# Patient Record
Sex: FEMALE | Race: WHITE | Employment: OTHER | URBAN - METROPOLITAN AREA
[De-identification: names, ages, dates, MRNs, and addresses within clinical notes are randomized per-mention and may not be internally consistent; named-entity substitution may affect disease eponyms.]

---

## 2017-04-03 PROBLEM — M84.48XA: Status: ACTIVE | Noted: 2017-04-03

## 2017-04-05 PROBLEM — M80.08XA PATHOLOGICAL FRACTURE OF VERTEBRA DUE TO OSTEOPOROSIS (HCC): Status: ACTIVE | Noted: 2017-04-03

## 2017-04-05 PROBLEM — E07.89 THYROID MASS OF UNCLEAR ETIOLOGY: Status: ACTIVE | Noted: 2017-04-05

## 2017-04-05 PROBLEM — R91.1 INCIDENTAL PULMONARY NODULE, GREATER THAN OR EQUAL TO 8MM: Status: ACTIVE | Noted: 2017-04-05

## 2017-04-11 ENCOUNTER — TELEPHONE (OUTPATIENT)
Dept: PULMONOLOGY | Age: 67
End: 2017-04-11

## 2017-04-11 ENCOUNTER — TELEPHONE (OUTPATIENT)
Dept: CASE MANAGEMENT | Age: 67
End: 2017-04-11

## 2017-04-11 DIAGNOSIS — R91.1 PULMONARY NODULE, LEFT: Primary | ICD-10-CM

## 2017-04-14 PROBLEM — J96.00 ACUTE RESPIRATORY FAILURE (HCC): Status: ACTIVE | Noted: 2017-04-14

## 2017-04-14 PROBLEM — J44.1 COPD EXACERBATION (HCC): Status: ACTIVE | Noted: 2017-04-14

## 2017-04-14 PROBLEM — T17.500A MUCUS PLUGGING OF BRONCHI: Status: ACTIVE | Noted: 2017-04-14

## 2017-04-14 PROBLEM — J20.9 ACUTE BRONCHITIS: Status: ACTIVE | Noted: 2017-04-14

## 2017-04-14 PROBLEM — J96.01 ACUTE RESPIRATORY FAILURE WITH HYPOXIA (HCC): Status: ACTIVE | Noted: 2017-04-14

## 2017-04-15 PROBLEM — R09.02 HYPOXEMIA: Status: ACTIVE | Noted: 2017-04-15

## 2017-04-15 PROBLEM — J96.01 ACUTE RESPIRATORY FAILURE WITH HYPOXIA (HCC): Status: RESOLVED | Noted: 2017-04-14 | Resolved: 2017-04-15

## 2017-04-25 ENCOUNTER — TELEPHONE (OUTPATIENT)
Dept: PULMONOLOGY | Age: 67
End: 2017-04-25

## 2017-04-27 ENCOUNTER — OFFICE VISIT (OUTPATIENT)
Dept: ENDOCRINOLOGY | Age: 67
End: 2017-04-27

## 2017-04-27 VITALS
RESPIRATION RATE: 16 BRPM | SYSTOLIC BLOOD PRESSURE: 125 MMHG | OXYGEN SATURATION: 98 % | HEIGHT: 62 IN | WEIGHT: 158 LBS | DIASTOLIC BLOOD PRESSURE: 79 MMHG | BODY MASS INDEX: 29.08 KG/M2 | HEART RATE: 83 BPM

## 2017-04-27 DIAGNOSIS — E07.89 THYROID MASS OF UNCLEAR ETIOLOGY: ICD-10-CM

## 2017-04-27 DIAGNOSIS — M80.08XS PATHOLOGICAL FRACTURE OF VERTEBRA DUE TO OSTEOPOROSIS, UNSPECIFIED OSTEOPOROSIS TYPE, SEQUELA: ICD-10-CM

## 2017-04-27 DIAGNOSIS — S22.000A COMPRESSION FRACTURE OF BODY OF THORACIC VERTEBRA (HCC): ICD-10-CM

## 2017-04-27 DIAGNOSIS — N95.1 POSTMENOPAUSAL DISORDER: Primary | ICD-10-CM

## 2017-04-27 PROCEDURE — 1123F ACP DISCUSS/DSCN MKR DOCD: CPT | Performed by: INTERNAL MEDICINE

## 2017-04-27 PROCEDURE — G8420 CALC BMI NORM PARAMETERS: HCPCS | Performed by: INTERNAL MEDICINE

## 2017-04-27 PROCEDURE — 1111F DSCHRG MED/CURRENT MED MERGE: CPT | Performed by: INTERNAL MEDICINE

## 2017-04-27 PROCEDURE — 4040F PNEUMOC VAC/ADMIN/RCVD: CPT | Performed by: INTERNAL MEDICINE

## 2017-04-27 PROCEDURE — 1090F PRES/ABSN URINE INCON ASSESS: CPT | Performed by: INTERNAL MEDICINE

## 2017-04-27 PROCEDURE — G8427 DOCREV CUR MEDS BY ELIG CLIN: HCPCS | Performed by: INTERNAL MEDICINE

## 2017-04-27 PROCEDURE — 99205 OFFICE O/P NEW HI 60 MIN: CPT | Performed by: INTERNAL MEDICINE

## 2017-04-27 PROCEDURE — 4005F PHARM THX FOR OP RXD: CPT | Performed by: INTERNAL MEDICINE

## 2017-04-27 PROCEDURE — 3014F SCREEN MAMMO DOC REV: CPT | Performed by: INTERNAL MEDICINE

## 2017-04-27 PROCEDURE — G8400 PT W/DXA NO RESULTS DOC: HCPCS | Performed by: INTERNAL MEDICINE

## 2017-04-27 PROCEDURE — 3017F COLORECTAL CA SCREEN DOC REV: CPT | Performed by: INTERNAL MEDICINE

## 2017-04-27 PROCEDURE — 1036F TOBACCO NON-USER: CPT | Performed by: INTERNAL MEDICINE

## 2017-04-28 ENCOUNTER — TELEPHONE (OUTPATIENT)
Dept: CT IMAGING | Age: 67
End: 2017-04-28

## 2017-04-28 ENCOUNTER — HOSPITAL ENCOUNTER (OUTPATIENT)
Dept: OTHER | Age: 67
Discharge: HOME | End: 2017-04-28
Attending: INTERNAL MEDICINE | Admitting: INTERNAL MEDICINE

## 2017-04-28 DIAGNOSIS — M80.08XS PATHOLOGICAL FRACTURE OF VERTEBRA DUE TO OSTEOPOROSIS, UNSPECIFIED OSTEOPOROSIS TYPE, SEQUELA: ICD-10-CM

## 2017-04-28 DIAGNOSIS — N95.1 POSTMENOPAUSAL DISORDER: ICD-10-CM

## 2017-04-28 DIAGNOSIS — S22.000A COMPRESSION FRACTURE OF BODY OF THORACIC VERTEBRA (HCC): ICD-10-CM

## 2017-05-02 ENCOUNTER — TELEPHONE (OUTPATIENT)
Dept: FAMILY MEDICINE CLINIC | Age: 67
End: 2017-05-02

## 2017-05-04 ENCOUNTER — OFFICE VISIT (OUTPATIENT)
Dept: FAMILY MEDICINE CLINIC | Age: 67
End: 2017-05-04

## 2017-05-04 VITALS
HEIGHT: 63 IN | DIASTOLIC BLOOD PRESSURE: 68 MMHG | RESPIRATION RATE: 16 BRPM | TEMPERATURE: 97.9 F | OXYGEN SATURATION: 96 % | HEART RATE: 78 BPM | WEIGHT: 155.4 LBS | SYSTOLIC BLOOD PRESSURE: 118 MMHG | BODY MASS INDEX: 27.54 KG/M2

## 2017-05-04 DIAGNOSIS — Z13.220 LIPID SCREENING: ICD-10-CM

## 2017-05-04 DIAGNOSIS — Z23 NEED FOR PNEUMOCOCCAL VACCINATION: ICD-10-CM

## 2017-05-04 DIAGNOSIS — M81.0 OSTEOPOROSIS: ICD-10-CM

## 2017-05-04 DIAGNOSIS — Z92.89 HX OF SCREENING MAMMOGRAPHY: ICD-10-CM

## 2017-05-04 DIAGNOSIS — K59.09 OTHER CONSTIPATION: ICD-10-CM

## 2017-05-04 DIAGNOSIS — J43.8 OTHER EMPHYSEMA (HCC): ICD-10-CM

## 2017-05-04 DIAGNOSIS — Z12.11 COLON CANCER SCREENING: ICD-10-CM

## 2017-05-04 DIAGNOSIS — S22.000A COMPRESSION FX, THORACIC SPINE, CLOSED, INITIAL ENCOUNTER (HCC): ICD-10-CM

## 2017-05-04 DIAGNOSIS — N95.1 POSTMENOPAUSAL DISORDER: ICD-10-CM

## 2017-05-04 DIAGNOSIS — Z87.891 FORMER SMOKER: ICD-10-CM

## 2017-05-04 DIAGNOSIS — K63.5 POLYP OF COLON: ICD-10-CM

## 2017-05-04 DIAGNOSIS — R91.1 LUNG NODULE: Primary | ICD-10-CM

## 2017-05-04 DIAGNOSIS — Z12.4 CERVICAL CANCER SCREENING: ICD-10-CM

## 2017-05-04 DIAGNOSIS — E07.89 THYROID MASS OF UNCLEAR ETIOLOGY: ICD-10-CM

## 2017-05-04 PROBLEM — J44.9 CHRONIC OBSTRUCTIVE PULMONARY DISEASE (HCC): Status: ACTIVE | Noted: 2017-05-04

## 2017-05-04 PROCEDURE — 1090F PRES/ABSN URINE INCON ASSESS: CPT | Performed by: FAMILY MEDICINE

## 2017-05-04 PROCEDURE — 4040F PNEUMOC VAC/ADMIN/RCVD: CPT | Performed by: FAMILY MEDICINE

## 2017-05-04 PROCEDURE — 3023F SPIROM DOC REV: CPT | Performed by: FAMILY MEDICINE

## 2017-05-04 PROCEDURE — 3017F COLORECTAL CA SCREEN DOC REV: CPT | Performed by: FAMILY MEDICINE

## 2017-05-04 PROCEDURE — 90732 PPSV23 VACC 2 YRS+ SUBQ/IM: CPT | Performed by: FAMILY MEDICINE

## 2017-05-04 PROCEDURE — 1036F TOBACCO NON-USER: CPT | Performed by: FAMILY MEDICINE

## 2017-05-04 PROCEDURE — 4005F PHARM THX FOR OP RXD: CPT | Performed by: FAMILY MEDICINE

## 2017-05-04 PROCEDURE — G8420 CALC BMI NORM PARAMETERS: HCPCS | Performed by: FAMILY MEDICINE

## 2017-05-04 PROCEDURE — G8926 SPIRO NO PERF OR DOC: HCPCS | Performed by: FAMILY MEDICINE

## 2017-05-04 PROCEDURE — G8399 PT W/DXA RESULTS DOCUMENT: HCPCS | Performed by: FAMILY MEDICINE

## 2017-05-04 PROCEDURE — G8427 DOCREV CUR MEDS BY ELIG CLIN: HCPCS | Performed by: FAMILY MEDICINE

## 2017-05-04 PROCEDURE — 99204 OFFICE O/P NEW MOD 45 MIN: CPT | Performed by: FAMILY MEDICINE

## 2017-05-04 PROCEDURE — 1123F ACP DISCUSS/DSCN MKR DOCD: CPT | Performed by: FAMILY MEDICINE

## 2017-05-04 PROCEDURE — G0009 ADMIN PNEUMOCOCCAL VACCINE: HCPCS | Performed by: FAMILY MEDICINE

## 2017-05-04 PROCEDURE — 1111F DSCHRG MED/CURRENT MED MERGE: CPT | Performed by: FAMILY MEDICINE

## 2017-05-04 PROCEDURE — 3014F SCREEN MAMMO DOC REV: CPT | Performed by: FAMILY MEDICINE

## 2017-05-04 RX ORDER — TRAMADOL HYDROCHLORIDE 50 MG/1
50 TABLET ORAL 2 TIMES DAILY
Status: ON HOLD | COMMUNITY
End: 2017-05-19

## 2017-05-04 RX ORDER — ALBUTEROL SULFATE 90 UG/1
2 AEROSOL, METERED RESPIRATORY (INHALATION) EVERY 6 HOURS PRN
Qty: 1 INHALER | Refills: 3 | Status: SHIPPED | OUTPATIENT
Start: 2017-05-04 | End: 2017-07-11 | Stop reason: SDUPTHER

## 2017-05-04 RX ORDER — SENNA PLUS 8.6 MG/1
1 TABLET ORAL NIGHTLY
Qty: 30 TABLET | Refills: 0 | Status: SHIPPED | OUTPATIENT
Start: 2017-05-04 | End: 2017-06-03

## 2017-05-04 RX ORDER — CALCIUM CARBONATE-CHOLECALCIFEROL TAB 250 MG-125 UNIT 250-125 MG-UNIT
1 TAB ORAL 2 TIMES DAILY WITH MEALS
Qty: 60 TABLET | Refills: 1 | Status: SHIPPED | OUTPATIENT
Start: 2017-05-04 | End: 2017-08-29

## 2017-05-04 ASSESSMENT — ENCOUNTER SYMPTOMS
COUGH: 0
COLOR CHANGE: 0
SORE THROAT: 0
SINUS PRESSURE: 0
EYE ITCHING: 0
STRIDOR: 0
EYE REDNESS: 0
VOMITING: 0
APNEA: 0
NAUSEA: 0
WHEEZING: 0
ABDOMINAL PAIN: 0
BLOOD IN STOOL: 0
TROUBLE SWALLOWING: 0
ANAL BLEEDING: 0
SHORTNESS OF BREATH: 0
EYE PAIN: 0
RECTAL PAIN: 0
BACK PAIN: 1
PHOTOPHOBIA: 0
EYE DISCHARGE: 0
VOICE CHANGE: 0
DIARRHEA: 0
FACIAL SWELLING: 0
CHEST TIGHTNESS: 0
RHINORRHEA: 0
CHOKING: 0
CONSTIPATION: 0
ABDOMINAL DISTENTION: 0

## 2017-05-04 ASSESSMENT — PATIENT HEALTH QUESTIONNAIRE - PHQ9
1. LITTLE INTEREST OR PLEASURE IN DOING THINGS: 0
SUM OF ALL RESPONSES TO PHQ QUESTIONS 1-9: 0
2. FEELING DOWN, DEPRESSED OR HOPELESS: 0
SUM OF ALL RESPONSES TO PHQ9 QUESTIONS 1 & 2: 0

## 2017-05-05 ENCOUNTER — OFFICE VISIT (OUTPATIENT)
Dept: ENDOCRINOLOGY | Age: 67
End: 2017-05-05

## 2017-05-05 VITALS
RESPIRATION RATE: 18 BRPM | WEIGHT: 158.2 LBS | DIASTOLIC BLOOD PRESSURE: 87 MMHG | HEIGHT: 62 IN | SYSTOLIC BLOOD PRESSURE: 113 MMHG | OXYGEN SATURATION: 93 % | BODY MASS INDEX: 29.11 KG/M2 | HEART RATE: 99 BPM

## 2017-05-05 DIAGNOSIS — E04.1 RIGHT THYROID NODULE: Primary | ICD-10-CM

## 2017-05-05 DIAGNOSIS — M81.0 OSTEOPOROSIS: ICD-10-CM

## 2017-05-05 PROCEDURE — 1036F TOBACCO NON-USER: CPT | Performed by: INTERNAL MEDICINE

## 2017-05-05 PROCEDURE — 4040F PNEUMOC VAC/ADMIN/RCVD: CPT | Performed by: INTERNAL MEDICINE

## 2017-05-05 PROCEDURE — G8420 CALC BMI NORM PARAMETERS: HCPCS | Performed by: INTERNAL MEDICINE

## 2017-05-05 PROCEDURE — 10022 PR FINE NEEDLE ASP;W/IMAGING GUIDANCE: CPT | Performed by: INTERNAL MEDICINE

## 2017-05-05 PROCEDURE — G8399 PT W/DXA RESULTS DOCUMENT: HCPCS | Performed by: INTERNAL MEDICINE

## 2017-05-05 PROCEDURE — 4005F PHARM THX FOR OP RXD: CPT | Performed by: INTERNAL MEDICINE

## 2017-05-05 PROCEDURE — 1111F DSCHRG MED/CURRENT MED MERGE: CPT | Performed by: INTERNAL MEDICINE

## 2017-05-05 PROCEDURE — 1123F ACP DISCUSS/DSCN MKR DOCD: CPT | Performed by: INTERNAL MEDICINE

## 2017-05-05 PROCEDURE — 3017F COLORECTAL CA SCREEN DOC REV: CPT | Performed by: INTERNAL MEDICINE

## 2017-05-05 PROCEDURE — 1090F PRES/ABSN URINE INCON ASSESS: CPT | Performed by: INTERNAL MEDICINE

## 2017-05-05 PROCEDURE — 3014F SCREEN MAMMO DOC REV: CPT | Performed by: INTERNAL MEDICINE

## 2017-05-05 PROCEDURE — 99214 OFFICE O/P EST MOD 30 MIN: CPT | Performed by: INTERNAL MEDICINE

## 2017-05-05 PROCEDURE — G8427 DOCREV CUR MEDS BY ELIG CLIN: HCPCS | Performed by: INTERNAL MEDICINE

## 2017-05-05 RX ORDER — ACETAMINOPHEN 325 MG/1
650 TABLET ORAL EVERY 6 HOURS PRN
Status: ON HOLD | COMMUNITY
End: 2017-05-19 | Stop reason: HOSPADM

## 2017-05-05 RX ORDER — FLUTICASONE FUROATE AND VILANTEROL 100; 25 UG/1; UG/1
POWDER RESPIRATORY (INHALATION) DAILY
COMMUNITY
End: 2017-08-29

## 2017-05-05 ASSESSMENT — ENCOUNTER SYMPTOMS
PHOTOPHOBIA: 0
ORTHOPNEA: 0
DOUBLE VISION: 0
BLURRED VISION: 0
HEMOPTYSIS: 0
COUGH: 0
BACK PAIN: 0

## 2017-05-08 ENCOUNTER — TELEPHONE (OUTPATIENT)
Dept: FAMILY MEDICINE CLINIC | Age: 67
End: 2017-05-08

## 2017-05-08 DIAGNOSIS — R53.81 PHYSICAL DECONDITIONING: Primary | ICD-10-CM

## 2017-05-08 DIAGNOSIS — M80.08XS PATHOLOGICAL FRACTURE OF VERTEBRA DUE TO OSTEOPOROSIS, UNSPECIFIED OSTEOPOROSIS TYPE, SEQUELA: ICD-10-CM

## 2017-05-08 DIAGNOSIS — S22.000A COMPRESSION FRACTURE OF BODY OF THORACIC VERTEBRA (HCC): ICD-10-CM

## 2017-05-08 DIAGNOSIS — N95.1 POSTMENOPAUSAL DISORDER: ICD-10-CM

## 2017-05-08 LAB
24HR URINE VOLUME (ML): 2750 ML
CALCIUM 24 HOUR URINE: 204 MG/24 HR (ref 42–353)
CREATININE 24 HOUR URINE: 1.2 G/24HR (ref 0.6–1.5)

## 2017-05-12 ENCOUNTER — TELEPHONE (OUTPATIENT)
Dept: ENDOCRINOLOGY | Age: 67
End: 2017-05-12

## 2017-05-12 DIAGNOSIS — Z13.220 LIPID SCREENING: ICD-10-CM

## 2017-05-12 DIAGNOSIS — E07.9 THYROID MASS: Primary | ICD-10-CM

## 2017-05-12 LAB
A/G RATIO: 1.9 (ref 1.1–2.2)
ALBUMIN SERPL-MCNC: 4.6 G/DL (ref 3.4–5)
ALP BLD-CCNC: 78 U/L (ref 40–129)
ALT SERPL-CCNC: 13 U/L (ref 10–40)
ANION GAP SERPL CALCULATED.3IONS-SCNC: 15 MMOL/L (ref 3–16)
AST SERPL-CCNC: 14 U/L (ref 15–37)
BILIRUB SERPL-MCNC: 0.4 MG/DL (ref 0–1)
BUN BLDV-MCNC: 17 MG/DL (ref 7–20)
CALCIUM SERPL-MCNC: 9.9 MG/DL (ref 8.3–10.6)
CHLORIDE BLD-SCNC: 100 MMOL/L (ref 99–110)
CHOLESTEROL, TOTAL: 180 MG/DL (ref 0–199)
CO2: 25 MMOL/L (ref 21–32)
CREAT SERPL-MCNC: 0.6 MG/DL (ref 0.6–1.2)
GFR AFRICAN AMERICAN: >60
GFR NON-AFRICAN AMERICAN: >60
GLOBULIN: 2.4 G/DL
GLUCOSE BLD-MCNC: 82 MG/DL (ref 70–99)
HDLC SERPL-MCNC: 57 MG/DL (ref 40–60)
LDL CHOLESTEROL CALCULATED: 107 MG/DL
POTASSIUM SERPL-SCNC: 4.3 MMOL/L (ref 3.5–5.1)
SODIUM BLD-SCNC: 140 MMOL/L (ref 136–145)
TOTAL PROTEIN: 7 G/DL (ref 6.4–8.2)
TRIGL SERPL-MCNC: 81 MG/DL (ref 0–150)
VLDLC SERPL CALC-MCNC: 16 MG/DL

## 2017-05-17 ENCOUNTER — HOSPITAL ENCOUNTER (OUTPATIENT)
Dept: MAMMOGRAPHY | Age: 67
Discharge: HOME | End: 2017-05-17
Attending: FAMILY MEDICINE | Admitting: FAMILY MEDICINE

## 2017-05-17 ENCOUNTER — HOSPITAL ENCOUNTER (OUTPATIENT)
Dept: CT IMAGING | Age: 67
Discharge: HOME | End: 2017-05-17
Attending: FAMILY MEDICINE | Admitting: FAMILY MEDICINE

## 2017-05-17 DIAGNOSIS — Z12.31 VISIT FOR SCREENING MAMMOGRAM: ICD-10-CM

## 2017-05-17 DIAGNOSIS — R91.1 SOLITARY PULMONARY NODULE: ICD-10-CM

## 2017-05-17 DIAGNOSIS — R91.1 LUNG NODULE: ICD-10-CM

## 2017-05-17 DIAGNOSIS — Z87.891 FORMER SMOKER: ICD-10-CM

## 2017-05-17 PROBLEM — M80.08XA VERTEBRAL FRACTURE, OSTEOPOROTIC (HCC): Status: ACTIVE | Noted: 2017-05-17

## 2017-05-22 ENCOUNTER — CARE COORDINATION (OUTPATIENT)
Dept: CARE COORDINATION | Age: 67
End: 2017-05-22

## 2017-05-22 ENCOUNTER — TELEPHONE (OUTPATIENT)
Dept: ENDOCRINOLOGY | Age: 67
End: 2017-05-22

## 2017-05-26 ENCOUNTER — OFFICE VISIT (OUTPATIENT)
Dept: SURGERY | Age: 67
End: 2017-05-26

## 2017-05-26 VITALS
TEMPERATURE: 98.1 F | BODY MASS INDEX: 28.52 KG/M2 | WEIGHT: 155 LBS | SYSTOLIC BLOOD PRESSURE: 125 MMHG | OXYGEN SATURATION: 95 % | HEIGHT: 62 IN | DIASTOLIC BLOOD PRESSURE: 92 MMHG | HEART RATE: 90 BPM

## 2017-05-26 DIAGNOSIS — E07.9 THYROID MASS: Primary | ICD-10-CM

## 2017-05-26 PROCEDURE — 4040F PNEUMOC VAC/ADMIN/RCVD: CPT | Performed by: SURGERY

## 2017-05-26 PROCEDURE — G8399 PT W/DXA RESULTS DOCUMENT: HCPCS | Performed by: SURGERY

## 2017-05-26 PROCEDURE — 99205 OFFICE O/P NEW HI 60 MIN: CPT | Performed by: SURGERY

## 2017-05-26 PROCEDURE — 1123F ACP DISCUSS/DSCN MKR DOCD: CPT | Performed by: SURGERY

## 2017-05-26 PROCEDURE — 3014F SCREEN MAMMO DOC REV: CPT | Performed by: SURGERY

## 2017-05-26 PROCEDURE — G8420 CALC BMI NORM PARAMETERS: HCPCS | Performed by: SURGERY

## 2017-05-26 PROCEDURE — 3017F COLORECTAL CA SCREEN DOC REV: CPT | Performed by: SURGERY

## 2017-05-26 PROCEDURE — G8427 DOCREV CUR MEDS BY ELIG CLIN: HCPCS | Performed by: SURGERY

## 2017-05-26 PROCEDURE — 1111F DSCHRG MED/CURRENT MED MERGE: CPT | Performed by: SURGERY

## 2017-05-26 PROCEDURE — 1036F TOBACCO NON-USER: CPT | Performed by: SURGERY

## 2017-05-26 PROCEDURE — 1090F PRES/ABSN URINE INCON ASSESS: CPT | Performed by: SURGERY

## 2017-05-30 ENCOUNTER — TELEPHONE (OUTPATIENT)
Dept: PULMONOLOGY | Age: 67
End: 2017-05-30

## 2017-05-30 ENCOUNTER — OFFICE VISIT (OUTPATIENT)
Dept: PULMONOLOGY | Age: 67
End: 2017-05-30

## 2017-05-30 VITALS
SYSTOLIC BLOOD PRESSURE: 122 MMHG | WEIGHT: 152 LBS | OXYGEN SATURATION: 95 % | RESPIRATION RATE: 16 BRPM | HEIGHT: 63 IN | DIASTOLIC BLOOD PRESSURE: 80 MMHG | BODY MASS INDEX: 26.93 KG/M2 | HEART RATE: 81 BPM

## 2017-05-30 DIAGNOSIS — J44.9 COPD, SEVERITY TO BE DETERMINED (HCC): Primary | ICD-10-CM

## 2017-05-30 DIAGNOSIS — R91.1 PULMONARY NODULE: ICD-10-CM

## 2017-05-30 PROCEDURE — G8420 CALC BMI NORM PARAMETERS: HCPCS | Performed by: INTERNAL MEDICINE

## 2017-05-30 PROCEDURE — 3023F SPIROM DOC REV: CPT | Performed by: INTERNAL MEDICINE

## 2017-05-30 PROCEDURE — G8926 SPIRO NO PERF OR DOC: HCPCS | Performed by: INTERNAL MEDICINE

## 2017-05-30 PROCEDURE — 3017F COLORECTAL CA SCREEN DOC REV: CPT | Performed by: INTERNAL MEDICINE

## 2017-05-30 PROCEDURE — 4040F PNEUMOC VAC/ADMIN/RCVD: CPT | Performed by: INTERNAL MEDICINE

## 2017-05-30 PROCEDURE — 1111F DSCHRG MED/CURRENT MED MERGE: CPT | Performed by: INTERNAL MEDICINE

## 2017-05-30 PROCEDURE — G8399 PT W/DXA RESULTS DOCUMENT: HCPCS | Performed by: INTERNAL MEDICINE

## 2017-05-30 PROCEDURE — G8427 DOCREV CUR MEDS BY ELIG CLIN: HCPCS | Performed by: INTERNAL MEDICINE

## 2017-05-30 PROCEDURE — 99214 OFFICE O/P EST MOD 30 MIN: CPT | Performed by: INTERNAL MEDICINE

## 2017-05-30 PROCEDURE — 1036F TOBACCO NON-USER: CPT | Performed by: INTERNAL MEDICINE

## 2017-05-30 PROCEDURE — 1090F PRES/ABSN URINE INCON ASSESS: CPT | Performed by: INTERNAL MEDICINE

## 2017-05-30 PROCEDURE — 3014F SCREEN MAMMO DOC REV: CPT | Performed by: INTERNAL MEDICINE

## 2017-05-30 PROCEDURE — 1123F ACP DISCUSS/DSCN MKR DOCD: CPT | Performed by: INTERNAL MEDICINE

## 2017-06-05 ENCOUNTER — HOSPITAL ENCOUNTER (OUTPATIENT)
Dept: PULMONOLOGY | Age: 67
Discharge: HOME | End: 2017-06-05
Attending: INTERNAL MEDICINE | Admitting: INTERNAL MEDICINE

## 2017-06-05 DIAGNOSIS — J44.9 CHRONIC OBSTRUCTIVE PULMONARY DISEASE (HCC): ICD-10-CM

## 2017-06-05 RX ORDER — ALBUTEROL SULFATE 2.5 MG/3ML
2.5 SOLUTION RESPIRATORY (INHALATION) ONCE
Status: COMPLETED | OUTPATIENT
Start: 2017-06-05 | End: 2017-06-05

## 2017-06-05 RX ADMIN — ALBUTEROL SULFATE 2.5 MG: 2.5 SOLUTION RESPIRATORY (INHALATION) at 09:05

## 2017-06-06 ENCOUNTER — TELEPHONE (OUTPATIENT)
Dept: FAMILY MEDICINE CLINIC | Age: 67
End: 2017-06-06

## 2017-06-08 ENCOUNTER — OFFICE VISIT (OUTPATIENT)
Dept: FAMILY MEDICINE CLINIC | Age: 67
End: 2017-06-08

## 2017-06-08 VITALS
TEMPERATURE: 98.5 F | RESPIRATION RATE: 16 BRPM | BODY MASS INDEX: 27.55 KG/M2 | SYSTOLIC BLOOD PRESSURE: 128 MMHG | OXYGEN SATURATION: 96 % | WEIGHT: 149.7 LBS | HEART RATE: 82 BPM | DIASTOLIC BLOOD PRESSURE: 80 MMHG | HEIGHT: 62 IN

## 2017-06-08 DIAGNOSIS — R91.1 LUNG NODULE: ICD-10-CM

## 2017-06-08 DIAGNOSIS — Z98.890 S/P KYPHOPLASTY: ICD-10-CM

## 2017-06-08 DIAGNOSIS — K21.9 GASTROESOPHAGEAL REFLUX DISEASE WITHOUT ESOPHAGITIS: ICD-10-CM

## 2017-06-08 DIAGNOSIS — M54.50 CHRONIC BILATERAL LOW BACK PAIN WITHOUT SCIATICA: ICD-10-CM

## 2017-06-08 DIAGNOSIS — M80.08XD VERTEBRAL FRACTURE, OSTEOPOROTIC, WITH ROUTINE HEALING, SUBSEQUENT ENCOUNTER: ICD-10-CM

## 2017-06-08 DIAGNOSIS — G89.29 CHRONIC BILATERAL LOW BACK PAIN WITHOUT SCIATICA: ICD-10-CM

## 2017-06-08 DIAGNOSIS — J43.8 OTHER EMPHYSEMA (HCC): Primary | ICD-10-CM

## 2017-06-08 DIAGNOSIS — E07.9 THYROID MASS: ICD-10-CM

## 2017-06-08 DIAGNOSIS — Z12.11 COLON CANCER SCREENING: ICD-10-CM

## 2017-06-08 DIAGNOSIS — E04.1 THYROID NODULE: ICD-10-CM

## 2017-06-08 PROCEDURE — G8420 CALC BMI NORM PARAMETERS: HCPCS | Performed by: FAMILY MEDICINE

## 2017-06-08 PROCEDURE — 99214 OFFICE O/P EST MOD 30 MIN: CPT | Performed by: FAMILY MEDICINE

## 2017-06-08 PROCEDURE — G8427 DOCREV CUR MEDS BY ELIG CLIN: HCPCS | Performed by: FAMILY MEDICINE

## 2017-06-08 PROCEDURE — 3023F SPIROM DOC REV: CPT | Performed by: FAMILY MEDICINE

## 2017-06-08 PROCEDURE — G8399 PT W/DXA RESULTS DOCUMENT: HCPCS | Performed by: FAMILY MEDICINE

## 2017-06-08 PROCEDURE — 1111F DSCHRG MED/CURRENT MED MERGE: CPT | Performed by: FAMILY MEDICINE

## 2017-06-08 PROCEDURE — 4005F PHARM THX FOR OP RXD: CPT | Performed by: FAMILY MEDICINE

## 2017-06-08 PROCEDURE — 3017F COLORECTAL CA SCREEN DOC REV: CPT | Performed by: FAMILY MEDICINE

## 2017-06-08 PROCEDURE — 3014F SCREEN MAMMO DOC REV: CPT | Performed by: FAMILY MEDICINE

## 2017-06-08 PROCEDURE — G8926 SPIRO NO PERF OR DOC: HCPCS | Performed by: FAMILY MEDICINE

## 2017-06-08 PROCEDURE — 1036F TOBACCO NON-USER: CPT | Performed by: FAMILY MEDICINE

## 2017-06-08 PROCEDURE — 4040F PNEUMOC VAC/ADMIN/RCVD: CPT | Performed by: FAMILY MEDICINE

## 2017-06-08 PROCEDURE — 1123F ACP DISCUSS/DSCN MKR DOCD: CPT | Performed by: FAMILY MEDICINE

## 2017-06-08 PROCEDURE — 1090F PRES/ABSN URINE INCON ASSESS: CPT | Performed by: FAMILY MEDICINE

## 2017-06-08 RX ORDER — RANITIDINE 150 MG/1
150 TABLET ORAL 2 TIMES DAILY
Qty: 60 TABLET | Refills: 1 | Status: SHIPPED | OUTPATIENT
Start: 2017-06-08 | End: 2017-08-29

## 2017-06-08 RX ORDER — TRAMADOL HYDROCHLORIDE 50 MG/1
50 TABLET ORAL EVERY 6 HOURS PRN
Qty: 60 TABLET | Refills: 0 | Status: CANCELLED | OUTPATIENT
Start: 2017-06-08

## 2017-06-08 ASSESSMENT — ENCOUNTER SYMPTOMS
CONSTIPATION: 0
RECTAL PAIN: 0
COUGH: 0
NAUSEA: 0
SHORTNESS OF BREATH: 0
VOMITING: 0
APNEA: 0
STRIDOR: 0
ANAL BLEEDING: 0
CHEST TIGHTNESS: 0
DIARRHEA: 0
ABDOMINAL PAIN: 0
CHOKING: 0
BLOOD IN STOOL: 0
WHEEZING: 0
ABDOMINAL DISTENTION: 0

## 2017-07-11 ENCOUNTER — TELEPHONE (OUTPATIENT)
Dept: FAMILY MEDICINE CLINIC | Age: 67
End: 2017-07-11

## 2017-07-11 DIAGNOSIS — J43.8 OTHER EMPHYSEMA (HCC): ICD-10-CM

## 2017-07-11 RX ORDER — ALBUTEROL SULFATE 90 UG/1
2 AEROSOL, METERED RESPIRATORY (INHALATION) EVERY 6 HOURS PRN
Qty: 1 INHALER | Refills: 3 | Status: SHIPPED | OUTPATIENT
Start: 2017-07-11 | End: 2017-07-11 | Stop reason: SDUPTHER

## 2017-07-11 RX ORDER — ALBUTEROL SULFATE 90 UG/1
2 AEROSOL, METERED RESPIRATORY (INHALATION) EVERY 6 HOURS PRN
Qty: 1 INHALER | Refills: 3 | Status: SHIPPED | OUTPATIENT
Start: 2017-07-11 | End: 2017-07-11

## 2017-07-11 RX ORDER — ALBUTEROL SULFATE 90 UG/1
2 AEROSOL, METERED RESPIRATORY (INHALATION) EVERY 6 HOURS PRN
Qty: 1 INHALER | Refills: 3 | Status: SHIPPED | OUTPATIENT
Start: 2017-07-11 | End: 2017-08-29

## 2017-08-03 ENCOUNTER — TELEPHONE (OUTPATIENT)
Dept: FAMILY MEDICINE CLINIC | Age: 67
End: 2017-08-03

## 2017-08-03 RX ORDER — ALBUTEROL SULFATE 90 UG/1
2 AEROSOL, METERED RESPIRATORY (INHALATION) EVERY 6 HOURS PRN
Qty: 1 INHALER | Refills: 3 | Status: SHIPPED | OUTPATIENT
Start: 2017-08-03 | End: 2018-06-01 | Stop reason: SDUPTHER

## 2017-08-29 ENCOUNTER — HOSPITAL ENCOUNTER (OUTPATIENT)
Dept: CT IMAGING | Age: 67
Discharge: HOME | End: 2017-08-29
Attending: INTERNAL MEDICINE | Admitting: INTERNAL MEDICINE

## 2017-08-29 ENCOUNTER — OFFICE VISIT (OUTPATIENT)
Dept: PULMONOLOGY | Age: 67
End: 2017-08-29

## 2017-08-29 VITALS
BODY MASS INDEX: 26.4 KG/M2 | RESPIRATION RATE: 18 BRPM | DIASTOLIC BLOOD PRESSURE: 80 MMHG | SYSTOLIC BLOOD PRESSURE: 128 MMHG | HEIGHT: 63 IN | HEART RATE: 87 BPM | WEIGHT: 149 LBS | OXYGEN SATURATION: 95 %

## 2017-08-29 DIAGNOSIS — R91.1 PULMONARY NODULE: ICD-10-CM

## 2017-08-29 DIAGNOSIS — R91.1 SOLITARY PULMONARY NODULE: ICD-10-CM

## 2017-08-29 DIAGNOSIS — J43.2 CENTRILOBULAR EMPHYSEMA (HCC): Primary | ICD-10-CM

## 2017-08-29 DIAGNOSIS — J44.9 COPD, MODERATE (HCC): ICD-10-CM

## 2017-08-29 DIAGNOSIS — R91.1 INCIDENTAL PULMONARY NODULE, GREATER THAN OR EQUAL TO 8MM: ICD-10-CM

## 2017-08-29 PROCEDURE — 99214 OFFICE O/P EST MOD 30 MIN: CPT | Performed by: INTERNAL MEDICINE

## 2017-08-29 PROCEDURE — 1036F TOBACCO NON-USER: CPT | Performed by: INTERNAL MEDICINE

## 2017-08-29 PROCEDURE — 3023F SPIROM DOC REV: CPT | Performed by: INTERNAL MEDICINE

## 2017-08-29 PROCEDURE — G8427 DOCREV CUR MEDS BY ELIG CLIN: HCPCS | Performed by: INTERNAL MEDICINE

## 2017-08-29 PROCEDURE — 1123F ACP DISCUSS/DSCN MKR DOCD: CPT | Performed by: INTERNAL MEDICINE

## 2017-08-29 PROCEDURE — 3014F SCREEN MAMMO DOC REV: CPT | Performed by: INTERNAL MEDICINE

## 2017-08-29 PROCEDURE — 4040F PNEUMOC VAC/ADMIN/RCVD: CPT | Performed by: INTERNAL MEDICINE

## 2017-08-29 PROCEDURE — G8399 PT W/DXA RESULTS DOCUMENT: HCPCS | Performed by: INTERNAL MEDICINE

## 2017-08-29 PROCEDURE — G8926 SPIRO NO PERF OR DOC: HCPCS | Performed by: INTERNAL MEDICINE

## 2017-08-29 PROCEDURE — 1090F PRES/ABSN URINE INCON ASSESS: CPT | Performed by: INTERNAL MEDICINE

## 2017-08-29 PROCEDURE — 3017F COLORECTAL CA SCREEN DOC REV: CPT | Performed by: INTERNAL MEDICINE

## 2017-08-29 PROCEDURE — G8419 CALC BMI OUT NRM PARAM NOF/U: HCPCS | Performed by: INTERNAL MEDICINE

## 2017-08-29 RX ORDER — ACETAMINOPHEN 500 MG
500 TABLET ORAL EVERY 6 HOURS PRN
COMMUNITY

## 2017-08-30 ENCOUNTER — TELEPHONE (OUTPATIENT)
Dept: PULMONOLOGY | Age: 67
End: 2017-08-30

## 2017-08-30 DIAGNOSIS — J43.2 CENTRILOBULAR EMPHYSEMA (HCC): Primary | ICD-10-CM

## 2017-09-14 ENCOUNTER — OFFICE VISIT (OUTPATIENT)
Dept: FAMILY MEDICINE CLINIC | Age: 67
End: 2017-09-14

## 2017-09-14 VITALS
BODY MASS INDEX: 28.06 KG/M2 | HEIGHT: 62 IN | OXYGEN SATURATION: 93 % | HEART RATE: 82 BPM | DIASTOLIC BLOOD PRESSURE: 78 MMHG | RESPIRATION RATE: 16 BRPM | SYSTOLIC BLOOD PRESSURE: 126 MMHG | WEIGHT: 152.5 LBS | TEMPERATURE: 97.9 F

## 2017-09-14 DIAGNOSIS — Z98.890 STATUS POST KYPHOPLASTY: ICD-10-CM

## 2017-09-14 DIAGNOSIS — R91.1 LUNG NODULE: ICD-10-CM

## 2017-09-14 DIAGNOSIS — E66.3 OVERWEIGHT: ICD-10-CM

## 2017-09-14 DIAGNOSIS — M81.0 AGE RELATED OSTEOPOROSIS, UNSPECIFIED PATHOLOGICAL FRACTURE PRESENCE: ICD-10-CM

## 2017-09-14 DIAGNOSIS — K21.9 GASTROESOPHAGEAL REFLUX DISEASE WITHOUT ESOPHAGITIS: ICD-10-CM

## 2017-09-14 DIAGNOSIS — Z01.818 PREOP EXAMINATION: Primary | ICD-10-CM

## 2017-09-14 DIAGNOSIS — E07.9 THYROID MASS: ICD-10-CM

## 2017-09-14 DIAGNOSIS — J43.8 OTHER EMPHYSEMA (HCC): ICD-10-CM

## 2017-09-14 PROCEDURE — G8427 DOCREV CUR MEDS BY ELIG CLIN: HCPCS | Performed by: FAMILY MEDICINE

## 2017-09-14 PROCEDURE — 1123F ACP DISCUSS/DSCN MKR DOCD: CPT | Performed by: FAMILY MEDICINE

## 2017-09-14 PROCEDURE — G8399 PT W/DXA RESULTS DOCUMENT: HCPCS | Performed by: FAMILY MEDICINE

## 2017-09-14 PROCEDURE — G8926 SPIRO NO PERF OR DOC: HCPCS | Performed by: FAMILY MEDICINE

## 2017-09-14 PROCEDURE — 1090F PRES/ABSN URINE INCON ASSESS: CPT | Performed by: FAMILY MEDICINE

## 2017-09-14 PROCEDURE — 4040F PNEUMOC VAC/ADMIN/RCVD: CPT | Performed by: FAMILY MEDICINE

## 2017-09-14 PROCEDURE — 3017F COLORECTAL CA SCREEN DOC REV: CPT | Performed by: FAMILY MEDICINE

## 2017-09-14 PROCEDURE — 3014F SCREEN MAMMO DOC REV: CPT | Performed by: FAMILY MEDICINE

## 2017-09-14 PROCEDURE — 99214 OFFICE O/P EST MOD 30 MIN: CPT | Performed by: FAMILY MEDICINE

## 2017-09-14 PROCEDURE — 1036F TOBACCO NON-USER: CPT | Performed by: FAMILY MEDICINE

## 2017-09-14 PROCEDURE — 4005F PHARM THX FOR OP RXD: CPT | Performed by: FAMILY MEDICINE

## 2017-09-14 PROCEDURE — 3023F SPIROM DOC REV: CPT | Performed by: FAMILY MEDICINE

## 2017-09-14 PROCEDURE — G8417 CALC BMI ABV UP PARAM F/U: HCPCS | Performed by: FAMILY MEDICINE

## 2017-09-14 ASSESSMENT — ENCOUNTER SYMPTOMS
EYE REDNESS: 0
WHEEZING: 0
EYE ITCHING: 0
EYE DISCHARGE: 0
DIARRHEA: 0
RECTAL PAIN: 0
BLOOD IN STOOL: 0
VOICE CHANGE: 0
ABDOMINAL PAIN: 0
ANAL BLEEDING: 0
PHOTOPHOBIA: 0
EYE PAIN: 0
TROUBLE SWALLOWING: 0
COUGH: 0
FACIAL SWELLING: 0
CONSTIPATION: 0
COLOR CHANGE: 0
APNEA: 0
NAUSEA: 0
CHOKING: 0
VOMITING: 0
SHORTNESS OF BREATH: 0
STRIDOR: 0
ABDOMINAL DISTENTION: 0
RHINORRHEA: 0
SORE THROAT: 0
SINUS PRESSURE: 0
CHEST TIGHTNESS: 0

## 2017-09-18 ENCOUNTER — HOSPITAL ENCOUNTER (OUTPATIENT)
Dept: OTHER | Age: 67
Discharge: HOME | End: 2017-09-18
Attending: FAMILY MEDICINE | Admitting: FAMILY MEDICINE

## 2017-09-18 LAB
EKG ATRIAL RATE: 88 BPM
EKG DIAGNOSIS: NORMAL
EKG P AXIS: -8 DEGREES
EKG P-R INTERVAL: 124 MS
EKG Q-T INTERVAL: 384 MS
EKG QRS DURATION: 76 MS
EKG QTC CALCULATION (BAZETT): 464 MS
EKG R AXIS: -4 DEGREES
EKG T AXIS: 36 DEGREES
EKG VENTRICULAR RATE: 88 BPM

## 2017-09-18 PROCEDURE — 93010 ELECTROCARDIOGRAM REPORT: CPT | Performed by: INTERNAL MEDICINE

## 2017-09-20 ENCOUNTER — TELEPHONE (OUTPATIENT)
Dept: PULMONOLOGY | Age: 67
End: 2017-09-20

## 2017-11-09 ENCOUNTER — OFFICE VISIT (OUTPATIENT)
Dept: PULMONOLOGY | Age: 67
End: 2017-11-09

## 2017-11-09 VITALS
OXYGEN SATURATION: 91 % | HEIGHT: 62 IN | SYSTOLIC BLOOD PRESSURE: 128 MMHG | BODY MASS INDEX: 27.42 KG/M2 | WEIGHT: 149 LBS | HEART RATE: 88 BPM | RESPIRATION RATE: 16 BRPM | DIASTOLIC BLOOD PRESSURE: 78 MMHG

## 2017-11-09 DIAGNOSIS — J43.2 CENTRILOBULAR EMPHYSEMA (HCC): Primary | ICD-10-CM

## 2017-11-09 DIAGNOSIS — R91.1 INCIDENTAL PULMONARY NODULE, GREATER THAN OR EQUAL TO 8MM: ICD-10-CM

## 2017-11-09 DIAGNOSIS — J38.00 PARALYZED VOCAL CORDS: ICD-10-CM

## 2017-11-09 PROCEDURE — 4040F PNEUMOC VAC/ADMIN/RCVD: CPT | Performed by: INTERNAL MEDICINE

## 2017-11-09 PROCEDURE — 1036F TOBACCO NON-USER: CPT | Performed by: INTERNAL MEDICINE

## 2017-11-09 PROCEDURE — G8399 PT W/DXA RESULTS DOCUMENT: HCPCS | Performed by: INTERNAL MEDICINE

## 2017-11-09 PROCEDURE — G8417 CALC BMI ABV UP PARAM F/U: HCPCS | Performed by: INTERNAL MEDICINE

## 2017-11-09 PROCEDURE — G8484 FLU IMMUNIZE NO ADMIN: HCPCS | Performed by: INTERNAL MEDICINE

## 2017-11-09 PROCEDURE — G8926 SPIRO NO PERF OR DOC: HCPCS | Performed by: INTERNAL MEDICINE

## 2017-11-09 PROCEDURE — 1123F ACP DISCUSS/DSCN MKR DOCD: CPT | Performed by: INTERNAL MEDICINE

## 2017-11-09 PROCEDURE — 3014F SCREEN MAMMO DOC REV: CPT | Performed by: INTERNAL MEDICINE

## 2017-11-09 PROCEDURE — G8428 CUR MEDS NOT DOCUMENT: HCPCS | Performed by: INTERNAL MEDICINE

## 2017-11-09 PROCEDURE — 3023F SPIROM DOC REV: CPT | Performed by: INTERNAL MEDICINE

## 2017-11-09 PROCEDURE — 99214 OFFICE O/P EST MOD 30 MIN: CPT | Performed by: INTERNAL MEDICINE

## 2017-11-09 PROCEDURE — 1090F PRES/ABSN URINE INCON ASSESS: CPT | Performed by: INTERNAL MEDICINE

## 2017-11-09 PROCEDURE — 3017F COLORECTAL CA SCREEN DOC REV: CPT | Performed by: INTERNAL MEDICINE

## 2017-11-09 RX ORDER — IPRATROPIUM BROMIDE AND ALBUTEROL SULFATE 2.5; .5 MG/3ML; MG/3ML
1 SOLUTION RESPIRATORY (INHALATION) EVERY 4 HOURS PRN
Qty: 360 ML | Refills: 2 | Status: ON HOLD | OUTPATIENT
Start: 2017-11-09 | End: 2018-05-26 | Stop reason: HOSPADM

## 2017-11-09 NOTE — PROGRESS NOTES
depression    Objective:   PHYSICAL EXAM:        VITALS:  /78 (Site: Left Arm, Position: Sitting, Cuff Size: Medium Adult)   Pulse 88   Resp 16   Ht 5' 2\" (1.575 m)   Wt 149 lb (67.6 kg)   SpO2 91%   BMI 27.25 kg/m²     CONSTITUTIONAL:  Awake, alert, cooperative, no apparent distress, and appears stated age  HEENT: No oropharyngeal exudate, PERRL, no cervical adenopathy, no tracheal deviation, thyroid size normal  LUNGS:  Speaking in short sentences, inspiratory and expiratory noise with diffuse expiratory wheezing. CARDIOVASCULAR:  normal S1 and S2 and no JVD  ABDOMEN:  Normal bowel sounds, non-distended and non-tender to palpation  EXT: No edema, no calf tenderness. Pulses are present bilaterally. NEUROLOGIC:  Mental Status Exam:  Level of Alertness:   awake  Orientation:   person, place, time. SKIN:  normal skin color, texture, turgor, no redness, warmth, or swelling     DATA:      Radiology Review:  Pertinent images / reports were reviewed as a part of this visit. CT chest reveals the following:  Impression:        1. Acute on chronic compression fracture at T11 as described. 2. Stable compression fractures and vertebroplasty elsewhere in   the thoracic spine as described. 3. Patchy airspace disease with linear opacities and nodular   opacities in the lingula and left lower lobe. These findings could   relate to atelectasis however early pneumonia is not excluded. 4. Stable indeterminate left upper lobe nodule with central   calcification. Followup chest CT in 3 months recommended. 5. Centrilobular emphysema in the upper lobes. 6. Large substernal right thyroid nodule unchanged in comparison   to previous exam.         August 2017:  Impression:        1. Stable indeterminate nodule left upper lobe measuring up to 12   mm with small central calcification. The nodule remains   indeterminate. At least 2 years of surveillance are recommended   within next CT in 6 months.  The nodule would be

## 2017-12-06 DIAGNOSIS — J43.8 OTHER EMPHYSEMA (HCC): ICD-10-CM

## 2017-12-21 ENCOUNTER — OFFICE VISIT (OUTPATIENT)
Dept: PULMONOLOGY | Age: 67
End: 2017-12-21

## 2017-12-21 VITALS
OXYGEN SATURATION: 91 % | HEIGHT: 62 IN | RESPIRATION RATE: 16 BRPM | DIASTOLIC BLOOD PRESSURE: 78 MMHG | HEART RATE: 94 BPM | WEIGHT: 145 LBS | SYSTOLIC BLOOD PRESSURE: 120 MMHG | BODY MASS INDEX: 26.68 KG/M2

## 2017-12-21 DIAGNOSIS — J43.2 CENTRILOBULAR EMPHYSEMA (HCC): ICD-10-CM

## 2017-12-21 DIAGNOSIS — J39.8 TRACHEOBRONCHOMALACIA: Primary | ICD-10-CM

## 2017-12-21 PROCEDURE — 1123F ACP DISCUSS/DSCN MKR DOCD: CPT | Performed by: INTERNAL MEDICINE

## 2017-12-21 PROCEDURE — G8417 CALC BMI ABV UP PARAM F/U: HCPCS | Performed by: INTERNAL MEDICINE

## 2017-12-21 PROCEDURE — G8428 CUR MEDS NOT DOCUMENT: HCPCS | Performed by: INTERNAL MEDICINE

## 2017-12-21 PROCEDURE — 1090F PRES/ABSN URINE INCON ASSESS: CPT | Performed by: INTERNAL MEDICINE

## 2017-12-21 PROCEDURE — G8926 SPIRO NO PERF OR DOC: HCPCS | Performed by: INTERNAL MEDICINE

## 2017-12-21 PROCEDURE — 3017F COLORECTAL CA SCREEN DOC REV: CPT | Performed by: INTERNAL MEDICINE

## 2017-12-21 PROCEDURE — G8484 FLU IMMUNIZE NO ADMIN: HCPCS | Performed by: INTERNAL MEDICINE

## 2017-12-21 PROCEDURE — 99214 OFFICE O/P EST MOD 30 MIN: CPT | Performed by: INTERNAL MEDICINE

## 2017-12-21 PROCEDURE — 3014F SCREEN MAMMO DOC REV: CPT | Performed by: INTERNAL MEDICINE

## 2017-12-21 PROCEDURE — 1036F TOBACCO NON-USER: CPT | Performed by: INTERNAL MEDICINE

## 2017-12-21 PROCEDURE — G8399 PT W/DXA RESULTS DOCUMENT: HCPCS | Performed by: INTERNAL MEDICINE

## 2017-12-21 PROCEDURE — 4040F PNEUMOC VAC/ADMIN/RCVD: CPT | Performed by: INTERNAL MEDICINE

## 2017-12-21 PROCEDURE — 3023F SPIROM DOC REV: CPT | Performed by: INTERNAL MEDICINE

## 2017-12-21 NOTE — PROGRESS NOTES
[facility] Pulmonary and Critical Care    Outpatient Follow Up Note    Subjective:   CHIEF COMPLAINT / HPI:     The patient is 74 y.o. female who presents today for COPD and pulmonary nodules. Still gets dyspneic with walking and any exertion. Using her albuterol inhaler more frequently than prescribed so she's running out early. She's backed off of late because she's going to run out and pharmacy won't refill it. She's not gotten her nebulizer because of the cost, but is willing to pay out of pocket for another albuterol inhaler. She had her vocal cord botox injection, but the ENT says it will take 6-9 months for full effect. She also said the ENT doesn't think her cords are why she's short of breath and that it may be in her lungs. Clinically her dyspnea was well managed prior to her thyroidectomy. Also, she was unimpressed by stiolto and went back to anoro. Previous history: patient had a follow-up CT scan done May 17 and was essentially found to have more vertebral fractures she was admitted for repair. She's run out of her Spiriva and her Breo several weeks ago and has not needed her rescue inhaler. She has also weaned off of supplemental oxygen. She is scheduled for surgery to remove her thyroid mass in a few weeks. Patient said she felt great on the Anoro and rarely needed her rescue inhaler, and even would forget the anoro from time to time and feel ok. Until September 29th when she had her thyroid removed. 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 Since then she's extremely dyspneic trying to walk short distances and she has to be careful how she swallows or she aspirates. She's having botox injected on the 17th to try to alleviate some of the issues and may need reconstruction. Since the surgery she doesn't feel much benefit from the anoro and has a nonproductive cough and increased dyspnea with exertion, talking and laying down.   She's started umeclidinium-vilanterol (ANORO ELLIPTA) 62.5-25 MCG/INH AEPB inhaler Inhale 1 puff into the lungs daily 1 puff 5    acetaminophen (TYLENOL) 500 MG tablet Take 500 mg by mouth every 6 hours as needed for Pain      Tiotropium Bromide-Olodaterol (STIOLTO RESPIMAT) 2.5-2.5 MCG/ACT AERS Inhale 1 Inhaler into the lungs 2 times daily 1 Inhaler 5    albuterol sulfate HFA (VENTOLIN HFA) 108 (90 Base) MCG/ACT inhaler Inhale 2 puffs into the lungs every 6 hours as needed for Wheezing 1 Inhaler 3     No current facility-administered medications on file prior to visit. REVIEW OF SYSTEMS:    CONSTITUTIONAL: Negative for fevers and chills  HEENT: Negative for oropharyngeal exudate, post nasal drip, sinus pain / pressure, nasal congestion, ear pain  RESPIRATORY:  See HPI  CARDIOVASCULAR: Negative for chest pain, palpitations, edema  GASTROINTESTINAL: Negative for nausea, vomiting, diarrhea, constipation and abdominal pain  HEMATOLOGICAL: Negative for adenopathy  SKIN: Negative for clubbing, cyanosis, skin lesions  EXTREMITIES: Negative for weakness, decreased ROM  NEUROLOGICAL: Negative for unilateral weakness, speech or gait abnormalities  PSYCH: Negative for anxiety, depression    Objective:   PHYSICAL EXAM:        VITALS:  There were no vitals taken for this visit. CONSTITUTIONAL:  Awake, alert, cooperative, no apparent distress, and appears stated age  [de-identified]: No oropharyngeal exudate, PERRL, no cervical adenopathy, no tracheal deviation, thyroid size normal  LUNGS:  Speaking in short sentences, inspiratory and expiratory noise with diffuse expiratory wheezing, best heard at the neck. CARDIOVASCULAR:  normal S1 and S2 and no JVD  ABDOMEN:  Normal bowel sounds, non-distended and non-tender to palpation  EXT: No edema, no calf tenderness. Pulses are present bilaterally. NEUROLOGIC:  Mental Status Exam:  Level of Alertness:   awake  Orientation:   person, place, time.   SKIN:  normal skin color, texture, turgor, no seems to help. I encouraged her to get the nebulizer and use that which will help spare her albuterol. ENT's concerns about her having below the cord issues is interesting since she was well managed prior to the thyroidectomy and cord paralysis. Also her wheezing seems to be coming from the upper airway. Giving that theory the benefit of the doubt I think it's time to repeat spirometry to see how it looks post thyroidectomy and with the paralyzed cord. If she has blunted inspiratory and expiratory limbs then we're dealing with a cord issue. In addition, I was planning to order a Ct anyway to follow her nodule, but now I'm going to get an inspiratory and expiratory film to see if she has significant tracheobronchomalacia as a result of her thyroid surgery. 1. Tracheobronchomalacia  FULL PFT STUDY WITH PRE AND POST    CT Chest WO Contrast        The patient is not currently smoking. More than half the time of this 25 minute visit was spent counseling the patient. RTC 6 weeks w/ MD. Call or RTC sooner if symptoms persist or worsen acutely.       Glenda France

## 2018-01-02 ENCOUNTER — TELEPHONE (OUTPATIENT)
Dept: PULMONOLOGY | Age: 68
End: 2018-01-02

## 2018-01-02 DIAGNOSIS — J18.9 PNEUMONIA DUE TO INFECTIOUS ORGANISM, UNSPECIFIED LATERALITY, UNSPECIFIED PART OF LUNG: Primary | ICD-10-CM

## 2018-01-02 RX ORDER — AZITHROMYCIN 250 MG/1
TABLET, FILM COATED ORAL
Qty: 6 TABLET | Refills: 1 | Status: SHIPPED | OUTPATIENT
Start: 2018-01-02 | End: 2018-01-12

## 2018-01-02 NOTE — TELEPHONE ENCOUNTER
Patient c/o cough--bright yellow phlegm. No fever. No other symptoms. Onset about 2 days ago. She did not know if she should be concerned and or if you wanted to send something to pharmacy, [facility].   Please advise  Thank you

## 2018-01-03 NOTE — TELEPHONE ENCOUNTER
Spoke to pt and she will go to lab after work today (3:30) to do sputum culture before starting Z-pack.

## 2018-01-05 ENCOUNTER — HOSPITAL ENCOUNTER (OUTPATIENT)
Dept: PULMONOLOGY | Age: 68
Discharge: HOME | End: 2018-01-05
Attending: INTERNAL MEDICINE | Admitting: INTERNAL MEDICINE

## 2018-01-05 DIAGNOSIS — J39.8 OTHER SPECIFIED DISEASES OF UPPER RESPIRATORY TRACT (CODE): ICD-10-CM

## 2018-01-05 DIAGNOSIS — J39.8 TRACHEOBRONCHOMALACIA: ICD-10-CM

## 2018-01-05 LAB
CULTURE, RESPIRATORY: ABNORMAL
CULTURE, RESPIRATORY: ABNORMAL
GRAM STAIN RESULT: ABNORMAL
ORGANISM: ABNORMAL

## 2018-01-05 RX ORDER — ALBUTEROL SULFATE 2.5 MG/3ML
2.5 SOLUTION RESPIRATORY (INHALATION) ONCE
Status: COMPLETED | OUTPATIENT
Start: 2018-01-05 | End: 2018-01-05

## 2018-01-05 RX ADMIN — ALBUTEROL SULFATE 2.5 MG: 2.5 SOLUTION RESPIRATORY (INHALATION) at 13:02

## 2018-01-05 NOTE — TELEPHONE ENCOUNTER
Patient called to see if she had appt with Dr Gertrude Bush after CT today. Advised no and gave her message from Dr Gertrude Bush regarding sputum results. She stated she was feeling a little better and see how she does over the weekend. Per Dr Gertrude Bush advised pt to call us or go to ED if she starts feeling worse over the weekend.   She verbalized understanding

## 2018-01-10 NOTE — PROCEDURES
[facility]              [address]                              PULMONARY FUNCTION    PATIENT NAME: Izzy Stallings                   :         1950  MED REC NO:   [id number]                          ROOM:  ACCOUNT NO:   [de-identified]                          ADMIT DATE:  2018  PROVIDER:     Samy Argueta MD    DATE OF PROCEDURE:  2018    INDICATIONS:  Tracheobronchial malacia with dyspnea on hills and  stairs. BMI is 25.6. Room air oxygen saturation 96%. Spirometry data is acceptable and reproducible. SPIROMETRY:  FEV1 to FVC ratio is 60%. Pre-bronchodilator FEV1 is  1.12, which is 50% of predicted. Pre-bronchodilator FVC is 2.38,  which is 82% of predicted. Lung volumes show total lung capacity of 5.72, which is 120% of  predicted. Residual volume is 3.31, which is 165% of predicted. Diffusion capacity is 12.73, which is 56% of predicted. Flow volume loops are consistent with an obstructive defect. IMPRESSION:  1. Moderate obstructive defect. 2.  There is no significant response to bronchodilators. 3.  Air trapping and hyperinflation is present. 4.  Moderate decrease in diffusion capacity. PFTs are consistent with COPD.         Dharmesh George MD    D: 01/10/2018 8:29:33       T: 01/10/2018 9:41:51     [email or web address]  Job#: [id number]     Doc#: [id number]    CC:

## 2018-01-20 DIAGNOSIS — J43.8 OTHER EMPHYSEMA (HCC): ICD-10-CM

## 2018-02-01 ENCOUNTER — OFFICE VISIT (OUTPATIENT)
Dept: PULMONOLOGY | Age: 68
End: 2018-02-01

## 2018-02-01 VITALS
BODY MASS INDEX: 26.68 KG/M2 | DIASTOLIC BLOOD PRESSURE: 80 MMHG | RESPIRATION RATE: 16 BRPM | HEART RATE: 96 BPM | HEIGHT: 62 IN | WEIGHT: 145 LBS | SYSTOLIC BLOOD PRESSURE: 120 MMHG | OXYGEN SATURATION: 93 %

## 2018-02-01 DIAGNOSIS — J38.01 VOCAL CORD PARALYSIS, UNILATERAL PARTIAL: ICD-10-CM

## 2018-02-01 DIAGNOSIS — J43.2 CENTRILOBULAR EMPHYSEMA (HCC): Primary | ICD-10-CM

## 2018-02-01 DIAGNOSIS — R91.1 INCIDENTAL PULMONARY NODULE, GREATER THAN OR EQUAL TO 8MM: ICD-10-CM

## 2018-02-01 PROCEDURE — G8399 PT W/DXA RESULTS DOCUMENT: HCPCS | Performed by: INTERNAL MEDICINE

## 2018-02-01 PROCEDURE — 1036F TOBACCO NON-USER: CPT | Performed by: INTERNAL MEDICINE

## 2018-02-01 PROCEDURE — 99214 OFFICE O/P EST MOD 30 MIN: CPT | Performed by: INTERNAL MEDICINE

## 2018-02-01 PROCEDURE — 90662 IIV NO PRSV INCREASED AG IM: CPT | Performed by: INTERNAL MEDICINE

## 2018-02-01 PROCEDURE — 1090F PRES/ABSN URINE INCON ASSESS: CPT | Performed by: INTERNAL MEDICINE

## 2018-02-01 PROCEDURE — G8417 CALC BMI ABV UP PARAM F/U: HCPCS | Performed by: INTERNAL MEDICINE

## 2018-02-01 PROCEDURE — 3023F SPIROM DOC REV: CPT | Performed by: INTERNAL MEDICINE

## 2018-02-01 PROCEDURE — G8926 SPIRO NO PERF OR DOC: HCPCS | Performed by: INTERNAL MEDICINE

## 2018-02-01 PROCEDURE — 3014F SCREEN MAMMO DOC REV: CPT | Performed by: INTERNAL MEDICINE

## 2018-02-01 PROCEDURE — 3017F COLORECTAL CA SCREEN DOC REV: CPT | Performed by: INTERNAL MEDICINE

## 2018-02-01 PROCEDURE — G8428 CUR MEDS NOT DOCUMENT: HCPCS | Performed by: INTERNAL MEDICINE

## 2018-02-01 PROCEDURE — 4040F PNEUMOC VAC/ADMIN/RCVD: CPT | Performed by: INTERNAL MEDICINE

## 2018-02-01 PROCEDURE — 1123F ACP DISCUSS/DSCN MKR DOCD: CPT | Performed by: INTERNAL MEDICINE

## 2018-02-01 PROCEDURE — G8484 FLU IMMUNIZE NO ADMIN: HCPCS | Performed by: INTERNAL MEDICINE

## 2018-02-01 PROCEDURE — G0008 ADMIN INFLUENZA VIRUS VAC: HCPCS | Performed by: INTERNAL MEDICINE

## 2018-02-01 NOTE — PROGRESS NOTES
chills  HEENT: Negative for oropharyngeal exudate, post nasal drip, sinus pain / pressure, nasal congestion, ear pain  RESPIRATORY:  See HPI  CARDIOVASCULAR: Negative for chest pain, palpitations, edema  GASTROINTESTINAL: Negative for nausea, vomiting, diarrhea, constipation and abdominal pain  HEMATOLOGICAL: Negative for adenopathy  SKIN: Negative for clubbing, cyanosis, skin lesions  EXTREMITIES: Negative for weakness, decreased ROM  NEUROLOGICAL: Negative for unilateral weakness, speech or gait abnormalities  PSYCH: Negative for anxiety, depression    Objective:   PHYSICAL EXAM:        VITALS:  /80 (Site: Right Arm, Position: Sitting, Cuff Size: Large Adult)   Pulse 96   Resp 16   Ht 5' 2\" (1.575 m)   Wt 145 lb (65.8 kg)   SpO2 93%   BMI 26.52 kg/m²     CONSTITUTIONAL:  Awake, alert, cooperative, no apparent distress, and appears stated age  HEENT: No oropharyngeal exudate, PERRL, no cervical adenopathy, no tracheal deviation, thyroid size normal  LUNGS:  Speaking in full sentences, no wheezes rales or ronchi. CARDIOVASCULAR:  normal S1 and S2 and no JVD  ABDOMEN:  Normal bowel sounds, non-distended and non-tender to palpation  EXT: No edema, no calf tenderness. Pulses are present bilaterally. NEUROLOGIC:  Mental Status Exam:  Level of Alertness:   awake  Orientation:   person, place, time. SKIN:  normal skin color, texture, turgor, no redness, warmth, or swelling     DATA:      Radiology Review:  Pertinent images / reports were reviewed as a part of this visit. CT chest reveals the following:  Impression:        1. Acute on chronic compression fracture at T11 as described. 2. Stable compression fractures and vertebroplasty elsewhere in   the thoracic spine as described. 3. Patchy airspace disease with linear opacities and nodular   opacities in the lingula and left lower lobe. These findings could   relate to atelectasis however early pneumonia is not excluded.    4. Stable indeterminate left

## 2018-02-12 DIAGNOSIS — J43.8 OTHER EMPHYSEMA (HCC): ICD-10-CM

## 2018-02-23 ENCOUNTER — TELEPHONE (OUTPATIENT)
Dept: PULMONOLOGY | Age: 68
End: 2018-02-23

## 2018-02-23 RX ORDER — PREDNISONE 10 MG/1
40 TABLET ORAL DAILY
Qty: 20 TABLET | Refills: 0 | Status: SHIPPED | OUTPATIENT
Start: 2018-02-23 | End: 2018-02-28

## 2018-02-23 RX ORDER — LEVOFLOXACIN 750 MG/1
750 TABLET ORAL DAILY
Qty: 7 TABLET | Refills: 0 | Status: SHIPPED | OUTPATIENT
Start: 2018-02-23 | End: 2018-03-02

## 2018-02-23 NOTE — TELEPHONE ENCOUNTER
Patient c/o sinus symptoms, cough with thickening phlegm some discoloration. She said she had this before and let it go and she became worse then was started on antibiotic. The only different symptom is her SOB now with short distant walks. She would like to see if you would send something to [address] [place] [address],[address] so she does not become worse over the weekend.   Thank you

## 2018-02-25 DIAGNOSIS — J43.8 OTHER EMPHYSEMA (HCC): ICD-10-CM

## 2018-03-26 DIAGNOSIS — J43.8 OTHER EMPHYSEMA (HCC): ICD-10-CM

## 2018-04-06 ENCOUNTER — TELEPHONE (OUTPATIENT)
Dept: PULMONOLOGY | Age: 68
End: 2018-04-06

## 2018-04-06 RX ORDER — AZITHROMYCIN 250 MG/1
TABLET, FILM COATED ORAL
Qty: 6 TABLET | Refills: 0 | Status: SHIPPED | OUTPATIENT
Start: 2018-04-06 | End: 2018-04-16

## 2018-04-06 RX ORDER — PREDNISONE 20 MG/1
40 TABLET ORAL DAILY
Qty: 14 TABLET | Refills: 0 | Status: ON HOLD | OUTPATIENT
Start: 2018-04-06 | End: 2018-05-26 | Stop reason: HOSPADM

## 2018-05-02 ENCOUNTER — TELEPHONE (OUTPATIENT)
Dept: FAMILY MEDICINE CLINIC | Age: 68
End: 2018-05-02

## 2018-05-22 PROBLEM — K21.9 GERD (GASTROESOPHAGEAL REFLUX DISEASE): Status: ACTIVE | Noted: 2018-05-22

## 2018-05-22 PROBLEM — R09.02 HYPOXIA: Status: ACTIVE | Noted: 2018-05-22

## 2018-05-23 PROBLEM — R79.89 ELEVATED TROPONIN: Status: ACTIVE | Noted: 2018-05-23

## 2018-05-23 PROBLEM — R77.8 ELEVATED TROPONIN: Status: ACTIVE | Noted: 2018-05-23

## 2018-05-25 PROBLEM — I50.22 SYSTOLIC CHF, CHRONIC (HCC): Status: ACTIVE | Noted: 2018-05-25

## 2018-05-26 PROBLEM — Z98.890 S/P CORONARY ANGIOGRAM: Status: ACTIVE | Noted: 2018-05-26

## 2018-05-27 ENCOUNTER — CARE COORDINATION (OUTPATIENT)
Dept: CASE MANAGEMENT | Age: 68
End: 2018-05-27

## 2018-05-27 DIAGNOSIS — Z98.890 S/P CORONARY ANGIOGRAM: Primary | ICD-10-CM

## 2018-05-27 PROCEDURE — 1111F DSCHRG MED/CURRENT MED MERGE: CPT | Performed by: FAMILY MEDICINE

## 2018-05-30 ENCOUNTER — TELEPHONE (OUTPATIENT)
Dept: PHARMACY | Facility: CLINIC | Age: 68
End: 2018-05-30

## 2018-05-30 ENCOUNTER — OFFICE VISIT (OUTPATIENT)
Dept: PULMONOLOGY | Age: 68
End: 2018-05-30

## 2018-05-30 VITALS
SYSTOLIC BLOOD PRESSURE: 100 MMHG | WEIGHT: 145 LBS | BODY MASS INDEX: 25.69 KG/M2 | OXYGEN SATURATION: 96 % | HEIGHT: 63 IN | DIASTOLIC BLOOD PRESSURE: 70 MMHG | HEART RATE: 75 BPM

## 2018-05-30 DIAGNOSIS — R91.1 INCIDENTAL PULMONARY NODULE, GREATER THAN OR EQUAL TO 8MM: ICD-10-CM

## 2018-05-30 DIAGNOSIS — J38.01 VOCAL CORD PARALYSIS, UNILATERAL PARTIAL: ICD-10-CM

## 2018-05-30 DIAGNOSIS — J43.2 CENTRILOBULAR EMPHYSEMA (HCC): Primary | ICD-10-CM

## 2018-05-30 PROCEDURE — G8427 DOCREV CUR MEDS BY ELIG CLIN: HCPCS | Performed by: INTERNAL MEDICINE

## 2018-05-30 PROCEDURE — 99214 OFFICE O/P EST MOD 30 MIN: CPT | Performed by: INTERNAL MEDICINE

## 2018-05-30 PROCEDURE — 1036F TOBACCO NON-USER: CPT | Performed by: INTERNAL MEDICINE

## 2018-05-30 PROCEDURE — 3023F SPIROM DOC REV: CPT | Performed by: INTERNAL MEDICINE

## 2018-05-30 PROCEDURE — G8926 SPIRO NO PERF OR DOC: HCPCS | Performed by: INTERNAL MEDICINE

## 2018-05-30 PROCEDURE — 1123F ACP DISCUSS/DSCN MKR DOCD: CPT | Performed by: INTERNAL MEDICINE

## 2018-05-30 PROCEDURE — G8417 CALC BMI ABV UP PARAM F/U: HCPCS | Performed by: INTERNAL MEDICINE

## 2018-05-30 PROCEDURE — 1111F DSCHRG MED/CURRENT MED MERGE: CPT | Performed by: INTERNAL MEDICINE

## 2018-05-30 PROCEDURE — G8399 PT W/DXA RESULTS DOCUMENT: HCPCS | Performed by: INTERNAL MEDICINE

## 2018-05-30 PROCEDURE — 3017F COLORECTAL CA SCREEN DOC REV: CPT | Performed by: INTERNAL MEDICINE

## 2018-05-30 PROCEDURE — 1090F PRES/ABSN URINE INCON ASSESS: CPT | Performed by: INTERNAL MEDICINE

## 2018-05-30 PROCEDURE — 4040F PNEUMOC VAC/ADMIN/RCVD: CPT | Performed by: INTERNAL MEDICINE

## 2018-05-30 RX ORDER — PREDNISONE 10 MG/1
TABLET ORAL
Qty: 20 TABLET | Refills: 0 | Status: SHIPPED | OUTPATIENT
Start: 2018-05-30 | End: 2018-06-08 | Stop reason: SDUPTHER

## 2018-06-01 ENCOUNTER — CARE COORDINATION (OUTPATIENT)
Dept: CASE MANAGEMENT | Age: 68
End: 2018-06-01

## 2018-06-01 RX ORDER — ALBUTEROL SULFATE 90 UG/1
2 AEROSOL, METERED RESPIRATORY (INHALATION) EVERY 6 HOURS PRN
Qty: 1 INHALER | Refills: 3 | Status: SHIPPED | OUTPATIENT
Start: 2018-06-01 | End: 2018-09-13 | Stop reason: SDUPTHER

## 2018-06-08 ENCOUNTER — OFFICE VISIT (OUTPATIENT)
Dept: CARDIOLOGY CLINIC | Age: 68
End: 2018-06-08

## 2018-06-08 VITALS
OXYGEN SATURATION: 93 % | DIASTOLIC BLOOD PRESSURE: 84 MMHG | WEIGHT: 150 LBS | SYSTOLIC BLOOD PRESSURE: 112 MMHG | HEART RATE: 70 BPM | BODY MASS INDEX: 26.57 KG/M2

## 2018-06-08 DIAGNOSIS — I50.22 SYSTOLIC CHF, CHRONIC (HCC): Primary | ICD-10-CM

## 2018-06-08 DIAGNOSIS — R06.02 SHORTNESS OF BREATH: ICD-10-CM

## 2018-06-08 PROCEDURE — G8417 CALC BMI ABV UP PARAM F/U: HCPCS | Performed by: NURSE PRACTITIONER

## 2018-06-08 PROCEDURE — 99214 OFFICE O/P EST MOD 30 MIN: CPT | Performed by: NURSE PRACTITIONER

## 2018-06-08 PROCEDURE — 3017F COLORECTAL CA SCREEN DOC REV: CPT | Performed by: NURSE PRACTITIONER

## 2018-06-08 PROCEDURE — G8399 PT W/DXA RESULTS DOCUMENT: HCPCS | Performed by: NURSE PRACTITIONER

## 2018-06-08 PROCEDURE — 1123F ACP DISCUSS/DSCN MKR DOCD: CPT | Performed by: NURSE PRACTITIONER

## 2018-06-08 PROCEDURE — 1036F TOBACCO NON-USER: CPT | Performed by: NURSE PRACTITIONER

## 2018-06-08 PROCEDURE — 1111F DSCHRG MED/CURRENT MED MERGE: CPT | Performed by: NURSE PRACTITIONER

## 2018-06-08 PROCEDURE — 4040F PNEUMOC VAC/ADMIN/RCVD: CPT | Performed by: NURSE PRACTITIONER

## 2018-06-08 PROCEDURE — G8427 DOCREV CUR MEDS BY ELIG CLIN: HCPCS | Performed by: NURSE PRACTITIONER

## 2018-06-08 PROCEDURE — 1090F PRES/ABSN URINE INCON ASSESS: CPT | Performed by: NURSE PRACTITIONER

## 2018-06-08 RX ORDER — LANOLIN ALCOHOL/MO/W.PET/CERES
3 CREAM (GRAM) TOPICAL NIGHTLY PRN
COMMUNITY

## 2018-06-08 RX ORDER — METOPROLOL SUCCINATE 25 MG/1
25 TABLET, EXTENDED RELEASE ORAL DAILY
Qty: 90 TABLET | Refills: 3 | Status: SHIPPED | OUTPATIENT
Start: 2018-06-08 | End: 2018-08-17 | Stop reason: SDUPTHER

## 2018-06-08 ASSESSMENT — ENCOUNTER SYMPTOMS
COUGH: 0
SHORTNESS OF BREATH: 1
GASTROINTESTINAL NEGATIVE: 1
WHEEZING: 0

## 2018-06-12 ENCOUNTER — CARE COORDINATION (OUTPATIENT)
Dept: CASE MANAGEMENT | Age: 68
End: 2018-06-12

## 2018-06-19 ENCOUNTER — TELEPHONE (OUTPATIENT)
Dept: PULMONOLOGY | Age: 68
End: 2018-06-19

## 2018-06-19 RX ORDER — PREDNISONE 10 MG/1
TABLET ORAL
Qty: 20 TABLET | Refills: 0 | Status: SHIPPED | OUTPATIENT
Start: 2018-06-19 | End: 2018-07-20 | Stop reason: ALTCHOICE

## 2018-06-20 RX ORDER — AZITHROMYCIN 250 MG/1
TABLET, FILM COATED ORAL
Qty: 6 TABLET | Refills: 0 | Status: SHIPPED | OUTPATIENT
Start: 2018-06-20 | End: 2018-07-20 | Stop reason: ALTCHOICE

## 2018-06-21 ENCOUNTER — HOSPITAL ENCOUNTER (OUTPATIENT)
Dept: MAMMOGRAPHY | Age: 68
Discharge: HOME | End: 2018-06-21
Attending: FAMILY MEDICINE | Admitting: FAMILY MEDICINE

## 2018-06-21 DIAGNOSIS — Z12.31 VISIT FOR SCREENING MAMMOGRAM: ICD-10-CM

## 2018-06-22 PROBLEM — R77.8 ELEVATED TROPONIN: Status: RESOLVED | Noted: 2018-05-23 | Resolved: 2018-06-22

## 2018-06-22 PROBLEM — R79.89 ELEVATED TROPONIN: Status: RESOLVED | Noted: 2018-05-23 | Resolved: 2018-06-22

## 2018-07-03 ENCOUNTER — OFFICE VISIT (OUTPATIENT)
Dept: DERMATOLOGY | Age: 68
End: 2018-07-03

## 2018-07-03 DIAGNOSIS — L81.4 SOLAR LENTIGO: ICD-10-CM

## 2018-07-03 DIAGNOSIS — D22.9 MULTIPLE NEVI: Primary | ICD-10-CM

## 2018-07-03 DIAGNOSIS — L82.1 SK (SEBORRHEIC KERATOSIS): ICD-10-CM

## 2018-07-03 DIAGNOSIS — D23.71 DERMATOFIBROMA OF RIGHT LOWER EXTREMITY: ICD-10-CM

## 2018-07-03 PROCEDURE — G8427 DOCREV CUR MEDS BY ELIG CLIN: HCPCS | Performed by: DERMATOLOGY

## 2018-07-03 PROCEDURE — 3017F COLORECTAL CA SCREEN DOC REV: CPT | Performed by: DERMATOLOGY

## 2018-07-03 PROCEDURE — 1036F TOBACCO NON-USER: CPT | Performed by: DERMATOLOGY

## 2018-07-03 PROCEDURE — 99213 OFFICE O/P EST LOW 20 MIN: CPT | Performed by: DERMATOLOGY

## 2018-07-03 PROCEDURE — 1123F ACP DISCUSS/DSCN MKR DOCD: CPT | Performed by: DERMATOLOGY

## 2018-07-03 PROCEDURE — G8417 CALC BMI ABV UP PARAM F/U: HCPCS | Performed by: DERMATOLOGY

## 2018-07-03 PROCEDURE — G8399 PT W/DXA RESULTS DOCUMENT: HCPCS | Performed by: DERMATOLOGY

## 2018-07-03 PROCEDURE — 4040F PNEUMOC VAC/ADMIN/RCVD: CPT | Performed by: DERMATOLOGY

## 2018-07-03 PROCEDURE — 1090F PRES/ABSN URINE INCON ASSESS: CPT | Performed by: DERMATOLOGY

## 2018-07-19 ASSESSMENT — ENCOUNTER SYMPTOMS
SHORTNESS OF BREATH: 1
GASTROINTESTINAL NEGATIVE: 1
COUGH: 0
WHEEZING: 0

## 2018-07-19 NOTE — PROGRESS NOTES
Appendectomy; Fixation Kyphoplasty (04/04/2017); and other surgical history (05/18/2017). Social History:   reports that she quit smoking about 7 years ago. Her smoking use included Cigarettes. She has a 22.00 pack-year smoking history. She has never used smokeless tobacco. She reports that she drinks about 2.4 oz of alcohol per week . She reports that she does not use drugs. Family History:   Family History   Problem Relation Age of Onset    Diabetes Mother     No Known Problems Father     No Known Problems Sister     No Known Problems Brother     No Known Problems Maternal Grandmother     No Known Problems Maternal Grandfather     No Known Problems Paternal Grandmother     No Known Problems Paternal Grandfather        Home Medications:  Prior to Admission medications    Medication Sig Start Date End Date Taking? Authorizing Provider   azithromycin (ZITHROMAX) 250 MG tablet Take 500 mg by mouth the first day then 250 mg for the remaining four days 6/20/18   Gordo Nazario MD   predniSONE (DELTASONE) 10 MG tablet Take 4 tablets by mouth for 5 days.  6/19/18   Gordo Nazario MD   melatonin 3 MG TABS tablet Take 3 mg by mouth nightly as needed    Historical Provider, MD   metoprolol succinate (TOPROL XL) 25 MG extended release tablet Take 1 tablet by mouth daily 6/8/18   FAUSTINA Akhtar - CNP   albuterol sulfate HFA (VENTOLIN HFA) 108 (90 Base) MCG/ACT inhaler Inhale 2 puffs into the lungs every 6 hours as needed for Wheezing 6/1/18   Soila Olmstead MD   aspirin 81 MG chewable tablet Take 1 tablet by mouth daily 5/27/18   Claire Nguyen MD   levalbuterol [facility]) 0.63 MG/3ML nebulization Take 3 mLs by nebulization every 6 hours as needed for Wheezing or Shortness of Breath 5/26/18   Claire Nguyen MD   lisinopril (PRINIVIL;ZESTRIL) 2.5 MG tablet Take 1 tablet by mouth daily 5/27/18   Claire Nguyen MD   magnesium hydroxide (MILK OF MAGNESIA) 400 MG/5ML suspension Take 30 mLs by mouth daily as needed for Constipation 5/26/18   Arnaldo Segal MD   Nebulizers [place] [facility] COMPACT MINI NEBULIZER) MISC 1 Device by Does not apply route daily 5/26/18   Arnaldo Segal MD   pantoprazole (PROTONIX) 40 MG tablet Take 1 tablet by mouth every morning (before breakfast) 5/27/18   Arnaldo Segal MD   umeclidinium-vilanterol [facility] ELLIPTA) 62.5-25 MCG/INH AEPB inhaler Inhale 1 puff into the lungs daily 3/26/18   Lobito Campbell MD   acetaminophen (TYLENOL) 500 MG tablet Take 500 mg by mouth every 6 hours as needed for Pain    Historical Provider, MD        Allergies:  Bee venom; Percocet [oxycodone-acetaminophen]; Vicodin [hydrocodone-acetaminophen]; and Adhesive tape     ROS:   Review of Systems   Constitutional: Negative. HENT: Negative. Respiratory: Positive for shortness of breath. Negative for cough and wheezing. Cardiovascular: Negative. Gastrointestinal: Negative. Genitourinary: Negative. Musculoskeletal: Negative. Skin: Negative. Neurological: Negative. Hematological: Negative. Psychiatric/Behavioral: Negative. Physical Examination:    Vitals:    07/20/18 0840   BP: 124/86   Pulse: 73   SpO2: (!) 89%   Weight: 151 lb 9.6 oz (68.8 kg)           Physical Exam   Constitutional: She is oriented to person, place, and time. She appears well-developed and well-nourished. HENT:   Head: Normocephalic and atraumatic. Eyes: Conjunctivae are normal. Pupils are equal, round, and reactive to light. Neck: Normal range of motion. Neck supple. Cardiovascular: Normal rate, regular rhythm, normal heart sounds and intact distal pulses. Pulmonary/Chest: Effort normal and breath sounds normal.   Abdominal: Soft. Musculoskeletal: Normal range of motion. Neurological: She is alert and oriented to person, place, and time. Skin: Skin is warm and dry. Psychiatric: She has a normal mood and affect.  Her behavior is normal. Judgment and thought content normal.       Lab Data:    CBC: coronary artery is normal.     Echo 5/23/18   Left ventricular cavity size is normal. There is mildly increased left   ventricular wall thickness.   Overall left ventricular systolic function appears normal.   Ejection fraction is visually estimated to be 55%.   Anterior wall may be mildly hypokinetic   Normal diastolic function.   No significant valvular disease. Pt Education: The patient has received education on the following topics: dietary sodium restriction, heart failure medications, the importance of physical activity, symptom management and weight monitoring      Assessment/Plan:    Encounter Diagnoses        Systolic CHF, chronic (HCC) EF 45% Fluid balance positive, add HCTZ, on GDT    Shortness of breath diurese           Instructions:   1. Medications: stop lisinopril and start 1/2 lisinopril/HCTZ once a day in the morning, weigh yourself every day in the morning and if no wt loss in one week, call Macey  2. Labs: one week  3. Referrals: Echo in August  4. Limit sodium to 2000mg/day and fluids to 2L or 64oz/day. 5. Follow up: 4 weeks with Blanca Singh    CHF Resource Line: [phone] - Blanca Singh    Heart Failure Websites:   www.heart. org  www.cardiosmart. org    Smartphone meseret's that can help you keep track of symptoms, weights, and medications:  Heart[employer]ner  MyHeartApp  HeartScribe  [address] to track calories, carbohydrates and sodium content of food:   CalorieKing      I appreciate the opportunity of cooperating in the care of this individual.    Babs Tineo CNP, 7/19/2018, 1:29 PM    QUALITY MEASURES  1. Tobacco Cessation Counseling: NA  2. Retake of BP if >140/90:   NA  3. Documentation to PCP/referring for new patient:  Sent to PCP at close of office visit  4. CAD patient on anti-platelet: NA  5. CAD patient on STATIN therapy:  NA  6. Patient with CHF and aFib on anticoagulation:  NA   7. Patient Education:  Yes   8. BB for LVSD :  Yes   9. ACE/ARB for LVSD:  Yes   10.  Left Ventricular Ejection Fraction (LVEF) Assessment:  Yes

## 2018-07-20 ENCOUNTER — OFFICE VISIT (OUTPATIENT)
Dept: CARDIOLOGY CLINIC | Age: 68
End: 2018-07-20

## 2018-07-20 VITALS
WEIGHT: 151.6 LBS | BODY MASS INDEX: 26.85 KG/M2 | HEART RATE: 73 BPM | SYSTOLIC BLOOD PRESSURE: 124 MMHG | OXYGEN SATURATION: 89 % | DIASTOLIC BLOOD PRESSURE: 86 MMHG

## 2018-07-20 DIAGNOSIS — R06.02 SHORTNESS OF BREATH: ICD-10-CM

## 2018-07-20 DIAGNOSIS — I50.22 SYSTOLIC CHF, CHRONIC (HCC): Primary | ICD-10-CM

## 2018-07-20 PROCEDURE — G8417 CALC BMI ABV UP PARAM F/U: HCPCS | Performed by: NURSE PRACTITIONER

## 2018-07-20 PROCEDURE — 1123F ACP DISCUSS/DSCN MKR DOCD: CPT | Performed by: NURSE PRACTITIONER

## 2018-07-20 PROCEDURE — 4040F PNEUMOC VAC/ADMIN/RCVD: CPT | Performed by: NURSE PRACTITIONER

## 2018-07-20 PROCEDURE — 99213 OFFICE O/P EST LOW 20 MIN: CPT | Performed by: NURSE PRACTITIONER

## 2018-07-20 PROCEDURE — G8427 DOCREV CUR MEDS BY ELIG CLIN: HCPCS | Performed by: NURSE PRACTITIONER

## 2018-07-20 PROCEDURE — 1090F PRES/ABSN URINE INCON ASSESS: CPT | Performed by: NURSE PRACTITIONER

## 2018-07-20 PROCEDURE — 1101F PT FALLS ASSESS-DOCD LE1/YR: CPT | Performed by: NURSE PRACTITIONER

## 2018-07-20 PROCEDURE — 1036F TOBACCO NON-USER: CPT | Performed by: NURSE PRACTITIONER

## 2018-07-20 PROCEDURE — G8399 PT W/DXA RESULTS DOCUMENT: HCPCS | Performed by: NURSE PRACTITIONER

## 2018-07-20 PROCEDURE — 3017F COLORECTAL CA SCREEN DOC REV: CPT | Performed by: NURSE PRACTITIONER

## 2018-07-20 RX ORDER — LISINOPRIL AND HYDROCHLOROTHIAZIDE 12.5; 1 MG/1; MG/1
1 TABLET ORAL DAILY
Qty: 30 TABLET | Refills: 3 | Status: SHIPPED | OUTPATIENT
Start: 2018-07-20 | End: 2018-09-06 | Stop reason: ALTCHOICE

## 2018-07-20 NOTE — PATIENT INSTRUCTIONS
younger than 25years old. How should I take hydrochlorothiazide? Follow all directions on your prescription label. Your doctor may occasionally change your dose to make sure you get the best results. Do not use this medicine in larger or smaller amounts or for longer than recommended. Hydrochlorothiazide is usually taken once per day. Follow your doctor's dosing instructions very carefully. Call your doctor if you have ongoing vomiting or diarrhea, or if you are sweating more than usual. You can easily become dehydrated while taking this medicine, which can lead to severely low blood pressure or a serious electrolyte imbalance. While using hydrochlorothiazide, you may need frequent medical tests and blood pressure be checks. Your blood and urine may both be tested if you have been vomiting or are dehydrated. If you need surgery, tell the surgeon ahead of time that you are using hydrochlorothiazide. You may need to stop using the medicine for a short time. Keep using this medicine as directed, even if you feel well. High blood pressure often has no symptoms. You may need to use blood pressure medicine for the rest of your life. Store at room temperature away from moisture, heat, and freezing. Keep the bottle tightly closed when not in use. What happens if I miss a dose? Take the missed dose as soon as you remember. Skip the missed dose if it is almost time for your next scheduled dose. Do not take extra medicine to make up the missed dose. What happens if I overdose? Seek emergency medical attention or call the Poison Help line at [phone]. Overdose symptoms may include nausea, weakness, dizziness, dry mouth, thirst, and muscle pain or weakness. What should I avoid while taking hydrochlorothiazide? Drinking alcohol with this medicine can cause side effects. Avoid becoming overheated or dehydrated during exercise, in hot weather, or by not drinking enough fluids.  Follow your doctor's instructions about the type and amount of liquids you should drink. In some cases, drinking too much liquid can be as unsafe as not drinking enough. What are the possible side effects of hydrochlorothiazide? Get emergency medical help if you have signs of an allergic reaction: hives; difficulty breathing; swelling of your face, lips, tongue, or throat. Call your doctor at once if you have:  · a light-headed feeling, like you might pass out;  · eye pain, vision problems;  · jaundice (yellowing of the skin or eyes);  · pale skin, easy bruising, unusual bleeding (nose, mouth, vagina, or rectum);  · shortness of breath, wheezing, cough with foamy mucus, chest pain;  · signs of electrolyte imbalance --dry mouth, thirst, drowsiness, lack of energy, restlessness, muscle pain or weakness, fast heart rate, nausea and vomiting, little or no urine; or  · severe skin reaction --fever, sore throat, swelling in your face or tongue, burning in your eyes, skin pain followed by a red or purple skin rash that spreads (especially in the face or upper body) and causes blistering and peeling. Common side effects may include:  · nausea, vomiting, loss of appetite;  · diarrhea, constipation;  · muscle spasm; or  · dizziness, headache. This is not a complete list of side effects and others may occur. Call your doctor for medical advice about side effects. You may report side effects to FDA at 5-253-FDA-2609. What other drugs will affect hydrochlorothiazide? Taking this medicine with other drugs that make you light-headed can worsen this effect. Ask your doctor before taking a sleeping pill, narcotic pain medicine, muscle relaxer, or medicine for anxiety, depression, or seizures.   Tell your doctor about all your current medicines and any you start or stop using, especially:  · cholestyramine, colestipol;  · insulin or oral diabetes medicine;  · lithium;  · other blood pressure medications;  · steroid medicine; or  · NSAIDs (nonsteroidal anti-inflammatory drugs) --aspirin, ibuprofen (Advil, Motrin), naproxen (Aleve), celecoxib, diclofenac, indomethacin, meloxicam, and others. This list is not complete. Other drugs may interact with hydrochlorothiazide, including prescription and over-the-counter medicines, vitamins, and herbal products. Not all possible interactions are listed in this medication guide. Where can I get more information? Your pharmacist can provide more information about hydrochlorothiazide. Remember, keep this and all other medicines out of the reach of children, never share your medicines with others, and use this medication only for the indication prescribed. Every effort has been made to ensure that the information provided by [address] [place], [place] [address]can Dr is accurate, up-to-date, and complete, but no guarantee is made to that effect. Drug information contained herein may be time sensitive. [facility] information has been compiled for use by healthcare practitioners and consumers in the [place] and therefore [facility] does not warrant that uses outside of the [place] are appropriate, unless specifically indicated otherwise. [facility] drug information does not endorse drugs, diagnose patients or recommend therapy. [facility] drug information is an informational resource designed to assist licensed healthcare practitioners in caring for their patients and/or to serve consumers viewing this service as a supplement to, and not a substitute for, the expertise, skill, knowledge and judgment of healthcare practitioners. The absence of a warning for a given drug or drug combination in no way should be construed to indicate that the drug or drug combination is safe, effective or appropriate for any given patient. [facility] does not assume any responsibility for any aspect of healthcare administered with the aid of information [facility] provides.  The information contained herein is not intended to cover all possible uses, directions, precautions, warnings, drug interactions, allergic reactions, or adverse effects. If you have questions about the drugs you are taking, check with your doctor, nurse or pharmacist.  Copyright 8442-4290 [address]: 12.01. Revision date: 8/9/2016. Care instructions adapted under license by [facility] ([facility]). If you have questions about a medical condition or this instruction, always ask your healthcare professional. [place] [address] any warranty or liability for your use of this information.

## 2018-07-26 ENCOUNTER — TELEPHONE (OUTPATIENT)
Dept: PULMONOLOGY | Age: 68
End: 2018-07-26

## 2018-08-03 ENCOUNTER — TELEPHONE (OUTPATIENT)
Dept: PULMONOLOGY | Age: 68
End: 2018-08-03

## 2018-08-03 RX ORDER — PREDNISONE 10 MG/1
TABLET ORAL
Qty: 20 TABLET | Refills: 0 | Status: SHIPPED | OUTPATIENT
Start: 2018-08-03 | End: 2018-11-01 | Stop reason: CLARIF

## 2018-08-03 RX ORDER — AZITHROMYCIN 250 MG/1
TABLET, FILM COATED ORAL
Qty: 6 TABLET | Refills: 0 | Status: SHIPPED | OUTPATIENT
Start: 2018-08-03 | End: 2018-11-01 | Stop reason: CLARIF

## 2018-08-16 DIAGNOSIS — I50.22 SYSTOLIC CHF, CHRONIC (HCC): ICD-10-CM

## 2018-08-16 LAB
ANION GAP SERPL CALCULATED.3IONS-SCNC: 13 MMOL/L (ref 3–16)
BUN BLDV-MCNC: 12 MG/DL (ref 7–20)
CALCIUM SERPL-MCNC: 9.8 MG/DL (ref 8.3–10.6)
CHLORIDE BLD-SCNC: 96 MMOL/L (ref 99–110)
CO2: 28 MMOL/L (ref 21–32)
CREAT SERPL-MCNC: 0.7 MG/DL (ref 0.6–1.2)
GFR AFRICAN AMERICAN: >60
GFR NON-AFRICAN AMERICAN: >60
GLUCOSE BLD-MCNC: 92 MG/DL (ref 70–99)
MAGNESIUM: 1.9 MG/DL (ref 1.8–2.4)
POTASSIUM SERPL-SCNC: 4.7 MMOL/L (ref 3.5–5.1)
PRO-BNP: 120 PG/ML (ref 0–124)
SODIUM BLD-SCNC: 137 MMOL/L (ref 136–145)

## 2018-08-16 ASSESSMENT — ENCOUNTER SYMPTOMS
GASTROINTESTINAL NEGATIVE: 1
COUGH: 0
WHEEZING: 0
SHORTNESS OF BREATH: 1

## 2018-08-16 NOTE — PROGRESS NOTES
[address]   Congestive Heart Failure    Primary Care Doctor:  Jerica Coburn MD    Chief Complaint:  CHF    History of Present Illness:  Sancho Piña is a 79 y.o. female with COPD, GERD who presents today for hospital f/u. Sancho Piña was admitted 5/21/18 with hypoxia, dyspnea, and elevated troponin. LHC showed nl CA and EF 45% - started on ACE, BB and discharged 5/26/18. HCTZ started at her last OV and today she has lost 4lb, edema resolved and dyspnea stable. She has been sleeping with home O2 only, none during the day now. She denies chest pain, palpitations, orthopnea, PND, exertional chest pressure/discomfort, fatigue, early saiety, syncope. Sodium Restrictions: 2g  Fluid Restrictions: 48-64 oz/day  Sodium and fluid restriction compliance: fair, just started reading labels    Activity: at baseline, working 3 days a week  Can you walk 1-2 blocks or do a moderate amount of house/yard work? Yes    EF: 45%     NYHA Class: I   Class I   Patients with no limitation of activities; they suffer no symptoms from ordinary activities. Class II   Patients with slight, mild limitation of activity; they are comfortable with rest or with mild exertion. Class III   Patients with marked limitation of activity; they are comfortable only at rest.   Class IV   Patients who should be at complete rest, confined to bed or chair; any physical activity brings on discomfort and symptoms occur at rest.       Past Medical History:   has a past medical history of Back pain; Cervical cancer screening; Compression fracture of thoracic vertebrae, non-traumatic (HCC); COPD (chronic obstructive pulmonary disease) ([address] COPD exacerbation ([address] GERD (gastroesophageal reflux disease); History of mammogram; Lung nodule:left; Osteomyelitis of left leg ([address] Osteoporosis; S/P colonoscopy; Systolic CHF, chronic (HCC) EF 45%; Thyroid mass; and Thyroid nodule.   Surgical History:   has a past surgical history that It  gives off _____ single PDA. The right coronary artery is normal.     Echo 5/23/18   Left ventricular cavity size is normal. There is mildly increased left   ventricular wall thickness.   Overall left ventricular systolic function appears normal.   Ejection fraction is visually estimated to be 55%.   Anterior wall may be mildly hypokinetic   Normal diastolic function.   No significant valvular disease. Pt Education: The patient has received education on the following topics: dietary sodium restriction, heart failure medications, the importance of physical activity, symptom management and weight monitoring      Assessment/Plan:    Encounter Diagnoses        Systolic CHF, chronic (HCC) EF 45% compensated    Shortness of breath stable    Dilated cardiomyopathy ([address] On GDT, uptitrate BB cautiously with COPD, f/u with Dr. Mahesh Valente for guidance on HF med management and echo repeat       Instructions:   1. Medications: Increase metoprolol to 37.5mg (1 and 1/2 tabs) once a day   2. Labs: once a month  3. Referrals: Dr. Mahesh Valente   4. Limit sodium to 2000mg/day and fluids to 2L or 64oz/day. 5. Follow up: 2-3 weeks with Dr. Mahesh Valente before echo ordered    CHF Resource Line: [phone] - [place]        Heart Failure Websites:   www.heart. org  www.cardiosmart. org    Smartphone meseret's that can help you keep track of symptoms, weights, and medications:  HeartPartner  MyHeartApp  HeartScribe  [address] to track calories, carbohydrates and sodium content of food:   CalorieKing      I appreciate the opportunity of cooperating in the care of this individual.    Barak Louis CNP, 8/16/2018, 8:55 AM    QUALITY MEASURES  1. Tobacco Cessation Counseling: NA  2. Retake of BP if >140/90:   NA  3. Documentation to PCP/referring for new patient:  Sent to PCP at close of office visit  4. CAD patient on anti-platelet: NA  5. CAD patient on STATIN therapy:  NA  6. Patient with CHF and aFib on anticoagulation:  NA   7.

## 2018-08-17 ENCOUNTER — OFFICE VISIT (OUTPATIENT)
Dept: CARDIOLOGY CLINIC | Age: 68
End: 2018-08-17

## 2018-08-17 VITALS
BODY MASS INDEX: 26.18 KG/M2 | WEIGHT: 147.8 LBS | DIASTOLIC BLOOD PRESSURE: 66 MMHG | HEART RATE: 69 BPM | SYSTOLIC BLOOD PRESSURE: 124 MMHG

## 2018-08-17 DIAGNOSIS — I50.22 SYSTOLIC CHF, CHRONIC (HCC): Primary | ICD-10-CM

## 2018-08-17 DIAGNOSIS — I42.0 DILATED CARDIOMYOPATHY (HCC): ICD-10-CM

## 2018-08-17 DIAGNOSIS — R06.02 SHORTNESS OF BREATH: ICD-10-CM

## 2018-08-17 PROBLEM — J44.1 COPD EXACERBATION (HCC): Status: RESOLVED | Noted: 2017-04-14 | Resolved: 2018-08-17

## 2018-08-17 PROBLEM — K21.9 GERD (GASTROESOPHAGEAL REFLUX DISEASE): Status: RESOLVED | Noted: 2018-05-22 | Resolved: 2018-08-17

## 2018-08-17 PROCEDURE — 1101F PT FALLS ASSESS-DOCD LE1/YR: CPT | Performed by: NURSE PRACTITIONER

## 2018-08-17 PROCEDURE — G8427 DOCREV CUR MEDS BY ELIG CLIN: HCPCS | Performed by: NURSE PRACTITIONER

## 2018-08-17 PROCEDURE — G8399 PT W/DXA RESULTS DOCUMENT: HCPCS | Performed by: NURSE PRACTITIONER

## 2018-08-17 PROCEDURE — 1036F TOBACCO NON-USER: CPT | Performed by: NURSE PRACTITIONER

## 2018-08-17 PROCEDURE — 99213 OFFICE O/P EST LOW 20 MIN: CPT | Performed by: NURSE PRACTITIONER

## 2018-08-17 PROCEDURE — 4040F PNEUMOC VAC/ADMIN/RCVD: CPT | Performed by: NURSE PRACTITIONER

## 2018-08-17 PROCEDURE — 3017F COLORECTAL CA SCREEN DOC REV: CPT | Performed by: NURSE PRACTITIONER

## 2018-08-17 PROCEDURE — G8417 CALC BMI ABV UP PARAM F/U: HCPCS | Performed by: NURSE PRACTITIONER

## 2018-08-17 PROCEDURE — 1123F ACP DISCUSS/DSCN MKR DOCD: CPT | Performed by: NURSE PRACTITIONER

## 2018-08-17 PROCEDURE — 1090F PRES/ABSN URINE INCON ASSESS: CPT | Performed by: NURSE PRACTITIONER

## 2018-08-17 RX ORDER — METOPROLOL SUCCINATE 25 MG/1
37.5 TABLET, EXTENDED RELEASE ORAL DAILY
Qty: 90 TABLET | Refills: 3 | Status: SHIPPED | OUTPATIENT
Start: 2018-08-17 | End: 2018-09-06 | Stop reason: SDUPTHER

## 2018-08-17 NOTE — PATIENT INSTRUCTIONS
Instructions:   1. Medications: Increase metoprolol to 37.5mg (1 and 1/2 tabs) once a day   2. Labs: once month  3. Referrals: Echo in August 4. Limit sodium to 2000mg/day and fluids to 2L or 64oz/day.    5. Follow up: 2-3 weeks with Dr. Rehan Zarco before echo ordered    CHF Resource Line: [phone] - Carol Armstrong

## 2018-08-28 ENCOUNTER — TELEPHONE (OUTPATIENT)
Dept: PULMONOLOGY | Age: 68
End: 2018-08-28

## 2018-08-28 DIAGNOSIS — J44.1 COPD WITH ACUTE EXACERBATION (HCC): Primary | ICD-10-CM

## 2018-08-28 RX ORDER — PREDNISONE 10 MG/1
TABLET ORAL
Qty: 20 TABLET | Refills: 0 | Status: SHIPPED | OUTPATIENT
Start: 2018-08-28 | End: 2018-11-01 | Stop reason: CLARIF

## 2018-08-28 NOTE — TELEPHONE ENCOUNTER
Patient states for the last 5 days she has had increased sob, coughing up yellow mucus and very congested. Patient would like something called into her pharmacy.

## 2018-09-05 DIAGNOSIS — J44.1 COPD WITH ACUTE EXACERBATION (HCC): ICD-10-CM

## 2018-09-06 ENCOUNTER — OFFICE VISIT (OUTPATIENT)
Dept: CARDIOLOGY CLINIC | Age: 68
End: 2018-09-06

## 2018-09-06 VITALS
WEIGHT: 152.8 LBS | SYSTOLIC BLOOD PRESSURE: 82 MMHG | BODY MASS INDEX: 27.07 KG/M2 | HEART RATE: 80 BPM | DIASTOLIC BLOOD PRESSURE: 58 MMHG

## 2018-09-06 DIAGNOSIS — I42.8 NONISCHEMIC CARDIOMYOPATHY (HCC): ICD-10-CM

## 2018-09-06 DIAGNOSIS — I50.22 SYSTOLIC CHF, CHRONIC (HCC): Primary | ICD-10-CM

## 2018-09-06 DIAGNOSIS — J43.8 OTHER EMPHYSEMA (HCC): ICD-10-CM

## 2018-09-06 DIAGNOSIS — I95.2 HYPOTENSION DUE TO DRUGS: ICD-10-CM

## 2018-09-06 PROCEDURE — 1123F ACP DISCUSS/DSCN MKR DOCD: CPT | Performed by: INTERNAL MEDICINE

## 2018-09-06 PROCEDURE — G8926 SPIRO NO PERF OR DOC: HCPCS | Performed by: INTERNAL MEDICINE

## 2018-09-06 PROCEDURE — 3023F SPIROM DOC REV: CPT | Performed by: INTERNAL MEDICINE

## 2018-09-06 PROCEDURE — G8399 PT W/DXA RESULTS DOCUMENT: HCPCS | Performed by: INTERNAL MEDICINE

## 2018-09-06 PROCEDURE — 99214 OFFICE O/P EST MOD 30 MIN: CPT | Performed by: INTERNAL MEDICINE

## 2018-09-06 PROCEDURE — G8417 CALC BMI ABV UP PARAM F/U: HCPCS | Performed by: INTERNAL MEDICINE

## 2018-09-06 PROCEDURE — 1090F PRES/ABSN URINE INCON ASSESS: CPT | Performed by: INTERNAL MEDICINE

## 2018-09-06 PROCEDURE — 1101F PT FALLS ASSESS-DOCD LE1/YR: CPT | Performed by: INTERNAL MEDICINE

## 2018-09-06 PROCEDURE — 3017F COLORECTAL CA SCREEN DOC REV: CPT | Performed by: INTERNAL MEDICINE

## 2018-09-06 PROCEDURE — 4040F PNEUMOC VAC/ADMIN/RCVD: CPT | Performed by: INTERNAL MEDICINE

## 2018-09-06 PROCEDURE — G8427 DOCREV CUR MEDS BY ELIG CLIN: HCPCS | Performed by: INTERNAL MEDICINE

## 2018-09-06 PROCEDURE — 1036F TOBACCO NON-USER: CPT | Performed by: INTERNAL MEDICINE

## 2018-09-06 RX ORDER — METOPROLOL SUCCINATE 25 MG/1
25 TABLET, EXTENDED RELEASE ORAL DAILY
Qty: 90 TABLET | Refills: 3 | Status: SHIPPED | OUTPATIENT
Start: 2018-09-06 | End: 2018-12-10 | Stop reason: CLARIF

## 2018-09-06 NOTE — PROGRESS NOTES
Wall:  No tenderness or deformity   Heart:  Regular rate and rhythm, S1, S2 normal, no murmur, rub or gallop PMI intact, no jvd, no edema   Abdomen:   Soft, non-tender, bowel sounds active all four quadrants,  no masses, no organomegaly       Extremities: Extremities normal, atraumatic, no cyanosis   Pulses: 2+ and symmetric   Skin: Skin color, texture, turgor normal, no rashes or lesions   Pysch: Normal mood and affect   Neurologic: Normal gross motor and sensory exam.  Cranial nerves intact        Labs:     Lab Results   Component Value Date    WBC 10.0 05/26/2018    HGB 13.4 05/26/2018    HCT 40.6 05/26/2018    MCV 92.2 05/26/2018     05/26/2018     Lab Results   Component Value Date     08/16/2018    K 4.7 08/16/2018    CL 96 (L) 08/16/2018    CO2 28 08/16/2018    BUN 12 08/16/2018    CREATININE 0.7 08/16/2018    GLUCOSE 92 08/16/2018    CALCIUM 9.8 08/16/2018    PROT 6.2 (L) 05/24/2018    LABALBU 3.8 05/24/2018    BILITOT 0.4 05/24/2018    ALKPHOS 54 05/24/2018    AST 10 (L) 05/24/2018    ALT 8 (L) 05/24/2018    LABGLOM >60 08/16/2018    GFRAA >60 08/16/2018    AGRATIO 1.6 05/24/2018    GLOB 2.4 05/24/2018         Lab Results   Component Value Date    CHOL 180 05/12/2017     Lab Results   Component Value Date    TRIG 81 05/12/2017    TRIG 115 04/04/2017     Lab Results   Component Value Date    HDL 57 05/12/2017     Lab Results   Component Value Date    LDLCALC 107 (H) 05/12/2017     Lab Results   Component Value Date    LABVLDL 16 05/12/2017     No results found for: [facility]    Lab Results   Component Value Date    INR 1.02 05/18/2017    INR 0.89 04/04/2017    PROTIME 11.5 05/18/2017    PROTIME 10.1 04/04/2017       The ASCVD Risk score (Jayesh Anderson., et al., 2013) failed to calculate for the following reasons: The valid systolic blood pressure range is 90 to 200 mmHg    Imaging:       Assessment / Plan:      Diagnosis Orders   1. Systolic CHF, chronic (HCC) EF 45%     2.  Nonischemic

## 2018-09-08 LAB
CULTURE, RESPIRATORY: NORMAL
GRAM STAIN RESULT: NORMAL

## 2018-09-12 ASSESSMENT — ENCOUNTER SYMPTOMS: GASTROINTESTINAL NEGATIVE: 1

## 2018-09-12 NOTE — PROGRESS NOTES
and Thyroid nodule. Surgical History:   has a past surgical history that includes  section; Appendectomy; Fixation Kyphoplasty (2017); and other surgical history (2017). Social History:   reports that she quit smoking about 7 years ago. Her smoking use included Cigarettes. She has a 22.00 pack-year smoking history. She has never used smokeless tobacco. She reports that she drinks about 2.4 oz of alcohol per week . She reports that she does not use drugs. Family History:   Family History   Problem Relation Age of Onset    Diabetes Mother     No Known Problems Father     No Known Problems Sister     No Known Problems Brother     No Known Problems Maternal Grandmother     No Known Problems Maternal Grandfather     No Known Problems Paternal Grandmother     No Known Problems Paternal Grandfather        Home Medications:  Prior to Admission medications    Medication Sig Start Date End Date Taking? Authorizing Provider   metoprolol succinate (TOPROL XL) 25 MG extended release tablet Take 1 tablet by mouth daily 18   Genoveva Valentine MD   predniSONE (DELTASONE) 10 MG tablet Take 4 tablets by mouth for 5 days. 18   Ronnie Perez MD   predniSONE (DELTASONE) 10 MG tablet Take 4 tablets by mouth for 5 days.  8/3/18   Ronnie Perez MD   azithromycin (ZITHROMAX) 250 MG tablet Take 500 mg by mouth the first day then 250 mg for the remaining four days 8/3/18   Ronnie Perez MD   melatonin 3 MG TABS tablet Take 3 mg by mouth nightly as needed    Historical Provider, MD   albuterol sulfate HFA (VENTOLIN HFA) 108 (90 Base) MCG/ACT inhaler Inhale 2 puffs into the lungs every 6 hours as needed for Wheezing 18   Gabino Bar MD   aspirin 81 MG chewable tablet Take 1 tablet by mouth daily 18   Trice Serrato MD   levalbuterol Kevyn Katz) 0.63 MG/3ML nebulization Take 3 mLs by nebulization every 6 hours as needed for Wheezing or Shortness of Breath 18   Trice Serrato MD magnesium hydroxide (MILK OF MAGNESIA) 400 MG/5ML suspension Take 30 mLs by mouth daily as needed for Constipation 5/26/18   Heather Moore MD   Nebulizers Marlon Washington COMPACT MINI NEBULIZER) MISC 1 Device by Does not apply route daily 5/26/18   Heather Moore MD   pantoprazole (PROTONIX) 40 MG tablet Take 1 tablet by mouth every morning (before breakfast) 5/27/18   Heather Moore MD   umeclidinium-vilanterol [facility] ELLIPTA) 62.5-25 MCG/INH AEPB inhaler Inhale 1 puff into the lungs daily 3/26/18   Kalee Baldwin MD   acetaminophen (TYLENOL) 500 MG tablet Take 500 mg by mouth every 6 hours as needed for Pain    Historical Provider, MD        Allergies:  Bee venom; Percocet [oxycodone-acetaminophen]; Vicodin [hydrocodone-acetaminophen]; and Adhesive tape     ROS:   Review of Systems   Constitutional: Negative. HENT: Negative. Respiratory: Negative. Cardiovascular: Negative. Gastrointestinal: Negative. Genitourinary: Negative. Musculoskeletal: Negative. Skin: Negative. Neurological: Negative. Hematological: Negative. Psychiatric/Behavioral: Negative. Physical Examination:    Vitals:    09/14/18 1003   BP: 110/80   Pulse: 64   Weight: 154 lb 9.6 oz (70.1 kg)           Physical Exam   Constitutional: She is oriented to person, place, and time. She appears well-developed and well-nourished. HENT:   Head: Normocephalic and atraumatic. Eyes: Pupils are equal, round, and reactive to light. Conjunctivae are normal.   Neck: Normal range of motion. Neck supple. Cardiovascular: Normal rate, regular rhythm, normal heart sounds and intact distal pulses. Pulmonary/Chest: Effort normal and breath sounds normal.   Abdominal: Soft. Musculoskeletal: Normal range of motion. Neurological: She is alert and oriented to person, place, and time. Skin: Skin is warm and dry. Psychiatric: She has a normal mood and affect.  Her behavior is normal. Judgment and thought content normal.       Lab Data:    CBC:   Lab Results   Component Value Date    WBC 10.0 05/26/2018    WBC 12.4 05/24/2018    WBC 5.7 05/22/2018    RBC 4.40 05/26/2018    RBC 4.45 05/24/2018    RBC 4.79 05/22/2018    HGB 13.4 05/26/2018    HGB 13.5 05/24/2018    HGB 14.7 05/22/2018    HCT 40.6 05/26/2018    HCT 41.0 05/24/2018    HCT 44.5 05/22/2018    MCV 92.2 05/26/2018    MCV 92.1 05/24/2018    MCV 93.0 05/22/2018    RDW 14.2 05/26/2018    RDW 14.4 05/24/2018    RDW 14.2 05/22/2018     05/26/2018     05/24/2018     05/22/2018     BMP:  Lab Results   Component Value Date     08/16/2018     05/26/2018     05/24/2018    K 4.7 08/16/2018    K 4.2 05/26/2018    K 4.3 05/24/2018    K 4.2 05/22/2018    K 4.3 05/21/2018    CL 96 08/16/2018     05/26/2018     05/24/2018    CO2 28 08/16/2018    CO2 29 05/26/2018    CO2 28 05/24/2018    PHOS 3.7 04/04/2017    BUN 12 08/16/2018    BUN 26 05/26/2018    BUN 20 05/24/2018    CREATININE 0.7 08/16/2018    CREATININE 0.6 05/26/2018    CREATININE 0.6 05/24/2018     BNP:   Lab Results   Component Value Date    PROBNP 120 08/16/2018    PROBNP 92 04/13/2017       Cardiac [facility]: [facility] 5/25/18     RESULTS:  HEMODYNAMICS:  Left ventricular end-diastolic pressure equals 4.     There was no significant gradient across the aortic valve by pullback post  cineangiography.     LEFT VENTRICULOGRAM:  Left ventriculogram demonstrates an area of lateral  hypokinesis. The ejection fraction is estimated at 45%.     LEFT MAIN:  Left main is very short vessel that gives almost immediate rise  to the LAD and circumflex. This is normal.     LEFT ANTERIOR DESCENDING:  The LAD courses and wraps around the apex. It  gives off two diagonal branches. The LAD is normal.     LEFT CIRCUMFLEX:  Circumflex gives off a large bifurcating marginal branch  and terminates to posterolateral branches.   The circumflex is normal.     RIGHT CORONARY ARTERY:  The right coronary artery is a dominant vessel. It  gives off _____ single PDA. The right coronary artery is normal.     Echo 5/23/18   Left ventricular cavity size is normal. There is mildly increased left   ventricular wall thickness.   Overall left ventricular systolic function appears normal.   Ejection fraction is visually estimated to be 55%.   Anterior wall may be mildly hypokinetic   Normal diastolic function.   No significant valvular disease. Pt Education: The patient has received education on the following topics: dietary sodium restriction, heart failure medications, the importance of physical activity, symptom management and weight monitoring      Assessment/Plan:    Encounter Diagnoses        Hypotension due to drugs Resolved, restart low dose ACE, labs in 2 weeks    Nonischemic cardiomyopathy ([address] On GDT, recheck echo    Systolic CHF, chronic ([address] EF 45% compensated       Instructions:   1. Medications: Restart Lisinopril 2.5mg once a day, take in the morning, continue metoprolol at night  2. Labs: 2 weeks  3. Referrals: repeat echo   4. Limit sodium to 2000mg/day and fluids to 2L or 64oz/day. 5. Follow up: one month with     CHF Resource Line: [phone] - [place]        Heart Failure Websites:   www.heart. org  www.cardiosmart. org    Smartphone meseret's that can help you keep track of symptoms, weights, and medications:  HeartPartner  MyHDolores  HeartScribe  [address] to track calories, carbohydrates and sodium content of food:   CalorieKing      I appreciate the opportunity of cooperating in the care of this individual.    Clarissa Díaz, CNP, 9/12/2018, 4:42 PM    QUALITY MEASURES  1. Tobacco Cessation Counseling: NA  2. Retake of BP if >140/90:   NA  3. Documentation to PCP/referring for new patient:  Sent to PCP at close of office visit  4. CAD patient on anti-platelet: NA  5. CAD patient on STATIN therapy:  NA  6. Patient with CHF and aFib on anticoagulation:  NA   7.  Patient Education:

## 2018-09-14 ENCOUNTER — OFFICE VISIT (OUTPATIENT)
Dept: CARDIOLOGY CLINIC | Age: 68
End: 2018-09-14

## 2018-09-14 VITALS
WEIGHT: 154.6 LBS | HEART RATE: 64 BPM | DIASTOLIC BLOOD PRESSURE: 80 MMHG | SYSTOLIC BLOOD PRESSURE: 110 MMHG | BODY MASS INDEX: 27.39 KG/M2

## 2018-09-14 DIAGNOSIS — I50.22 SYSTOLIC CHF, CHRONIC (HCC): ICD-10-CM

## 2018-09-14 DIAGNOSIS — I42.8 NONISCHEMIC CARDIOMYOPATHY (HCC): Primary | ICD-10-CM

## 2018-09-14 DIAGNOSIS — I95.2 HYPOTENSION DUE TO DRUGS: ICD-10-CM

## 2018-09-14 PROCEDURE — 4040F PNEUMOC VAC/ADMIN/RCVD: CPT | Performed by: NURSE PRACTITIONER

## 2018-09-14 PROCEDURE — G8427 DOCREV CUR MEDS BY ELIG CLIN: HCPCS | Performed by: NURSE PRACTITIONER

## 2018-09-14 PROCEDURE — 3017F COLORECTAL CA SCREEN DOC REV: CPT | Performed by: NURSE PRACTITIONER

## 2018-09-14 PROCEDURE — G8417 CALC BMI ABV UP PARAM F/U: HCPCS | Performed by: NURSE PRACTITIONER

## 2018-09-14 PROCEDURE — 99213 OFFICE O/P EST LOW 20 MIN: CPT | Performed by: NURSE PRACTITIONER

## 2018-09-14 PROCEDURE — 1101F PT FALLS ASSESS-DOCD LE1/YR: CPT | Performed by: NURSE PRACTITIONER

## 2018-09-14 PROCEDURE — 1036F TOBACCO NON-USER: CPT | Performed by: NURSE PRACTITIONER

## 2018-09-14 PROCEDURE — 1090F PRES/ABSN URINE INCON ASSESS: CPT | Performed by: NURSE PRACTITIONER

## 2018-09-14 PROCEDURE — G8399 PT W/DXA RESULTS DOCUMENT: HCPCS | Performed by: NURSE PRACTITIONER

## 2018-09-14 PROCEDURE — 1123F ACP DISCUSS/DSCN MKR DOCD: CPT | Performed by: NURSE PRACTITIONER

## 2018-09-14 RX ORDER — LISINOPRIL 2.5 MG/1
2.5 TABLET ORAL EVERY EVENING
Qty: 90 TABLET | Refills: 3 | Status: SHIPPED | OUTPATIENT
Start: 2018-09-14 | End: 2018-12-10 | Stop reason: CLARIF

## 2018-09-14 ASSESSMENT — ENCOUNTER SYMPTOMS: RESPIRATORY NEGATIVE: 1

## 2018-09-14 NOTE — PATIENT INSTRUCTIONS
Instructions:   1. Medications: Restart Lisinopril 2.5mg once a day, take in the morning, continue metoprolol at night  2. Labs: 2 weeks  3. Referrals: repeat echo   4. Limit sodium to 2000mg/day and fluids to 2L or 64oz/day. 5. Follow up: one month with     CHF Resource Line: [phone] Shahana Ruiz    Patient Education        Limiting Sodium and Fluids With Heart Failure: Care Instructions  Your Care Instructions    Sodium causes your body to hold on to extra water. This may cause your heart failure symptoms to get worse. Limiting sodium may help you feel better and lower your risk of having to go to the hospital.  People get most of their sodium from processed foods. Fast food and restaurant meals also tend to be very high in sodium. Your doctor may suggest that you limit sodium to 2,000 milligrams (mg) a day or less. That is less than 1 teaspoon of salt a day, including all the salt you eat in cooked or packaged foods. Usually, you have to limit the amount of liquids you drink only if your heart failure is severe. Limiting sodium alone often is enough to help your body get rid of extra fluids. However, your doctor may tell you to limit your fluid intake to a set amount each day. Follow-up care is a key part of your treatment and safety. Be sure to make and go to all appointments, and call your doctor if you are having problems. It's also a good idea to know your test results and keep a list of the medicines you take. How can you care for yourself at home? Read food labels  · Read food labels on cans and food packages. The labels tell you how much sodium is in each serving. Make sure that you look at the serving size. If you eat more than the serving size, you have eaten more sodium than is listed for one serving. · Food labels also tell you the Percent Daily Value.  If the Percent Daily Value says 50%, it means that you will get at least 50% of all the sodium you need for the entire day in one processed cheese and regular peanut butter. · Crackers with salted tops. · Frozen prepared meals. · Canned and dried soups, broths, and bouillon, unless labeled sodium-free or low-sodium. · Canned vegetables, unless labeled sodium-free or low-sodium. · Salted snack foods such as chips and pretzels. · [place] fries, pizza, tacos, and other fast foods. · Pickles, olives, ketchup, and other condiments, especially soy sauce, unless labeled sodium-free or low-sodium. If you cannot cook for yourself  · Have family members or friends help you, or have someone cook low-sodium meals. · Check with your local senior nutrition program to find out where meals are served and whether they offer a low-sodium option. You can often find these programs through your local health department or hospital.  · Have meals delivered to your home. Most [facility] have a Meals on [employer]. These programs provide one hot meal a day for older adults, delivered to their homes. Ask whether these meals are low-sodium. Let them know that you are on a low-sodium diet. Limiting fluid intake  · Find a method that works for you. You might simply write down how much you drink every time you do. Some people keep a container filled with the amount of fluid allowed for that day. If they drink from a source other than the container, then they pour out that amount. · Measure your regular drinking glasses to find out how much fluid each one holds. Once you know this, you will not have to measure every time. · Besides water, milk, juices, and other drinks, some foods have a lot of fluid. Count any foods that will melt (such as ice cream or gelatin dessert) or liquid foods (such as soup) as part of your fluid intake for the day. Where can you learn more? Go to https://chrosieb.[employer]. org and sign in to your [employer] account.  Enter A166 in the [employer] box to learn more about \"Limiting Sodium and Fluids With Heart poor balance

## 2018-09-20 ENCOUNTER — OFFICE VISIT (OUTPATIENT)
Dept: PULMONOLOGY | Age: 68
End: 2018-09-20

## 2018-09-20 VITALS
BODY MASS INDEX: 27.79 KG/M2 | SYSTOLIC BLOOD PRESSURE: 100 MMHG | RESPIRATION RATE: 16 BRPM | HEART RATE: 92 BPM | DIASTOLIC BLOOD PRESSURE: 60 MMHG | OXYGEN SATURATION: 93 % | WEIGHT: 151 LBS | HEIGHT: 62 IN

## 2018-09-20 DIAGNOSIS — J43.2 CENTRILOBULAR EMPHYSEMA (HCC): Primary | ICD-10-CM

## 2018-09-20 DIAGNOSIS — J96.11 CHRONIC RESPIRATORY FAILURE WITH HYPOXIA (HCC): ICD-10-CM

## 2018-09-20 PROCEDURE — 1090F PRES/ABSN URINE INCON ASSESS: CPT | Performed by: INTERNAL MEDICINE

## 2018-09-20 PROCEDURE — G8926 SPIRO NO PERF OR DOC: HCPCS | Performed by: INTERNAL MEDICINE

## 2018-09-20 PROCEDURE — 3017F COLORECTAL CA SCREEN DOC REV: CPT | Performed by: INTERNAL MEDICINE

## 2018-09-20 PROCEDURE — G8399 PT W/DXA RESULTS DOCUMENT: HCPCS | Performed by: INTERNAL MEDICINE

## 2018-09-20 PROCEDURE — 3023F SPIROM DOC REV: CPT | Performed by: INTERNAL MEDICINE

## 2018-09-20 PROCEDURE — G8427 DOCREV CUR MEDS BY ELIG CLIN: HCPCS | Performed by: INTERNAL MEDICINE

## 2018-09-20 PROCEDURE — 1101F PT FALLS ASSESS-DOCD LE1/YR: CPT | Performed by: INTERNAL MEDICINE

## 2018-09-20 PROCEDURE — 1036F TOBACCO NON-USER: CPT | Performed by: INTERNAL MEDICINE

## 2018-09-20 PROCEDURE — 1123F ACP DISCUSS/DSCN MKR DOCD: CPT | Performed by: INTERNAL MEDICINE

## 2018-09-20 PROCEDURE — 99214 OFFICE O/P EST MOD 30 MIN: CPT | Performed by: INTERNAL MEDICINE

## 2018-09-20 PROCEDURE — G8417 CALC BMI ABV UP PARAM F/U: HCPCS | Performed by: INTERNAL MEDICINE

## 2018-09-20 PROCEDURE — 4040F PNEUMOC VAC/ADMIN/RCVD: CPT | Performed by: INTERNAL MEDICINE

## 2018-09-20 NOTE — PROGRESS NOTES
[facility] Pulmonary and Critical Care    Outpatient Follow Up Note    Subjective:   CHIEF COMPLAINT / HPI:     The patient is 79 y.o. female who presents today for hospital discharge follow up for exacerbation of COPD and pulmonary nodules. Gait comes in today saying that she is doing pretty well overall. She continues to work and be active with her family. She does not garden as much as she used to because it does make her short of breath. She had a bit of a flare at the end of August and was given a burst of prednisone and had a sputum culture that grew normal respiratory jose manuel so she did not get antibiotics. She only took the prednisone for a couple of days and said she felt very much better within a day or 2. She is hung onto the prednisone and says she felt short of breath, and like there was a tickle in her throat that made breathing more difficult so she took a dose of steroids on Monday and she thinks again on Tuesday, but only 10 mg. She said it made a big difference. She is aware of the potential side effects of using steroids long-term and wanted to discuss it. She is currently on anoro and albuterol. She has a nebulizer that she uses infrequently. She remains on oxygen at night but very seldomly uses it during the day, she has a note from [place] [id number] that says she needs to be re-qualify for oxygen oral cost her $2900. When asked to compare how her breathing is now compared to before she had her thyroid removed, she says it feels a little bit worse. Previous history: patient had a follow-up CT scan done May 17 and was essentially found to have more vertebral fractures she was admitted for repair. She's run out of her Spiriva and her Breo several weeks ago and has not needed her rescue inhaler. She has also weaned off of supplemental oxygen. She is scheduled for surgery to remove her thyroid mass in a few weeks.  Patient said she felt great on the Anoro and rarely needed her rescue 4/4/2011   Smokeless Tobacco    Never Used       Current Medications:  Current Outpatient Prescriptions on File Prior to Visit   Medication Sig Dispense Refill    VENTOLIN  (90 Base) MCG/ACT inhaler INHALE 2 PUFFS INTO THE LUNGS EVERY 6 HOURS AS NEEDED FOR WHEEZING 18 g 0    lisinopril (PRINIVIL;ZESTRIL) 2.5 MG tablet Take 1 tablet by mouth every evening 90 tablet 3    metoprolol succinate (TOPROL XL) 25 MG extended release tablet Take 1 tablet by mouth daily 90 tablet 3    predniSONE (DELTASONE) 10 MG tablet Take 4 tablets by mouth for 5 days. 20 tablet 0    predniSONE (DELTASONE) 10 MG tablet Take 4 tablets by mouth for 5 days. 20 tablet 0    melatonin 3 MG TABS tablet Take 3 mg by mouth nightly as needed      aspirin 81 MG chewable tablet Take 1 tablet by mouth daily 30 tablet 3    levalbuterol (XOPENEX) 0.63 MG/3ML nebulization Take 3 mLs by nebulization every 6 hours as needed for Wheezing or Shortness of Breath 120 vial 3    magnesium hydroxide (MILK OF MAGNESIA) 400 MG/5ML suspension Take 30 mLs by mouth daily as needed for Constipation      Nebulizers (AIRIAL COMPACT MINI NEBULIZER) MISC 1 Device by Does not apply route daily 1 each 0    umeclidinium-vilanterol (ANORO ELLIPTA) 62.5-25 MCG/INH AEPB inhaler Inhale 1 puff into the lungs daily 1 each 11    acetaminophen (TYLENOL) 500 MG tablet Take 500 mg by mouth every 6 hours as needed for Pain      azithromycin (ZITHROMAX) 250 MG tablet Take 500 mg by mouth the first day then 250 mg for the remaining four days 6 tablet 0    pantoprazole (PROTONIX) 40 MG tablet Take 1 tablet by mouth every morning (before breakfast) 30 tablet 3     No current facility-administered medications on file prior to visit.         REVIEW OF SYSTEMS:    CONSTITUTIONAL: Negative for fevers and chills  HEENT: Negative for oropharyngeal exudate, post nasal drip, sinus pain / pressure, nasal congestion, ear pain  RESPIRATORY:  See HPI  CARDIOVASCULAR: Negative for this this scenario. 2018: IMPRESSION:  1. Moderate obstructive defect. 2.  There is no significant response to bronchodilators. 3.  Air trapping and hyperinflation is present. 4.  Moderate decrease in diffusion capacity. Assessment: This is a 79 y.o. female with COPD and pulmonary nodules    Plan:     Continue anoro and albuterol for control. I'm a little perplexed by her feeling that her breathing is worse even after having her thyroid removed. Remains unclear to me if it is partly vocal cord issue or COPD issue. Since she improves with neb treatments and steroids I'm inclined to think it is COPD related. I don't want her using prednisone as a crutch when there are other treatment options available. I wrote for an inhaled steroid and instructed her to rinse her mouth out afterwards. Pulmicort appears to be warts on her formulary. Patient needs to be requalified for oxygen or have it discontinued. We had her perform a 6 minute walk in the office and she was able to complete 4 minutes before the pulse oximeter . She did not desaturate. Will check an overnight pulse ox study and if negative will discontinue oxygen. I would like her to pulmonary rehab but she is still working full-time. Needs a repeat chest CT in May given her risk factors. This will be a lung cancer screening CT    The patient is not currently smoking. More than half the time of this 30 minute visit was spent counseling the patient. RTC 5 months w/ MD. Call or RTC sooner if symptoms persist or worsen acutely.       Marylee Buster

## 2018-09-24 ENCOUNTER — TELEPHONE (OUTPATIENT)
Dept: PULMONOLOGY | Age: 68
End: 2018-09-24

## 2018-09-25 RX ORDER — AZITHROMYCIN 250 MG/1
TABLET, FILM COATED ORAL
Qty: 6 TABLET | Refills: 0 | Status: SHIPPED | OUTPATIENT
Start: 2018-09-25 | End: 2018-11-01 | Stop reason: CLARIF

## 2018-09-25 RX ORDER — PREDNISONE 10 MG/1
TABLET ORAL
Qty: 20 TABLET | Refills: 0 | Status: SHIPPED | OUTPATIENT
Start: 2018-09-25 | End: 2018-11-01 | Stop reason: CLARIF

## 2018-09-25 NOTE — TELEPHONE ENCOUNTER
Wrote for a prednisone burst and z-pack. Hopefully the confusion over the pulmicort has been cleared up. Can you please clarify that Swati Lux knows I want her to add the pulmicort to her anoro and not substitute. The previous note said \"change inhalers\". I wasn't changing anything, I was adding an inhaler.

## 2018-10-29 ENCOUNTER — TELEPHONE (OUTPATIENT)
Dept: PULMONOLOGY | Age: 68
End: 2018-10-29

## 2018-10-29 ENCOUNTER — HOSPITAL ENCOUNTER (OUTPATIENT)
Dept: NON INVASIVE DIAGNOSTICS | Age: 68
Discharge: HOME | End: 2018-10-29
Payer: MEDICARE

## 2018-10-29 DIAGNOSIS — I50.22 SYSTOLIC CHF, CHRONIC (HCC): ICD-10-CM

## 2018-10-29 DIAGNOSIS — I95.2 HYPOTENSION DUE TO DRUGS: ICD-10-CM

## 2018-10-29 DIAGNOSIS — I42.8 NONISCHEMIC CARDIOMYOPATHY (HCC): ICD-10-CM

## 2018-10-29 LAB
LV EF: 53 %
LVEF MODALITY: NORMAL

## 2018-10-29 PROCEDURE — 93306 TTE W/DOPPLER COMPLETE: CPT

## 2018-10-29 RX ORDER — AZITHROMYCIN 250 MG/1
TABLET, FILM COATED ORAL
Qty: 6 TABLET | Refills: 0 | Status: SHIPPED | OUTPATIENT
Start: 2018-10-29 | End: 2018-12-10 | Stop reason: CLARIF

## 2018-10-29 RX ORDER — PREDNISONE 10 MG/1
TABLET ORAL
Qty: 20 TABLET | Refills: 0 | Status: SHIPPED | OUTPATIENT
Start: 2018-10-29 | End: 2018-12-10 | Stop reason: CLARIF

## 2018-10-30 ENCOUNTER — OFFICE VISIT (OUTPATIENT)
Dept: CARDIOLOGY CLINIC | Age: 68
End: 2018-10-30
Payer: MEDICARE

## 2018-10-30 VITALS
WEIGHT: 149 LBS | BODY MASS INDEX: 27.25 KG/M2 | DIASTOLIC BLOOD PRESSURE: 80 MMHG | HEART RATE: 84 BPM | SYSTOLIC BLOOD PRESSURE: 132 MMHG

## 2018-10-30 DIAGNOSIS — I42.0 DILATED CARDIOMYOPATHY (HCC): ICD-10-CM

## 2018-10-30 DIAGNOSIS — I50.22 CHRONIC SYSTOLIC CONGESTIVE HEART FAILURE (HCC): Primary | ICD-10-CM

## 2018-10-30 PROCEDURE — G8427 DOCREV CUR MEDS BY ELIG CLIN: HCPCS | Performed by: INTERNAL MEDICINE

## 2018-10-30 PROCEDURE — 3017F COLORECTAL CA SCREEN DOC REV: CPT | Performed by: INTERNAL MEDICINE

## 2018-10-30 PROCEDURE — 1036F TOBACCO NON-USER: CPT | Performed by: INTERNAL MEDICINE

## 2018-10-30 PROCEDURE — 1123F ACP DISCUSS/DSCN MKR DOCD: CPT | Performed by: INTERNAL MEDICINE

## 2018-10-30 PROCEDURE — G8417 CALC BMI ABV UP PARAM F/U: HCPCS | Performed by: INTERNAL MEDICINE

## 2018-10-30 PROCEDURE — 4040F PNEUMOC VAC/ADMIN/RCVD: CPT | Performed by: INTERNAL MEDICINE

## 2018-10-30 PROCEDURE — G8484 FLU IMMUNIZE NO ADMIN: HCPCS | Performed by: INTERNAL MEDICINE

## 2018-10-30 PROCEDURE — 99214 OFFICE O/P EST MOD 30 MIN: CPT | Performed by: INTERNAL MEDICINE

## 2018-10-30 PROCEDURE — G8399 PT W/DXA RESULTS DOCUMENT: HCPCS | Performed by: INTERNAL MEDICINE

## 2018-10-30 PROCEDURE — 1090F PRES/ABSN URINE INCON ASSESS: CPT | Performed by: INTERNAL MEDICINE

## 2018-10-30 PROCEDURE — 1101F PT FALLS ASSESS-DOCD LE1/YR: CPT | Performed by: INTERNAL MEDICINE

## 2018-10-30 ASSESSMENT — ENCOUNTER SYMPTOMS
CHOKING: 0
COUGH: 0
SHORTNESS OF BREATH: 0
CHEST TIGHTNESS: 0

## 2018-11-01 ENCOUNTER — OFFICE VISIT (OUTPATIENT)
Dept: FAMILY MEDICINE CLINIC | Age: 68
End: 2018-11-01
Payer: MEDICARE

## 2018-11-01 VITALS
DIASTOLIC BLOOD PRESSURE: 86 MMHG | WEIGHT: 150.4 LBS | OXYGEN SATURATION: 95 % | RESPIRATION RATE: 16 BRPM | HEIGHT: 62 IN | HEART RATE: 89 BPM | BODY MASS INDEX: 27.68 KG/M2 | TEMPERATURE: 98.3 F | SYSTOLIC BLOOD PRESSURE: 122 MMHG

## 2018-11-01 DIAGNOSIS — Z12.11 COLON CANCER SCREENING: ICD-10-CM

## 2018-11-01 DIAGNOSIS — R19.7 DIARRHEA OF PRESUMED INFECTIOUS ORIGIN: ICD-10-CM

## 2018-11-01 DIAGNOSIS — E78.00 ELEVATED LDL CHOLESTEROL LEVEL: ICD-10-CM

## 2018-11-01 DIAGNOSIS — K52.9 ACUTE GASTROENTERITIS: Primary | ICD-10-CM

## 2018-11-01 PROCEDURE — 4040F PNEUMOC VAC/ADMIN/RCVD: CPT | Performed by: FAMILY MEDICINE

## 2018-11-01 PROCEDURE — G8427 DOCREV CUR MEDS BY ELIG CLIN: HCPCS | Performed by: FAMILY MEDICINE

## 2018-11-01 PROCEDURE — 1090F PRES/ABSN URINE INCON ASSESS: CPT | Performed by: FAMILY MEDICINE

## 2018-11-01 PROCEDURE — G8510 SCR DEP NEG, NO PLAN REQD: HCPCS | Performed by: FAMILY MEDICINE

## 2018-11-01 PROCEDURE — 1036F TOBACCO NON-USER: CPT | Performed by: FAMILY MEDICINE

## 2018-11-01 PROCEDURE — 3288F FALL RISK ASSESSMENT DOCD: CPT | Performed by: FAMILY MEDICINE

## 2018-11-01 PROCEDURE — G8484 FLU IMMUNIZE NO ADMIN: HCPCS | Performed by: FAMILY MEDICINE

## 2018-11-01 PROCEDURE — 1123F ACP DISCUSS/DSCN MKR DOCD: CPT | Performed by: FAMILY MEDICINE

## 2018-11-01 PROCEDURE — 1101F PT FALLS ASSESS-DOCD LE1/YR: CPT | Performed by: FAMILY MEDICINE

## 2018-11-01 PROCEDURE — 3017F COLORECTAL CA SCREEN DOC REV: CPT | Performed by: FAMILY MEDICINE

## 2018-11-01 PROCEDURE — G8399 PT W/DXA RESULTS DOCUMENT: HCPCS | Performed by: FAMILY MEDICINE

## 2018-11-01 PROCEDURE — 99214 OFFICE O/P EST MOD 30 MIN: CPT | Performed by: FAMILY MEDICINE

## 2018-11-01 PROCEDURE — G8417 CALC BMI ABV UP PARAM F/U: HCPCS | Performed by: FAMILY MEDICINE

## 2018-11-01 ASSESSMENT — ENCOUNTER SYMPTOMS
RECTAL PAIN: 0
APNEA: 0
SHORTNESS OF BREATH: 0
ANAL BLEEDING: 0
STRIDOR: 0
NAUSEA: 0
DIARRHEA: 1
COUGH: 0
CHEST TIGHTNESS: 0
VOMITING: 0
CONSTIPATION: 0
WHEEZING: 0
CHOKING: 0
BLOOD IN STOOL: 0
ABDOMINAL DISTENTION: 0
ABDOMINAL PAIN: 0

## 2018-11-01 ASSESSMENT — PATIENT HEALTH QUESTIONNAIRE - PHQ9
SUM OF ALL RESPONSES TO PHQ QUESTIONS 1-9: 0
2. FEELING DOWN, DEPRESSED OR HOPELESS: 0
1. LITTLE INTEREST OR PLEASURE IN DOING THINGS: 0
SUM OF ALL RESPONSES TO PHQ QUESTIONS 1-9: 0
SUM OF ALL RESPONSES TO PHQ9 QUESTIONS 1 & 2: 0

## 2018-11-01 NOTE — PATIENT INSTRUCTIONS
Gastroenteritis: Care Instructions  Your Care Instructions    Gastroenteritis is an illness that may cause nausea, vomiting, and diarrhea. It is sometimes called \"stomach flu. \" It can be caused by bacteria or a virus. You will probably begin to feel better in 1 to 2 days. In the meantime, get plenty of rest and make sure you do not become dehydrated. Dehydration occurs when your body loses too much fluid. Follow-up care is a key part of your treatment and safety. Be sure to make and go to all appointments, and call your doctor if you are having problems. It's also a good idea to know your test results and keep a list of the medicines you take. How can you care for yourself at home? · If your doctor prescribed antibiotics, take them as directed. Do not stop taking them just because you feel better. You need to take the full course of antibiotics. · Drink plenty of fluids to prevent dehydration, enough so that your urine is light yellow or clear like water. Choose water and other caffeine-free clear liquids until you feel better. If you have kidney, heart, or liver disease and have to limit fluids, talk with your doctor before you increase your fluid intake. · Drink fluids slowly, in frequent, small amounts, because drinking too much too fast can cause vomiting. · Begin eating mild foods, such as dry toast, yogurt, applesauce, bananas, and rice. Avoid spicy, hot, or high-fat foods, and do not drink alcohol or caffeine for a day or two. Do not drink milk or eat ice cream until you are feeling better. How to prevent gastroenteritis  · Keep hot foods hot and cold foods cold. · Do not eat meats, dressings, salads, or other foods that have been kept at room temperature for more than 2 hours. · Use a thermometer to check your refrigerator. It should be between 34°F and 40°F.  · Defrost meats in the refrigerator or microwave, not on the kitchen counter. · Keep your hands and your kitchen clean.  Wash your hands, cutting boards, and countertops with hot soapy water frequently. · Cook meat until it is well done. · Do not eat raw eggs or uncooked sauces made with raw eggs. · Do not take chances. If food looks or tastes spoiled, throw it out. When should you call for help? Call 911 anytime you think you may need emergency care. For example, call if:    · You vomit blood or what looks like coffee grounds.     · You passed out (lost consciousness).     · You pass maroon or very bloody stools.    Call your doctor now or seek immediate medical care if:    · You have severe belly pain.     · You have signs of needing more fluids. You have sunken eyes, a dry mouth, and pass only a little dark urine.     · You feel like you are going to faint.     · You have increased belly pain that does not go away in 1 to 2 days.     · You have new or increased nausea, or you are vomiting.     · You have a new or higher fever.     · Your stools are black and tarlike or have streaks of blood.    Watch closely for changes in your health, and be sure to contact your doctor if:    · You are dizzy or lightheaded.     · You urinate less than usual, or your urine is dark yellow or brown.     · You do not feel better with each day that goes by. Where can you learn more? Go to [email or web address] Computer [email or web address] org and sign in to your [employer] account. Enter N142 in the [facility] box to learn more about \"Gastroenteritis: Care Instructions. \"     If you do not have an account, please click on the \"Sign Up Now\" link. Current as of: November 18, 2017  Content Version: 11.7  © 5300-0695 [employer]. Care instructions adapted under license by [facility] ([facility]). If you have questions about a medical condition or this instruction, always ask your healthcare professional. [address] any warranty or liability for your use of this information.

## 2018-12-03 ENCOUNTER — TELEPHONE (OUTPATIENT)
Dept: PULMONOLOGY | Age: 68
End: 2018-12-03

## 2018-12-04 NOTE — TELEPHONE ENCOUNTER
Called and LVM about appt for tomorrow at 3:00, Asked her to call and let us know if she can make it

## 2018-12-07 DIAGNOSIS — E78.00 ELEVATED LDL CHOLESTEROL LEVEL: ICD-10-CM

## 2018-12-07 DIAGNOSIS — R19.7 DIARRHEA OF PRESUMED INFECTIOUS ORIGIN: ICD-10-CM

## 2018-12-07 DIAGNOSIS — K52.9 ACUTE GASTROENTERITIS: ICD-10-CM

## 2018-12-07 LAB
A/G RATIO: 2.2 (ref 1.1–2.2)
ALBUMIN SERPL-MCNC: 4.9 G/DL (ref 3.4–5)
ALP BLD-CCNC: 59 U/L (ref 40–129)
ALT SERPL-CCNC: 8 U/L (ref 10–40)
ANION GAP SERPL CALCULATED.3IONS-SCNC: 15 MMOL/L (ref 3–16)
AST SERPL-CCNC: 11 U/L (ref 15–37)
BILIRUB SERPL-MCNC: 0.8 MG/DL (ref 0–1)
BUN BLDV-MCNC: 8 MG/DL (ref 7–20)
CALCIUM SERPL-MCNC: 9.7 MG/DL (ref 8.3–10.6)
CHLORIDE BLD-SCNC: 98 MMOL/L (ref 99–110)
CHOLESTEROL, TOTAL: 184 MG/DL (ref 0–199)
CO2: 27 MMOL/L (ref 21–32)
CREAT SERPL-MCNC: 0.6 MG/DL (ref 0.6–1.2)
GFR AFRICAN AMERICAN: >60
GFR NON-AFRICAN AMERICAN: >60
GLOBULIN: 2.2 G/DL
GLUCOSE BLD-MCNC: 95 MG/DL (ref 70–99)
HCT VFR BLD CALC: 45.5 % (ref 36–48)
HDLC SERPL-MCNC: 61 MG/DL (ref 40–60)
HEMOGLOBIN: 14.8 G/DL (ref 12–16)
LDL CHOLESTEROL CALCULATED: 106 MG/DL
MCH RBC QN AUTO: 31.1 PG (ref 26–34)
MCHC RBC AUTO-ENTMCNC: 32.5 G/DL (ref 31–36)
MCV RBC AUTO: 95.6 FL (ref 80–100)
PDW BLD-RTO: 14 % (ref 12.4–15.4)
PLATELET # BLD: 359 K/UL (ref 135–450)
PMV BLD AUTO: 8.5 FL (ref 5–10.5)
POTASSIUM SERPL-SCNC: 4.7 MMOL/L (ref 3.5–5.1)
RBC # BLD: 4.76 M/UL (ref 4–5.2)
SODIUM BLD-SCNC: 140 MMOL/L (ref 136–145)
TOTAL PROTEIN: 7.1 G/DL (ref 6.4–8.2)
TRIGL SERPL-MCNC: 84 MG/DL (ref 0–150)
VLDLC SERPL CALC-MCNC: 17 MG/DL
WBC # BLD: 6.5 K/UL (ref 4–11)

## 2018-12-10 ENCOUNTER — TELEPHONE (OUTPATIENT)
Dept: PULMONOLOGY | Age: 68
End: 2018-12-10

## 2018-12-10 ENCOUNTER — HOSPITAL ENCOUNTER (INPATIENT)
Age: 68
LOS: 4 days | Discharge: HOME | DRG: 189 | End: 2018-12-14
Attending: EMERGENCY MEDICINE | Admitting: INTERNAL MEDICINE
Payer: MEDICARE

## 2018-12-10 ENCOUNTER — APPOINTMENT (OUTPATIENT)
Dept: CT IMAGING | Age: 68
DRG: 189 | End: 2018-12-10
Payer: MEDICARE

## 2018-12-10 ENCOUNTER — APPOINTMENT (OUTPATIENT)
Dept: GENERAL RADIOLOGY | Age: 68
DRG: 189 | End: 2018-12-10
Payer: MEDICARE

## 2018-12-10 DIAGNOSIS — J44.1 COPD EXACERBATION (HCC): Primary | ICD-10-CM

## 2018-12-10 LAB
B-TYPE NATRIURETIC PEPTIDE: 41 PG/ML (ref 0–99.9)
BASE EXCESS VENOUS: 5 (ref -3–3)
BASOPHILS ABSOLUTE: 0.1 K/UL (ref 0–0.2)
BASOPHILS RELATIVE PERCENT: 1.4 %
CALCIUM IONIZED: 1.06 MMOL/L (ref 1.12–1.32)
CO2: 29 MMOL/L (ref 21–32)
EKG ATRIAL RATE: 107 BPM
EKG DIAGNOSIS: NORMAL
EKG P AXIS: 71 DEGREES
EKG P-R INTERVAL: 136 MS
EKG Q-T INTERVAL: 344 MS
EKG QRS DURATION: 76 MS
EKG QTC CALCULATION (BAZETT): 459 MS
EKG R AXIS: 50 DEGREES
EKG T AXIS: 69 DEGREES
EKG VENTRICULAR RATE: 107 BPM
EOSINOPHILS ABSOLUTE: 0.7 K/UL (ref 0–0.6)
EOSINOPHILS RELATIVE PERCENT: 10 %
GFR AFRICAN AMERICAN: >60
GFR NON-AFRICAN AMERICAN: >60
GLUCOSE BLD-MCNC: 118 MG/DL (ref 70–99)
HCO3 VENOUS: 30.7 MMOL/L (ref 23–29)
HCT VFR BLD CALC: 44.3 % (ref 36–48)
HEMOGLOBIN: 14.6 G/DL (ref 12–16)
LACTATE: 1.55 MMOL/L (ref 0.4–2)
LYMPHOCYTES ABSOLUTE: 2.1 K/UL (ref 1–5.1)
LYMPHOCYTES RELATIVE PERCENT: 27.9 %
MCH RBC QN AUTO: 31.8 PG (ref 26–34)
MCHC RBC AUTO-ENTMCNC: 32.9 G/DL (ref 31–36)
MCV RBC AUTO: 96.5 FL (ref 80–100)
MONOCYTES ABSOLUTE: 0.5 K/UL (ref 0–1.3)
MONOCYTES RELATIVE PERCENT: 6.8 %
NEUTROPHILS ABSOLUTE: 4 K/UL (ref 1.7–7.7)
NEUTROPHILS RELATIVE PERCENT: 53.9 %
O2 SAT, VEN: 33 %
PCO2, VEN: 52.4 MM HG (ref 40–50)
PDW BLD-RTO: 14.2 % (ref 12.4–15.4)
PERFORMED ON: ABNORMAL
PERFORMED ON: ABNORMAL
PERFORMED ON: NORMAL
PERFORMED ON: NORMAL
PH VENOUS: 7.38 (ref 7.35–7.45)
PLATELET # BLD: 343 K/UL (ref 135–450)
PMV BLD AUTO: 8.3 FL (ref 5–10.5)
PO2, VEN: 21 MM HG
POC ANION GAP: 10 (ref 10–20)
POC BUN: 10 MG/DL (ref 7–18)
POC CHLORIDE: 99 MMOL/L (ref 99–110)
POC CREATININE: 0.6 MG/DL (ref 0.6–1.2)
POC POTASSIUM: 4.3 MMOL/L (ref 3.5–5.1)
POC SAMPLE TYPE: ABNORMAL
POC SAMPLE TYPE: ABNORMAL
POC SAMPLE TYPE: NORMAL
POC SAMPLE TYPE: NORMAL
POC SODIUM: 138 MMOL/L (ref 136–145)
POC TROPONIN I: 0 NG/ML (ref 0–0.1)
RBC # BLD: 4.59 M/UL (ref 4–5.2)
TCO2 CALC VENOUS: 32 MMOL/L
WBC # BLD: 7.4 K/UL (ref 4–11)

## 2018-12-10 PROCEDURE — 94640 AIRWAY INHALATION TREATMENT: CPT

## 2018-12-10 PROCEDURE — 83605 ASSAY OF LACTIC ACID: CPT

## 2018-12-10 PROCEDURE — 96365 THER/PROPH/DIAG IV INF INIT: CPT

## 2018-12-10 PROCEDURE — 2580000003 HC RX 258: Performed by: EMERGENCY MEDICINE

## 2018-12-10 PROCEDURE — 2700000000 HC OXYGEN THERAPY PER DAY

## 2018-12-10 PROCEDURE — 6360000002 HC RX W HCPCS: Performed by: EMERGENCY MEDICINE

## 2018-12-10 PROCEDURE — 94664 DEMO&/EVAL PT USE INHALER: CPT

## 2018-12-10 PROCEDURE — 6360000002 HC RX W HCPCS: Performed by: INTERNAL MEDICINE

## 2018-12-10 PROCEDURE — 94760 N-INVAS EAR/PLS OXIMETRY 1: CPT

## 2018-12-10 PROCEDURE — 2580000003 HC RX 258: Performed by: INTERNAL MEDICINE

## 2018-12-10 PROCEDURE — 71046 X-RAY EXAM CHEST 2 VIEWS: CPT

## 2018-12-10 PROCEDURE — 6370000000 HC RX 637 (ALT 250 FOR IP): Performed by: INTERNAL MEDICINE

## 2018-12-10 PROCEDURE — 6360000004 HC RX CONTRAST MEDICATION: Performed by: EMERGENCY MEDICINE

## 2018-12-10 PROCEDURE — 82803 BLOOD GASES ANY COMBINATION: CPT

## 2018-12-10 PROCEDURE — 83880 ASSAY OF NATRIURETIC PEPTIDE: CPT

## 2018-12-10 PROCEDURE — 93005 ELECTROCARDIOGRAM TRACING: CPT | Performed by: EMERGENCY MEDICINE

## 2018-12-10 PROCEDURE — 6370000000 HC RX 637 (ALT 250 FOR IP): Performed by: EMERGENCY MEDICINE

## 2018-12-10 PROCEDURE — 1200000000 HC SEMI PRIVATE

## 2018-12-10 PROCEDURE — 71275 CT ANGIOGRAPHY CHEST: CPT

## 2018-12-10 PROCEDURE — 96375 TX/PRO/DX INJ NEW DRUG ADDON: CPT

## 2018-12-10 PROCEDURE — 80047 BASIC METABLC PNL IONIZED CA: CPT

## 2018-12-10 PROCEDURE — 85025 COMPLETE CBC W/AUTO DIFF WBC: CPT

## 2018-12-10 PROCEDURE — 99285 EMERGENCY DEPT VISIT HI MDM: CPT

## 2018-12-10 PROCEDURE — 84484 ASSAY OF TROPONIN QUANT: CPT

## 2018-12-10 RX ORDER — LOPERAMIDE HYDROCHLORIDE 2 MG/1
2 CAPSULE ORAL 4 TIMES DAILY PRN
COMMUNITY
End: 2019-04-26 | Stop reason: CLARIF

## 2018-12-10 RX ORDER — METOPROLOL SUCCINATE 25 MG/1
25 TABLET, EXTENDED RELEASE ORAL NIGHTLY
Status: DISCONTINUED | OUTPATIENT
Start: 2018-12-10 | End: 2018-12-14 | Stop reason: HOSPADM

## 2018-12-10 RX ORDER — UREA 10 %
6 LOTION (ML) TOPICAL NIGHTLY PRN
Status: DISCONTINUED | OUTPATIENT
Start: 2018-12-10 | End: 2018-12-14 | Stop reason: HOSPADM

## 2018-12-10 RX ORDER — MORPHINE SULFATE 4 MG/ML
4 INJECTION, SOLUTION INTRAMUSCULAR; INTRAVENOUS ONCE
Status: COMPLETED | OUTPATIENT
Start: 2018-12-10 | End: 2018-12-10

## 2018-12-10 RX ORDER — PREDNISONE 10 MG/1
TABLET ORAL
Qty: 20 TABLET | Refills: 0 | Status: SHIPPED | OUTPATIENT
Start: 2018-12-10 | End: 2018-12-10 | Stop reason: CLARIF

## 2018-12-10 RX ORDER — METHYLPREDNISOLONE SODIUM SUCCINATE 125 MG/2ML
125 INJECTION, POWDER, LYOPHILIZED, FOR SOLUTION INTRAMUSCULAR; INTRAVENOUS ONCE
Status: COMPLETED | OUTPATIENT
Start: 2018-12-10 | End: 2018-12-10

## 2018-12-10 RX ORDER — SODIUM CHLORIDE 0.9 % (FLUSH) 0.9 %
10 SYRINGE (ML) INJECTION PRN
Status: DISCONTINUED | OUTPATIENT
Start: 2018-12-10 | End: 2018-12-14 | Stop reason: HOSPADM

## 2018-12-10 RX ORDER — LISINOPRIL 2.5 MG/1
2.5 TABLET ORAL DAILY
Status: DISCONTINUED | OUTPATIENT
Start: 2018-12-11 | End: 2018-12-14 | Stop reason: HOSPADM

## 2018-12-10 RX ORDER — IPRATROPIUM BROMIDE AND ALBUTEROL SULFATE 2.5; .5 MG/3ML; MG/3ML
1 SOLUTION RESPIRATORY (INHALATION) ONCE
Status: COMPLETED | OUTPATIENT
Start: 2018-12-10 | End: 2018-12-10

## 2018-12-10 RX ORDER — ALBUTEROL SULFATE 2.5 MG/3ML
2.5 SOLUTION RESPIRATORY (INHALATION) EVERY 6 HOURS PRN
Status: DISCONTINUED | OUTPATIENT
Start: 2018-12-10 | End: 2018-12-14 | Stop reason: HOSPADM

## 2018-12-10 RX ORDER — AZITHROMYCIN 250 MG/1
250 TABLET, FILM COATED ORAL DAILY
Status: DISCONTINUED | OUTPATIENT
Start: 2018-12-12 | End: 2018-12-14 | Stop reason: HOSPADM

## 2018-12-10 RX ORDER — METHYLPREDNISOLONE SODIUM SUCCINATE 40 MG/ML
40 INJECTION, POWDER, LYOPHILIZED, FOR SOLUTION INTRAMUSCULAR; INTRAVENOUS DAILY
Status: DISCONTINUED | OUTPATIENT
Start: 2018-12-11 | End: 2018-12-12

## 2018-12-10 RX ORDER — UREA 10 %
3 LOTION (ML) TOPICAL NIGHTLY PRN
Status: DISCONTINUED | OUTPATIENT
Start: 2018-12-10 | End: 2018-12-10

## 2018-12-10 RX ORDER — ONDANSETRON 2 MG/ML
4 INJECTION INTRAMUSCULAR; INTRAVENOUS EVERY 6 HOURS PRN
Status: DISCONTINUED | OUTPATIENT
Start: 2018-12-10 | End: 2018-12-14 | Stop reason: HOSPADM

## 2018-12-10 RX ORDER — BUDESONIDE 0.5 MG/2ML
0.5 INHALANT ORAL 2 TIMES DAILY
Status: DISCONTINUED | OUTPATIENT
Start: 2018-12-10 | End: 2018-12-14 | Stop reason: HOSPADM

## 2018-12-10 RX ORDER — SODIUM CHLORIDE 0.9 % (FLUSH) 0.9 %
10 SYRINGE (ML) INJECTION EVERY 12 HOURS SCHEDULED
Status: DISCONTINUED | OUTPATIENT
Start: 2018-12-10 | End: 2018-12-14 | Stop reason: HOSPADM

## 2018-12-10 RX ORDER — IPRATROPIUM BROMIDE AND ALBUTEROL SULFATE 2.5; .5 MG/3ML; MG/3ML
1 SOLUTION RESPIRATORY (INHALATION) EVERY 4 HOURS
Status: DISCONTINUED | OUTPATIENT
Start: 2018-12-11 | End: 2018-12-11

## 2018-12-10 RX ORDER — LISINOPRIL 2.5 MG/1
2.5 TABLET ORAL DAILY
COMMUNITY
End: 2019-11-13

## 2018-12-10 RX ORDER — ASPIRIN 81 MG/1
81 TABLET, CHEWABLE ORAL DAILY
Status: DISCONTINUED | OUTPATIENT
Start: 2018-12-11 | End: 2018-12-14 | Stop reason: HOSPADM

## 2018-12-10 RX ORDER — ALBUTEROL SULFATE 2.5 MG/3ML
2.5 SOLUTION RESPIRATORY (INHALATION) ONCE
Status: COMPLETED | OUTPATIENT
Start: 2018-12-10 | End: 2018-12-10

## 2018-12-10 RX ORDER — AZITHROMYCIN 250 MG/1
500 TABLET, FILM COATED ORAL DAILY
Status: COMPLETED | OUTPATIENT
Start: 2018-12-11 | End: 2018-12-11

## 2018-12-10 RX ORDER — METOPROLOL SUCCINATE 25 MG/1
12.5 TABLET, EXTENDED RELEASE ORAL 2 TIMES DAILY
COMMUNITY
End: 2019-06-07 | Stop reason: SDUPTHER

## 2018-12-10 RX ADMIN — IPRATROPIUM BROMIDE AND ALBUTEROL SULFATE 1 AMPULE: .5; 3 SOLUTION RESPIRATORY (INHALATION) at 16:39

## 2018-12-10 RX ADMIN — ALBUTEROL SULFATE 2.5 MG: 2.5 SOLUTION RESPIRATORY (INHALATION) at 16:38

## 2018-12-10 RX ADMIN — BUDESONIDE 500 MCG: 0.5 INHALANT RESPIRATORY (INHALATION) at 21:45

## 2018-12-10 RX ADMIN — Medication 10 ML: at 23:05

## 2018-12-10 RX ADMIN — METOPROLOL SUCCINATE 25 MG: 25 TABLET, EXTENDED RELEASE ORAL at 23:05

## 2018-12-10 RX ADMIN — MORPHINE SULFATE 4 MG: 4 INJECTION INTRAVENOUS at 19:22

## 2018-12-10 RX ADMIN — ALBUTEROL SULFATE 2.5 MG: 2.5 SOLUTION RESPIRATORY (INHALATION) at 23:30

## 2018-12-10 RX ADMIN — METHYLPREDNISOLONE SODIUM SUCCINATE 125 MG: 125 INJECTION, POWDER, FOR SOLUTION INTRAMUSCULAR; INTRAVENOUS at 16:52

## 2018-12-10 RX ADMIN — IOPAMIDOL 80 ML: 755 INJECTION, SOLUTION INTRAVENOUS at 17:56

## 2018-12-10 RX ADMIN — AZITHROMYCIN MONOHYDRATE 500 MG: 500 INJECTION, POWDER, LYOPHILIZED, FOR SOLUTION INTRAVENOUS at 18:45

## 2018-12-10 RX ADMIN — ALBUTEROL SULFATE 2.5 MG: 2.5 SOLUTION RESPIRATORY (INHALATION) at 17:40

## 2018-12-10 RX ADMIN — ALBUTEROL SULFATE 2.5 MG: 2.5 SOLUTION RESPIRATORY (INHALATION) at 16:32

## 2018-12-10 RX ADMIN — Medication 3 MG: at 23:04

## 2018-12-10 ASSESSMENT — PAIN SCALES - GENERAL: PAINLEVEL_OUTOF10: 7

## 2018-12-10 NOTE — ED PROVIDER NOTES
[facility]          ATTENDING PHYSICIAN NOTE       Date of evaluation: 12/10/2018    Chief Complaint     Shortness of Breath (hx of copd ); Cough; and Chest Pain (sharp pain right side )      History of Present Illness     Natalee Kinney is a 79 y.o. female who presents Today with shortness of breath and right-sided chest pain. No sick she had intermittent right-sided pleuritic chest pain for the past few days and then became short of breath this morning. It has been slowly progressive. She has a history of COPD as well as CHF with an EF of 45%. She is managed by Dr. Delmy Patton of pulmonology for COPD. She is not on oxygen at home but comes in satting 84% on room air requiring 2 L to bring saturations above 90%. She is speaking in 6-7 word sentences on arrival but noticeably dyspneic and endorses intermittent right-sided chest pain with wheezing and difficulty breathing. She denies any weight gain denies any lower extremity swelling denies any calf pain, long trips. She did try her Ventolin inhaler prior to arrival here with some mild improvement but not complete. Review of Systems     Review of Systems  A full 10 point review of systems was obtained. Pertinent positives and negatives as documented in the HPI, otherwise all other systems were reviewed and were negative. Past Medical, Surgical, Family, and Social History     She has a past medical history of Back pain; Cervical cancer screening; Compression fracture of thoracic vertebrae, non-traumatic (HCC); COPD (chronic obstructive pulmonary disease) ([address] COPD exacerbation ([address] Elevated LDL cholesterol level; GERD (gastroesophageal reflux disease); History of mammogram; Lung nodule:left; Osteomyelitis of left leg ([address] Osteoporosis; S/P colonoscopy; Systolic CHF, chronic (HCC) EF 45%; Thyroid mass; and Thyroid nodule. She has a past surgical history that includes  section;  Appendectomy; appearing, but dyspneic on examination speaking in 6-7 word sentences  HEENT:  head is atraumatic, sclera are clear, oropharynx is nonerythematous, mucus membranes are moist  Neck: Trachea midline  Chest: Labored respirations with to subcu was inspiratory and expiratory wheezing throughout all lung fields, clear to auscultation bilaterally  Cardiovascular: Regular rate  and rhythm, 2+ radial pulses bilaterally  Abdominal: Nondistended abdomen, soft, nontender without rebound or gaurding  Skin: Warm, dry well perfused, no rashes  Extremities: no obvious deformities, no tenderness to palpation diffusely  Neurologic:  Alert and oriented, speech is clear and intact without dysarthria, gait is intact  Psychologic: appropriate mood and affect  Diagnostic Results     EKG   Sinus tachycardia with a ventricular rate of 107, intervals within normal limits, normal axis, no ST or T-wave changes no signs of ischemia. No changes from EKG in May 2018. RADIOLOGY:  CTA CHEST W CONTRAST   Final Result      1. There is suboptimal timing of contrast bolus. There is no evidence of any pulmonary embolism within the main, lobar segmental pulmonary arteries. Evaluation of the subsegmental pulmonary arteries is markedly limited. 2. No evidence of any acute pulmonary disease. 3. Stable left lower lobe pulmonary nodules dating back to a chest CT dated 4/3/2017. Given stability, these favor a benign etiology. Recommend CT follow-up in one year to confirm stability. XR CHEST STANDARD (2 VW)   Final Result   1. No evidence of acute cardiopulmonary disease.            LABS:   Results for orders placed or performed during the hospital encounter of 12/10/18   CBC auto differential   Result Value Ref Range    WBC 7.4 4.0 - 11.0 K/uL    RBC 4.59 4.00 - 5.20 M/uL    Hemoglobin 14.6 12.0 - 16.0 g/dL    Hematocrit 44.3 36.0 - 48.0 %    MCV 96.5 80.0 - 100.0 fL    MCH 31.8 26.0 - 34.0 pg    MCHC 32.9 31.0 - 36.0 g/dL    RDW 14.2 12.4 - 15.4 MEDICATIONS:  New Prescriptions    No medications on file       DISPOSITION Admitted 12/10/2018 08:13:52 PM       Margarito Johnson MD  12/10/18 [id number]

## 2018-12-10 NOTE — TELEPHONE ENCOUNTER
Patient called back at approx. 3:45 PM.  She will be going to [facility] ED, as her chest pain is becoming more piercing and frequent with exertion. She is driving herself there.

## 2018-12-10 NOTE — ED TRIAGE NOTES
Pt to ED with c/o SOB. Hx of COPD. Unable to talk in complete sentences. 84% on RA. Only needs O2 at night usually. Labored breathing noted. Audible wheezing noted.

## 2018-12-11 LAB
REPORT: NORMAL
RESPIRATORY PANEL PCR: NORMAL

## 2018-12-11 PROCEDURE — 2580000003 HC RX 258: Performed by: INTERNAL MEDICINE

## 2018-12-11 PROCEDURE — 87798 DETECT AGENT NOS DNA AMP: CPT

## 2018-12-11 PROCEDURE — G0008 ADMIN INFLUENZA VIRUS VAC: HCPCS | Performed by: INTERNAL MEDICINE

## 2018-12-11 PROCEDURE — 1200000000 HC SEMI PRIVATE

## 2018-12-11 PROCEDURE — 90686 IIV4 VACC NO PRSV 0.5 ML IM: CPT | Performed by: INTERNAL MEDICINE

## 2018-12-11 PROCEDURE — 87486 CHLMYD PNEUM DNA AMP PROBE: CPT

## 2018-12-11 PROCEDURE — 94761 N-INVAS EAR/PLS OXIMETRY MLT: CPT

## 2018-12-11 PROCEDURE — 89220 SPUTUM SPECIMEN COLLECTION: CPT

## 2018-12-11 PROCEDURE — 2700000000 HC OXYGEN THERAPY PER DAY

## 2018-12-11 PROCEDURE — 6370000000 HC RX 637 (ALT 250 FOR IP): Performed by: INTERNAL MEDICINE

## 2018-12-11 PROCEDURE — 94640 AIRWAY INHALATION TREATMENT: CPT

## 2018-12-11 PROCEDURE — 87581 M.PNEUMON DNA AMP PROBE: CPT

## 2018-12-11 PROCEDURE — 99223 1ST HOSP IP/OBS HIGH 75: CPT | Performed by: INTERNAL MEDICINE

## 2018-12-11 PROCEDURE — 94664 DEMO&/EVAL PT USE INHALER: CPT

## 2018-12-11 PROCEDURE — 94667 MNPJ CHEST WALL 1ST: CPT

## 2018-12-11 PROCEDURE — 6360000002 HC RX W HCPCS: Performed by: INTERNAL MEDICINE

## 2018-12-11 PROCEDURE — 87633 RESP VIRUS 12-25 TARGETS: CPT

## 2018-12-11 RX ORDER — ACETAMINOPHEN 325 MG/1
650 TABLET ORAL EVERY 4 HOURS PRN
Status: DISCONTINUED | OUTPATIENT
Start: 2018-12-11 | End: 2018-12-14 | Stop reason: HOSPADM

## 2018-12-11 RX ORDER — GUAIFENESIN/DEXTROMETHORPHAN 100-10MG/5
5 SYRUP ORAL EVERY 6 HOURS PRN
Status: DISCONTINUED | OUTPATIENT
Start: 2018-12-11 | End: 2018-12-14 | Stop reason: HOSPADM

## 2018-12-11 RX ORDER — TRAMADOL HYDROCHLORIDE 50 MG/1
50 TABLET ORAL ONCE
Status: COMPLETED | OUTPATIENT
Start: 2018-12-11 | End: 2018-12-11

## 2018-12-11 RX ORDER — IPRATROPIUM BROMIDE AND ALBUTEROL SULFATE 2.5; .5 MG/3ML; MG/3ML
1 SOLUTION RESPIRATORY (INHALATION) 3 TIMES DAILY
Status: DISCONTINUED | OUTPATIENT
Start: 2018-12-12 | End: 2018-12-14 | Stop reason: HOSPADM

## 2018-12-11 RX ADMIN — INFLUENZA A VIRUS A/MICHIGAN/45/2015 X-275 (H1N1) ANTIGEN (FORMALDEHYDE INACTIVATED), INFLUENZA A VIRUS A/SINGAPORE/INFIMH-16-0019/2016 IVR-186 (H3N2) ANTIGEN (FORMALDEHYDE INACTIVATED), INFLUENZA B VIRUS B/PHUKET/3073/2013 ANTIGEN (FORMALDEHYDE INACTIVATED), AND INFLUENZA B VIRUS B/MARYLAND/15/2016 BX-69A ANTIGEN (FORMALDEHYDE INACTIVATED) 0.5 ML: 15; 15; 15; 15 INJECTION, SUSPENSION INTRAMUSCULAR at 10:41

## 2018-12-11 RX ADMIN — IPRATROPIUM BROMIDE AND ALBUTEROL SULFATE 1 AMPULE: .5; 3 SOLUTION RESPIRATORY (INHALATION) at 20:35

## 2018-12-11 RX ADMIN — AZITHROMYCIN 500 MG: 250 TABLET, FILM COATED ORAL at 10:39

## 2018-12-11 RX ADMIN — BUDESONIDE 500 MCG: 0.5 INHALANT RESPIRATORY (INHALATION) at 20:35

## 2018-12-11 RX ADMIN — TRAMADOL HYDROCHLORIDE 50 MG: 50 TABLET, FILM COATED ORAL at 02:26

## 2018-12-11 RX ADMIN — IPRATROPIUM BROMIDE AND ALBUTEROL SULFATE 1 AMPULE: .5; 3 SOLUTION RESPIRATORY (INHALATION) at 11:37

## 2018-12-11 RX ADMIN — ASPIRIN 81 MG 81 MG: 81 TABLET ORAL at 10:40

## 2018-12-11 RX ADMIN — Medication 10 ML: at 21:24

## 2018-12-11 RX ADMIN — IPRATROPIUM BROMIDE AND ALBUTEROL SULFATE 1 AMPULE: .5; 3 SOLUTION RESPIRATORY (INHALATION) at 04:26

## 2018-12-11 RX ADMIN — IPRATROPIUM BROMIDE AND ALBUTEROL SULFATE 1 AMPULE: .5; 3 SOLUTION RESPIRATORY (INHALATION) at 08:01

## 2018-12-11 RX ADMIN — IPRATROPIUM BROMIDE AND ALBUTEROL SULFATE 1 AMPULE: .5; 3 SOLUTION RESPIRATORY (INHALATION) at 01:22

## 2018-12-11 RX ADMIN — ACETAMINOPHEN 650 MG: 325 TABLET ORAL at 11:39

## 2018-12-11 RX ADMIN — METOPROLOL SUCCINATE 25 MG: 25 TABLET, EXTENDED RELEASE ORAL at 21:24

## 2018-12-11 RX ADMIN — IPRATROPIUM BROMIDE AND ALBUTEROL SULFATE 1 AMPULE: .5; 3 SOLUTION RESPIRATORY (INHALATION) at 16:22

## 2018-12-11 RX ADMIN — ACETAMINOPHEN 650 MG: 325 TABLET ORAL at 19:51

## 2018-12-11 RX ADMIN — Medication 10 ML: at 10:41

## 2018-12-11 RX ADMIN — BUDESONIDE 500 MCG: 0.5 INHALANT RESPIRATORY (INHALATION) at 08:01

## 2018-12-11 RX ADMIN — METHYLPREDNISOLONE SODIUM SUCCINATE 40 MG: 40 INJECTION, POWDER, FOR SOLUTION INTRAMUSCULAR; INTRAVENOUS at 10:38

## 2018-12-11 RX ADMIN — ENOXAPARIN SODIUM 40 MG: 40 INJECTION SUBCUTANEOUS at 10:38

## 2018-12-11 RX ADMIN — Medication 6 MG: at 21:24

## 2018-12-11 ASSESSMENT — PAIN DESCRIPTION - PROGRESSION
CLINICAL_PROGRESSION: GRADUALLY WORSENING
CLINICAL_PROGRESSION: NOT CHANGED
CLINICAL_PROGRESSION: NOT CHANGED

## 2018-12-11 ASSESSMENT — PAIN SCALES - GENERAL
PAINLEVEL_OUTOF10: 0
PAINLEVEL_OUTOF10: 7
PAINLEVEL_OUTOF10: 3
PAINLEVEL_OUTOF10: 6
PAINLEVEL_OUTOF10: 3
PAINLEVEL_OUTOF10: 7

## 2018-12-11 ASSESSMENT — PAIN DESCRIPTION - ORIENTATION
ORIENTATION: RIGHT

## 2018-12-11 ASSESSMENT — PAIN DESCRIPTION - ONSET
ONSET: ON-GOING

## 2018-12-11 ASSESSMENT — PAIN DESCRIPTION - DESCRIPTORS
DESCRIPTORS: SHARP
DESCRIPTORS: DISCOMFORT

## 2018-12-11 ASSESSMENT — PAIN DESCRIPTION - PAIN TYPE
TYPE: ACUTE PAIN

## 2018-12-11 ASSESSMENT — PAIN DESCRIPTION - FREQUENCY
FREQUENCY: INTERMITTENT

## 2018-12-11 ASSESSMENT — PAIN DESCRIPTION - LOCATION
LOCATION: CHEST

## 2018-12-11 NOTE — PROGRESS NOTES
bpm                d. Can demonstrate a 2-3 second inspiratory hold  4. Bronchodilators will be administered via Hand Held Nebulizer [facility]) to patients when ANY of the following criteria are met  a. Incognizant or uncooperative          b. Patients treated with HHN at Home        c. Unable to demonstrate proper use of MDI with spacer     d. RR > 30 bpm   5. Bronchodilators will be delivered via Metered Dose Inhaler (MDI), HHN, Aerogen to intubated patients on mechanical ventilation. 6. Inhalation medication orders will be delivered and/or substituted as outlined below. Aerosolized Medications Ordering and Administration Guidelines:    1. All Medications will be ordered by a physician, and their frequency and/or modality will be adjusted as defined by the patients Respiratory Severity Index (RSI) score. 2. If the patient does not have documented COPD, consider discontinuing anticholinergics when RSI is less than 9.  3. If the bronchospasm worsens (increased RSI), then the bronchodilator frequency can be increased to a maximum of every 4 hours. If greater than every 4 hours is required, the physician will be contacted. 4. If the bronchospasm improves, the frequency of the bronchodilator can be decreased, based on the patient's RSI, but not less than home treatment regimen frequency. 5. Bronchodilator(s) will be discontinued if patient has a RSI less than 9 and has received no scheduled or as needed treatment for 72  Hrs. Patients Ordered on a Mucolytic Agent:    1. Must always be administered with a bronchodilator. 2. Discontinue if patient experiences worsened bronchospasm, or secretions have lessened to the point that the patient is able to clear them with a cough. Anti-inflammatory and Combination Medications:    1.  If the patient lacks prior history of lung disease, is not using inhaled anti-inflammatory medication at home, and lacks wheezing by examination or by history for at least 24 hours, contact physician for possible discontinuation.

## 2018-12-11 NOTE — CARE COORDINATION
CTC attempted to meet with the Pt to complete the Inpatient Interview. Pt is receiving a breathing treatment and CTC will attempt to meet with the Pt at a later time. JEANCARLOS Phan, RN  Care Transition Coordinator  Contact Number:  [phone]

## 2018-12-11 NOTE — PROGRESS NOTES
Musculoskeletal: Normal range of motion. She exhibits no edema or deformity. Neurological: She is alert and oriented to person, place, and time. Skin: Skin is warm and dry. Capillary refill takes less than 2 seconds. Psychiatric: She has a normal mood and affect. Her behavior is normal.             Labs      Recent Labs      12/10/18   1645   WBC  7.4   HGB  14.6   HCT  44.3   PLT  343   MCV  96.5        Recent Labs      12/10/18   1636   CO2  29   CREATININE  0.6       No results for input(s): AST, ALT, ALB, BILIDIR, BILITOT, ALKPHOS in the last 72 hours. No results for input(s): MG in the last 72 hours. Radiology  CTA CHEST W CONTRAST   Final Result      1. There is suboptimal timing of contrast bolus. There is no evidence of any pulmonary embolism within the main, lobar segmental pulmonary arteries. Evaluation of the subsegmental pulmonary arteries is markedly limited. 2. No evidence of any acute pulmonary disease. 3. Stable left lower lobe pulmonary nodules dating back to a chest CT dated 4/3/2017. Given stability, these favor a benign etiology. Recommend CT follow-up in one year to confirm stability. XR CHEST STANDARD (2 VW)   Final Result   1. No evidence of acute cardiopulmonary disease. Assessment :   1. Acute hypercapnic  failure   2. COPD exacerbation   3. Chronic diastolic HF        Plan  We'll continue with the oxygenation, nebulizations, inhalational corticosteroids plus longer acting beta [email or web address] continue with IV steroids. Completed the Z-Gregg. Chronic systolic heart failure appears to be compensated. Discussed with the pulmonology.   Anticipate discharge in next 24 hours      Axel Barlow MD  12/11/2018    ,

## 2018-12-11 NOTE — H&P
puff into the lungs daily 3/26/18  Yes Kamala Null MD   acetaminophen (TYLENOL) 500 MG tablet Take 500 mg by mouth every 6 hours as needed for Pain   Yes Historical Provider, MD   budesonide (PULMICORT FLEXHALER) 180 MCG/ACT AEPB inhaler Inhale 2 puffs into the lungs 2 times daily 9/20/18   Cassidy Dee MD   Nebulizers Charis Craft COMPACT MINI NEBULIZER) MISC 1 Device by Does not apply route daily 5/26/18   Verner Porto, MD       Allergies:  Bee venom; Percocet [oxycodone-acetaminophen]; Vicodin [hydrocodone-acetaminophen]; and Adhesive tape    Social History:      The patient currently lives At home with  Rola Erwin:   reports that she quit smoking about 7 years ago. Her smoking use included Cigarettes. She has a 22.00 pack-year smoking history. She has never used smokeless tobacco.  ETOH:   reports that she drinks about 2.4 oz of alcohol per week . Family History:      Reviewed. Positive as follows:    Family History   Problem Relation Age of Onset    Diabetes Mother     No Known Problems Father     No Known Problems Sister     No Known Problems Brother     No Known Problems Maternal Grandmother     No Known Problems Maternal Grandfather     No Known Problems Paternal Grandmother     No Known Problems Paternal Grandfather        REVIEW OF SYSTEMS:   Pertinent positives as noted in the HPI. All other systems reviewed and negative. PHYSICAL EXAM PERFORMED:    /64   Pulse 95   Temp 98.7 °F (37.1 °C) (Oral)   Resp 19   Ht 5' 3\" (1.6 m)   Wt 140 lb (63.5 kg)   SpO2 94%   BMI 24.80 kg/m²     General appearance: Moderate distress, appears stated age and cooperative. HEENT:  Normal cephalic, atraumatic without obvious deformity. Pupils equal, round, and reactive to light. Extra ocular muscles intact. Conjunctivae/corneas clear. Neck: Supple, with full range of motion. No jugular venous distention. Trachea midline.   Respiratory:  Increased respiratory effort,

## 2018-12-12 ENCOUNTER — TELEPHONE (OUTPATIENT)
Dept: PULMONOLOGY | Age: 68
End: 2018-12-12

## 2018-12-12 ENCOUNTER — APPOINTMENT (OUTPATIENT)
Dept: GENERAL RADIOLOGY | Age: 68
DRG: 189 | End: 2018-12-12
Payer: MEDICARE

## 2018-12-12 LAB
BASE EXCESS ARTERIAL: 3.3 MMOL/L (ref -3–3)
CARBOXYHEMOGLOBIN ARTERIAL: 1.1 % (ref 0–1.5)
EKG ATRIAL RATE: 88 BPM
EKG DIAGNOSIS: NORMAL
EKG P AXIS: 45 DEGREES
EKG P-R INTERVAL: 122 MS
EKG Q-T INTERVAL: 372 MS
EKG QRS DURATION: 72 MS
EKG QTC CALCULATION (BAZETT): 450 MS
EKG R AXIS: 26 DEGREES
EKG T AXIS: 59 DEGREES
EKG VENTRICULAR RATE: 88 BPM
HCO3 ARTERIAL: 28 MMOL/L (ref 21–29)
HEMOGLOBIN, ART, EXTENDED: 14.3 G/DL
METHEMOGLOBIN ARTERIAL: 0.7 % (ref 0–1.4)
O2 SAT, ARTERIAL: 96 % (ref 93–100)
PCO2 ARTERIAL: 45.5 MMHG (ref 35–45)
PH ARTERIAL: 7.41 (ref 7.35–7.45)
PO2 ARTERIAL: 81.9 MMHG (ref 75–108)
TCO2 ARTERIAL: 30 MMOL/L

## 2018-12-12 PROCEDURE — 6360000002 HC RX W HCPCS: Performed by: INTERNAL MEDICINE

## 2018-12-12 PROCEDURE — 6370000000 HC RX 637 (ALT 250 FOR IP): Performed by: INTERNAL MEDICINE

## 2018-12-12 PROCEDURE — 93005 ELECTROCARDIOGRAM TRACING: CPT | Performed by: INTERNAL MEDICINE

## 2018-12-12 PROCEDURE — 6360000002 HC RX W HCPCS: Performed by: EMERGENCY MEDICINE

## 2018-12-12 PROCEDURE — 94761 N-INVAS EAR/PLS OXIMETRY MLT: CPT

## 2018-12-12 PROCEDURE — 82803 BLOOD GASES ANY COMBINATION: CPT

## 2018-12-12 PROCEDURE — 94640 AIRWAY INHALATION TREATMENT: CPT

## 2018-12-12 PROCEDURE — 2580000003 HC RX 258: Performed by: INTERNAL MEDICINE

## 2018-12-12 PROCEDURE — 93010 ELECTROCARDIOGRAM REPORT: CPT | Performed by: INTERNAL MEDICINE

## 2018-12-12 PROCEDURE — 71045 X-RAY EXAM CHEST 1 VIEW: CPT

## 2018-12-12 PROCEDURE — 1200000000 HC SEMI PRIVATE

## 2018-12-12 PROCEDURE — 99233 SBSQ HOSP IP/OBS HIGH 50: CPT | Performed by: INTERNAL MEDICINE

## 2018-12-12 PROCEDURE — 94664 DEMO&/EVAL PT USE INHALER: CPT

## 2018-12-12 PROCEDURE — 2700000000 HC OXYGEN THERAPY PER DAY

## 2018-12-12 RX ORDER — PREDNISONE 20 MG/1
40 TABLET ORAL DAILY
Status: DISCONTINUED | OUTPATIENT
Start: 2018-12-13 | End: 2018-12-14 | Stop reason: HOSPADM

## 2018-12-12 RX ADMIN — ACETAMINOPHEN 650 MG: 325 TABLET ORAL at 23:06

## 2018-12-12 RX ADMIN — BUDESONIDE 500 MCG: 0.5 INHALANT RESPIRATORY (INHALATION) at 07:17

## 2018-12-12 RX ADMIN — BUDESONIDE 500 MCG: 0.5 INHALANT RESPIRATORY (INHALATION) at 21:20

## 2018-12-12 RX ADMIN — IPRATROPIUM BROMIDE AND ALBUTEROL SULFATE 1 AMPULE: .5; 3 SOLUTION RESPIRATORY (INHALATION) at 13:13

## 2018-12-12 RX ADMIN — Medication 6 MG: at 23:06

## 2018-12-12 RX ADMIN — LISINOPRIL 2.5 MG: 2.5 TABLET ORAL at 09:24

## 2018-12-12 RX ADMIN — METHYLPREDNISOLONE SODIUM SUCCINATE 40 MG: 40 INJECTION, POWDER, FOR SOLUTION INTRAMUSCULAR; INTRAVENOUS at 09:24

## 2018-12-12 RX ADMIN — IPRATROPIUM BROMIDE AND ALBUTEROL SULFATE 1 AMPULE: .5; 3 SOLUTION RESPIRATORY (INHALATION) at 07:17

## 2018-12-12 RX ADMIN — GUAIFENESIN AND DEXTROMETHORPHAN 5 ML: 100; 10 SYRUP ORAL at 18:28

## 2018-12-12 RX ADMIN — IPRATROPIUM BROMIDE AND ALBUTEROL SULFATE 1 AMPULE: .5; 3 SOLUTION RESPIRATORY (INHALATION) at 21:20

## 2018-12-12 RX ADMIN — ACETAMINOPHEN 650 MG: 325 TABLET ORAL at 18:28

## 2018-12-12 RX ADMIN — ACETAMINOPHEN 650 MG: 325 TABLET ORAL at 09:24

## 2018-12-12 RX ADMIN — ALBUTEROL SULFATE 2.5 MG: 2.5 SOLUTION RESPIRATORY (INHALATION) at 00:45

## 2018-12-12 RX ADMIN — AZITHROMYCIN 250 MG: 250 TABLET, FILM COATED ORAL at 09:24

## 2018-12-12 RX ADMIN — METOPROLOL SUCCINATE 25 MG: 25 TABLET, EXTENDED RELEASE ORAL at 20:19

## 2018-12-12 RX ADMIN — Medication 10 ML: at 20:18

## 2018-12-12 RX ADMIN — ACETAMINOPHEN 650 MG: 325 TABLET ORAL at 01:18

## 2018-12-12 RX ADMIN — ENOXAPARIN SODIUM 40 MG: 40 INJECTION SUBCUTANEOUS at 09:24

## 2018-12-12 RX ADMIN — Medication 10 ML: at 09:25

## 2018-12-12 RX ADMIN — GUAIFENESIN AND DEXTROMETHORPHAN 5 ML: 100; 10 SYRUP ORAL at 01:18

## 2018-12-12 RX ADMIN — ASPIRIN 81 MG 81 MG: 81 TABLET ORAL at 09:24

## 2018-12-12 ASSESSMENT — PAIN DESCRIPTION - PAIN TYPE
TYPE: ACUTE PAIN

## 2018-12-12 ASSESSMENT — PAIN SCALES - GENERAL
PAINLEVEL_OUTOF10: 3
PAINLEVEL_OUTOF10: 3
PAINLEVEL_OUTOF10: 1
PAINLEVEL_OUTOF10: 6
PAINLEVEL_OUTOF10: 0
PAINLEVEL_OUTOF10: 1
PAINLEVEL_OUTOF10: 0

## 2018-12-12 ASSESSMENT — PAIN DESCRIPTION - FREQUENCY
FREQUENCY: INTERMITTENT

## 2018-12-12 ASSESSMENT — PAIN DESCRIPTION - LOCATION
LOCATION: CHEST

## 2018-12-12 ASSESSMENT — PAIN DESCRIPTION - ORIENTATION
ORIENTATION: RIGHT;LEFT
ORIENTATION: RIGHT
ORIENTATION: RIGHT;LEFT

## 2018-12-12 ASSESSMENT — PAIN DESCRIPTION - DESCRIPTORS
DESCRIPTORS: ACHING
DESCRIPTORS: SHARP
DESCRIPTORS: ACHING

## 2018-12-12 ASSESSMENT — PAIN DESCRIPTION - PROGRESSION: CLINICAL_PROGRESSION: NOT CHANGED

## 2018-12-12 ASSESSMENT — PAIN DESCRIPTION - ONSET
ONSET: ON-GOING
ONSET: GRADUAL
ONSET: ON-GOING

## 2018-12-12 NOTE — PROGRESS NOTES
Hospital Medicine Care  Progress Note      Chief complain; Shortness of Breath (hx of copd ); Cough; and Chest Pain (sharp pain right side )           Subjective: This am patient became sob with exertion, rapid response was called. Per resident note diffuse wheezing noted on exam.  Patient has chest tightness which hasnt changed. Feels cough loosening up. No nausea, emesis, diarrhea. Afebrile     Review of Systems:     Review of Systems as mentioned above, other ros is negative. Objective:       Medications        Scheduled Meds:   ipratropium-albuterol  1 ampule Inhalation TID    aspirin  81 mg Oral Daily    budesonide  0.5 mg Nebulization BID    lisinopril  2.5 mg Oral Daily    metoprolol succinate  25 mg Oral Nightly    sodium chloride flush  10 mL Intravenous 2 times per day    enoxaparin  40 mg Subcutaneous Daily    methylPREDNISolone  40 mg Intravenous Daily    azithromycin  250 mg Oral Daily     Continuous Infusions:  PRN Meds:.acetaminophen, sodium chloride, guaiFENesin-dextromethorphan, albuterol, sodium chloride flush, magnesium hydroxide, ondansetron, melatonin      Allergies  Allergies   Allergen Reactions    Bee Venom Swelling    Percocet [Oxycodone-Acetaminophen]     Vicodin [Hydrocodone-Acetaminophen]     Adhesive Tape Rash         Vitals:    12/12/18 0354 12/12/18 0717 12/12/18 0736 12/12/18 0808   BP: 128/82  (!) 138/93    Pulse: 87  90    Resp: 18 18 24    Temp: 97.7 °F (36.5 °C)  98.1 °F (36.7 °C)    TempSrc: Oral  Oral    SpO2: 95% 99% 97% 90%   Weight:       Height:             Physical exam:         Physical Exam   Constitutional: She is oriented to person, place, and time. She appears well-developed and well-nourished. HENT:   Head: Normocephalic and atraumatic. Eyes: Pupils are equal, round, and reactive to light. EOM are normal.   Cardiovascular: Normal rate and regular rhythm. Exam reveals no friction rub. No murmur heard.   Pulmonary/Chest: Effort

## 2018-12-12 NOTE — PROGRESS NOTES
RESPIRATORY THERAPY ASSESSMENT    Name:  Ulises Record Number:  [id number]  Age: 79 y.o. Gender: female  : 1950  Today's Date:  2018  Room:  [facility]322-    Assessment     Is the patient being admitted for a COPD or Asthma exacerbation? No   (If yes the patient will be seen every 4 hours for the first 24 hours and then reassessed)    Patient Admission Diagnosis      Allergies  Allergies   Allergen Reactions    Bee Venom Swelling    Percocet [Oxycodone-Acetaminophen]     Vicodin [Hydrocodone-Acetaminophen]     Adhesive Tape Rash       Minimum Predicted Vital Capacity:     782          Actual Vital Capacity:      1000          Pulmonary History:COPD  Home Oxygen Therapy:  2 liters/min via nasal [email or web address][email or web address]  Home Respiratory Therapy:Albuterol and Umeclidinium Bromide/Vilanterol   Current Respiratory Therapy:  duoneb Q4, pulmicort BID, albuterol Q4prn  Treatment Type: N  Medications: Albuterol/Ipratropium    Respiratory Severity Index(RSI)   Patients with orders for inhalation medications, oxygen, or any therapeutic treatment modality will be placed on Respiratory Protocol. They will be assessed with the first treatment and at least every 72 hours thereafter. The following severity scale will be used to determine frequency of treatment intervention.     Smoking History: Mild Exacerbation = 3 (quit 10 years)  Social History  Social History   Substance Use Topics    Smoking status: Former Smoker     Packs/day: 1.00     Years: 22.00     Types: Cigarettes     Quit date: 2011    Smokeless tobacco: Never Used    Alcohol use 2.4 oz/week     4 Cans of beer per week       Recent Surgical History: None = 0  Past Surgical History  Past Surgical History:   Procedure Laterality Date    APPENDECTOMY       SECTION      FIXATION KYPHOPLASTY  2017    Dr. Nam Stanton  2017    thoracic kyphoplasty       Level of Consciousness: Alert, Oriented, administered via Metered Dose Inhaler (MDI) with spacer when the following criteria are met:  a. Alert and cooperative     b. HR < 140 bpm  c. RR < 30 bpm                d. Can demonstrate a 2-3 second inspiratory hold  4. Bronchodilators will be administered via Hand Held Nebulizer [facility]) to patients when ANY of the following criteria are met  a. Incognizant or uncooperative          b. Patients treated with HHN at Home        c. Unable to demonstrate proper use of MDI with spacer     d. RR > 30 bpm   5. Bronchodilators will be delivered via Metered Dose Inhaler (MDI), HHN, Aerogen to intubated patients on mechanical ventilation. 6. Inhalation medication orders will be delivered and/or substituted as outlined below. Aerosolized Medications Ordering and Administration Guidelines:    1. All Medications will be ordered by a physician, and their frequency and/or modality will be adjusted as defined by the patients Respiratory Severity Index (RSI) score. 2. If the patient does not have documented COPD, consider discontinuing anticholinergics when RSI is less than 9.  3. If the bronchospasm worsens (increased RSI), then the bronchodilator frequency can be increased to a maximum of every 4 hours. If greater than every 4 hours is required, the physician will be contacted. 4. If the bronchospasm improves, the frequency of the bronchodilator can be decreased, based on the patient's RSI, but not less than home treatment regimen frequency. 5. Bronchodilator(s) will be discontinued if patient has a RSI less than 9 and has received no scheduled or as needed treatment for 72  Hrs. Patients Ordered on a Mucolytic Agent:    1. Must always be administered with a bronchodilator. 2. Discontinue if patient experiences worsened bronchospasm, or secretions have lessened to the point that the patient is able to clear them with a cough. Anti-inflammatory and Combination Medications:    1.  If the patient lacks prior history

## 2018-12-12 NOTE — TELEPHONE ENCOUNTER
[facility] COPD care coord called to schedule hospital f/u appt for pt. Right now I have pt scheduled 1/2/19 @ noon. Is this going to be ok?

## 2018-12-12 NOTE — CODE DOCUMENTATION
Rapid response has ended. Patient oxygen saturation increased to 97% with the non re-breather. Patient continued to be monitored by Dr. Leo Curran and another resident(name unknown) for fifteen minutes after rapid ended.

## 2018-12-13 ENCOUNTER — TELEPHONE (OUTPATIENT)
Dept: FAMILY MEDICINE CLINIC | Age: 68
End: 2018-12-13

## 2018-12-13 PROCEDURE — 6370000000 HC RX 637 (ALT 250 FOR IP): Performed by: INTERNAL MEDICINE

## 2018-12-13 PROCEDURE — 94664 DEMO&/EVAL PT USE INHALER: CPT

## 2018-12-13 PROCEDURE — 94640 AIRWAY INHALATION TREATMENT: CPT

## 2018-12-13 PROCEDURE — 1200000000 HC SEMI PRIVATE

## 2018-12-13 PROCEDURE — 6360000002 HC RX W HCPCS: Performed by: INTERNAL MEDICINE

## 2018-12-13 PROCEDURE — 99232 SBSQ HOSP IP/OBS MODERATE 35: CPT | Performed by: INTERNAL MEDICINE

## 2018-12-13 PROCEDURE — 2700000000 HC OXYGEN THERAPY PER DAY

## 2018-12-13 PROCEDURE — 94761 N-INVAS EAR/PLS OXIMETRY MLT: CPT

## 2018-12-13 PROCEDURE — 94680 O2 UPTK RST&XERS DIR SIMPLE: CPT

## 2018-12-13 PROCEDURE — 2580000003 HC RX 258: Performed by: INTERNAL MEDICINE

## 2018-12-13 PROCEDURE — 6360000002 HC RX W HCPCS: Performed by: EMERGENCY MEDICINE

## 2018-12-13 RX ORDER — AZITHROMYCIN 250 MG/1
250 TABLET, FILM COATED ORAL DAILY
Qty: 3 TABLET | Refills: 0 | Status: SHIPPED | OUTPATIENT
Start: 2018-12-13 | End: 2018-12-16

## 2018-12-13 RX ORDER — PREDNISONE 10 MG/1
TABLET ORAL
Qty: 30 TABLET | Refills: 0 | Status: SHIPPED | OUTPATIENT
Start: 2018-12-13 | End: 2019-04-26 | Stop reason: CLARIF

## 2018-12-13 RX ORDER — IPRATROPIUM BROMIDE AND ALBUTEROL SULFATE 2.5; .5 MG/3ML; MG/3ML
3 SOLUTION RESPIRATORY (INHALATION) EVERY 6 HOURS PRN
Qty: 360 ML | Refills: 1 | Status: SHIPPED | OUTPATIENT
Start: 2018-12-13 | End: 2019-04-26 | Stop reason: CLARIF

## 2018-12-13 RX ADMIN — IPRATROPIUM BROMIDE AND ALBUTEROL SULFATE 1 AMPULE: .5; 3 SOLUTION RESPIRATORY (INHALATION) at 09:00

## 2018-12-13 RX ADMIN — BUDESONIDE 500 MCG: 0.5 INHALANT RESPIRATORY (INHALATION) at 20:35

## 2018-12-13 RX ADMIN — GUAIFENESIN AND DEXTROMETHORPHAN 5 ML: 100; 10 SYRUP ORAL at 20:07

## 2018-12-13 RX ADMIN — Medication 6 MG: at 23:21

## 2018-12-13 RX ADMIN — BUDESONIDE 500 MCG: 0.5 INHALANT RESPIRATORY (INHALATION) at 09:00

## 2018-12-13 RX ADMIN — ASPIRIN 81 MG 81 MG: 81 TABLET ORAL at 08:54

## 2018-12-13 RX ADMIN — ACETAMINOPHEN 650 MG: 325 TABLET ORAL at 03:36

## 2018-12-13 RX ADMIN — ACETAMINOPHEN 650 MG: 325 TABLET ORAL at 08:57

## 2018-12-13 RX ADMIN — LISINOPRIL 2.5 MG: 2.5 TABLET ORAL at 08:54

## 2018-12-13 RX ADMIN — Medication 10 ML: at 20:08

## 2018-12-13 RX ADMIN — ENOXAPARIN SODIUM 40 MG: 40 INJECTION SUBCUTANEOUS at 08:54

## 2018-12-13 RX ADMIN — METOPROLOL SUCCINATE 25 MG: 25 TABLET, EXTENDED RELEASE ORAL at 20:06

## 2018-12-13 RX ADMIN — Medication 10 ML: at 11:59

## 2018-12-13 RX ADMIN — ALBUTEROL SULFATE 2.5 MG: 2.5 SOLUTION RESPIRATORY (INHALATION) at 03:44

## 2018-12-13 RX ADMIN — IPRATROPIUM BROMIDE AND ALBUTEROL SULFATE 1 AMPULE: .5; 3 SOLUTION RESPIRATORY (INHALATION) at 15:13

## 2018-12-13 RX ADMIN — ACETAMINOPHEN 650 MG: 325 TABLET ORAL at 20:06

## 2018-12-13 RX ADMIN — GUAIFENESIN AND DEXTROMETHORPHAN 5 ML: 100; 10 SYRUP ORAL at 03:36

## 2018-12-13 RX ADMIN — PREDNISONE 40 MG: 20 TABLET ORAL at 08:54

## 2018-12-13 RX ADMIN — AZITHROMYCIN 250 MG: 250 TABLET, FILM COATED ORAL at 08:54

## 2018-12-13 RX ADMIN — IPRATROPIUM BROMIDE AND ALBUTEROL SULFATE 1 AMPULE: .5; 3 SOLUTION RESPIRATORY (INHALATION) at 20:35

## 2018-12-13 ASSESSMENT — PAIN SCALES - GENERAL
PAINLEVEL_OUTOF10: 3
PAINLEVEL_OUTOF10: 0
PAINLEVEL_OUTOF10: 0
PAINLEVEL_OUTOF10: 5
PAINLEVEL_OUTOF10: 4
PAINLEVEL_OUTOF10: 0

## 2018-12-13 ASSESSMENT — PAIN DESCRIPTION - ONSET: ONSET: GRADUAL

## 2018-12-13 ASSESSMENT — PAIN DESCRIPTION - PROGRESSION: CLINICAL_PROGRESSION: NOT CHANGED

## 2018-12-13 ASSESSMENT — PAIN DESCRIPTION - FREQUENCY: FREQUENCY: INTERMITTENT

## 2018-12-13 ASSESSMENT — PAIN DESCRIPTION - DESCRIPTORS: DESCRIPTORS: ACHING

## 2018-12-13 ASSESSMENT — PAIN DESCRIPTION - PAIN TYPE: TYPE: ACUTE PAIN

## 2018-12-13 ASSESSMENT — PAIN DESCRIPTION - ORIENTATION: ORIENTATION: RIGHT;LEFT

## 2018-12-13 ASSESSMENT — PAIN DESCRIPTION - LOCATION: LOCATION: CHEST

## 2018-12-13 NOTE — PROGRESS NOTES
[facility]    Respiratory Therapy     Home Oxygen Evaluation        Name: Ulises Record Number: [id number]  Age: 79 y.o.   Gender:  female   : 1950  Today's date: 2018  Room: 6322/6322-01      Assessment        /88   Pulse 85   Temp 98 °F (36.7 °C) (Oral)   Resp 18   Ht 5' 3\" (1.6 m)   Wt 146 lb 9.6 oz (66.5 kg)   SpO2 95%   BMI 25.97 kg/m²     Patient Active Problem List   Diagnosis    Pathological fracture of vertebra due to osteoporosis (HCC)    Incidental pulmonary nodule, greater than or equal to 8mm    Thyroid mass of unclear etiology    Acute bronchitis    Mucus plugging of bronchi    Shortness of breath    Hypoxemia    Pulmonary emphysema (HCC)    Chronic obstructive pulmonary disease (HCC)    Lung nodule:left    Vertebral fracture, osteoporotic (HCC)    Gastroesophageal reflux disease without esophagitis    Age related osteoporosis    Vocal cord paralysis, unilateral partial    Hypoxia    Chronic respiratory failure with hypoxia (HCC)    Systolic CHF, chronic (HCC) EF 45%    S/P coronary angiogram    Nonischemic cardiomyopathy (HCC)    Hypotension due to drugs    COPD exacerbation (HCC)       Social History:  Social History   Substance Use Topics    Smoking status: Former Smoker     Packs/day: 1.00     Years: 22.00     Types: Cigarettes     Quit date: 2011    Smokeless tobacco: Never Used    Alcohol use 2.4 oz/week     4 Cans of beer per week       Patient Room Air saturation at rest 91  %  Patient Room Air saturation upon ambulation 84 %    Oxygen saturations of 88% or less on RA qualifies patient for Home Oxygen    Patient resting on 0  lmp  with an oxygen saturation of  90 %     Patient ambulated on 1 lpm with an oxygen saturation of 84%    Patient ambulated on 2 lpm with an oxygen saturation of 84%    Patient ambulated on 3 lpm with an oxygen saturation of 85%     Patient ambulated on 4 lpm with an oxygen saturation of 86%    Patient ambulated on 6 liters with an oxygen saturation of 89%    Qualifying patient for home oxygen with ambulation and continuous flow  @ 6 lpm.      In your clinical assessment does the Patient Require Portable Oxygen Tanks?     Yes               Patient/caregiver was educated on Home Oxygen process:  Yes      Level of patient/caregiver understanding able to:   [x] Verbalize understanding   [] Demonstrate understanding       [] Teach back        [] Needs reinforcement        []  No available caregiver               []  Other:     Response to education:  Excellent     Time Spent with Home O2 Set Up:  5  minutes     [facility] VALDEZ on 12/13/2018 at 10:15 AM                 .

## 2018-12-13 NOTE — PROGRESS NOTES
Hospital Medicine Care  Progress Note      Chief complain; Shortness of Breath (hx of copd ); Cough; and Chest Pain (sharp pain right side )           Subjective:     Sob improved  Cough with phlegm production  Afebrile  No nausea, vomiting. No cp, sob     Review of Systems:     Review of Systems as mentioned above, other ros is negative. Objective:       Medications        Scheduled Meds:   predniSONE  40 mg Oral Daily    ipratropium-albuterol  1 ampule Inhalation TID    aspirin  81 mg Oral Daily    budesonide  0.5 mg Nebulization BID    lisinopril  2.5 mg Oral Daily    metoprolol succinate  25 mg Oral Nightly    sodium chloride flush  10 mL Intravenous 2 times per day    enoxaparin  40 mg Subcutaneous Daily    azithromycin  250 mg Oral Daily     Continuous Infusions:  PRN Meds:.acetaminophen, sodium chloride, guaiFENesin-dextromethorphan, albuterol, sodium chloride flush, magnesium hydroxide, ondansetron, melatonin      Allergies  Allergies   Allergen Reactions    Bee Venom Swelling    Percocet [Oxycodone-Acetaminophen]     Vicodin [Hydrocodone-Acetaminophen]     Adhesive Tape Rash         Vitals:    12/13/18 0343 12/13/18 0344 12/13/18 0820 12/13/18 0900   BP: (!) 147/94  134/88    Pulse: 78  85    Resp: 20 16 18    Temp: 97.8 °F (36.6 °C)  98 °F (36.7 °C)    TempSrc: Oral  Oral    SpO2: 95% 94% 93% 95%   Weight:       Height:             Physical exam:         Physical Exam   Constitutional: She is oriented to person, place, and time. She appears well-developed and well-nourished. HENT:   Head: Normocephalic and atraumatic. Eyes: Pupils are equal, round, and reactive to light. EOM are normal.   Cardiovascular: Normal rate and regular rhythm. Exam reveals no friction rub. No murmur heard. Pulmonary/Chest: Effort normal. She has decreased breath sounds. She has wheezes. Abdominal: Soft. Bowel sounds are normal.   Musculoskeletal: Normal range of motion.  She exhibits no edema or

## 2018-12-14 VITALS
WEIGHT: 146.6 LBS | TEMPERATURE: 98.2 F | HEART RATE: 84 BPM | OXYGEN SATURATION: 91 % | DIASTOLIC BLOOD PRESSURE: 77 MMHG | RESPIRATION RATE: 18 BRPM | BODY MASS INDEX: 25.98 KG/M2 | HEIGHT: 63 IN | SYSTOLIC BLOOD PRESSURE: 157 MMHG

## 2018-12-14 PROCEDURE — 6360000002 HC RX W HCPCS: Performed by: INTERNAL MEDICINE

## 2018-12-14 PROCEDURE — 6370000000 HC RX 637 (ALT 250 FOR IP): Performed by: INTERNAL MEDICINE

## 2018-12-14 PROCEDURE — 94640 AIRWAY INHALATION TREATMENT: CPT

## 2018-12-14 PROCEDURE — 2700000000 HC OXYGEN THERAPY PER DAY

## 2018-12-14 PROCEDURE — 99232 SBSQ HOSP IP/OBS MODERATE 35: CPT | Performed by: INTERNAL MEDICINE

## 2018-12-14 PROCEDURE — 94680 O2 UPTK RST&XERS DIR SIMPLE: CPT

## 2018-12-14 PROCEDURE — 94761 N-INVAS EAR/PLS OXIMETRY MLT: CPT

## 2018-12-14 PROCEDURE — 2580000003 HC RX 258: Performed by: INTERNAL MEDICINE

## 2018-12-14 RX ADMIN — ACETAMINOPHEN 650 MG: 325 TABLET ORAL at 09:21

## 2018-12-14 RX ADMIN — GUAIFENESIN AND DEXTROMETHORPHAN 5 ML: 100; 10 SYRUP ORAL at 02:17

## 2018-12-14 RX ADMIN — ASPIRIN 81 MG 81 MG: 81 TABLET ORAL at 09:18

## 2018-12-14 RX ADMIN — Medication 10 ML: at 10:37

## 2018-12-14 RX ADMIN — GUAIFENESIN AND DEXTROMETHORPHAN 5 ML: 100; 10 SYRUP ORAL at 09:23

## 2018-12-14 RX ADMIN — IPRATROPIUM BROMIDE AND ALBUTEROL SULFATE 1 AMPULE: .5; 3 SOLUTION RESPIRATORY (INHALATION) at 08:48

## 2018-12-14 RX ADMIN — PREDNISONE 40 MG: 20 TABLET ORAL at 09:18

## 2018-12-14 RX ADMIN — AZITHROMYCIN 250 MG: 250 TABLET, FILM COATED ORAL at 09:18

## 2018-12-14 RX ADMIN — BUDESONIDE 500 MCG: 0.5 INHALANT RESPIRATORY (INHALATION) at 08:48

## 2018-12-14 RX ADMIN — IPRATROPIUM BROMIDE AND ALBUTEROL SULFATE 1 AMPULE: .5; 3 SOLUTION RESPIRATORY (INHALATION) at 14:20

## 2018-12-14 RX ADMIN — LISINOPRIL 2.5 MG: 2.5 TABLET ORAL at 09:18

## 2018-12-14 RX ADMIN — ENOXAPARIN SODIUM 40 MG: 40 INJECTION SUBCUTANEOUS at 09:18

## 2018-12-14 ASSESSMENT — PAIN SCALES - GENERAL
PAINLEVEL_OUTOF10: 2
PAINLEVEL_OUTOF10: 0

## 2018-12-14 NOTE — DISCHARGE INSTR - COC
800 ml   Output             1250 ml   Net             -450 ml     I/O last 3 completed shifts: In: 1280 [P.O.:1280]  Out: 1250 [Urine:1250]    Safety Concerns:     [address] Safety Concerns:389706334}    Impairments/Disabilities:      [address] Impairments/Disabilities:985022902}    Nutrition Therapy:  Current Nutrition Therapy:   [address] Diet List:118103674}    Routes of Feeding: {CHP DME Other Feedings:712245559}  Liquids: {Slp liquid thickness:83719}  Daily Fluid Restriction: {CHP DME Yes amt example:989441097}  Last Modified Barium Swallow with Video (Video Swallowing Test): {Done Not Done PYAR:768460254}    Treatments at the Time of Hospital Discharge:   Respiratory Treatments: ***  Oxygen Therapy:  {Therapy; copd oxygen:34714}  Ventilator:    { CC Vent IDGB:283330261}    Rehab Therapies: {THERAPEUTIC INTERVENTION:5677119505}  Weight Bearing Status/Restrictions: [address]  Weight Bearin}  Other Medical Equipment (for information only, NOT a DME order):  {EQUIPMENT:498073992}  Other Treatments: ***    Patient's personal belongings (please select all that are sent with patient):  {P DME Belongings:230829999}    RN SIGNATURE:  {Esignature:246845934}    CASE MANAGEMENT/SOCIAL WORK SECTION    Inpatient Status Date: 12/10/2018    Readmission Risk Assessment Score:  Readmission Risk              Risk of Unplanned Readmission:        12           Discharging to [address]  P:  [phone] F:  [address], [address] [facility]   Of any changes .      Dialysis Facility (if applicable) NA   · Name:  · Address:  · Dialysis Schedule:  · Phone:  · Fax:    / signature: Electronically signed by Fercho Shah RN on 18 at 12:15 PM    PHYSICIAN SECTION    Prognosis: {Prognosis:1135797923}    Condition at Discharge: [address] Patient Condition:939699724}    Rehab Potential (if transferring to Rehab):

## 2018-12-14 NOTE — PROGRESS NOTES
°F (36.7 °C) (Oral)   Resp 19   Ht 5' 3\" (1.6 m)   Wt 146 lb 9.6 oz (66.5 kg)   SpO2 92%   BMI 25.97 kg/m²     I/O:      Intake/Output Summary (Last 24 hours) at 12/14/18 0854  Last data filed at 12/14/18 0558   Gross per 24 hour   Intake             1280 ml   Output             1250 ml   Net               30 ml     No intake/output data recorded. I/O last 3 completed shifts: In: 1280 [P.O.:1280]  Out: 1250 [Urine:1250]    Physical Exam   Constitutional: She is oriented to person, place, and time. She appears well-developed and well-nourished. HENT:   Head: Normocephalic and atraumatic. Eyes: No scleral icterus. Neck: Neck supple. No tracheal deviation present. Cardiovascular: Normal rate and regular rhythm. Exam reveals distant heart sounds. Pulmonary/Chest:  She has decreased air movement. No wheezes heard today. Abdominal: Soft. Musculoskeletal: She exhibits no edema. Neurological: She is alert and oriented to person, place, and time. Skin: Skin is warm and dry. Psychiatric: She has a normal mood and affect. Her behavior is normal. Thought content normal.     Recent Labs      12/12/18   0750   PHART  7.408   AUG0STW  45.5*   PO2ART  81.9       DATA:       Labs  CBC:   No results for input(s): WBC, HGB, HCT, PLT in the last 72 hours. BMP:   No results for input(s): NA, K, CL, CO2, BUN, CREATININE, GLUCOSE in the last 72 hours. Invalid input(s):  CA,  PHOS  LFT's: No results for input(s): AST, ALT, ALB, BILITOT, ALKPHOS in the last 72 hours. Troponin:   No results for input(s): TROPONINI in the last 72 hours. BNP:   No results for input(s): BNP in the last 72 hours. ABGs:   Recent Labs      12/12/18   0750   PHART  7.408   OHS0PKF  45.5*   PO2ART  81.9     INR: No results for input(s): INR in the last 72 hours. Lipids: No results for input(s): CHOL, HDL in the last 72 hours.     Invalid input(s): LDLCALCU    U/A:No results for input(s): NITRITE, [address], [address] [facility], LABCAST, [address], RBCUA, MUCUS, TRICHOMONAS, YEAST, BACTERIA, CLARITYU, SPECGRAV, LEUKOCYTESUR, UROBILINOGEN, BILIRUBINUR, BLOODU, GLUCOSEU, AMORPHOUS in the last 72 hours. Invalid input(s): KETONESU     XR CHEST PORTABLE   Final Result      Bibasilar airspace opacities may represent atelectasis/infiltrate. CTA CHEST W CONTRAST   Final Result      1. There is suboptimal timing of contrast bolus. There is no evidence of any pulmonary embolism within the main, lobar segmental pulmonary arteries. Evaluation of the subsegmental pulmonary arteries is markedly limited. 2. No evidence of any acute pulmonary disease. 3. Stable left lower lobe pulmonary nodules dating back to a chest CT dated 4/3/2017. Given stability, these favor a benign etiology. Recommend CT follow-up in one year to confirm stability. XR CHEST STANDARD (2 VW)   Final Result   1. No evidence of acute cardiopulmonary disease. PFTs:  SPIROMETRY:  FEV1 to FVC ratio is 60%.  Pre-bronchodilator FEV1 is  1.12, which is 50% of predicted.  Pre-bronchodilator FVC is 2.38,  which is 82% of predicted.     Lung volumes show total lung capacity of 5.72, which is 120% of  predicted.  Residual volume is 3.31, which is 165% of predicted.     Diffusion capacity is 12.73, which is 56% of predicted.     Flow volume loops are consistent with an obstructive defect.     IMPRESSION:  1.  Moderate obstructive defect. 2. Hiram Bullocks is no significant response to bronchodilators. 3.  Air trapping and hyperinflation is present. 4.  Moderate decrease in diffusion capacity.     PFTs are consistent with COPD.        Echo 10/2018  Normal left ventricle size.  There is mild concentric left ventricular   hypertrophy.   Overall left ventricular systolic function appears normal with an ejection   fraction visually estimated at 50-55%.   No regional wall motion abnormalities are noted.   Indeterminate diastolic function.   Mild mitral regurgitation is present.   Mild tricuspid regurgitation.   Estimated pulmonary artery systolic pressure is elevated at 38mmHg assuming   a right atrial pressure of 3mmHg.       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 Most likely COPD exacerbation. Active Hospital Problems    Diagnosis Date Noted    [facility] [J44.1] 12/10/2018    Hypoxemia [R09.02] 04/15/2017       1. COPD exacerbation   presenting with SOB, increasing cough productive of clear sputum, some fevers, chills. Wheezing on exam. CXR benign for disease. VBG showed pCO2 in ~50's. Requiring 4L O2. On O2 at home, normally around 2L at home nocturnally.   -duonebs q4h  -proventil q6h PRN  -zithromax 250  -flu vaccine X1  -pulmicort BID  -solumedrol 40 IV daily  -encourage activity as pt tolerates  -wean NC as tolerated  -dispo per primary team     Michael Louis MD    I will discuss the plan with Dr. Bernadette Renae      -----------------------------  Chandan Singleton, PGY-1  [facility]  12/14/2018  8:54 AM

## 2018-12-14 NOTE — PLAN OF CARE
Problem: Discharge Planning:  Goal: Discharged to appropriate level of care  Discharged to appropriate level of care   Outcome: Ongoing  Discharge order received. Patient informed of discharge order. Discharge instructions reviewed with patient and . Copy of discharge instructions given to patient. Prescriptions filled via meds to beds program. Patient and  verbalized understanding, denies needs or questions at this time. IV and telemetry removed. All patient belongings packed and sent with patient upon discharge. Patient left in wheelchair via nursing staff to private vehicle for transportation to private residence.

## 2018-12-14 NOTE — PROGRESS NOTES
CLINICAL PHARMACY NOTE: MEDS TO [address] Select Patient?: Yes  Total # of Prescriptions Filled: 2   The following medications were delivered to the patient:  · Prednisone  · Azithromycin  Total # of Interventions Completed: 2  Time Spent (min): 45    Additional Documentation:    Duo-Neb to be run under part B, we cannot run

## 2018-12-15 ENCOUNTER — CARE COORDINATION (OUTPATIENT)
Dept: CASE MANAGEMENT | Age: 68
End: 2018-12-15

## 2018-12-16 NOTE — CARE COORDINATION
[facility] Transitions Initial Follow Up Call    Call within 2 business days of discharge: Yes    Patient: Davide Ruiz Patient : 1950   MRN: [id number]  Reason for Admission: There are no discharge diagnoses documented for the most recent discharge. Discharge Date: 18 RARS: Readmission Risk Score: 12     Spoke with: Marietta Davalos \"Carol\" Sonal Jiang (patient)    Facility: [facility]    Non-face-to-face services provided:  Obtained and reviewed discharge summary and/or continuity of care documents    Care Transitions 24 Hour Call    Do you have all of your prescriptions and are they filled?:  Yes  Patient DME:  Shower chair, Walker, Straight cane, Other  Other Patient DME:  walk-in shower, handheld shower head, high boy toilet, 4 wheeled walker & cane (states she does not use DME equipment)  Do you have support at home?:  Partner/Spouse/SO  Are you an active caregiver in your home?:  No  Care Transitions Interventions         Follow Up: Spoke with Brian Payton. She states she is doing very good. Brian Payton states she received a copy of her d/c instructions and they were reviewed with her prior to her d/c. Brian Payton states she uses home oxygen therapy at night routinely and prn during the day. She states she has  A pulse ox and monitors her O2 sats. Today they have been 88-90% and she has been using her oxygen. Brian Payton states she has also done 1 HHN treatment today. She states she is on the antibiotic and prednisone. Brian Payton denies feeling SOB. She states she does have a cough and some chest discomfort. Her weight today = 148lbs. Discussed the importance of daily weights - how to do daily weights - parameters and documentation. Brian Payton denies any edema. She states she does follow a low sodium/no added salt diet. Brianmindy Cancinos denies any needs or concerns today. Discussed role of CTC. She is agreeable to follow up from CTC.   Future Appointments  Date Time Provider Pablo Guevara   2019 12:00 PM Bev Orosco MD [facility] P/CC [facility]

## 2018-12-18 ENCOUNTER — TELEPHONE (OUTPATIENT)
Dept: PHARMACY | Facility: CLINIC | Age: 68
End: 2018-12-18

## 2018-12-18 NOTE — TELEPHONE ENCOUNTER
CLINICAL PHARMACY NOTE  Post-Discharge Transitions of Care (E[facility]A)    Patient was discharged from [address] [place] [address] on 12/14/18. Attempted to reach patient to review medications; left message on home/mobile # asking for return call. Will continue to attempt to contact as appropriate.       Cheli Guo, PharmD, [address] [place] [address]  Direct: [phone]  Department, toll free: [phone], option 7

## 2018-12-21 ENCOUNTER — TELEPHONE (OUTPATIENT)
Dept: PHARMACY | Facility: CLINIC | Age: 68
End: 2018-12-21

## 2018-12-21 DIAGNOSIS — J44.1 COPD EXACERBATION (HCC): Primary | ICD-10-CM

## 2018-12-21 PROCEDURE — 1111F DSCHRG MED/CURRENT MED MERGE: CPT | Performed by: PHARMACIST

## 2018-12-21 RX ORDER — ALBUTEROL SULFATE 2.5 MG/3ML
2.5 SOLUTION RESPIRATORY (INHALATION) EVERY 6 HOURS PRN
COMMUNITY

## 2018-12-21 NOTE — TELEPHONE ENCOUNTER
CLINICAL PHARMACY NOTE  Post-Discharge Transitions of Care (ELLA)    Subjective/Objective:  Yaya Mcgregor is a 79 y.o. female. Patient was discharged from [facility] on 12/14/18 with a diagnosis of COPD exacerbation. Patient outreach to review discharge medications and provide medication review and management. Spoke with patient, returning my call. Allergies   Allergen Reactions    Bee Venom Swelling    Percocet [Oxycodone-Acetaminophen]     Vicodin [Hydrocodone-Acetaminophen]     Adhesive Tape Rash     Discharge Medications (as per current medication list/AVS):  There are NEW medications for you.  START taking them after you leave the hospital:  Current Outpatient Prescriptions   Medication Sig Dispense Refill    ipratropium-albuterol (DUONEB) 0.5-2.5 (3) MG/3ML SOLN nebulizer solution    *does NOT have - sts her pharmacy told her they needed to check with provider and she hasn't heard back Inhale 3 mLs into the lungs every 6 hours as needed for Shortness of Breath 360 mL 1    predniSONE (DELTASONE) 10 MG tablet 4 tabs daily x 3 days, then  3 tabs daily x 3 days, then  2 tabs daily x 3 days, then  1 tab daily x 3 days 30 tablet 0   *confirms has completed azithromycin    You told us you were taking these medications at home, but the amount or how often you take this medication has CHANGED:  N/A    These are medications you told us you were taking at home, CONTINUE taking them after you leave the hospital:  Current Outpatient Prescriptions   Medication Sig Dispense Refill    VENTOLIN  (90 Base) MCG/ACT inhaler INHALE 2 PUFFS INTO THE LUNGS EVERY 6 HOURS AS NEEDED FOR WHEEZING 18 g 0    lisinopril (PRINIVIL;ZESTRIL) 2.5 MG tablet Take 2.5 mg by mouth daily      metoprolol succinate (TOPROL XL) 25 MG extended release tablet Take 25 mg by mouth nightly      loperamide (IMODIUM) 2 MG capsule Take 2 mg by mouth 4 times daily as needed for Diarrhea      budesonide (PULMICORT FLEXHALER) 180 MCG/ACT AEPB inhaler    *reports not regularly using because she's not sure she's using it correctly Inhale 2 puffs into the lungs 2 times daily 1 each 3    melatonin 3 MG TABS tablet Take 3 mg by mouth nightly as needed      aspirin 81 MG chewable tablet Take 1 tablet by mouth daily 30 tablet 3    levalbuterol (XOPENEX) 0.63 MG/3ML nebulization    *does NOT have - unclear, but reports similar as above with new Duoneb rx - sts her pharmacy told her they needed to check with provider and she hasn't heard back Take 3 mLs by nebulization every 6 hours as needed for Wheezing or Shortness of Breath 120 vial 3    Nebulizers (AIRIAL COMPACT MINI NEBULIZER) MISC 1 Device by Does not apply route daily 1 each 0    umeclidinium-vilanterol (ANORO ELLIPTA) 62.5-25 MCG/INH AEPB inhaler Inhale 1 puff into the lungs daily 1 each 11    acetaminophen (TYLENOL) 500 MG tablet Take 500 mg by mouth every 6 hours as needed for Pain       These are the medications you have told us you were taking at home, STOP taking them after you leave the hospital:  N/A     Additional Medications:  Denies    Estimated Creatinine Clearance: 84 mL/min (based on SCr of 0.6 mg/dL). Assessment/Plan:  - Medication reconciliation completed. - Pt is not taking medications as directed by discharging physician. Number of discrepancies: 2. Instructions per discharge list provided except per below documentation. Identified medication discrepancies/issues:   · Category 3 (2):  1.  Patient does NOT have ipratropium-albuterol or levalbuterol nebulizers - sts the pharmacy was supposed to be doing something and she's never heard back  · Called and spoke to Valeriano Arteaga at [facility] they have the Duoneb rx, but diagnosis code and date patient received nebulizer is needed to process the rx  · Advised Valeriano Arteaga that diagnosis is COPD (ICD J44.9)  · Advised Valeriano Arteaga that patient did confirm with me while I was speaking to the patient that she does have nebulizer;

## 2018-12-28 ENCOUNTER — CARE COORDINATION (OUTPATIENT)
Dept: CASE MANAGEMENT | Age: 68
End: 2018-12-28

## 2018-12-28 NOTE — CARE COORDINATION
[facility] Transitions Initial Follow Up Call    18    Patient:  Juvenal MARTIN  Patient :  1950  MRN:  [id number]    Reason for Admission:  COPD exac. Discharge Date:  18   RARS:  Radhika      Final CTC attempt to reach Pt regarding recent hospital discharge. CTC left voice recording with call back number requesting a call back if further assistance is needed     Follow up appointments:    Future Appointments  Date Time Provider Pablo Guevara   2019 12:00 PM Bhupendra Quinteros MD [facility]       JEANCARLOS Moy, RN  Care Transition Coordinator  Contact Number:  [phone]

## 2019-01-02 ENCOUNTER — OFFICE VISIT (OUTPATIENT)
Dept: PULMONOLOGY | Age: 69
End: 2019-01-02
Payer: MEDICARE

## 2019-01-02 VITALS
WEIGHT: 155 LBS | OXYGEN SATURATION: 94 % | HEART RATE: 84 BPM | DIASTOLIC BLOOD PRESSURE: 80 MMHG | SYSTOLIC BLOOD PRESSURE: 100 MMHG | HEIGHT: 63 IN | BODY MASS INDEX: 27.46 KG/M2

## 2019-01-02 DIAGNOSIS — R91.1 INCIDENTAL PULMONARY NODULE, GREATER THAN OR EQUAL TO 8MM: ICD-10-CM

## 2019-01-02 DIAGNOSIS — J96.11 CHRONIC RESPIRATORY FAILURE WITH HYPOXIA (HCC): ICD-10-CM

## 2019-01-02 DIAGNOSIS — J38.01 VOCAL CORD PARALYSIS, UNILATERAL PARTIAL: ICD-10-CM

## 2019-01-02 DIAGNOSIS — J43.2 CENTRILOBULAR EMPHYSEMA (HCC): Primary | ICD-10-CM

## 2019-01-02 PROCEDURE — 1101F PT FALLS ASSESS-DOCD LE1/YR: CPT | Performed by: INTERNAL MEDICINE

## 2019-01-02 PROCEDURE — 99214 OFFICE O/P EST MOD 30 MIN: CPT | Performed by: INTERNAL MEDICINE

## 2019-01-02 PROCEDURE — G8399 PT W/DXA RESULTS DOCUMENT: HCPCS | Performed by: INTERNAL MEDICINE

## 2019-01-02 PROCEDURE — 1090F PRES/ABSN URINE INCON ASSESS: CPT | Performed by: INTERNAL MEDICINE

## 2019-01-02 PROCEDURE — G8417 CALC BMI ABV UP PARAM F/U: HCPCS | Performed by: INTERNAL MEDICINE

## 2019-01-02 PROCEDURE — 3023F SPIROM DOC REV: CPT | Performed by: INTERNAL MEDICINE

## 2019-01-02 PROCEDURE — 1123F ACP DISCUSS/DSCN MKR DOCD: CPT | Performed by: INTERNAL MEDICINE

## 2019-01-02 PROCEDURE — G8482 FLU IMMUNIZE ORDER/ADMIN: HCPCS | Performed by: INTERNAL MEDICINE

## 2019-01-02 PROCEDURE — G8926 SPIRO NO PERF OR DOC: HCPCS | Performed by: INTERNAL MEDICINE

## 2019-01-02 PROCEDURE — 1111F DSCHRG MED/CURRENT MED MERGE: CPT | Performed by: INTERNAL MEDICINE

## 2019-01-02 PROCEDURE — 4040F PNEUMOC VAC/ADMIN/RCVD: CPT | Performed by: INTERNAL MEDICINE

## 2019-01-02 PROCEDURE — 3017F COLORECTAL CA SCREEN DOC REV: CPT | Performed by: INTERNAL MEDICINE

## 2019-01-02 PROCEDURE — 1036F TOBACCO NON-USER: CPT | Performed by: INTERNAL MEDICINE

## 2019-01-02 PROCEDURE — G8427 DOCREV CUR MEDS BY ELIG CLIN: HCPCS | Performed by: INTERNAL MEDICINE

## 2019-01-02 RX ORDER — PREDNISONE 10 MG/1
TABLET ORAL
Qty: 20 TABLET | Refills: 0 | Status: SHIPPED | OUTPATIENT
Start: 2019-01-02 | End: 2019-04-26 | Stop reason: CLARIF

## 2019-03-29 RX ORDER — ALBUTEROL SULFATE 90 UG/1
AEROSOL, METERED RESPIRATORY (INHALATION)
Qty: 18 G | Refills: 0 | Status: SHIPPED | OUTPATIENT
Start: 2019-03-29 | End: 2019-04-26 | Stop reason: CLARIF

## 2019-04-10 ENCOUNTER — CARE COORDINATION (OUTPATIENT)
Dept: CARE COORDINATION | Age: 69
End: 2019-04-10

## 2019-04-10 NOTE — CARE COORDINATION
afford your medications?:  With Assistance  Medication Assistance Program:  Other  Other Assistance Program:  Has trouble affording inhaler Trelegy. will see if she qualifies for pt assistance  How often do you have trouble taking your medications the way you have been told to take them?:  I always take them as prescribed. Do you have Home O2 Therapy?:  Yes   Oxygen Regimen: At night/Sleep Flow - Enter rate/FIO2:  2   Method of Delivery:  Nasal Cannula      Ability to seek help/take action for Emergent Urgent situations i.e. fire, crime, inclement weather or health crisis. :  Independent  Ability to ambulate to restroom:  Independent  Ability handle personal hygeine needs (bathing/dressing/grooming): Independent  Ability to manage Medications: Independent  Ability to prepare Food Preparation:  Independent  Ability to maintain home (clean home, laundry): Independent  Ability to drive and/or has transportation:  Independent  Ability to do shopping:  Independent  Ability to manage finances: Independent  Is patient able to live independently?:  Yes     Current Housing:  Private Residence                 Thinking about your patient's physical health needs, are there any symptoms or problems (risk indicators) you are unsure about that require further investigation?:  No identified areas of uncertainly or problems already being investigated   Are the patients physical health problems impacting on their mental well-being?:  No identified areas of concern   Are there any problems with your patients lifestyle behaviors (alcohol, drugs, diet, exercise) that are impacting on physical or mental well-being?:  No identified areas of concern   Do you have any other concerns about your patients mental well-being?  How would you rate their severity and impact on the patient?:  No identified areas of concern   How would you rate their home environment in terms of safety and stability (including domestic violence, insecure housing, neighbor harassment)?:  Consistently safe, supportive, stable, no identified problems   How do daily activities impact on the patient's well-being? (include current or anticipated unemployment, work, caregiving, access to transportation or other):  No identified problems or perceived positive benefits   How would you rate their financial resources (including ability to afford all required medical care)?:  Financially secure, some resource challenges   How wells does the patient now understand their health and well-being (symptoms, signs or risk factors) and what they need to do to manage their health?:  Reasonable to good understanding and already engages in managing health or is willing to undertake better management   How well do you think your patient can engage in healthcare discussions? (Barriers include language, deafness, aphasia, alcohol or drug problems, learning difficulties, concentration):  Clear and open communication, no identified barriers   Do other services need to be involved to help this patient?:  Other care/services not required at this time   Suggested Interventions and Community Resources   Disease Assocation: In Process   Zone Management Tools: In Process                  Prior to Admission medications    Medication Sig Start Date End Date Taking?  Authorizing Provider   albuterol sulfate  (90 Base) MCG/ACT inhaler INHALE 2 PUFFS INTO THE LUNGS EVERY 6 HOURS AS NEEDED FOR WHEEZING 3/29/19  Yes Aftab Tijerina MD   VENTOLIN  (90 Base) MCG/ACT inhaler INHALE 2 PUFFS INTO THE LUNGS EVERY 6 HOURS AS NEEDED FOR WHEEZING 1/31/19  Yes John Vick MD   Fluticasone-Umeclidin-Vilant 108-15.2-44 MCG/INH AEPB Inhale 1 puff into the lungs daily 1/2/19  Yes Ese Grajeda MD   albuterol (PROVENTIL) (2.5 MG/3ML) 0.083% nebulizer solution Take 2.5 mg by nebulization every 6 hours as needed for Wheezing   Yes Historical Provider, MD   ipratropium-albuterol (DUONEB) 0.5-2.5 (3) MG/3ML SOLN nebulizer solution Inhale 3 mLs into the lungs every 6 hours as needed for Shortness of Breath 12/13/18  Yes Brandi Reyes MD   metoprolol succinate (TOPROL XL) 25 MG extended release tablet Take 25 mg by mouth nightly   Yes Historical Provider, MD   loperamide (IMODIUM) 2 MG capsule Take 2 mg by mouth 4 times daily as needed for Diarrhea   Yes Historical Provider, MD   melatonin 3 MG TABS tablet Take 3 mg by mouth nightly as needed   Yes Historical Provider, MD   aspirin 81 MG chewable tablet Take 1 tablet by mouth daily 5/27/18  Yes Rocío Harrison MD   Nebulizers Donivan Helling COMPACT MINI NEBULIZER) MISC 1 Device by Does not apply route daily 5/26/18  Yes Rocío Harrison MD   acetaminophen (TYLENOL) 500 MG tablet Take 500 mg by mouth every 6 hours as needed for Pain   Yes Historical Provider, MD   predniSONE (DELTASONE) 10 MG tablet Take 4 tablets by mouth for 5 days.  1/2/19   Gilford Paschal, MD   predniSONE (DELTASONE) 10 MG tablet 4 tabs daily x 3 days, then  3 tabs daily x 3 days, then  2 tabs daily x 3 days, then  1 tab daily x 3 days 12/13/18   Brandi Reyes MD   lisinopril (PRINIVIL;ZESTRIL) 2.5 MG tablet Take 2.5 mg by mouth daily    Historical Provider, MD   umeclidinium-vilanterol Jelorin Beatty ELLIPTA) 62.5-25 MCG/INH AEPB inhaler Inhale 1 puff into the lungs daily 3/26/18   Jose Sosa MD       Future Appointments   Date Time Provider Pablo Guevara   4/25/2019  9:00 AM Gilford Paschal, MD [place] [facility]   4/26/2019  1:30 PM MD [facility]

## 2019-04-15 ENCOUNTER — CARE COORDINATION (OUTPATIENT)
Dept: CARE COORDINATION | Age: 69
End: 2019-04-15

## 2019-04-15 NOTE — CARE COORDINATION
daily as needed for Diarrhea    Historical Provider, MD   melatonin 3 MG TABS tablet Take 3 mg by mouth nightly as needed    Historical Provider, MD   aspirin 81 MG chewable tablet Take 1 tablet by mouth daily 5/27/18   David Alonso MD   Nebulizers Myna Callas COMPACT MINI NEBULIZER) MISC 1 Device by Does not apply route daily 5/26/18   David Alonso MD   umeclidinium-vilanterol [facility] ELLIPTA) 62.5-25 MCG/INH AEPB inhaler Inhale 1 puff into the lungs daily 3/26/18   Maurizio Villalobos MD   acetaminophen (TYLENOL) 500 MG tablet Take 500 mg by mouth every 6 hours as needed for Pain    Historical Provider, MD       Future Appointments   Date Time Provider Pablo Guevara   4/25/2019  9:00 AM MD Rodolfo Hernandez/CC [facility]   4/26/2019  1:30 PM MD BENSON [facility]

## 2019-04-25 ENCOUNTER — OFFICE VISIT (OUTPATIENT)
Dept: PULMONOLOGY | Age: 69
End: 2019-04-25
Payer: MEDICARE

## 2019-04-25 VITALS
RESPIRATION RATE: 16 BRPM | BODY MASS INDEX: 27.29 KG/M2 | WEIGHT: 154 LBS | SYSTOLIC BLOOD PRESSURE: 120 MMHG | HEIGHT: 63 IN | HEART RATE: 88 BPM | DIASTOLIC BLOOD PRESSURE: 80 MMHG | OXYGEN SATURATION: 90 %

## 2019-04-25 DIAGNOSIS — J38.01 VOCAL CORD PARALYSIS, UNILATERAL PARTIAL: ICD-10-CM

## 2019-04-25 DIAGNOSIS — J43.2 CENTRILOBULAR EMPHYSEMA (HCC): Primary | ICD-10-CM

## 2019-04-25 PROCEDURE — G8427 DOCREV CUR MEDS BY ELIG CLIN: HCPCS | Performed by: INTERNAL MEDICINE

## 2019-04-25 PROCEDURE — G8417 CALC BMI ABV UP PARAM F/U: HCPCS | Performed by: INTERNAL MEDICINE

## 2019-04-25 PROCEDURE — G8926 SPIRO NO PERF OR DOC: HCPCS | Performed by: INTERNAL MEDICINE

## 2019-04-25 PROCEDURE — 99214 OFFICE O/P EST MOD 30 MIN: CPT | Performed by: INTERNAL MEDICINE

## 2019-04-25 PROCEDURE — 1036F TOBACCO NON-USER: CPT | Performed by: INTERNAL MEDICINE

## 2019-04-25 PROCEDURE — 4040F PNEUMOC VAC/ADMIN/RCVD: CPT | Performed by: INTERNAL MEDICINE

## 2019-04-25 PROCEDURE — G8399 PT W/DXA RESULTS DOCUMENT: HCPCS | Performed by: INTERNAL MEDICINE

## 2019-04-25 PROCEDURE — 1090F PRES/ABSN URINE INCON ASSESS: CPT | Performed by: INTERNAL MEDICINE

## 2019-04-25 PROCEDURE — 1123F ACP DISCUSS/DSCN MKR DOCD: CPT | Performed by: INTERNAL MEDICINE

## 2019-04-25 PROCEDURE — 3023F SPIROM DOC REV: CPT | Performed by: INTERNAL MEDICINE

## 2019-04-25 PROCEDURE — 3017F COLORECTAL CA SCREEN DOC REV: CPT | Performed by: INTERNAL MEDICINE

## 2019-04-25 RX ORDER — ALBUTEROL SULFATE 90 UG/1
2 AEROSOL, METERED RESPIRATORY (INHALATION) EVERY 6 HOURS PRN
Qty: 1 INHALER | Refills: 11 | Status: SHIPPED | OUTPATIENT
Start: 2019-04-25 | End: 2019-04-26 | Stop reason: CLARIF

## 2019-04-25 RX ORDER — PREDNISONE 10 MG/1
TABLET ORAL
Qty: 20 TABLET | Refills: 0 | Status: SHIPPED | OUTPATIENT
Start: 2019-04-25 | End: 2019-04-26 | Stop reason: CLARIF

## 2019-04-25 NOTE — PROGRESS NOTES
[facility]    Outpatient Follow Up Note    Subjective:   CHIEF COMPLAINT / HPI:     The patient is 81 y.o. female who presents today for hospital discharge follow up for exacerbation of COPD and pulmonary nodules. Saida Amaya has been able to stay out of the hospital the last 3 months after having a flare or being hospitalized each of the previous months. The only differences in management is that we discontinued the pulmicort and put her on trelegy. She still has mini-flares on the current regimen and used the prednisone burst I gave her last visit by taking 10mg of prednisone when she would get on the \"struggle bus\". She did it for 3 day increments each of the last 3 months. Previous history: patient had a follow-up CT scan done May 17 and was essentially found to have more vertebral fractures she was admitted for repair. She's run out of her Spiriva and her Breo several weeks ago and has not needed her rescue inhaler. She has also weaned off of supplemental oxygen. She is scheduled for surgery to remove her thyroid mass in a few weeks. Patient said she felt great on the Anoro and rarely needed her rescue inhaler, and even would forget the anoro from time to time and feel ok. Until September 29th when she had her thyroid removed. 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 Since then she's extremely dyspneic trying to walk short distances and she has to be careful how she swallows or she aspirates. She's having botox injected on the 17th to try to alleviate some of the issues and may need reconstruction. Since the surgery she doesn't feel much benefit from the anoro and has a nonproductive cough and increased dyspnea with exertion, talking and laying down. She's started using her albuterol HFA up to 8 times per day. She says she feels her cough improve and is able to breath better 5-10 minutes after use.   She was all set to do pulmonary ipratropium-albuterol (DUONEB) 0.5-2.5 (3) MG/3ML SOLN nebulizer solution Inhale 3 mLs into the lungs every 6 hours as needed for Shortness of Breath 360 mL 1    lisinopril (PRINIVIL;ZESTRIL) 2.5 MG tablet Take 2.5 mg by mouth daily      metoprolol succinate (TOPROL XL) 25 MG extended release tablet Take 25 mg by mouth nightly      loperamide (IMODIUM) 2 MG capsule Take 2 mg by mouth 4 times daily as needed for Diarrhea      melatonin 3 MG TABS tablet Take 3 mg by mouth nightly as needed      aspirin 81 MG chewable tablet Take 1 tablet by mouth daily 30 tablet 3    Nebulizers ([place] COMPACT MINI NEBULIZER) MISC 1 Device by Does not apply route daily 1 each 0    acetaminophen (TYLENOL) 500 MG tablet Take 500 mg by mouth every 6 hours as needed for Pain      predniSONE (DELTASONE) 10 MG tablet Take 4 tablets by mouth for 5 days. 20 tablet 0    predniSONE (DELTASONE) 10 MG tablet 4 tabs daily x 3 days, then  3 tabs daily x 3 days, then  2 tabs daily x 3 days, then  1 tab daily x 3 days 30 tablet 0    umeclidinium-vilanterol (ANORO ELLIPTA) 62.5-25 MCG/INH AEPB inhaler Inhale 1 puff into the lungs daily 1 each 11     No current facility-administered medications on file prior to visit.         REVIEW OF SYSTEMS:    CONSTITUTIONAL: Negative for fevers and chills  HEENT: Negative for oropharyngeal exudate, post nasal drip, sinus pain / pressure, nasal congestion, ear pain  RESPIRATORY:  See HPI  CARDIOVASCULAR: Negative for chest pain, palpitations, edema  GASTROINTESTINAL: Negative for nausea, vomiting, diarrhea, constipation and abdominal pain  HEMATOLOGICAL: Negative for adenopathy  SKIN: Negative for clubbing, cyanosis, skin lesions  EXTREMITIES: Negative for weakness, decreased ROM  NEUROLOGICAL: Negative for unilateral weakness, speech or gait abnormalities  PSYCH: Negative for anxiety, depression    Objective:   PHYSICAL EXAM:        VITALS:  /80 (Site: Left Upper Arm, Position: Sitting, Cuff Size: Improved lower lung patchy airspace disease with minimal   residual scarring or subsegmental atelectasis. 4. Stable compression fractures with no new compression fracture   identified. January 2018:  No evidence of tracheobronchomalacia.       Centrilobular nodular branching opacities in the lower lobes and   right middle lobe consistent with bronchiolitis. Bronchial wall   thickening is also noted as well as secretions within subsegmental   bronchi. This is all likely representative of an infectious   process.       Multiple stable nodules since 4/3/2017. Findings are most   consistent with a benign process. Following the Fleischner   criteria the patient should BE examined with chest CT at 24 months   from the original examination and April 2019. Following the   Fleischner criteria for pulmonary nodule greater than 8 mm: If   patient is at low risk for lung cancer, recommend follow up CT at   around 3, 9, and 24 mo, dynamic contrast enhanced CT, PET, and/or   biopsy.  If patient is at high risk for lung cancer, same as for   low-risk patient. Last PFTs:   CONCLUSION:  This is a patient who has a baseline severe obstructive lung  defect but with positive bronchodilator response to the moderate range,  evidence of hyperinflation and air trapping and moderately decreased diffusing  capacity. These findings are most consistent with a diagnosis of COPD,  longterm asthma with fixed obstructive defect could also fit this this scenario. 1/2018: IMPRESSION:  1. Moderate obstructive defect. 2.  There is no significant response to bronchodilators. 3.  Air trapping and hyperinflation is present. 4.  Moderate decrease in diffusion capacity. Assessment: This is a 81 y.o. female with COPD and pulmonary nodules    Plan:   Patient has had a flare every month for the past 6 months despite anoro and Pulmicort. She has some difficulty taking the Pulmicort but seems to respond well to prednisone.     Trelegy has helped but she's still needed steroids in between. Will stop trelegy both for cost and efficacy and put her on high dose flovent (she failed pulmicort many times over) and anoro again. Given anoro samples. I also re-wrote for an as needed prednisone burst.    If patient continues to have exacerbations will need to consider a maintenance macrolide or daliresp    I would like her to pulmonary rehab but she is still working full-time. Due for a screening CT in May 2019 given her risk factors. This will be a lung cancer screening CT and will order next visit    The patient is not currently smoking. More than half the time of this 30 minute visit was spent counseling the patient. RTC 4 months w/ MD. Call or RTC sooner if symptoms persist or worsen acutely.       Sandre Fluke

## 2019-04-26 ENCOUNTER — OFFICE VISIT (OUTPATIENT)
Dept: FAMILY MEDICINE CLINIC | Age: 69
End: 2019-04-26
Payer: MEDICARE

## 2019-04-26 VITALS
DIASTOLIC BLOOD PRESSURE: 74 MMHG | WEIGHT: 156.4 LBS | HEIGHT: 63 IN | RESPIRATION RATE: 16 BRPM | HEART RATE: 83 BPM | TEMPERATURE: 97.8 F | SYSTOLIC BLOOD PRESSURE: 112 MMHG | OXYGEN SATURATION: 93 % | BODY MASS INDEX: 27.71 KG/M2

## 2019-04-26 DIAGNOSIS — K21.9 GASTROESOPHAGEAL REFLUX DISEASE WITHOUT ESOPHAGITIS: ICD-10-CM

## 2019-04-26 DIAGNOSIS — E07.9 THYROID MASS: ICD-10-CM

## 2019-04-26 DIAGNOSIS — J43.8 OTHER EMPHYSEMA (HCC): ICD-10-CM

## 2019-04-26 DIAGNOSIS — J44.1 COPD EXACERBATION (HCC): Primary | ICD-10-CM

## 2019-04-26 DIAGNOSIS — E78.00 ELEVATED LDL CHOLESTEROL LEVEL: ICD-10-CM

## 2019-04-26 DIAGNOSIS — R91.1 LUNG NODULE: ICD-10-CM

## 2019-04-26 PROBLEM — R49.0 HOARSENESS OF VOICE: Status: ACTIVE | Noted: 2017-10-04

## 2019-04-26 PROCEDURE — G8399 PT W/DXA RESULTS DOCUMENT: HCPCS | Performed by: FAMILY MEDICINE

## 2019-04-26 PROCEDURE — 1123F ACP DISCUSS/DSCN MKR DOCD: CPT | Performed by: FAMILY MEDICINE

## 2019-04-26 PROCEDURE — G8417 CALC BMI ABV UP PARAM F/U: HCPCS | Performed by: FAMILY MEDICINE

## 2019-04-26 PROCEDURE — 1090F PRES/ABSN URINE INCON ASSESS: CPT | Performed by: FAMILY MEDICINE

## 2019-04-26 PROCEDURE — G8427 DOCREV CUR MEDS BY ELIG CLIN: HCPCS | Performed by: FAMILY MEDICINE

## 2019-04-26 PROCEDURE — 3023F SPIROM DOC REV: CPT | Performed by: FAMILY MEDICINE

## 2019-04-26 PROCEDURE — 99214 OFFICE O/P EST MOD 30 MIN: CPT | Performed by: FAMILY MEDICINE

## 2019-04-26 PROCEDURE — 4040F PNEUMOC VAC/ADMIN/RCVD: CPT | Performed by: FAMILY MEDICINE

## 2019-04-26 PROCEDURE — 1036F TOBACCO NON-USER: CPT | Performed by: FAMILY MEDICINE

## 2019-04-26 PROCEDURE — G8926 SPIRO NO PERF OR DOC: HCPCS | Performed by: FAMILY MEDICINE

## 2019-04-26 PROCEDURE — 3017F COLORECTAL CA SCREEN DOC REV: CPT | Performed by: FAMILY MEDICINE

## 2019-04-26 ASSESSMENT — PATIENT HEALTH QUESTIONNAIRE - PHQ9
SUM OF ALL RESPONSES TO PHQ9 QUESTIONS 1 & 2: 0
1. LITTLE INTEREST OR PLEASURE IN DOING THINGS: 0
SUM OF ALL RESPONSES TO PHQ QUESTIONS 1-9: 0
2. FEELING DOWN, DEPRESSED OR HOPELESS: 0
SUM OF ALL RESPONSES TO PHQ QUESTIONS 1-9: 0

## 2019-04-26 ASSESSMENT — ENCOUNTER SYMPTOMS
RECTAL PAIN: 0
CHOKING: 0
ABDOMINAL PAIN: 0
APNEA: 0
CHEST TIGHTNESS: 0
SHORTNESS OF BREATH: 0
WHEEZING: 0
ANAL BLEEDING: 0
STRIDOR: 0
DIARRHEA: 0
BLOOD IN STOOL: 0
CONSTIPATION: 0
NAUSEA: 0
ABDOMINAL DISTENTION: 0
COUGH: 0
VOMITING: 0

## 2019-04-26 NOTE — PROGRESS NOTES
Subjective:      Patient ID: Emmanuel Irizarry is a 81 y.o. female. HPI  Results for Paul Poole" (MRN [id number]) as of 4/26/2019 13:36   Ref. Range 12/7/2018 08:23   Sodium Latest Ref Range: 136 - 145 mmol/L 140   Potassium Latest Ref Range: 3.5 - 5.1 mmol/L 4.7   Chloride Latest Ref Range: 99 - 110 mmol/L 98 (L)   CO2 Latest Ref Range: 21 - 32 mmol/L 27   BUN Latest Ref Range: 7 - 20 mg/dL 8   Creatinine Latest Ref Range: 0.6 - 1.2 mg/dL 0.6   Anion Gap Latest Ref Range: 3 - 16  15   GFR Non- Latest Ref Range: >60  >60   GFR  Latest Ref Range: >60  >60   Glucose Latest Ref Range: 70 - 99 mg/dL 95   Calcium Latest Ref Range: 8.3 - 10.6 mg/dL 9.7   Total Protein Latest Ref Range: 6.4 - 8.2 g/dL 7.1   Cholesterol, Total Latest Ref Range: 0 - 199 mg/dL 184   HDL Cholesterol Latest Ref Range: 40 - 60 mg/dL 61 (H)   LDL Calculated Latest Ref Range: <100 mg/dL 106 (H)   Triglycerides Latest Ref Range: 0 - 150 mg/dL 84   VLDL Cholesterol Calculated Latest Ref Range: Not Established mg/dL 17   Albumin Latest Ref Range: 3.4 - 5.0 g/dL 4.9   Globulin Latest Units: g/dL 2.2   Albumin/Globulin Ratio Latest Ref Range: 1.1 - 2.2  2.2   Alk Phos Latest Ref Range: 40 - 129 U/L 59   ALT Latest Ref Range: 10 - 40 U/L 8 (L)   AST Latest Ref Range: 15 - 37 U/L 11 (L)   Bilirubin Latest Ref Range: 0.0 - 1.0 mg/dL 0.8   WBC Latest Ref Range: 4.0 - 11.0 K/uL 6.5   RBC Latest Ref Range: 4.00 - 5.20 M/uL 4.76   Hemoglobin Quant Latest Ref Range: 12.0 - 16.0 g/dL 14.8   Hematocrit Latest Ref Range: 36.0 - 48.0 % 45.5   MCV Latest Ref Range: 80.0 - 100.0 fL 95.6   MCH Latest Ref Range: 26.0 - 34.0 pg 31.1   MCHC Latest Ref Range: 31.0 - 36.0 g/dL 32.5   MPV Latest Ref Range: 5.0 - 10.5 fL 8.5   RDW Latest Ref Range: 12.4 - 15.4 % 14.0   Platelet Count Latest Ref Range: 135 - 450 K/uL 359     New problem:  Presenting for ER/Hosp f/u:[facility] records via [facility] reviewed.   ER visit date:12/10/18  Signs/symptoms resulting in visit:sob  Treatment/tests done during visit/hospital stay:cxr;labsabx/steriods/nebs/pulmo consult  Dx:copd exacerbation. Discharge date:12/14/18. Discharge dx/follow up:pcp/pulmo. Seen by pulmo on 4/25/19:office note via EPIC reviewed today. Now:feels better. No recurrence. COPD:per pt' doing well under the care of pulmo. Using inhalers w/o side effects. Doing well. Associated with nothing new. Albuterol inhaler use:1xdaily. Denies wheezing/cough/sob. Elevated LDL:see labs above. No new associated or worsening factors. Improving factors:better diet regime. Denies adbo pain/myalgias. Thyroid nodule-mass/comp fx(clinical osteoporosis):under care of endo:Dr. Rayray Richardson. Denies heat-cold intolerance/fatigue/tremors/palpitations/edema/skin changes. GERD f/u:doing well w/o medication. No new associated/worsening or other improving factors. Denies abdo pain/n-v/diarrhea/melena-blood in stool. Allergies   Allergen Reactions    Bee Venom Swelling    Percocet [Oxycodone-Acetaminophen]     Vicodin [Hydrocodone-Acetaminophen]     Adhesive Tape Rash       Current Outpatient Medications on File Prior to Visit   Medication Sig Dispense Refill    predniSONE (DELTASONE) 10 MG tablet Take 4 tablets by mouth for 5 days. 20 tablet 0    fluticasone propionate (FLOVENT) 250 MCG/BLIST AEPB inhaler Inhale 1 puff into the lungs daily 60 each 3    umeclidinium-vilanterol (ANORO ELLIPTA) 62.5-25 MCG/INH AEPB inhaler Inhale 1 puff into the lungs daily 2 each 0    albuterol sulfate  (90 Base) MCG/ACT inhaler INHALE 2 PUFFS INTO THE LUNGS EVERY 6 HOURS AS NEEDED FOR WHEEZING 18 g 0    VENTOLIN  (90 Base) MCG/ACT inhaler INHALE 2 PUFFS INTO THE LUNGS EVERY 6 HOURS AS NEEDED FOR WHEEZING 18 g 0    predniSONE (DELTASONE) 10 MG tablet Take 4 tablets by mouth for 5 days.  20 tablet 0    albuterol (PROVENTIL) (2.5 MG/3ML) 0.083% nebulizer solution Take 2.5 S/P colonoscopy     Polyps:next in 0MKM=7303    Systolic CHF, chronic (HCC) EF 45% 2018    Thyroid mass     under care of endo & surgeon(Dr. Ewing)    Thyroid nodule     under care of endo:Dr. Ke Romano           Social History     Tobacco Use    Smoking status: Former Smoker     Packs/day: 1.00     Years: 22.00     Pack years: 22.00     Types: Cigarettes     Last attempt to quit: 2011     Years since quittin.0    Smokeless tobacco: Never Used   Substance Use Topics    Alcohol use: Yes     Alcohol/week: 2.4 oz     Types: 4 Cans of beer per week    Drug use: No     Social History     Substance and Sexual Activity   Drug Use No           Review of Systems   Constitutional: Negative for activity change, appetite change, chills, diaphoresis, fatigue, fever and unexpected weight change. Respiratory: Negative for apnea, cough, choking, chest tightness, shortness of breath, wheezing and stridor. Cardiovascular: Negative for chest pain, palpitations and leg swelling. Gastrointestinal: Negative for abdominal distention, abdominal pain, anal bleeding, blood in stool, constipation, diarrhea, nausea, rectal pain and vomiting. Endocrine: Negative. Skin: Negative for rash and wound. Neurological: Negative for dizziness and light-headedness. Hematological: Negative for adenopathy. Psychiatric/Behavioral: Negative for sleep disturbance. Objective:   Physical Exam   Constitutional: She is oriented to person, place, and time. Vital signs are normal. She appears well-developed and well-nourished. She is cooperative. She does not have a sickly appearance. No distress. HENT:   Nose: Nose normal.   Mouth/Throat: Uvula is midline, oropharynx is clear and moist and mucous membranes are normal.   Hearing intact to nml conversation   Eyes: Pupils are equal, round, and reactive to light. Conjunctivae, EOM and lids are normal.   Neck: Trachea normal and normal range of motion. Neck supple. Carotid bruit is not present. Cardiovascular: Normal rate, regular rhythm, normal heart sounds, intact distal pulses and normal pulses. No ankle edema. Pulmonary/Chest: Effort normal and breath sounds normal.   CTAB,good AE bilaterally   Abdominal: Soft. Normal appearance and bowel sounds are normal. She exhibits no distension and no mass. There is no hepatomegaly. There is no tenderness. Lymphadenopathy:     She has no cervical adenopathy. Neurological: She is alert and oriented to person, place, and time. Skin: Skin is warm, dry and intact. Capillary refill takes less than 2 seconds. No rash noted. No cyanosis. Good skin turgor. Psychiatric: She has a normal mood and affect. Good eye contact. Assessment:       Diagnosis Orders   1. COPD exacerbation ([address]  VSS/well appearing. Resolved. COPD:stable. Continue care plan per pulmo. 2. Elevated LDL cholesterol level  LDL close to goal.  The patient is asked to make an attempt to improve diet and exercise patterns to aid in medical management of this problem. Labs in 2-3mos. Comprehensive Metabolic Panel    Lipid Panel   3. Gastroesophageal reflux disease without esophagitis  Stable. 4. COPD  Stable. 5. Lung nodule:left  Per pulmo. 6. Thyroid mass  Per endo. Plan:      Pt' left office in good condition. Obtain labs/diagnostic tests as discussed today & call back for results within 2-7days. Advised to go to local ER or call 911 for any worrisome signs/sx.           Naresh Jenkins MD

## 2019-04-26 NOTE — PATIENT INSTRUCTIONS
Patient Education        Learning About High Cholesterol  What is high cholesterol? Cholesterol is a type of fat in your blood. It is needed for many body functions, such as making new cells. Cholesterol is made by your body. It also comes from food you eat. If you have too much cholesterol, it starts to build up in your arteries. This is called hardening of the arteries, or atherosclerosis. High cholesterol raises your risk of a heart attack and stroke. There are different types of cholesterol. LDL is the \"bad\" cholesterol. High LDL can raise your risk for heart disease, heart attack, and stroke. HDL is the \"good\" cholesterol. High HDL is linked with a lower risk for heart disease, heart attack, and stroke. Your cholesterol levels help your doctor find out your risk for having a heart attack or stroke. How can you prevent high cholesterol? A heart-healthy lifestyle can help you prevent high cholesterol. This lifestyle helps lower your risk for a heart attack and stroke. · Eat heart-healthy foods. ? Eat fruits, vegetables, whole grains (like oatmeal), dried beans and peas, nuts and seeds, soy products (like tofu), and fat-free or low-fat dairy products. ? Replace butter, margarine, and hydrogenated or partially hydrogenated oils with olive and canola oils. (Canola oil margarine without trans fat is fine.)  ? Replace red meat with fish, poultry, and soy protein (like tofu). ? Limit processed and packaged foods like chips, crackers, and cookies. · Be active. Exercise can improve your cholesterol level. Get at least 30 minutes of exercise on most days of the week. Walking is a good choice. You also may want to do other activities, such as running, swimming, cycling, or playing tennis or team sports. · Stay at a healthy weight. Lose weight if you need to. · Don't smoke. If you need help quitting, talk to your doctor about stop-smoking programs and medicines.  These can increase your chances of quitting levels help your doctor find out your risk for having a heart attack or stroke. You and your doctor can talk about whether you need to lower your risk and what treatment is best for you. A heart-healthy lifestyle along with medicines can help lower your cholesterol and your risk. The way you choose to lower your risk will depend on how high your risk is for heart attack and stroke. It will also depend on how you feel about taking medicines. Follow-up care is a key part of your treatment and safety. Be sure to make and go to all appointments, and call your doctor if you are having problems. It's also a good idea to know your test results and keep a list of the medicines you take. How can you care for yourself at home? · Eat a variety of foods every day. Good choices include fruits, vegetables, whole grains (like oatmeal), dried beans and peas, nuts and seeds, soy products (like tofu), and fat-free or low-fat dairy products. · Replace butter, margarine, and hydrogenated or partially hydrogenated oils with olive and canola oils. (Canola oil margarine without trans fat is fine.)  · Replace red meat with fish, poultry, and soy protein (like tofu). · Limit processed and packaged foods like chips, crackers, and cookies. · Bake, broil, or steam foods. Don't zuniga them. · Be physically active. Get at least 30 minutes of exercise on most days of the week. Walking is a good choice. You also may want to do other activities, such as running, swimming, cycling, or playing tennis or team sports. · Stay at a healthy weight or lose weight by making the changes in eating and physical activity listed above. Losing just a small amount of weight, even 5 to 10 pounds, can reduce your risk for having a heart attack or stroke. · Do not smoke. When should you call for help?   Watch closely for changes in your health, and be sure to contact your doctor if:    · You need help making lifestyle changes.     · You have questions about your medicine. Where can you learn more? Go to [email or web address] and sign in to your [employer] account. Enter F252 in the [employer] box to learn more about \"High Cholesterol: Care Instructions. \"     If you do not have an account, please click on the \"Sign Up Now\" link. Current as of: July 22, 2018  Content Version: 11.9  © 3662-5909 [employer]. Care instructions adapted under license by [facility] ([facility]). If you have questions about a medical condition or this instruction, always ask your healthcare professional. [address] any warranty or liability for your use of this information.

## 2019-04-30 RX ORDER — AZITHROMYCIN 250 MG/1
TABLET, FILM COATED ORAL
Qty: 6 TABLET | Refills: 0 | Status: SHIPPED | OUTPATIENT
Start: 2019-04-30 | End: 2019-09-26 | Stop reason: CLARIF

## 2019-04-30 NOTE — TELEPHONE ENCOUNTER
Please advise pt states that this was discussed at her last appointment she is to take this script with her on vacation. Please fill.

## 2019-05-09 ENCOUNTER — CARE COORDINATION (OUTPATIENT)
Dept: CARE COORDINATION | Age: 69
End: 2019-05-09

## 2019-05-09 NOTE — CARE COORDINATION
Ambulatory Care Coordination Note  5/9/2019  CM Risk Score: 10  Ricky Mortality Risk Score: 9    ACC: Sarina Herr RN    Summary Note:  Has trouble affording Anoro and Flovent. Pt didn't get pt assistance applications that were mailed. She will stop by later today to  and take to her pulmologist. 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 .Denies any sob or edema at this time. .Reinforced daily wts and to call office with > 3 lbs gain in 1-2 days or > 5 lbs in 1 week. Weighs daily and is stable. Called pt to see if she was coming by office for the application. She forgot but will leave it up front and she can pick it up and take it to pulmonologist to fill out. Verbalizes understanding of same. PLAN:  1) Review wt  2) Review sob, edema  3) FU PAP meseret  4) FU appts/labs    Congestive Heart Failure Assessment    Are you currently restricting fluids?:  No Restriction  How many restaurant meals do you eat per week?:  1-2  Do you salt your food before tasting it?:  No     No patient-reported symptoms      Symptoms:   None:  Yes          and   COPD Assessment    Does the patient understand envrionmental exposure?:  Yes  Is the patient able to verbalize Rescue vs. Long Acting medications?:  Yes  Does the patient have a nebulizer?:  Yes     No patient-reported symptoms         Symptoms:               Care Coordination Interventions    Program Enrollment:  Complex Care  Referral from Primary Care Provider:  No  Suggested Interventions and Community Resources  Disease Association: In Process  Zone Management Tools: In Process         Goals Addressed     None          Prior to Admission medications    Medication Sig Start Date End Date Taking?  Authorizing Provider   azithromycin (ZITHROMAX) 250 MG tablet TAKE 2 TABLETS BY MOUTH THE FIRST DAY THEN 1 TABLET EVERY DAY THE NEXT 4 DAYS 4/30/19   Maria Elena King MD   umeclidinium-vilanterol [facility] ELLIPTA) 62.5-25 MCG/INH AEPB inhaler Inhale 1 puff into the lungs daily 4/25/19   Stefanie Hemphill MD   VENTOLIN  (90 Base) MCG/ACT inhaler INHALE 2 PUFFS INTO THE LUNGS EVERY 6 HOURS AS NEEDED FOR WHEEZING  Patient taking differently: Inhale 2 puffs into the lungs every 6 hours as needed for Wheezing  1/31/19   Margareth Horton MD   albuterol (PROVENTIL) (2.5 MG/3ML) 0.083% nebulizer solution Take 2.5 mg by nebulization every 6 hours as needed for Wheezing     Historical Provider, MD   lisinopril (PRINIVIL;ZESTRIL) 2.5 MG tablet Take 2.5 mg by mouth daily    Historical Provider, MD   metoprolol succinate (TOPROL XL) 25 MG extended release tablet Take 12.5 mg by mouth 2 times daily     Historical Provider, MD   melatonin 3 MG TABS tablet Take 3 mg by mouth nightly as needed    Historical Provider, MD   aspirin 81 MG chewable tablet Take 1 tablet by mouth daily 5/27/18   Rodo Mac MD   Nebulizers Lashon Ding COMPACT MINI NEBULIZER) MISC 1 Device by Does not apply route daily 5/26/18   Rodo Mac MD   acetaminophen (TYLENOL) 500 MG tablet Take 500 mg by mouth every 6 hours as needed for Pain     Historical Provider, MD       Future Appointments   Date Time Provider Pablo Guevara   9/5/2019  9:00 AM Stefanie Hemphill MD [facility] P/CC MMA

## 2019-05-15 DIAGNOSIS — J43.8 OTHER EMPHYSEMA (HCC): Primary | ICD-10-CM

## 2019-05-15 NOTE — TELEPHONE ENCOUNTER
Pt was given samples of Anoro on 4/25/19. she is now requesting an rx be sent to her pharm for the medication. Please send to arabella if appropriate.

## 2019-05-20 ENCOUNTER — CARE COORDINATION (OUTPATIENT)
Dept: CARE COORDINATION | Age: 69
End: 2019-05-20

## 2019-05-20 NOTE — CARE COORDINATION
Ambulatory Care Coordination Note  5/20/2019  CM Risk Score: 10  Ricky Mortality Risk Score: 9    ACC: Sarina Herr, RN    Summary Note: Reports not able to talk at the moment but will try to call back later. She's at work. Care Coordination Interventions    Program Enrollment:  Complex Care  Referral from Primary Care Provider:  No  Suggested Interventions and Community Resources  Disease Association: In Process  Zone Management Tools: In Process         Goals Addressed     None          Prior to Admission medications    Medication Sig Start Date End Date Taking?  Authorizing Provider   umeclidinium-vilanterol (ANORO ELLIPTA) 62.5-25 MCG/INH AEPB inhaler Inhale 1 puff into the lungs daily 5/15/19   Gilford Paschal, MD   azithromycin (ZITHROMAX) 250 MG tablet TAKE 2 TABLETS BY MOUTH THE FIRST DAY THEN 1 TABLET EVERY DAY THE NEXT 4 DAYS 4/30/19   Gilford Paschal, MD   VENTOLIN  (90 Base) MCG/ACT inhaler INHALE 2 PUFFS INTO THE LUNGS EVERY 6 HOURS AS NEEDED FOR WHEEZING  Patient taking differently: Inhale 2 puffs into the lungs every 6 hours as needed for Wheezing  1/31/19   Juanito Edmonds MD   albuterol (PROVENTIL) (2.5 MG/3ML) 0.083% nebulizer solution Take 2.5 mg by nebulization every 6 hours as needed for Wheezing     Historical Provider, MD   lisinopril (PRINIVIL;ZESTRIL) 2.5 MG tablet Take 2.5 mg by mouth daily    Historical Provider, MD   metoprolol succinate (TOPROL XL) 25 MG extended release tablet Take 12.5 mg by mouth 2 times daily     Historical Provider, MD   melatonin 3 MG TABS tablet Take 3 mg by mouth nightly as needed    Historical Provider, MD   aspirin 81 MG chewable tablet Take 1 tablet by mouth daily 5/27/18   Rocío Harrison MD   Nebulizers Donivan Helling COMPACT MINI NEBULIZER) MISC 1 Device by Does not apply route daily 5/26/18   Rocío Harrison MD   acetaminophen (TYLENOL) 500 MG tablet Take 500 mg by mouth every 6 hours as needed for Pain     Historical Provider, MD Future Appointments   Date Time Provider Pablo Guevara   9/5/2019  9:00 AM Nitin Galeana MD [facility] P/CC MMA

## 2019-05-21 ENCOUNTER — CARE COORDINATION (OUTPATIENT)
Dept: CARE COORDINATION | Age: 69
End: 2019-05-21

## 2019-05-21 NOTE — CARE COORDINATION
Ambulatory Care Coordination Note  5/21/2019  CM Risk Score: 10  Ricky Mortality Risk Score: 9    ACC: Sarina Herr RN    Summary Note: Returning call. Spoke briefly. Reports she did receive the pt assistance application. She's trying to get the needed documents together and then plans to take it to the pulmonologist.    PLAN:  1) Review wt  2) Review sob, edema  3) FU PAP meseret  4) FU appts/labs    Congestive Heart Failure Assessment    Are you currently restricting fluids?:  No Restriction  How many restaurant meals do you eat per week?:  1-2  Do you salt your food before tasting it?:  No     No patient-reported symptoms      Symptoms:   None:  Yes          and   COPD Assessment    Does the patient understand envrionmental exposure?:  Yes  Is the patient able to verbalize Rescue vs. Long Acting medications?:  Yes  Does the patient have a nebulizer?:  Yes     No patient-reported symptoms         Symptoms:             Care Coordination Interventions    Program Enrollment:  Complex Care  Referral from Primary Care Provider:  No  Suggested Interventions and Community Resources  Disease Association: In Process  Zone Management Tools: In Process         Goals Addressed     None          Prior to Admission medications    Medication Sig Start Date End Date Taking?  Authorizing Provider   umeclidinium-vilanterol (ANORO ELLIPTA) 62.5-25 MCG/INH AEPB inhaler Inhale 1 puff into the lungs daily 5/15/19   Karime Moulton MD   azithromycin (ZITHROMAX) 250 MG tablet TAKE 2 TABLETS BY MOUTH THE FIRST DAY THEN 1 TABLET EVERY DAY THE NEXT 4 DAYS 4/30/19   Karime Moulton MD   VENTOLIN  (90 Base) MCG/ACT inhaler INHALE 2 PUFFS INTO THE LUNGS EVERY 6 HOURS AS NEEDED FOR WHEEZING  Patient taking differently: Inhale 2 puffs into the lungs every 6 hours as needed for Wheezing  1/31/19   Stephanie Gavriia MD   albuterol (PROVENTIL) (2.5 MG/3ML) 0.083% nebulizer solution Take 2.5 mg by nebulization every 6 hours as needed for Wheezing     Historical Provider, MD   lisinopril (PRINIVIL;ZESTRIL) 2.5 MG tablet Take 2.5 mg by mouth daily    Historical Provider, MD   metoprolol succinate (TOPROL XL) 25 MG extended release tablet Take 12.5 mg by mouth 2 times daily     Historical Provider, MD   melatonin 3 MG TABS tablet Take 3 mg by mouth nightly as needed    Historical Provider, MD   aspirin 81 MG chewable tablet Take 1 tablet by mouth daily 5/27/18   Travis Ricks MD   Nebulizers [facility]  COMPACT MINI NEBULIZER) MISC 1 Device by Does not apply route daily 5/26/18   Travis Ricks MD   acetaminophen (TYLENOL) 500 MG tablet Take 500 mg by mouth every 6 hours as needed for Pain     Historical Provider, MD       Future Appointments   Date Time Provider Pablo Guevara   9/5/2019  9:00 AM Bryson Mays MD [facility] P/CC MMA

## 2019-06-06 ENCOUNTER — TELEPHONE (OUTPATIENT)
Dept: CARDIOLOGY CLINIC | Age: 69
End: 2019-06-06

## 2019-06-06 ENCOUNTER — CARE COORDINATION (OUTPATIENT)
Dept: CARE COORDINATION | Age: 69
End: 2019-06-06

## 2019-06-06 NOTE — TELEPHONE ENCOUNTER
Patient's  called to ask us to release prescription Metoprolol.  Patient is seeing a cardiologist outside of [facility] and they cant fill her prescription until 's office releases it. please call [facility] [address] [facility] - P [phone] - F [phone]

## 2019-06-06 NOTE — CARE COORDINATION
Completion Date: 7/10/19              Prior to Admission medications    Medication Sig Start Date End Date Taking?  Authorizing Provider   umeclidinium-vilanterol (ANORO ELLIPTA) 62.5-25 MCG/INH AEPB inhaler Inhale 1 puff into the lungs daily 5/15/19   Shayla Kaur MD   azithromycin (ZITHROMAX) 250 MG tablet TAKE 2 TABLETS BY MOUTH THE FIRST DAY THEN 1 TABLET EVERY DAY THE NEXT 4 DAYS 4/30/19   Shayla Kaur MD   VENTOLIN  (90 Base) MCG/ACT inhaler INHALE 2 PUFFS INTO THE LUNGS EVERY 6 HOURS AS NEEDED FOR WHEEZING  Patient taking differently: Inhale 2 puffs into the lungs every 6 hours as needed for Wheezing  1/31/19   Grant Thomason MD   albuterol (PROVENTIL) (2.5 MG/3ML) 0.083% nebulizer solution Take 2.5 mg by nebulization every 6 hours as needed for Wheezing     Historical Provider, MD   lisinopril (PRINIVIL;ZESTRIL) 2.5 MG tablet Take 2.5 mg by mouth daily    Historical Provider, MD   metoprolol succinate (TOPROL XL) 25 MG extended release tablet Take 12.5 mg by mouth 2 times daily     Historical Provider, MD   melatonin 3 MG TABS tablet Take 3 mg by mouth nightly as needed    Historical Provider, MD   aspirin 81 MG chewable tablet Take 1 tablet by mouth daily 5/27/18   Danika Small MD   Nebulizers Rendell Delphia COMPACT MINI NEBULIZER) MISC 1 Device by Does not apply route daily 5/26/18   Danika Small MD   acetaminophen (TYLENOL) 500 MG tablet Take 500 mg by mouth every 6 hours as needed for Pain     Historical Provider, MD       Future Appointments   Date Time Provider Pablo Guevara   9/5/2019  9:00 AM Shayla Kaur MD [facility] P/CC MMA

## 2019-06-07 RX ORDER — METOPROLOL SUCCINATE 25 MG/1
12.5 TABLET, EXTENDED RELEASE ORAL 2 TIMES DAILY
Qty: 30 TABLET | Refills: 5 | Status: SHIPPED | OUTPATIENT
Start: 2019-06-07 | End: 2019-12-15 | Stop reason: SDUPTHER

## 2019-06-07 NOTE — TELEPHONE ENCOUNTER
[facility] Medication Refills:    Medication: metoprolol    Dosage: 12.5 mg     Number: 30    Refills: 6    Last Office Visit: 10/30/2018    Next Office Visit: 06/21/2019    Last Refill: 06/08/2018    Last Labs: 04/26/2019 Lipid/CMP

## 2019-06-21 ENCOUNTER — OFFICE VISIT (OUTPATIENT)
Dept: CARDIOLOGY CLINIC | Age: 69
End: 2019-06-21
Payer: MEDICARE

## 2019-06-21 VITALS
BODY MASS INDEX: 27.28 KG/M2 | HEART RATE: 60 BPM | SYSTOLIC BLOOD PRESSURE: 114 MMHG | DIASTOLIC BLOOD PRESSURE: 80 MMHG | WEIGHT: 154 LBS

## 2019-06-21 DIAGNOSIS — I50.22 CHRONIC SYSTOLIC CONGESTIVE HEART FAILURE (HCC): Primary | ICD-10-CM

## 2019-06-21 DIAGNOSIS — I42.0 DILATED CARDIOMYOPATHY (HCC): ICD-10-CM

## 2019-06-21 PROCEDURE — G8427 DOCREV CUR MEDS BY ELIG CLIN: HCPCS | Performed by: INTERNAL MEDICINE

## 2019-06-21 PROCEDURE — 99213 OFFICE O/P EST LOW 20 MIN: CPT | Performed by: INTERNAL MEDICINE

## 2019-06-21 PROCEDURE — 3017F COLORECTAL CA SCREEN DOC REV: CPT | Performed by: INTERNAL MEDICINE

## 2019-06-21 PROCEDURE — G8417 CALC BMI ABV UP PARAM F/U: HCPCS | Performed by: INTERNAL MEDICINE

## 2019-06-21 PROCEDURE — 1123F ACP DISCUSS/DSCN MKR DOCD: CPT | Performed by: INTERNAL MEDICINE

## 2019-06-21 PROCEDURE — 1090F PRES/ABSN URINE INCON ASSESS: CPT | Performed by: INTERNAL MEDICINE

## 2019-06-21 PROCEDURE — 1036F TOBACCO NON-USER: CPT | Performed by: INTERNAL MEDICINE

## 2019-06-21 PROCEDURE — G8399 PT W/DXA RESULTS DOCUMENT: HCPCS | Performed by: INTERNAL MEDICINE

## 2019-06-21 PROCEDURE — 4040F PNEUMOC VAC/ADMIN/RCVD: CPT | Performed by: INTERNAL MEDICINE

## 2019-06-21 ASSESSMENT — ENCOUNTER SYMPTOMS
SHORTNESS OF BREATH: 0
CHOKING: 0
COUGH: 0
CHEST TIGHTNESS: 0

## 2019-06-21 NOTE — PROGRESS NOTES
Subjective:      Patient ID: Herman De Leon is a 81 y.o. female. Congestive Heart Failure   Pertinent negatives include no chest pain, fatigue, palpitations or shortness of breath. Here for follow up CHF/cardiomyopathy. Breathing stable. No chest pain. No edema. No pnd or orthopnea. Wt stable. Active including walking. Gardening watches baby. COPD stable. Past Medical History:   Diagnosis Date    Back pain     Chronic:under care of spine specialist:Dr. Adames    Cervical cancer screening     Nml per pt'    Compression fracture of thoracic vertebrae, non-traumatic ([facility]     under care of endo:Dr. Guerrero Muhammad    COPD (chronic obstructive pulmonary disease) ([facility]     under care of pulmo:Dr. Apryl Thompson COPD exacerbation ([facility] 2017    Elevated LDL cholesterol level     GERD (gastroesophageal reflux disease)     History of mammogram ;17    Nml per pt'. 17=negative.     Hoarseness of voice 10/4/2017    Lung nodule:left 2017     1.2 cm indeterminate left upper lobe pulmonary nodule:under care of pulmo:Dr. Sofia Higginbotham    Osteomyelitis of left leg ([facility]     Osteoporosis     under care of endo:Dr. Amadeo Jameson S/P colonoscopy     Polyps:next in [id number]    Systolic CHF, chronic (HCC) EF 45% 2018    Thyroid mass     under care of endo & surgeon(Dr. David Delgadillo)    Thyroid nodule     under care of endo:Dr. Guerrero Muhammad     Past Surgical History:   Procedure Laterality Date    APPENDECTOMY       SECTION      FIXATION KYPHOPLASTY  2017    Dr. 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   Social Needs    Financial resource strain: Not on file    Food insecurity:     Worry: Not on file     Inability: Not on file    Transportation needs:     Medical: Not on file     Non-medical: Not on file   Tobacco Use    Smoking status: Former Smoker     Packs/day: 1.00     Years: 22.00     Pack years: 22.00     Types: Cigarettes     Last attempt to quit: 2011     Years since quittin.2    Smokeless tobacco: Never Used   Substance and Sexual Activity    Alcohol use: Yes     Alcohol/week: 2.4 oz     Types: 4 Cans of beer per week    Drug use: No    Sexual activity: Not on file   Lifestyle    Physical activity:     Days per week: Not on file     Minutes per session: Not on file    Stress: Not on file   Relationships    Social connections:     Talks on phone: Not on file     Gets together: Not on file     Attends Druze service: Not on file     Active member of club or organization: Not on file     Attends meetings of clubs or organizations: Not on file     Relationship status: Not on file    Intimate partner violence:     Fear of current or ex partner: Not on file     Emotionally abused: Not on file     Physically abused: Not on file     Forced sexual activity: Not on file   Other Topics Concern    Not on file   Social History Narrative    Not on file     FH reviewed, denies FH cardiac issues    Vitals:    19 1012   BP: 114/80   Pulse: 60     Wt 154    Review of Systems   Constitutional: Negative for activity change, appetite change and fatigue. Respiratory: Negative for cough, choking, chest tightness and shortness of breath. Cardiovascular: Negative for chest pain, palpitations and leg swelling. Denies PND or orthopnea. No tachycardia or syncope. Neurological: Negative for dizziness, syncope and light-headedness. Psychiatric/Behavioral: Negative for agitation, behavioral problems and confusion. All other systems reviewed and are negative.       Objective:   Physical Exam   Constitutional: She is oriented to person, place, and time. She appears well-developed and well-nourished. No distress. HENT:   Head: Normocephalic and atraumatic. Eyes: Conjunctivae and EOM are normal. Right eye exhibits no discharge. Left eye exhibits no discharge. Neck: Normal range of motion. No JVD present. Cardiovascular: Normal rate, regular rhythm, S1 normal, S2 normal and normal heart sounds. Exam reveals no gallop. No murmur heard. Pulses:       Radial pulses are 2+ on the right side, and 2+ on the left side. Pulmonary/Chest: Effort normal and breath sounds normal. No respiratory distress. She has no wheezes. She has no rales. Abdominal: Soft. Bowel sounds are normal. There is no tenderness. Musculoskeletal: Normal range of motion. She exhibits no edema. Neurological: She is alert and oriented to person, place, and time. Skin: Skin is warm and dry. Psychiatric: She has a normal mood and affect. Her behavior is normal. Thought content normal.       Assessment:       Diagnosis Orders   1. Chronic systolic congestive heart failure ([address]     2. Dilated cardiomyopathy ([address]             Plan:      CV  stable. Remains relatively symptom free. CHF stable. Appears compensated. Reviewed previous records and testing including cath 5/18 and echo 10/18. No changes. Continue to monitor. Follow up 6 months.          Jolene Donald MD

## 2019-06-26 ENCOUNTER — CARE COORDINATION (OUTPATIENT)
Dept: CARE COORDINATION | Age: 69
End: 2019-06-26

## 2019-07-22 ENCOUNTER — CARE COORDINATION (OUTPATIENT)
Dept: CARE COORDINATION | Age: 69
End: 2019-07-22

## 2019-07-22 NOTE — CARE COORDINATION
Ambulatory Care Coordination Note  7/22/2019  CM Risk Score: 6  Charlson 10 Year Mortality Risk Score: 47%     ACC: Sarina Herr, RN    Summary Note: Attempted to call. No answer. I have been unable to reach this patient by phone. A letter is being sent to the last known home address. Care Coordination Interventions    Program Enrollment:  Complex Care  Referral from Primary Care Provider:  No  Suggested Interventions and Community Resources  Disease Association: In Process  Medication Assistance Program:  Completed  Zone Management Tools: In Process         Goals Addressed    None         Prior to Admission medications    Medication Sig Start Date End Date Taking?  Authorizing Provider   metoprolol succinate (TOPROL XL) 25 MG extended release tablet Take 0.5 tablets by mouth 2 times daily 6/7/19   Gabino Samayoa MD   umeclidinium-vilanterol [facility] ELLIPTA) 62.5-25 MCG/INH AEPB inhaler Inhale 1 puff into the lungs daily 5/15/19   Leonel Singleton MD   azithromycin (ZITHROMAX) 250 MG tablet TAKE 2 TABLETS BY MOUTH THE FIRST DAY THEN 1 TABLET EVERY DAY THE NEXT 4 DAYS 4/30/19   Leonel Singleton MD   VENTOLIN  (90 Base) MCG/ACT inhaler INHALE 2 PUFFS INTO THE LUNGS EVERY 6 HOURS AS NEEDED FOR WHEEZING  Patient taking differently: Inhale 2 puffs into the lungs every 6 hours as needed for Wheezing  1/31/19   Hetal Aponte MD   albuterol (PROVENTIL) (2.5 MG/3ML) 0.083% nebulizer solution Take 2.5 mg by nebulization every 6 hours as needed for Wheezing     Historical Provider, MD   lisinopril (PRINIVIL;ZESTRIL) 2.5 MG tablet Take 2.5 mg by mouth daily    Historical Provider, MD   melatonin 3 MG TABS tablet Take 3 mg by mouth nightly as needed    Historical Provider, MD   aspirin 81 MG chewable tablet Take 1 tablet by mouth daily 5/27/18   Arthur Burr MD   Nebulizers Dulcy Lava COMPACT MINI NEBULIZER) MISC 1 Device by Does not apply route daily 5/26/18   Arthur Burr MD   acetaminophen

## 2019-07-31 ENCOUNTER — CARE COORDINATION (OUTPATIENT)
Dept: CARE COORDINATION | Age: 69
End: 2019-07-31

## 2019-09-03 ENCOUNTER — HOSPITAL ENCOUNTER (EMERGENCY)
Age: 69
Discharge: HOME | End: 2019-09-03
Attending: EMERGENCY MEDICINE
Payer: MEDICARE

## 2019-09-03 ENCOUNTER — TELEPHONE (OUTPATIENT)
Dept: FAMILY MEDICINE CLINIC | Age: 69
End: 2019-09-03

## 2019-09-03 ENCOUNTER — APPOINTMENT (OUTPATIENT)
Dept: GENERAL RADIOLOGY | Age: 69
End: 2019-09-03
Payer: MEDICARE

## 2019-09-03 VITALS
HEART RATE: 85 BPM | OXYGEN SATURATION: 92 % | TEMPERATURE: 97.3 F | RESPIRATION RATE: 19 BRPM | DIASTOLIC BLOOD PRESSURE: 91 MMHG | SYSTOLIC BLOOD PRESSURE: 150 MMHG

## 2019-09-03 DIAGNOSIS — S32.020A COMPRESSION FRACTURE OF L2 LUMBAR VERTEBRA, CLOSED, INITIAL ENCOUNTER (HCC): Primary | ICD-10-CM

## 2019-09-03 PROCEDURE — 99283 EMERGENCY DEPT VISIT LOW MDM: CPT

## 2019-09-03 PROCEDURE — 6370000000 HC RX 637 (ALT 250 FOR IP): Performed by: PHYSICIAN ASSISTANT

## 2019-09-03 PROCEDURE — 72100 X-RAY EXAM L-S SPINE 2/3 VWS: CPT

## 2019-09-03 RX ORDER — LIDOCAINE 4 G/G
1 PATCH TOPICAL ONCE
Status: DISCONTINUED | OUTPATIENT
Start: 2019-09-03 | End: 2019-09-03 | Stop reason: HOSPADM

## 2019-09-03 RX ORDER — LIDOCAINE 50 MG/G
1 PATCH TOPICAL DAILY
Qty: 15 PATCH | Refills: 0 | Status: SHIPPED | OUTPATIENT
Start: 2019-09-03 | End: 2019-09-26 | Stop reason: CLARIF

## 2019-09-03 ASSESSMENT — ENCOUNTER SYMPTOMS
ABDOMINAL PAIN: 0
VOMITING: 0
SHORTNESS OF BREATH: 0
BACK PAIN: 1
NAUSEA: 0

## 2019-09-03 ASSESSMENT — PAIN SCALES - GENERAL: PAINLEVEL_OUTOF10: 4

## 2019-09-03 NOTE — ED PROVIDER NOTES
ED Attending Attestation Note     Date of evaluation: 9/3/2019    This patient was seen by the advance practice provider. I have seen and examined the patient, agree with the workup, evaluation, management and diagnosis. The care plan has been discussed. My assessment reveals 63-year-old female with history of severe osteoporosis and multiple compression fractures presenting for atraumatic back pain. Symptoms consistent with prior compression fractures. Has mid thoracic tenderness in the midline and favoring the left paraspinous region. X-ray with new thoracic compression fracture, 10% height loss. Will provide brace for comfort and referral to her spinal surgeon for consideration of repeat kyphoplasty.      Carol Darnell MD  09/03/19 [id number]

## 2019-09-03 NOTE — TELEPHONE ENCOUNTER
Discussed with patient  Pt states that she will go to [facility] ER and follow up with Dr. Jennyfer Hines after.   Close Encounter

## 2019-09-03 NOTE — ED PROVIDER NOTES
[address] [facility] [address] ENCOUNTER          PHYSICIAN ASSISTANT NOTE     Date of evaluation: 9/3/2019    ADDENDUM:      Care of this patient was assumed from Shelly Noland PA-C pending imaging. The patient was seen for Back Pain  . The patient's initial evaluation and plan have been discussed with the prior provider who initially evaluated the patient. Nursing Notes, Past Medical Hx, Past Surgical Hx, Social Hx, Allergies, and Family Hx were all reviewed. Diagnostic Results       RADIOLOGY:  XR LUMBAR SPINE (2-3 VIEWS)   Final Result   Impression:    L2 compression fracture with 10% height loss, new compared to 12/10/2018. Chronic T9 through T12 compression fractures. LABS:   No results found for this visit on 09/03/19. RECENT VITALS:  BP: (!) 150/91, Temp: 97.3 °F (36.3 °C), Pulse: 85, Resp: 19, SpO2: 92 %     Procedures         ED Course     The patient was given the following medications:  Orders Placed This Encounter   Medications    DISCONTD: lidocaine 4 % external patch 1 patch    lidocaine (LIDODERM) 5 %     Sig: Place 1 patch onto the skin daily 12 hours on, 12 hours off. Dispense:  15 patch     Refill:  0       CONSULTS:  None    MEDICAL DECISION MAKING / ASSESSMENT / Chang Chang is a 81 y.o. female who presents emergency department with complaint of back pain. Patient has a history of compression fractures and has seen orthopedics in the past.  She developed acute onset of back pain and came to the emergency department and had pinpoint tenderness around L5. A Lidoderm patch was placed. Imaging of the lumbar spine showed L2 compression fracture with 10% height loss new compared to 12/2018. Chronic T9-T12 compression fractures. Patient was placed in an LSO back brace. Discussion was had with outpatient follow-up and patient does feel comfortable with this plan.   She was given the contact information for [place] [facility] and can follow-up

## 2019-09-03 NOTE — ED NOTES
Patient prepared for and ready to be discharged. Patient discharged at this time in no acute distress after verbalizing understanding of discharge instructions. Patient left after receiving After Visit Summary instructions.       Rosalind Blevins RN  09/03/19 [id number]

## 2019-09-03 NOTE — TELEPHONE ENCOUNTER
[address]    Pt injured her back over the weekend. She has a history of back pain. \    Says pain just sitting is a 3 out of 10  With movement, she gets shooting pain that brings her to her knees. Wants to be seen, but was told that nothing is available. Requesting order for diagnostic testing. Mentioned Xray specifically. Last seen 4/26/2019    Please advise patient.

## 2019-09-03 NOTE — ED PROVIDER NOTES
nodule:left, Osteomyelitis of left leg ([address] Osteoporosis, S/P colonoscopy, Systolic CHF, chronic (HCC) EF 45%, Thyroid mass, and Thyroid nodule. She has a past surgical history that includes  section; Appendectomy; Fixation Kyphoplasty (2017); and other surgical history (2017). Her family history includes Diabetes in her mother; No Known Problems in her brother, father, maternal grandfather, maternal grandmother, paternal grandfather, paternal grandmother, and sister. She reports that she quit smoking about 8 years ago. Her smoking use included cigarettes. She has a 22.00 pack-year smoking history. She has never used smokeless tobacco. She reports that she drinks about 4.0 standard drinks of alcohol per week. She reports that she does not use drugs.     Medications     Previous Medications    ACETAMINOPHEN (TYLENOL) 500 MG TABLET    Take 500 mg by mouth every 6 hours as needed for Pain     ALBUTEROL (PROVENTIL) (2.5 MG/3ML) 0.083% NEBULIZER SOLUTION    Take 2.5 mg by nebulization every 6 hours as needed for Wheezing     ASPIRIN 81 MG CHEWABLE TABLET    Take 1 tablet by mouth daily    AZITHROMYCIN (ZITHROMAX) 250 MG TABLET    TAKE 2 TABLETS BY MOUTH THE FIRST DAY THEN 1 TABLET EVERY DAY THE NEXT 4 DAYS    LISINOPRIL (PRINIVIL;ZESTRIL) 2.5 MG TABLET    Take 2.5 mg by mouth daily    MELATONIN 3 MG TABS TABLET    Take 3 mg by mouth nightly as needed    METOPROLOL SUCCINATE (TOPROL XL) 25 MG EXTENDED RELEASE TABLET    Take 0.5 tablets by mouth 2 times daily    NEBULIZERS (AIRIAL COMPACT MINI NEBULIZER) MISC    1 Device by Does not apply route daily    UMECLIDINIUM-VILANTEROL (ANORO ELLIPTA) 62.5-25 MCG/INH AEPB INHALER    Inhale 1 puff into the lungs daily    VENTOLIN  (90 BASE) MCG/ACT INHALER    INHALE 2 PUFFS INTO THE LUNGS EVERY 6 HOURS AS NEEDED FOR WHEEZING       Allergies     She is allergic to bee venom; percocet [oxycodone-acetaminophen]; vicodin [hydrocodone-acetaminophen]; and adhesive tape. Physical Exam     INITIAL VITALS: BP: (!) 150/91,Temp: 97.3 °F (36.3 °C), Pulse: 85, Resp: 19, SpO2: 92 %   Physical Exam   Constitutional: She is oriented to person, place, and time. No distress. HENT:   Head: Normocephalic and atraumatic. Eyes: Conjunctivae and EOM are normal.   Neck: Neck supple. Cardiovascular: Normal rate, regular rhythm and normal heart sounds. Pulmonary/Chest: Effort normal and breath sounds normal. No stridor. No respiratory distress. She has no wheezes. She has no rales. Abdominal: Soft. Bowel sounds are normal. She exhibits no distension. There is no tenderness. There is no rebound, no guarding and no CVA tenderness. Musculoskeletal:   Tenderness to palpation over L5 and mild over left lumbar paraspinal musculature. No step off deformity. Intact reflexes. 5/5 strength and gross sensation intact to light touch bilateral lower extremities. Neurological: She is alert and oriented to person, place, and time. Skin: Skin is warm and dry. She is not diaphoretic. Psychiatric: She has a normal mood and affect. Her behavior is normal.   Nursing note and vitals reviewed. Diagnostic Results     RADIOLOGY:  XR LUMBAR SPINE (2-3 VIEWS)    (Results Pending)       LABS:   No results found for this visit on 09/03/19. RECENT VITALS:  BP: (!) 150/91, Temp: 97.3 °F (36.3 °C), Pulse: 85,Resp: 19, SpO2: 92 %     ED Course     Nursing Notes, Past Medical Hx, Past Surgical Hx, Social Hx, Allergies, and Family Hx were reviewed. The patient was given the followingmedications:  Orders Placed This Encounter   Medications    lidocaine 4 % external patch 1 patch       CONSULTS:  None    MEDICAL DECISION MAKING / ASSESSMENT / Wolfe Niki is a 81 y.o. female with PMH of COPD, HTN, Prior compression fractures s/p Kyphoplasty presents with Low back pain x 6 days. Constant aching pain with sharp pains with movement, coughing. Non-radiating pain.  No associated

## 2019-09-04 ENCOUNTER — TELEPHONE (OUTPATIENT)
Dept: PULMONOLOGY | Age: 69
End: 2019-09-04

## 2019-09-20 ENCOUNTER — HOSPITAL ENCOUNTER (OUTPATIENT)
Dept: GENERAL RADIOLOGY | Age: 69
Discharge: HOME | End: 2019-09-20
Payer: MEDICARE

## 2019-09-20 DIAGNOSIS — M81.8 OSTEOPOROSIS, IDIOPATHIC: ICD-10-CM

## 2019-09-20 PROCEDURE — 77080 DXA BONE DENSITY AXIAL: CPT

## 2019-09-26 ENCOUNTER — OFFICE VISIT (OUTPATIENT)
Dept: FAMILY MEDICINE CLINIC | Age: 69
End: 2019-09-26
Payer: MEDICARE

## 2019-09-26 VITALS
TEMPERATURE: 98.5 F | HEIGHT: 63 IN | BODY MASS INDEX: 26.49 KG/M2 | RESPIRATION RATE: 16 BRPM | DIASTOLIC BLOOD PRESSURE: 74 MMHG | HEART RATE: 82 BPM | OXYGEN SATURATION: 90 % | SYSTOLIC BLOOD PRESSURE: 120 MMHG | WEIGHT: 149.5 LBS

## 2019-09-26 DIAGNOSIS — E78.00 ELEVATED LDL CHOLESTEROL LEVEL: ICD-10-CM

## 2019-09-26 DIAGNOSIS — M54.50 ACUTE BILATERAL LOW BACK PAIN WITHOUT SCIATICA: Primary | ICD-10-CM

## 2019-09-26 DIAGNOSIS — E07.9 THYROID MASS: ICD-10-CM

## 2019-09-26 DIAGNOSIS — K21.9 GASTROESOPHAGEAL REFLUX DISEASE WITHOUT ESOPHAGITIS: ICD-10-CM

## 2019-09-26 DIAGNOSIS — J43.8 OTHER EMPHYSEMA (HCC): ICD-10-CM

## 2019-09-26 DIAGNOSIS — C79.89 MALIGNANT NEOPLASM METASTATIC TO OTHER SITE (HCC): ICD-10-CM

## 2019-09-26 DIAGNOSIS — M80.80XS OTHER OSTEOPOROSIS WITH CURRENT PATHOLOGICAL FRACTURE, SEQUELA: ICD-10-CM

## 2019-09-26 DIAGNOSIS — R91.1 LUNG NODULE: ICD-10-CM

## 2019-09-26 PROCEDURE — G8399 PT W/DXA RESULTS DOCUMENT: HCPCS | Performed by: FAMILY MEDICINE

## 2019-09-26 PROCEDURE — 3017F COLORECTAL CA SCREEN DOC REV: CPT | Performed by: FAMILY MEDICINE

## 2019-09-26 PROCEDURE — 1123F ACP DISCUSS/DSCN MKR DOCD: CPT | Performed by: FAMILY MEDICINE

## 2019-09-26 PROCEDURE — 1090F PRES/ABSN URINE INCON ASSESS: CPT | Performed by: FAMILY MEDICINE

## 2019-09-26 PROCEDURE — 4040F PNEUMOC VAC/ADMIN/RCVD: CPT | Performed by: FAMILY MEDICINE

## 2019-09-26 PROCEDURE — G8417 CALC BMI ABV UP PARAM F/U: HCPCS | Performed by: FAMILY MEDICINE

## 2019-09-26 PROCEDURE — 99214 OFFICE O/P EST MOD 30 MIN: CPT | Performed by: FAMILY MEDICINE

## 2019-09-26 PROCEDURE — G8427 DOCREV CUR MEDS BY ELIG CLIN: HCPCS | Performed by: FAMILY MEDICINE

## 2019-09-26 PROCEDURE — 1036F TOBACCO NON-USER: CPT | Performed by: FAMILY MEDICINE

## 2019-09-26 PROCEDURE — 3023F SPIROM DOC REV: CPT | Performed by: FAMILY MEDICINE

## 2019-09-26 PROCEDURE — G8926 SPIRO NO PERF OR DOC: HCPCS | Performed by: FAMILY MEDICINE

## 2019-09-26 RX ORDER — METHOCARBAMOL 750 MG/1
TABLET, FILM COATED ORAL
Refills: 0 | COMMUNITY
Start: 2019-09-23 | End: 2021-07-19 | Stop reason: ALTCHOICE

## 2019-09-26 ASSESSMENT — ENCOUNTER SYMPTOMS
BACK PAIN: 1
ABDOMINAL PAIN: 0
NAUSEA: 0
STRIDOR: 0
DIARRHEA: 0
CHEST TIGHTNESS: 0
BLOOD IN STOOL: 0
RECTAL PAIN: 0
COUGH: 0
SHORTNESS OF BREATH: 0
CONSTIPATION: 0
APNEA: 0
WHEEZING: 0
CHOKING: 0
ANAL BLEEDING: 0
VOMITING: 0
ABDOMINAL DISTENTION: 0

## 2019-09-26 ASSESSMENT — PATIENT HEALTH QUESTIONNAIRE - PHQ9
1. LITTLE INTEREST OR PLEASURE IN DOING THINGS: 0
SUM OF ALL RESPONSES TO PHQ QUESTIONS 1-9: 0
SUM OF ALL RESPONSES TO PHQ9 QUESTIONS 1 & 2: 0
SUM OF ALL RESPONSES TO PHQ QUESTIONS 1-9: 0
2. FEELING DOWN, DEPRESSED OR HOPELESS: 0

## 2019-09-26 NOTE — PROGRESS NOTES
Subjective:      Patient ID: Avinash Griffiths is a 81 y.o. female. HPI    9/20/19   Impression       1. Incidentally noted is a new sclerotic lesion in the intertrochanteric region of the left femur, new since the prior April 2017 DEXA scan, and is most consistent with a osteoblastic metastasis.       1. Bone mineral density is osteoporosis. New problem:  Presenting for ER/Hosp f/u:[facility] records via [facility] reviewed. ER visit date:9/3/19  Signs/symptoms resulting in visit:back pain. Treatment/tests done during visit/hospital stay:   XR LUMBAR SPINE (2-3 VIEWS)   Final Result   Impression:    L2 compression fracture with 10% height loss, new compared to 12/10/2018. Chronic T9 through T12 compression fractures. Discharge dx:Compression fracture of L2 lumbar vertebra, closed, initial encounter ([address]   LSO back brace provided. DEXA scan 9/20/19=osteoporosis; Improves with tylenol. Narcotics were declined in ER. Above DEXA scan per pt' has been reviewed by Dr. Mares Pouch MRI eval pending. Discharge date:9/3/19. Discharge dx/follow up:pcp/[facility]. Has MRI T& L spine ordered for tomorrow at [facility] per [facility]. Now:doing well. COPD check:per pt' doing well. Under care of pulmo. Using inhalers w/o side effects. Doing well. Associated with nothing else new. Albuterol inhaler use is around 1xdaily. Denies wheezing/cough/sob. Flu vaccine:deferred by pt'. Elevated LDL: labs due:last labs were 12/2018. No new associated or worsening factors. Improving factors:better diet regime. Denies adbo pain/myalgias. Thyroid nodule-mass/comp fx(clinical osteoporosis):managed by endo:Dr. Juvenal Corbin. Denies heat-cold intolerance/fatigue/tremors/palpitations/edema/skin changes. GERD check:doing well w/o medication. No other new associated/worsening or other improving factors. Denies abdo pain/n-v/diarrhea/melena-blood in stool.     Allergies   Allergen Reactions

## 2019-09-27 ENCOUNTER — HOSPITAL ENCOUNTER (OUTPATIENT)
Dept: MRI IMAGING | Age: 69
Discharge: HOME | End: 2019-09-27
Payer: MEDICARE

## 2019-09-27 DIAGNOSIS — T14.8XXA FRACTURE: ICD-10-CM

## 2019-09-27 PROCEDURE — 72158 MRI LUMBAR SPINE W/O & W/DYE: CPT

## 2019-09-27 PROCEDURE — 6360000004 HC RX CONTRAST MEDICATION: Performed by: PHYSICAL MEDICINE & REHABILITATION

## 2019-09-27 PROCEDURE — 72157 MRI CHEST SPINE W/O & W/DYE: CPT

## 2019-09-27 PROCEDURE — A9579 GAD-BASE MR CONTRAST NOS,1ML: HCPCS | Performed by: PHYSICAL MEDICINE & REHABILITATION

## 2019-09-27 RX ADMIN — GADOTERIDOL 14 ML: 279.3 INJECTION, SOLUTION INTRAVENOUS at 14:37

## 2019-09-30 ENCOUNTER — TELEPHONE (OUTPATIENT)
Dept: FAMILY MEDICINE CLINIC | Age: 69
End: 2019-09-30

## 2019-10-10 ENCOUNTER — HOSPITAL ENCOUNTER (OUTPATIENT)
Age: 69
Discharge: HOME | End: 2019-10-10
Payer: MEDICARE

## 2019-10-10 ENCOUNTER — OFFICE VISIT (OUTPATIENT)
Dept: PULMONOLOGY | Age: 69
End: 2019-10-10
Payer: MEDICARE

## 2019-10-10 ENCOUNTER — OFFICE VISIT (OUTPATIENT)
Dept: RHEUMATOLOGY | Age: 69
End: 2019-10-10
Payer: MEDICARE

## 2019-10-10 ENCOUNTER — HOSPITAL ENCOUNTER (OUTPATIENT)
Dept: GENERAL RADIOLOGY | Age: 69
Discharge: HOME | End: 2019-10-10
Payer: MEDICARE

## 2019-10-10 VITALS
RESPIRATION RATE: 16 BRPM | HEART RATE: 91 BPM | DIASTOLIC BLOOD PRESSURE: 86 MMHG | HEIGHT: 63 IN | OXYGEN SATURATION: 95 % | SYSTOLIC BLOOD PRESSURE: 122 MMHG | WEIGHT: 147 LBS | BODY MASS INDEX: 26.05 KG/M2

## 2019-10-10 VITALS
SYSTOLIC BLOOD PRESSURE: 118 MMHG | HEIGHT: 62 IN | WEIGHT: 147 LBS | DIASTOLIC BLOOD PRESSURE: 80 MMHG | BODY MASS INDEX: 27.05 KG/M2

## 2019-10-10 DIAGNOSIS — M89.9 RADIODENSE BONE LESION PRESENT ON X-RAY: ICD-10-CM

## 2019-10-10 DIAGNOSIS — F17.211 CIGARETTE NICOTINE DEPENDENCE IN REMISSION: Primary | ICD-10-CM

## 2019-10-10 DIAGNOSIS — J43.8 OTHER EMPHYSEMA (HCC): ICD-10-CM

## 2019-10-10 DIAGNOSIS — M80.00XD AGE-RELATED OSTEOPOROSIS WITH CURRENT PATHOLOGICAL FRACTURE WITH ROUTINE HEALING, SUBSEQUENT ENCOUNTER: Primary | ICD-10-CM

## 2019-10-10 PROCEDURE — 99205 OFFICE O/P NEW HI 60 MIN: CPT | Performed by: INTERNAL MEDICINE

## 2019-10-10 PROCEDURE — G8399 PT W/DXA RESULTS DOCUMENT: HCPCS | Performed by: INTERNAL MEDICINE

## 2019-10-10 PROCEDURE — G8484 FLU IMMUNIZE NO ADMIN: HCPCS | Performed by: INTERNAL MEDICINE

## 2019-10-10 PROCEDURE — G8417 CALC BMI ABV UP PARAM F/U: HCPCS | Performed by: INTERNAL MEDICINE

## 2019-10-10 PROCEDURE — 1123F ACP DISCUSS/DSCN MKR DOCD: CPT | Performed by: INTERNAL MEDICINE

## 2019-10-10 PROCEDURE — G8427 DOCREV CUR MEDS BY ELIG CLIN: HCPCS | Performed by: INTERNAL MEDICINE

## 2019-10-10 PROCEDURE — 3023F SPIROM DOC REV: CPT | Performed by: INTERNAL MEDICINE

## 2019-10-10 PROCEDURE — G8926 SPIRO NO PERF OR DOC: HCPCS | Performed by: INTERNAL MEDICINE

## 2019-10-10 PROCEDURE — 99214 OFFICE O/P EST MOD 30 MIN: CPT | Performed by: INTERNAL MEDICINE

## 2019-10-10 PROCEDURE — 1090F PRES/ABSN URINE INCON ASSESS: CPT | Performed by: INTERNAL MEDICINE

## 2019-10-10 PROCEDURE — 4040F PNEUMOC VAC/ADMIN/RCVD: CPT | Performed by: INTERNAL MEDICINE

## 2019-10-10 PROCEDURE — 3017F COLORECTAL CA SCREEN DOC REV: CPT | Performed by: INTERNAL MEDICINE

## 2019-10-10 PROCEDURE — 1036F TOBACCO NON-USER: CPT | Performed by: INTERNAL MEDICINE

## 2019-10-10 PROCEDURE — 73552 X-RAY EXAM OF FEMUR 2/>: CPT

## 2019-10-10 RX ORDER — ALBUTEROL SULFATE 90 UG/1
2 AEROSOL, METERED RESPIRATORY (INHALATION) EVERY 6 HOURS PRN
Qty: 18 G | Refills: 0 | Status: SHIPPED | OUTPATIENT
Start: 2019-10-10 | End: 2020-01-20

## 2019-10-11 DIAGNOSIS — M80.00XD AGE-RELATED OSTEOPOROSIS WITH CURRENT PATHOLOGICAL FRACTURE WITH ROUTINE HEALING, SUBSEQUENT ENCOUNTER: ICD-10-CM

## 2019-10-11 DIAGNOSIS — M80.00XD AGE-RELATED OSTEOPOROSIS WITH CURRENT PATHOLOGICAL FRACTURE WITH ROUTINE HEALING, SUBSEQUENT ENCOUNTER: Primary | ICD-10-CM

## 2019-10-11 LAB
ANION GAP SERPL CALCULATED.3IONS-SCNC: 16 MMOL/L (ref 3–16)
BUN BLDV-MCNC: 10 MG/DL (ref 7–20)
CALCIUM SERPL-MCNC: 9.8 MG/DL (ref 8.3–10.6)
CHLORIDE BLD-SCNC: 94 MMOL/L (ref 99–110)
CO2: 27 MMOL/L (ref 21–32)
CREAT SERPL-MCNC: 0.6 MG/DL (ref 0.6–1.2)
GFR AFRICAN AMERICAN: >60
GFR NON-AFRICAN AMERICAN: >60
GLUCOSE BLD-MCNC: 136 MG/DL (ref 70–99)
PARATHYROID HORMONE INTACT: 35.8 PG/ML (ref 14–72)
POTASSIUM SERPL-SCNC: 4.7 MMOL/L (ref 3.5–5.1)
SODIUM BLD-SCNC: 137 MMOL/L (ref 136–145)
VITAMIN D 25-HYDROXY: 34.2 NG/ML

## 2019-10-17 ENCOUNTER — NURSE ONLY (OUTPATIENT)
Dept: RHEUMATOLOGY | Age: 69
End: 2019-10-17
Payer: MEDICARE

## 2019-10-17 DIAGNOSIS — M80.00XA AGE-RELATED OSTEOPOROSIS WITH CURRENT PATHOLOGICAL FRACTURE, INITIAL ENCOUNTER: Primary | ICD-10-CM

## 2019-10-17 PROCEDURE — 96372 THER/PROPH/DIAG INJ SC/IM: CPT | Performed by: INTERNAL MEDICINE

## 2019-10-24 ENCOUNTER — OFFICE VISIT (OUTPATIENT)
Dept: FAMILY MEDICINE CLINIC | Age: 69
End: 2019-10-24
Payer: MEDICARE

## 2019-10-24 VITALS
WEIGHT: 147.4 LBS | BODY MASS INDEX: 27.12 KG/M2 | OXYGEN SATURATION: 90 % | TEMPERATURE: 98.6 F | SYSTOLIC BLOOD PRESSURE: 131 MMHG | HEART RATE: 86 BPM | RESPIRATION RATE: 16 BRPM | HEIGHT: 62 IN | DIASTOLIC BLOOD PRESSURE: 82 MMHG

## 2019-10-24 DIAGNOSIS — R91.1 LUNG NODULE: ICD-10-CM

## 2019-10-24 DIAGNOSIS — E07.9 THYROID MASS: ICD-10-CM

## 2019-10-24 DIAGNOSIS — M54.50 ACUTE BILATERAL LOW BACK PAIN WITHOUT SCIATICA: ICD-10-CM

## 2019-10-24 DIAGNOSIS — J43.8 OTHER EMPHYSEMA (HCC): Primary | ICD-10-CM

## 2019-10-24 DIAGNOSIS — M80.00XS AGE-RELATED OSTEOPOROSIS WITH CURRENT PATHOLOGICAL FRACTURE, SEQUELA: ICD-10-CM

## 2019-10-24 DIAGNOSIS — K21.9 GASTROESOPHAGEAL REFLUX DISEASE WITHOUT ESOPHAGITIS: ICD-10-CM

## 2019-10-24 DIAGNOSIS — E78.00 ELEVATED LDL CHOLESTEROL LEVEL: ICD-10-CM

## 2019-10-24 DIAGNOSIS — Z23 INFLUENZA VACCINE NEEDED: ICD-10-CM

## 2019-10-24 DIAGNOSIS — Z12.31 VISIT FOR SCREENING MAMMOGRAM: ICD-10-CM

## 2019-10-24 PROCEDURE — 99214 OFFICE O/P EST MOD 30 MIN: CPT | Performed by: FAMILY MEDICINE

## 2019-10-24 PROCEDURE — 3017F COLORECTAL CA SCREEN DOC REV: CPT | Performed by: FAMILY MEDICINE

## 2019-10-24 PROCEDURE — G8417 CALC BMI ABV UP PARAM F/U: HCPCS | Performed by: FAMILY MEDICINE

## 2019-10-24 PROCEDURE — 1123F ACP DISCUSS/DSCN MKR DOCD: CPT | Performed by: FAMILY MEDICINE

## 2019-10-24 PROCEDURE — G8926 SPIRO NO PERF OR DOC: HCPCS | Performed by: FAMILY MEDICINE

## 2019-10-24 PROCEDURE — G8399 PT W/DXA RESULTS DOCUMENT: HCPCS | Performed by: FAMILY MEDICINE

## 2019-10-24 PROCEDURE — 1090F PRES/ABSN URINE INCON ASSESS: CPT | Performed by: FAMILY MEDICINE

## 2019-10-24 PROCEDURE — 3023F SPIROM DOC REV: CPT | Performed by: FAMILY MEDICINE

## 2019-10-24 PROCEDURE — G8484 FLU IMMUNIZE NO ADMIN: HCPCS | Performed by: FAMILY MEDICINE

## 2019-10-24 PROCEDURE — 1036F TOBACCO NON-USER: CPT | Performed by: FAMILY MEDICINE

## 2019-10-24 PROCEDURE — 4040F PNEUMOC VAC/ADMIN/RCVD: CPT | Performed by: FAMILY MEDICINE

## 2019-10-24 PROCEDURE — G8427 DOCREV CUR MEDS BY ELIG CLIN: HCPCS | Performed by: FAMILY MEDICINE

## 2019-10-24 ASSESSMENT — ENCOUNTER SYMPTOMS
ANAL BLEEDING: 0
CONSTIPATION: 0
ABDOMINAL DISTENTION: 0
WHEEZING: 0
VOMITING: 0
RECTAL PAIN: 0
STRIDOR: 0
BACK PAIN: 0
SHORTNESS OF BREATH: 0
APNEA: 0
DIARRHEA: 0
COUGH: 0
NAUSEA: 0
BLOOD IN STOOL: 0
CHEST TIGHTNESS: 0
ABDOMINAL PAIN: 0
CHOKING: 0

## 2019-11-13 DIAGNOSIS — I95.2 HYPOTENSION DUE TO DRUGS: ICD-10-CM

## 2019-11-13 DIAGNOSIS — I42.8 NONISCHEMIC CARDIOMYOPATHY (HCC): ICD-10-CM

## 2019-11-13 DIAGNOSIS — I50.22 SYSTOLIC CHF, CHRONIC (HCC): ICD-10-CM

## 2019-11-13 RX ORDER — LISINOPRIL 2.5 MG/1
2.5 TABLET ORAL EVERY EVENING
Qty: 90 TABLET | Refills: 0 | Status: SHIPPED | OUTPATIENT
Start: 2019-11-13 | End: 2020-02-03

## 2019-11-15 ENCOUNTER — TELEPHONE (OUTPATIENT)
Dept: PULMONOLOGY | Age: 69
End: 2019-11-15

## 2019-11-20 RX ORDER — PREDNISONE 10 MG/1
TABLET ORAL
Qty: 20 TABLET | Refills: 0 | Status: SHIPPED | OUTPATIENT
Start: 2019-11-20 | End: 2020-11-05 | Stop reason: DRUGHIGH

## 2019-11-21 ENCOUNTER — TELEPHONE (OUTPATIENT)
Dept: FAMILY MEDICINE CLINIC | Age: 69
End: 2019-11-21

## 2019-11-22 ENCOUNTER — NURSE ONLY (OUTPATIENT)
Dept: FAMILY MEDICINE CLINIC | Age: 69
End: 2019-11-22
Payer: MEDICARE

## 2019-11-22 DIAGNOSIS — Z23 NEED FOR INFLUENZA VACCINATION: Primary | ICD-10-CM

## 2019-11-22 PROCEDURE — G0008 ADMIN INFLUENZA VIRUS VAC: HCPCS | Performed by: FAMILY MEDICINE

## 2019-11-22 PROCEDURE — 90662 IIV NO PRSV INCREASED AG IM: CPT | Performed by: FAMILY MEDICINE

## 2019-12-13 ENCOUNTER — OFFICE VISIT (OUTPATIENT)
Dept: CARDIOLOGY CLINIC | Age: 69
End: 2019-12-13
Payer: MEDICARE

## 2019-12-13 VITALS
DIASTOLIC BLOOD PRESSURE: 70 MMHG | WEIGHT: 150 LBS | SYSTOLIC BLOOD PRESSURE: 130 MMHG | BODY MASS INDEX: 27.44 KG/M2 | HEART RATE: 72 BPM

## 2019-12-13 DIAGNOSIS — I50.22 CHRONIC SYSTOLIC CONGESTIVE HEART FAILURE (HCC): Primary | ICD-10-CM

## 2019-12-13 DIAGNOSIS — E78.5 HYPERLIPIDEMIA, UNSPECIFIED HYPERLIPIDEMIA TYPE: ICD-10-CM

## 2019-12-13 DIAGNOSIS — I42.0 DILATED CARDIOMYOPATHY (HCC): ICD-10-CM

## 2019-12-13 PROCEDURE — G8417 CALC BMI ABV UP PARAM F/U: HCPCS | Performed by: INTERNAL MEDICINE

## 2019-12-13 PROCEDURE — 1123F ACP DISCUSS/DSCN MKR DOCD: CPT | Performed by: INTERNAL MEDICINE

## 2019-12-13 PROCEDURE — 99214 OFFICE O/P EST MOD 30 MIN: CPT | Performed by: INTERNAL MEDICINE

## 2019-12-13 PROCEDURE — 3017F COLORECTAL CA SCREEN DOC REV: CPT | Performed by: INTERNAL MEDICINE

## 2019-12-13 PROCEDURE — G8399 PT W/DXA RESULTS DOCUMENT: HCPCS | Performed by: INTERNAL MEDICINE

## 2019-12-13 PROCEDURE — 1036F TOBACCO NON-USER: CPT | Performed by: INTERNAL MEDICINE

## 2019-12-13 PROCEDURE — 4040F PNEUMOC VAC/ADMIN/RCVD: CPT | Performed by: INTERNAL MEDICINE

## 2019-12-13 PROCEDURE — G8482 FLU IMMUNIZE ORDER/ADMIN: HCPCS | Performed by: INTERNAL MEDICINE

## 2019-12-13 PROCEDURE — G8427 DOCREV CUR MEDS BY ELIG CLIN: HCPCS | Performed by: INTERNAL MEDICINE

## 2019-12-13 PROCEDURE — 1090F PRES/ABSN URINE INCON ASSESS: CPT | Performed by: INTERNAL MEDICINE

## 2019-12-13 ASSESSMENT — ENCOUNTER SYMPTOMS
CHOKING: 0
CHEST TIGHTNESS: 0
COUGH: 0
SHORTNESS OF BREATH: 0

## 2019-12-16 RX ORDER — METOPROLOL SUCCINATE 25 MG/1
TABLET, EXTENDED RELEASE ORAL
Qty: 90 TABLET | Refills: 3 | Status: SHIPPED | OUTPATIENT
Start: 2019-12-16 | End: 2020-12-14

## 2020-01-01 NOTE — ED NOTES
Home Med List is complete. Source of medications in list is pt interview and review of recent fill history.     Patient states she took her am meds today.      Please note:  1. Removed from pt's med list: azithromycin 250 mg, prednisone 10 mg (rx called into pharmacy today, patient did not ), milk of mag, pantoprazole 40 mg  2.  Added to pt's med list: loperamide    Halie Waite  PharmD Candidate 2019  12/10/2018 7:51 PM
No

## 2020-02-03 RX ORDER — LISINOPRIL 2.5 MG/1
2.5 TABLET ORAL EVERY EVENING
Qty: 90 TABLET | Refills: 3 | Status: SHIPPED | OUTPATIENT
Start: 2020-02-03 | End: 2021-02-09

## 2020-04-22 ENCOUNTER — NURSE ONLY (OUTPATIENT)
Dept: RHEUMATOLOGY | Age: 70
End: 2020-04-22
Payer: MEDICARE

## 2020-04-22 ENCOUNTER — VIRTUAL VISIT (OUTPATIENT)
Dept: RHEUMATOLOGY | Age: 70
End: 2020-04-22
Payer: MEDICARE

## 2020-04-22 PROCEDURE — 99214 OFFICE O/P EST MOD 30 MIN: CPT | Performed by: INTERNAL MEDICINE

## 2020-04-22 PROCEDURE — 1090F PRES/ABSN URINE INCON ASSESS: CPT | Performed by: INTERNAL MEDICINE

## 2020-04-22 PROCEDURE — 4040F PNEUMOC VAC/ADMIN/RCVD: CPT | Performed by: INTERNAL MEDICINE

## 2020-04-22 PROCEDURE — 96372 THER/PROPH/DIAG INJ SC/IM: CPT | Performed by: INTERNAL MEDICINE

## 2020-04-22 PROCEDURE — 3017F COLORECTAL CA SCREEN DOC REV: CPT | Performed by: INTERNAL MEDICINE

## 2020-04-22 PROCEDURE — G8399 PT W/DXA RESULTS DOCUMENT: HCPCS | Performed by: INTERNAL MEDICINE

## 2020-04-22 PROCEDURE — G8428 CUR MEDS NOT DOCUMENT: HCPCS | Performed by: INTERNAL MEDICINE

## 2020-04-22 PROCEDURE — 1123F ACP DISCUSS/DSCN MKR DOCD: CPT | Performed by: INTERNAL MEDICINE

## 2020-04-22 NOTE — PROGRESS NOTES
Affect [] No Hallucinations        [] Abnormal-     Other pertinent observable physical exam findings-     ASSESSMENT/PLAN:  Braden Adkins was seen today for follow-up. Diagnoses and all orders for this visit:    Age-related osteoporosis with current pathological fracture with routine healing, subsequent encounter    High risk medication use    Chronic midline low back pain without sciatica     Senile asked osteoporosis with multiple vertebral fractures, unable to afford Forteo, therefore is on Prolia. This is her second injection, will be due for next injection in October 23, 2020. Continue calcium and vitamin D. She was advised to call us couple of weeks prior to her next appointment for BMP check. Okay to take Tylenol arthritis as needed for intercurrent arthralgias. Return in about 6 months (around 10/22/2020). Tiana Law is a 78 y.o. female being evaluated by a Virtual Visit (video visit) encounter to address concerns as mentioned above. A caregiver was present when appropriate. Due to this being a TeleHealth encounter ([facility]), evaluation of the following organ systems was limited: Vitals/Constitutional/EENT/Resp/CV/GI//MS/Neuro/Skin/Heme-Lymph-Imm. Pursuant to the emergency declaration under the [facility] [address] authority and the [employer] Act, this Virtual Visit was conducted with patient's (and/or legal guardian's) consent, to reduce the patient's risk of exposure to COVID-19 and provide necessary medical care. The patient (and/or legal guardian) has also been advised to contact this office for worsening conditions or problems, and seek emergency medical treatment and/or call 911 if deemed necessary. Services were provided through a video synchronous discussion virtually to substitute for in-person clinic visit.  Patient and provider were located at their

## 2020-06-01 ENCOUNTER — TELEPHONE (OUTPATIENT)
Dept: PULMONOLOGY | Age: 70
End: 2020-06-01

## 2020-06-09 ENCOUNTER — HOSPITAL ENCOUNTER (OUTPATIENT)
Dept: CT IMAGING | Age: 70
Discharge: HOME | End: 2020-06-09
Payer: MEDICARE

## 2020-06-09 PROCEDURE — G0297 LDCT FOR LUNG CA SCREEN: HCPCS

## 2020-06-10 ENCOUNTER — TELEPHONE (OUTPATIENT)
Dept: CASE MANAGEMENT | Age: 70
End: 2020-06-10

## 2020-06-10 NOTE — TELEPHONE ENCOUNTER
Baseline lung screen on 06/09/2020. LRAD2. Recommended screen in one year. Patient scheduled for upcoming appointment with Dr. Ricco Rodriguez 06/11/2020-results will be reviewed at this appointment.     William THOMPSONN, RN  [facility]  Lung Nodule Navigator  [phone]

## 2020-06-11 ENCOUNTER — OFFICE VISIT (OUTPATIENT)
Dept: PULMONOLOGY | Age: 70
End: 2020-06-11
Payer: MEDICARE

## 2020-06-11 VITALS — BODY MASS INDEX: 25.97 KG/M2 | TEMPERATURE: 98.1 F | WEIGHT: 142 LBS

## 2020-06-11 PROCEDURE — 1123F ACP DISCUSS/DSCN MKR DOCD: CPT | Performed by: INTERNAL MEDICINE

## 2020-06-11 PROCEDURE — G8417 CALC BMI ABV UP PARAM F/U: HCPCS | Performed by: INTERNAL MEDICINE

## 2020-06-11 PROCEDURE — G8399 PT W/DXA RESULTS DOCUMENT: HCPCS | Performed by: INTERNAL MEDICINE

## 2020-06-11 PROCEDURE — G8926 SPIRO NO PERF OR DOC: HCPCS | Performed by: INTERNAL MEDICINE

## 2020-06-11 PROCEDURE — 99214 OFFICE O/P EST MOD 30 MIN: CPT | Performed by: INTERNAL MEDICINE

## 2020-06-11 PROCEDURE — 3023F SPIROM DOC REV: CPT | Performed by: INTERNAL MEDICINE

## 2020-06-11 PROCEDURE — 1090F PRES/ABSN URINE INCON ASSESS: CPT | Performed by: INTERNAL MEDICINE

## 2020-06-11 PROCEDURE — 4040F PNEUMOC VAC/ADMIN/RCVD: CPT | Performed by: INTERNAL MEDICINE

## 2020-06-11 PROCEDURE — G8428 CUR MEDS NOT DOCUMENT: HCPCS | Performed by: INTERNAL MEDICINE

## 2020-06-11 PROCEDURE — 3017F COLORECTAL CA SCREEN DOC REV: CPT | Performed by: INTERNAL MEDICINE

## 2020-06-11 PROCEDURE — 1036F TOBACCO NON-USER: CPT | Performed by: INTERNAL MEDICINE

## 2020-06-11 NOTE — PROGRESS NOTES
[facility] Pulmonary [facility] Critical Care    Outpatient Follow Up Note    Subjective:   CHIEF COMPLAINT / HPI:     The patient is 78 y.o. female who presents today for COPD and pulmonary nodules. Dali Li continues to do well on anoro and flovent diskus. She's been home since the pandemic started and off work. She's been doing a lot of gardening when the weather allows and she's lost 8 lbs semi-on purpose. Previous visit:Carol is doing better on the anoro, flovent diskus combo and seldom has to use her rescue inhaler. She does limit her activity on some days and is also limited by back pain. She's on muscle relaxers and recently had an MRI that showed acute compression fractures in her lumbar spine. She's wearing a back brace. Previous history: patient had a follow-up CT scan done May 17 and was essentially found to have more vertebral fractures she was admitted for repair. She's run out of her Spiriva and her Breo several weeks ago and has not needed her rescue inhaler. She has also weaned off of supplemental oxygen. She is scheduled for surgery to remove her thyroid mass in a few weeks. Patient said she felt great on the Anoro and rarely needed her rescue inhaler, and even would forget the anoro from time to time and feel ok. Until September 29th when she had her thyroid removed. 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 Since then she's extremely dyspneic trying to walk short distances and she has to be careful how she swallows or she aspirates. She's having botox injected on the 17th to try to alleviate some of the issues and may need reconstruction. Since the surgery she doesn't feel much benefit from the anoro and has a nonproductive cough and increased dyspnea with exertion, talking and laying down. She's started using her albuterol HFA up to 8 times per day. She says she feels her cough improve and is able to breath better 5-10 minutes after use.   She no calf tenderness. Pulses are present bilaterally. NEUROLOGIC:  Mental Status Exam:  Level of Alertness:   awake  Orientation:   person, place, time. SKIN:  normal skin color, texture, turgor, no redness, warmth, or swelling     DATA:      Radiology Review:  Pertinent images / reports were reviewed as a part of this visit. CT chest reveals the following:  Impression:        1. Acute on chronic compression fracture at T11 as described. 2. Stable compression fractures and vertebroplasty elsewhere in   the thoracic spine as described. 3. Patchy airspace disease with linear opacities and nodular   opacities in the lingula and left lower lobe. These findings could   relate to atelectasis however early pneumonia is not excluded. 4. Stable indeterminate left upper lobe nodule with central   calcification. Followup chest CT in 3 months recommended. 5. Centrilobular emphysema in the upper lobes. 6. Large substernal right thyroid nodule unchanged in comparison   to previous exam.         August 2017:  Impression:        1. Stable indeterminate nodule left upper lobe measuring up to 12   mm with small central calcification. The nodule remains   indeterminate. At least 2 years of surveillance are recommended   within next CT in 6 months. The nodule would be large enough to   evaluate with PET CT if clinically warranted. 2. Large right thyroid nodule unchanged. 3. Improved lower lung patchy airspace disease with minimal   residual scarring or subsegmental atelectasis. 4. Stable compression fractures with no new compression fracture   identified. January 2018:  No evidence of tracheobronchomalacia.       Centrilobular nodular branching opacities in the lower lobes and   right middle lobe consistent with bronchiolitis. Bronchial wall   thickening is also noted as well as secretions within subsegmental   bronchi.  This is all likely representative of an infectious   process.       Multiple stable nodules since

## 2020-06-12 ENCOUNTER — OFFICE VISIT (OUTPATIENT)
Dept: CARDIOLOGY CLINIC | Age: 70
End: 2020-06-12
Payer: MEDICARE

## 2020-06-12 VITALS
TEMPERATURE: 96.2 F | SYSTOLIC BLOOD PRESSURE: 118 MMHG | HEART RATE: 66 BPM | WEIGHT: 142 LBS | DIASTOLIC BLOOD PRESSURE: 8 MMHG | BODY MASS INDEX: 25.97 KG/M2

## 2020-06-12 PROCEDURE — 99214 OFFICE O/P EST MOD 30 MIN: CPT | Performed by: INTERNAL MEDICINE

## 2020-06-12 PROCEDURE — 1090F PRES/ABSN URINE INCON ASSESS: CPT | Performed by: INTERNAL MEDICINE

## 2020-06-12 PROCEDURE — G8399 PT W/DXA RESULTS DOCUMENT: HCPCS | Performed by: INTERNAL MEDICINE

## 2020-06-12 PROCEDURE — 4040F PNEUMOC VAC/ADMIN/RCVD: CPT | Performed by: INTERNAL MEDICINE

## 2020-06-12 PROCEDURE — G8427 DOCREV CUR MEDS BY ELIG CLIN: HCPCS | Performed by: INTERNAL MEDICINE

## 2020-06-12 PROCEDURE — 3017F COLORECTAL CA SCREEN DOC REV: CPT | Performed by: INTERNAL MEDICINE

## 2020-06-12 PROCEDURE — G8417 CALC BMI ABV UP PARAM F/U: HCPCS | Performed by: INTERNAL MEDICINE

## 2020-06-12 PROCEDURE — 1036F TOBACCO NON-USER: CPT | Performed by: INTERNAL MEDICINE

## 2020-06-12 PROCEDURE — 1123F ACP DISCUSS/DSCN MKR DOCD: CPT | Performed by: INTERNAL MEDICINE

## 2020-06-12 ASSESSMENT — ENCOUNTER SYMPTOMS
SHORTNESS OF BREATH: 0
CHEST TIGHTNESS: 0
CHOKING: 0
COUGH: 0

## 2020-06-12 NOTE — PROGRESS NOTES
Subjective:      Patient ID: Jorgito Castro is a 78 y.o. female. Congestive Heart Failure   Pertinent negatives include no chest pain, fatigue, palpitations or shortness of breath. Here for follow up CHF/cardiomyopathy. Breathing stable. No chest pain. No edema. No pnd or orthopnea. Wt stable. Active including walking and gardening. Still watching granddaughter. COPD stable. Past Medical History:   Diagnosis Date    Back pain     Chronic:under care of spine specialist:Dr. Adames    Cervical cancer screening     Nml per pt'    Compression fracture of thoracic vertebrae, non-traumatic ([facility] [address]     under care of endo:Dr. Mohini Weller    COPD (chronic obstructive pulmonary disease) ([facility] [address]     under care of pulmo:Dr. Smita Kirkland COPD exacerbation ([facility] [address] 2017    Elevated LDL cholesterol level     GERD (gastroesophageal reflux disease)     History of mammogram ;17    Nml per pt'. 17=negative.     Hoarseness of voice 10/4/2017    Lung nodule:left 2017     1.2 cm indeterminate left upper lobe pulmonary nodule:under care of pulmo:Dr. Jovita Azul    Osteomyelitis of left leg ([facility] [address]     Osteoporosis     under care of endo:Dr. Sarahi Plasencia S/P colonoscopy     Polyps:next in [id number]    Systolic CHF, chronic (HCC) EF 45% 2018    Thyroid mass     under care of endo & surgeon(Dr. Kesha Rojo)    Thyroid nodule     under care of endo:Dr. Mohini Weller     Past Surgical History:   Procedure Laterality Date    APPENDECTOMY       SECTION      FIXATION KYPHOPLASTY  2017    Dr. Roderick Brown  2017    thoracic kyphoplasty     Social History     Socioeconomic History    Marital status:      Spouse name: Not on file    Number of children: Not on file    Years of education: Not on file    Highest education level: Not on file   Occupational History    Occupation: Retired:[employer] [address] [employer]

## 2020-09-24 ENCOUNTER — TELEPHONE (OUTPATIENT)
Dept: FAMILY MEDICINE CLINIC | Age: 70
End: 2020-09-24

## 2020-09-24 NOTE — TELEPHONE ENCOUNTER
----- Message from Michael Tovar sent at 9/24/2020 12:25 PM EDT -----  Subject: Message to Provider    QUESTIONS  Information for Provider? patient is requesting the Flu shot please call   patient to schedule   schedule with her  same day and time   ---------------------------------------------------------------------------  --------------  CALL BACK INFO  What is the best way for the office to contact you? OK to leave message on   voicemail  Preferred Call Back Phone Number? [phone]  ---------------------------------------------------------------------------  --------------  SCRIPT ANSWERS  Relationship to Patient?  Self

## 2020-09-24 NOTE — TELEPHONE ENCOUNTER
Last seen 10/2019:is due for appt:ok to schedule via VV & may obtain flu vaccine at local pharmacy today. In-person is an option if preferred.

## 2020-09-29 ENCOUNTER — NURSE ONLY (OUTPATIENT)
Dept: FAMILY MEDICINE CLINIC | Age: 70
End: 2020-09-29
Payer: MEDICARE

## 2020-09-29 PROCEDURE — G0008 ADMIN INFLUENZA VIRUS VAC: HCPCS | Performed by: FAMILY MEDICINE

## 2020-09-29 PROCEDURE — 90653 IIV ADJUVANT VACCINE IM: CPT | Performed by: FAMILY MEDICINE

## 2020-09-29 NOTE — PROGRESS NOTES
Vaccine Information Sheet, \"Influenza - Inactivated\"  given to Chana Santana, or parent/legal guardian of  Chana Santana and verbalized understanding. Patient responses:    Have you ever had a reaction to a flu vaccine? No  Do you have any current illness? No  Have you ever had Guillian [place] Syndrome? No  Do you have a serious allergy to any of the follow: Neomycin, Polymyxin, Thimerosal, eggs or egg products? No    Flu vaccine given per order. Please see immunization tab. Risks and benefits explained. Current VIS given.       Immunizations Administered     Name Date Dose Route    Influenza, Triv, inactivated, subunit, adjuvanted, IM (Fluad 65 yrs and older) 9/29/2020 0.5 mL Intramuscular    Site: Deltoid- Left    Lot: [id number]    [address]: [phone]

## 2020-10-23 ENCOUNTER — VIRTUAL VISIT (OUTPATIENT)
Dept: RHEUMATOLOGY | Age: 70
End: 2020-10-23
Payer: MEDICARE

## 2020-10-23 LAB
ANION GAP SERPL CALCULATED.3IONS-SCNC: 11 MMOL/L (ref 3–16)
BUN BLDV-MCNC: 6 MG/DL (ref 7–20)
CALCIUM SERPL-MCNC: 9.6 MG/DL (ref 8.3–10.6)
CHLORIDE BLD-SCNC: 97 MMOL/L (ref 99–110)
CO2: 28 MMOL/L (ref 21–32)
CREAT SERPL-MCNC: 0.6 MG/DL (ref 0.6–1.2)
GFR AFRICAN AMERICAN: >60
GFR NON-AFRICAN AMERICAN: >60
GLUCOSE BLD-MCNC: 97 MG/DL (ref 70–99)
POTASSIUM SERPL-SCNC: 5.2 MMOL/L (ref 3.5–5.1)
SODIUM BLD-SCNC: 136 MMOL/L (ref 136–145)

## 2020-10-23 PROCEDURE — 3017F COLORECTAL CA SCREEN DOC REV: CPT | Performed by: INTERNAL MEDICINE

## 2020-10-23 PROCEDURE — G8482 FLU IMMUNIZE ORDER/ADMIN: HCPCS | Performed by: INTERNAL MEDICINE

## 2020-10-23 PROCEDURE — G8417 CALC BMI ABV UP PARAM F/U: HCPCS | Performed by: INTERNAL MEDICINE

## 2020-10-23 PROCEDURE — G8427 DOCREV CUR MEDS BY ELIG CLIN: HCPCS | Performed by: INTERNAL MEDICINE

## 2020-10-23 PROCEDURE — 1036F TOBACCO NON-USER: CPT | Performed by: INTERNAL MEDICINE

## 2020-10-23 PROCEDURE — 1123F ACP DISCUSS/DSCN MKR DOCD: CPT | Performed by: INTERNAL MEDICINE

## 2020-10-23 PROCEDURE — 1090F PRES/ABSN URINE INCON ASSESS: CPT | Performed by: INTERNAL MEDICINE

## 2020-10-23 PROCEDURE — 99214 OFFICE O/P EST MOD 30 MIN: CPT | Performed by: INTERNAL MEDICINE

## 2020-10-23 PROCEDURE — G8399 PT W/DXA RESULTS DOCUMENT: HCPCS | Performed by: INTERNAL MEDICINE

## 2020-10-23 PROCEDURE — 4040F PNEUMOC VAC/ADMIN/RCVD: CPT | Performed by: INTERNAL MEDICINE

## 2020-10-23 NOTE — PROGRESS NOTES
Jerilyn Palacios MD   Nebulizers Reita Pinna COMPACT MINI NEBULIZER) MISC 1 Device by Does not apply route daily  Leticia Yepez MD   acetaminophen (TYLENOL) 500 MG tablet Take 500 mg by mouth every 6 hours as needed for Pain   Historical Provider, MD       Social History     Tobacco Use    Smoking status: Former Smoker     Packs/day: 1.00     Years: 22.00     Pack years: 22.00     Types: Cigarettes     Last attempt to quit: 2011     Years since quittin.5    Smokeless tobacco: Never Used   Substance Use Topics    Alcohol use: Yes     Alcohol/week: 4.0 standard drinks     Types: 4 Cans of beer per week     Comment: occ    Drug use: No            PHYSICAL EXAMINATION:  [ INSTRUCTIONS:  \"[x]\" Indicates a positive item  \"[]\" Indicates a negative item  -- DELETE ALL ITEMS NOT EXAMINED]  Vital Signs: (As obtained by patient/caregiver or practitioner observation)    Blood pressure-  Heart rate-    Respiratory rate-    Temperature-  Pulse oximetry-     Constitutional: [x] Appears well-developed and well-nourished [x] No apparent distress      [] Abnormal-   Mental status  [x] Alert and awake  [x] Oriented to person/place/time [x]Able to follow commands      Eyes:  EOM    []  Normal  [] Abnormal-  Sclera  []  Normal  [] Abnormal -         Discharge []  None visible  [] Abnormal -    HENT:   [] Normocephalic, atraumatic.   [] Abnormal   [] Mouth/Throat: Mucous membranes are moist.     External Ears [] Normal  [] Abnormal-     Neck: [] No visualized mass     Pulmonary/Chest: [] Respiratory effort normal.  [] No visualized signs of difficulty breathing or respiratory distress        [] Abnormal-      Musculoskeletal:   [x] Normal gait with no signs of ataxia         [x] Normal range of motion of neck        [] Abnormal-nothing significant to be noted in VS exam.      Neurological:        [] No Facial Asymmetry (Cranial nerve 7 motor function) (limited exam to video visit)          [] No gaze palsy        [] Abnormal-         Skin: substitute for in-person clinic visit. Patient and provider were located at their individual homes. --Eugenio Morin MD on 10/23/2020 at 1:23 PM    An electronic signature was used to authenticate this note.

## 2020-10-28 ENCOUNTER — NURSE ONLY (OUTPATIENT)
Dept: RHEUMATOLOGY | Age: 70
End: 2020-10-28
Payer: MEDICARE

## 2020-10-28 VITALS — TEMPERATURE: 98.1 F | WEIGHT: 142 LBS | HEIGHT: 62 IN | BODY MASS INDEX: 26.13 KG/M2

## 2020-10-28 PROCEDURE — 96372 THER/PROPH/DIAG INJ SC/IM: CPT | Performed by: INTERNAL MEDICINE

## 2020-10-29 ENCOUNTER — TELEPHONE (OUTPATIENT)
Dept: PULMONOLOGY | Age: 70
End: 2020-10-29

## 2020-10-29 RX ORDER — AZITHROMYCIN 250 MG/1
TABLET, FILM COATED ORAL
Qty: 6 TABLET | Refills: 0 | Status: SHIPPED | OUTPATIENT
Start: 2020-10-29 | End: 2020-11-05 | Stop reason: DRUGHIGH

## 2020-10-29 RX ORDER — PREDNISONE 10 MG/1
TABLET ORAL
Qty: 20 TABLET | Refills: 0 | Status: SHIPPED | OUTPATIENT
Start: 2020-10-29 | End: 2020-11-05 | Stop reason: DRUGHIGH

## 2020-10-29 NOTE — TELEPHONE ENCOUNTER
Her  was just sick recently too. I sent her a zpack and a burst of prednisone    Orders Placed This Encounter   Medications    predniSONE (DELTASONE) 10 MG tablet     Sig: Take 4 tablets by mouth for 5 days. Dispense:  20 tablet     Refill:  0    azithromycin (ZITHROMAX) 250 MG tablet     Sig: Take 500 mg by mouth the first day then 250 mg for the remaining four days     Dispense:  6 tablet     Refill:  0         Diagnosis Orders   1.  COPD exacerbation (HCC)  azithromycin (ZITHROMAX) 250 MG tablet

## 2020-10-29 NOTE — TELEPHONE ENCOUNTER
Sxs: deep cough, difficulty sleeping at night. Would like a z-pack for symptoms     Cough? yes          Productive?  yes    Color of Mucus? Dark color                 SOB? Only at night           Nasal Congestion? no                          Runny Nose/Color? No     Sore Throat? No          Fever? No          Last recorded temp? 97.1  OTC Meds Tried? No     Taking routine pulmonary meds?  Yes     Pharmacy: [address]      Callback #: [phone]

## 2020-11-05 ENCOUNTER — OFFICE VISIT (OUTPATIENT)
Dept: PULMONOLOGY | Age: 70
End: 2020-11-05
Payer: MEDICARE

## 2020-11-05 VITALS
WEIGHT: 140 LBS | BODY MASS INDEX: 25.76 KG/M2 | HEIGHT: 62 IN | TEMPERATURE: 97.1 F | OXYGEN SATURATION: 94 % | HEART RATE: 81 BPM | RESPIRATION RATE: 16 BRPM

## 2020-11-05 PROCEDURE — 1036F TOBACCO NON-USER: CPT | Performed by: INTERNAL MEDICINE

## 2020-11-05 PROCEDURE — 3023F SPIROM DOC REV: CPT | Performed by: INTERNAL MEDICINE

## 2020-11-05 PROCEDURE — 3017F COLORECTAL CA SCREEN DOC REV: CPT | Performed by: INTERNAL MEDICINE

## 2020-11-05 PROCEDURE — G8926 SPIRO NO PERF OR DOC: HCPCS | Performed by: INTERNAL MEDICINE

## 2020-11-05 PROCEDURE — 1090F PRES/ABSN URINE INCON ASSESS: CPT | Performed by: INTERNAL MEDICINE

## 2020-11-05 PROCEDURE — G8417 CALC BMI ABV UP PARAM F/U: HCPCS | Performed by: INTERNAL MEDICINE

## 2020-11-05 PROCEDURE — 1123F ACP DISCUSS/DSCN MKR DOCD: CPT | Performed by: INTERNAL MEDICINE

## 2020-11-05 PROCEDURE — G8427 DOCREV CUR MEDS BY ELIG CLIN: HCPCS | Performed by: INTERNAL MEDICINE

## 2020-11-05 PROCEDURE — 4040F PNEUMOC VAC/ADMIN/RCVD: CPT | Performed by: INTERNAL MEDICINE

## 2020-11-05 PROCEDURE — G8399 PT W/DXA RESULTS DOCUMENT: HCPCS | Performed by: INTERNAL MEDICINE

## 2020-11-05 PROCEDURE — G8482 FLU IMMUNIZE ORDER/ADMIN: HCPCS | Performed by: INTERNAL MEDICINE

## 2020-11-05 PROCEDURE — 99213 OFFICE O/P EST LOW 20 MIN: CPT | Performed by: INTERNAL MEDICINE

## 2020-11-05 NOTE — PROGRESS NOTES
[facility] Pulmonary and Critical Care    Outpatient Follow Up Note    Subjective:   CHIEF COMPLAINT / HPI:     The patient is 78 y.o. female who presents today for COPD and pulmonary nodules. Nicolas Benitez continues to do well on anoro and flovent diskus. She's been home since the pandemic started and off work. She's been doing a lot of gardening when the weather allows and she's lost 8 lbs semi-on purpose. Her  recently had a flare and needed steroids and antibiotic about a week later she had similar symptoms so she got a burst of prednisone as well which she said fixed it pretty quickly. She is feeling much better and off steroids for over a week now. Previous visit:Carol is doing better on the anoro, flovent diskus combo and seldom has to use her rescue inhaler. She does limit her activity on some days and is also limited by back pain. She's on muscle relaxers and recently had an MRI that showed acute compression fractures in her lumbar spine. She's wearing a back brace. Previous history: patient had a follow-up CT scan done May 17 and was essentially found to have more vertebral fractures she was admitted for repair. She's run out of her Spiriva and her Breo several weeks ago and has not needed her rescue inhaler. She has also weaned off of supplemental oxygen. She is scheduled for surgery to remove her thyroid mass in a few weeks. Patient said she felt great on the Anoro and rarely needed her rescue inhaler, and even would forget the anoro from time to time and feel ok. Until September 29th when she had her thyroid removed. 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 Since then she's extremely dyspneic trying to walk short distances and she has to be careful how she swallows or she aspirates. She's having botox injected on the 17th to try to alleviate some of the issues and may need reconstruction.   Since the surgery she doesn't feel much benefit from NEEDED FOR WHEEZING 18 g 11    lisinopril (PRINIVIL;ZESTRIL) 2.5 MG tablet TAKE 1 TABLET BY MOUTH EVERY EVENING 90 tablet 3    metoprolol succinate (TOPROL XL) 25 MG extended release tablet TAKE 1/2 TABLET BY MOUTH TWICE DAILY 90 tablet 3    fluticasone propionate (FLOVENT) 250 MCG/BLIST AEPB inhaler Inhale 2 puffs into the lungs daily 60 each 11    methocarbamol (ROBAXIN) 750 MG tablet TK 1 T PO Q 6 HOURS PRN  0    albuterol (PROVENTIL) (2.5 MG/3ML) 0.083% nebulizer solution Take 2.5 mg by nebulization every 6 hours as needed for Wheezing       melatonin 3 MG TABS tablet Take 3 mg by mouth nightly as needed      aspirin 81 MG chewable tablet Take 1 tablet by mouth daily 30 tablet 3    Nebulizers (AIRIAL COMPACT MINI NEBULIZER) MISC 1 Device by Does not apply route daily 1 each 0    acetaminophen (TYLENOL) 500 MG tablet Take 500 mg by mouth every 6 hours as needed for Pain       predniSONE (DELTASONE) 10 MG tablet Take 4 tablets by mouth for 5 days. (Patient not taking: Reported on 11/5/2020) 20 tablet 0    azithromycin (ZITHROMAX) 250 MG tablet Take 500 mg by mouth the first day then 250 mg for the remaining four days (Patient not taking: Reported on 11/5/2020) 6 tablet 0    predniSONE (DELTASONE) 10 MG tablet Take 4 tablets by mouth for 5 days. (Patient not taking: Reported on 11/5/2020) 20 tablet 0     No current facility-administered medications on file prior to visit.         REVIEW OF SYSTEMS:    CONSTITUTIONAL: Negative for fevers and chills  HEENT: Negative for oropharyngeal exudate, post nasal drip, sinus pain / pressure, nasal congestion, ear pain  RESPIRATORY:  See HPI  CARDIOVASCULAR: Negative for chest pain, palpitations, edema  GASTROINTESTINAL: Negative for nausea, vomiting, diarrhea, constipation and abdominal pain  HEMATOLOGICAL: Negative for adenopathy  SKIN: Negative for clubbing, cyanosis, skin lesions  EXTREMITIES: Negative for weakness, decreased ROM  NEUROLOGICAL: Negative for unilateral weakness, speech or gait abnormalities  PSYCH: Negative for anxiety, depression    Objective:   PHYSICAL EXAM:        VITALS:  Pulse 81   Temp 97.1 °F (36.2 °C)   Resp 16   Ht 5' 2\" (1.575 m)   Wt 140 lb (63.5 kg)   SpO2 94%   Breastfeeding No   BMI 25.61 kg/m²     CONSTITUTIONAL:  Awake, alert, cooperative, no apparent distress, and appears stated age  HEENT: No oropharyngeal exudate, PERRL, no cervical adenopathy, no tracheal deviation, thyroid absent  LUNGS:  Speaking in full sentences, Faint upper airway and lung zone wheezes, no rales or ronchi. CARDIOVASCULAR:  normal S1 and S2 and no JVD  ABDOMEN:  Normal bowel sounds, non-distended and non-tender to palpation  EXT: No edema, no calf tenderness. Pulses are present bilaterally. NEUROLOGIC:  Mental Status Exam:  Level of Alertness:   awake  Orientation:   person, place, time. SKIN:  normal skin color, texture, turgor, no redness, warmth, or swelling     DATA:      Radiology Review:  Pertinent images / reports were reviewed as a part of this visit. CT chest reveals the following:  Impression:        1. Acute on chronic compression fracture at T11 as described. 2. Stable compression fractures and vertebroplasty elsewhere in   the thoracic spine as described. 3. Patchy airspace disease with linear opacities and nodular   opacities in the lingula and left lower lobe. These findings could   relate to atelectasis however early pneumonia is not excluded. 4. Stable indeterminate left upper lobe nodule with central   calcification. Followup chest CT in 3 months recommended. 5. Centrilobular emphysema in the upper lobes. 6. Large substernal right thyroid nodule unchanged in comparison   to previous exam.         August 2017:  Impression:        1. Stable indeterminate nodule left upper lobe measuring up to 12   mm with small central calcification. The nodule remains   indeterminate.  At least 2 years of surveillance are recommended   within next CT in 6 months. The nodule would be large enough to   evaluate with PET CT if clinically warranted. 2. Large right thyroid nodule unchanged. 3. Improved lower lung patchy airspace disease with minimal   residual scarring or subsegmental atelectasis. 4. Stable compression fractures with no new compression fracture   identified. January 2018:  No evidence of tracheobronchomalacia.       Centrilobular nodular branching opacities in the lower lobes and   right middle lobe consistent with bronchiolitis. Bronchial wall   thickening is also noted as well as secretions within subsegmental   bronchi. This is all likely representative of an infectious   process.       Multiple stable nodules since 4/3/2017. Findings are most   consistent with a benign process. Following the Fleischner   criteria the patient should BE examined with chest CT at 24 months   from the original examination and April 2019. Following the   Fleischner criteria for pulmonary nodule greater than 8 mm: If   patient is at low risk for lung cancer, recommend follow up CT at   around 3, 9, and 24 mo, dynamic contrast enhanced CT, PET, and/or   biopsy.  If patient is at high risk for lung cancer, same as for   low-risk patient. Last PFTs:   CONCLUSION:  This is a patient who has a baseline severe obstructive lung  defect but with positive bronchodilator response to the moderate range,  evidence of hyperinflation and air trapping and moderately decreased diffusing  capacity. These findings are most consistent with a diagnosis of COPD,  longterm asthma with fixed obstructive defect could also fit this this scenario. 1/2018: IMPRESSION:  1. Moderate obstructive defect. 2.  There is no significant response to bronchodilators. 3.  Air trapping and hyperinflation is present. 4.  Moderate decrease in diffusion capacity. Assessment:    This is a 78 y.o. female with COPD and pulmonary nodules    Plan:   Failed trelegy because of exacerbations and cost. Doing better on anoro and flovent. Unclear as to why since they are equivalent regimens. Consider a maintenance macrolide or daliresp if we lose COPD control    I would like her to pulmonary rehab but she is remaining very active and would like to go back to work when the pandemic is over. Had her screening CT 2 days ago and it was stable. Resume annual screening next June. The patient is not currently smoking. More than half the time of this 22 minute visit was spent counseling the patient. RTC 6 months w/ MD. Call or RTC sooner if symptoms persist or worsen acutely.       Kari Cuadra

## 2020-11-13 ENCOUNTER — TELEPHONE (OUTPATIENT)
Dept: FAMILY MEDICINE CLINIC | Age: 70
End: 2020-11-13

## 2020-11-13 NOTE — TELEPHONE ENCOUNTER
Pt's  would like to know if pt should be taking potassium. He states no one prescribed this for her, just something she has been taking for years with a multivitamin.

## 2020-11-13 NOTE — TELEPHONE ENCOUNTER
Pt's  was asking specifically about pt. Per Dr. Get Clay: pt should stop taking potassium and rechecked blood test since it was elevated at 5.2 on 10/23/2020 ordered by Dr. Eddie Chaudhry. Pt's  informed.  She will call back to schedule for blood work   Close Encounter

## 2020-11-23 ENCOUNTER — TELEPHONE (OUTPATIENT)
Dept: PULMONOLOGY | Age: 70
End: 2020-11-23

## 2020-11-23 RX ORDER — PREDNISONE 10 MG/1
TABLET ORAL
Qty: 20 TABLET | Refills: 0 | Status: SHIPPED | OUTPATIENT
Start: 2020-11-23 | End: 2021-07-12 | Stop reason: DRUGHIGH

## 2020-11-23 NOTE — TELEPHONE ENCOUNTER
Patient called asking if there is anything you could give her for her cough. She has a deep cough that has kept her up for 2 nights. She said the last time this happened you sent in a zpack & burst of prednisone. She is wondering if you could send that again to [place].     Thank you

## 2020-11-23 NOTE — TELEPHONE ENCOUNTER
Patient aware of prednisone being sent to pharmacy. She verbalized understanding as to why we were not ordering antibiotics as of yet.

## 2020-11-23 NOTE — TELEPHONE ENCOUNTER
I wrote for the prednisone. I don't want to keep prescribing antibiotics if I can help it, so they'll be more useful later when it's more clear they are needed. Orders Placed This Encounter   Medications    predniSONE (DELTASONE) 10 MG tablet     Sig: Take 4 tablets by mouth for 5 days.      Dispense:  20 tablet     Refill:  0

## 2020-12-03 ENCOUNTER — TELEPHONE (OUTPATIENT)
Dept: PULMONOLOGY | Age: 70
End: 2020-12-03

## 2020-12-03 NOTE — TELEPHONE ENCOUNTER
Please advise patient is calling and would like to know since she have not used her oxygen if she could get an order to d/c it or do you feel she need to keep it.

## 2020-12-04 ENCOUNTER — OFFICE VISIT (OUTPATIENT)
Dept: CARDIOLOGY CLINIC | Age: 70
End: 2020-12-04
Payer: MEDICARE

## 2020-12-04 VITALS
TEMPERATURE: 96.8 F | BODY MASS INDEX: 25.79 KG/M2 | WEIGHT: 141 LBS | DIASTOLIC BLOOD PRESSURE: 80 MMHG | HEART RATE: 68 BPM | SYSTOLIC BLOOD PRESSURE: 120 MMHG

## 2020-12-04 PROCEDURE — 1123F ACP DISCUSS/DSCN MKR DOCD: CPT | Performed by: INTERNAL MEDICINE

## 2020-12-04 PROCEDURE — G8399 PT W/DXA RESULTS DOCUMENT: HCPCS | Performed by: INTERNAL MEDICINE

## 2020-12-04 PROCEDURE — G8417 CALC BMI ABV UP PARAM F/U: HCPCS | Performed by: INTERNAL MEDICINE

## 2020-12-04 PROCEDURE — 4040F PNEUMOC VAC/ADMIN/RCVD: CPT | Performed by: INTERNAL MEDICINE

## 2020-12-04 PROCEDURE — 99214 OFFICE O/P EST MOD 30 MIN: CPT | Performed by: INTERNAL MEDICINE

## 2020-12-04 PROCEDURE — G8482 FLU IMMUNIZE ORDER/ADMIN: HCPCS | Performed by: INTERNAL MEDICINE

## 2020-12-04 PROCEDURE — 3017F COLORECTAL CA SCREEN DOC REV: CPT | Performed by: INTERNAL MEDICINE

## 2020-12-04 PROCEDURE — 1090F PRES/ABSN URINE INCON ASSESS: CPT | Performed by: INTERNAL MEDICINE

## 2020-12-04 PROCEDURE — 1036F TOBACCO NON-USER: CPT | Performed by: INTERNAL MEDICINE

## 2020-12-04 PROCEDURE — G8428 CUR MEDS NOT DOCUMENT: HCPCS | Performed by: INTERNAL MEDICINE

## 2020-12-04 ASSESSMENT — ENCOUNTER SYMPTOMS
SHORTNESS OF BREATH: 0
COUGH: 0
CHOKING: 0
CHEST TIGHTNESS: 0

## 2020-12-04 NOTE — PROGRESS NOTES
Subjective:      Patient ID: Staci oMnae is a 78 y.o. female. Congestive Heart Failure   Pertinent negatives include no chest pain, fatigue, palpitations or shortness of breath. Here for follow up CHF/cardiomyopathy. Breathing stable. No chest pain. No edema. No pnd or orthopnea. Wt stable. Active including walking. New grandbaby. Still watching granddaughter. COPD stable. Past Medical History:   Diagnosis Date    Back pain     Chronic:under care of spine specialist:Dr. Adames    Cervical cancer screening     Nml per pt'    Compression fracture of thoracic vertebrae, non-traumatic ([facility] [address]     under care of endo:Dr. Beatrice Bell    COPD (chronic obstructive pulmonary disease) ([facility] [address]     under care of pulmo:Dr. Radha Au COPD exacerbation ([facility] [address] 2017    Elevated LDL cholesterol level     GERD (gastroesophageal reflux disease)     History of mammogram ;17    Nml per pt'. 17=negative.     Hoarseness of voice 10/4/2017    Lung nodule:left 2017     1.2 cm indeterminate left upper lobe pulmonary nodule:under care of pulmo:Dr. Ladan Cates    Osteomyelitis of left leg ([facility] [address]     Osteoporosis     under care of endo:Dr. Jazzmine Genao S/P colonoscopy     Polyps:next in [id number]=[id number]    Systolic CHF, chronic (HCC) EF 45% 2018    Thyroid mass     under care of endo & surgeon(Dr. Edgar De La Cruz)    Thyroid nodule     under care of endo:Dr. Beatrice Bell     Past Surgical History:   Procedure Laterality Date    APPENDECTOMY       SECTION      FIXATION KYPHOPLASTY  2017    Dr. April Bernstein  2017    thoracic kyphoplasty     Social History     Socioeconomic History    Marital status:      Spouse name: Not on file    Number of children: Not on file    Years of education: Not on file    Highest education level: Not on file   Occupational History    Occupation: Retired:[employer] strain: Not on file    Food insecurity     Worry: Not on file     Inability: Not on file    Transportation needs     Medical: Not on file     Non-medical: Not on file   Tobacco Use    Smoking status: Former Smoker     Packs/day: 1.00     Years: 22.00     Pack years: 22.00     Types: Cigarettes     Last attempt to quit: 2011     Years since quittin.6    Smokeless tobacco: Never Used   Substance and Sexual Activity    Alcohol use: Yes     Alcohol/week: 4.0 standard drinks     Types: 4 Cans of beer per week     Comment: occ    Drug use: No    Sexual activity: Not on file   Lifestyle    Physical activity     Days per week: Not on file     Minutes per session: Not on file    Stress: Not on file   Relationships    Social connections     Talks on phone: Not on file     Gets together: Not on file     Attends [employer]ism service: Not on file     Active member of club or organization: Not on file     Attends meetings of clubs or organizations: Not on file     Relationship status: Not on file    Intimate partner violence     Fear of current or ex partner: Not on file     Emotionally abused: Not on file     Physically abused: Not on file     Forced sexual activity: Not on file   Other Topics Concern    Not on file   Social History Narrative    Not on file     FH reviewed, denies FH cardiac issues    Vitals:    20 1014   BP: 120/80   Pulse: 68   Temp: 96.8 °F (36 °C)     Wt 141    Review of Systems   Constitutional: Negative for activity change, appetite change and fatigue. Respiratory: Negative for cough, choking, chest tightness and shortness of breath. Cardiovascular: Negative for chest pain, palpitations and leg swelling. Denies PND or orthopnea. No tachycardia or syncope. Neurological: Negative for dizziness, syncope and light-headedness. Psychiatric/Behavioral: Negative for agitation, behavioral problems and confusion.    All other systems reviewed and are negative. Objective:   Physical Exam   Constitutional: She is oriented to person, place, and time. She appears well-developed and well-nourished. No distress. HENT:   Head: Normocephalic and atraumatic. Eyes: Conjunctivae and EOM are normal. Right eye exhibits no discharge. Left eye exhibits no discharge. Neck: Normal range of motion. No JVD present. Cardiovascular: Normal rate, regular rhythm, S1 normal, S2 normal and normal heart sounds. Exam reveals no gallop. No murmur heard. Pulses:       Radial pulses are 2+ on the right side and 2+ on the left side. Pulmonary/Chest: Effort normal and breath sounds normal. No respiratory distress. She has no wheezes. She has no rales. Abdominal: Soft. Bowel sounds are normal. There is no abdominal tenderness. Musculoskeletal: Normal range of motion. General: No edema. Neurological: She is alert and oriented to person, place, and time. Skin: Skin is warm and dry. Psychiatric: She has a normal mood and affect. Her behavior is normal. Thought content normal.       Assessment:       Diagnosis Orders   1. Chronic systolic congestive heart failure ([address]     2. Dilated cardiomyopathy ([address]     3. Hyperlipidemia, unspecified hyperlipidemia type             Plan:      CV  stable. Remains stable. CHF stable. BP good. Appears compensated. Reviewed previous records and testing including cath 5/18 and echo 10/18. No changes. Continue to monitor. Follow up 6 months. Consider echo from next visit again due to covid.         Sonia Cisneros MD

## 2020-12-14 RX ORDER — METOPROLOL SUCCINATE 25 MG/1
TABLET, EXTENDED RELEASE ORAL
Qty: 90 TABLET | Refills: 1 | Status: SHIPPED | OUTPATIENT
Start: 2020-12-14 | End: 2021-06-15

## 2020-12-29 ENCOUNTER — TELEPHONE (OUTPATIENT)
Dept: PULMONOLOGY | Age: 70
End: 2020-12-29

## 2020-12-29 RX ORDER — PREDNISONE 10 MG/1
TABLET ORAL
Qty: 20 TABLET | Refills: 0 | Status: SHIPPED | OUTPATIENT
Start: 2020-12-29 | End: 2021-07-12 | Stop reason: DRUGHIGH

## 2020-12-29 NOTE — TELEPHONE ENCOUNTER
Sxs: productive cough, worsens at night, no sore throat , no fever, slight chest congestion and runny nose  Thinks a round of prednisone  will help     Cough? no          Productive? yes     Color of Mucus?  none               SOB? yes         Nasal Congestion? no                         Runny Nose/Color? no    Sore Throat? no         Fever? on         Last recorded temp? OTC Meds Tried? None     Taking routine pulmonary meds?  Using inhaler and nebulizer     Pharmacy: [facility] [address] #: [phone]

## 2020-12-29 NOTE — TELEPHONE ENCOUNTER
Happy to oblige. I hope it helps. Orders Placed This Encounter   Medications    predniSONE (DELTASONE) 10 MG tablet     Sig: Take 4 tablets by mouth for 5 days.      Dispense:  20 tablet     Refill:  0

## 2021-01-22 ENCOUNTER — TELEPHONE (OUTPATIENT)
Dept: PULMONOLOGY | Age: 71
End: 2021-01-22

## 2021-01-22 RX ORDER — ALBUTEROL SULFATE 1.25 MG/3ML
1 SOLUTION RESPIRATORY (INHALATION) EVERY 4 HOURS PRN
Qty: 180 VIAL | Refills: 3 | Status: SHIPPED | OUTPATIENT
Start: 2021-01-22

## 2021-01-22 RX ORDER — PREDNISONE 10 MG/1
TABLET ORAL
Qty: 20 TABLET | Refills: 0 | Status: SHIPPED | OUTPATIENT
Start: 2021-01-22 | End: 2021-07-12 | Stop reason: DRUGHIGH

## 2021-01-22 NOTE — TELEPHONE ENCOUNTER
Sxs: SOB and coughing with clear phlegm for 3-4 days. Cough keeping her up at night. Would like prednisone for symptoms and a refill on albuterol neb solution. Current rx has      Cough? yes         Productive?  yes    Color of Mucus? clear                SOB? yes           Nasal Congestion? nonw                          Runny Nose/Color? none    Sore Throat? Only during coughing spell          Fever? none         Last recorded temp? OTC Meds Tried? benadryl    Taking routine pulmonary meds?  Albuterol neb 3x a day     Pharmacy: [facility] [address] #: [phone]

## 2021-01-29 ENCOUNTER — OFFICE VISIT (OUTPATIENT)
Dept: PRIMARY CARE CLINIC | Age: 71
End: 2021-01-29
Payer: MEDICARE

## 2021-01-29 DIAGNOSIS — Z20.822 SUSPECTED COVID-19 VIRUS INFECTION: Primary | ICD-10-CM

## 2021-01-29 PROCEDURE — G8417 CALC BMI ABV UP PARAM F/U: HCPCS | Performed by: NURSE PRACTITIONER

## 2021-01-29 PROCEDURE — G8428 CUR MEDS NOT DOCUMENT: HCPCS | Performed by: NURSE PRACTITIONER

## 2021-01-29 PROCEDURE — 99211 OFF/OP EST MAY X REQ PHY/QHP: CPT | Performed by: NURSE PRACTITIONER

## 2021-01-30 LAB — SARS-COV-2, NAA: NOT DETECTED

## 2021-02-06 DIAGNOSIS — I42.8 NONISCHEMIC CARDIOMYOPATHY (HCC): ICD-10-CM

## 2021-02-06 DIAGNOSIS — I95.2 HYPOTENSION DUE TO DRUGS: ICD-10-CM

## 2021-02-06 DIAGNOSIS — I50.22 SYSTOLIC CHF, CHRONIC (HCC): ICD-10-CM

## 2021-02-08 NOTE — TELEPHONE ENCOUNTER
Requested Prescriptions     Pending Prescriptions Disp Refills    lisinopril (PRINIVIL;ZESTRIL) 2.5 MG tablet [Pharmacy Med Name: LISINOPRIL 2.5MG TABLETS] 90 tablet 3     Sig: TAKE 1 TABLET BY MOUTH EVERY EVENING          Last Office Visit: 12/4/20    Next Office Visit: 6/17/21

## 2021-02-09 RX ORDER — LISINOPRIL 2.5 MG/1
2.5 TABLET ORAL EVERY EVENING
Qty: 90 TABLET | Refills: 3 | Status: SHIPPED | OUTPATIENT
Start: 2021-02-09 | End: 2022-02-14

## 2021-02-12 ENCOUNTER — TELEPHONE (OUTPATIENT)
Dept: PULMONOLOGY | Age: 71
End: 2021-02-12

## 2021-02-12 DIAGNOSIS — J44.1 COPD EXACERBATION (HCC): Primary | ICD-10-CM

## 2021-02-12 RX ORDER — AZITHROMYCIN 250 MG/1
TABLET, FILM COATED ORAL
Qty: 6 TABLET | Refills: 0 | Status: SHIPPED | OUTPATIENT
Start: 2021-02-12 | End: 2021-07-19

## 2021-02-12 RX ORDER — PREDNISONE 10 MG/1
TABLET ORAL
Qty: 22 TABLET | Refills: 0 | Status: SHIPPED | OUTPATIENT
Start: 2021-02-12 | End: 2021-02-22

## 2021-02-12 NOTE — TELEPHONE ENCOUNTER
Sxs: patient has complaints of  sob and a runny nose which she has taken benadryl for. Cough? yes         Productive?  no    Color of Mucus?   none             SOB? Yes           Nasal Congestion? no                     Runny Nose/Color? yes    Sore Throat? no         Fever? No         Last recorded temp? OTC Meds Tried? benadryl    Taking routine pulmonary meds?  yes    Pharmacy: [facility] #: [phone]

## 2021-02-12 NOTE — TELEPHONE ENCOUNTER
Sounds like a COPD exacerbation. Orders Placed This Encounter   Medications    azithromycin (ZITHROMAX) 250 MG tablet     Sig: Take 500 mg by mouth the first day then 250 mg for the remaining four days     Dispense:  6 tablet     Refill:  0    predniSONE (DELTASONE) 10 MG tablet     Si tabs for 3 days, then 2 tabs for 3 days, then 1 tab for 4 days     Dispense:  22 tablet     Refill:  0         Diagnosis Orders   1.  COPD exacerbation (HCC)  azithromycin (ZITHROMAX) 250 MG tablet

## 2021-03-18 ENCOUNTER — TELEPHONE (OUTPATIENT)
Dept: PULMONOLOGY | Age: 71
End: 2021-03-18

## 2021-03-18 DIAGNOSIS — J44.1 COPD EXACERBATION (HCC): Primary | ICD-10-CM

## 2021-03-18 RX ORDER — PREDNISONE 10 MG/1
TABLET ORAL
Qty: 20 TABLET | Refills: 1 | Status: SHIPPED | OUTPATIENT
Start: 2021-03-18 | End: 2021-07-12 | Stop reason: DRUGHIGH

## 2021-03-18 RX ORDER — AZITHROMYCIN 250 MG/1
250 TABLET, FILM COATED ORAL SEE ADMIN INSTRUCTIONS
Qty: 6 TABLET | Refills: 0 | Status: SHIPPED | OUTPATIENT
Start: 2021-03-18 | End: 2021-03-23

## 2021-03-18 NOTE — TELEPHONE ENCOUNTER
Josi Caba c/o what she describes as extreme shortness of breath. Sounded short of breath on phone, but was able to speak. Coughing, but \"nothing is green\", cough keeps her up at night. No fever, n/v, or loss of taste or smell. Will get her second COVID vaccine on 03/31/2021. In her opinion, she does not need ABX, just prednisone. She can be reached at [phone]    Pharmacy is [facility] on [address]

## 2021-03-26 ENCOUNTER — TELEPHONE (OUTPATIENT)
Dept: FAMILY MEDICINE CLINIC | Age: 71
End: 2021-03-26

## 2021-03-26 NOTE — TELEPHONE ENCOUNTER
called to schedule an in person appt for patient to have a check up since it has been while since the pt has been seen.  was requesting the appt to be on the same days as his. Patient informed PCP is out of the office and will wait for MA to callback. Please advise.

## 2021-04-12 DIAGNOSIS — Z79.899 HIGH RISK MEDICATION USE: Primary | ICD-10-CM

## 2021-04-15 DIAGNOSIS — E87.5 HYPERKALEMIA: ICD-10-CM

## 2021-04-15 LAB
ANION GAP SERPL CALCULATED.3IONS-SCNC: 12 MMOL/L (ref 3–16)
BUN BLDV-MCNC: 9 MG/DL (ref 7–20)
CALCIUM SERPL-MCNC: 8.9 MG/DL (ref 8.3–10.6)
CHLORIDE BLD-SCNC: 95 MMOL/L (ref 99–110)
CO2: 27 MMOL/L (ref 21–32)
CREAT SERPL-MCNC: 0.7 MG/DL (ref 0.6–1.2)
GFR AFRICAN AMERICAN: >60
GFR NON-AFRICAN AMERICAN: >60
GLUCOSE BLD-MCNC: 111 MG/DL (ref 70–99)
POTASSIUM SERPL-SCNC: 4.5 MMOL/L (ref 3.5–5.1)
SODIUM BLD-SCNC: 134 MMOL/L (ref 136–145)

## 2021-04-29 ENCOUNTER — OFFICE VISIT (OUTPATIENT)
Dept: RHEUMATOLOGY | Age: 71
End: 2021-04-29
Payer: MEDICARE

## 2021-04-29 VITALS
SYSTOLIC BLOOD PRESSURE: 120 MMHG | BODY MASS INDEX: 25.95 KG/M2 | HEIGHT: 62 IN | DIASTOLIC BLOOD PRESSURE: 76 MMHG | WEIGHT: 141 LBS

## 2021-04-29 DIAGNOSIS — Z79.899 HIGH RISK MEDICATION USE: ICD-10-CM

## 2021-04-29 DIAGNOSIS — M80.00XA AGE-RELATED OSTEOPOROSIS WITH CURRENT PATHOLOGICAL FRACTURE, INITIAL ENCOUNTER: Primary | ICD-10-CM

## 2021-04-29 DIAGNOSIS — M65.30 TRIGGER FINGER, UNSPECIFIED FINGER, UNSPECIFIED LATERALITY: ICD-10-CM

## 2021-04-29 PROCEDURE — G8417 CALC BMI ABV UP PARAM F/U: HCPCS | Performed by: INTERNAL MEDICINE

## 2021-04-29 PROCEDURE — 1123F ACP DISCUSS/DSCN MKR DOCD: CPT | Performed by: INTERNAL MEDICINE

## 2021-04-29 PROCEDURE — 99214 OFFICE O/P EST MOD 30 MIN: CPT | Performed by: INTERNAL MEDICINE

## 2021-04-29 PROCEDURE — 4040F PNEUMOC VAC/ADMIN/RCVD: CPT | Performed by: INTERNAL MEDICINE

## 2021-04-29 PROCEDURE — 1090F PRES/ABSN URINE INCON ASSESS: CPT | Performed by: INTERNAL MEDICINE

## 2021-04-29 PROCEDURE — 3017F COLORECTAL CA SCREEN DOC REV: CPT | Performed by: INTERNAL MEDICINE

## 2021-04-29 PROCEDURE — G8427 DOCREV CUR MEDS BY ELIG CLIN: HCPCS | Performed by: INTERNAL MEDICINE

## 2021-04-29 PROCEDURE — 96372 THER/PROPH/DIAG INJ SC/IM: CPT | Performed by: INTERNAL MEDICINE

## 2021-04-29 PROCEDURE — 1036F TOBACCO NON-USER: CPT | Performed by: INTERNAL MEDICINE

## 2021-04-29 PROCEDURE — G8399 PT W/DXA RESULTS DOCUMENT: HCPCS | Performed by: INTERNAL MEDICINE

## 2021-04-29 NOTE — PROGRESS NOTES
Inhale 2 puffs into the lungs daily  Yola Benton MD   methocarbamol (ROBAXIN) 750 MG tablet TK 1 T PO Q 6 HOURS PRN  Historical Provider, MD   albuterol (PROVENTIL) (2.5 MG/3ML) 0.083% nebulizer solution Take 2.5 mg by nebulization every 6 hours as needed for Wheezing   Historical Provider, MD   melatonin 3 MG TABS tablet Take 3 mg by mouth nightly as needed  Historical Provider, MD   aspirin 81 MG chewable tablet Take 1 tablet by mouth daily  Last Rivera MD   Nebulizers (AIRIAL COMPACT MINI NEBULIZER) MISC 1 Device by Does not apply route daily  Last Rivera MD   acetaminophen (TYLENOL) 500 MG tablet Take 500 mg by mouth every 6 hours as needed for Pain   Historical Provider, MD                PHYSICAL EXAMINATION:  [ INSTRUCTIONS:  \"[x]\" Indicates a positive item  \"[]\" Indicates a negative item  -- DELETE ALL ITEMS NOT EXAMINED]  Vital Signs: (As obtained by patient/caregiver or practitioner observation)    Blood pressure-  Heart rate-    Respiratory rate-    Temperature-  Pulse oximetry-     Constitutional: [x] Appears well-developed and well-nourished [x] No apparent distress      [] Abnormal-   Mental status  [x] Alert and awake  [x] Oriented to person/place/time [x]Able to follow commands      Eyes:  EOM    []  Normal  [] Abnormal-  Sclera  []  Normal  [] Abnormal -         Discharge []  None visible  [] Abnormal -    HENT:   [] Normocephalic, atraumatic. [] Abnormal   [] Mouth/Throat: Mucous membranes are moist.     External Ears [] Normal  [] Abnormal-     Neck: [] No visualized mass     Pulmonary/Chest: [x] Respiratory effort normal.  [x] No visualized signs of difficulty breathing or respiratory distress        [] Abnormal-      Musculoskeletal:   [x] Normal gait with no signs of ataxia         [] Normal range of motion of neck        [] Abnormal    No active triggering noted in her fingers at this time.   I do not appreciate any focally tender, swollen or inflamed joints in upper and lower signature was used to authenticate this note.

## 2021-05-13 ENCOUNTER — OFFICE VISIT (OUTPATIENT)
Dept: PULMONOLOGY | Age: 71
End: 2021-05-13
Payer: MEDICARE

## 2021-05-13 VITALS
WEIGHT: 139.2 LBS | RESPIRATION RATE: 18 BRPM | SYSTOLIC BLOOD PRESSURE: 90 MMHG | HEIGHT: 63 IN | OXYGEN SATURATION: 93 % | TEMPERATURE: 96.8 F | DIASTOLIC BLOOD PRESSURE: 64 MMHG | BODY MASS INDEX: 24.66 KG/M2 | HEART RATE: 95 BPM

## 2021-05-13 DIAGNOSIS — Z72.0 TOBACCO ABUSE DISORDER: Primary | ICD-10-CM

## 2021-05-13 DIAGNOSIS — J43.8 OTHER EMPHYSEMA (HCC): ICD-10-CM

## 2021-05-13 PROCEDURE — G8926 SPIRO NO PERF OR DOC: HCPCS | Performed by: INTERNAL MEDICINE

## 2021-05-13 PROCEDURE — 1036F TOBACCO NON-USER: CPT | Performed by: INTERNAL MEDICINE

## 2021-05-13 PROCEDURE — G8420 CALC BMI NORM PARAMETERS: HCPCS | Performed by: INTERNAL MEDICINE

## 2021-05-13 PROCEDURE — 3017F COLORECTAL CA SCREEN DOC REV: CPT | Performed by: INTERNAL MEDICINE

## 2021-05-13 PROCEDURE — 3023F SPIROM DOC REV: CPT | Performed by: INTERNAL MEDICINE

## 2021-05-13 PROCEDURE — 1123F ACP DISCUSS/DSCN MKR DOCD: CPT | Performed by: INTERNAL MEDICINE

## 2021-05-13 PROCEDURE — 99212 OFFICE O/P EST SF 10 MIN: CPT | Performed by: INTERNAL MEDICINE

## 2021-05-13 PROCEDURE — G8427 DOCREV CUR MEDS BY ELIG CLIN: HCPCS | Performed by: INTERNAL MEDICINE

## 2021-05-13 PROCEDURE — G8399 PT W/DXA RESULTS DOCUMENT: HCPCS | Performed by: INTERNAL MEDICINE

## 2021-05-13 PROCEDURE — 1090F PRES/ABSN URINE INCON ASSESS: CPT | Performed by: INTERNAL MEDICINE

## 2021-05-13 PROCEDURE — 4040F PNEUMOC VAC/ADMIN/RCVD: CPT | Performed by: INTERNAL MEDICINE

## 2021-05-13 NOTE — PROGRESS NOTES
On license of [facility]    Outpatient Follow Up Note    Subjective:   CHIEF COMPLAINT / HPI:     The patient is 73 y.o. female who presents today for COPD and pulmonary nodules. Justo Caro previously had been on Trelegy and said that it was a struggle for her but I think she may have been rationing her doses because she used some of the friends and said it worked really well for her. Instead of had her on Anoro and Flovent. Cost of medication is a barrier for she and her  to him who I saw earlier today. Overall she is doing pretty well, just having some issues with seasonal allergies. Are trying to figure out the issue with cost of medications. Previous visit: Justo Caro continues to do well on anoro and flovent diskus. She's been home since the pandemic started and off work. She's been doing a lot of gardening when the weather allows and she's lost 8 lbs semi-on purpose. Her  recently had a flare and needed steroids and antibiotic about a week later she had similar symptoms so she got a burst of prednisone as well which she said fixed it pretty quickly. She is feeling much better and off steroids for over a week now. Previous visit:Carol is doing better on the anoro, flovent diskus combo and seldom has to use her rescue inhaler. She does limit her activity on some days and is also limited by back pain. She's on muscle relaxers and recently had an MRI that showed acute compression fractures in her lumbar spine. She's wearing a back brace. Previous history: patient had a follow-up CT scan done May 17 and was essentially found to have more vertebral fractures she was admitted for repair. She's run out of her Spiriva and her Breo several weeks ago and has not needed her rescue inhaler. She has also weaned off of supplemental oxygen. She is scheduled for surgery to remove her thyroid mass in a few weeks.  Patient said she felt great on the Anoro and rarely needed her rescue inhaler, and even would forget the anoro from time to time and feel ok. Until September 29th when she had her thyroid removed. 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 Since then she's extremely dyspneic trying to walk short distances and she has to be careful how she swallows or she aspirates. She's having botox injected on the 17th to try to alleviate some of the issues and may need reconstruction. Since the surgery she doesn't feel much benefit from the anoro and has a nonproductive cough and increased dyspnea with exertion, talking and laying down. She's started using her albuterol HFA up to 8 times per day. She says she feels her cough improve and is able to breath better 5-10 minutes after use. She was all set to do pulmonary rehab until this happened, and has deferred as a result. Past Medical History:    Past Medical History:   Diagnosis Date    Back pain     Chronic:under care of spine specialist:Dr. Adames    Cervical cancer screening 2010    Nml per pt'    Compression fracture of thoracic vertebrae, non-traumatic ([address]     under care of endo:Dr. Leopold Pane    COPD (chronic obstructive pulmonary disease) [facility]     under care of pulmo:Dr. Tana Kay COPD exacerbation ([address] 4/14/2017    Elevated LDL cholesterol level     GERD (gastroesophageal reflux disease)     History of mammogram 2012;5/17/17    Nml per pt'. 5/17/17=negative.     Hoarseness of voice 10/4/2017    Lung nodule:left 05/04/2017     1.2 cm indeterminate left upper lobe pulmonary nodule:under care of pulmo:Dr. Yuri Pedersen    Osteomyelitis of left leg ([address]     Osteoporosis     under care of endo:Dr. Adrian Oenil S/P colonoscopy 2011    Polyps:next in [id number]    Systolic CHF, chronic ([address] EF 45% 5/25/2018    Thyroid mass     under care of endo & surgeon(Dr. Yancy Jorge)    Thyroid nodule     under care of endo:Dr. Leopold Pane       Social History:    Social History     Tobacco Use Smoking Status Former Smoker    Packs/day: 1.00    Years: 22.00    Pack years: 22.00    Types: Cigarettes    Quit date: 4/4/2011    Years since quitting: 10.1   Smokeless Tobacco Never Used       Current Medications:  Current Outpatient Medications on File Prior to Visit   Medication Sig Dispense Refill    predniSONE (DELTASONE) 10 MG tablet Take 4 tablets by mouth for 5 days. 20 tablet 1    azithromycin (ZITHROMAX) 250 MG tablet Take 500 mg by mouth the first day then 250 mg for the remaining four days 6 tablet 0    lisinopril (PRINIVIL;ZESTRIL) 2.5 MG tablet TAKE 1 TABLET BY MOUTH EVERY EVENING 90 tablet 3    VENTOLIN  (90 Base) MCG/ACT inhaler INHALE 2 PUFFS INTO THE LUNGS EVERY 6 HOURS AS NEEDED FOR WHEEZING 18 g 11    albuterol (ACCUNEB) 1.25 MG/3ML nebulizer solution Inhale 3 mLs into the lungs every 4 hours as needed for Wheezing or Shortness of Breath 180 vial 3    predniSONE (DELTASONE) 10 MG tablet Take 4 tablets by mouth for 5 days. 20 tablet 0    predniSONE (DELTASONE) 10 MG tablet Take 4 tablets by mouth for 5 days. 20 tablet 0    metoprolol succinate (TOPROL XL) 25 MG extended release tablet TAKE 1/2 TABLET BY MOUTH TWICE DAILY 90 tablet 1    predniSONE (DELTASONE) 10 MG tablet Take 4 tablets by mouth for 5 days.  20 tablet 0    ANORO ELLIPTA 62.5-25 MCG/INH AEPB inhaler INHALE 1 PUFF INTO THE LUNGS DAILY 60 each 6    fluticasone propionate (FLOVENT) 250 MCG/BLIST AEPB inhaler Inhale 2 puffs into the lungs daily 60 each 11    methocarbamol (ROBAXIN) 750 MG tablet TK 1 T PO Q 6 HOURS PRN  0    albuterol (PROVENTIL) (2.5 MG/3ML) 0.083% nebulizer solution Take 2.5 mg by nebulization every 6 hours as needed for Wheezing       melatonin 3 MG TABS tablet Take 3 mg by mouth nightly as needed      aspirin 81 MG chewable tablet Take 1 tablet by mouth daily 30 tablet 3    Nebulizers (AIRIAL COMPACT MINI NEBULIZER) MISC 1 Device by Does not apply route daily 1 each 0    acetaminophen (TYLENOL) 500 MG tablet Take 500 mg by mouth every 6 hours as needed for Pain        No current facility-administered medications on file prior to visit. REVIEW OF SYSTEMS:    CONSTITUTIONAL: Negative for fevers and chills  HEENT: Negative for oropharyngeal exudate, post nasal drip, sinus pain / pressure, nasal congestion, ear pain  RESPIRATORY:  See HPI  CARDIOVASCULAR: Negative for chest pain, palpitations, edema  GASTROINTESTINAL: Negative for nausea, vomiting, diarrhea, constipation and abdominal pain  HEMATOLOGICAL: Negative for adenopathy  SKIN: Negative for clubbing, cyanosis, skin lesions  EXTREMITIES: Negative for weakness, decreased ROM  NEUROLOGICAL: Negative for unilateral weakness, speech or gait abnormalities  PSYCH: Negative for anxiety, depression    Objective:   PHYSICAL EXAM:        VITALS:  BP 90/64 (Site: Right Upper Arm, Position: Sitting, Cuff Size: Medium Adult)   Pulse 95   Temp 96.8 °F (36 °C) (Infrared)   Resp 18   Ht 5' 3\" (1.6 m)   Wt 139 lb 3.2 oz (63.1 kg)   SpO2 93%   Breastfeeding No   BMI 24.66 kg/m²     CONSTITUTIONAL:  Awake, alert, cooperative, no apparent distress, and appears stated age  HEENT: No oropharyngeal exudate, PERRL, no cervical adenopathy, no tracheal deviation, thyroid absent  LUNGS:  Speaking in full sentences, Faint upper airway and lung zone wheezes, no rales or ronchi. CARDIOVASCULAR:  normal S1 and S2 and no JVD  ABDOMEN:  Normal bowel sounds, non-distended and non-tender to palpation  EXT: No edema, no calf tenderness. Pulses are present bilaterally. NEUROLOGIC:  Mental Status Exam:  Level of Alertness:   awake  Orientation:   person, place, time. SKIN:  normal skin color, texture, turgor, no redness, warmth, or swelling     DATA:      Radiology Review:  Pertinent images / reports were reviewed as a part of this visit. CT chest reveals the following:  Impression:        1.  Acute on chronic compression fracture at T11 as described. 2. Stable compression fractures and vertebroplasty elsewhere in   the thoracic spine as described. 3. Patchy airspace disease with linear opacities and nodular   opacities in the lingula and left lower lobe. These findings could   relate to atelectasis however early pneumonia is not excluded. 4. Stable indeterminate left upper lobe nodule with central   calcification. Followup chest CT in 3 months recommended. 5. Centrilobular emphysema in the upper lobes. 6. Large substernal right thyroid nodule unchanged in comparison   to previous exam.         August 2017:  Impression:        1. Stable indeterminate nodule left upper lobe measuring up to 12   mm with small central calcification. The nodule remains   indeterminate. At least 2 years of surveillance are recommended   within next CT in 6 months. The nodule would be large enough to   evaluate with PET CT if clinically warranted. 2. Large right thyroid nodule unchanged. 3. Improved lower lung patchy airspace disease with minimal   residual scarring or subsegmental atelectasis. 4. Stable compression fractures with no new compression fracture   identified. January 2018:  No evidence of tracheobronchomalacia.       Centrilobular nodular branching opacities in the lower lobes and   right middle lobe consistent with bronchiolitis. Bronchial wall   thickening is also noted as well as secretions within subsegmental   bronchi. This is all likely representative of an infectious   process.       Multiple stable nodules since 4/3/2017. Findings are most   consistent with a benign process. Following the Fleischner   criteria the patient should BE examined with chest CT at 24 months   from the original examination and April 2019. Following the   Fleischner criteria for pulmonary nodule greater than 8 mm: If   patient is at low risk for lung cancer, recommend follow up CT at   around 3, 9, and 24 mo, dynamic contrast enhanced CT, PET, and/or   biopsy.  If patient is at high risk for lung cancer, same as for   low-risk patient. Last PFTs:   CONCLUSION:  This is a patient who has a baseline severe obstructive lung  defect but with positive bronchodilator response to the moderate range,  evidence of hyperinflation and air trapping and moderately decreased diffusing  capacity. These findings are most consistent with a diagnosis of COPD,  longterm asthma with fixed obstructive defect could also fit this this scenario. 1/2018: IMPRESSION:  1. Moderate obstructive defect. 2.  There is no significant response to bronchodilators. 3.  Air trapping and hyperinflation is present. 4.  Moderate decrease in diffusion capacity. Assessment: This is a 73 y.o. female with COPD and pulmonary nodules    Plan:   COPD is stable on anoro and flovent. She would like to go back to Trelegy because it was more convenient and she felt that it worked fine. She has not had any flares in a while some still going to hold off on a maintenance macrolide or daliresp. I would like her to pulmonary rehab but she is remaining very active and she is deciding whether or not to go back to work when the pandemic is over. She is due for screening CT next month      Diagnosis Orders   1. Tobacco abuse disorder  CT Lung Screen (Initial or Annual)       The patient is not currently smoking. More than half the time of this 22 minute visit was spent counseling the patient. RTC 6 months w/ MD. Call or RTC sooner if symptoms persist or worsen acutely.       Flor Shah

## 2021-06-07 DIAGNOSIS — J43.8 OTHER EMPHYSEMA (HCC): ICD-10-CM

## 2021-06-07 RX ORDER — UMECLIDINIUM BROMIDE AND VILANTEROL TRIFENATATE 62.5; 25 UG/1; UG/1
POWDER RESPIRATORY (INHALATION)
Qty: 60 EACH | Refills: 6 | Status: SHIPPED | OUTPATIENT
Start: 2021-06-07 | End: 2022-02-10 | Stop reason: SDUPTHER

## 2021-06-10 ENCOUNTER — TELEPHONE (OUTPATIENT)
Dept: PULMONOLOGY | Age: 71
End: 2021-06-10

## 2021-06-10 RX ORDER — PREDNISONE 20 MG/1
40 TABLET ORAL DAILY
Qty: 10 TABLET | Refills: 0 | Status: SHIPPED | OUTPATIENT
Start: 2021-06-10 | End: 2021-06-15

## 2021-06-10 NOTE — TELEPHONE ENCOUNTER
Patient of Dr. Richard Moffett. C/o head congestion X 3 - 4 days. Having difficulty clearing this by cough. When she does cough, productive of thick, greenish sputum. No fever. Is fully vaccinated. No sick contacts. No loss of taste or smell. SOB. No wheezing. Has tried nebulizer, but not helpful. Dr. Richard Moffett has given her prednisone in past, which has been helpful. No ABX have been used recently. She can be reached at [phone]. [facility] on [address]

## 2021-06-15 RX ORDER — METOPROLOL SUCCINATE 25 MG/1
TABLET, EXTENDED RELEASE ORAL
Qty: 90 TABLET | Refills: 3 | Status: SHIPPED | OUTPATIENT
Start: 2021-06-15 | End: 2022-07-06

## 2021-06-17 ENCOUNTER — OFFICE VISIT (OUTPATIENT)
Dept: CARDIOLOGY CLINIC | Age: 71
End: 2021-06-17
Payer: MEDICARE

## 2021-06-17 VITALS
HEART RATE: 76 BPM | BODY MASS INDEX: 23.91 KG/M2 | SYSTOLIC BLOOD PRESSURE: 120 MMHG | DIASTOLIC BLOOD PRESSURE: 84 MMHG | WEIGHT: 135 LBS

## 2021-06-17 DIAGNOSIS — E78.5 HYPERLIPIDEMIA, UNSPECIFIED HYPERLIPIDEMIA TYPE: ICD-10-CM

## 2021-06-17 DIAGNOSIS — I42.0 DILATED CARDIOMYOPATHY (HCC): ICD-10-CM

## 2021-06-17 DIAGNOSIS — I50.22 CHRONIC SYSTOLIC CONGESTIVE HEART FAILURE (HCC): Primary | ICD-10-CM

## 2021-06-17 PROCEDURE — G8399 PT W/DXA RESULTS DOCUMENT: HCPCS | Performed by: INTERNAL MEDICINE

## 2021-06-17 PROCEDURE — G8420 CALC BMI NORM PARAMETERS: HCPCS | Performed by: INTERNAL MEDICINE

## 2021-06-17 PROCEDURE — G8427 DOCREV CUR MEDS BY ELIG CLIN: HCPCS | Performed by: INTERNAL MEDICINE

## 2021-06-17 PROCEDURE — 1036F TOBACCO NON-USER: CPT | Performed by: INTERNAL MEDICINE

## 2021-06-17 PROCEDURE — 1090F PRES/ABSN URINE INCON ASSESS: CPT | Performed by: INTERNAL MEDICINE

## 2021-06-17 PROCEDURE — 4040F PNEUMOC VAC/ADMIN/RCVD: CPT | Performed by: INTERNAL MEDICINE

## 2021-06-17 PROCEDURE — 1123F ACP DISCUSS/DSCN MKR DOCD: CPT | Performed by: INTERNAL MEDICINE

## 2021-06-17 PROCEDURE — 99214 OFFICE O/P EST MOD 30 MIN: CPT | Performed by: INTERNAL MEDICINE

## 2021-06-17 PROCEDURE — 3017F COLORECTAL CA SCREEN DOC REV: CPT | Performed by: INTERNAL MEDICINE

## 2021-06-17 ASSESSMENT — ENCOUNTER SYMPTOMS
CHOKING: 0
CHEST TIGHTNESS: 0
COUGH: 0
SHORTNESS OF BREATH: 0

## 2021-06-17 NOTE — PROGRESS NOTES
Subjective:      Patient ID: Kennedy Shirley is a 73 y.o. female. Congestive Heart Failure  Pertinent negatives include no chest pain, fatigue, palpitations or shortness of breath. Here for follow up CHF/cardiomyopathy. Breathing stable. No chest pain. No edema. No pnd or orthopnea. Wt stable. Active including walking. Another grand child on way. Still watching granddaughter. COPD stable. Past Medical History:   Diagnosis Date    Back pain     Chronic:under care of spine specialist:Dr. Adames    Cervical cancer screening     Nml per pt'    Compression fracture of thoracic vertebrae, non-traumatic ([facility]     under care of endo:Dr. Chen Yoder    COPD (chronic obstructive pulmonary disease) ([facility]     under care of pulmo:Dr. Orestes Sotelo COPD exacerbation ([facility] 2017    Elevated LDL cholesterol level     GERD (gastroesophageal reflux disease)     History of mammogram ;17    Nml per pt'. 17=negative.     Hoarseness of voice 10/4/2017    Lung nodule:left 2017     1.2 cm indeterminate left upper lobe pulmonary nodule:under care of pulmo:Dr. Kevan Birch    Osteomyelitis of left leg ([facility]     Osteoporosis     under care of endo:Dr. Baron Silva S/P colonoscopy     Polyps:next in [id number]    Systolic CHF, chronic (HCC) EF 45% 2018    Thyroid mass     under care of endo & surgeon(Dr. Johana Bravo)    Thyroid nodule     under care of endo:Dr. Chen Yoder     Past Surgical History:   Procedure Laterality Date    APPENDECTOMY       SECTION      FIXATION KYPHOPLASTY  2017    Dr. 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 All other systems reviewed and are negative. Objective:   Physical Exam  Constitutional:       General: She is not in acute distress. Appearance: She is well-developed. HENT:      Head: Normocephalic and atraumatic. Eyes:      General:         Right eye: No discharge. Left eye: No discharge. Conjunctiva/sclera: Conjunctivae normal.   Neck:      Vascular: No JVD. Cardiovascular:      Rate and Rhythm: Normal rate and regular rhythm. Pulses:           Radial pulses are 2+ on the right side and 2+ on the left side. Heart sounds: Normal heart sounds, S1 normal and S2 normal. No murmur heard. No gallop. Pulmonary:      Effort: Pulmonary effort is normal. No respiratory distress. Breath sounds: Normal breath sounds. No wheezing or rales. Abdominal:      General: Bowel sounds are normal.      Palpations: Abdomen is soft. Tenderness: There is no abdominal tenderness. Musculoskeletal:         General: Normal range of motion. Cervical back: Normal range of motion. Skin:     General: Skin is warm and dry. Neurological:      Mental Status: She is alert and oriented to person, place, and time. Psychiatric:         Behavior: Behavior normal.         Thought Content: Thought content normal.         Assessment:       Diagnosis Orders   1. Chronic systolic congestive heart failure ([address]     2. Dilated cardiomyopathy ([address]     3. Hyperlipidemia, unspecified hyperlipidemia type             Plan:      CV  stable. Remains stable. CHF stable. BP good. Appears compensated. Reviewed previous records and testing including cath 5/18 and echo 10/18. No changes. Continue to monitor. Follow up 6 months. Echo to follow cardiomyopathy.         Sushant Burgess MD

## 2021-06-24 ENCOUNTER — HOSPITAL ENCOUNTER (OUTPATIENT)
Dept: CT IMAGING | Age: 71
Discharge: HOME | End: 2021-06-24
Payer: MEDICARE

## 2021-06-24 DIAGNOSIS — Z72.0 TOBACCO ABUSE DISORDER: ICD-10-CM

## 2021-06-24 PROCEDURE — 71271 CT THORAX LUNG CANCER SCR C-: CPT

## 2021-06-29 ENCOUNTER — TELEPHONE (OUTPATIENT)
Dept: CT IMAGING | Age: 71
End: 2021-06-29

## 2021-07-06 ENCOUNTER — TELEPHONE (OUTPATIENT)
Dept: PULMONOLOGY | Age: 71
End: 2021-07-06

## 2021-07-07 RX ORDER — ALBUTEROL SULFATE 90 UG/1
2 AEROSOL, METERED RESPIRATORY (INHALATION) EVERY 6 HOURS PRN
Qty: 1 INHALER | Refills: 5 | Status: SHIPPED | OUTPATIENT
Start: 2021-07-07 | End: 2021-09-10

## 2021-07-07 NOTE — TELEPHONE ENCOUNTER
Spoke to Shraddha and her breathing has not been improving with burst of prednisone and her voice is a lot closer and more deep so she thinks there is an issue with her vocal cord again. She is getting set up to see ear nose and throat UC again and I think that is a wise move. She also says she has in need of a new primary care doctor so I gave her a couple of names. In addition she said she needed a new Ventolin as she and her  share 1 and had run out.

## 2021-07-12 ENCOUNTER — TELEPHONE (OUTPATIENT)
Dept: PULMONOLOGY | Age: 71
End: 2021-07-12

## 2021-07-12 DIAGNOSIS — J43.8 OTHER EMPHYSEMA (HCC): Primary | ICD-10-CM

## 2021-07-12 RX ORDER — PREDNISONE 10 MG/1
TABLET ORAL
Qty: 20 TABLET | Refills: 0 | Status: SHIPPED | OUTPATIENT
Start: 2021-07-12 | End: 2021-07-19 | Stop reason: ALTCHOICE

## 2021-07-12 NOTE — TELEPHONE ENCOUNTER
Carol sounds like she's having a mild flare. She already had an rx for azithromycin that I wrote when she went out of town as a precaution. I told her to take that, and wrote for a burst of prednisone. Since she's not going back to work as planned, prior to the pandemic, I'm going to refer her to pulmonary rehab. Orders Placed This Encounter   Medications    predniSONE (DELTASONE) 10 MG tablet     Sig: Take 4 tablets by mouth for 5 days. Dispense:  20 tablet     Refill:  0         Diagnosis Orders   1.  Other emphysema ([facility]  Ambulatory referral to Pulmonary Rehab

## 2021-07-12 NOTE — TELEPHONE ENCOUNTER
Please advise patient called and states she is having some congestion that is yellow now, 5-6 days. She states that she still have some ABX(azithromycin)  that she didn't take from last time and wonders if she should take it. Or if she need both prednisone and azithromycin.

## 2021-07-19 ENCOUNTER — OFFICE VISIT (OUTPATIENT)
Dept: INTERNAL MEDICINE CLINIC | Age: 71
End: 2021-07-19
Payer: MEDICARE

## 2021-07-19 VITALS
RESPIRATION RATE: 16 BRPM | HEIGHT: 60 IN | WEIGHT: 132.4 LBS | OXYGEN SATURATION: 90 % | HEART RATE: 90 BPM | BODY MASS INDEX: 26 KG/M2 | DIASTOLIC BLOOD PRESSURE: 74 MMHG | SYSTOLIC BLOOD PRESSURE: 112 MMHG

## 2021-07-19 DIAGNOSIS — I10 ESSENTIAL HYPERTENSION: ICD-10-CM

## 2021-07-19 DIAGNOSIS — J44.1 COPD EXACERBATION (HCC): ICD-10-CM

## 2021-07-19 DIAGNOSIS — E07.89 THYROID MASS OF UNCLEAR ETIOLOGY: ICD-10-CM

## 2021-07-19 DIAGNOSIS — Z98.890 HISTORY OF THYROID SURGERY: Primary | ICD-10-CM

## 2021-07-19 DIAGNOSIS — Z12.11 SCREENING FOR COLON CANCER: ICD-10-CM

## 2021-07-19 DIAGNOSIS — H53.9 VISION CHANGES: ICD-10-CM

## 2021-07-19 DIAGNOSIS — Z13.220 SCREENING CHOLESTEROL LEVEL: ICD-10-CM

## 2021-07-19 DIAGNOSIS — I50.22 SYSTOLIC CHF, CHRONIC (HCC): ICD-10-CM

## 2021-07-19 DIAGNOSIS — M80.08XD FRACTURE OF VERTEBRA DUE TO OSTEOPOROSIS WITH ROUTINE HEALING, SUBSEQUENT ENCOUNTER: ICD-10-CM

## 2021-07-19 DIAGNOSIS — Z13.1 SCREENING FOR DIABETES MELLITUS (DM): ICD-10-CM

## 2021-07-19 DIAGNOSIS — Z12.31 ENCOUNTER FOR SCREENING MAMMOGRAM FOR MALIGNANT NEOPLASM OF BREAST: ICD-10-CM

## 2021-07-19 PROCEDURE — 4040F PNEUMOC VAC/ADMIN/RCVD: CPT | Performed by: INTERNAL MEDICINE

## 2021-07-19 PROCEDURE — 3017F COLORECTAL CA SCREEN DOC REV: CPT | Performed by: INTERNAL MEDICINE

## 2021-07-19 PROCEDURE — 99204 OFFICE O/P NEW MOD 45 MIN: CPT | Performed by: INTERNAL MEDICINE

## 2021-07-19 PROCEDURE — G8417 CALC BMI ABV UP PARAM F/U: HCPCS | Performed by: INTERNAL MEDICINE

## 2021-07-19 PROCEDURE — 3023F SPIROM DOC REV: CPT | Performed by: INTERNAL MEDICINE

## 2021-07-19 PROCEDURE — 1036F TOBACCO NON-USER: CPT | Performed by: INTERNAL MEDICINE

## 2021-07-19 PROCEDURE — G8926 SPIRO NO PERF OR DOC: HCPCS | Performed by: INTERNAL MEDICINE

## 2021-07-19 PROCEDURE — G8427 DOCREV CUR MEDS BY ELIG CLIN: HCPCS | Performed by: INTERNAL MEDICINE

## 2021-07-19 PROCEDURE — 1090F PRES/ABSN URINE INCON ASSESS: CPT | Performed by: INTERNAL MEDICINE

## 2021-07-19 PROCEDURE — G8399 PT W/DXA RESULTS DOCUMENT: HCPCS | Performed by: INTERNAL MEDICINE

## 2021-07-19 PROCEDURE — 1123F ACP DISCUSS/DSCN MKR DOCD: CPT | Performed by: INTERNAL MEDICINE

## 2021-07-19 SDOH — HEALTH STABILITY: PHYSICAL HEALTH: ON AVERAGE, HOW MANY MINUTES DO YOU ENGAGE IN EXERCISE AT THIS LEVEL?: 0 MIN

## 2021-07-19 SDOH — ECONOMIC STABILITY: TRANSPORTATION INSECURITY
IN THE PAST 12 MONTHS, HAS LACK OF TRANSPORTATION KEPT YOU FROM MEETINGS, WORK, OR FROM GETTING THINGS NEEDED FOR DAILY LIVING?: NO

## 2021-07-19 SDOH — ECONOMIC STABILITY: FOOD INSECURITY: WITHIN THE PAST 12 MONTHS, YOU WORRIED THAT YOUR FOOD WOULD RUN OUT BEFORE YOU GOT MONEY TO BUY MORE.: NEVER TRUE

## 2021-07-19 SDOH — ECONOMIC STABILITY: HOUSING INSECURITY
IN THE LAST 12 MONTHS, WAS THERE A TIME WHEN YOU DID NOT HAVE A STEADY PLACE TO SLEEP OR SLEPT IN A SHELTER (INCLUDING NOW)?: NO

## 2021-07-19 SDOH — HEALTH STABILITY: PHYSICAL HEALTH: ON AVERAGE, HOW MANY DAYS PER WEEK DO YOU ENGAGE IN MODERATE TO STRENUOUS EXERCISE (LIKE A BRISK WALK)?: 7 DAYS

## 2021-07-19 SDOH — HEALTH STABILITY: PHYSICAL HEALTH: ON AVERAGE, HOW MANY DAYS PER WEEK DO YOU ENGAGE IN MODERATE TO STRENUOUS EXERCISE (LIKE A BRISK WALK)?: 0 DAYS

## 2021-07-19 SDOH — HEALTH STABILITY: PHYSICAL HEALTH: ON AVERAGE, HOW MANY MINUTES DO YOU ENGAGE IN EXERCISE AT THIS LEVEL?: 10 MIN

## 2021-07-19 SDOH — ECONOMIC STABILITY: TRANSPORTATION INSECURITY
IN THE PAST 12 MONTHS, HAS THE LACK OF TRANSPORTATION KEPT YOU FROM MEDICAL APPOINTMENTS OR FROM GETTING MEDICATIONS?: NO

## 2021-07-19 SDOH — ECONOMIC STABILITY: FOOD INSECURITY: WITHIN THE PAST 12 MONTHS, THE FOOD YOU BOUGHT JUST DIDN'T LAST AND YOU DIDN'T HAVE MONEY TO GET MORE.: NEVER TRUE

## 2021-07-19 SDOH — ECONOMIC STABILITY: INCOME INSECURITY: IN THE LAST 12 MONTHS, WAS THERE A TIME WHEN YOU WERE NOT ABLE TO PAY THE MORTGAGE OR RENT ON TIME?: NO

## 2021-07-19 ASSESSMENT — SOCIAL DETERMINANTS OF HEALTH (SDOH)
HOW OFTEN DO YOU ATTENT MEETINGS OF THE CLUB OR ORGANIZATION YOU BELONG TO?: NEVER
WITHIN THE LAST YEAR, HAVE YOU BEEN AFRAID OF YOUR PARTNER OR EX-PARTNER?: NO
HOW HARD IS IT FOR YOU TO PAY FOR THE VERY BASICS LIKE FOOD, HOUSING, MEDICAL CARE, AND HEATING?: NOT HARD AT ALL
HOW OFTEN DO YOU ATTEND CHURCH OR RELIGIOUS SERVICES?: NEVER
WITHIN THE LAST YEAR, HAVE YOU BEEN HUMILIATED OR EMOTIONALLY ABUSED IN OTHER WAYS BY YOUR PARTNER OR EX-PARTNER?: NO
WITHIN THE LAST YEAR, HAVE TO BEEN RAPED OR FORCED TO HAVE ANY KIND OF SEXUAL ACTIVITY BY YOUR PARTNER OR EX-PARTNER?: NO
DO YOU BELONG TO ANY CLUBS OR ORGANIZATIONS SUCH AS CHURCH GROUPS UNIONS, FRATERNAL OR ATHLETIC GROUPS, OR SCHOOL GROUPS?: NO
IN A TYPICAL WEEK, HOW MANY TIMES DO YOU TALK ON THE PHONE WITH FAMILY, FRIENDS, OR NEIGHBORS?: THREE TIMES A WEEK
WITHIN THE LAST YEAR, HAVE YOU BEEN KICKED, HIT, SLAPPED, OR OTHERWISE PHYSICALLY HURT BY YOUR PARTNER OR EX-PARTNER?: NO
HOW OFTEN DO YOU GET TOGETHER WITH FRIENDS OR RELATIVES?: THREE TIMES A WEEK

## 2021-07-19 ASSESSMENT — PATIENT HEALTH QUESTIONNAIRE - PHQ9
SUM OF ALL RESPONSES TO PHQ QUESTIONS 1-9: 0
1. LITTLE INTEREST OR PLEASURE IN DOING THINGS: 0
2. FEELING DOWN, DEPRESSED OR HOPELESS: 0
SUM OF ALL RESPONSES TO PHQ QUESTIONS 1-9: 0
SUM OF ALL RESPONSES TO PHQ9 QUESTIONS 1 & 2: 0
SUM OF ALL RESPONSES TO PHQ QUESTIONS 1-9: 0

## 2021-07-19 ASSESSMENT — LIFESTYLE VARIABLES: HOW OFTEN DO YOU HAVE A DRINK CONTAINING ALCOHOL: NEVER

## 2021-07-19 NOTE — PROGRESS NOTES
[facility]  Internal Medicine  New Patient Note  Janet Ash,       2021    Debora Sellers (:  1950) is a 73 y.o. female, here for evaluation of the following medical concerns:      SUBJECTIVE:    HPI    Patient is a 30-year-old female with past medical history of COPD on Anoro and Flovent, CHF/cardiomyopathy, osteoporosis on Prolia, lung nodule followed by pulmonology, and thyroid mass who presents secondary to need for a new pcp and to establish care. COPD: Patient has dealt with COPD for some time. She has been a long lifetime smoker. She follows with pulmonology. She takes Anoro daily and has albuterol as needed. She has been evaluated for need of oxygen in the past and has not qualified. Hypertension: Patient is well controlled with metoprolol and lisinopril. Osteoporosis: Patient on Prolia. She follows with rheumatology for this. CHF plan history of systolic dysfunction with an EF of 45% however more recent echo in 2018 showed an EF of 50 to 55% with intermediate diastolic dysfunction. Patient does follow with cardiology    Patient also notes history of a thyroid mass or extra thyroid that was removed back in 2018. Patient notes that she still has her thyroid present. She notes there were complications with her voicebox during surgery. She has a follow-up appointment with ENT coming up soon. Lung nodules: Patient follows with pulmonology for this. Most recent CT was just done within the last few months. All nodules are currently stable. Plan for follow-up in 1 year. Colonoscopy - 7-8 years ago. Suppose to get every 5. Tetanus -     Review of Systems ROS negative except for those noted in the HPI above.        Outpatient Medications Marked as Taking for the 21 encounter (Office Visit) with Shanti Sofia DO   Medication Sig Dispense Refill    albuterol sulfate HFA (VENTOLIN HFA) 108 (90 Base) MCG/ACT inhaler Inhale 2 puffs into the lungs every 6 hours as needed for Wheezing 1 Inhaler 5    metoprolol succinate (TOPROL XL) 25 MG extended release tablet TAKE 1/2 TABLET BY MOUTH TWICE DAILY 90 tablet 3    umeclidinium-vilanterol (ANORO ELLIPTA) 62.5-25 MCG/INH AEPB inhaler INHALE 1 PUFF INTO THE LUNGS DAILY 60 each 6    lisinopril (PRINIVIL;ZESTRIL) 2.5 MG tablet TAKE 1 TABLET BY MOUTH EVERY EVENING 90 tablet 3    albuterol (ACCUNEB) 1.25 MG/3ML nebulizer solution Inhale 3 mLs into the lungs every 4 hours as needed for Wheezing or Shortness of Breath 180 vial 3    fluticasone propionate (FLOVENT) 250 MCG/BLIST AEPB inhaler Inhale 2 puffs into the lungs daily 60 each 11    albuterol (PROVENTIL) (2.5 MG/3ML) 0.083% nebulizer solution Take 2.5 mg by nebulization every 6 hours as needed for Wheezing       melatonin 3 MG TABS tablet Take 3 mg by mouth nightly as needed      aspirin 81 MG chewable tablet Take 1 tablet by mouth daily 30 tablet 3    Nebulizers (AIRIAL COMPACT MINI NEBULIZER) MISC 1 Device by Does not apply route daily 1 each 0    acetaminophen (TYLENOL) 500 MG tablet Take 500 mg by mouth every 6 hours as needed for Pain           Allergies   Allergen Reactions    Codeine     Bee Venom Swelling    Percocet [Oxycodone-Acetaminophen]     Vicodin [Hydrocodone-Acetaminophen]     Adhesive Tape Rash       Past Medical History:   Diagnosis Date    Allergic rhinitis     Back pain     Chronic:under care of spine specialist:Dr. Adames    Cervical cancer screening 2010    Nml per pt'    Compression fracture of thoracic vertebrae, non-traumatic ([facility] [address]     under care of endo:Dr. Whitley Isabel    COPD (chronic obstructive pulmonary disease) ([facility] [address]     under care of pulmo:Dr. Russel Hammonds COPD exacerbation ([facility] [address] 4/14/2017    Elevated LDL cholesterol level     GERD (gastroesophageal reflux disease)     History of mammogram 2012;5/17/17    Nml per pt'. 5/17/17=negative.     Hoarseness of voice 10/4/2017    Hypertension     Lung nodule:left 2017     1.2 cm indeterminate left upper lobe pulmonary nodule:under care of pulmo:Dr. Torrey Howell    Osteoarthritis     Osteomyelitis of left leg ([address]     Osteoporosis     under care of endo:Dr. Dana Linares S/P colonoscopy     Polyps:next in [id number]    Systolic CHF, chronic (HCC) EF 45% 2018    Thyroid mass     under care of endo & surgeon(Dr. Petra Benitez)    Thyroid nodule     under care of endo:Dr. Anny Wright       Past Surgical History:   Procedure Laterality Date    APPENDECTOMY       SECTION      FIXATION KYPHOPLASTY  2017    Dr. 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   Tobacco Use    Smoking status: Former Smoker     Packs/day: 1.00     Years: 22.00     Pack years: 22.00     Types: Cigarettes     Quit date: 2011     Years since quitting: 10.3    Smokeless tobacco: Never Used   Vaping Use    Vaping Use: Never used   Substance and Sexual Activity    Alcohol use: Yes     Alcohol/week: 4.0 standard drinks     Types: 4 Cans of beer per week     Comment: occ    Drug use: No    Sexual activity: Yes     Partners: Male   Other Topics Concern    Not on file   Social History Narrative    Not on file     Social Determinants of Health     Financial Resource Strain: Low Risk     Difficulty of Paying Living Expenses: Not hard at all   Food Insecurity: No Food Insecurity    Worried About Running Out of Food in the Last Year: Never true    [address] in the Last Year: Never true   Transportation Needs: No Transportation Needs    Lack of Transportation (Medical): No    Lack of Transportation (Non-Medical):  No   Physical Activity: Insufficiently Active    Days of Exercise per Week: 7 days    Minutes of Exercise per Session: 10 min   Stress: Stress Concern Present    Feeling of Stress : To some extent   Social Connections: Moderately Isolated    Frequency of Communication with Friends and Family: Three times a week    Frequency of Social Gatherings with Friends and Family: Three times a week    Attends Hoahaoism Services: Never    Active Member of Clubs or Organizations: No    Attends Club or Organization Meetings: Never    Marital Status:    Intimate Partner Violence: Not At Risk    Fear of Current or Ex-Partner: No    Emotionally Abused: No    Physically Abused: No    Sexually Abused: No        Family History   Problem Relation Age of Onset    Diabetes Mother     No Known Problems Father     No Known Problems Sister     No Known Problems Brother     No Known Problems Maternal Grandmother     No Known Problems Maternal Grandfather     No Known Problems Paternal Grandmother     No Known Problems Paternal Grandfather          OBJECTIVE:    Vitals:    07/19/21 0814   BP: 112/74   Pulse: 90   Resp: 16   SpO2: 90%   Weight: 132 lb 6.4 oz (60.1 kg)   Height: 5' (1.524 m)     Body mass index is 25.86 kg/m². Physical Exam  Constitutional:       General: She is not in acute distress. Appearance: Normal appearance. She is not ill-appearing. HENT:      Head: Normocephalic and atraumatic. Right Ear: External ear normal.      Left Ear: External ear normal.      Nose: No congestion or rhinorrhea. Mouth/Throat:      Mouth: Mucous membranes are moist.      Pharynx: No oropharyngeal exudate or posterior oropharyngeal erythema. Eyes:      General: No scleral icterus. Extraocular Movements: Extraocular movements intact. Conjunctiva/sclera: Conjunctivae normal.   Cardiovascular:      Rate and Rhythm: Normal rate and regular rhythm. Pulses: Normal pulses. Heart sounds: No murmur heard. No friction rub. No gallop. Pulmonary:      Effort: No respiratory distress. Breath sounds: Wheezing present. No rhonchi or rales. Abdominal:      General: Bowel sounds are normal. There is no distension. Palpations: Abdomen is soft. There is no mass. Tenderness: There is no abdominal tenderness. There is no guarding or rebound. Musculoskeletal:         General: No swelling or tenderness. Normal range of motion. Cervical back: Normal range of motion. No rigidity. No muscular tenderness. Lymphadenopathy:      Cervical: No cervical adenopathy. Skin:     General: Skin is warm. Coloration: Skin is not jaundiced. Findings: No bruising, erythema or rash. Neurological:      General: No focal deficit present. Mental Status: She is alert and oriented to person, place, and time. Motor: No weakness. Gait: Gait normal.   Psychiatric:         Mood and Affect: Mood normal.         Behavior: Behavior normal.         ASSESSMENT/PLAN:  1. History of thyroid surgery  We will check TSH.  - TSH with Reflex; Future    2. Screening for diabetes mellitus (DM)  Patient due for diabetes screening  - Comprehensive Metabolic Panel; Future    3. Screening cholesterol level  Lipid panel ordered  - Lipid Panel; Future    4. Thyroid mass of unclear etiology  Patient had complication with change in voice following surgery. Patient notes something with her voice box. Patient to follow-up with ENT in the coming months.  - TSH with Reflex; Future    5. Encounter for screening mammogram for malignant neoplasm of breast  Mammogram ordered  - [facility] DIGITAL SCREEN W OR WO CAD BILATERAL; Future    6. Vision changes  Given referral to ophthalmology  - Ronnie Marc MD, (Glaucoma) Ophthalmology, [facility]    7. Screening for colon cancer  Referral given  - [facility] - Tyrese Alcaraz MD, Gastroenterology, [facility]     8. Copd  Currently stable.   On repeat pulse ox patient's oxygen saturation running between 90 and 93%.  Continue Anoro and albuterol. Continue following with pulmonology. 9.CHF  Documented systolic and diastolic dysfunction. Most recent echo was in 2018 and EF had improved to 50 to 55%. Diastolic dysfunction was still noted. Patient following with cardiology. Continue Lasix and metoprolol per cardiology. Echo was ordered by cardiology encourage patient to have this done. 10. Osteoporosis   Patient follows with rheumatology for this. Patient on Prolia. Continue this management. 11. HTN  Blood pressure currently stable on metoprolol and lisinopril. Continue both medications at this time. Return in about 3 months (around 10/19/2021).      The DO Emeka

## 2021-07-20 PROBLEM — I10 ESSENTIAL HYPERTENSION: Status: ACTIVE | Noted: 2021-07-20

## 2021-07-23 ENCOUNTER — TELEPHONE (OUTPATIENT)
Dept: INTERNAL MEDICINE CLINIC | Age: 71
End: 2021-07-23

## 2021-07-23 NOTE — TELEPHONE ENCOUNTER
Spoke with patient regarding lab results again. She does not want to start medication at this time. Requested I send her information on diet information for cholesterol. I have put together some information from our [facility] flyers and mailed to patients home.

## 2021-07-23 NOTE — TELEPHONE ENCOUNTER
----- Message from Janett Frank sent at 7/23/2021  9:13 AM EDT -----  Subject: Results Request    QUESTIONS  Which lab or imaging result is the patient calling about? blood work   Which provider ordered the test? [address]   At what location was the test performed? Date the test was performed? 2021-07-20  Additional Information for Provider? patient call request for her blood   work test results that was done on 7/20/21 was done at the [place]    office . please call patient with results  ---------------------------------------------------------------------------  --------------  CALL BACK INFO  What is the best way for the office to contact you? OK to leave message on   voicemail  Preferred Call Back Phone Number?  [phone]

## 2021-08-10 ENCOUNTER — TELEPHONE (OUTPATIENT)
Dept: PULMONOLOGY | Age: 71
End: 2021-08-10

## 2021-08-10 NOTE — TELEPHONE ENCOUNTER
FYI: pharmacy wants TAMIKO written on script for albuterol sulfate HFA (VENTOLIN HFA) in future per patient preference

## 2021-08-24 ENCOUNTER — TELEPHONE (OUTPATIENT)
Dept: PULMONOLOGY | Age: 71
End: 2021-08-24

## 2021-08-24 RX ORDER — PREDNISONE 10 MG/1
TABLET ORAL
Qty: 20 TABLET | Refills: 0 | Status: SHIPPED | OUTPATIENT
Start: 2021-08-24 | End: 2022-02-14 | Stop reason: ALTCHOICE

## 2021-08-24 NOTE — TELEPHONE ENCOUNTER
Yuni Devin called with c/o Hard time Breathing due to weather, not being able to sleep. States she has issue with her voice box -it is lying in her air passage, has seen  but has not had tx yet. She is hoping you will give her a call today at [phone], she is also hoping that you will send something to pharmacy for her.

## 2021-08-24 NOTE — TELEPHONE ENCOUNTER
I called Marcie Mart, but she did not answer the phone. I left her message to let me know if there was something more going on then what felt like her usual flare. I wrote a prescription for prednisone and sent it to her pharmacy. Orders Placed This Encounter   Medications    predniSONE (DELTASONE) 10 MG tablet     Sig: Take 4 tablets by mouth for 5 days.      Dispense:  20 tablet     Refill:  0

## 2021-08-27 ENCOUNTER — TELEPHONE (OUTPATIENT)
Dept: INTERNAL MEDICINE CLINIC | Age: 71
End: 2021-08-27

## 2021-08-27 NOTE — TELEPHONE ENCOUNTER
----- Message from Sanjay Frankel sent at 8/27/2021  9:48 AM EDT -----  Subject: Referral Request    QUESTIONS   Reason for referral request? Pt needs a referral for head and neck sx at   . Dr. Venancio Iglesias. Reconstructive sx for voice box. Please let pt   know when this is done. Has the physician seen you for this condition before? No   Preferred Specialist (if applicable)? Do you already have an appointment scheduled? No  Additional Information for Provider?   ---------------------------------------------------------------------------  --------------  CALL BACK INFO  What is the best way for the office to contact you? OK to leave message on   voicemail  Preferred Call Back Phone Number?  [phone]

## 2021-08-27 NOTE — TELEPHONE ENCOUNTER
----- Message from Hope Dawkins sent at 8/27/2021  9:52 AM EDT -----  Subject: Message to Provider    QUESTIONS  Information for Provider? Pt is having issues with sleep due to the   problems with her voice box. She was told Dr. Sydnee Perez would have to   prescribe her sleep medication. The specialist does not do that. She takes   3 mg of Melatonin without any relief. [facility] pharmacy.   ---------------------------------------------------------------------------  --------------  Darjan Boom INFO  What is the best way for the office to contact you? OK to leave message on   voicemail  Preferred Call Back Phone Number? [phone]  ---------------------------------------------------------------------------  --------------  SCRIPT ANSWERS  Relationship to Patient?  Self

## 2021-08-27 NOTE — TELEPHONE ENCOUNTER
Patient will need to be seen to discuss further her sleep issues. Can discuss referral at that time as well.

## 2021-09-02 ENCOUNTER — TELEPHONE (OUTPATIENT)
Dept: PULMONOLOGY | Age: 71
End: 2021-09-02

## 2021-09-02 ENCOUNTER — OFFICE VISIT (OUTPATIENT)
Dept: INTERNAL MEDICINE CLINIC | Age: 71
End: 2021-09-02
Payer: MEDICARE

## 2021-09-02 VITALS
SYSTOLIC BLOOD PRESSURE: 132 MMHG | HEIGHT: 60 IN | RESPIRATION RATE: 16 BRPM | WEIGHT: 131 LBS | HEART RATE: 89 BPM | DIASTOLIC BLOOD PRESSURE: 76 MMHG | BODY MASS INDEX: 25.72 KG/M2 | OXYGEN SATURATION: 98 %

## 2021-09-02 DIAGNOSIS — E78.2 MIXED HYPERLIPIDEMIA: ICD-10-CM

## 2021-09-02 DIAGNOSIS — J44.1 COPD EXACERBATION (HCC): ICD-10-CM

## 2021-09-02 DIAGNOSIS — J38.01 VOCAL CORD PARALYSIS, UNILATERAL PARTIAL: Primary | ICD-10-CM

## 2021-09-02 PROCEDURE — G8399 PT W/DXA RESULTS DOCUMENT: HCPCS | Performed by: INTERNAL MEDICINE

## 2021-09-02 PROCEDURE — 1123F ACP DISCUSS/DSCN MKR DOCD: CPT | Performed by: INTERNAL MEDICINE

## 2021-09-02 PROCEDURE — 99212 OFFICE O/P EST SF 10 MIN: CPT | Performed by: INTERNAL MEDICINE

## 2021-09-02 PROCEDURE — G8427 DOCREV CUR MEDS BY ELIG CLIN: HCPCS | Performed by: INTERNAL MEDICINE

## 2021-09-02 PROCEDURE — G0008 ADMIN INFLUENZA VIRUS VAC: HCPCS | Performed by: INTERNAL MEDICINE

## 2021-09-02 PROCEDURE — G8417 CALC BMI ABV UP PARAM F/U: HCPCS | Performed by: INTERNAL MEDICINE

## 2021-09-02 PROCEDURE — G8926 SPIRO NO PERF OR DOC: HCPCS | Performed by: INTERNAL MEDICINE

## 2021-09-02 PROCEDURE — 3023F SPIROM DOC REV: CPT | Performed by: INTERNAL MEDICINE

## 2021-09-02 PROCEDURE — 3017F COLORECTAL CA SCREEN DOC REV: CPT | Performed by: INTERNAL MEDICINE

## 2021-09-02 PROCEDURE — 1036F TOBACCO NON-USER: CPT | Performed by: INTERNAL MEDICINE

## 2021-09-02 PROCEDURE — 4040F PNEUMOC VAC/ADMIN/RCVD: CPT | Performed by: INTERNAL MEDICINE

## 2021-09-02 PROCEDURE — 1090F PRES/ABSN URINE INCON ASSESS: CPT | Performed by: INTERNAL MEDICINE

## 2021-09-02 PROCEDURE — 90694 VACC AIIV4 NO PRSRV 0.5ML IM: CPT | Performed by: INTERNAL MEDICINE

## 2021-09-02 RX ORDER — PREDNISONE 1 MG/1
5 TABLET ORAL 2 TIMES DAILY
Qty: 60 TABLET | Refills: 1 | Status: SHIPPED | OUTPATIENT
Start: 2021-09-02 | End: 2021-11-03

## 2021-09-02 NOTE — TELEPHONE ENCOUNTER
Lionel Miles says she was asked to office with prednisone dose. She has 10 mg tabs. [facility]: [facility] on [address]

## 2021-09-02 NOTE — TELEPHONE ENCOUNTER
LM on voice mail on which Monse Velazquez clearly had identified herself. Dr. Isadora Moore was relayed; also that pharmacy had confirmed receiving the prescription.

## 2021-09-02 NOTE — PROGRESS NOTES
Michelle Hilton (:  1950) is a 73 y.o. female,Established patient, here for evaluation of the following chief complaint(s): Insomnia      Vitals:    21 1232   BP: 132/76   Pulse: 89   Resp: 16   SpO2: 98%        ASSESSMENT/PLAN:  1. Vocal cord paralysis, unilateral partial  Patient with history of vocal cord paralysis and COPD. Patient has intermittent shortness of breath secondary to both. Patient also has difficulty sleeping secondary to this. Patient did speak with her pulmonologist who placed her on steroids and both sleep and shortness of breath has improved. Patient's oxygen saturation is at baseline. 2. Mixed hyperlipidemia  Discussed with patient her cholesterol levels. Although her LDL and total cholesterol are not very high, her ASCVD risk score is 11%. We discussed this risk and I recommendation for statin therapy. Patient would like to try diet and exercise for the next 3 months to repeat a lipid panel and then discussed overall risk again. Patient understands risk secondary to this  3. COPD exacerbation ([facility] [address]  See #1 above    Return in about 3 months (around 2021). SUBJECTIVE/OBJECTIVE:  HPI    Patient is a 81-year-old female with past medical history of COPD, vocal cord paralysis, systolic heart failure and hyperlipidemia who presents for follow-up for discussion about hyperlipidemia and need for statin therapy. Patient notes that she has been more short of breath recently. She talked with her pulmonologist Dr. Wing Mendoza who to prescribe her steroids. Symptoms are likely secondary to her COPD but also vocal cord paralysis. Patient is seeing ENT surgeon for procedure in October. Patient notes that her symptoms of shortness of breath and difficulty with sleep have improved since being on the steroids. Patient did have questions about her cholesterol levels. We talked at this at great length. Patient is otherwise feeling well and has no complaints.   Review of Systems ROS negative except for those noted in the HPI above. Vitals:    09/02/21 1232   BP: 132/76   Pulse: 89   Resp: 16   SpO2: 98%       The 10-year ASCVD risk score (Frances Paredes et al., 2013) is: 11%    Values used to calculate the score:      Age: 73 years      Sex: Female      Is Non- : No      Diabetic: No      Tobacco smoker: No      Systolic Blood Pressure: 780 mmHg      Is BP treated: Yes      HDL Cholesterol: 81 mg/dL      Total Cholesterol: 202 mg/dL    Physical Exam  Constitutional:       General: She is not in acute distress. Appearance: Normal appearance. She is not ill-appearing or toxic-appearing. HENT:      Head: Normocephalic and atraumatic. Right Ear: External ear normal.      Left Ear: External ear normal.   Eyes:      General: No scleral icterus. Extraocular Movements: Extraocular movements intact. Conjunctiva/sclera: Conjunctivae normal.   Cardiovascular:      Rate and Rhythm: Normal rate and regular rhythm. Pulses: Normal pulses. Heart sounds: No murmur heard. No friction rub. No gallop. Pulmonary:      Effort: Pulmonary effort is normal. No respiratory distress. Breath sounds: Normal breath sounds. No wheezing, rhonchi or rales. Abdominal:      General: Bowel sounds are normal. There is no distension. Palpations: Abdomen is soft. There is no mass. Tenderness: There is no abdominal tenderness. There is no guarding or rebound. Musculoskeletal:         General: No swelling or tenderness. Normal range of motion. Cervical back: Normal range of motion. No muscular tenderness. Skin:     General: Skin is warm. Coloration: Skin is not jaundiced. Findings: No bruising, erythema or rash. Neurological:      General: No focal deficit present. Mental Status: She is alert and oriented to person, place, and time.    Psychiatric:         Mood and Affect: Mood normal.         Behavior: Behavior normal. An electronic signature was used to authenticate this note.     --Afia Rowland, DO

## 2021-09-03 PROBLEM — E78.5 HLD (HYPERLIPIDEMIA): Status: ACTIVE | Noted: 2021-09-03

## 2021-09-07 ENCOUNTER — HOSPITAL ENCOUNTER (OUTPATIENT)
Dept: MAMMOGRAPHY | Age: 71
Discharge: HOME | End: 2021-09-07
Payer: MEDICARE

## 2021-09-07 VITALS — BODY MASS INDEX: 23 KG/M2 | WEIGHT: 125 LBS | HEIGHT: 62 IN

## 2021-09-07 DIAGNOSIS — Z12.31 ENCOUNTER FOR SCREENING MAMMOGRAM FOR MALIGNANT NEOPLASM OF BREAST: ICD-10-CM

## 2021-09-07 PROCEDURE — 77067 SCR MAMMO BI INCL CAD: CPT

## 2021-09-28 ENCOUNTER — TELEPHONE (OUTPATIENT)
Dept: INTERNAL MEDICINE CLINIC | Age: 71
End: 2021-09-28

## 2021-09-28 NOTE — TELEPHONE ENCOUNTER
----- Message from Shaq Philip sent at 9/28/2021 10:54 AM EDT -----  Subject: Referral Request    QUESTIONS   Reason for referral request? patient is scheduled for a colonoscopy on   9/30/21 . patient called to let Dr Ana Moore know that they do not have the   order prep for the colonoscopy   Has the physician seen you for this condition before? No   Preferred Specialist (if applicable)? Do you already have an appointment scheduled? Yes  [facility] Scheduled Date? 2021-09-30  [facility] Scheduled Physician? Eleazar Shanks   Additional Information for Provider? Dr. Mando Amin at [facility]   [facility] will be doing the colonoscopy if patient do not have the prep in   time would have to cancel   ---------------------------------------------------------------------------  --------------  CALL BACK INFO  What is the best way for the office to contact you? OK to leave message on   voicemail  Preferred Call Back Phone Number?  [phone]

## 2021-09-29 ENCOUNTER — TELEPHONE (OUTPATIENT)
Dept: RHEUMATOLOGY | Age: 71
End: 2021-09-29

## 2021-09-29 DIAGNOSIS — M81.0 AGE-RELATED OSTEOPOROSIS WITHOUT CURRENT PATHOLOGICAL FRACTURE: Primary | ICD-10-CM

## 2021-09-30 DIAGNOSIS — M81.0 SENILE OSTEOPOROSIS: Primary | ICD-10-CM

## 2021-10-15 ENCOUNTER — HOSPITAL ENCOUNTER (OUTPATIENT)
Dept: GENERAL RADIOLOGY | Age: 71
Discharge: HOME | End: 2021-10-15
Payer: MEDICARE

## 2021-10-15 DIAGNOSIS — M81.0 AGE-RELATED OSTEOPOROSIS WITHOUT CURRENT PATHOLOGICAL FRACTURE: ICD-10-CM

## 2021-10-15 DIAGNOSIS — M81.0 SENILE OSTEOPOROSIS: ICD-10-CM

## 2021-10-15 LAB
ANION GAP SERPL CALCULATED.3IONS-SCNC: 12 MMOL/L (ref 3–16)
BUN BLDV-MCNC: 9 MG/DL (ref 7–20)
CALCIUM SERPL-MCNC: 9.1 MG/DL (ref 8.3–10.6)
CHLORIDE BLD-SCNC: 97 MMOL/L (ref 99–110)
CO2: 27 MMOL/L (ref 21–32)
CREAT SERPL-MCNC: 0.6 MG/DL (ref 0.6–1.2)
GFR AFRICAN AMERICAN: >60
GFR NON-AFRICAN AMERICAN: >60
GLUCOSE BLD-MCNC: 84 MG/DL (ref 70–99)
POTASSIUM SERPL-SCNC: 4.6 MMOL/L (ref 3.5–5.1)
SODIUM BLD-SCNC: 136 MMOL/L (ref 136–145)

## 2021-10-15 PROCEDURE — 77080 DXA BONE DENSITY AXIAL: CPT

## 2021-10-25 ENCOUNTER — OFFICE VISIT (OUTPATIENT)
Dept: PULMONOLOGY | Age: 71
End: 2021-10-25
Payer: MEDICARE

## 2021-10-25 VITALS
BODY MASS INDEX: 25.86 KG/M2 | HEIGHT: 61 IN | DIASTOLIC BLOOD PRESSURE: 82 MMHG | RESPIRATION RATE: 16 BRPM | SYSTOLIC BLOOD PRESSURE: 122 MMHG | OXYGEN SATURATION: 95 % | HEART RATE: 78 BPM | WEIGHT: 137 LBS | TEMPERATURE: 97.1 F

## 2021-10-25 DIAGNOSIS — J43.8 OTHER EMPHYSEMA (HCC): Primary | ICD-10-CM

## 2021-10-25 PROCEDURE — G8399 PT W/DXA RESULTS DOCUMENT: HCPCS | Performed by: INTERNAL MEDICINE

## 2021-10-25 PROCEDURE — G8417 CALC BMI ABV UP PARAM F/U: HCPCS | Performed by: INTERNAL MEDICINE

## 2021-10-25 PROCEDURE — 4040F PNEUMOC VAC/ADMIN/RCVD: CPT | Performed by: INTERNAL MEDICINE

## 2021-10-25 PROCEDURE — G8484 FLU IMMUNIZE NO ADMIN: HCPCS | Performed by: INTERNAL MEDICINE

## 2021-10-25 PROCEDURE — 99212 OFFICE O/P EST SF 10 MIN: CPT | Performed by: INTERNAL MEDICINE

## 2021-10-25 PROCEDURE — 1090F PRES/ABSN URINE INCON ASSESS: CPT | Performed by: INTERNAL MEDICINE

## 2021-10-25 PROCEDURE — 3017F COLORECTAL CA SCREEN DOC REV: CPT | Performed by: INTERNAL MEDICINE

## 2021-10-25 PROCEDURE — G8427 DOCREV CUR MEDS BY ELIG CLIN: HCPCS | Performed by: INTERNAL MEDICINE

## 2021-10-25 PROCEDURE — G8926 SPIRO NO PERF OR DOC: HCPCS | Performed by: INTERNAL MEDICINE

## 2021-10-25 PROCEDURE — 1123F ACP DISCUSS/DSCN MKR DOCD: CPT | Performed by: INTERNAL MEDICINE

## 2021-10-25 PROCEDURE — 1036F TOBACCO NON-USER: CPT | Performed by: INTERNAL MEDICINE

## 2021-10-25 PROCEDURE — 3023F SPIROM DOC REV: CPT | Performed by: INTERNAL MEDICINE

## 2021-10-25 NOTE — PROGRESS NOTES
[facility]monary [facility] Critical Care    Outpatient Follow Up Note    Subjective:   CHIEF COMPLAINT / HPI:     The patient is 73 y.o. female who presents today for COPD and pulmonary nodules. Mrs. Alva Anderson comes in today without any acute complaints. She had her vocal cord Botox procedure performed and it has helped her breathing some. She maintains on the maintenance prednisone 5 mg as well as Anoro and Flovent. She was vaccinated with Torsten Coleman in the winter and is asking about the booster shots. Most recent visit:  Margie Lutz previously had been on Trelegy and said that it was a struggle for her but I think she may have been rationing her doses because she used some of the friends and said it worked really well for her. Instead of had her on Anoro and Flovent. Cost of medication is a barrier for she and her  to him who I saw earlier today. Overall she is doing pretty well, just having some issues with seasonal allergies. Are trying to figure out the issue with cost of medications. Previous visit: Margie Lutz continues to do well on anoro and flovent diskus. She's been home since the pandemic started and off work. She's been doing a lot of gardening when the weather allows and she's lost 8 lbs semi-on purpose. Her  recently had a flare and needed steroids and antibiotic about a week later she had similar symptoms so she got a burst of prednisone as well which she said fixed it pretty quickly. She is feeling much better and off steroids for over a week now. Previous visit:Carol is doing better on the anoro, flovent diskus combo and seldom has to use her rescue inhaler. She does limit her activity on some days and is also limited by back pain. She's on muscle relaxers and recently had an MRI that showed acute compression fractures in her lumbar spine. She's wearing a back brace.     Previous history: patient had a follow-up CT scan done May 17 and was essentially found to have more vertebral fractures she was admitted for repair. She's run out of her Spiriva and her Breo several weeks ago and has not needed her rescue inhaler. She has also weaned off of supplemental oxygen. She is scheduled for surgery to remove her thyroid mass in a few weeks. Patient said she felt great on the Anoro and rarely needed her rescue inhaler, and even would forget the anoro from time to time and feel ok. Until September 29th when she had her thyroid removed. 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 Since then she's extremely dyspneic trying to walk short distances and she has to be careful how she swallows or she aspirates. She's having botox injected on the 17th to try to alleviate some of the issues and may need reconstruction. Since the surgery she doesn't feel much benefit from the anoro and has a nonproductive cough and increased dyspnea with exertion, talking and laying down. She's started using her albuterol HFA up to 8 times per day. She says she feels her cough improve and is able to breath better 5-10 minutes after use. She was all set to do pulmonary rehab until this happened, and has deferred as a result. Past Medical History:    Past Medical History:   Diagnosis Date    Allergic rhinitis     Back pain     Chronic:under care of spine specialist:Dr. Adames    Cervical cancer screening 2010    Nml per pt'    Compression fracture of thoracic vertebrae, non-traumatic ([facility] [address]     under care of endo:Dr. Tatianna Porras    COPD (chronic obstructive pulmonary disease) ([facility] [address]     under care of pulmo:Dr. Luh Robledo COPD exacerbation ([facility] [address] 4/14/2017    Elevated LDL cholesterol level     GERD (gastroesophageal reflux disease)     History of mammogram 2012;5/17/17    Nml per pt'. 5/17/17=negative.     HLD (hyperlipidemia) 9/3/2021    Hoarseness of voice 10/4/2017    Hypertension     Lung nodule:left 05/04/2017     1.2 cm indeterminate left upper lobe pulmonary nodule:under care of pulmo:Dr. 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 Esha Pino S/P colonoscopy 2011    Polyps:next in [id number]    Systolic CHF, chronic ([facility] EF 45% 5/25/2018    Thyroid mass     under care of endo & surgeon(Dr. Rod Goode)    Thyroid nodule     under care of endo:Dr. Tammi Mckee       Social History:    Social History     Tobacco Use   Smoking Status Former Smoker    Packs/day: 1.00    Years: 22.00    Pack years: 22.00    Types: Cigarettes    Quit date: 4/4/2011    Years since quitting: 10.5   Smokeless Tobacco Never Used       Current Medications:  Current Outpatient Medications on File Prior to Visit   Medication Sig Dispense Refill    albuterol sulfate  (90 Base) MCG/ACT inhaler INHALE 2 PUFFS INTO THE LUNGS EVERY 6 HOURS AS NEEDED FOR WHEEZING 18 g 11    predniSONE (DELTASONE) 5 MG tablet Take 1 tablet by mouth 2 times daily 60 tablet 1    predniSONE (DELTASONE) 10 MG tablet Take 4 tablets by mouth for 5 days.  20 tablet 0    metoprolol succinate (TOPROL XL) 25 MG extended release tablet TAKE 1/2 TABLET BY MOUTH TWICE DAILY 90 tablet 3    umeclidinium-vilanterol (ANORO ELLIPTA) 62.5-25 MCG/INH AEPB inhaler INHALE 1 PUFF INTO THE LUNGS DAILY 60 each 6    lisinopril (PRINIVIL;ZESTRIL) 2.5 MG tablet TAKE 1 TABLET BY MOUTH EVERY EVENING 90 tablet 3    albuterol (ACCUNEB) 1.25 MG/3ML nebulizer solution Inhale 3 mLs into the lungs every 4 hours as needed for Wheezing or Shortness of Breath 180 vial 3    fluticasone propionate (FLOVENT) 250 MCG/BLIST AEPB inhaler Inhale 2 puffs into the lungs daily 60 each 11    albuterol (PROVENTIL) (2.5 MG/3ML) 0.083% nebulizer solution Take 2.5 mg by nebulization every 6 hours as needed for Wheezing       melatonin 3 MG TABS tablet Take 3 mg by mouth nightly as needed      aspirin 81 MG chewable tablet Take 1 tablet by mouth daily 30 tablet 3    Nebulizers (AIRGucash COMPACT MINI NEBULIZER) MISC 1 Device by Does not apply route daily 1 each 0    acetaminophen (TYLENOL) 500 MG tablet Take 500 mg by mouth every 6 hours as needed for Pain        No current facility-administered medications on file prior to visit. REVIEW OF SYSTEMS:    CONSTITUTIONAL: Negative for fevers and chills  HEENT: Negative for oropharyngeal exudate, post nasal drip, sinus pain / pressure, nasal congestion, ear pain  RESPIRATORY:  See HPI  CARDIOVASCULAR: Negative for chest pain, palpitations, edema  GASTROINTESTINAL: Negative for nausea, vomiting, diarrhea, constipation and abdominal pain  HEMATOLOGICAL: Negative for adenopathy  SKIN: Negative for clubbing, cyanosis, skin lesions  EXTREMITIES: Negative for weakness, decreased ROM  NEUROLOGICAL: Negative for unilateral weakness, speech or gait abnormalities  PSYCH: Negative for anxiety, depression    Objective:   PHYSICAL EXAM:        VITALS:  /82 (Site: Left Upper Arm, Position: Sitting, Cuff Size: Large Adult)   Pulse 78   Temp 97.1 °F (36.2 °C) (Oral)   Resp 16   Ht 5' 1\" (1.549 m)   Wt 137 lb (62.1 kg)   SpO2 95%   BMI 25.89 kg/m²     CONSTITUTIONAL:  Awake, alert, cooperative, no apparent distress, and appears stated age  HEENT: No oropharyngeal exudate, PERRL, no cervical adenopathy, no tracheal deviation, thyroid absent  LUNGS:  Speaking in full sentences, Faint upper airway and lung zone wheezes, no rales or ronchi. CARDIOVASCULAR:  normal S1 and S2 and no JVD  ABDOMEN:  Normal bowel sounds, non-distended and non-tender to palpation  EXT: No edema, no calf tenderness. Pulses are present bilaterally. NEUROLOGIC:  Mental Status Exam:  Level of Alertness:   awake  Orientation:   person, place, time. SKIN:  normal skin color, texture, turgor, no redness, warmth, or swelling     DATA:      Radiology Review:  Pertinent images / reports were reviewed as a part of this visit. CT chest reveals the following:  Impression:        1.  Acute on chronic compression fracture at T11 as described. 2. Stable compression fractures and vertebroplasty elsewhere in   the thoracic spine as described. 3. Patchy airspace disease with linear opacities and nodular   opacities in the lingula and left lower lobe. These findings could   relate to atelectasis however early pneumonia is not excluded. 4. Stable indeterminate left upper lobe nodule with central   calcification. Followup chest CT in 3 months recommended. 5. Centrilobular emphysema in the upper lobes. 6. Large substernal right thyroid nodule unchanged in comparison   to previous exam.         August 2017:  Impression:        1. Stable indeterminate nodule left upper lobe measuring up to 12   mm with small central calcification. The nodule remains   indeterminate. At least 2 years of surveillance are recommended   within next CT in 6 months. The nodule would be large enough to   evaluate with PET CT if clinically warranted. 2. Large right thyroid nodule unchanged. 3. Improved lower lung patchy airspace disease with minimal   residual scarring or subsegmental atelectasis. 4. Stable compression fractures with no new compression fracture   identified. January 2018:  No evidence of tracheobronchomalacia.       Centrilobular nodular branching opacities in the lower lobes and   right middle lobe consistent with bronchiolitis. Bronchial wall   thickening is also noted as well as secretions within subsegmental   bronchi. This is all likely representative of an infectious   process.       Multiple stable nodules since 4/3/2017. Findings are most   consistent with a benign process. Following the Fleischner   criteria the patient should BE examined with chest CT at 24 months   from the original examination and April 2019. Following the   Fleischner criteria for pulmonary nodule greater than 8 mm:  If   patient is at low risk for lung cancer, recommend follow up CT at   around 3, 9, and 24 mo, dynamic contrast enhanced CT, PET, and/or   biopsy.  If patient is at high risk for lung cancer, same as for   low-risk patient. Last PFTs:   CONCLUSION:  This is a patient who has a baseline severe obstructive lung  defect but with positive bronchodilator response to the moderate range,  evidence of hyperinflation and air trapping and moderately decreased diffusing  capacity. These findings are most consistent with a diagnosis of COPD,  longterm asthma with fixed obstructive defect could also fit this this scenario. 1/2018: IMPRESSION:  1. Moderate obstructive defect. 2.  There is no significant response to bronchodilators. 3.  Air trapping and hyperinflation is present. 4.  Moderate decrease in diffusion capacity. Assessment: This is a 73 y.o. female with COPD and pulmonary nodules    Plan:   COPD is stable on anoro and flovent. Continue. She is currently on a maintenance dose of prednisone. She has not had any flares in a while some still going to hold off on a maintenance macrolide or daliresp. I would like her to do pulmonary rehab but she is remaining very active and she is deciding whether or not to go back to work when the pandemic is over. Screening CT in June 2021 was stable, so she is due for screening CT in June of 2022  I did recommend that she get the booster shot for Covid given her age and comorbid conditions. She gave me a good quote today: \"If you can't be a good example, then be a horrible warning\"     Diagnosis Orders   1. COPD exacerbation ([facility] [address]         The patient is not currently smoking. RTC 6 months w/ MD. Call or RTC sooner if symptoms persist or worsen acutely.       Hawa Recinos MD

## 2021-11-01 ENCOUNTER — OFFICE VISIT (OUTPATIENT)
Dept: RHEUMATOLOGY | Age: 71
End: 2021-11-01
Payer: MEDICARE

## 2021-11-01 VITALS
DIASTOLIC BLOOD PRESSURE: 76 MMHG | SYSTOLIC BLOOD PRESSURE: 120 MMHG | BODY MASS INDEX: 25.86 KG/M2 | HEIGHT: 61 IN | WEIGHT: 137 LBS

## 2021-11-01 DIAGNOSIS — M81.0 SENILE OSTEOPOROSIS: Primary | ICD-10-CM

## 2021-11-01 DIAGNOSIS — M15.9 GENERALIZED OSTEOARTHRITIS: ICD-10-CM

## 2021-11-01 PROCEDURE — 4040F PNEUMOC VAC/ADMIN/RCVD: CPT | Performed by: INTERNAL MEDICINE

## 2021-11-01 PROCEDURE — G8427 DOCREV CUR MEDS BY ELIG CLIN: HCPCS | Performed by: INTERNAL MEDICINE

## 2021-11-01 PROCEDURE — 3017F COLORECTAL CA SCREEN DOC REV: CPT | Performed by: INTERNAL MEDICINE

## 2021-11-01 PROCEDURE — G8399 PT W/DXA RESULTS DOCUMENT: HCPCS | Performed by: INTERNAL MEDICINE

## 2021-11-01 PROCEDURE — 1123F ACP DISCUSS/DSCN MKR DOCD: CPT | Performed by: INTERNAL MEDICINE

## 2021-11-01 PROCEDURE — 96372 THER/PROPH/DIAG INJ SC/IM: CPT | Performed by: INTERNAL MEDICINE

## 2021-11-01 PROCEDURE — G8484 FLU IMMUNIZE NO ADMIN: HCPCS | Performed by: INTERNAL MEDICINE

## 2021-11-01 PROCEDURE — 99214 OFFICE O/P EST MOD 30 MIN: CPT | Performed by: INTERNAL MEDICINE

## 2021-11-01 PROCEDURE — G8417 CALC BMI ABV UP PARAM F/U: HCPCS | Performed by: INTERNAL MEDICINE

## 2021-11-01 PROCEDURE — 1090F PRES/ABSN URINE INCON ASSESS: CPT | Performed by: INTERNAL MEDICINE

## 2021-11-01 PROCEDURE — 1036F TOBACCO NON-USER: CPT | Performed by: INTERNAL MEDICINE

## 2021-11-01 NOTE — PROGRESS NOTES
2021       Anahy Morris (:  1950)     [id number] for osteoporosis with multiple spine fractures. She is here for follow-up for osteoporosis and osteoarthritis. Doing fairly well. No new fractures. DEXA scan is improved. Osteoarthritis pain is also better with physical activities and light weight lifting. Overall pleased on current regimen. Tolerating medications well. Continues to take calcium and vitamin D.   Work-up has been negative for secondary osteoporosis. No diarrhea, kidney stones or thyroid parathyroid problems. Rest of the review of systems are negative. Review of Systems    Prior to Visit Medications    Medication Sig Taking? Authorizing Provider   albuterol sulfate  (90 Base) MCG/ACT inhaler INHALE 2 PUFFS INTO THE LUNGS EVERY 6 HOURS AS NEEDED FOR WHEEZING Yes Alexa John MD   predniSONE (DELTASONE) 5 MG tablet Take 1 tablet by mouth 2 times daily Yes Alexa John MD   predniSONE (DELTASONE) 10 MG tablet Take 4 tablets by mouth for 5 days.  Yes Alexa John MD   metoprolol succinate (TOPROL XL) 25 MG extended release tablet TAKE 1/2 TABLET BY MOUTH TWICE DAILY Yes Joan Hampton APRN - KAREN   umeclidinium-vilanterol (ANORO ELLIPTA) 62.5-25 MCG/INH AEPB inhaler INHALE 1 PUFF INTO THE LUNGS DAILY Yes Alexa John MD   lisinopril (PRINIVIL;ZESTRIL) 2.5 MG tablet TAKE 1 TABLET BY MOUTH EVERY EVENING Yes Teetee Hanley MD   albuterol (ACCUNEB) 1.25 MG/3ML nebulizer solution Inhale 3 mLs into the lungs every 4 hours as needed for Wheezing or Shortness of Breath Yes Alexa John MD   fluticasone propionate (FLOVENT) 250 MCG/BLIST AEPB inhaler Inhale 2 puffs into the lungs daily Yes Alexa John MD   albuterol (PROVENTIL) (2.5 MG/3ML) 0.083% nebulizer solution Take 2.5 mg by nebulization every 6 hours as needed for Wheezing  Yes Historical Provider, MD   melatonin 3 MG TABS tablet Take 3 mg by mouth nightly as needed Yes Historical Provider, MD   aspirin 81 MG chewable tablet Take 1 tablet by mouth daily Yes Danyell Brown MD   Nebulizers (AIRIAL COMPACT MINI NEBULIZER) MISC 1 Device by Does not apply route daily Yes Danyell Brown MD   acetaminophen (TYLENOL) 500 MG tablet Take 500 mg by mouth every 6 hours as needed for Pain  Yes Historical Provider, MD                PHYSICAL EXAMINATION:  [ INSTRUCTIONS:  \"[x]\" Indicates a positive item  \"[]\" Indicates a negative item  -- DELETE ALL ITEMS NOT EXAMINED]  Vital Signs: (As obtained by patient/caregiver or practitioner observation)    Blood pressure-  Heart rate-    Respiratory rate-    Temperature-  Pulse oximetry-     Constitutional: [x] Appears well-developed and well-nourished [x] No apparent distress      [] Abnormal-   Mental status  [x] Alert and awake  [x] Oriented to person/place/time [x]Able to follow commands      Eyes:  EOM    []  Normal  [] Abnormal-  Sclera  []  Normal  [] Abnormal -         Discharge []  None visible  [] Abnormal -    HENT:   [] Normocephalic, atraumatic. [] Abnormal   [] Mouth/Throat: Mucous membranes are moist.     External Ears [] Normal  [] Abnormal-     Neck: [] No visualized mass     Pulmonary/Chest: [x] Respiratory effort normal.  [x] No visualized signs of difficulty breathing or respiratory distress        [] Abnormal-      Musculoskeletal:   [x] Normal gait with no signs of ataxia         [] Normal range of motion of neck        [] Abnormal osteoarthritic changes unchanged in the scattered finger joints, feet. I do not appreciate any focally tender, swollen or inflamed joints in upper and lower extremities. Asymptomatic OA changes in scattered DIPs, knees. Full range of motion all peripheral joints.     Neurological:        [] No Facial Asymmetry (Cranial nerve 7 motor function) (limited exam to video visit)          [] No gaze palsy        [] Abnormal-         Skin:        [x] No significant exanthematous lesions or discoloration noted on facial skin         [] Abnormal-            Psychiatric:       [] Normal Affect [] No Hallucinations        [] Abnormal-     Other pertinent observable physical exam findings-     ASSESSMENT/PLAN:  Conchis Sotelo was seen today for follow-up and injections. Diagnoses and all orders for this visit:    Senile osteoporosis  -     denosumab (PROLIA) SC injection 60 mg    Generalized osteoarthritis    Postmenopausal osteoporosis with multiple vertebral fractures, unable to afford Forteo, therefore is on Prolia. Severe osteoporosis with multiple compression fractures in the spine- lost 2.5 inch ht (MRI thoracolumbar spine 9/27/2019-kyphoplasty T6, T7, T10, T11, T12, chronic compression of T3, subacute compression of T4, acute compression L1 and L2. Long-term smoking, otherwise no other risk factors. Quit smoking. DEXA scan T score L spine                     T score femoral neck  10/15/2021        -3.2                            L hip -3.6, FN -3.8  9/20/2019            -4.5                           left hip -2.3, left femoral neck -4.1  4/28/2017            -4.2                           left hip -3.6, left femoral neck -3.4, right hip -2.7, right femoral neck -2.5      Prolia will be given today. Acetaminophen as needed for intercurrent arthralgias. Prolia  10/17/2019, 4/22/2020, 10/28/2020, 4/29/2021, 10/29/2020, 4/29/2021, 11/1/2021, will be due 5-20 22. Continue calcium and vitamin D supplementation. Call us in late September for ordering DEXA scan and BMP in order to prepare for Prolia. Tylenol arthritis for intercurrent osteoarthritis pain. Follow-up in 6 months. Return in about 6 months (around 5/1/2022).      Total time 31 minutes that includes the following-  Preparing to see the patient such as reviewing patients records, pre-charting, preparing the visit on the same day, performing a medically appropriate history and physical examination, counseling and educating patient about diagnosis, management plan, ordering appropriate testings, prescriptions, communicating findings to other care providers, and documenting clinical information in electronic medical record. --Mindy Suarez MD on 11/1/2021 at 4:53 PM    An electronic signature was used to authenticate this note.

## 2021-11-03 RX ORDER — PREDNISONE 1 MG/1
TABLET ORAL
Qty: 60 TABLET | Refills: 1 | Status: SHIPPED | OUTPATIENT
Start: 2021-11-03 | End: 2022-02-14 | Stop reason: SDUPTHER

## 2021-12-09 ENCOUNTER — TELEPHONE (OUTPATIENT)
Dept: PULMONOLOGY | Age: 71
End: 2021-12-09

## 2021-12-09 NOTE — TELEPHONE ENCOUNTER
She had sx on voice box and was given prednisone, she states that you were wanting her to wean off the prednisone and wanting to know how you are wanting her to do it.   She knows that you are working in the hosp and states this can wait until Monday or Tuesday,

## 2021-12-13 ENCOUNTER — HOSPITAL ENCOUNTER (EMERGENCY)
Age: 71
Discharge: HOME | End: 2021-12-13
Attending: EMERGENCY MEDICINE
Payer: MEDICARE

## 2021-12-13 VITALS
RESPIRATION RATE: 22 BRPM | OXYGEN SATURATION: 96 % | WEIGHT: 129.7 LBS | HEIGHT: 61 IN | TEMPERATURE: 98.3 F | HEART RATE: 79 BPM | SYSTOLIC BLOOD PRESSURE: 108 MMHG | BODY MASS INDEX: 24.49 KG/M2 | DIASTOLIC BLOOD PRESSURE: 81 MMHG

## 2021-12-13 DIAGNOSIS — R11.2 NON-INTRACTABLE VOMITING WITH NAUSEA, UNSPECIFIED VOMITING TYPE: Primary | ICD-10-CM

## 2021-12-13 LAB
ALBUMIN SERPL-MCNC: 4.3 G/DL (ref 3.4–5)
ALP BLD-CCNC: 45 U/L (ref 40–129)
ALT SERPL-CCNC: 10 U/L (ref 10–40)
ANION GAP SERPL CALCULATED.3IONS-SCNC: 13 MMOL/L (ref 3–16)
AST SERPL-CCNC: 15 U/L (ref 15–37)
BASOPHILS ABSOLUTE: 0.1 K/UL (ref 0–0.2)
BASOPHILS RELATIVE PERCENT: 0.9 %
BILIRUB SERPL-MCNC: 0.7 MG/DL (ref 0–1)
BILIRUBIN DIRECT: <0.2 MG/DL (ref 0–0.3)
BILIRUBIN, INDIRECT: NORMAL MG/DL (ref 0–1)
BUN BLDV-MCNC: 8 MG/DL (ref 7–20)
CALCIUM SERPL-MCNC: 8.4 MG/DL (ref 8.3–10.6)
CHLORIDE BLD-SCNC: 92 MMOL/L (ref 99–110)
CO2: 25 MMOL/L (ref 21–32)
CREAT SERPL-MCNC: <0.5 MG/DL (ref 0.6–1.2)
EOSINOPHILS ABSOLUTE: 0 K/UL (ref 0–0.6)
EOSINOPHILS RELATIVE PERCENT: 0.5 %
GFR AFRICAN AMERICAN: >60
GFR NON-AFRICAN AMERICAN: >60
GLUCOSE BLD-MCNC: 134 MG/DL (ref 70–99)
HCT VFR BLD CALC: 43.5 % (ref 36–48)
HEMOGLOBIN: 14.8 G/DL (ref 12–16)
LIPASE: 12 U/L (ref 13–60)
LYMPHOCYTES ABSOLUTE: 1.3 K/UL (ref 1–5.1)
LYMPHOCYTES RELATIVE PERCENT: 16.2 %
MCH RBC QN AUTO: 33 PG (ref 26–34)
MCHC RBC AUTO-ENTMCNC: 34.1 G/DL (ref 31–36)
MCV RBC AUTO: 96.7 FL (ref 80–100)
MONOCYTES ABSOLUTE: 0.3 K/UL (ref 0–1.3)
MONOCYTES RELATIVE PERCENT: 4 %
NEUTROPHILS ABSOLUTE: 6.5 K/UL (ref 1.7–7.7)
NEUTROPHILS RELATIVE PERCENT: 78.4 %
PDW BLD-RTO: 14.3 % (ref 12.4–15.4)
PLATELET # BLD: 325 K/UL (ref 135–450)
PMV BLD AUTO: 7.5 FL (ref 5–10.5)
POTASSIUM REFLEX MAGNESIUM: 4 MMOL/L (ref 3.5–5.1)
RBC # BLD: 4.5 M/UL (ref 4–5.2)
SODIUM BLD-SCNC: 130 MMOL/L (ref 136–145)
TOTAL PROTEIN: 6.8 G/DL (ref 6.4–8.2)
WBC # BLD: 8.3 K/UL (ref 4–11)

## 2021-12-13 PROCEDURE — 83690 ASSAY OF LIPASE: CPT

## 2021-12-13 PROCEDURE — 96374 THER/PROPH/DIAG INJ IV PUSH: CPT

## 2021-12-13 PROCEDURE — 85025 COMPLETE CBC W/AUTO DIFF WBC: CPT

## 2021-12-13 PROCEDURE — 80076 HEPATIC FUNCTION PANEL: CPT

## 2021-12-13 PROCEDURE — 2580000003 HC RX 258: Performed by: STUDENT IN AN ORGANIZED HEALTH CARE EDUCATION/TRAINING PROGRAM

## 2021-12-13 PROCEDURE — 80048 BASIC METABOLIC PNL TOTAL CA: CPT

## 2021-12-13 PROCEDURE — 96375 TX/PRO/DX INJ NEW DRUG ADDON: CPT

## 2021-12-13 PROCEDURE — 6360000002 HC RX W HCPCS: Performed by: STUDENT IN AN ORGANIZED HEALTH CARE EDUCATION/TRAINING PROGRAM

## 2021-12-13 PROCEDURE — 99283 EMERGENCY DEPT VISIT LOW MDM: CPT

## 2021-12-13 RX ORDER — PROMETHAZINE HYDROCHLORIDE 25 MG/ML
12.5 INJECTION, SOLUTION INTRAMUSCULAR; INTRAVENOUS ONCE
Status: COMPLETED | OUTPATIENT
Start: 2021-12-13 | End: 2021-12-13

## 2021-12-13 RX ORDER — ONDANSETRON 2 MG/ML
4 INJECTION INTRAMUSCULAR; INTRAVENOUS ONCE
Status: COMPLETED | OUTPATIENT
Start: 2021-12-13 | End: 2021-12-13

## 2021-12-13 RX ORDER — PROMETHAZINE HYDROCHLORIDE 25 MG/1
12.5 TABLET ORAL EVERY 6 HOURS PRN
Qty: 8 TABLET | Refills: 0 | Status: SHIPPED | OUTPATIENT
Start: 2021-12-13 | End: 2021-12-17

## 2021-12-13 RX ORDER — SODIUM CHLORIDE, SODIUM LACTATE, POTASSIUM CHLORIDE, AND CALCIUM CHLORIDE .6; .31; .03; .02 G/100ML; G/100ML; G/100ML; G/100ML
1000 INJECTION, SOLUTION INTRAVENOUS ONCE
Status: COMPLETED | OUTPATIENT
Start: 2021-12-13 | End: 2021-12-13

## 2021-12-13 RX ORDER — PROMETHAZINE HYDROCHLORIDE 25 MG/1
12.5 TABLET ORAL EVERY 6 HOURS PRN
Qty: 8 TABLET | Refills: 0 | Status: SHIPPED | OUTPATIENT
Start: 2021-12-13 | End: 2021-12-13 | Stop reason: SDUPTHER

## 2021-12-13 RX ADMIN — ONDANSETRON 4 MG: 2 INJECTION INTRAMUSCULAR; INTRAVENOUS at 01:04

## 2021-12-13 RX ADMIN — SODIUM CHLORIDE, POTASSIUM CHLORIDE, SODIUM LACTATE AND CALCIUM CHLORIDE 1000 ML: 600; 310; 30; 20 INJECTION, SOLUTION INTRAVENOUS at 01:04

## 2021-12-13 RX ADMIN — PROMETHAZINE HYDROCHLORIDE 12.5 MG: 25 INJECTION INTRAMUSCULAR; INTRAVENOUS at 01:32

## 2021-12-13 ASSESSMENT — ENCOUNTER SYMPTOMS
SHORTNESS OF BREATH: 0
ABDOMINAL PAIN: 0
CONSTIPATION: 0
NAUSEA: 1
VOMITING: 1
ABDOMINAL DISTENTION: 0
COUGH: 0
DIARRHEA: 0

## 2021-12-13 NOTE — ED PROVIDER NOTES
ED Attending Attestation Note     Date of evaluation: 12/13/2021    This patient was seen by the resident. I have seen and examined the patient, agree with the workup, evaluation, management and diagnosis. The care plan has been discussed. My assessment reveals complaints of several hours of nonbloody and nonbilious vomiting. Patient has a soft abdomen on exam.  Will check laboratory studies, treat symptomatically.       Maggi Parrish MD  12/13/21 [id number]

## 2021-12-13 NOTE — ED PROVIDER NOTES
Compression fracture of thoracic vertebrae, non-traumatic (HCC), COPD (chronic obstructive pulmonary disease) ([facility] [address] COPD exacerbation (HCC), Elevated LDL cholesterol level, GERD (gastroesophageal reflux disease), History of mammogram, HLD (hyperlipidemia), Hoarseness of voice, Hypertension, Lung nodule:left, Osteoarthritis, Osteomyelitis of left leg ([address] Osteoporosis, S/P colonoscopy, Systolic CHF, chronic (HCC) EF 45%, Thyroid mass, and Thyroid nodule. She has a past surgical history that includes  section; Appendectomy; Fixation Kyphoplasty (2017); and other surgical history (2017). Her family history includes Diabetes in her mother; No Known Problems in her brother, father, maternal grandfather, maternal grandmother, paternal grandfather, paternal grandmother, and sister. She reports that she quit smoking about 10 years ago. Her smoking use included cigarettes. She has a 22.00 pack-year smoking history. She has never used smokeless tobacco. She reports current alcohol use of about 4.0 standard drinks of alcohol per week. She reports that she does not use drugs.     Medications     Previous Medications    ACETAMINOPHEN (TYLENOL) 500 MG TABLET    Take 500 mg by mouth every 6 hours as needed for Pain     ALBUTEROL (ACCUNEB) 1.25 MG/3ML NEBULIZER SOLUTION    Inhale 3 mLs into the lungs every 4 hours as needed for Wheezing or Shortness of Breath    ALBUTEROL (PROVENTIL) (2.5 MG/3ML) 0.083% NEBULIZER SOLUTION    Take 2.5 mg by nebulization every 6 hours as needed for Wheezing     ASPIRIN 81 MG CHEWABLE TABLET    Take 1 tablet by mouth daily    FLUTICASONE PROPIONATE (FLOVENT) 250 MCG/BLIST AEPB INHALER    Inhale 2 puffs into the lungs daily    LISINOPRIL (PRINIVIL;ZESTRIL) 2.5 MG TABLET    TAKE 1 TABLET BY MOUTH EVERY EVENING    MELATONIN 3 MG TABS TABLET    Take 3 mg by mouth nightly as needed    METOPROLOL SUCCINATE (TOPROL XL) 25 MG EXTENDED RELEASE TABLET    TAKE 1/2 TABLET BY MOUTH TWICE DAILY    NEBULIZERS (AIRIAL COMPACT MINI NEBULIZER) MISC    1 Device by Does not apply route daily    PREDNISONE (DELTASONE) 10 MG TABLET    Take 4 tablets by mouth for 5 days. PREDNISONE (DELTASONE) 5 MG TABLET    TAKE 1 TABLET BY MOUTH TWICE DAILY    UMECLIDINIUM-VILANTEROL (ANORO ELLIPTA) 62.5-25 MCG/INH AEPB INHALER    INHALE 1 PUFF INTO THE LUNGS DAILY    VENTOLIN  (90 BASE) MCG/ACT INHALER    INHALE 2 PUFFS INTO THE LUNGS EVERY 6 HOURS AS NEEDED FOR WHEEZING       Allergies     She is allergic to codeine, bee venom, percocet [oxycodone-acetaminophen], vicodin [hydrocodone-acetaminophen], and adhesive tape. Physical Exam     INITIAL VITALS: BP: (!) 157/94, Temp: 98.3 °F (36.8 °C), Pulse: 79, Resp: 22, SpO2: 96 %   Physical Exam  Constitutional:       Appearance: Normal appearance. Comments: No active vomiting   HENT:      Mouth/Throat:      Mouth: Mucous membranes are moist.      Pharynx: Oropharynx is clear. Eyes:      Extraocular Movements: Extraocular movements intact. Pupils: Pupils are equal, round, and reactive to light. Cardiovascular:      Rate and Rhythm: Normal rate and regular rhythm. Pulmonary:      Breath sounds: Rhonchi present. No wheezing. Comments: Tachypnea  Abdominal:      General: Abdomen is flat. There is no distension. Palpations: Abdomen is soft. There is no mass. Tenderness: There is no abdominal tenderness. There is no right CVA tenderness, left CVA tenderness, guarding or rebound. Hernia: No hernia is present. Musculoskeletal:         General: No swelling or tenderness. Normal range of motion. Skin:     General: Skin is warm and dry. Capillary Refill: Capillary refill takes less than 2 seconds. Neurological:      General: No focal deficit present. Mental Status: She is alert and oriented to person, place, and time.          DiagnosticResults       RADIOLOGY:  No orders to display       LABS:   Results for orders placed or performed during the hospital encounter of 12/13/21   CBC Auto Differential   Result Value Ref Range    WBC 8.3 4.0 - 11.0 K/uL    RBC 4.50 4.00 - 5.20 M/uL    Hemoglobin 14.8 12.0 - 16.0 g/dL    Hematocrit 43.5 36.0 - 48.0 %    MCV 96.7 80.0 - 100.0 fL    MCH 33.0 26.0 - 34.0 pg    MCHC 34.1 31.0 - 36.0 g/dL    RDW 14.3 12.4 - 15.4 %    Platelets 282 891 - 859 K/uL    MPV 7.5 5.0 - 10.5 fL    Neutrophils % 78.4 %    Lymphocytes % 16.2 %    Monocytes % 4.0 %    Eosinophils % 0.5 %    Basophils % 0.9 %    Neutrophils Absolute 6.5 1.7 - 7.7 K/uL    Lymphocytes Absolute 1.3 1.0 - 5.1 K/uL    Monocytes Absolute 0.3 0.0 - 1.3 K/uL    Eosinophils Absolute 0.0 0.0 - 0.6 K/uL    Basophils Absolute 0.1 0.0 - 0.2 K/uL   Basic Metabolic Panel w/ Reflex to MG   Result Value Ref Range    Sodium 130 (L) 136 - 145 mmol/L    Potassium reflex Magnesium 4.0 3.5 - 5.1 mmol/L    Chloride 92 (L) 99 - 110 mmol/L    CO2 25 21 - 32 mmol/L    Anion Gap 13 3 - 16    Glucose 134 (H) 70 - 99 mg/dL    BUN 8 7 - 20 mg/dL    CREATININE <0.5 (L) 0.6 - 1.2 mg/dL    GFR Non-African American >60 >60    GFR African American >60 >60    Calcium 8.4 8.3 - 10.6 mg/dL   Hepatic Function Panel   Result Value Ref Range    Total Protein 6.8 6.4 - 8.2 g/dL    Albumin 4.3 3.4 - 5.0 g/dL    Alkaline Phosphatase 45 40 - 129 U/L    ALT 10 10 - 40 U/L    AST 15 15 - 37 U/L    Total Bilirubin 0.7 0.0 - 1.0 mg/dL    Bilirubin, Direct <0.2 0.0 - 0.3 mg/dL    Bilirubin, Indirect see below 0.0 - 1.0 mg/dL   Lipase   Result Value Ref Range    Lipase 12.0 (L) 13.0 - 60.0 U/L       ED BEDSIDE ULTRASOUND:      RECENT VITALS:  BP: (!) 157/94, Temp: 98.3 °F (36.8 °C), Pulse: 79,Resp: 22, SpO2: 96 %     Procedures         ED Course     Nursing Notes, Past Medical Hx, Past Surgical Hx, Social Hx, Allergies, and Family Hx were reviewed.     The patient was given the followingmedications:  Orders Placed This Encounter   Medications    lactated ringers bolus    ondansetron [facility] injection 4 mg    promethazine (PHENERGAN) injection 12.5 mg    promethazine (PHENERGAN) 25 MG tablet     Sig: Take 0.5 tablets by mouth every 6 hours as needed for Nausea WARNING:  May cause drowsiness. May impair ability to operate vehicles or machinery. Do not use in combination with alcohol. Dispense:  8 tablet     Refill:  0       CONSULTS:  None    MEDICAL DECISION MAKING / ASSESSMENT / PLAN     Demetrice Lawton is a 73 y.o. female who presents with nausea and vomiting. On presentation, patient is overall well-appearing. She is no longer actively vomiting. She has mild hypertension but no tachycardia or fever. She has no complaints of abdominal pain and has a benign abdomen on my examination. I suspect this is related to a viral illness or food poisoning. She does have some mild diarrhea with the symptoms as well that started when she arrived to the emergency room. She has no history of abdominal surgeries and is having bowel movements, so do not suspect obstruction. She has no abdominal pain on palpation, so do not suspect acute intra-abdominal pathology such as appendicitis, biliary disease or kidney stone. She has no reports of chest pain and do not think this is representative of ACS. Basic lab work was obtained including a CBC, renal, lipase and LFTs. All these are within normal limits other than some mild hyponatremia at 130 suggesting mild dehydration. Patient was given 1 L of IV fluids. We nasally trialed symptom control with Zofran and she had minimal relief. She was then given a dose of Phenergan which completely resolved her nausea. To be discharged home with a prescription for nausea instructions to avoid if driving given the drowsiness. She should follow-up with her PCP within the next week and if she is unable to tolerate p.o. and return to the emergency room for reevaluation. This patient was also evaluated by the attending physician.  All care plans

## 2021-12-13 NOTE — DISCHARGE INSTR - COC
Continuity of Care Form    Patient Name: Nina Lr   :  1950  MRN:  [id number]    [facility] date:  2021  Discharge date:  ***    Code Status Order: Prior   Advance Directives:      Admitting Physician:  No admitting provider for patient encounter.   PCP: Serge Workman DO    Discharging Nurse: [facility] Unit/Room#: A01/A01-01  Discharging Unit Phone Number: ***    Emergency Contact:   Extended Emergency Contact Information  Primary Emergency Contact: Norberto Nazario   [address] Phone: [phone]  Relation: Spouse  Secondary Emergency Contact: [facility] Phone: [phone]  Relation: Brother/Sister    Past Surgical History:  Past Surgical History:   Procedure Laterality Date    APPENDECTOMY       SECTION      FIXATION KYPHOPLASTY  2017    Dr. Deja Yoon  2017    thoracic kyphoplasty       Immunization History:   Immunization History   Administered Date(s) Administered    COVID-19, Robert Rivase, Primary or Immunocompromised, PF, 100mcg/0.5mL 2021, 2021    Influenza, High Dose (Fluzone 65 yrs and older) 2018, 2019    Influenza, Quadv, 6 mo and older, IM, PF (Flulaval, Fluarix) 2018    Influenza, Quadv, adjuvanted, 65 yrs +, IM, PF (Fluad) 2021    Influenza, Triv, inactivated, subunit, adjuvanted, IM (Fluad 65 yrs and older) 2020    Pneumococcal Conjugate 13-valent (Liwxwlr85) 2017    Pneumococcal Polysaccharide (Gwlspykvk72) 2017    Td, unspecified formulation 2009       Active Problems:  Patient Active Problem List   Diagnosis Code    Pathological fracture of vertebra due to osteoporosis ([facility] [address] M80.08XA    Incidental pulmonary nodule, greater than or equal to 8mm R91.1    Thyroid mass of unclear etiology E07.89    Acute bronchitis J20.9    Mucus plugging of bronchi T17.500A    Shortness of breath R06.02    Hypoxemia R09.02    Pulmonary emphysema ([facility] [address] J43.9 Chronic obstructive pulmonary disease (HCC) J44.9    Lung nodule:left R91.1    Vertebral fracture, osteoporotic ([facility]) M80.08XA    Gastroesophageal reflux disease without esophagitis K21.9    Age related osteoporosis M81.0    Vocal cord paralysis, unilateral partial J38.01    Hypoxia R09.02    Chronic respiratory failure with hypoxia ([facility]) J57.11    Systolic CHF, chronic ([facility]) EF 45% I50.22    S/P coronary angiogram Z98.890    Nonischemic cardiomyopathy ([facility]) I42.8    Hypotension due to drugs I95.2    COPD exacerbation ([facility]) J44.1    Hoarseness of voice R49.0    Essential hypertension I10    HLD (hyperlipidemia) E78.5       Isolation/Infection:   Isolation            No Isolation          Patient Infection Status       None to display            Nurse Assessment:  Last Vital Signs: BP (!) 157/94   Pulse 79   Temp 98.3 °F (36.8 °C) (Axillary)   Resp 22   Ht 5' 1\" (1.549 m)   Wt 129 lb 11.2 oz (58.8 kg) Comment: Scale on bed not working  SpO2 96%   BMI 24.51 kg/m²     Last documented pain score (0-10 scale):    Last Weight:   Wt Readings from Last 1 Encounters:   21 129 lb 11.2 oz (58.8 kg)     Mental Status:  {IP PT MENTAL STATUS:54736}    IV Access:  { HOMER IV ACCESS:143426566}    Nursing Mobility/ADLs:  Walking   {[facility] DME GXIC:216109106}  Transfer  {[facility] DME NHTN:453800014}  Bathing  {[facility] DME CGP}  Dressing  {[facility] DME JWNA:584795660}  Toileting  {[facility] DME WBXX:636233981}  Feeding  {[facility] DME YIIM:973542902}  Med Admin  {[facility] DME WWN}  Med Delivery   { HOMER MED Delivery:613428045}    Wound Care Documentation and Therapy:        Elimination:  Continence: Bowel: {YES / PI:39219}  Bladder: {YES / AC:15995}  Urinary Catheter: {Urinary Catheter:229870403}   Colostomy/Ileostomy/Ileal Conduit: {YES / YF:35312}       Date of Last BM: ***  No intake or output data in the 24 hours ending 21 0215  No intake/output data recorded.     Safety Concerns:     Oscar Caban [facility] Safety Concerns:958928611}    Impairments/Disabilities:      [address] Impairments/Disabilities:208771701}    Nutrition Therapy:  Current Nutrition Therapy:   [address] Diet List:517321108}    Routes of Feeding: {CHP DME Other Feedings:319789349}  Liquids: {Slp liquid thickness:40371}  Daily Fluid Restriction: {CHP DME Yes amt example:621510020}  Last Modified Barium Swallow with Video (Video Swallowing Test): {Done Not Done ZPBY:413350217}    Treatments at the Time of Hospital Discharge:   Respiratory Treatments: ***  Oxygen Therapy:  {Therapy; copd oxygen:04991}  Ventilator:    {[facility] Vent PIPQ:615145389}    Rehab Therapies: {THERAPEUTIC INTERVENTION:9818019436}  Weight Bearing Status/Restrictions: [address] EG[address] Weight Bearin}  Other Medical Equipment (for information only, NOT a DME order):  {EQUIPMENT:504051148}  Other Treatments: ***    Patient's personal belongings (please select all that are sent with patient):  {CHP DME Belongings:686548928}    RN SIGNATURE:  {Esignature:468484808}    CASE MANAGEMENT/SOCIAL WORK SECTION    Inpatient Status Date: ***    Readmission Risk Assessment Score:  Readmission Risk              Risk of Unplanned Readmission:  0           Discharging to Facility/ Agency   Name:   Address:  Phone:  Fax:    Dialysis Facility (if applicable)   Name:  Address:  Dialysis Schedule:  Phone:  Fax:    / signature: {Esignature:534699764}    PHYSICIAN SECTION    Prognosis: {Prognosis:9203096751}    Condition at Discharge: [address] Patient Condition:430323904}    Rehab Potential (if transferring to Rehab): {Prognosis:1630214555}    Recommended Labs or Other Treatments After Discharge: ***    Physician Certification: I certify the above information and transfer of Virginia Child  is necessary for the continuing treatment of the diagnosis listed and that she requires {Admit to Appropriate Level of Care:22350} for {GREATER/LESS:009452422} 30 days.      Update Admission H&P: [facility]34[facility]}    PHYSICIAN SIGNATURE:  {Esignature:095037089}

## 2021-12-15 ENCOUNTER — TELEPHONE (OUTPATIENT)
Dept: PULMONOLOGY | Age: 71
End: 2021-12-15

## 2021-12-15 RX ORDER — PREDNISONE 1 MG/1
5 TABLET ORAL DAILY
Qty: 30 TABLET | Refills: 5 | Status: SHIPPED | OUTPATIENT
Start: 2021-12-15 | End: 2022-06-22 | Stop reason: ALTCHOICE

## 2021-12-15 NOTE — TELEPHONE ENCOUNTER
Please advise patient is calling to let  that she is almost done with her Prednisone. She states that she is supposed to be weaning off prednisone  and she need more sent to her Pharmacy.

## 2021-12-15 NOTE — TELEPHONE ENCOUNTER
I tried Martyn Schwab again, but got voicemail. She was on maintenance prednisone at 5mg which I rewrote.     Orders Placed This Encounter   Medications    predniSONE (DELTASONE) 5 MG tablet     Sig: Take 1 tablet by mouth daily     Dispense:  30 tablet     Refill:  5

## 2021-12-16 NOTE — TELEPHONE ENCOUNTER
Pt called asking if Dr. Rowan Rowland sent prednisone to pharmacy. I advised her did.  She will go and pick it up

## 2022-01-06 ENCOUNTER — OFFICE VISIT (OUTPATIENT)
Dept: CARDIOLOGY CLINIC | Age: 72
End: 2022-01-06
Payer: MEDICARE

## 2022-01-06 VITALS
WEIGHT: 131 LBS | DIASTOLIC BLOOD PRESSURE: 70 MMHG | HEART RATE: 60 BPM | BODY MASS INDEX: 24.75 KG/M2 | SYSTOLIC BLOOD PRESSURE: 110 MMHG

## 2022-01-06 DIAGNOSIS — I50.22 CHRONIC SYSTOLIC CONGESTIVE HEART FAILURE (HCC): Primary | ICD-10-CM

## 2022-01-06 DIAGNOSIS — I42.0 DILATED CARDIOMYOPATHY (HCC): ICD-10-CM

## 2022-01-06 DIAGNOSIS — E78.5 HYPERLIPIDEMIA, UNSPECIFIED HYPERLIPIDEMIA TYPE: ICD-10-CM

## 2022-01-06 PROCEDURE — G8484 FLU IMMUNIZE NO ADMIN: HCPCS | Performed by: INTERNAL MEDICINE

## 2022-01-06 PROCEDURE — 99214 OFFICE O/P EST MOD 30 MIN: CPT | Performed by: INTERNAL MEDICINE

## 2022-01-06 PROCEDURE — 1123F ACP DISCUSS/DSCN MKR DOCD: CPT | Performed by: INTERNAL MEDICINE

## 2022-01-06 PROCEDURE — G8427 DOCREV CUR MEDS BY ELIG CLIN: HCPCS | Performed by: INTERNAL MEDICINE

## 2022-01-06 PROCEDURE — 3017F COLORECTAL CA SCREEN DOC REV: CPT | Performed by: INTERNAL MEDICINE

## 2022-01-06 PROCEDURE — 1090F PRES/ABSN URINE INCON ASSESS: CPT | Performed by: INTERNAL MEDICINE

## 2022-01-06 PROCEDURE — G8399 PT W/DXA RESULTS DOCUMENT: HCPCS | Performed by: INTERNAL MEDICINE

## 2022-01-06 PROCEDURE — 1036F TOBACCO NON-USER: CPT | Performed by: INTERNAL MEDICINE

## 2022-01-06 PROCEDURE — 4040F PNEUMOC VAC/ADMIN/RCVD: CPT | Performed by: INTERNAL MEDICINE

## 2022-01-06 PROCEDURE — G8420 CALC BMI NORM PARAMETERS: HCPCS | Performed by: INTERNAL MEDICINE

## 2022-01-06 ASSESSMENT — ENCOUNTER SYMPTOMS
SHORTNESS OF BREATH: 0
COUGH: 0
CHEST TIGHTNESS: 0
CHOKING: 0

## 2022-01-06 NOTE — PROGRESS NOTES
Subjective:      Patient ID: Zeina Tirado is a 73 y.o. female. Congestive Heart Failure  Pertinent negatives include no chest pain, fatigue, palpitations or shortness of breath. Here for follow up CHF/cardiomyopathy. Breathing stable. No chest pain. No edema. No pnd or orthopnea. Wt stable. Active including walking. Another grand daughter. Still watching granddaughter. COPD stable. Past Medical History:   Diagnosis Date    Allergic rhinitis     Back pain     Chronic:under care of spine specialist:Dr. Adames    Cervical cancer screening     Nml per pt'    Compression fracture of thoracic vertebrae, non-traumatic ([address]     under care of endo:Dr. Mari Aguilar    COPD (chronic obstructive pulmonary disease) ([facility] [address]     under care of pulmo:Dr. Shanika Milian COPD exacerbation ([facility] [address] 2017    Elevated LDL cholesterol level     GERD (gastroesophageal reflux disease)     History of mammogram ;17    Nml per pt'. 17=negative.     HLD (hyperlipidemia) 9/3/2021    Hoarseness of voice 10/4/2017    Hypertension     Lung nodule:left 2017     1.2 cm indeterminate left upper lobe pulmonary nodule:under care of pulmo:Dr. Justice Dutton    Osteoarthritis     Osteomyelitis of left leg ([address]     Osteoporosis     under care of endo:Dr. Beena Whitlock S/P colonoscopy     Polyps:next in 7LSH=1649    Systolic CHF, chronic ([address] EF 45% 2018    Thyroid mass     under care of endo & surgeon(Dr. Sahil Weaver)    Thyroid nodule     under care of endo:Dr. Mari Aguilar     Past Surgical History:   Procedure Laterality Date    APPENDECTOMY       SECTION      FIXATION KYPHOPLASTY  2017    Dr. 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   Tobacco Use    Smoking status: Former Smoker     Packs/day: 1.00     Years: 22.00     Pack years: 22.00     Types: Cigarettes     Quit date: 4/4/2011     Years since quitting: 10.7    Smokeless tobacco: Never Used   Vaping Use    Vaping Use: Never used   Substance and Sexual Activity    Alcohol use: Yes     Alcohol/week: 4.0 standard drinks     Types: 4 Cans of beer per week     Comment: occ    Drug use: No    Sexual activity: Yes     Partners: Male   Other Topics Concern    Not on file   Social History Narrative    Not on file     Social Determinants of Health     Financial Resource Strain: Low Risk     Difficulty of Paying Living Expenses: Not hard at all   Food Insecurity: No Food Insecurity    Worried About Running Out of Food in the Last Year: Never true    [address] in the Last Year: Never true   Transportation Needs: No Transportation Needs    Lack of Transportation (Medical): No    Lack of Transportation (Non-Medical): No   Physical Activity: Insufficiently Active    Days of Exercise per Week: 7 days    Minutes of Exercise per Session: 10 min   Stress: Stress Concern Present    Feeling of Stress : To some extent   Social Connections: Moderately Isolated    Frequency of Communication with Friends and Family: Three times a week    Frequency of Social Gatherings with Friends and Family:  Three times a week    Attends Catholic Services: Never    Active Member of Clubs or Organizations: No    Attends Club or Organization Meetings: Never    Marital Status:    Intimate Partner Violence: Not At Risk    Fear of Current or Ex-Partner: No    Emotionally Abused: No    Physically Abused: No    Sexually Abused: No   Housing Stability: Unknown    Unable to Pay for Housing in the Last Year: No    Number of [place] in the Last Year: Not on file    Unstable Housing in the Last Year: No     FH reviewed, denies FH cardiac issues    Vitals:    01/06/22 1040   BP: 110/70   Pulse: 60     Wt 131    Review of Systems   Constitutional: Negative for activity change, appetite change and fatigue. Respiratory: Negative for cough, choking, chest tightness and shortness of breath. Cardiovascular: Negative for chest pain, palpitations and leg swelling. Denies PND or orthopnea. No tachycardia or syncope. Neurological: Negative for dizziness, syncope and light-headedness. Psychiatric/Behavioral: Negative for agitation, behavioral problems and confusion. All other systems reviewed and are negative. Objective:   Physical Exam  Constitutional:       General: She is not in acute distress. Appearance: She is well-developed. HENT:      Head: Normocephalic and atraumatic. Eyes:      General:         Right eye: No discharge. Left eye: No discharge. Conjunctiva/sclera: Conjunctivae normal.   Neck:      Vascular: No JVD. Cardiovascular:      Rate and Rhythm: Normal rate and regular rhythm. Pulses:           Radial pulses are 2+ on the right side and 2+ on the left side. Heart sounds: Normal heart sounds, S1 normal and S2 normal. No murmur heard. No gallop. Pulmonary:      Effort: Pulmonary effort is normal. No respiratory distress. Breath sounds: Normal breath sounds. No wheezing or rales. Abdominal:      General: Bowel sounds are normal.      Palpations: Abdomen is soft. Tenderness: There is no abdominal tenderness. Musculoskeletal:         General: Normal range of motion. Cervical back: Normal range of motion. Skin:     General: Skin is warm and dry. Neurological:      Mental Status: She is alert and oriented to person, place, and time. Psychiatric:         Behavior: Behavior normal.         Thought Content: Thought content normal.         Assessment:       Diagnosis Orders   1. Chronic systolic congestive heart failure ([facility] [address]     2. Dilated cardiomyopathy ([facility] [address]     3. Hyperlipidemia, unspecified hyperlipidemia type             Plan:      CV  stable. Remains stable. CHF stable. BP good. Appears compensated. Will continue Toprol XL 25 mg qd, lisinopril 2.5 mg qd for CHF/cardiomyop. Reviewed previous records and testing including cath 5/18 and echo 10/18. No changes. Continue to monitor. Follow up 6 months. Echo to follow cardiomyopathy since did not get from last visit. But will get from next visit since Kat.         Jan Brambila MD

## 2022-01-21 ENCOUNTER — TELEPHONE (OUTPATIENT)
Dept: INTERNAL MEDICINE CLINIC | Age: 72
End: 2022-01-21

## 2022-01-21 NOTE — TELEPHONE ENCOUNTER
Patient is calling to se if it is ok to establish care with Dr. Mayco Lozano as he is her husbands PCP and she says it is more convenient for them to see the same doctor. Please call her back to be scheduled if this is ok?  [phone]

## 2022-01-26 ENCOUNTER — TELEPHONE (OUTPATIENT)
Dept: PULMONOLOGY | Age: 72
End: 2022-01-26

## 2022-01-26 NOTE — TELEPHONE ENCOUNTER
Scarlett Ortega started taper down prednisone Saturday. Monday she started to feel winded and could not walk a couple feet without feeling tired.  Says she feels a little better today but wanted to know if this is normal.  Can be reached at [phone]

## 2022-01-26 NOTE — TELEPHONE ENCOUNTER
Vicky Lara weaned down from 10mg to 5 mg on the prednisone a couple weeks ago and took her last 5mg tablet 4 days ago. She is having more issues with shortness of breath and fatigue but she is having both with minimal exertion. Unclear if this is poor control of her COPD or some relative adrenal insufficiency, but she is not feeling excessively tired or dizzy. Oxygen saturations are adequate. She is currently just on Anoro as the Trelegy that she was on previously was almost $800 and Anoro is also very expensive but more affordable. I suspect her lungs are missing some steroid either because of adrenal issues or just inflammation that requires abatement. I wrote for 200 arnuity's and hopefully that is affordable for. It would be more convenient if she was just on one Trelegy as opposed to 2 other inhalers.     Orders Placed This Encounter   Medications    fluticasone 200 MCG/ACT AEPB     Sig: Inhale 1 Inhaler into the lungs daily     Dispense:  1 each     Refill:  3

## 2022-01-28 ENCOUNTER — TELEPHONE (OUTPATIENT)
Dept: PULMONOLOGY | Age: 72
End: 2022-01-28

## 2022-01-31 ENCOUNTER — TELEPHONE (OUTPATIENT)
Dept: INTERNAL MEDICINE CLINIC | Age: 72
End: 2022-01-31

## 2022-01-31 NOTE — TELEPHONE ENCOUNTER
Pt was told that the pharmacy did not have her Trelegy that we sent on 1/26/2022. I called pharmacy and lm on vm with Rx information and asking for a call back to confirm message was rcvd.

## 2022-01-31 NOTE — TELEPHONE ENCOUNTER
----- Message from Yuliana Monroy sent at 2022 12:24 PM EST -----  Subject: Appointment Request    Reason for Call: New Patient Request Appointment    QUESTIONS  Type of Appointment? New Patient/New to Provider  Reason for appointment request? Other - pt is requesting new pt appt  Additional Information for Provider? pt is requesting to become a pt of   [facility]  Dayday Anne) is already a pt of [facility]   ---------------------------------------------------------------------------  --------------  [address]  What is the best way for the office to contact you? OK to leave message on   voicemail  Preferred Call Back Phone Number? [phone]  ---------------------------------------------------------------------------  --------------  SCRIPT ANSWERS  Relationship to Patient? Self  Specialty Confirmation? Primary Care  Is this the first appointment to establish care for a ? No  Have you been diagnosed with, awaiting test results for, or told that you   are suspected of having COVID-19 (Coronavirus)? (If patient has tested   negative or was tested as a requirement for work, school, or travel and   not based on symptoms, answer no)? No  Within the past two weeks have you developed any of the following symptoms   (answer no if symptoms have been present longer than 2 weeks or began   more than 2 weeks ago)? Fever or Chills, Cough, Shortness of breath or   difficulty breathing, Loss of taste or smell, Sore throat, Nasal   congestion, Sneezing or runny nose, Fatigue or generalized body aches   (answer no if pain is specific to a body part e.g. back pain), Diarrhea,   Headache? No  Have you had close contact with someone with COVID-19 in the last 14 days? No  (Service Expert  click yes below to proceed with [employer] As Usual   Scheduling)?  Yes

## 2022-02-10 DIAGNOSIS — J43.8 OTHER EMPHYSEMA (HCC): ICD-10-CM

## 2022-02-10 RX ORDER — UMECLIDINIUM BROMIDE AND VILANTEROL TRIFENATATE 62.5; 25 UG/1; UG/1
POWDER RESPIRATORY (INHALATION)
Qty: 60 EACH | Refills: 6 | Status: SHIPPED | OUTPATIENT
Start: 2022-02-10

## 2022-02-10 NOTE — TELEPHONE ENCOUNTER
Please refill pended meds if appropriate   Asking that trelegy is prescribed as well to compare prices

## 2022-02-14 ENCOUNTER — OFFICE VISIT (OUTPATIENT)
Dept: INTERNAL MEDICINE CLINIC | Age: 72
End: 2022-02-14
Payer: MEDICARE

## 2022-02-14 VITALS
HEIGHT: 61 IN | WEIGHT: 131.7 LBS | RESPIRATION RATE: 12 BRPM | SYSTOLIC BLOOD PRESSURE: 122 MMHG | BODY MASS INDEX: 24.87 KG/M2 | DIASTOLIC BLOOD PRESSURE: 78 MMHG | OXYGEN SATURATION: 97 % | HEART RATE: 93 BPM

## 2022-02-14 DIAGNOSIS — I70.0 THORACIC AORTA ATHEROSCLEROSIS (HCC): ICD-10-CM

## 2022-02-14 DIAGNOSIS — I50.32 CHRONIC DIASTOLIC CHF (CONGESTIVE HEART FAILURE) (HCC): ICD-10-CM

## 2022-02-14 DIAGNOSIS — I42.8 NONISCHEMIC CARDIOMYOPATHY (HCC): ICD-10-CM

## 2022-02-14 DIAGNOSIS — J38.01 VOCAL CORD PARALYSIS, UNILATERAL PARTIAL: ICD-10-CM

## 2022-02-14 DIAGNOSIS — R73.01 IMPAIRED FASTING GLUCOSE: ICD-10-CM

## 2022-02-14 DIAGNOSIS — I10 BENIGN ESSENTIAL HTN: ICD-10-CM

## 2022-02-14 DIAGNOSIS — F41.9 ANXIETY: ICD-10-CM

## 2022-02-14 DIAGNOSIS — I50.22 SYSTOLIC CHF, CHRONIC (HCC): ICD-10-CM

## 2022-02-14 DIAGNOSIS — K21.9 GASTROESOPHAGEAL REFLUX DISEASE WITHOUT ESOPHAGITIS: ICD-10-CM

## 2022-02-14 DIAGNOSIS — E78.2 MIXED HYPERLIPIDEMIA: Primary | ICD-10-CM

## 2022-02-14 DIAGNOSIS — M81.0 POSTMENOPAUSAL OSTEOPOROSIS: ICD-10-CM

## 2022-02-14 DIAGNOSIS — J44.9 COPD, MODERATE (HCC): ICD-10-CM

## 2022-02-14 DIAGNOSIS — J43.9 PULMONARY EMPHYSEMA, UNSPECIFIED EMPHYSEMA TYPE (HCC): ICD-10-CM

## 2022-02-14 DIAGNOSIS — I95.2 HYPOTENSION DUE TO DRUGS: ICD-10-CM

## 2022-02-14 DIAGNOSIS — Z11.59 NEED FOR HEPATITIS C SCREENING TEST: ICD-10-CM

## 2022-02-14 PROBLEM — J44.1 COPD EXACERBATION (HCC): Status: RESOLVED | Noted: 2018-12-10 | Resolved: 2022-02-14

## 2022-02-14 PROBLEM — J20.9 ACUTE BRONCHITIS: Status: RESOLVED | Noted: 2017-04-14 | Resolved: 2022-02-14

## 2022-02-14 PROCEDURE — G8484 FLU IMMUNIZE NO ADMIN: HCPCS | Performed by: INTERNAL MEDICINE

## 2022-02-14 PROCEDURE — 1036F TOBACCO NON-USER: CPT | Performed by: INTERNAL MEDICINE

## 2022-02-14 PROCEDURE — G8427 DOCREV CUR MEDS BY ELIG CLIN: HCPCS | Performed by: INTERNAL MEDICINE

## 2022-02-14 PROCEDURE — 3023F SPIROM DOC REV: CPT | Performed by: INTERNAL MEDICINE

## 2022-02-14 PROCEDURE — G8420 CALC BMI NORM PARAMETERS: HCPCS | Performed by: INTERNAL MEDICINE

## 2022-02-14 PROCEDURE — G8399 PT W/DXA RESULTS DOCUMENT: HCPCS | Performed by: INTERNAL MEDICINE

## 2022-02-14 PROCEDURE — 1123F ACP DISCUSS/DSCN MKR DOCD: CPT | Performed by: INTERNAL MEDICINE

## 2022-02-14 PROCEDURE — 3017F COLORECTAL CA SCREEN DOC REV: CPT | Performed by: INTERNAL MEDICINE

## 2022-02-14 PROCEDURE — 99215 OFFICE O/P EST HI 40 MIN: CPT | Performed by: INTERNAL MEDICINE

## 2022-02-14 PROCEDURE — 4040F PNEUMOC VAC/ADMIN/RCVD: CPT | Performed by: INTERNAL MEDICINE

## 2022-02-14 PROCEDURE — 1090F PRES/ABSN URINE INCON ASSESS: CPT | Performed by: INTERNAL MEDICINE

## 2022-02-14 RX ORDER — LISINOPRIL 2.5 MG/1
2.5 TABLET ORAL EVERY EVENING
Qty: 90 TABLET | Refills: 3 | Status: SHIPPED | OUTPATIENT
Start: 2022-02-14

## 2022-02-14 NOTE — PROGRESS NOTES
[facility]) Physicians  Internal Medicine  Patient Encounter  Goldie Ken D.O., Katrina Larose        Chief Complaint   Patient presents with    Establish Care       HPI: 73 y.o. female who is seen today in order to establish new primary care. The patient was previously under the care of my partner Dr. Dayday Elizabeth, but patient wanted to transition to me because I see her . She and her  used to see Dr. Ruchi Mckoy. She is requesting a checkup regarding the status of current chronic medical problems listed below along with a medication review and reconciliation. COPD/emphysema--Patient is a former smoker. Her last low-dose radiation CT scan of the chest was 6/24/2021. This showed benign nodular densities within the left upper lobe and left lower lobe without new suspicious masses or nodules. Incidental findings on the CAT scan showed mild atherosclerotic calcifications of the thoracic aorta. No evidence of coronary artery calcifications. She sees Dr. Sofia Strickland. She is on Anoro. She wants to switch to Trelegy. She is no longer on Flovent. She is also on chronic prednisone 5 mg daily. Stress and anxiety make her breathing worse. She has been on Prednisone since October 2021. She is interested in coming off the steroid. Her last PFT's 1/5/2018-- Mod COPD, air trapping, hyperinflation, Mod decreased diffusion capacity. She is C/O anxiety issues. She states she does not handle stress well. She feels overwhelmed at times. She has a large family. She may want to start medication, but not yet. GERD--patient is not requiring medication at this time. She denies any prior history of ulcers, GI bleeding. She currently denies any heartburn or dysphagia. Hyperlipidemia--    Lab Results   Component Value Date    LDLCALC 106 (H) 07/20/2021   Patient is not yet on statin therapy.   It was recommended by her previous PCP     Hypertension--Patient is on Toprol-XL 25 mg 1/2 BID daily, Lisinopril 2.5 mg daily. Not sure why the Toprol XL is BID. She tolerates the medication well. She denies any headaches, lightheadedness, syncope, swelling, palpitations, episodes of unilateral weakness, speech or visual disturbances. Osteoporosis--Her last DEXA scan was 1015 2021 showing a T score in the L-spine of -3.2 and left hip with a T score of -3. 6. She sees Dr. Chano Guzman. She is on Prolia    Chronic systolic congestive heart failure--previous heart cath on 5/21/2018  showed an ejection fraction of 45%. She had normal epicardial coronary arteries. However in 2018 her ejection fraction was measured at 50 to 55%. At that time she had mild mod regurgitation and slightly elevated right ventricular systolic pressure of 38 mmHg. There is an order in the electronic health record for a repeat echo from her cardiologist, Dr. Saad Choi. Patient denies any orthopnea, increased swelling, increased shortness of breath    Vocal cord paralysis--She sees Dr. Agnes Lee. She was having stridor and hoarseness. 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 Reggie Zamarripa. Surgery was complicated by right vocal cord paralysis. She is receiving some sort of an injection to help. She has had only one. She states her voice gets weaker the more she talks. She lebron not get to a whisper. Past Medical History:   Diagnosis Date    Allergic rhinitis     Anxiety 2/14/2022    Back pain     Chronic:under care of spine specialist:Dr. Adames    Cervical cancer screening 2010    Nml per pt'    Compression fracture of thoracic vertebrae, non-traumatic ([facility]     under care of endo:Dr. Dolores Owen    COPD (chronic obstructive pulmonary disease) ([facility]     under care of pulmo:Dr. Pollie Schilder COPD exacerbation ([facility] [address] 4/14/2017    Elevated LDL cholesterol level     GERD (gastroesophageal reflux disease)     History of mammogram 2012;5/17/17    Nml per pt'. 5/17/17=negative.     HLD (hyperlipidemia) 9/3/2021    Hoarseness of voice 10/4/2017    Hypertension     Lung nodule:left 05/04/2017     1.2 cm indeterminate left upper lobe pulmonary nodule:under care of pulmo:Dr. Keith Malhotra    Osteoarthritis     [facility]omyelitis of left leg ([facility] [address]     Osteoporosis     under care of endo:Dr. Rosalinda Szymanski S/P colonoscopy 2011    Polyps:next in 1DAH=0570    Systolic CHF, chronic [facility] EF 45% 5/25/2018    Thoracic aorta atherosclerosis ([facility] 2/14/2022    Thyroid mass     under care of endo & surgeon(Dr. Bella Rivera)    Thyroid nodule     under care of endo:Dr. Mckinnon Males         MEDICATIONS:  Medication Sig   lisinopril (PRINIVIL;ZESTRIL) 2.5 MG tablet TAKE 1 TABLET BY MOUTH EVERY EVENING   umeclidinium-vilanterol (ANORO ELLIPTA) 62.5-25 MCG/INH AEPB inhaler INHALE 1 PUFF INTO THE LUNGS DAILY   predniSONE (DELTASONE) 5 MG tablet Take 1 tablet by mouth daily   VENTOLIN  (90 Base) MCG/ACT inhaler INHALE 2 PUFFS INTO THE LUNGS EVERY 6 HOURS AS NEEDED FOR WHEEZING   metoprolol succinate (TOPROL XL) 25 MG extended release tablet TAKE 1/2 TABLET BY MOUTH TWICE DAILY   albuterol (ACCUNEB) 1.25 MG/3ML nebulizer solution Inhale 3 mLs into the lungs every 4 hours as needed for Wheezing or Shortness of Breath   albuterol (PROVENTIL) (2.5 MG/3ML) 0.083% nebulizer solution Take 2.5 mg by nebulization every 6 hours as needed for Wheezing    melatonin 3 MG TABS tablet Take 3 mg by mouth nightly as needed   aspirin 81 MG chewable tablet Take 1 tablet by mouth daily   Nebulizers (AIRIAL COMPACT MINI NEBULIZER) MISC 1 Device by Does not apply route daily   acetaminophen (TYLENOL) 500 MG tablet Take 500 mg by mouth every 6 hours as needed for Pain            Review of Systems - As per HPI  GEN: Pt denies fever, chills or night sweats. Denies weight changes.   Appetite good  HEENT: Pt denies visual and auditory changes, epistaxis, upper respiratory symptoms and dysphagia  CV: Pt denies CP, SOB, MERAZ, orthopnea palpitations, LE swelling, and claudication. PULM: Pt denies cough, wheezing or sputum production  GI: Pt denies N/V/D, heart burn, rectal bleeding, or melena. NEURO: Pt denies unilateral weakness, paresthesia and dizziness. OBJECTIVE:  Vitals:    02/14/22 1356   BP: 122/78   Pulse: 93   Resp: 12   SpO2: 97%   Weight: 131 lb 11.2 oz (59.7 kg)   Height: 5' 1\" (1.549 m)     Body mass index is 24.88 kg/m². Wt Readings from Last 3 Encounters:   02/14/22 131 lb 11.2 oz (59.7 kg)   01/06/22 131 lb (59.4 kg)   12/13/21 129 lb 11.2 oz (58.8 kg)     BP Readings from Last 3 Encounters:   02/14/22 122/78   01/06/22 110/70   12/13/21 108/81      GEN: NAD, A&O, Non-toxic  HEENT: NC/AT, HARSHIL, EOMI, Oral cavity Clear,  TM's NL, Nasal cavity clear. NECK: Supple. No thyromegaly. No JVD  LYMPH: No C/SC nodes. CV: S1 S2 NL, RRR. No murmurs, clicks or rubs. PULM: CTA, symmentric air exchange  EXT: No C/C/E  GI: Abdomen is soft, NT, BS+, No hepatomegaly. No masses. NEURO: No focal or lateralizing deficits. VASC:  No carotid bruits. Pulses symmetric  SKIN:  No rashes or lesions of concern      ASSESSMENT/PLAN:    1. Mixed hyperlipidemia  Condition stability and control are uncertain  Due for lab  Consider adding statin for further cardiovascular disease risk reduction  - Comprehensive Metabolic Panel; Future  - Lipid Panel; Future  - TSH without Reflex; Future    2. Thoracic aorta atherosclerosis (HCC)  Condition is asymptomatic  Consider statin therapy  Continue aspirin cautiously. - Lipid Panel; Future    3. Gastroesophageal reflux disease without esophagitis  Condition is well controlled  Continue monitor for heartburn and dysphagia. 4. Vocal cord paralysis, unilateral partial  Under the care of ENT  Continue to monitor for stridor    5. Benign essential HTN  Blood pressure is well controlled  Continue to monitor for hypotension.  - CBC Auto Differential; Future  - Comprehensive Metabolic Panel; Future  - Lipid Panel;  Future  - TSH without Reflex; Future    6. Postmenopausal osteoporosis  Under the care of rheumatology    7. Need for hepatitis C screening test    - Hepatitis C Antibody; Future    8. Anxiety  This is not a new problem but newly addressed  Consider medication such as an SSRI. Could also consider buspirone. 9. Chronic diastolic CHF (congestive heart failure) (HCC)  Condition is stable without signs of fluid overload or other signs of cardiac decompensation. Continue to follow-up with cardiology    10. COPD, moderate ([address]  Under the care of pulmonary  Continue to monitor for exacerbation  Sounds like patient needs to get her inhaler regimen solidified. She probably needs triple therapy with LABA, LAMA, ICS. Would like to see if we can get her off of chronic prednisone. I have asked her to discuss this with her pulmonologist.  It seems like a reasonable trade off to get her back on an inhaled steroid. Cost is a problem. In the end, she may need to be on nebulized therapy such as budesonide and Lonell Michael as well as DuoNeb. I would look forward to Dr. Elvis Thomas input. 11. Pulmonary emphysema, unspecified emphysema type ([address]  As above    12. Impaired fasting glucose  Condition stability and control are uncertain. Due for lab  She has been asymptomatic  Continue a sensible low simple sugar diet. - Comprehensive Metabolic Panel; Future  - Hemoglobin A1C; Future        On this date 2/14/2022 I have spent 50 minutes reviewing previous notes, test results and face to face with the patient discussing the diagnosis and importance of compliance with the treatment plan as well as documenting on the day of the visit. Discussed medications with patient who voiced understanding of their use, indication and potential side effects. Pt also understands the above recommendations. All questions answered. This note was generated completely or in part utilizing Dragon dictation speech recognition software.   Occasionally, words are mistranscribed and despite editing, the text may contain inaccuracies due to incorrect word recognition.   If further clarification is needed please contact the office at [phone]       Electronically signed    Alyssia Chen D.O.

## 2022-02-14 NOTE — PATIENT INSTRUCTIONS
need it. ? Talk to your doctor if you have any problems with your medicine. And call your doctor if you have to use your antibiotic or steroid pills. Preventing an exacerbation  · Do not smoke. This is the most important step you can take to prevent more damage to your lungs and prevent problems. If you already smoke, it is never too late to stop. If you need help quitting, talk to your doctor about stop-smoking programs and medicines. These may increase your chances of quitting for good. · Take your daily medicines as prescribed. · Avoid colds and other infections. ? Get a flu vaccine each year, as soon as it is available. Ask those you live or work with to do the same, so they will not get the flu and infect you.  ? Get the pneumococcal and whooping cough (pertussis) vaccines. ? Try to stay away from people with colds or the flu. ? Wash your hands often. · Avoid secondhand smoke and air pollution. Try to stay inside with your windows closed when air pollution is bad. · Learn breathing techniques for COPD, such as pursed-lip breathing. These techniques may help you breathe easier during an exacerbation. When should you call for help? Call 911 anytime you think you may need emergency care. For example, call if:    · You have severe trouble breathing.     · You have severe chest pain. Call your doctor now or seek immediate medical care if:    · You have new or worse shortness of breath.     · You develop new chest pain.     · You are coughing more deeply or more often, especially if you notice more mucus or a change in the color of your mucus.     · You cough up blood.     · You have new or increased swelling in your legs or belly.     · You have a fever. Watch closely for changes in your health, and be sure to contact your doctor if:    · You need to use your antibiotic or steroid pills.     · Your symptoms are getting worse. Where can you learn more? Go to https://chzander.health-partners. org and sign in to your [employer] account. Enter U630 in the [facility] box to learn more about \"COPD Exacerbation Plan: Care Instructions. \"     If you do not have an account, please click on the \"Sign Up Now\" link. Current as of: July 6, 2021               Content Version: 13.1  © 2006-2021 [employer]. Care instructions adapted under license by [facility] [place] ([facility]). If you have questions about a medical condition or this instruction, always ask your healthcare professional. [place] [address] any warranty or liability for your use of this information. Patient Education        Anxiety Disorder: Care Instructions  Your Care Instructions     Anxiety is a normal reaction to stress. Difficult situations can cause you to have symptoms such as sweaty palms and a nervous feeling. In an anxiety disorder, the symptoms are far more severe. Constant worry, muscle tension, trouble sleeping, nausea and diarrhea, and other symptoms can make normal daily activities difficult or impossible. These symptoms may occur for no reason, and they can affect your work, school, or social life. Medicines, counseling, and self-care can all help. Follow-up care is a key part of your treatment and safety. Be sure to make and go to all appointments, and call your doctor if you are having problems. It's also a good idea to know your test results and keep a list of the medicines you take. How can you care for yourself at home? · Take medicines exactly as directed. Call your doctor if you think you are having a problem with your medicine. · Go to your counseling sessions and follow-up appointments. · Recognize and accept your anxiety. Then, when you are in a situation that makes you anxious, say to yourself, \"This is not an emergency. I feel uncomfortable, but I am not in danger. I can keep going even if I feel anxious. \"  · Be kind to your body:  ? Relieve tension with exercise or a massage.   ? Get enough rest.  ? Avoid alcohol, caffeine, nicotine, and illegal drugs. They can increase your anxiety level and cause sleep problems. ? Learn and do relaxation techniques. See below for more about these techniques. · Engage your mind. Get out and do something you enjoy. Go to a funny movie, or take a walk or hike. Plan your day. Having too much or too little to do can make you anxious. · Keep a record of your symptoms. Discuss your fears with a good friend or family member, or join a support group for people with similar problems. Talking to others sometimes relieves stress. · Get involved in social groups, or volunteer to help others. Being alone sometimes makes things seem worse than they are. · Get at least 30 minutes of exercise on most days of the week to relieve stress. Walking is a good choice. You also may want to do other activities, such as running, swimming, cycling, or playing tennis or team sports. Relaxation techniques  Do relaxation exercises 10 to 20 minutes a day. You can play soothing, relaxing music while you do them, if you wish. · Tell others in your house that you are going to do your relaxation exercises. Ask them not to disturb you. · Find a comfortable place, away from all distractions and noise. · Lie down on your back, or sit with your back straight. · Focus on your breathing. Make it slow and steady. · Breathe in through your nose. Breathe out through either your nose or mouth. · Breathe deeply, filling up the area between your navel and your rib cage. Breathe so that your belly goes up and down. · Do not hold your breath. · Breathe like this for 5 to 10 minutes. Notice the feeling of calmness throughout your whole body. As you continue to breathe slowly and deeply, relax by doing the following for another 5 to 10 minutes:  · Tighten and relax each muscle group in your body. You can begin at your toes and work your way up to your head.   · Imagine your muscle groups relaxing and becoming heavy. · Empty your mind of all thoughts. · Let yourself relax more and more deeply. · Become aware of the state of calmness that surrounds you. · When your relaxation time is over, you can bring yourself back to alertness by moving your fingers and toes and then your hands and feet and then stretching and moving your entire body. Sometimes people fall asleep during relaxation, but they usually wake up shortly afterward. · Always give yourself time to return to full alertness before you drive a car or do anything that might cause an accident if you are not fully alert. Never play a relaxation tape while you drive a car. When should you call for help? Call 911 anytime you think you may need emergency care. For example, call if:    · You feel you cannot stop from hurting yourself or someone else. Keep the numbers for these national suicide hotlines: 0-172-496-TALK (1-431.340.8194) and 5-167-POCCQSE (8-714.132.7184). If you or someone you know talks about suicide or feeling hopeless, get help right away. Watch closely for changes in your health, and be sure to contact your doctor if:    · You have anxiety or fear that affects your life.     · You have symptoms of anxiety that are new or different from those you had before. Where can you learn more? Go to https://Meaningorosieb.Register My InfoÂ®. org and sign in to your [employer] account. Enter P754 in the [employer] box to learn more about \"Anxiety Disorder: Care Instructions. \"     If you do not have an account, please click on the \"Sign Up Now\" link. Current as of: September 23, 2020               Content Version: 12.9  © 0856-1862 [employer]. Care instructions adapted under license by [facility] ([facility]). If you have questions about a medical condition or this instruction, always ask your healthcare professional. [place] [address] any warranty or liability for your use of this information.          Patient Education        Learning About Anxiety Disorders  What are anxiety disorders? Anxiety disorders are a type of medical problem. They cause severe anxiety. When you feel anxious, you feel that something bad is about to happen. This feeling interferes with your life. These disorders include:  · Generalized anxiety disorder. You feel worried and stressed about many everyday events and activities. This goes on for several months and disrupts your life on most days. · Panic disorder. You have repeated panic attacks. A panic attack is a sudden, intense fear or anxiety. It may make you feel short of breath. Your heart may pound. · Social anxiety disorder. You feel very anxious about what you will say or do in front of people. For example, you may be scared to talk or eat in public. This problem affects your daily life. · Phobias. You are very scared of a specific object, situation, or activity. For example, you may fear spiders, high places, or small spaces. What are the symptoms? Generalized anxiety disorder  Symptoms may include:  · Feeling worried and stressed about many things almost every day. · Feeling tired or irritable. You may have a hard time concentrating. · Having headaches or muscle aches. · Having a hard time getting to sleep or staying asleep. Panic disorder  You may have repeated panic attacks when there is no reason for feeling afraid. You may change your daily activities because you worry that you will have another attack. Symptoms may include:  · Intense fear, terror, or anxiety. · Trouble breathing or very fast breathing. · Chest pain or tightness. · A heartbeat that races or is not regular. Social anxiety disorder  Symptoms may include:  · Fear about a social situation, such as eating in front of others or speaking in public. You may worry a lot. Or you may be afraid that something bad will happen. · Anxiety that can cause you to blush, sweat, and feel shaky.   · A heartbeat that is faster than normal.  · A hard time focusing. Phobias  Symptoms may include:  · More fear than most people of being around an object, being in a situation, or doing an activity. You might also be stressed about the chance of being around the thing you fear. · Worry about losing control, panicking, fainting, or having physical symptoms like a faster heartbeat when you are around the situation or object. How are these disorders treated? Anxiety disorders can be treated with medicines or counseling. A combination of both may be used. Medicines may include:  · Antidepressants. These may help your symptoms by keeping chemicals in your brain in balance. · Benzodiazepines. These may give you short-term relief of your symptoms. Some people use cognitive-behavioral therapy. A therapist helps you learn to change stressful or bad thoughts into helpful thoughts. Lead a healthy lifestyle  A healthy lifestyle may help you feel better. · Get at least 30 minutes of exercise on most days of the week. Walking is a good choice. · Eat a healthy diet. Include fruits, vegetables, lean proteins, and whole grains in your diet each day. · Try to go to bed at the same time every night. Try for 8 hours of sleep a night. · Find ways to manage stress. Try relaxation exercises. · Avoid alcohol and illegal drugs. Follow-up care is a key part of your treatment and safety. Be sure to make and go to all appointments, and call your doctor if you are having problems. It's also a good idea to know your test results and keep a list of the medicines you take. Where can you learn more? Go to https://jey.Aveksa. org and sign in to your [employer] account. Enter N198 in the [facility] box to learn more about \"Learning About Anxiety Disorders. \"     If you do not have an account, please click on the \"Sign Up Now\" link.   Current as of: June 16, 2021               Content Version: 13.1  © 2038-5201 Healthwise, Incorporated. Care instructions adapted under license by [facility] ([facility]). If you have questions about a medical condition or this instruction, always ask your healthcare professional. [address] any warranty or liability for your use of this information. Patient Education        Learning About Atherosclerosis of the Aorta  What is atherosclerosis of the aorta? Having atherosclerosis (say \"qyn-rw-rlr-mqmso-GRI-zoa\") of the aorta means that a material called plaque (fat and calcium) has built up in the inside wall of a large blood vessel called the aorta. This plaque buildup is sometimes called \"hardening of the arteries. \"  The aorta is the main artery that sends oxygen-rich blood from the heart out to the body and to the brain. It runs from your heart down through your stomach area. When plaque builds up, it can cause problems:  · The plaque can weaken the wall of the aorta. The wall might stretch or tear. · Pieces of the plaque can break open, which causes a blood clot to form. A blood clot or a piece of plaque can travel to other parts of your body and block blood flow. So even if you have no symptoms, having this disease makes you more likely to have serious problems such as:  · Stroke. A stroke can happen when a blood clot travels to the brain and blocks blood flow. Without blood and the oxygen it carries, that part of the brain starts to die. · Aortic aneurysm (say \"a-OR-tik VV-wxp-blo-zum\"). This is a bulge in the wall of the aorta. The bulge can burst, causing serious bleeding. · Aortic dissection. This is a tear between the inner and outer layers of the aorta wall. The tear can cause the wall to separate and burst. This can cause serious bleeding. · Limb ischemia (say \"qoh-HHO-gtj-yuh\"). This means that your limb, usually a leg, is not getting enough oxygen. It happens when a blood clot moves from the aorta to an artery in a leg.  The blood clot cuts off blood supply to the leg. It can permanently damage the tissue. How is it treated? Atherosclerosis of the aorta can be treated with lifestyle changes and medicines that help lower your risk of serious complications. These medicines include:  · Blood pressure medicines such as ACE (angiotensin-converting enzyme) inhibitors, ARBs (angiotensin II receptor blockers), and beta-blockers. · Cholesterol medicines, such as statins and other medicines to lower cholesterol. · Medicine that prevents blood clots, such as aspirin. These medicines prevent blood clots that can cause a heart attack. You may also get a CT (computed tomography) or MRI (magnetic resonance imaging) scan to check the health of your aorta. How can you care for yourself at home? Lifestyle changes    · Do not smoke. If you need help quitting, talk to your doctor about stop-smoking programs and medicines. These can increase your chances of quitting for good.     · Eat heart-healthy foods such as fruits, vegetables, whole grains, fish, lean meats, and low-fat or nonfat dairy foods. Limit sodium, sugars, and alcohol.     · If your doctor recommends it, get more exercise. Walking is a good choice. Bit by bit, increase the amount you walk every day. Try for at least 30 minutes on most days of the week.     · Stay at a healthy weight. Lose weight if you need to. Medicines    · Be safe with medicines. Take your medicines exactly as prescribed. Call your doctor if you think you are having a problem with your medicine.     · If you take a blood thinner, be sure to get instructions about how to take your medicine safely. Blood thinners can cause serious bleeding problems.     · Do not take any vitamins, over-the-counter drugs, or herbal products without talking to your doctor first.   Staying healthy    · Avoid colds and flu. Get a pneumococcal vaccine shot. If you have had one before, ask your doctor if you need another dose. Get the flu vaccine every year.  If you must be around people with colds or flu, wash your hands often.     · Manage other health problems, such as diabetes.     · If you have high blood pressure, it's very important to monitor your blood pressure and take your blood pressure medicines.     · If you think you may have a problem with alcohol or drug use, talk to your doctor. When should you call for help? Call 911 anytime you think you may need emergency care. For example, call if:  · You passed out (lost consciousness). · You have severe pain in your belly, back, or chest.  · You have severe trouble breathing. · You have severe leg pain; numbness; or pale, blue-black skin. · You cough up blood. · You have symptoms of a heart attack. These may include:  ? Chest pain or pressure, or a strange feeling in the chest.  ? Sweating. ? Shortness of breath. ? Nausea or vomiting. ? Pain, pressure, or a strange feeling in the back, neck, jaw, or upper belly or in one or both shoulders or arms. ? Lightheadedness or sudden weakness. ? A fast or irregular heartbeat. · You have symptoms of a stroke. These may include:  ? Sudden numbness, tingling, weakness, or loss of movement in your face, arm, or leg, especially on only one side of your body. ? Sudden vision changes. ? Sudden trouble speaking. ? Sudden confusion or trouble understanding simple statements. ? Sudden problems with walking or balance. ? A sudden, severe headache that is different from past headaches. Watch closely for changes in your health, and be sure to contact your doctor if you have any problems. Follow-up care is a key part of your treatment and safety. Be sure to make and go to all appointments, and call your doctor if you are having problems. It's also a good idea to know your test results and keep a list of the medicines you take. Where can you learn more? Go to https://jey.Wine in Black. org and sign in to your Sxbbm account.  Enter D054 in the [facility] box to learn more about \"Learning About Atherosclerosis of the Aorta. \"     If you do not have an account, please click on the \"Sign Up Now\" link. Current as of: April 29, 2021               Content Version: 13.1  © 8650-3362 Healthwise, Incorporated. Care instructions adapted under license by [facility] ([facility]). If you have questions about a medical condition or this instruction, always ask your healthcare professional. [address] any warranty or liability for your use of this information.

## 2022-02-14 NOTE — TELEPHONE ENCOUNTER
Requested Prescriptions     Pending Prescriptions Disp Refills    lisinopril (PRINIVIL;ZESTRIL) 2.5 MG tablet [Pharmacy Med Name: LISINOPRIL 2.5MG TABLETS] 90 tablet 3     Sig: TAKE 1 TABLET BY MOUTH EVERY EVENING                  Last Office Visit: 1/6/2022     Next Office Visit: 7/20/2022

## 2022-04-29 DIAGNOSIS — M81.0 SENILE OSTEOPOROSIS: ICD-10-CM

## 2022-04-29 DIAGNOSIS — M81.0 SENILE OSTEOPOROSIS: Primary | ICD-10-CM

## 2022-04-29 LAB
ANION GAP SERPL CALCULATED.3IONS-SCNC: 9 MMOL/L (ref 3–16)
BUN BLDV-MCNC: 8 MG/DL (ref 7–20)
CALCIUM SERPL-MCNC: 9.6 MG/DL (ref 8.3–10.6)
CHLORIDE BLD-SCNC: 93 MMOL/L (ref 99–110)
CO2: 30 MMOL/L (ref 21–32)
CREAT SERPL-MCNC: 0.6 MG/DL (ref 0.6–1.2)
GFR AFRICAN AMERICAN: >60
GFR NON-AFRICAN AMERICAN: >60
GLUCOSE BLD-MCNC: 82 MG/DL (ref 70–99)
POTASSIUM SERPL-SCNC: 4.8 MMOL/L (ref 3.5–5.1)
SODIUM BLD-SCNC: 132 MMOL/L (ref 136–145)

## 2022-05-02 ENCOUNTER — OFFICE VISIT (OUTPATIENT)
Dept: RHEUMATOLOGY | Age: 72
End: 2022-05-02
Payer: MEDICARE

## 2022-05-02 VITALS
WEIGHT: 121 LBS | SYSTOLIC BLOOD PRESSURE: 122 MMHG | DIASTOLIC BLOOD PRESSURE: 76 MMHG | BODY MASS INDEX: 22.84 KG/M2 | HEIGHT: 61 IN

## 2022-05-02 DIAGNOSIS — M15.9 GENERALIZED OSTEOARTHRITIS: ICD-10-CM

## 2022-05-02 DIAGNOSIS — M81.0 SENILE OSTEOPOROSIS: Primary | ICD-10-CM

## 2022-05-02 PROCEDURE — G8399 PT W/DXA RESULTS DOCUMENT: HCPCS | Performed by: INTERNAL MEDICINE

## 2022-05-02 PROCEDURE — 4040F PNEUMOC VAC/ADMIN/RCVD: CPT | Performed by: INTERNAL MEDICINE

## 2022-05-02 PROCEDURE — 1090F PRES/ABSN URINE INCON ASSESS: CPT | Performed by: INTERNAL MEDICINE

## 2022-05-02 PROCEDURE — 99214 OFFICE O/P EST MOD 30 MIN: CPT | Performed by: INTERNAL MEDICINE

## 2022-05-02 PROCEDURE — 1123F ACP DISCUSS/DSCN MKR DOCD: CPT | Performed by: INTERNAL MEDICINE

## 2022-05-02 PROCEDURE — 1036F TOBACCO NON-USER: CPT | Performed by: INTERNAL MEDICINE

## 2022-05-02 PROCEDURE — 3017F COLORECTAL CA SCREEN DOC REV: CPT | Performed by: INTERNAL MEDICINE

## 2022-05-02 PROCEDURE — G8427 DOCREV CUR MEDS BY ELIG CLIN: HCPCS | Performed by: INTERNAL MEDICINE

## 2022-05-02 PROCEDURE — G8420 CALC BMI NORM PARAMETERS: HCPCS | Performed by: INTERNAL MEDICINE

## 2022-05-02 PROCEDURE — 96372 THER/PROPH/DIAG INJ SC/IM: CPT | Performed by: INTERNAL MEDICINE

## 2022-05-02 NOTE — PROGRESS NOTES
2022       Martha Aldridge (:  1950)     Ptcondbaud-yeeuqk-ip for osteoporosis with multiple spine fractures. She continues to have mild intercurrent arthralgias from osteoarthritis in different joints at different times depending on activities. Her symptoms are manageable. No overtly tender, swollen or inflamed joints. Osteoporosis stable, no new fractures. Tolerating Prolia well. Continues to take calcium and vitamin D.     Work-up has been negative for secondary osteoporosis. No diarrhea, kidney stones or thyroid parathyroid problems. Rest of the review of systems are negative. Review of Systems    Prior to Visit Medications    Medication Sig Taking?  Authorizing Provider   lisinopril (PRINIVIL;ZESTRIL) 2.5 MG tablet TAKE 1 TABLET BY MOUTH EVERY EVENING  FAUSTINA Prieto CNP   umeclidinium-vilanterol (ANORO ELLIPTA) 62.5-25 MCG/INH AEPB inhaler INHALE 1 PUFF INTO THE LUNGS DAILY  Manny Pedro MD   predniSONE (DELTASONE) 5 MG tablet Take 1 tablet by mouth daily  Manny Pedro MD   VENTOLIN  (90 Base) MCG/ACT inhaler INHALE 2 PUFFS INTO THE LUNGS EVERY 6 HOURS AS NEEDED FOR WHEEZING  Manny Pedro MD   metoprolol succinate (TOPROL XL) 25 MG extended release tablet TAKE 1/2 TABLET BY MOUTH TWICE DAILY  FAUSTINA Prieto CNP   albuterol (ACCUNEB) 1.25 MG/3ML nebulizer solution Inhale 3 mLs into the lungs every 4 hours as needed for Wheezing or Shortness of Breath  Manny Pedro MD   albuterol (PROVENTIL) (2.5 MG/3ML) 0.083% nebulizer solution Take 2.5 mg by nebulization every 6 hours as needed for Wheezing   Historical Provider, MD   melatonin 3 MG TABS tablet Take 3 mg by mouth nightly as needed  Historical Provider, MD   aspirin 81 MG chewable tablet Take 1 tablet by mouth daily  Carlus Hammans, MD   Nebulizers (AIRIAL COMPACT MINI NEBULIZER) MISC 1 Device by Does not apply route daily  Carlus Hammans, MD   acetaminophen (TYLENOL) 500 MG tablet Take 500 mg by mouth every 6 hours as needed for Pain   Historical Provider, MD                PHYSICAL EXAMINATION:  [ INSTRUCTIONS:  \"[x]\" Indicates a positive item  \"[]\" Indicates a negative item  -- DELETE ALL ITEMS NOT EXAMINED]  Vital Signs: (As obtained by patient/caregiver or practitioner observation)    Blood pressure-  Heart rate-    Respiratory rate-    Temperature-  Pulse oximetry-     Constitutional: [x] Appears well-developed and well-nourished [x] No apparent distress      [] Abnormal-   Mental status  [x] Alert and awake  [x] Oriented to person/place/time [x]Able to follow commands      Eyes:  EOM    []  Normal  [] Abnormal-  Sclera  []  Normal  [] Abnormal -         Discharge []  None visible  [] Abnormal -    HENT:   [] Normocephalic, atraumatic. [] Abnormal   [] Mouth/Throat: Mucous membranes are moist.     External Ears [] Normal  [] Abnormal-     Neck: [] No visualized mass     Pulmonary/Chest: [x] Respiratory effort normal.  [x] No visualized signs of difficulty breathing or respiratory distress        [] Abnormal-      Musculoskeletal:   [x] Normal gait with no signs of ataxia         [] Normal range of motion of neck        [x] osteoarthritic changes unchanged in the scattered finger joints, feet. I do not appreciate any focally tender, swollen or inflamed joints in upper and lower extremities. Asymptomatic OA changes in scattered DIPs, knees. Full range of motion all peripheral joints. Neurological:        [] No Facial Asymmetry (Cranial nerve 7 motor function) (limited exam to video visit)          [] No gaze palsy        [] Abnormal-         Skin:        [x] No significant exanthematous lesions or discoloration noted on facial skin         [] Abnormal-            Psychiatric:       [] Normal Affect [] No Hallucinations        [] Abnormal-     Other pertinent observable physical exam findings-     ASSESSMENT/PLAN:  Nasima Espinal was seen today for follow-up and injections.     Diagnoses and all orders for this visit:    Senile osteoporosis  -     denosumab (PROLIA) SC injection 60 mg    Generalized osteoarthritis        Severe osteoporosis with multiple compression fractures in the spine- lost 2.5 inch ht (MRI thoracolumbar spine 9/27/2019-kyphoplasty T6, T7, T10, T11, T12, chronic compression of T3, subacute compression of T4, acute compression L1 and L2. Unable to afford Forteo, therefore is on Prolia. Tolerating it well. Long-term smoking, otherwise no other risk factors. Quit smoking. DEXA scan T score L spine                     T score femoral neck  10/15/2021        -3.2                            L hip -3.6, FN -3.8  9/20/2019            -4.5                           left hip -2.3, left femoral neck -4.1  4/28/2017            -4.2                           left hip -3.6, left femoral neck -3.4, right hip -2.7, right femoral neck -2.5      Prolia  10/17/2019, 4/22/2020, 10/28/2020, 4/29/2021, 10/29/2020, 4/29/2021, 11/1/2021, 5/2/2022. Next dose will be November 3, 2022. Continue calcium and vitamin D supplementation. Okay to take acetaminophen as needed for intercurrent arthralgias. We will update DEXA scan next visit. Follow-up in 6 months. No follow-ups on file. Total time 31 minutes that includes the following-  Preparing to see the patient such as reviewing patients records, pre-charting, preparing the visit on the same day, performing a medically appropriate history and physical examination, counseling and educating patient about diagnosis, management plan, ordering appropriate testings, prescriptions, communicating findings to other care providers, and documenting clinical information in electronic medical record. --Arcadio Romano MD on 5/2/2022 at 3:28 PM    An electronic signature was used to authenticate this note.

## 2022-05-27 RX ORDER — FLUTICASONE FUROATE, UMECLIDINIUM BROMIDE AND VILANTEROL TRIFENATATE 200; 62.5; 25 UG/1; UG/1; UG/1
POWDER RESPIRATORY (INHALATION)
Qty: 3 EACH | Refills: 3 | Status: SHIPPED | OUTPATIENT
Start: 2022-05-27

## 2022-06-14 ENCOUNTER — TELEPHONE (OUTPATIENT)
Dept: CASE MANAGEMENT | Age: 72
End: 2022-06-14

## 2022-06-14 NOTE — TELEPHONE ENCOUNTER
Patient due for annual CT Lung Screening. Reminder letter mailed.         Jesse THOMPSONN, RN   Lung Nodule Navigator  [facility]  [phone]

## 2022-06-20 ENCOUNTER — HOSPITAL ENCOUNTER (EMERGENCY)
Age: 72
Discharge: HOME | End: 2022-06-20
Attending: EMERGENCY MEDICINE
Payer: MEDICARE

## 2022-06-20 ENCOUNTER — APPOINTMENT (OUTPATIENT)
Dept: CT IMAGING | Age: 72
End: 2022-06-20
Payer: MEDICARE

## 2022-06-20 VITALS
DIASTOLIC BLOOD PRESSURE: 110 MMHG | BODY MASS INDEX: 24.19 KG/M2 | HEART RATE: 87 BPM | OXYGEN SATURATION: 91 % | RESPIRATION RATE: 24 BRPM | SYSTOLIC BLOOD PRESSURE: 164 MMHG | WEIGHT: 128 LBS | TEMPERATURE: 97.6 F

## 2022-06-20 DIAGNOSIS — K52.9 COLITIS: Primary | ICD-10-CM

## 2022-06-20 LAB
ALBUMIN SERPL-MCNC: 4.4 G/DL (ref 3.4–5)
ALP BLD-CCNC: 55 U/L (ref 40–129)
ALT SERPL-CCNC: 8 U/L (ref 10–40)
ANION GAP SERPL CALCULATED.3IONS-SCNC: 10 MMOL/L (ref 3–16)
AST SERPL-CCNC: 11 U/L (ref 15–37)
BASE EXCESS VENOUS: 6 MMOL/L (ref -2–3)
BASOPHILS ABSOLUTE: 0.1 K/UL (ref 0–0.2)
BASOPHILS RELATIVE PERCENT: 0.8 %
BILIRUB SERPL-MCNC: 1 MG/DL (ref 0–1)
BILIRUBIN DIRECT: <0.2 MG/DL (ref 0–0.3)
BILIRUBIN URINE: NEGATIVE
BILIRUBIN, INDIRECT: ABNORMAL MG/DL (ref 0–1)
BLOOD, URINE: NEGATIVE
BUN BLDV-MCNC: 10 MG/DL (ref 7–20)
CALCIUM SERPL-MCNC: 8.8 MG/DL (ref 8.3–10.6)
CARBOXYHEMOGLOBIN: 2.2 % (ref 0–1.5)
CHLORIDE BLD-SCNC: 92 MMOL/L (ref 99–110)
CLARITY: CLEAR
CO2: 29 MMOL/L (ref 21–32)
COLOR: YELLOW
CREAT SERPL-MCNC: <0.5 MG/DL (ref 0.6–1.2)
EOSINOPHILS ABSOLUTE: 0 K/UL (ref 0–0.6)
EOSINOPHILS RELATIVE PERCENT: 0.1 %
EPITHELIAL CELLS, UA: ABNORMAL /HPF (ref 0–5)
GFR AFRICAN AMERICAN: >60
GFR NON-AFRICAN AMERICAN: >60
GLUCOSE BLD-MCNC: 119 MG/DL (ref 70–99)
GLUCOSE URINE: NEGATIVE MG/DL
HCO3 VENOUS: 31.6 MMOL/L (ref 24–28)
HCT VFR BLD CALC: 47 % (ref 36–48)
HEMOGLOBIN, VEN, REDUCED: 40.4 %
HEMOGLOBIN: 15.6 G/DL (ref 12–16)
KETONES, URINE: 15 MG/DL
LACTIC ACID: 1.1 MMOL/L (ref 0.4–2)
LEUKOCYTE ESTERASE, URINE: NEGATIVE
LIPASE: 12 U/L (ref 13–60)
LYMPHOCYTES ABSOLUTE: 1.2 K/UL (ref 1–5.1)
LYMPHOCYTES RELATIVE PERCENT: 8 %
MCH RBC QN AUTO: 30.9 PG (ref 26–34)
MCHC RBC AUTO-ENTMCNC: 33.1 G/DL (ref 31–36)
MCV RBC AUTO: 93.3 FL (ref 80–100)
METHEMOGLOBIN VENOUS: 0.1 % (ref 0–1.5)
MICROSCOPIC EXAMINATION: ABNORMAL
MONOCYTES ABSOLUTE: 0.7 K/UL (ref 0–1.3)
MONOCYTES RELATIVE PERCENT: 4.3 %
NEUTROPHILS ABSOLUTE: 13 K/UL (ref 1.7–7.7)
NEUTROPHILS RELATIVE PERCENT: 86.8 %
NITRITE, URINE: NEGATIVE
O2 SAT, VEN: 59 %
PCO2, VEN: 47.2 MMHG (ref 41–51)
PDW BLD-RTO: 13.6 % (ref 12.4–15.4)
PH UA: 7.5 (ref 5–8)
PH VENOUS: 7.43 (ref 7.35–7.45)
PLATELET # BLD: 307 K/UL (ref 135–450)
PMV BLD AUTO: 7.7 FL (ref 5–10.5)
PO2, VEN: 31.9 MMHG (ref 25–40)
POTASSIUM REFLEX MAGNESIUM: 3.7 MMOL/L (ref 3.5–5.1)
PROTEIN UA: NEGATIVE MG/DL
RBC # BLD: 5.04 M/UL (ref 4–5.2)
RBC UA: ABNORMAL /HPF (ref 0–4)
SODIUM BLD-SCNC: 131 MMOL/L (ref 136–145)
SPECIFIC GRAVITY UA: 1.01 (ref 1–1.03)
TCO2 CALC VENOUS: 33 MMOL/L
TOTAL PROTEIN: 6.9 G/DL (ref 6.4–8.2)
URINE TYPE: ABNORMAL
UROBILINOGEN, URINE: 0.2 E.U./DL
WBC # BLD: 15 K/UL (ref 4–11)
WBC UA: ABNORMAL /HPF (ref 0–5)

## 2022-06-20 PROCEDURE — 99285 EMERGENCY DEPT VISIT HI MDM: CPT

## 2022-06-20 PROCEDURE — 81001 URINALYSIS AUTO W/SCOPE: CPT

## 2022-06-20 PROCEDURE — 96374 THER/PROPH/DIAG INJ IV PUSH: CPT

## 2022-06-20 PROCEDURE — 36415 COLL VENOUS BLD VENIPUNCTURE: CPT

## 2022-06-20 PROCEDURE — 80076 HEPATIC FUNCTION PANEL: CPT

## 2022-06-20 PROCEDURE — 74177 CT ABD & PELVIS W/CONTRAST: CPT

## 2022-06-20 PROCEDURE — 83605 ASSAY OF LACTIC ACID: CPT

## 2022-06-20 PROCEDURE — 85025 COMPLETE CBC W/AUTO DIFF WBC: CPT

## 2022-06-20 PROCEDURE — 83690 ASSAY OF LIPASE: CPT

## 2022-06-20 PROCEDURE — 80048 BASIC METABOLIC PNL TOTAL CA: CPT

## 2022-06-20 PROCEDURE — 6360000002 HC RX W HCPCS: Performed by: EMERGENCY MEDICINE

## 2022-06-20 PROCEDURE — 6370000000 HC RX 637 (ALT 250 FOR IP): Performed by: EMERGENCY MEDICINE

## 2022-06-20 PROCEDURE — 82803 BLOOD GASES ANY COMBINATION: CPT

## 2022-06-20 PROCEDURE — 6360000004 HC RX CONTRAST MEDICATION: Performed by: EMERGENCY MEDICINE

## 2022-06-20 PROCEDURE — 2580000003 HC RX 258: Performed by: EMERGENCY MEDICINE

## 2022-06-20 RX ORDER — SODIUM CHLORIDE, SODIUM LACTATE, POTASSIUM CHLORIDE, AND CALCIUM CHLORIDE .6; .31; .03; .02 G/100ML; G/100ML; G/100ML; G/100ML
1000 INJECTION, SOLUTION INTRAVENOUS ONCE
Status: COMPLETED | OUTPATIENT
Start: 2022-06-20 | End: 2022-06-20

## 2022-06-20 RX ORDER — ONDANSETRON 2 MG/ML
8 INJECTION INTRAMUSCULAR; INTRAVENOUS ONCE
Status: COMPLETED | OUTPATIENT
Start: 2022-06-20 | End: 2022-06-20

## 2022-06-20 RX ORDER — AMOXICILLIN AND CLAVULANATE POTASSIUM 875; 125 MG/1; MG/1
1 TABLET, FILM COATED ORAL ONCE
Status: COMPLETED | OUTPATIENT
Start: 2022-06-20 | End: 2022-06-20

## 2022-06-20 RX ORDER — AMOXICILLIN AND CLAVULANATE POTASSIUM 875; 125 MG/1; MG/1
1 TABLET, FILM COATED ORAL 2 TIMES DAILY
Qty: 20 TABLET | Refills: 0 | Status: SHIPPED | OUTPATIENT
Start: 2022-06-20 | End: 2022-06-22 | Stop reason: ALTCHOICE

## 2022-06-20 RX ADMIN — AMOXICILLIN AND CLAVULANATE POTASSIUM 1 TABLET: 875; 125 TABLET, FILM COATED ORAL at 23:32

## 2022-06-20 RX ADMIN — IOPAMIDOL 80 ML: 755 INJECTION, SOLUTION INTRAVENOUS at 22:17

## 2022-06-20 RX ADMIN — ONDANSETRON 8 MG: 2 INJECTION INTRAMUSCULAR; INTRAVENOUS at 19:24

## 2022-06-20 RX ADMIN — SODIUM CHLORIDE, POTASSIUM CHLORIDE, SODIUM LACTATE AND CALCIUM CHLORIDE 1000 ML: 600; 310; 30; 20 INJECTION, SOLUTION INTRAVENOUS at 19:24

## 2022-06-20 ASSESSMENT — PAIN - FUNCTIONAL ASSESSMENT: PAIN_FUNCTIONAL_ASSESSMENT: NONE - DENIES PAIN

## 2022-06-21 ENCOUNTER — TELEPHONE (OUTPATIENT)
Dept: PULMONOLOGY | Age: 72
End: 2022-06-21

## 2022-06-21 DIAGNOSIS — Z72.0 TOBACCO ABUSE DISORDER: Primary | ICD-10-CM

## 2022-06-21 NOTE — TELEPHONE ENCOUNTER
Please advise patient got an letter from insurance states that she is due for a CT Lung Screening please place order if due for one

## 2022-06-21 NOTE — ED PROVIDER NOTES
[address] [facility] [address] ENCOUNTER          ATTENDING PHYSICIAN NOTE       Date of evaluation: 6/20/2022    ADDENDUM:      Care of this patient was assumed from Dr Hipolito Cabrera. The patient was seen for Abdominal Pain (n/v/d since sunday with generalized abdominal cramping.)  . The patient's initial evaluation and plan have been discussed with the prior provider who initially evaluated the patient. Nursing Notes, Past Medical Hx, Past Surgical Hx, Social Hx, Allergies, and Family Hx were all reviewed. Diagnostic Results       RADIOLOGY:  CT ABDOMEN PELVIS W IV CONTRAST Additional Contrast? None   Final Result      Diffuse wall thickening of the colon particularly prominent in the descending colon and sigmoid. Most consistent with an infectious or inflammatory colitis. Mild compression of multiple vertebral bodies with evidence of previous kyphoplasty in the lower thoracic spine. Bladder wall thickening, indeterminate.  Cystitis is not excluded although the bladder is not well distended for the exam.          LABS:   Results for orders placed or performed during the hospital encounter of 06/20/22   CBC with Auto Differential   Result Value Ref Range    WBC 15.0 (H) 4.0 - 11.0 K/uL    RBC 5.04 4.00 - 5.20 M/uL    Hemoglobin 15.6 12.0 - 16.0 g/dL    Hematocrit 47.0 36.0 - 48.0 %    MCV 93.3 80.0 - 100.0 fL    MCH 30.9 26.0 - 34.0 pg    MCHC 33.1 31.0 - 36.0 g/dL    RDW 13.6 12.4 - 15.4 %    Platelets 565 927 - 764 K/uL    MPV 7.7 5.0 - 10.5 fL    Neutrophils % 86.8 %    Lymphocytes % 8.0 %    Monocytes % 4.3 %    Eosinophils % 0.1 %    Basophils % 0.8 %    Neutrophils Absolute 13.0 (H) 1.7 - 7.7 K/uL    Lymphocytes Absolute 1.2 1.0 - 5.1 K/uL    Monocytes Absolute 0.7 0.0 - 1.3 K/uL    Eosinophils Absolute 0.0 0.0 - 0.6 K/uL    Basophils Absolute 0.1 0.0 - 0.2 K/uL   Basic Metabolic Panel w/ Reflex to MG   Result Value Ref Range    Sodium 131 (L) 136 - 145 mmol/L    Potassium reflex Magnesium 3.7 3.5 - 5.1 mmol/L    Chloride 92 (L) 99 - 110 mmol/L    CO2 29 21 - 32 mmol/L    Anion Gap 10 3 - 16    Glucose 119 (H) 70 - 99 mg/dL    BUN 10 7 - 20 mg/dL    CREATININE <0.5 (L) 0.6 - 1.2 mg/dL    GFR Non-African American >60 >60    GFR African American >60 >60    Calcium 8.8 8.3 - 10.6 mg/dL   Lipase   Result Value Ref Range    Lipase 12.0 (L) 13.0 - 60.0 U/L   Hepatic Function Panel   Result Value Ref Range    Total Protein 6.9 6.4 - 8.2 g/dL    Albumin 4.4 3.4 - 5.0 g/dL    Alkaline Phosphatase 55 40 - 129 U/L    ALT 8 (L) 10 - 40 U/L    AST 11 (L) 15 - 37 U/L    Total Bilirubin 1.0 0.0 - 1.0 mg/dL    Bilirubin, Direct <0.2 0.0 - 0.3 mg/dL    Bilirubin, Indirect see below 0.0 - 1.0 mg/dL   Urinalysis with Microscopic   Result Value Ref Range    Color, UA Yellow Straw/Yellow    Clarity, UA Clear Clear    Glucose, Ur Negative Negative mg/dL    Bilirubin Urine Negative Negative    Ketones, Urine 15 (A) Negative mg/dL    Specific Gravity, UA 1.010 1.005 - 1.030    Blood, Urine Negative Negative    pH, UA 7.5 5.0 - 8.0    Protein, UA Negative Negative mg/dL    Urobilinogen, Urine 0.2 <2.0 E.U./dL    Nitrite, Urine Negative Negative    Leukocyte Esterase, Urine Negative Negative    Microscopic Examination Not Indicated     Urine Type NotGiven     WBC, UA None seen 0 - 5 /HPF    RBC, UA None seen 0 - 4 /HPF    Epithelial Cells, UA 6-10 (A) 0 - 5 /HPF   Blood Gas, Venous   Result Value Ref Range    pH, Waqas 7.434 7.350 - 7.450    pCO2, Waqas 47.2 41.0 - 51.0 mmHg    pO2, Waqas 31.9 25.0 - 40.0 mmHg    HCO3, Venous 31.6 (H) 24.0 - 28.0 mmol/L    Base Excess, Waqas 6.0 (H) -2.0 - 3.0 mmol/L    O2 Sat, Waqas 59 Not established %    Carboxyhemoglobin 2.2 (H) 0.0 - 1.5 %    MetHgb, Waqas 0.1 0.0 - 1.5 %    TC02 (Calc), Waqas 33 mmol/L    Hemoglobin, Waqas, Reduced 40.40 %   Lactic Acid   Result Value Ref Range    Lactic Acid 1.1 0.4 - 2.0 mmol/L       RECENT VITALS:  BP: (!) 164/110, Temp: 97.6 °F (36.4 °C), Heart Rate: 87, Resp: 24, SpO2: 91 %         ED Course     The patient was given the following medications:  Orders Placed This Encounter   Medications    lactated ringers bolus    ondansetron (ZOFRAN) injection 8 mg    iopamidol (ISOVUE-370) 76 % injection 80 mL    amoxicillin-clavulanate (AUGMENTIN) 875-125 MG per tablet 1 tablet     Order Specific Question:   Antimicrobial Indications     Answer:   Intra-Abdominal Infection    amoxicillin-clavulanate (AUGMENTIN) 875-125 MG per tablet     Sig: Take 1 tablet by mouth 2 times daily for 10 days     Dispense:  20 tablet     Refill:  0       CONSULTS:  None    MEDICAL DECISION MAKING / ASSESSMENT / Ghanshyam Km is a 81 y.o. female who presented to the ED with the complaint of intermittent abdominal pain in the setting of recent laryngeal surgery. The patient was turned over to me awaiting the results of a CT abdomen and pelvis. The patient CT of the abdomen and pelvis resulted back showing evidence of colitis. Given the patient's leukocytosis and absence of any significant lactate elevation acid-base disturbance and patent vessels seen on imaging feel that ischemic colitis is highly unlikely. I reexamined the patient and found her to have a soft nonperitoneal abdomen patient felt symptomatically improved and I feel is safe for discharge home with an outpatient course of antibiotics. The patient was given a first dose of Augmentin here in the emergency department will be discharged home on 10-day course of the same. Clinical Impression     1.  Colitis        Disposition     PATIENT REFERRED TO:  DO Ezequiel Ness [address]. [address].  [phone]            DISCHARGE MEDICATIONS:  New Prescriptions    AMOXICILLIN-CLAVULANATE (AUGMENTIN) 875-125 MG PER TABLET    Take 1 tablet by mouth 2 times daily for 10 days       DISPOSITION Discharge - Pending Orders Complete 06/20/2022 11:02:12 PM       Bhavya Rios MD  06/20/22 3909

## 2022-06-21 NOTE — ED PROVIDER NOTES
[address] [facility] ATTENDING NOTE       Date of evaluation: 6/20/2022    Chief Complaint     Abdominal Pain (n/v/d since sunday with generalized abdominal cramping.)      History of Present Illness     Olman Landeros is a 81 y.o. female who presents to the emerge department for evaluation of abdominal pain with nausea and vomiting. Patient states that over the last 24 hours she has had persistent lower abdominal cramping with associated nausea, vomiting and diarrhea. The patient is status post recent medialization laryngeal plasty performed at [facility] on 6/17 in the setting of vocal cord polyps. She states that after returning from the surgery she was doing well until the symptoms developed last night. She describes her lower abdominal pain is crampy in nature. It is not better or worse with bowel movements. She denies any blood in the toilet. She denies any dysuria but does report increased urinary frequency. She denies any recent fevers, cough, shortness of breath or chest pain. Review of Systems     Positive: Abdominal pain, nausea, vomiting, diarrhea    Negative: Fever, cough, shortness of breath, chest pain, changes in taste, changes in smell, headache    See HPI. A complete review of systems wasconducted and is otherwise negative unless noted above.     Past Medical, Surgical, Family, and Social History     She has a past medical history of Allergic rhinitis, Anxiety, Back pain, Cervical cancer screening, Compression fracture of thoracic vertebrae, non-traumatic ([address] COPD (chronic obstructive pulmonary disease) ([address] COPD exacerbation ([address] Elevated LDL cholesterol level, GERD (gastroesophageal reflux disease), History of mammogram, HLD (hyperlipidemia), Hoarseness of voice, Hypertension, Lung nodule:left, Osteoarthritis, Osteomyelitis of left leg ([address] Osteoporosis, S/P colonoscopy, Systolic CHF, chronic (HCC) EF 45%, Thoracic aorta atherosclerosis [facility]), Thyroid mass, and Thyroid nodule. She has a past surgical history that includes  section; Appendectomy; Fixation Kyphoplasty (2017); and other surgical history (2017). Her family history includes Diabetes in her mother; No Known Problems in her brother, father, maternal grandfather, maternal grandmother, paternal grandfather, paternal grandmother, and sister. She reports that she quit smoking about 11 years ago. Her smoking use included cigarettes. She has a 22.00 pack-year smoking history. She has never used smokeless tobacco. She reports current alcohol use of about 4.0 standard drinks of alcohol per week. She reports that she does not use drugs.     Medications     Previous Medications    ACETAMINOPHEN (TYLENOL) 500 MG TABLET    Take 500 mg by mouth every 6 hours as needed for Pain     ALBUTEROL (ACCUNEB) 1.25 MG/3ML NEBULIZER SOLUTION    Inhale 3 mLs into the lungs every 4 hours as needed for Wheezing or Shortness of Breath    ALBUTEROL (PROVENTIL) (2.5 MG/3ML) 0.083% NEBULIZER SOLUTION    Take 2.5 mg by nebulization every 6 hours as needed for Wheezing     ASPIRIN 81 MG CHEWABLE TABLET    Take 1 tablet by mouth daily    LISINOPRIL (PRINIVIL;ZESTRIL) 2.5 MG TABLET    TAKE 1 TABLET BY MOUTH EVERY EVENING    MELATONIN 3 MG TABS TABLET    Take 3 mg by mouth nightly as needed    METOPROLOL SUCCINATE (TOPROL XL) 25 MG EXTENDED RELEASE TABLET    TAKE 1/2 TABLET BY MOUTH TWICE DAILY    NEBULIZERS (AIRIAL COMPACT MINI NEBULIZER) MISC    1 Device by Does not apply route daily    PREDNISONE (DELTASONE) 5 MG TABLET    Take 1 tablet by mouth daily    TRELEGY ELLIPTA 200-62.5-25 MCG/INH AEPB    USE 1 PUFF EVERY DAY    UMECLIDINIUM-VILANTEROL (ANORO ELLIPTA) 62.5-25 MCG/INH AEPB INHALER    INHALE 1 PUFF INTO THE LUNGS DAILY    VENTOLIN  (90 BASE) MCG/ACT INHALER    INHALE 2 PUFFS INTO THE LUNGS EVERY 6 HOURS AS NEEDED FOR WHEEZING       Allergies     She is allergic to codeine, bee venom, percocet [oxycodone-acetaminophen], vicodin [hydrocodone-acetaminophen], and adhesive tape.     Physical Exam     INITIAL VITALS: BP: (!) 159/110, Temp: 97.6 °F (36.4 °C), Heart Rate: 92, Resp: 18, SpO2: 99 %   Physical Exam    General:  Well developed adult female in no acute distress, able toconverse in full sentences  HEENT:  Normocephalic, atraumatic, PERRL, extra-ocular movements intact, oropharynx benign  Neck:  Supple, no lymphadenopathy, trachea midline, no masses, surgical wound is clean, dry and intact with no surrounding erythema, streaking or discharge  Pulmonary:   Clear to auscultation bilaterally, good air movement, no wheezes  Cardiac:  Regular rate and rhythm, no M/R/G  Abdomen:  Soft, non-tender, non-distended, no rebounding or guarding  Musculoskeletal:  2+ pulses, no edema or clubbing  Skin:  No concerning rashes or lesions, no cyanosis  Neuro: Alert and oriented X 4,able to move all four extremities with equal strength bilaterally, sensory exam grossly intact, cranial nerves II-XII intact  Psych:  Appropriate mood and affect    Diagnostic Results       RADIOLOGY:  CT ABDOMEN PELVIS W IV CONTRAST Additional Contrast? None    (Results Pending)       LABS:   Results for orders placed or performed during the hospital encounter of 06/20/22   CBC with Auto Differential   Result Value Ref Range    WBC 15.0 (H) 4.0 - 11.0 K/uL    RBC 5.04 4.00 - 5.20 M/uL    Hemoglobin 15.6 12.0 - 16.0 g/dL    Hematocrit 47.0 36.0 - 48.0 %    MCV 93.3 80.0 - 100.0 fL    MCH 30.9 26.0 - 34.0 pg    MCHC 33.1 31.0 - 36.0 g/dL    RDW 13.6 12.4 - 15.4 %    Platelets 483 862 - 765 K/uL    MPV 7.7 5.0 - 10.5 fL    Neutrophils % 86.8 %    Lymphocytes % 8.0 %    Monocytes % 4.3 %    Eosinophils % 0.1 %    Basophils % 0.8 %    Neutrophils Absolute 13.0 (H) 1.7 - 7.7 K/uL    Lymphocytes Absolute 1.2 1.0 - 5.1 K/uL    Monocytes Absolute 0.7 0.0 - 1.3 K/uL    Eosinophils Absolute 0.0 0.0 - 0.6 K/uL    Basophils Absolute 0.1 0.0 - 0.2 K/uL   Basic Metabolic Panel w/ Reflex to MG   Result Value Ref Range    Sodium 131 (L) 136 - 145 mmol/L    Potassium reflex Magnesium 3.7 3.5 - 5.1 mmol/L    Chloride 92 (L) 99 - 110 mmol/L    CO2 29 21 - 32 mmol/L    Anion Gap 10 3 - 16    Glucose 119 (H) 70 - 99 mg/dL    BUN 10 7 - 20 mg/dL    CREATININE <0.5 (L) 0.6 - 1.2 mg/dL    GFR Non-African American >60 >60    GFR African American >60 >60    Calcium 8.8 8.3 - 10.6 mg/dL   Lipase   Result Value Ref Range    Lipase 12.0 (L) 13.0 - 60.0 U/L   Hepatic Function Panel   Result Value Ref Range    Total Protein 6.9 6.4 - 8.2 g/dL    Albumin 4.4 3.4 - 5.0 g/dL    Alkaline Phosphatase 55 40 - 129 U/L    ALT 8 (L) 10 - 40 U/L    AST 11 (L) 15 - 37 U/L    Total Bilirubin 1.0 0.0 - 1.0 mg/dL    Bilirubin, Direct <0.2 0.0 - 0.3 mg/dL    Bilirubin, Indirect see below 0.0 - 1.0 mg/dL   Urinalysis with Microscopic   Result Value Ref Range    Color, UA Yellow Straw/Yellow    Clarity, UA Clear Clear    Glucose, Ur Negative Negative mg/dL    Bilirubin Urine Negative Negative    Ketones, Urine 15 (A) Negative mg/dL    Specific Gravity, UA 1.010 1.005 - 1.030    Blood, Urine Negative Negative    pH, UA 7.5 5.0 - 8.0    Protein, UA Negative Negative mg/dL    Urobilinogen, Urine 0.2 <2.0 E.U./dL    Nitrite, Urine Negative Negative    Leukocyte Esterase, Urine Negative Negative    Microscopic Examination Not Indicated     Urine Type NotGiven     WBC, UA None seen 0 - 5 /HPF    RBC, UA None seen 0 - 4 /HPF    Epithelial Cells, UA 6-10 (A) 0 - 5 /HPF   Blood Gas, Venous   Result Value Ref Range    pH, Waqas 7.434 7.350 - 7.450    pCO2, Waqas 47.2 41.0 - 51.0 mmHg    pO2, Waqas 31.9 25.0 - 40.0 mmHg    HCO3, Venous 31.6 (H) 24.0 - 28.0 mmol/L    Base Excess, Waqas 6.0 (H) -2.0 - 3.0 mmol/L    O2 Sat, Waqas 59 Not established %    Carboxyhemoglobin 2.2 (H) 0.0 - 1.5 %    MetHgb, Waqas 0.1 0.0 - 1.5 %    TC02 (Calc), Waqas 33 mmol/L    Hemoglobin, Waqas, Reduced 40.40 %   Lactic Acid   Result Value Ref Range    Lactic Acid 1.1 0.4 - 2.0 mmol/L           RECENT VITALS:  BP: (!) 164/110, Temp: 97.6 °F (36.4 °C), Heart Rate: 87, Resp: 24, SpO2: 91 %     Procedures         ED Course     Nursing Notes, Past Medical Hx, Past Surgical Hx, Social Hx, Allergies, and Family Hx were reviewed. The patient was given the following medications:  Orders Placed This Encounter   Medications    lactated ringers bolus    ondansetron (ZOFRAN) injection 8 mg    iopamidol (ISOVUE-370) 76 % injection 80 mL       CONSULTS:  None    MEDICAL DECISION MAKING / ASSESSMENT / Froilan Mejias is a 81 y.o. female with a history andpresentation as described above in HPI. The patient was evaluated by myself, the ED Attending Physician. On initial encounter the patient was in no acute distress with reassuring vitals and no signs of impending hemodynamic or respiratory collapse. Patient presents to the emergency department with nausea, vomiting, diarrhea and lower abdominal pain for the last 24 hours. She is well-appearing on initial evaluation with a reassuring abdominal exam with no peritoneal signs. The patient is recently postop from a ENT surgery approximately 3 days ago. She received antibiotics intraoperatively, Unasyn, per chart review though it does not appear that she has been on any further antibiotics lowering concern for C. difficile. She denies any dysuria lowering concern for urinary tract infection. We will treat her nausea symptomatically with Zofran, provide IV fluids and obtain laboratory work-up including urinalysis. Urinalysis does not show any signs of acute infection. Renal panel with mild hyponatremia to 131 but otherwise reassuring. No lactic acidosis. Patient does have a leukocytosis to 15 which could be stress response given recent surgery. It does not appear that she was on any steroids following discharge.   Given the leukocytosis however I believe cross-sectional imaging is warranted in the setting of her symptoms. At this point I am going off service and will be signing out care of the patient to my colleague Dr. Musa Lopez. He will follow-up CT imaging, reassess the patient and determine eventual disposition. Patient was treated with below medications:  Medications   lactated ringers bolus (0 mLs IntraVENous Stopped 6/20/22 2049)   ondansetron (ZOFRAN) injection 8 mg (8 mg IntraVENous Given 6/20/22 1924)   iopamidol (ISOVUE-370) 76 % injection 80 mL (80 mLs IntraVENous Given 6/20/22 2217)       Clinical Impression     Abdominal pain, nausea, vomiting, diarrhea    Disposition     PATIENT REFERREDTO:  No follow-up provider specified.     DISCHARGE MEDICATIONS:  New Prescriptions    No medications on file       Mary Alice Ibarra MD  06/20/22 6534

## 2022-06-21 NOTE — ED NOTES
Pt being discharged. Went over discharge instructions and all questions answered. Removed PIV. Pt able to ambulate out of department with all belongings at bedside.       Jo Davalos RN  06/20/22 9922

## 2022-06-22 ENCOUNTER — TELEPHONE (OUTPATIENT)
Dept: INTERNAL MEDICINE CLINIC | Age: 72
End: 2022-06-22

## 2022-06-22 ENCOUNTER — TELEMEDICINE (OUTPATIENT)
Dept: INTERNAL MEDICINE CLINIC | Age: 72
End: 2022-06-22
Payer: MEDICARE

## 2022-06-22 DIAGNOSIS — A09 INFECTIOUS COLITIS: Primary | ICD-10-CM

## 2022-06-22 PROCEDURE — 99442 PR PHYS/QHP TELEPHONE EVALUATION 11-20 MIN: CPT | Performed by: INTERNAL MEDICINE

## 2022-06-22 RX ORDER — ONDANSETRON 4 MG/1
4 TABLET, ORALLY DISINTEGRATING ORAL EVERY 6 HOURS PRN
Qty: 30 TABLET | Refills: 0 | Status: SHIPPED | OUTPATIENT
Start: 2022-06-22

## 2022-06-22 RX ORDER — LOPERAMIDE HYDROCHLORIDE 2 MG/1
2 CAPSULE ORAL 4 TIMES DAILY PRN
COMMUNITY
Start: 2022-06-22 | End: 2022-07-02

## 2022-06-22 RX ORDER — LEVOFLOXACIN 500 MG/1
500 TABLET, FILM COATED ORAL DAILY
Qty: 5 TABLET | Refills: 0 | Status: SHIPPED | OUTPATIENT
Start: 2022-06-22 | End: 2022-06-27

## 2022-06-22 ASSESSMENT — PATIENT HEALTH QUESTIONNAIRE - PHQ9
2. FEELING DOWN, DEPRESSED OR HOPELESS: 0
SUM OF ALL RESPONSES TO PHQ QUESTIONS 1-9: 0
1. LITTLE INTEREST OR PLEASURE IN DOING THINGS: 0
SUM OF ALL RESPONSES TO PHQ QUESTIONS 1-9: 0
SUM OF ALL RESPONSES TO PHQ QUESTIONS 1-9: 0
SUM OF ALL RESPONSES TO PHQ9 QUESTIONS 1 & 2: 0
SUM OF ALL RESPONSES TO PHQ QUESTIONS 1-9: 0

## 2022-06-22 NOTE — PROGRESS NOTES
Candelaria Penn is a 81 y.o. female evaluated via telephone on 6/22/2022 for Diarrhea  . [facility]) Physicians  Internal Medicine  Patient Encounter  [address], [address] [facility]        Chief Complaint   Patient presents with    Diarrhea       HPI: Fatoumata Ochoa is a 68-year-old female with history of COPD, GERD, hyperlipidemia, hypertension, systolic congestive heart failure being evaluated today with complaint of diarrhea and vomiting. Symptoms started 4 days ago on Father's Day. 4 days ago she was evaluated in the emergency department with the symptoms. She was given IV fluids and she was diagnosed with diverticulitis. CT scan is reviewed from 6/20/2022--   Diffuse wall thickening of the colon particularly prominent in the descending colon and sigmoid. Most consistent with an infectious or inflammatory colitis.       Mild compression of multiple vertebral bodies with evidence of previous kyphoplasty in the lower thoracic spine.       Bladder wall thickening, indeterminate. Cystitis is not excluded although the bladder is not well distended for the exam.     Further review of the ER report showed that she had a white count of 15,000, sodium 131. BUN was 10, creatinine less than 0.5. She was diagnosed with infectious colitis and treated with Augmentin. She was treated with IV fluids a well. Vomiting is resolved. Diarrhea was watery and is improving. She recently had a surgical procedure for dysphonia following dysphonia after thyroid surgery. She had vocal cord paralysis. Documentation:  I communicated with the patient and/or health care decision maker about infectious diarrhea. Details of this discussion including any medical advice provided: See below      ASSESSMENT/PLAN:    1.  Infectious colitis  Since she is still having diarrhea and nausea will evaluate further  Repeat lab  Stool analysis  Empiric treatment with Levaquin  Discontinue Augmentin  Zofran for nausea  Imodium for diarrhea to be used as needed  Continue to monitor for worsening symptoms such as fever, bloody stools, abdominal pain  - ondansetron (ZOFRAN-ODT) 4 MG disintegrating tablet; Take 1 tablet by mouth every 6 hours as needed for Nausea or Vomiting  Dispense: 30 tablet; Refill: 0  - levoFLOXacin (LEVAQUIN) 500 MG tablet; Take 1 tablet by mouth daily for 5 days  Dispense: 5 tablet; Refill: 0  - loperamide (RA ANTI-DIARRHEAL) 2 MG capsule; Take 1 capsule by mouth 4 times daily as needed for Diarrhea  - CBC with Auto Differential; Future  - Basic Metabolic Panel; Future  - Sedimentation Rate; Future  - C-Reactive Protein; Future  - C DIFF TOXIN/ANTIGEN; Future  - Fecal Leukocytes; Future  - CALPROTECTIN STOOL; Future  - OVA & PARASITE ID/COUNT #1; Future  - Blood Occult Stool Diagnostic; Future  - GI Bacterial Pathogens By PCR; Future            Total Time: minutes: 11-20 minutes (20 minutes)    Ana Hughes was evaluated through a synchronous (real-time) audio encounter. Patient identification was verified at the start of the visit. She (or guardian if applicable) is aware that this is a billable service, which includes applicable co-pays. This visit was conducted with the patient's (and/or legal guardian's) verbal consent. She has not had a related appointment within my department in the past 7 days or scheduled within the next 24 hours. The patient was located at Home: [address] [place] [address]. The provider was located at [facility] (Appt Dept): [address] [facility],  [address] [facility] [facility].     Note: not billable if this call serves to triage the patient into an appointment for the relevant concern    [address] [place] [address]

## 2022-06-22 NOTE — TELEPHONE ENCOUNTER
Patient is requesting Dr. Hakeem Hernandez recommendation regarding the following:  Saturday night she began to get sick, Vomiting and Diarrhea. Sunday she ended up going to the ER, was given fluids, and had testing which showed she had a Diverticulitis flare up. She was prescribed Augmentin. Patient states she feels better, but is still having Diarrhea and can't keep anything down. She can't do an in office visit due to her symptoms, and she doesn't have/want my chart. Does Dr. Benigno Bryant want to do a phone visit? Please contact patient @ phone # provided.

## 2022-06-28 ENCOUNTER — TELEPHONE (OUTPATIENT)
Dept: INTERNAL MEDICINE CLINIC | Age: 72
End: 2022-06-28

## 2022-06-29 ENCOUNTER — OFFICE VISIT (OUTPATIENT)
Dept: INTERNAL MEDICINE CLINIC | Age: 72
End: 2022-06-29
Payer: MEDICARE

## 2022-06-29 VITALS
HEIGHT: 61 IN | HEART RATE: 88 BPM | RESPIRATION RATE: 12 BRPM | SYSTOLIC BLOOD PRESSURE: 112 MMHG | DIASTOLIC BLOOD PRESSURE: 78 MMHG | BODY MASS INDEX: 23.71 KG/M2 | OXYGEN SATURATION: 97 % | WEIGHT: 125.6 LBS

## 2022-06-29 DIAGNOSIS — Z13.6 SCREENING FOR CARDIOVASCULAR CONDITION: ICD-10-CM

## 2022-06-29 DIAGNOSIS — Z23 NEED FOR PROPHYLACTIC VACCINATION AND INOCULATION AGAINST VARICELLA: ICD-10-CM

## 2022-06-29 DIAGNOSIS — N32.89 BLADDER WALL THICKENING: ICD-10-CM

## 2022-06-29 DIAGNOSIS — J44.9 COPD, MODERATE (HCC): ICD-10-CM

## 2022-06-29 DIAGNOSIS — Z23 NEED FOR PROPHYLACTIC VACCINATION AGAINST DIPHTHERIA-TETANUS-PERTUSSIS (DTP): ICD-10-CM

## 2022-06-29 DIAGNOSIS — A09 INFECTIOUS COLITIS: ICD-10-CM

## 2022-06-29 DIAGNOSIS — Z00.00 INITIAL MEDICARE ANNUAL WELLNESS VISIT: Primary | ICD-10-CM

## 2022-06-29 PROCEDURE — 3017F COLORECTAL CA SCREEN DOC REV: CPT | Performed by: INTERNAL MEDICINE

## 2022-06-29 PROCEDURE — G0438 PPPS, INITIAL VISIT: HCPCS | Performed by: INTERNAL MEDICINE

## 2022-06-29 PROCEDURE — 1123F ACP DISCUSS/DSCN MKR DOCD: CPT | Performed by: INTERNAL MEDICINE

## 2022-06-29 PROCEDURE — G0446 INTENS BEHAVE THER CARDIO DX: HCPCS | Performed by: INTERNAL MEDICINE

## 2022-06-29 ASSESSMENT — LIFESTYLE VARIABLES
HOW MANY STANDARD DRINKS CONTAINING ALCOHOL DO YOU HAVE ON A TYPICAL DAY: 1 OR 2
HOW OFTEN DO YOU HAVE A DRINK CONTAINING ALCOHOL: 2-3 TIMES A WEEK

## 2022-06-29 NOTE — PATIENT INSTRUCTIONS
Personalized Preventive Plan for Kristine Lopez - 6/29/2022  Medicare offers a range of preventive health benefits. Some of the tests and screenings are paid in full while other may be subject to a deductible, co-insurance, and/or copay. Some of these benefits include a comprehensive review of your medical history including lifestyle, illnesses that may run in your family, and various assessments and screenings as appropriate. After reviewing your medical record and screening and assessments performed today your provider may have ordered immunizations, labs, imaging, and/or referrals for you. A list of these orders (if applicable) as well as your Preventive Care list are included within your After Visit Summary for your review. Other Preventive Recommendations:    · A preventive eye exam performed by an eye specialist is recommended every 1-2 years to screen for glaucoma; cataracts, macular degeneration, and other eye disorders. · A preventive dental visit is recommended every 6 months. · Try to get at least 150 minutes of exercise per week or 10,000 steps per day on a pedometer . · Order or download the FREE \"Exercise & Physical Activity: Your Everyday Guide\" from [employer]. Call 1-800.751.1168 or search [employer] online. · You need 7255-2530 mg of calcium and 1011-4612 IU of vitamin D per day. It is possible to meet your calcium requirement with diet alone, but a vitamin D supplement is usually necessary to meet this goal.  · When exposed to the sun, use a sunscreen that protects against both UVA and UVB radiation with an SPF of 30 or greater. Reapply every 2 to 3 hours or after sweating, drying off with a towel, or swimming. · Always wear a seat belt when traveling in a car. Patient Education        Colitis: Care Instructions  Your Care Instructions  Colitis is the medical term for swelling (inflammation) of the intestine.  It can be caused by different things, such as an infection or loss of blood flow in the intestine. Other causes are problems like Crohn's disease or ulcerativecolitis. Symptoms may include fever, diarrhea that may be bloody, or belly pain. Sometimes symptoms go away without treatment. But you may need treatment or more tests, such as blood tests or a stool test. Or you may need imaging tests like a CT scan or a colonoscopy. In some cases, the doctor may want to test asample of tissue from the intestine. This test is called a biopsy. The doctor has checked you carefully, but problems can develop later. If you notice any problems or new symptoms, get medical treatment right away. Follow-up care is a key part of your treatment and safety. Be sure to make and go to all appointments, and call your doctor if you are having problems. It's also a good idea to know your test results and keep alist of the medicines you take. How can you care for yourself at home? Rest until you feel better. Your doctor may recommend that you eat bland foods. These include rice, dry toast or crackers, bananas, and applesauce. To prevent dehydration, drink plenty of fluids. Choose water and other clear liquids until you feel better. If you have kidney, heart, or liver disease and have to limit fluids, talk with your doctor before you increase the amount of fluids you drink. Be safe with medicines. Take your medicines exactly as prescribed. Call your doctor if you think you are having a problem with your medicine. You will get more details on the specific medicines your doctor prescribes. When should you call for help? Call 911 anytime you think you may need emergency care. For example, call if:    You passed out (lost consciousness).     Your stools are maroon or very bloody.    Call your doctor now or seek immediate medical care if:    You have new or worse belly pain.     You have a fever.     You are vomiting.     You cannot pass stools or gas.     You have new or more blood in your stools. Watch closely for changes in your health, and be sure to contact your doctor if:    You have new or worse symptoms.     You are losing weight.     You do not get better as expected. Where can you learn more? Go to [email or web address]://jey.[email or web address] org and sign in to your delicious account. Enter I634 in the [facility] box to learn more about \"Colitis: Care Instructions. \"     If you do not have an account, please click on the \"Sign Up Now\" link. Current as of: September 8, 2021               Content Version: 13.3  © 2006-2022 [employer]. Care instructions adapted under license by [facility] ([facility]). If you have questions about a medical condition or this instruction, always ask your healthcare professional. [address] any warranty or liability for your use of this information. Patient Education        Learning About Colitis  What is colitis? Colitis is swelling (inflammation) of the colon. The colon makes up most of thelarge intestine. Many conditions can cause colitis. What are some types of colitis? Infectious colitis is a type that appears suddenly. It's caused by a bacteria or virus, such as salmonella, shigella, or campylobacter. Ischemic colitis is caused by problems with blood flow to the colon. This can happen after surgery. It can also be caused by other health problems. Microscopic colitis doesn't always have a clear cause. It can only be found with special tests. Crohn's disease and ulcerative colitis are lifelong diseases. They can cause swelling, inflammation, and deep sores in the lining of the digestive tract. What are the symptoms? Symptoms may include fever, diarrhea that may be bloody, or belly pain. You may also have an urgent need to move your bowels or pain when you move yourbowels. Or you may have bleeding from the rectum or weight loss.   Your symptoms may depend on the type of colitis you have. For example,microscopic colitis may cause watery diarrhea. Sometimes symptoms go away on their own. If they don't go away, or if you havebleeding or severe pain, call your doctor right away. How is it diagnosed? You may need blood tests or a stool test. You also may need imaging tests like a CT scan. You may have a colonoscopy so that a doctor can look inside your colon. In some cases, the doctor may want to test a sample of tissue from theintestine. This test is called a biopsy. How is it treated? Treatment for colitis depends on the condition that is causing it. Antibiotics may be used to treat an infection. Diet changes may help with symptoms. Medicines can also help to relieve inflammation and control symptoms. In some cases, surgery to remove parts of the intestine may be needed. Follow-up care is a key part of your treatment and safety. Be sure to make and go to all appointments, and call your doctor if you are having problems. It's also a good idea to know your test results and keep alist of the medicines you take. Where can you learn more? Go to [email or web address] and sign in to your [employer] account. Enter C130 in the [facility] box to learn more about \"Learning About Colitis. \"     If you do not have an account, please click on the \"Sign Up Now\" link. Current as of: September 8, 2021               Content Version: 13.3  © 2006-2022 [employer]. Care instructions adapted under license by [facility] ([facility]). If you have questions about a medical condition or this instruction, always ask your healthcare professional. [place] [address] any warranty or liability for your use of this information. Patient Education        Recombinant Zoster (Shingles) Vaccine: What You Need to Know  Why get vaccinated? Recombinant zoster (shingles) vaccine can prevent shingles.   Shingles (also called herpes zoster, or just zoster) is a painful skin rash, usually with blisters. In addition to the rash, shingles can cause fever, headache, chills, or upset stomach. Rarely, shingles can lead to complications such as pneumonia, hearing problems, blindness, brain inflammation (encephalitis), ordeath. The risk of shingles increases with age. The most common complication of shingles is long-term nerve pain called postherpetic neuralgia (PHN). PHN occurs in the areas where the shingles rash was and can last for months or years after the rash goes away. The pain from PHN can be severe anddebilitating. The risk of PHN increases with age. An older adult with shingles is more likely to develop PHN and have longer lasting and more severe pain than a youngerperson. People with weakened immune systems also have a higher risk of getting shinglesand complications from the disease. Shingles is caused by varicella-zoster virus, the same virus that causes chickenpox. After you have chickenpox, the virus stays in your body and can cause shingles later in life. Shingles cannot be passed from one person to another, but the virus that causes shingles can spread and cause chickenpox insomeone who has never had chickenpox or has never received chickenpox vaccine. Recombinant shingles vaccine  Recombinant shingles vaccine provides strong protection against shingles. By preventing shingles, recombinant shingles vaccine also protects against PHN andother complications. Recombinant shingles vaccine is recommended for:  Adults 48 years and older  Adults 19 years and older who have a weakened immune system because of disease or treatments  Shingles vaccine is given as a two-dose series. For most people, the second dose should be given 2 to 6 months after the first dose. Some people who have or will have a weakened immune system can get the second dose 1 to 2 monthsafter the first dose. Ask your health care provider for guidance.   People who have had shingles in the of a vaccine causing asevere allergic reaction, other serious injury, or death. What if there is a serious problem? An allergic reaction could occur after the vaccinated person leaves the clinic. If you see signs of a severe allergic reaction (hives, swelling of the face and throat, difficulty breathing, a fast heartbeat, dizziness, or weakness), call 9-1-1 and get the person to the nearest hospital.  For other signs that concern you, call your health care provider. Adverse reactions should be reported to the Vaccine Adverse Event Reporting System (VAERS). Your health care provider will usually file this report, or you can do it yourself. Visit the VAERS website at www.vaers. [facility].gov or call 1-897.592.8967. VAERS is only for reporting reactions, and VAERS staff members do not give medical advice. How can I learn more? Ask your health care provider. Call your local or state health department. Visit the website of the Food and Drug Administration (FDA) for vaccine package inserts and additional information at www.fda.gov/vaccines-blood-biologics/vaccines. Contact the Centers for Disease Control and Prevention (CDC): Call 8-376.164.8706 (1-800-CDC-INFO) or  Visit CDCs website at www.cdc.gov/vaccines. Vaccine Information Statement  Recombinant Zoster Vaccine  02/04/2022  Department of Health and Human Services  Centers for Disease Control and Prevention  Many vaccine information statements are available in Romansh and other languages. See www.immunize.org/vis  Hojas de información sobre vacunas están disponibles en español y en muchos otros idiomas. Visite Martinez.si  Care instructions adapted under license by [facility] ([facility]). If you have questions about a medical condition or this instruction, always ask your healthcare professional. [place] [address] any warranty or liability for your use of this information.          Patient Education        Tdap (Tetanus, Diphtheria, Pertussis) Vaccine: What You Need to Know  Why get vaccinated? Tdap vaccine can prevent tetanus, diphtheria, and pertussis. Diphtheria and pertussis spread from person to person. Tetanus enters the bodythrough cuts or wounds. TETANUS (T) causes painful stiffening of the muscles. Tetanus can lead to serious health problems, including being unable to open the mouth, having trouble swallowing and breathing, or death. DIPHTHERIA (D) can lead to difficulty breathing, heart failure, paralysis, or death. PERTUSSIS (aP), also known as \"whooping cough,\" can cause uncontrollable, violent coughing that makes it hard to breathe, eat, or drink. Pertussis can be extremely serious especially in babies and young children, causing pneumonia, convulsions, brain damage, or death. In teens and adults, it can cause weight loss, loss of bladder control, passing out, and rib fractures from severe coughing. Tdap vaccine  Tdap is only for children 7 years and older, adolescents, and adults. Adolescents should receive a single dose of Tdap, preferably at age 6 or 15 years. Pregnant people should get a dose of Tdap during every pregnancy, preferably during the early part of the third trimester, to help protect the  from pertussis. Infants are most at risk for severe, life-threatening complications frompertussis. Adults who have never received Tdap should get a dose of Tdap. Also, adults should receive a booster dose of either Tdap or Td (a different vaccine that protects against tetanus and diphtheria but not pertussis) every 10 years, or after 5 years in the case of a severe or dirty wound or burn. Tdap may be given at the same time as other vaccines.   Talk with your health care provider  Tell your vaccination provider if the person getting the vaccine:  Has had an allergic reaction after a previous dose of any vaccine that protects against tetanus, diphtheria, or pertussis, or has any severe, life-threatening allergies  Has had a coma, decreased level of consciousness, or prolonged seizures within 7 days after a previous dose of any pertussis vaccine (DTP, DTaP, or Tdap)  Has seizures or another nervous system problem  Has ever had Guillain-Barré Syndrome (also called \"GBS\")  Has had severe pain or swelling after a previous dose of any vaccine that protects against tetanus or diphtheria  In some cases, your health care provider may decide to postpone Tdapvaccination until a future visit. People with minor illnesses, such as a cold, may be vaccinated. People who are moderately or severely ill should usually wait until they recover beforegetting Tdap vaccine. Your health care provider can give you more information. Risks of a vaccine reaction  Pain, redness, or swelling where the shot was given, mild fever, headache, feeling tired, and nausea, vomiting, diarrhea, or stomachache sometimes happen after Tdap vaccination. People sometimes faint after medical procedures, including vaccination. Tellyour provider if you feel dizzy or have vision changes or ringing in the ears. As with any medicine, there is a very remote chance of a vaccine causing asevere allergic reaction, other serious injury, or death. What if there is a serious problem? An allergic reaction could occur after the vaccinated person leaves the clinic. If you see signs of a severe allergic reaction (hives, swelling of the face and throat, difficulty breathing, a fast heartbeat, dizziness, or weakness), call 9-1-1 and get the person to the nearest hospital.  For other signs that concern you, call your health care provider. Adverse reactions should be reported to the Vaccine Adverse Event Reporting System (VAERS). Your health care provider will usually file this report, or you can do it yourself. Visit the VAERS website at www.vaers. hhs.gov or call 2-685.441.5545.  VAERS is only for reporting reactions, and VAERS staff members do not give medical advice. The National Vaccine Injury Compensation Program  The National Vaccine Injury Compensation Program (VICP) is a federal program that was created to compensate people who may have been injured by certain vaccines. Claims regarding alleged injury or death due to vaccination have a time limit for filing, which may be as short as two years. Visit the VICP website at www.hrsa.gov/vaccinecompensation or call 8-147.547.6021 to learn about the program and about filing a claim. How can I learn more? Ask your health care provider. Call your local or state health department. Visit the website of the Food and Drug Administration (FDA) for vaccine package inserts and additional information at www.fda.gov/vaccines-blood-biologics/vaccines. Contact the Centers for Disease Control and Prevention (CDC): Call 3-168.748.1701 (1-800-CDC-INFO) or  Visit CDC's website at www.cdc.gov/vaccines. Vaccine Information Statement  Tdap (Tetanus, Diphtheria, Pertussis) Vaccine  8/6/2021  [address] ESTEB[address] Musa [id number]  [facility] for Disease Control and Prevention  Many vaccine information statements are available in Iranian and other languages. See www.immunize.org/vis  Hojas de información sobre vacunas están disponibles en español y en muchos otros idiomas. Visite www.immunize.org/vis  Care instructions adapted under license by [facility] ([facility]). If you have questions about a medical condition or this instruction, always ask your healthcare professional. [place] [address] any warranty or liability for your use of this information. Patient Education        Well Visit, Over 72: Care Instructions  Overview     Well visits can help you stay healthy. Your doctor has checked your overall health and may have suggested ways to take good care of yourself. Your doctor also may have recommended tests.  At home, you can help prevent illness withhealthy eating, regular exercise, and other steps.  Follow-up care is a key part of your treatment and safety. Be sure to make and go to all appointments, and call your doctor if you are having problems. It's also a good idea to know your test results and keep alist of the medicines you take. How can you care for yourself at home? Get screening tests that you and your doctor decide on. Screening helps find diseases before any symptoms appear. Eat healthy foods. Choose fruits, vegetables, whole grains, protein, and low-fat dairy foods. Limit fat, especially saturated fat. Reduce salt in your diet. Limit alcohol. If you are a man, have no more than 2 drinks a day or 14 drinks a week. If you are a woman, have no more than 1 drink a day or 7 drinks a week. Since alcohol affects older adults differently, you may want to limit alcohol even more. Or you may not want to drink at all. Get at least 30 minutes of exercise on most days of the week. Walking is a good choice. You also may want to do other activities, such as running, swimming, cycling, or playing tennis or team sports. Reach and stay at a healthy weight. This will lower your risk for many problems, such as obesity, diabetes, heart disease, and high blood pressure. Do not smoke. Smoking can make health problems worse. If you need help quitting, talk to your doctor about stop-smoking programs and medicines. These can increase your chances of quitting for good. Care for your mental health. It is easy to get weighed down by worry and stress. Learn strategies to manage stress, like deep breathing and mindfulness, and stay connected with your family and community. If you find you often feel sad or hopeless, talk with your doctor. Treatment can help. Talk to your doctor about whether you have any risk factors for sexually transmitted infections (STIs). You can help prevent STIs if you wait to have sex with a new partner (or partners) until you've each been tested for STIs.  It also helps if you use condoms (male or female condoms) and if you limit your sex partners to one person who only has sex with you. Vaccines are available for some STIs. If you think you may have a problem with alcohol or drug use, talk to your doctor. This includes prescription medicines (such as amphetamines and opioids) and illegal drugs (such as cocaine and methamphetamine). Your doctor can help you figure out what type of treatment is best for you. Protect your skin from too much sun. When you're outdoors from 10 a.m. to 4 p.m., stay in the shade or cover up with clothing and a hat with a wide brim. Wear sunglasses that block UV rays. Even when it's cloudy, put broad-spectrum sunscreen (SPF 30 or higher) on any exposed skin. See a dentist one or two times a year for checkups and to have your teeth cleaned. Wear a seat belt in the car. When should you call for help? Watch closely for changes in your health, and be sure to contact your doctor if you have any problems or symptoms that concern you. Where can you learn more? Go to https://[employer]rum [employer].healthSoapBox Soaps. org and sign in to your [employer] account. Enter A773 in the [facility] box to learn more about \"Well Visit, Over 65: Care Instructions. \"     If you do not have an account, please click on the \"Sign Up Now\" link. Current as of: October 6, 2021               Content Version: 13.3  © 8504-5973 [employer]. Care instructions adapted under license by [facility] ([facility]). If you have questions about a medical condition or this instruction, always ask your healthcare professional. [place] [address] any warranty or liability for your use of this information. Patient Education        Preventing Falls: Care Instructions  Your Care Instructions     Getting around your home safely can be a challenge if you have injuries or health problems that make it easy for you to fall.  Loose rugs and furniture in walkways are among the dangers for many older people who have problems walking or who have poor eyesight. People who have conditions such as arthritis,osteoporosis, or dementia also have to be careful not to fall. You can make your home safer with a few simple measures. Follow-up care is a key part of your treatment and safety. Be sure to make and go to all appointments, and call your doctor if you are having problems. It's also a good idea to know your test results and keep alist of the medicines you take. How can you care for yourself at home? Taking care of yourself  Exercise regularly to improve your strength, muscle tone, and balance. Walk if you can. Swimming may be a good choice if you cannot walk easily. Have your vision and hearing checked each year or any time you notice a change. If you have trouble seeing and hearing, you might not be able to avoid objects and could lose your balance. Know the side effects of the medicines you take. Ask your doctor or pharmacist whether the medicines you take can affect your balance. Sleeping pills or sedatives can affect your balance. Limit the amount of alcohol you drink. Alcohol can impair your balance and other senses. Ask your doctor whether calluses or corns on your feet need to be removed. If you wear loose-fitting shoes because of calluses or corns, you can lose your balance and fall. Talk to your doctor if you have numbness in your feet. You may get dizzy if you do not drink enough water. To prevent dehydration, drink plenty of fluids. Choose water and other clear liquids. If you have kidney, heart, or liver disease and have to limit fluids, talk with your doctor before you increase the amount of fluids you drink. Preventing falls at home  Remove raised doorway thresholds, throw rugs, and clutter. Repair loose carpet or raised areas in the floor. Move furniture and electrical cords to keep them out of walking paths.   Use nonskid floor wax, and wipe up spills right away, especially on ceramic tile floors. If you use a walker or cane, put rubber tips on it. If you use crutches, clean the bottoms of them regularly with an abrasive pad, such as steel wool. Keep your house well lit, especially stairways, porches, and outside walkways. Use night-lights in areas such as hallways and bathrooms. Add extra light switches or use remote switches (such as switches that go on or off when you clap your hands) to make it easier to turn lights on if you have to get up during the night. Install sturdy handrails on stairways. Move items in your cabinets so that the things you use a lot are on the lower shelves (about waist level). Keep a cordless phone and a flashlight with new batteries by your bed. If possible, put a phone in each of the main rooms of your house, or carry a cell phone in case you fall and cannot reach a phone. Or, you can wear a device around your neck or wrist. You push a button that sends a signal for help. Wear low-heeled shoes that fit well and give your feet good support. Use footwear with nonskid soles. Check the heels and soles of your shoes for wear. Repair or replace worn heels or soles. Do not wear socks without shoes on wood floors. Walk on the grass when the sidewalks are slippery. If you live in an area that gets snow and ice in the winter, sprinkle salt on slippery steps and sidewalks. Or ask a family member or friend to do this for you. Preventing falls in the bath  Install grab bars and nonskid mats inside and outside your shower or tub and near the toilet and sinks. Use shower chairs and bath benches. Use a hand-held shower head that will allow you to sit while showering. Get into a tub or shower by putting the weaker leg in first. Get out of a tub or shower with your strong side first.  Repair loose toilet seats and consider installing a raised toilet seat to make getting on and off the toilet easier.   Keep your bathroom door unlocked while you are in the shower. Where can you learn more? Go to https://chpepiceweb.Planitax. org and sign in to your Visualaset account. Enter [phone] in the [facility] box to learn more about \"Preventing Falls: Care Instructions. \"     If you do not have an account, please click on the \"Sign Up Now\" link. Current as of: September 8, 2021               Content Version: 13.3  © 2006-2022 [employer] Care instructions adapted under license by [facility] [place] ([facility]). If you have questions about a medical condition or this instruction, always ask your healthcare professional. [place] [address] any warranty or liability for your use of this information. Patient Education        Preventing Outdoor Falls: Care Instructions  Your Care Instructions     Worries about falls don't need to keep you indoors. Outdoor activities like walking have big benefits for your health. You will need to watch your step andlearn a few safety measures. If you are worried about having a fall outdoors, ask your doctor about exercises, classes, or physical therapy that may help. You can learn ways to gain strength, flexibility, and balance. Ask if it might help to use a cane orwalker. You can make your time outdoors safer with a few simple measures. Follow-up care is a key part of your treatment and safety. Be sure to make and go to all appointments, and call your doctor if you are having problems. It's also a good idea to know your test results and keep alist of the medicines you take. How can you prevent falls outdoors? Wear shoes with firm soles and low heels. If you have to walk on an icy surface, use grippers that can be worn over your shoes in bad weather. Be extra careful if weather is bad. Walk on the grass when the sidewalks are slick. If you live in a place that gets snow and ice in the winter, sprinkle salt on slippery stairs and sidewalks.   Be careful getting on or off buses and trains or getting in and out of cars. If handrails are available, use them. Be careful when you cross the street. Look for crosswalks or places where curb cuts or ramps are present. Try not to hurry, especially if you are carrying something. Be cautious in parking lots or garages. There may be curbs or changes in pavement, or the height of the pavement may vary. Make sure to wear the correct eyeglasses, if you need them. Reading glasses or bifocals can make it harder to see hazards that might be in your way. If you are walking outdoors for exercise, try to: Walk in well-lighted, well-maintained areas. These include high school or college tracks, shopping malls, and public spaces. Walk with a partner. Watch out for cracked sidewalks, curbs, changes in the height of the pavement, exposed tree roots, and debris such as fallen leaves or branches. Where can you learn more? Go to https://iota ComputingpeZYB.healthGlobal RallyCross Championship. org and sign in to your Neurodyn account. Enter R721 in the [employer] box to learn more about \"Preventing Outdoor Falls: Care Instructions. \"     If you do not have an account, please click on the \"Sign Up Now\" link. Current as of: September 8, 2021               Content Version: 13.3  © 2006-2022 Healthwise, Incorporated. Care instructions adapted under license by [facility] ([facility]). If you have questions about a medical condition or this instruction, always ask your healthcare professional. [place] [address] any warranty or liability for your use of this information.

## 2022-06-29 NOTE — PROGRESS NOTES
[facility]) Physicians  Internal Medicine  Patient Encounter  Deja Murrell D.O., [facility] Annual Wellness Visit  Samantha Hazel  6/29/2022    Subjective Samantha Hazel is here for [facility] AWV    Chief Complaint   Patient presents with    Medicare AWV       HPI: 80 yo female seen today for Medicare AWV. Recently seen for diarrhea and abnormal CT scan. She was found to have findings consistent with colitis. Stool studies showed positive heme, positive fecal leukocytes, significantly elevated calprotectin. Stool cultures were negative. She states she had visible blood in the stool. Patient improved with Levaquin. She has had three normal BM's. Colonoscopy 9/2021. Dr. Cristin Benton. She had a similar problem in December. Medical/Surgical Histories     Past Medical History:   Diagnosis Date    Allergic rhinitis     Anxiety 2/14/2022    Back pain     Chronic:under care of spine specialist:Dr. Adames    Cervical cancer screening 2010    Nml per pt'    Compression fracture of thoracic vertebrae, non-traumatic ([address]     under care of endo:Dr. Whitley Isabel    COPD (chronic obstructive pulmonary disease) ([address]     under care of pulmo:Dr. Russel Hammonds COPD exacerbation ([address] 4/14/2017    Elevated LDL cholesterol level     GERD (gastroesophageal reflux disease)     History of mammogram 2012;5/17/17    Nml per pt'. 5/17/17=negative.     HLD (hyperlipidemia) 9/3/2021    Hoarseness of voice 10/4/2017    Hypertension     Lung nodule:left 05/04/2017     1.2 cm indeterminate left upper lobe pulmonary nodule:under care of pulmo:Dr. Saurabh Lomas    Osteoarthritis     Osteomyelitis of left leg ([address]     Osteoporosis     under care of endo:Dr. Jarrod Estevez S/P colonoscopy 2011    Polyps:next in [id number]    Systolic CHF, chronic ([address] EF 45% 5/25/2018    Thoracic aorta atherosclerosis ([address] 2/14/2022    Thyroid mass     under care of endo & surgeon(Dr. Ewing)    Thyroid nodule     under care of endo:Dr. Anusha Ansari          Past Surgical History:   Procedure Laterality Date    APPENDECTOMY       SECTION      FIXATION KYPHOPLASTY  2017    Dr. Valenzuela Polio    LARYNGOPLASTY Right 2022    MEDIALIZATION LARYNGOPLASTY, Dr. Lehman Deem HISTORY  2017    thoracic kyphoplasty    THYROIDECTOMY, PARTIAL Right 2017    Right hemithyroidectomy for goiter,  complicated by vocal cord paralysis    VOCAL CORD INJECTION             Medications/Allergies     Medication Sig    ondansetron (ZOFRAN-ODT) 4 MG disintegrating tablet Take 1 tablet by mouth every 6 hours as needed for Nausea or Vomiting    loperamide (RA ANTI-DIARRHEAL) 2 MG capsule Take 1 capsule by mouth 4 times daily as needed for Diarrhea    TRELEGY ELLIPTA 200-62.5-25 MCG/INH AEPB USE 1 PUFF EVERY DAY    lisinopril (PRINIVIL;ZESTRIL) 2.5 MG tablet TAKE 1 TABLET BY MOUTH EVERY EVENING    umeclidinium-vilanterol (ANORO ELLIPTA) 62.5-25 MCG/INH AEPB inhaler INHALE 1 PUFF INTO THE LUNGS DAILY    VENTOLIN  (90 Base) MCG/ACT inhaler INHALE 2 PUFFS INTO THE LUNGS EVERY 6 HOURS AS NEEDED FOR WHEEZING    metoprolol succinate (TOPROL XL) 25 MG extended release tablet TAKE 1/2 TABLET BY MOUTH TWICE DAILY    albuterol (ACCUNEB) 1.25 MG/3ML nebulizer solution Inhale 3 mLs into the lungs every 4 hours as needed for Wheezing or Shortness of Breath    albuterol (PROVENTIL) (2.5 MG/3ML) 0.083% nebulizer solution Take 2.5 mg by nebulization every 6 hours as needed for Wheezing     melatonin 3 MG TABS tablet Take 3 mg by mouth nightly as needed    aspirin 81 MG chewable tablet Take 1 tablet by mouth daily    Nebulizers (AIRIAL COMPACT MINI NEBULIZER) MISC 1 Device by Does not apply route daily    acetaminophen (TYLENOL) 500 MG tablet Take 500 mg by mouth every 6 hours as needed for Pain      No current facility-administered medications for this visit.         Allergies   Allergen Reactions  Codeine     Bee Venom Swelling    Percocet [Oxycodone-Acetaminophen]     Vicodin [Hydrocodone-Acetaminophen]     Adhesive Tape Rash         Substance Use History     Social History     Tobacco Use    Smoking status: Former Smoker     Packs/day: 1.00     Years: 22.00     Pack years: 22.00     Types: Cigarettes     Quit date: 2011     Years since quittin.2    Smokeless tobacco: Never Used   Vaping Use    Vaping Use: Never used   Substance Use Topics    Alcohol use: Yes     Alcohol/week: 4.0 standard drinks     Types: 4 Cans of beer per week     Comment: occ    Drug use: No          Family History     Family History   Problem Relation Age of Onset    Diabetes Mother     No Known Problems Father     No Known Problems Sister     No Known Problems Brother     No Known Problems Maternal Grandmother     No Known Problems Maternal Grandfather     No Known Problems Paternal Grandmother     No Known Problems Paternal Grandfather             CARE TEAM  (Including outside providers/suppliers regularly involved in providing care):   Patient Care Team:  Edin Smith DO as PCP - General (Internal Medicine)  Edin Smith DO as PCP - [facility] Empaneled Provider  Dayana Marion MD as Consulting Physician (Pulmonology)      Patient's complete Health Risk Assessment and screening values have been reviewed and are found in [facility]. The following risks were reviewed today and where indicated follow up appointments were made and/or referrals ordered.     Positive Risk Factor Screenings with Interventions:               Hearing/Vision:  Do you or your family notice any trouble with your hearing that hasn't been managed with hearing aids?: No  Do you have difficulty driving, watching TV, or doing any of your daily activities because of your eyesight?: No  Have you had an eye exam within the past year?: (!) No  No exam data present    Hearing/Vision Interventions:  · Vision concerns:  patient encouraged to make appointment with his/her eye specialist    Safety:  Do you have working smoke detectors?: Yes  Do you have any tripping hazards - loose or unsecured carpets or rugs?: No  Do you have any tripping hazards - clutter in doorways, halls, or stairs?: No  Do you have either shower bars, grab bars, non-slip mats or non-slip surfaces in your shower or bathtub?: (!) No  Do all of your stairways have a railing or banister?: Yes  Do you always fasten your seatbelt when you are in a car?: Yes    Safety Interventions:  · Home safety tips provided         ROS:  PULM: Low-dose radiation CT scan ordered by her pulmonologist.      Reviewed CT scan of abdomen pelvis from 6/20/2022:  Diffuse wall thickening of the colon particularly prominent in the descending colon and sigmoid. Most consistent with an infectious or inflammatory colitis.       Mild compression of multiple vertebral bodies with evidence of previous kyphoplasty in the lower thoracic spine.       Bladder wall thickening, indeterminate. Cystitis is not excluded although the bladder is not well distended for the exam.         OBJECTIVE     Vitals:    06/29/22 1430   BP: 112/78   Pulse: 88   Resp: 12   SpO2: 97%   Weight: 125 lb 9.6 oz (57 kg)   Height: 5' 1\" (1.549 m)     Body mass index is 23.73 kg/m². Wt Readings from Last 3 Encounters:   06/29/22 125 lb 9.6 oz (57 kg)   06/20/22 128 lb (58.1 kg)   05/02/22 121 lb (54.9 kg)     BP Readings from Last 3 Encounters:   06/29/22 112/78   06/20/22 (!) 164/110   05/02/22 122/76          GEN:  81 y.o. female who is in NAD, A&O. She appears stated age and well nourished. She is cooperative and pleasant. HEAD:  NC/AT, no lesions. EYES:  KOBE, EOMI, No scleral icterus or conjunctival injection or discharge. Visual fields in tact to confrontation. Fundoscopic (non-dilated) grossly normal.  Disc margins well demarcated. EARS:  EAC's clear, TM's normal.  NOSE:  Nasal cavity is clear.   No mucosal congestion or discharge. Sinuses are nontender. MOUTH & THROAT:  Oral cavity is clear without mucosal lesions. Tongue is midline. Dentition is in good repair. No pharyngeal erythema or exudate. NECK:  Supple. Full ROM.  + Ecchymosis from laryngeal surgery. LYMPH: No C/SC/A/F nodes  CARDIAC:  S1S2 NL. Regular rhythm. No murmur/clicks/rubs. No ectopy. PMI is non-displaced. VASC:  Pedal pulses 2/4. Carotid upstrokes 2+. No bruits noted. PULM:  Lungs are CTA. Decreased BS throughout. GI:  Abdomen is soft and nontender. No distension. No organomegaly. No masses. No pulsatile masses. EXT:  No Cyanosis or clubbing. No edema. SKIN: Warm and dry, normal turgor, no rash or lesions of concern. NEURO:  Cranial nerves 2-12 are NL. Speech fluent and coherent. Strength is 5/5 in all muscle groups. No sensory deficits. No focal or lateralizing deficits. Reflexes 2/4 and symmetric. Gait is normal.  PSYCH:  Mood and affect NL. Judgement and insight NL. Reviewed and updated this visit:  Tobacco  Allergies  Meds  Med Hx  Surg Hx  Soc Hx  Fam Hx        ASSESSMENT/PLAN     ASSESSMENT/PLAN:    1. Initial Medicare annual wellness visit  All care gaps identified and addressed  Discussed the importance of updating her vaccinations. She is due for Tdap and Shingrix  - Tdap (ADACEL) 5-2-15.5 LF-MCG/0.5 injection; Inject 0.5 mLs into the muscle once for 1 dose  Dispense: 0.5 mL; Refill: 0  - zoster recombinant adjuvanted vaccine (SHINGRIX) 50 MCG/0.5ML SUSR injection; Inject 0.5 mLs into the muscle once for 1 dose  Dispense: 0.5 mL; Refill: 0  - DC Intens behave ther cardio dx, 15 minutes []    2. Screening for cardiovascular condition    - DC Intens behave ther cardio dx, 15 minutes []    3. Need for prophylactic vaccination against diphtheria-tetanus-pertussis (DTP)    - Tdap (ADACEL) 5-2-15.5 LF-MCG/0.5 injection;  Inject 0.5 mLs into the muscle once for 1 dose  Dispense: 0.5 mL; Refill: 0    4. Need for prophylactic vaccination and inoculation against varicella    - zoster recombinant adjuvanted vaccine [facility]) 50 MCG/0.5ML SUSR injection; Inject 0.5 mLs into the muscle once for 1 dose  Dispense: 0.5 mL; Refill: 0    5. Bladder wall thickening  Repeat CT scan at the end of July  Urinalysis was negative  May need to see urology  - CT ABDOMEN PELVIS W IV CONTRAST Additional Contrast? None; Future    6. Infectious colitis  Condition is much improved having taken her antibiotic  Repeat CT scan to ensure resolution of colitis changes  - CT ABDOMEN PELVIS W IV CONTRAST Additional Contrast? None; Future    7. COPD, moderate ([address]  Under the management of pulmonary  Due for CT scan of the chest.  This is already been ordered by Dr. Hilario Orosco          Recommendations for Preventive Services Due: see orders and patient instructions/AVS.  Recommended screening schedule for the next 5-10 years is provided to the patient in written form: see Patient Instructions/AVS.    Return in 6 months (on 12/29/2022) for check up for chronic medical problems, Medication Check, LAB. Cardiovascular Disease Risk Counseling: Assessed the patient's risk to develop cardiovascular disease and reviewed main risk factors. Reviewed steps to reduce disease risk including:   · Quitting tobacco use, reducing amount smoked, or not starting the habit  · Making healthy food choices  · Being physically active and gradualy increasing activity levels   · Reduce weight and determine a healthy BMI goal  · Monitor blood pressure and treat if higher than 140/90 mmHg  · Maintain blood total cholesterol levels under 5 mmol/l or 190 mg/dl  · Maintain LDL cholesterol levels under 3.0 mmol/l or 115 mg/dl   · Control blood glucose levels  · Consider taking aspirin (75 mg daily), once blood pressure is controlled   Provided a follow up plan.   Time spent (minutes): 15

## 2022-07-05 ENCOUNTER — HOSPITAL ENCOUNTER (OUTPATIENT)
Dept: CT IMAGING | Age: 72
Discharge: HOME | End: 2022-07-05
Payer: MEDICARE

## 2022-07-05 DIAGNOSIS — Z72.0 TOBACCO ABUSE DISORDER: ICD-10-CM

## 2022-07-05 PROCEDURE — 71271 CT THORAX LUNG CANCER SCR C-: CPT

## 2022-07-06 RX ORDER — METOPROLOL SUCCINATE 25 MG/1
TABLET, EXTENDED RELEASE ORAL
Qty: 90 TABLET | Refills: 3 | Status: SHIPPED | OUTPATIENT
Start: 2022-07-06

## 2022-07-06 NOTE — TELEPHONE ENCOUNTER
Requested Prescriptions     Pending Prescriptions Disp Refills    metoprolol succinate (TOPROL XL) 25 MG extended release tablet [Pharmacy Med Name: METOPROLOL ER SUCCINATE 25MG TABS] 90 tablet 3     Sig: TAKE 1/2 TABLET BY MOUTH TWICE DAILY                  Last Office Visit: 6/17/2021     Next Office Visit: 7/20/2022

## 2022-07-12 ENCOUNTER — TELEPHONE (OUTPATIENT)
Dept: CASE MANAGEMENT | Age: 72
End: 2022-07-12

## 2022-07-12 NOTE — TELEPHONE ENCOUNTER
Annual lung screen on 7/5/2022. LRAD2. Recommended screen in one year. Reviewed by ordering physician and ordering office contacts pt with results. Results letter mailed.        Maryjane ARSHAD, RN   Lung Nodule Navigator  [facility]  [phone]

## 2022-07-18 ENCOUNTER — TELEPHONE (OUTPATIENT)
Dept: PULMONOLOGY | Age: 72
End: 2022-07-18

## 2022-07-18 RX ORDER — PREDNISONE 10 MG/1
TABLET ORAL
Qty: 20 TABLET | Refills: 0 | Status: SHIPPED | OUTPATIENT
Start: 2022-07-18

## 2022-07-18 NOTE — TELEPHONE ENCOUNTER
Sxs: started last Thursday, hard time sleeping, cannot catch breathe- wants a z-pack    Cough? yes     Productive?  yes    Color of Mucus? SOB? yes          Nasal Congestion? yes                      Runny Nose/Color? Sore Throat? Fever? Last recorded temp? OTC Meds Tried? Taking routine pulmonary meds? yes    Pharmacy: [facility]     Callback #:  [phone]

## 2022-07-18 NOTE — TELEPHONE ENCOUNTER
I spoke to Shraddha, sounds like she is having a mild flare of her COPD. No cough or purulent sputum so I did not write for a Z-Gregg but I did give her a burst of prednisone. Orders Placed This Encounter   Medications    predniSONE (DELTASONE) 10 MG tablet     Sig: Take 4 tablets by mouth for 5 days.      Dispense:  20 tablet     Refill:  0

## 2022-07-20 ENCOUNTER — OFFICE VISIT (OUTPATIENT)
Dept: CARDIOLOGY CLINIC | Age: 72
End: 2022-07-20
Payer: MEDICARE

## 2022-07-20 VITALS
HEART RATE: 60 BPM | WEIGHT: 117 LBS | BODY MASS INDEX: 22.11 KG/M2 | DIASTOLIC BLOOD PRESSURE: 90 MMHG | SYSTOLIC BLOOD PRESSURE: 110 MMHG

## 2022-07-20 DIAGNOSIS — E78.5 HYPERLIPIDEMIA, UNSPECIFIED HYPERLIPIDEMIA TYPE: ICD-10-CM

## 2022-07-20 DIAGNOSIS — I50.22 CHRONIC SYSTOLIC CONGESTIVE HEART FAILURE (HCC): Primary | ICD-10-CM

## 2022-07-20 DIAGNOSIS — I42.0 DILATED CARDIOMYOPATHY (HCC): ICD-10-CM

## 2022-07-20 PROCEDURE — 1123F ACP DISCUSS/DSCN MKR DOCD: CPT | Performed by: INTERNAL MEDICINE

## 2022-07-20 PROCEDURE — 99214 OFFICE O/P EST MOD 30 MIN: CPT | Performed by: INTERNAL MEDICINE

## 2022-07-20 PROCEDURE — 3017F COLORECTAL CA SCREEN DOC REV: CPT | Performed by: INTERNAL MEDICINE

## 2022-07-20 PROCEDURE — 1090F PRES/ABSN URINE INCON ASSESS: CPT | Performed by: INTERNAL MEDICINE

## 2022-07-20 PROCEDURE — G8399 PT W/DXA RESULTS DOCUMENT: HCPCS | Performed by: INTERNAL MEDICINE

## 2022-07-20 PROCEDURE — G8420 CALC BMI NORM PARAMETERS: HCPCS | Performed by: INTERNAL MEDICINE

## 2022-07-20 PROCEDURE — G8428 CUR MEDS NOT DOCUMENT: HCPCS | Performed by: INTERNAL MEDICINE

## 2022-07-20 PROCEDURE — 1036F TOBACCO NON-USER: CPT | Performed by: INTERNAL MEDICINE

## 2022-07-20 ASSESSMENT — ENCOUNTER SYMPTOMS
SHORTNESS OF BREATH: 0
COUGH: 0
CHEST TIGHTNESS: 0
CHOKING: 0

## 2022-07-20 NOTE — PROGRESS NOTES
Subjective:      Patient ID: Slick Blake is a 81 y.o. female. Congestive Heart Failure  Pertinent negatives include no chest pain, fatigue, palpitations or shortness of breath. Here for follow up CHF/cardiomyopathy. Breathing stable after tx COPD. No chest pain. No edema. No pnd or orthopnea. Wt stable. Active including walking. [address] daughters doing well. COPD stable after weekend. Past Medical History:   Diagnosis Date    Allergic rhinitis     Anxiety 2022    Back pain     Chronic:under care of spine specialist:Dr. Adames    Cervical cancer screening     Nml per pt'    Compression fracture of thoracic vertebrae, non-traumatic ([facility]     under care of endo:Dr. Cory Pino    COPD (chronic obstructive pulmonary disease) ([facility]     under care of pulmo:Dr. Susan Brito    COPD exacerbation ([facility] 2017    Elevated LDL cholesterol level     GERD (gastroesophageal reflux disease)     History of mammogram ;17    Nml per pt'. 17=negative.     HLD (hyperlipidemia) 9/3/2021    Hoarseness of voice 10/4/2017    Hypertension     Lung nodule:left 2017     1.2 cm indeterminate left upper lobe pulmonary nodule:under care of pulmo:Dr. Susan Brito    Osteoarthritis     Osteomyelitis of left leg ([facility]     Osteoporosis     under care of endo:Dr. Cory Pino    S/P colonoscopy     Polyps:next in [id number]    Systolic CHF, chronic ([address] EF 45% 2018    Thoracic aorta atherosclerosis ([facility] 2022    Thyroid mass     under care of endo & surgeon(Dr. Ewing)    Thyroid nodule     under care of endo:Dr. Cory Pino     Past Surgical History:   Procedure Laterality Date    APPENDECTOMY       SECTION      FIXATION KYPHOPLASTY  2017    Dr. Danilo Claudio    LARYNGOPLASTY Right 2022    MEDIALIZATION LARYNGOPLASTY, Dr. Graeme Ta  2017    thoracic kyphoplasty    THYROIDECTOMY, PARTIAL Right 2017    Right hemithyroidectomy for goiter, Exam  Constitutional:       General: She is not in acute distress. Appearance: She is well-developed. HENT:      Head: Normocephalic and atraumatic. Eyes:      General:         Right eye: No discharge. Left eye: No discharge. Conjunctiva/sclera: Conjunctivae normal.   Neck:      Vascular: No JVD. Cardiovascular:      Rate and Rhythm: Normal rate and regular rhythm. Pulses:           Radial pulses are 2+ on the right side and 2+ on the left side. Heart sounds: Normal heart sounds, S1 normal and S2 normal. No murmur heard. No gallop. Pulmonary:      Effort: Pulmonary effort is normal. No respiratory distress. Breath sounds: Normal breath sounds. No wheezing or rales. Abdominal:      General: Bowel sounds are normal.      Palpations: Abdomen is soft. Tenderness: There is no abdominal tenderness. Musculoskeletal:         General: Normal range of motion. Cervical back: Normal range of motion. Skin:     General: Skin is warm and dry. Neurological:      Mental Status: She is alert and oriented to person, place, and time. Psychiatric:         Behavior: Behavior normal.         Thought Content: Thought content normal.       Assessment:       Diagnosis Orders   1. Chronic systolic congestive heart failure ([address]        2. Dilated cardiomyopathy ([address]        3. Hyperlipidemia, unspecified hyperlipidemia type                Plan:      CV  stable. Remains stable. CHF compensated. BP good. Will continue Toprol XL 25 mg qd, lisinopril 2.5 mg qd for CHF/cardiomyop. Reviewed previous records and testing including cath 5/18 and echo 10/18. No changes. Continue to monitor. Follow up 6 months. Will have echo.         Stefanie Schroeder MD

## 2022-08-03 ENCOUNTER — OFFICE VISIT (OUTPATIENT)
Dept: PULMONOLOGY | Age: 72
End: 2022-08-03
Payer: MEDICARE

## 2022-08-03 VITALS
TEMPERATURE: 97 F | WEIGHT: 119 LBS | OXYGEN SATURATION: 91 % | HEART RATE: 90 BPM | HEIGHT: 61 IN | SYSTOLIC BLOOD PRESSURE: 143 MMHG | BODY MASS INDEX: 22.47 KG/M2 | DIASTOLIC BLOOD PRESSURE: 89 MMHG

## 2022-08-03 DIAGNOSIS — Z72.0 TOBACCO ABUSE DISORDER: Primary | ICD-10-CM

## 2022-08-03 DIAGNOSIS — J43.8 OTHER EMPHYSEMA (HCC): ICD-10-CM

## 2022-08-03 PROCEDURE — 3023F SPIROM DOC REV: CPT | Performed by: INTERNAL MEDICINE

## 2022-08-03 PROCEDURE — 1123F ACP DISCUSS/DSCN MKR DOCD: CPT | Performed by: INTERNAL MEDICINE

## 2022-08-03 PROCEDURE — 1036F TOBACCO NON-USER: CPT | Performed by: INTERNAL MEDICINE

## 2022-08-03 PROCEDURE — 99213 OFFICE O/P EST LOW 20 MIN: CPT | Performed by: INTERNAL MEDICINE

## 2022-08-03 PROCEDURE — 1090F PRES/ABSN URINE INCON ASSESS: CPT | Performed by: INTERNAL MEDICINE

## 2022-08-03 PROCEDURE — G8399 PT W/DXA RESULTS DOCUMENT: HCPCS | Performed by: INTERNAL MEDICINE

## 2022-08-03 PROCEDURE — G8420 CALC BMI NORM PARAMETERS: HCPCS | Performed by: INTERNAL MEDICINE

## 2022-08-03 PROCEDURE — 3017F COLORECTAL CA SCREEN DOC REV: CPT | Performed by: INTERNAL MEDICINE

## 2022-08-03 PROCEDURE — G8427 DOCREV CUR MEDS BY ELIG CLIN: HCPCS | Performed by: INTERNAL MEDICINE

## 2022-08-03 RX ORDER — FLUTICASONE PROPIONATE 220 UG/1
2 AEROSOL, METERED RESPIRATORY (INHALATION) 2 TIMES DAILY
Qty: 1 EACH | Refills: 5 | Status: SHIPPED | OUTPATIENT
Start: 2022-08-03 | End: 2023-08-03

## 2022-08-03 NOTE — PROGRESS NOTES
[facility]    Outpatient Follow Up Note    Subjective:   CHIEF COMPLAINT / HPI:     The patient is 81 y.o. female who presents today for COPD and pulmonary nodules. Thersia Essex comes in today having finished a burst of steroids in the middle of July. She's still struggling a bit. She'd been on trelegy and really liked it, but then they jacked up the price to $800 and she can't afford it. She is back on anoro but not the flovent. She had her larynx reconstruction in mid June. Interval history:  Mrs. Barksdale Artmohit comes in today without any acute complaints. She had her vocal cord Botox procedure performed and it has helped her breathing some. She maintains on the maintenance prednisone 5 mg as well as Anoro and Flovent. She was vaccinated with Shermon Heads in the winter and is asking about the booster shots. Most recent visit:  Thersia Essex previously had been on Trelegy and said that it was a struggle for her but I think she may have been rationing her doses because she used some of the friends and said it worked really well for her. Instead of had her on Anoro and Flovent. Cost of medication is a barrier for she and her  to him who I saw earlier today. Overall she is doing pretty well, just having some issues with seasonal allergies. Are trying to figure out the issue with cost of medications. Previous visit: Thersia Essex continues to do well on anoro and flovent diskus. She's been home since the pandemic started and off work. She's been doing a lot of gardening when the weather allows and she's lost 8 lbs semi-on purpose. Her  recently had a flare and needed steroids and antibiotic about a week later she had similar symptoms so she got a burst of prednisone as well which she said fixed it pretty quickly. She is feeling much better and off steroids for over a week now.     Previous visit:Carol is doing better on the anoro, flovent diskus combo and seldom has to use her rescue inhaler. She does limit her activity on some days and is also limited by back pain. She's on muscle relaxers and recently had an MRI that showed acute compression fractures in her lumbar spine. She's wearing a back brace. Previous history: patient had a follow-up CT scan done May 17 and was essentially found to have more vertebral fractures she was admitted for repair. She's run out of her Spiriva and her Breo several weeks ago and has not needed her rescue inhaler. She has also weaned off of supplemental oxygen. She is scheduled for surgery to remove her thyroid mass in a few weeks. Patient said she felt great on the Anoro and rarely needed her rescue inhaler, and even would forget the anoro from time to time and feel ok. Until September 29th when she had her thyroid removed. 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 Since then she's extremely dyspneic trying to walk short distances and she has to be careful how she swallows or she aspirates. She's having botox injected on the 17th to try to alleviate some of the issues and may need reconstruction. Since the surgery she doesn't feel much benefit from the anoro and has a nonproductive cough and increased dyspnea with exertion, talking and laying down. She's started using her albuterol HFA up to 8 times per day. She says she feels her cough improve and is able to breath better 5-10 minutes after use. She was all set to do pulmonary rehab until this happened, and has deferred as a result.       Past Medical History:    Past Medical History:   Diagnosis Date    Allergic rhinitis     Anxiety 2/14/2022    Back pain     Chronic:under care of spine specialist:Dr. Adames    Cervical cancer screening 2010    Nml per pt'    Compression fracture of thoracic vertebrae, non-traumatic ([facility]     under care of endo:Dr. Jake Valderrama    COPD (chronic obstructive pulmonary disease) ([facility]     under care of pulmo:Dr. Angelica Parr    COPD exacerbation ([facility] 2017    Elevated LDL cholesterol level     GERD (gastroesophageal reflux disease)     History of mammogram ;17    Nml per pt'. 17=negative. HLD (hyperlipidemia) 9/3/2021    Hoarseness of voice 10/4/2017    Hypertension     Lung nodule:left 2017     1.2 cm indeterminate left upper lobe pulmonary nodule:under care of pulmo:Dr. Lucas Nevarez    Osteoarthritis     Osteomyelitis of left leg ([facility]     Osteoporosis     under care of endo:Dr. Radha Posada    S/P colonoscopy     Polyps:next in [id number]    Systolic CHF, chronic ([facility] EF 45% 2018    Thoracic aorta atherosclerosis ([facility] 2022    Thyroid mass     under care of endo & surgeon(Dr. Ewing)    Thyroid nodule     under care of endo:Dr. Radha Posada       Social History:    Social History     Tobacco Use   Smoking Status Former    Packs/day: 1.50    Years: 22.00    Pack years: 33.00    Types: Cigarettes    Quit date: 2011    Years since quittin.3   Smokeless Tobacco Never       Current Medications:  Current Outpatient Medications on File Prior to Visit   Medication Sig Dispense Refill    VENTOLIN  (90 Base) MCG/ACT inhaler INHALE 2 PUFFS INTO THE LUNGS EVERY 6 HOURS AS NEEDED FOR WHEEZING 18 g 11    predniSONE (DELTASONE) 10 MG tablet Take 4 tablets by mouth for 5 days.  20 tablet 0    metoprolol succinate (TOPROL XL) 25 MG extended release tablet TAKE 1/2 TABLET BY MOUTH TWICE DAILY 90 tablet 3    ondansetron (ZOFRAN-ODT) 4 MG disintegrating tablet Take 1 tablet by mouth every 6 hours as needed for Nausea or Vomiting 30 tablet 0    TRELEGY ELLIPTA 200-62.5-25 MCG/INH AEPB USE 1 PUFF EVERY DAY 3 each 3    lisinopril (PRINIVIL;ZESTRIL) 2.5 MG tablet TAKE 1 TABLET BY MOUTH EVERY EVENING 90 tablet 3    umeclidinium-vilanterol (ANORO ELLIPTA) 62.5-25 MCG/INH AEPB inhaler INHALE 1 PUFF INTO THE LUNGS DAILY 60 each 6    albuterol (ACCUNEB) 1.25 MG/3ML nebulizer solution Inhale 3 mLs into the lungs every 4 hours as needed for Wheezing or Shortness of Breath 180 vial 3    albuterol (PROVENTIL) (2.5 MG/3ML) 0.083% nebulizer solution Take 2.5 mg by nebulization every 6 hours as needed for Wheezing       melatonin 3 MG TABS tablet Take 3 mg by mouth nightly as needed      aspirin 81 MG chewable tablet Take 1 tablet by mouth daily 30 tablet 3    Nebulizers (AIRIAL COMPACT MINI NEBULIZER) MISC 1 Device by Does not apply route daily 1 each 0    acetaminophen (TYLENOL) 500 MG tablet Take 500 mg by mouth every 6 hours as needed for Pain        No current facility-administered medications on file prior to visit. REVIEW OF SYSTEMS:    CONSTITUTIONAL: Negative for fevers and chills  HEENT: Negative for oropharyngeal exudate, post nasal drip, sinus pain / pressure, nasal congestion, ear pain  RESPIRATORY:  See HPI  CARDIOVASCULAR: Negative for chest pain, palpitations, edema  GASTROINTESTINAL: Negative for nausea, vomiting, diarrhea, constipation and abdominal pain  HEMATOLOGICAL: Negative for adenopathy  SKIN: Negative for clubbing, cyanosis, skin lesions  EXTREMITIES: Negative for weakness, decreased ROM  NEUROLOGICAL: Negative for unilateral weakness, speech or gait abnormalities  PSYCH: Negative for anxiety, depression    Objective:   PHYSICAL EXAM:        VITALS:  BP (!) 143/89 (Site: Right Upper Arm, Position: Sitting, Cuff Size: Medium Adult)   Pulse 90   Temp 97 °F (36.1 °C) (Infrared)   Ht 5' 1\" (1.549 m)   Wt 119 lb (54 kg)   SpO2 91%   BMI 22.48 kg/m²     CONSTITUTIONAL:  Awake, alert, cooperative, no apparent distress, and appears stated age  HEENT: No oropharyngeal exudate, PERRL, no cervical adenopathy, no tracheal deviation, thyroid absent  LUNGS:  Speaking in full sentences, Faint upper airway and lung zone wheezes, no rales or ronchi. CARDIOVASCULAR:  normal S1 and S2 and no JVD  ABDOMEN:  Normal bowel sounds, non-distended and non-tender to palpation  EXT: No edema, no calf tenderness.  Pulses are present bilaterally. NEUROLOGIC:  Mental Status Exam:  Level of Alertness:   awake  Orientation:   person, place, time. SKIN:  normal skin color, texture, turgor, no redness, warmth, or swelling     DATA:      Radiology Review:  Pertinent images / reports were reviewed as a part of this visit. CT chest reveals the following:  Impression:        1. Acute on chronic compression fracture at T11 as described. 2. Stable compression fractures and vertebroplasty elsewhere in   the thoracic spine as described. 3. Patchy airspace disease with linear opacities and nodular   opacities in the lingula and left lower lobe. These findings could   relate to atelectasis however early pneumonia is not excluded. 4. Stable indeterminate left upper lobe nodule with central   calcification. Followup chest CT in 3 months recommended. 5. Centrilobular emphysema in the upper lobes. 6. Large substernal right thyroid nodule unchanged in comparison   to previous exam.         August 2017:  Impression:        1. Stable indeterminate nodule left upper lobe measuring up to 12   mm with small central calcification. The nodule remains   indeterminate. At least 2 years of surveillance are recommended   within next CT in 6 months. The nodule would be large enough to   evaluate with PET CT if clinically warranted. 2. Large right thyroid nodule unchanged. 3. Improved lower lung patchy airspace disease with minimal   residual scarring or subsegmental atelectasis. 4. Stable compression fractures with no new compression fracture   identified. January 2018:  No evidence of tracheobronchomalacia. Centrilobular nodular branching opacities in the lower lobes and   right middle lobe consistent with bronchiolitis. Bronchial wall   thickening is also noted as well as secretions within subsegmental   bronchi. This is all likely representative of an infectious   process. Multiple stable nodules since 4/3/2017.  Findings are most consistent with a benign process. Following the Fleischner   criteria the patient should BE examined with chest CT at 24 months   from the original examination and April 2019. Following the   Fleischner criteria for pulmonary nodule greater than 8 mm: If   patient is at low risk for lung cancer, recommend follow up CT at   around 3, 9, and 24 mo, dynamic contrast enhanced CT, PET, and/or   biopsy. If patient is at high risk for lung cancer, same as for   low-risk patient. Last PFTs:   CONCLUSION:  This is a patient who has a baseline severe obstructive lung  defect but with positive bronchodilator response to the moderate range,  evidence of hyperinflation and air trapping and moderately decreased diffusing  capacity. These findings are most consistent with a diagnosis of COPD,  longterm asthma with fixed obstructive defect could also fit this this scenario. 1/2018: IMPRESSION:  1. Moderate obstructive defect. 2.  There is no significant response to bronchodilators. 3.  Air trapping and hyperinflation is present. 4.  Moderate decrease in diffusion capacity. Assessment: This is a 81 y.o. female with COPD and pulmonary nodules    Plan:   COPD is unstable on anoro alone. restart flovent. She is no longer on a maintenance dose of prednisone. I would like her to do pulmonary rehab but she is remaining very active and she is deciding whether or not to go back to work when the pandemic is over. PFTs in 2018 were just below the cutoff for what we use in homogeneous emphysema to consider valves. She doesn't have large blebs of emphysema but she does have it. Will discuss getting repeat PFTs and a StratX CT to see if valves might be an option for her in the future. Screening CT in June 2022 was stable, so she is due for screening CT in June of 2023    Previous good quote:  \"If you can't be a good example, then be a horrible warning\"    Orders Placed This Encounter   Medications fluticasone (FLOVENT HFA) 220 MCG/ACT inhaler     Sig: Inhale 2 puffs into the lungs in the morning and 2 puffs before bedtime. Dispense:  1 each     Refill:  5         Diagnosis Orders   1. Tobacco abuse disorder        2. Other emphysema ([facility]                The patient is not currently smoking. RTC 4 months w/ MD. Call or RTC sooner if symptoms persist or worsen acutely.       Jacinta Pinto MD

## 2022-08-09 ENCOUNTER — TELEPHONE (OUTPATIENT)
Dept: PULMONOLOGY | Age: 72
End: 2022-08-09

## 2022-08-09 NOTE — TELEPHONE ENCOUNTER
Pt is having some sob/tightness in chest. She said this happened a month ago and Dr. Ilana Huitron sent prednisone burst Rx to [facility] on [address]. She is wondering if another round may be sent. Please advise thank you.

## 2022-08-10 RX ORDER — PREDNISONE 10 MG/1
TABLET ORAL
Qty: 20 TABLET | Refills: 0 | Status: SHIPPED | OUTPATIENT
Start: 2022-08-10

## 2022-08-10 NOTE — TELEPHONE ENCOUNTER
Patient is aware of the medications that were sent to pharmacy. She was very appreciative. She wcb if she does not feel any better.

## 2022-08-11 ENCOUNTER — HOSPITAL ENCOUNTER (OUTPATIENT)
Dept: NON INVASIVE DIAGNOSTICS | Age: 72
Discharge: HOME | End: 2022-08-11
Payer: MEDICARE

## 2022-08-11 DIAGNOSIS — I42.0 DILATED CARDIOMYOPATHY (HCC): ICD-10-CM

## 2022-08-11 DIAGNOSIS — E78.5 HYPERLIPIDEMIA, UNSPECIFIED HYPERLIPIDEMIA TYPE: ICD-10-CM

## 2022-08-11 DIAGNOSIS — I50.22 CHRONIC SYSTOLIC CONGESTIVE HEART FAILURE (HCC): ICD-10-CM

## 2022-08-11 LAB
LV EF: 58 %
LVEF MODALITY: NORMAL

## 2022-08-11 PROCEDURE — 93306 TTE W/DOPPLER COMPLETE: CPT

## 2022-08-17 ENCOUNTER — HOSPITAL ENCOUNTER (OUTPATIENT)
Dept: CT IMAGING | Age: 72
Discharge: HOME | End: 2022-08-17
Payer: MEDICARE

## 2022-08-17 DIAGNOSIS — A09 INFECTIOUS COLITIS: ICD-10-CM

## 2022-08-17 DIAGNOSIS — N32.89 BLADDER WALL THICKENING: ICD-10-CM

## 2022-08-17 LAB
GFR AFRICAN AMERICAN: >60
GFR NON-AFRICAN AMERICAN: >60
PERFORMED ON: NORMAL
POC CREATININE: 0.6 MG/DL (ref 0.6–1.2)
POC SAMPLE TYPE: NORMAL

## 2022-08-17 PROCEDURE — 74177 CT ABD & PELVIS W/CONTRAST: CPT

## 2022-08-17 PROCEDURE — 82565 ASSAY OF CREATININE: CPT

## 2022-08-17 PROCEDURE — 6360000004 HC RX CONTRAST MEDICATION: Performed by: INTERNAL MEDICINE

## 2022-08-17 RX ADMIN — IOPAMIDOL 75 ML: 755 INJECTION, SOLUTION INTRAVENOUS at 08:32

## 2022-08-22 DIAGNOSIS — N32.89 BLADDER WALL THICKENING: Primary | ICD-10-CM

## 2022-09-12 ENCOUNTER — TELEPHONE (OUTPATIENT)
Dept: PULMONOLOGY | Age: 72
End: 2022-09-12

## 2022-09-12 RX ORDER — PREDNISONE 10 MG/1
TABLET ORAL
Qty: 20 TABLET | Refills: 0 | Status: SHIPPED | OUTPATIENT
Start: 2022-09-12

## 2022-09-12 NOTE — TELEPHONE ENCOUNTER
Sounds like a COPD exacerbation. Orders Placed This Encounter   Medications    predniSONE (DELTASONE) 10 MG tablet     Sig: Take 4 tablets by mouth for 5 days.      Dispense:  20 tablet     Refill:  0

## 2022-09-19 ENCOUNTER — HOSPITAL ENCOUNTER (OUTPATIENT)
Dept: MAMMOGRAPHY | Age: 72
Discharge: HOME | End: 2022-09-19
Payer: MEDICARE

## 2022-09-19 VITALS — WEIGHT: 119 LBS | BODY MASS INDEX: 22.47 KG/M2 | HEIGHT: 61 IN

## 2022-09-19 DIAGNOSIS — Z12.31 VISIT FOR SCREENING MAMMOGRAM: ICD-10-CM

## 2022-09-19 PROCEDURE — 77067 SCR MAMMO BI INCL CAD: CPT

## 2022-10-31 DIAGNOSIS — M81.0 SENILE OSTEOPOROSIS: ICD-10-CM

## 2022-10-31 DIAGNOSIS — M81.0 AGE-RELATED OSTEOPOROSIS WITHOUT CURRENT PATHOLOGICAL FRACTURE: Primary | ICD-10-CM

## 2022-11-01 NOTE — PROGRESS NOTES
11/3/2022       Izabella Weinstein (:  1950)     [id number] for osteoporosis with multiple spine fractures. States that she is doing fairly well in terms of osteoporosis, no new fragility fractures. Tolerating Prolia well. Osteoarthritis stable, symptoms are manageable with acetaminophen as needed. Work-up has been negative for secondary osteoporosis. No diarrhea, kidney stones or thyroid parathyroid problems. Normal ADLs and recreational activities. Review of Systems    Prior to Visit Medications    Medication Sig Taking? Authorizing Provider   predniSONE (DELTASONE) 10 MG tablet Take 4 tablets by mouth for 5 days. Yes Jackson Ceballos MD   budesonide (PULMICORT) 180 MCG/ACT AEPB inhaler Inhale 2 puffs into the lungs in the morning and 2 puffs in the evening. Yes Jackson Ceballos MD   predniSONE (DELTASONE) 10 MG tablet Take 4 tablets by mouth for 5 days. Yes Jackson Ceballos MD   fluticasone (FLOVENT HFA) 220 MCG/ACT inhaler Inhale 2 puffs into the lungs in the morning and 2 puffs before bedtime. Yes Jackson Ceballos MD   VENTOLIN  (90 Base) MCG/ACT inhaler INHALE 2 PUFFS INTO THE LUNGS EVERY 6 HOURS AS NEEDED FOR WHEEZING Yes Jackson Ceballos MD   predniSONE (DELTASONE) 10 MG tablet Take 4 tablets by mouth for 5 days.  Yes Jackson Ceballos MD   metoprolol succinate (TOPROL XL) 25 MG extended release tablet TAKE 1/2 TABLET BY MOUTH TWICE DAILY Yes Anshul Garcia MD   ondansetron (ZOFRAN-ODT) 4 MG disintegrating tablet Take 1 tablet by mouth every 6 hours as needed for Nausea or Vomiting Yes [address] [place] [address]   Beatriz Slade 842-79.2-85 MCG/INH AEPB USE 1 PUFF EVERY DAY Yes Jackson Ceballos MD   lisinopril (PRINIVIL;ZESTRIL) 2.5 MG tablet TAKE 1 TABLET BY MOUTH EVERY EVENING Yes FAUSTINA Vigil - CNP   umeclidinium-vilanterol (ANORO ELLIPTA) 62.5-25 MCG/INH AEPB inhaler INHALE 1 PUFF INTO THE LUNGS DAILY Yes Jackson Ceballos MD   albuterol (ACCUNEB) 1.25 MG/3ML nebulizer solution Inhale 3 mLs into the lungs every 4 hours as needed for Wheezing or Shortness of Breath Yes Mohini Bolanos MD   albuterol (PROVENTIL) (2.5 MG/3ML) 0.083% nebulizer solution Take 2.5 mg by nebulization every 6 hours as needed for Wheezing  Yes Historical Provider, MD   melatonin 3 MG TABS tablet Take 3 mg by mouth nightly as needed Yes Historical Provider, MD   aspirin 81 MG chewable tablet Take 1 tablet by mouth daily Yes Pal Colon MD   Nebulizers (AIRIAL COMPACT MINI NEBULIZER) MISC 1 Device by Does not apply route daily Yes Pal Colon MD   acetaminophen (TYLENOL) 500 MG tablet Take 500 mg by mouth every 6 hours as needed for Pain  Yes Historical Provider, MD                PHYSICAL EXAMINATION:  [ INSTRUCTIONS:  \"[x]\" Indicates a positive item  \"[]\" Indicates a negative item  -- DELETE ALL ITEMS NOT EXAMINED]  Vital Signs: (As obtained by patient/caregiver or practitioner observation)    Blood pressure-  Heart rate-    Respiratory rate-    Temperature-  Pulse oximetry-     Constitutional: [x] Appears well-developed and well-nourished [x] No apparent distress      [] Abnormal-   Mental status  [x] Alert and awake  [x] Oriented to person/place/time [x]Able to follow commands      Eyes:  EOM    []  Normal  [] Abnormal-  Sclera  []  Normal  [] Abnormal -         Discharge []  None visible  [] Abnormal -    HENT:   [] Normocephalic, atraumatic. [] Abnormal   [] Mouth/Throat: Mucous membranes are moist.     External Ears [] Normal  [] Abnormal-     Neck: [] No visualized mass     Pulmonary/Chest: [x] Respiratory effort normal.  [x] No visualized signs of difficulty breathing or respiratory distress        [] Abnormal-      Musculoskeletal:   [x] Normal gait with no signs of ataxia         [] Normal range of motion of neck        [x] osteoarthritic changes unchanged in the scattered finger joints, feet.      I do not appreciate any focally tender, swollen or inflamed joints in upper and lower extremities. Asymptomatic OA changes in scattered DIPs, knees. Full range of motion all peripheral joints. Neurological:        [] No Facial Asymmetry (Cranial nerve 7 motor function) (limited exam to video visit)          [] No gaze palsy        [] Abnormal-         Skin:        [x] No significant exanthematous lesions or discoloration noted on facial skin         [] Abnormal-            Psychiatric:       [] Normal Affect [] No Hallucinations        [] Abnormal-     Other pertinent observable physical exam findings-     ASSESSMENT/PLAN:  Julio C Payan was seen today for follow-up and injections. Diagnoses and all orders for this visit:    Senile osteoporosis    Generalized osteoarthritis    High risk medication use      1. Severe osteoporosis with multiple compression fractures in the spine- lost 2.5 inch ht (MRI thoracolumbar spine 9/27/2019-kyphoplasty T6, T7, T10, T11, T12, chronic compression of T3, subacute compression of T4, acute compression L1 and L2. Unable to afford Forteo, therefore is on Prolia. Tolerating well, T-scores are improving. Long-term smoking, otherwise no other risk factors. Quit smoking. DEXA scan T score L spine                     T score femoral neck  10/15/2021        -3.2                            L hip -3.6, FN -3.8  9/20/2019            -4.5                           left hip -2.3, left femoral neck -4.1  4/28/2017            -4.2                           left hip -3.6, left femoral neck -3.4, right hip -2.7, right femoral neck -2.5      Prolia  10/17/2019, 4/22/2020, 10/28/2020, 4/29/2021, 10/29/2020, 4/29/2021, 11/1/2021, 5/2/2022, 11/3/2022, next dose May 4, 2023. Continue calcium vitamin D supplementation. Acetaminophen for osteoarthritis pain. Follow-up in 6 months. No follow-ups on file.      Total time 32 minutes that includes the following-  Preparing to see the patient such as reviewing patients records, pre-charting, preparing the visit on the same day, performing a medically appropriate history and physical examination, counseling and educating patient about diagnosis, management plan, ordering appropriate testings, prescriptions, communicating findings to other care providers, and documenting clinical information in electronic medical record. --Alba Ellis MD on 11/3/2022 at 10:09 AM    An electronic signature was used to authenticate this note.

## 2022-11-03 ENCOUNTER — OFFICE VISIT (OUTPATIENT)
Dept: RHEUMATOLOGY | Age: 72
End: 2022-11-03
Payer: MEDICARE

## 2022-11-03 VITALS
HEIGHT: 61 IN | WEIGHT: 119 LBS | SYSTOLIC BLOOD PRESSURE: 120 MMHG | DIASTOLIC BLOOD PRESSURE: 76 MMHG | BODY MASS INDEX: 22.47 KG/M2

## 2022-11-03 DIAGNOSIS — M81.0 SENILE OSTEOPOROSIS: ICD-10-CM

## 2022-11-03 DIAGNOSIS — M81.0 SENILE OSTEOPOROSIS: Primary | ICD-10-CM

## 2022-11-03 DIAGNOSIS — Z79.899 HIGH RISK MEDICATION USE: ICD-10-CM

## 2022-11-03 DIAGNOSIS — M81.0 AGE-RELATED OSTEOPOROSIS WITHOUT CURRENT PATHOLOGICAL FRACTURE: ICD-10-CM

## 2022-11-03 DIAGNOSIS — M15.9 GENERALIZED OSTEOARTHRITIS: ICD-10-CM

## 2022-11-03 PROCEDURE — G8399 PT W/DXA RESULTS DOCUMENT: HCPCS | Performed by: INTERNAL MEDICINE

## 2022-11-03 PROCEDURE — 1036F TOBACCO NON-USER: CPT | Performed by: INTERNAL MEDICINE

## 2022-11-03 PROCEDURE — 1123F ACP DISCUSS/DSCN MKR DOCD: CPT | Performed by: INTERNAL MEDICINE

## 2022-11-03 PROCEDURE — G8420 CALC BMI NORM PARAMETERS: HCPCS | Performed by: INTERNAL MEDICINE

## 2022-11-03 PROCEDURE — 96372 THER/PROPH/DIAG INJ SC/IM: CPT | Performed by: INTERNAL MEDICINE

## 2022-11-03 PROCEDURE — G8427 DOCREV CUR MEDS BY ELIG CLIN: HCPCS | Performed by: INTERNAL MEDICINE

## 2022-11-03 PROCEDURE — 1090F PRES/ABSN URINE INCON ASSESS: CPT | Performed by: INTERNAL MEDICINE

## 2022-11-03 PROCEDURE — 3074F SYST BP LT 130 MM HG: CPT | Performed by: INTERNAL MEDICINE

## 2022-11-03 PROCEDURE — G8484 FLU IMMUNIZE NO ADMIN: HCPCS | Performed by: INTERNAL MEDICINE

## 2022-11-03 PROCEDURE — 3017F COLORECTAL CA SCREEN DOC REV: CPT | Performed by: INTERNAL MEDICINE

## 2022-11-03 PROCEDURE — 3078F DIAST BP <80 MM HG: CPT | Performed by: INTERNAL MEDICINE

## 2022-11-03 PROCEDURE — 99214 OFFICE O/P EST MOD 30 MIN: CPT | Performed by: INTERNAL MEDICINE

## 2022-11-04 ENCOUNTER — TELEPHONE (OUTPATIENT)
Dept: PULMONOLOGY | Age: 72
End: 2022-11-04

## 2022-11-04 LAB
ANION GAP SERPL CALCULATED.3IONS-SCNC: 13 MMOL/L (ref 3–16)
BUN BLDV-MCNC: 7 MG/DL (ref 7–20)
CALCIUM SERPL-MCNC: 9.5 MG/DL (ref 8.3–10.6)
CHLORIDE BLD-SCNC: 95 MMOL/L (ref 99–110)
CO2: 27 MMOL/L (ref 21–32)
CREAT SERPL-MCNC: 0.5 MG/DL (ref 0.6–1.2)
GFR SERPL CREATININE-BSD FRML MDRD: >60 ML/MIN/{1.73_M2}
GLUCOSE BLD-MCNC: 112 MG/DL (ref 70–99)
POTASSIUM SERPL-SCNC: 4.5 MMOL/L (ref 3.5–5.1)
SODIUM BLD-SCNC: 135 MMOL/L (ref 136–145)
VITAMIN D 25-HYDROXY: 54.2 NG/ML

## 2022-11-04 RX ORDER — PREDNISONE 10 MG/1
TABLET ORAL
Qty: 20 TABLET | Refills: 0 | Status: SHIPPED | OUTPATIENT
Start: 2022-11-04

## 2022-11-04 NOTE — TELEPHONE ENCOUNTER
Dr. Swan Cool comments were relayed. She was informed of script at pharmacy. She expressed much gratitude.

## 2022-11-04 NOTE — TELEPHONE ENCOUNTER
Mariella Napoleshire work does tend to be one of her triggers. Sounds like a COPD exacerbation. Orders Placed This Encounter   Medications    predniSONE (DELTASONE) 10 MG tablet     Sig: Take 4 tablets by mouth for 5 days.      Dispense:  20 tablet     Refill:  0

## 2022-11-04 NOTE — TELEPHONE ENCOUNTER
Zack Mark states that she has had increasing SOB, dry cough, and unable to sleep well because of these symptoms. Cough is dry; some chest tightness; slight wheezing heard during this phone conversation. No fever. She thinks these symptoms are directly related to doing more outside yard work this week, and being around leaves. She has been using inhalers and nebulizer, which have helped. She thinks that prednisone would help. States that she doesn't want \"this to get ahead of her\". She feels that she does not need an ABX at this time. Pharm: [facility] on [facility].

## 2022-12-20 ENCOUNTER — OFFICE VISIT (OUTPATIENT)
Dept: PULMONOLOGY | Age: 72
End: 2022-12-20
Payer: MEDICARE

## 2022-12-20 VITALS
HEIGHT: 61 IN | SYSTOLIC BLOOD PRESSURE: 132 MMHG | DIASTOLIC BLOOD PRESSURE: 80 MMHG | WEIGHT: 114 LBS | HEART RATE: 84 BPM | BODY MASS INDEX: 21.52 KG/M2 | OXYGEN SATURATION: 92 %

## 2022-12-20 DIAGNOSIS — J43.9 PULMONARY EMPHYSEMA, UNSPECIFIED EMPHYSEMA TYPE (HCC): ICD-10-CM

## 2022-12-20 DIAGNOSIS — J44.1 COPD EXACERBATION (HCC): Primary | ICD-10-CM

## 2022-12-20 PROCEDURE — G8484 FLU IMMUNIZE NO ADMIN: HCPCS | Performed by: INTERNAL MEDICINE

## 2022-12-20 PROCEDURE — 1036F TOBACCO NON-USER: CPT | Performed by: INTERNAL MEDICINE

## 2022-12-20 PROCEDURE — G8427 DOCREV CUR MEDS BY ELIG CLIN: HCPCS | Performed by: INTERNAL MEDICINE

## 2022-12-20 PROCEDURE — 3078F DIAST BP <80 MM HG: CPT | Performed by: INTERNAL MEDICINE

## 2022-12-20 PROCEDURE — 1090F PRES/ABSN URINE INCON ASSESS: CPT | Performed by: INTERNAL MEDICINE

## 2022-12-20 PROCEDURE — 99214 OFFICE O/P EST MOD 30 MIN: CPT | Performed by: INTERNAL MEDICINE

## 2022-12-20 PROCEDURE — G8399 PT W/DXA RESULTS DOCUMENT: HCPCS | Performed by: INTERNAL MEDICINE

## 2022-12-20 PROCEDURE — 3023F SPIROM DOC REV: CPT | Performed by: INTERNAL MEDICINE

## 2022-12-20 PROCEDURE — G8420 CALC BMI NORM PARAMETERS: HCPCS | Performed by: INTERNAL MEDICINE

## 2022-12-20 PROCEDURE — 3074F SYST BP LT 130 MM HG: CPT | Performed by: INTERNAL MEDICINE

## 2022-12-20 PROCEDURE — 3017F COLORECTAL CA SCREEN DOC REV: CPT | Performed by: INTERNAL MEDICINE

## 2022-12-20 PROCEDURE — 1123F ACP DISCUSS/DSCN MKR DOCD: CPT | Performed by: INTERNAL MEDICINE

## 2022-12-20 RX ORDER — PREDNISONE 10 MG/1
TABLET ORAL
Qty: 20 TABLET | Refills: 0 | Status: SHIPPED | OUTPATIENT
Start: 2022-12-20

## 2022-12-20 NOTE — PROGRESS NOTES
[facility] Pulmonary and Critical Care    Outpatient Follow Up Note    Subjective:   CHIEF COMPLAINT / HPI:     The patient is 81 y.o. female who presents today for COPD and pulmonary nodules. Tahmina Dominguez comes in by herself this morning and she seems to be at about baseline but says she continues to struggle with her breathing and was surprised to see that she is lost about 5 pounds compared to earlier this year. Interval history:  Taye Perry comes in today having finished a burst of steroids in the middle of July. She's still struggling a bit. She'd been on trelegy and really liked it, but then they jacked up the price to $800 and she can't afford it. She is back on anoro but not the flovent. She had her larynx reconstruction in mid June. Interval history:  Mrs. Halima Burciaga comes in today without any acute complaints. She had her vocal cord Botox procedure performed and it has helped her breathing some. She maintains on the maintenance prednisone 5 mg as well as Anoro and Flovent. She was vaccinated with Jessenia Tejada in the winter and is asking about the booster shots. Most recent visit:  Taye Perry previously had been on Trelegy and said that it was a struggle for her but I think she may have been rationing her doses because she used some of the friends and said it worked really well for her. Instead of had her on Anoro and Flovent. Cost of medication is a barrier for she and her  to him who I saw earlier today. Overall she is doing pretty well, just having some issues with seasonal allergies. Are trying to figure out the issue with cost of medications. Previous visit: Taye Perry continues to do well on anoro and flovent diskus. She's been home since the pandemic started and off work. She's been doing a lot of gardening when the weather allows and she's lost 8 lbs semi-on purpose.   Her  recently had a flare and needed steroids and antibiotic about a week later she had similar symptoms so she got a burst of prednisone as well which she said fixed it pretty quickly. She is feeling much better and off steroids for over a week now. Previous visit:Carol is doing better on the anoro, flovent diskus combo and seldom has to use her rescue inhaler. She does limit her activity on some days and is also limited by back pain. She's on muscle relaxers and recently had an MRI that showed acute compression fractures in her lumbar spine. She's wearing a back brace. Previous history: patient had a follow-up CT scan done May 17 and was essentially found to have more vertebral fractures she was admitted for repair. She's run out of her Spiriva and her Breo several weeks ago and has not needed her rescue inhaler. She has also weaned off of supplemental oxygen. She is scheduled for surgery to remove her thyroid mass in a few weeks. Patient said she felt great on the Anoro and rarely needed her rescue inhaler, and even would forget the anoro from time to time and feel ok. Until September 29th when she had her thyroid removed. 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 Since then she's extremely dyspneic trying to walk short distances and she has to be careful how she swallows or she aspirates. She's having botox injected on the 17th to try to alleviate some of the issues and may need reconstruction. Since the surgery she doesn't feel much benefit from the anoro and has a nonproductive cough and increased dyspnea with exertion, talking and laying down. She's started using her albuterol HFA up to 8 times per day. She says she feels her cough improve and is able to breath better 5-10 minutes after use. She was all set to do pulmonary rehab until this happened, and has deferred as a result.       Past Medical History:    Past Medical History:   Diagnosis Date    Allergic rhinitis     Anxiety 2/14/2022    Back pain     Chronic:under care of spine specialist:Dr. Rosangela Han cancer screening     Nml per pt'    Compression fracture of thoracic vertebrae, non-traumatic ([facility]     under care of endo:Dr. Jamye Mcconnell    COPD (chronic obstructive pulmonary disease) ([facility]     under care of pulmo:Dr. Radha Abbott    COPD exacerbation ([facility] 2017    Elevated LDL cholesterol level     GERD (gastroesophageal reflux disease)     History of mammogram ;17    Nml per pt'. 17=negative. HLD (hyperlipidemia) 9/3/2021    Hoarseness of voice 10/4/2017    Hypertension     Lung nodule:left 2017     1.2 cm indeterminate left upper lobe pulmonary nodule:under care of pulmo:Dr. Radha Abbott    Osteoarthritis     Osteomyelitis of left leg ([facility]     Osteoporosis     under care of endo:Dr. Jamey Mcconnell    S/P colonoscopy     Polyps:next in [id number]    Systolic CHF, chronic ([facility] EF 45% 2018    Thoracic aorta atherosclerosis ([facility] 2022    Thyroid mass     under care of endo & surgeon(Dr. Ewing)    Thyroid nodule     under care of endo:Dr. Jamey Mcconnell       Social History:    Social History     Tobacco Use   Smoking Status Former    Packs/day: 1.50    Years: 22.00    Pack years: 33.00    Types: Cigarettes    Quit date: 2011    Years since quittin.7   Smokeless Tobacco Never       Current Medications:  Current Outpatient Medications on File Prior to Visit   Medication Sig Dispense Refill    predniSONE (DELTASONE) 10 MG tablet Take 4 tablets by mouth for 5 days. 20 tablet 0    predniSONE (DELTASONE) 10 MG tablet Take 4 tablets by mouth for 5 days. 20 tablet 0    budesonide (PULMICORT) 180 MCG/ACT AEPB inhaler Inhale 2 puffs into the lungs in the morning and 2 puffs in the evening. 3 each 3    predniSONE (DELTASONE) 10 MG tablet Take 4 tablets by mouth for 5 days. 20 tablet 0    fluticasone (FLOVENT HFA) 220 MCG/ACT inhaler Inhale 2 puffs into the lungs in the morning and 2 puffs before bedtime.  1 each 5    VENTOLIN  (90 Base) MCG/ACT inhaler INHALE 2 PUFFS INTO THE LUNGS EVERY 6 HOURS AS NEEDED FOR WHEEZING 18 g 11    predniSONE (DELTASONE) 10 MG tablet Take 4 tablets by mouth for 5 days. 20 tablet 0    metoprolol succinate (TOPROL XL) 25 MG extended release tablet TAKE 1/2 TABLET BY MOUTH TWICE DAILY 90 tablet 3    ondansetron (ZOFRAN-ODT) 4 MG disintegrating tablet Take 1 tablet by mouth every 6 hours as needed for Nausea or Vomiting 30 tablet 0    TRELEGY ELLIPTA 200-62.5-25 MCG/INH AEPB USE 1 PUFF EVERY DAY 3 each 3    lisinopril (PRINIVIL;ZESTRIL) 2.5 MG tablet TAKE 1 TABLET BY MOUTH EVERY EVENING 90 tablet 3    umeclidinium-vilanterol (ANORO ELLIPTA) 62.5-25 MCG/INH AEPB inhaler INHALE 1 PUFF INTO THE LUNGS DAILY 60 each 6    albuterol (ACCUNEB) 1.25 MG/3ML nebulizer solution Inhale 3 mLs into the lungs every 4 hours as needed for Wheezing or Shortness of Breath 180 vial 3    albuterol (PROVENTIL) (2.5 MG/3ML) 0.083% nebulizer solution Take 2.5 mg by nebulization every 6 hours as needed for Wheezing       melatonin 3 MG TABS tablet Take 3 mg by mouth nightly as needed      aspirin 81 MG chewable tablet Take 1 tablet by mouth daily 30 tablet 3    Nebulizers (AIRIAL COMPACT MINI NEBULIZER) MISC 1 Device by Does not apply route daily 1 each 0    acetaminophen (TYLENOL) 500 MG tablet Take 500 mg by mouth every 6 hours as needed for Pain        No current facility-administered medications on file prior to visit.        REVIEW OF SYSTEMS:    CONSTITUTIONAL: Negative for fevers and chills  HEENT: Negative for oropharyngeal exudate, post nasal drip, sinus pain / pressure, nasal congestion, ear pain  RESPIRATORY:  See HPI  CARDIOVASCULAR: Negative for chest pain, palpitations, edema  GASTROINTESTINAL: Negative for nausea, vomiting, diarrhea, constipation and abdominal pain  HEMATOLOGICAL: Negative for adenopathy  SKIN: Negative for clubbing, cyanosis, skin lesions  EXTREMITIES: Negative for weakness, decreased ROM  NEUROLOGICAL: Negative for unilateral weakness, speech or gait still has dyspnea that limits her lifestyle. She is no longer on a maintenance dose of prednisone, but I did write for her to have a burst on hand. I am reluctant to put her on Daliresp because of the high cost, but more importantly her recent weight loss. She may be a candidate for endobronchial valves as she does have some emphysema. Going to repeat her PFTs to see if her hyperinflation and air trapping are bad enough to warrant further work-up. If they are then I will get a Strat X CT scan either at the next visit or before. Screening CT in June 2022 was stable, so she is due for screening CT in June of 2023, was negative static CT beforehand. Previous good quote: \"If you can't be a good example, then be a horrible warning\"    Orders Placed This Encounter   Medications    predniSONE (DELTASONE) 10 MG tablet     Sig: Take 4 tablets by mouth for 5 days. Dispense:  20 tablet     Refill:  0           Diagnosis Orders   1. COPD exacerbation ([address]  Full PFT Study With Bronchodilator    Carbon Monoxide Diffusing Capacity      2. Pulmonary emphysema, unspecified emphysema type ([address]                  The patient is not currently smoking. RTC 3-4 months w/ MD. Call or RTC sooner if symptoms persist or worsen acutely.       Candice Lord MD

## 2023-01-03 ENCOUNTER — OFFICE VISIT (OUTPATIENT)
Dept: INTERNAL MEDICINE CLINIC | Age: 73
End: 2023-01-03
Payer: MEDICARE

## 2023-01-03 VITALS
HEIGHT: 61 IN | HEART RATE: 92 BPM | DIASTOLIC BLOOD PRESSURE: 74 MMHG | WEIGHT: 117 LBS | BODY MASS INDEX: 22.09 KG/M2 | SYSTOLIC BLOOD PRESSURE: 110 MMHG | RESPIRATION RATE: 14 BRPM | OXYGEN SATURATION: 95 %

## 2023-01-03 DIAGNOSIS — Z23 NEED FOR SHINGLES VACCINE: ICD-10-CM

## 2023-01-03 DIAGNOSIS — E78.2 MIXED HYPERLIPIDEMIA: ICD-10-CM

## 2023-01-03 DIAGNOSIS — I50.22 SYSTOLIC CHF, CHRONIC (HCC): ICD-10-CM

## 2023-01-03 DIAGNOSIS — I10 BENIGN ESSENTIAL HTN: Primary | ICD-10-CM

## 2023-01-03 DIAGNOSIS — J44.9 COPD, MODERATE (HCC): ICD-10-CM

## 2023-01-03 DIAGNOSIS — I70.0 THORACIC AORTA ATHEROSCLEROSIS (HCC): ICD-10-CM

## 2023-01-03 DIAGNOSIS — R73.01 IMPAIRED FASTING GLUCOSE: ICD-10-CM

## 2023-01-03 DIAGNOSIS — Z23 NEED FOR TDAP VACCINATION: ICD-10-CM

## 2023-01-03 PROBLEM — I50.32 CHRONIC DIASTOLIC CHF (CONGESTIVE HEART FAILURE) (HCC): Status: RESOLVED | Noted: 2022-02-14 | Resolved: 2023-01-03

## 2023-01-03 PROBLEM — T17.500A MUCUS PLUGGING OF BRONCHI: Status: RESOLVED | Noted: 2017-04-14 | Resolved: 2023-01-03

## 2023-01-03 PROBLEM — M80.08XA VERTEBRAL FRACTURE, OSTEOPOROTIC (HCC): Status: RESOLVED | Noted: 2017-05-17 | Resolved: 2023-01-03

## 2023-01-03 PROBLEM — I42.8 NONISCHEMIC CARDIOMYOPATHY (HCC): Status: RESOLVED | Noted: 2018-08-17 | Resolved: 2023-01-03

## 2023-01-03 PROCEDURE — 3074F SYST BP LT 130 MM HG: CPT | Performed by: INTERNAL MEDICINE

## 2023-01-03 PROCEDURE — 1090F PRES/ABSN URINE INCON ASSESS: CPT | Performed by: INTERNAL MEDICINE

## 2023-01-03 PROCEDURE — 99214 OFFICE O/P EST MOD 30 MIN: CPT | Performed by: INTERNAL MEDICINE

## 2023-01-03 PROCEDURE — 1123F ACP DISCUSS/DSCN MKR DOCD: CPT | Performed by: INTERNAL MEDICINE

## 2023-01-03 PROCEDURE — 3078F DIAST BP <80 MM HG: CPT | Performed by: INTERNAL MEDICINE

## 2023-01-03 PROCEDURE — G8420 CALC BMI NORM PARAMETERS: HCPCS | Performed by: INTERNAL MEDICINE

## 2023-01-03 PROCEDURE — 1036F TOBACCO NON-USER: CPT | Performed by: INTERNAL MEDICINE

## 2023-01-03 PROCEDURE — 3023F SPIROM DOC REV: CPT | Performed by: INTERNAL MEDICINE

## 2023-01-03 PROCEDURE — G8399 PT W/DXA RESULTS DOCUMENT: HCPCS | Performed by: INTERNAL MEDICINE

## 2023-01-03 PROCEDURE — G8484 FLU IMMUNIZE NO ADMIN: HCPCS | Performed by: INTERNAL MEDICINE

## 2023-01-03 PROCEDURE — G8427 DOCREV CUR MEDS BY ELIG CLIN: HCPCS | Performed by: INTERNAL MEDICINE

## 2023-01-03 PROCEDURE — 3017F COLORECTAL CA SCREEN DOC REV: CPT | Performed by: INTERNAL MEDICINE

## 2023-01-03 RX ORDER — ONDANSETRON 4 MG/1
4 TABLET, FILM COATED ORAL EVERY 6 HOURS PRN
Qty: 30 TABLET | Refills: 0 | Status: SHIPPED | OUTPATIENT
Start: 2023-01-03

## 2023-01-03 RX ORDER — ZOSTER VACCINE RECOMBINANT, ADJUVANTED 50 MCG/0.5
0.5 KIT INTRAMUSCULAR SEE ADMIN INSTRUCTIONS
Qty: 0.5 ML | Refills: 1 | Status: SHIPPED | OUTPATIENT
Start: 2023-01-03 | End: 2023-07-02

## 2023-01-03 SDOH — ECONOMIC STABILITY: FOOD INSECURITY: WITHIN THE PAST 12 MONTHS, THE FOOD YOU BOUGHT JUST DIDN'T LAST AND YOU DIDN'T HAVE MONEY TO GET MORE.: NEVER TRUE

## 2023-01-03 SDOH — ECONOMIC STABILITY: FOOD INSECURITY: WITHIN THE PAST 12 MONTHS, YOU WORRIED THAT YOUR FOOD WOULD RUN OUT BEFORE YOU GOT MONEY TO BUY MORE.: NEVER TRUE

## 2023-01-03 ASSESSMENT — SOCIAL DETERMINANTS OF HEALTH (SDOH): HOW HARD IS IT FOR YOU TO PAY FOR THE VERY BASICS LIKE FOOD, HOUSING, MEDICAL CARE, AND HEATING?: NOT HARD AT ALL

## 2023-01-03 ASSESSMENT — PATIENT HEALTH QUESTIONNAIRE - PHQ9
SUM OF ALL RESPONSES TO PHQ9 QUESTIONS 1 & 2: 0
SUM OF ALL RESPONSES TO PHQ QUESTIONS 1-9: 0
1. LITTLE INTEREST OR PLEASURE IN DOING THINGS: 0
2. FEELING DOWN, DEPRESSED OR HOPELESS: 0
SUM OF ALL RESPONSES TO PHQ QUESTIONS 1-9: 0

## 2023-01-03 NOTE — PROGRESS NOTES
[facility]  Internal Medicine  Patient Encounter  Shayla Strong D.O., [place]        Chief Complaint   Patient presents with    Check-Up       HPI: 81 y.o. female seen today requesting a routine checkup regarding the status of current chronic medical problems as below along with a medication review and reconciliation. COPD/emphysema--patient was last seen by her pulmonologist, Dr. Beena Bull on 12/20/2022 for a COPD exacerbation. She is a former smoker. Her last CT scan of the chest was 7/5/2022. Previously, incidental findings on the CAT scan showed mild atherosclerotic calcifications of the thoracic aorta. There was no mention of atherosclerosis on the current scan. No evidence of coronary artery calcifications. Patient is currently on Flovent HFA, albuterol HFA. Thoracic aorta atherosclerosis-asymptomatic. Seen incidentally on CT scan    She is C/O anxiety issues. She states she does not handle stress well. She feels overwhelmed at times. She has a large family. She may want to start medication, but not yet. Hyperlipidemia--    Lab Results   Component Value Date    LDLCALC 101 (H) 02/14/2022    Patient is not yet on statin therapy. It was recommended by her previous PCP     Hypertension-- She denies any headaches, lightheadedness, syncope, swelling, palpitations, episodes of unilateral weakness, speech or visual disturbances. Patient reports compliance with her medication. IFG--Her last glucose level on 11/3/2022 was 112. Her last A1c level was 5.9% in February 2022. She denies any polys. She has had no significant weight changes. Hemoglobin A1C   Date Value Ref Range Status   02/14/2022 5.9 See comment % Final     Comment:     Comment:  Diagnosis of Diabetes: > or = 6.5%  Increased risk of diabetes (Prediabetes): 5.7-6.4%  Glycemic Control: Nonpregnant Adults: <7.0%                    Pregnant: <6.0%            Osteoporosis-She sees Dr. Krystle Barrera.   She is on Alfredo    Chronic systolic congestive heart failure--previously---> heart cath on 5/21/2018  showed an ejection fraction of 45%. She had normal epicardial coronary arteries. However in 2018 her ejection fraction was measured at 50 to 55%. At that time she had mild mod regurgitation and slightly elevated right ventricular systolic pressure of 38 mmHg. Overall, she is feeling well. She denies orthopnea. Her last 8/11/2022--> EF 55-60%, Mild Septal Hypertrophy. She denies orthopnea, swelling. She does have MERAZ due to lung disease. Past Medical History:   Diagnosis Date    Allergic rhinitis     Anxiety 2/14/2022    Back pain     Chronic:under care of spine specialist:Dr. Adames    Cervical cancer screening 2010    Nml per pt'    Compression fracture of thoracic vertebrae, non-traumatic ([facility]     under care of endo:Dr. Tyson Riddle    COPD (chronic obstructive pulmonary disease) ([facility]     under care of pulmo:Dr. Ilana Huitron    COPD exacerbation ([facility] 4/14/2017    Elevated LDL cholesterol level     GERD (gastroesophageal reflux disease)     History of mammogram 2012;5/17/17    Nml per pt'. 5/17/17=negative.     HLD (hyperlipidemia) 9/3/2021    Hoarseness of voice 10/4/2017    Hypertension     Lung nodule:left 05/04/2017     1.2 cm indeterminate left upper lobe pulmonary nodule:under care of pulmo:Dr. Ilana Huitron    Osteoarthritis     Osteomyelitis of left leg ([address]     Osteoporosis     under care of endo:Dr. Tyson Riddle    S/P colonoscopy 2011    Polyps:next in [id number]    Systolic CHF, chronic ([address] EF 45% 5/25/2018    Thoracic aorta atherosclerosis ([facility] 2/14/2022    Thyroid mass     under care of endo & surgeon(Dr. Ewing)    Thyroid nodule     under care of endo:Dr. Sena Silver:  Prior to Visit Medications    Medication Sig   ondansetron (ZOFRAN) 4 MG tablet Take 1 tablet by mouth every 6 hours as needed for Nausea or Vomiting   zoster recombinant adjuvanted vaccine (SHINGRIX) 50 MCG/0.5ML SUSR injection Inject 0.5 mLs into the muscle See Admin Instructions 1 dose now and repeat in 2-6 months   Tetanus-Diphth-Acell Pertussis (BOOSTRIX) 5-2.5-18.5 LF-MCG/0.5 injection Inject 0.5 mLs into the muscle once for 1 dose   budesonide (PULMICORT) 180 MCG/ACT AEPB inhaler Inhale 2 puffs into the lungs in the morning and 2 puffs in the evening. fluticasone (FLOVENT HFA) 220 MCG/ACT inhaler Inhale 2 puffs into the lungs in the morning and 2 puffs before bedtime. VENTOLIN  (90 Base) MCG/ACT inhaler INHALE 2 PUFFS INTO THE LUNGS EVERY 6 HOURS AS NEEDED FOR WHEEZING   metoprolol succinate (TOPROL XL) 25 MG extended release tablet TAKE 1/2 TABLET BY MOUTH TWICE DAILY   TRELEGY ELLIPTA 200-62.5-25 MCG/INH AEPB USE 1 PUFF EVERY DAY   lisinopril (PRINIVIL;ZESTRIL) 2.5 MG tablet TAKE 1 TABLET BY MOUTH EVERY EVENING   umeclidinium-vilanterol (ANORO ELLIPTA) 62.5-25 MCG/INH AEPB inhaler INHALE 1 PUFF INTO THE LUNGS DAILY   albuterol (ACCUNEB) 1.25 MG/3ML nebulizer solution Inhale 3 mLs into the lungs every 4 hours as needed for Wheezing or Shortness of Breath   albuterol (PROVENTIL) (2.5 MG/3ML) 0.083% nebulizer solution Take 2.5 mg by nebulization every 6 hours as needed for Wheezing    melatonin 3 MG TABS tablet Take 3 mg by mouth nightly as needed   aspirin 81 MG chewable tablet Take 1 tablet by mouth daily   Nebulizers (AIRIAL COMPACT MINI NEBULIZER) MISC 1 Device by Does not apply route daily   acetaminophen (TYLENOL) 500 MG tablet Take 500 mg by mouth every 6 hours as needed for Pain            Review of Systems - As per HPI      OBJECTIVE:  Vitals:    01/03/23 0936   BP: 110/74   Pulse: 92   Resp: 14   SpO2: 95%   Weight: 117 lb (53.1 kg)   Height: 5' 1\" (1.549 m)     Body mass index is 22.11 kg/m².      Wt Readings from Last 3 Encounters:   01/03/23 117 lb (53.1 kg)   12/20/22 114 lb (51.7 kg)   11/03/22 119 lb (54 kg)     BP Readings from Last 3 Encounters:   01/03/23 110/74   12/20/22 132/80   11/03/22 120/76        GEN: NAD, A&O, Non-toxic  HEENT: NC/AT, HARSHIL, EOMI, Oral cavity Clear,  TM's NL  NECK: Supple. No thyromegaly. No JVD  LYMPH: No C/SC nodes. CV: S1 S2 NL, RRR. No murmurs, clicks or rubs. PULM: CTA, diminished breath sounds throughout. No crackles or wheezing  EXT: No edema  GI: Abdomen is soft, NT, BS+, No hepatomegaly. No masses. NEURO: No focal or lateralizing deficits. VASC:  No carotid bruits. Pedal Pulses symmetric  SKIN:  No rashes or lesions of concern      ASSESSMENT/PLAN:    1. Benign essential HTN  Blood pressure is well controlled  Continue lisinopril 2.5 mg daily and Toprol-XL 25 mg daily  Continue to monitor for hypotension    2. Thoracic aorta atherosclerosis (HCC)  Asymptomatic  Continue aggressive cardiovascular risk factor management  This was not mentioned on most recent CT scan  Patient is not in favor of statin therapy at this time  Continue low-dose aspirin    3. Need for shingles vaccine    - zoster recombinant adjuvanted vaccine [facility]) 50 MCG/0.5ML SUSR injection; Inject 0.5 mLs into the muscle See Admin Instructions 1 dose now and repeat in 2-6 months  Dispense: 0.5 mL; Refill: 1    4. Need for Tdap vaccination    - Tetanus-Diphth-Acell Pertussis ([address]) 5-2.5-18.5 LF-MCG/0.5 injection; Inject 0.5 mLs into the muscle once for 1 dose  Dispense: 0.5 mL; Refill: 0    5. COPD, moderate ([address]  Patient is well controlled at this time  She does have some dyspnea with exertion but no worse  Continue current regimen as set forth by her pulmonologist  She does have an order for PFTs with [facility]     6. Mixed hyperlipidemia  Condition control is uncertain  Due for lab  Consider statin therapy as part of her overall cardiovascular disease risk reduction strategy    7. Impaired fasting glucose  Asymptomatic at this time  Check A1c to ensure she has not transition to type 2 diabetes  Counseled on a carb restricted diet    8.  Systolic CHF, chronic (HCC) EF 45%  Condition is improved  Her most recent echo showed a normalized ejection fraction  Follow-up with cardiology  Continue beta-blocker              Discussed medications with patient who voiced understanding of their use, indication and potential side effects. Pt also understands the above recommendations. All questions answered. This note was generated completely or in part utilizing Dragon dictation speech recognition software. Occasionally, words are mistranscribed and despite editing, the text may contain inaccuracies due to incorrect word recognition.   If further clarification is needed please contact the office at [phone]       Electronically signed    Luca White D.O.

## 2023-01-20 ENCOUNTER — OFFICE VISIT (OUTPATIENT)
Dept: CARDIOLOGY CLINIC | Age: 73
End: 2023-01-20
Payer: MEDICARE

## 2023-01-20 VITALS
SYSTOLIC BLOOD PRESSURE: 134 MMHG | DIASTOLIC BLOOD PRESSURE: 80 MMHG | BODY MASS INDEX: 21.92 KG/M2 | WEIGHT: 116 LBS | HEART RATE: 60 BPM

## 2023-01-20 DIAGNOSIS — I42.0 DILATED CARDIOMYOPATHY (HCC): ICD-10-CM

## 2023-01-20 DIAGNOSIS — E78.5 HYPERLIPIDEMIA, UNSPECIFIED HYPERLIPIDEMIA TYPE: ICD-10-CM

## 2023-01-20 DIAGNOSIS — I50.22 CHRONIC SYSTOLIC CONGESTIVE HEART FAILURE (HCC): Primary | ICD-10-CM

## 2023-01-20 PROCEDURE — 3017F COLORECTAL CA SCREEN DOC REV: CPT | Performed by: INTERNAL MEDICINE

## 2023-01-20 PROCEDURE — 3079F DIAST BP 80-89 MM HG: CPT | Performed by: INTERNAL MEDICINE

## 2023-01-20 PROCEDURE — G8484 FLU IMMUNIZE NO ADMIN: HCPCS | Performed by: INTERNAL MEDICINE

## 2023-01-20 PROCEDURE — 1123F ACP DISCUSS/DSCN MKR DOCD: CPT | Performed by: INTERNAL MEDICINE

## 2023-01-20 PROCEDURE — 1090F PRES/ABSN URINE INCON ASSESS: CPT | Performed by: INTERNAL MEDICINE

## 2023-01-20 PROCEDURE — 99214 OFFICE O/P EST MOD 30 MIN: CPT | Performed by: INTERNAL MEDICINE

## 2023-01-20 PROCEDURE — G8428 CUR MEDS NOT DOCUMENT: HCPCS | Performed by: INTERNAL MEDICINE

## 2023-01-20 PROCEDURE — G8420 CALC BMI NORM PARAMETERS: HCPCS | Performed by: INTERNAL MEDICINE

## 2023-01-20 PROCEDURE — G8399 PT W/DXA RESULTS DOCUMENT: HCPCS | Performed by: INTERNAL MEDICINE

## 2023-01-20 PROCEDURE — 3075F SYST BP GE 130 - 139MM HG: CPT | Performed by: INTERNAL MEDICINE

## 2023-01-20 PROCEDURE — 1036F TOBACCO NON-USER: CPT | Performed by: INTERNAL MEDICINE

## 2023-01-20 ASSESSMENT — ENCOUNTER SYMPTOMS
CHEST TIGHTNESS: 0
CHOKING: 0
COUGH: 0
SHORTNESS OF BREATH: 0

## 2023-01-20 NOTE — PROGRESS NOTES
Subjective:      Patient ID: Gayle Jiang is a 81 y.o. female. Congestive Heart Failure  Pertinent negatives include no chest pain, fatigue, palpitations or shortness of breath. Here for follow up CHF/cardiomyopathy. Breathing stable after tx COPD. No chest pain. No edema. No pnd or orthopnea. Wt stable. Active including walking. COPD stable after weekend. Past Medical History:   Diagnosis Date    Allergic rhinitis     Anxiety 2022    Back pain     Chronic:under care of spine specialist:Dr. Adames    Cervical cancer screening     Nml per pt'    Compression fracture of thoracic vertebrae, non-traumatic ([address]     under care of endo:Dr. Adelaide Caban    COPD (chronic obstructive pulmonary disease) ([address]     under care of pulmo:Dr. Medel Child    COPD exacerbation ([address] 2017    Elevated LDL cholesterol level     GERD (gastroesophageal reflux disease)     History of mammogram ;17    Nml per pt'. 17=negative.     HLD (hyperlipidemia) 9/3/2021    Hoarseness of voice 10/4/2017    Hypertension     Lung nodule:left 2017     1.2 cm indeterminate left upper lobe pulmonary nodule:under care of pulmo:Dr. Lizy Sharpe    Mucus plugging of bronchi 2017    Osteoarthritis     Osteomyelitis of left leg ([address]     Osteoporosis     under care of endo:Dr. Adelaide Caban    S/P colonoscopy     Polyps:next in [id number]    Systolic CHF, chronic ([address] EF 45% 2018    Thoracic aorta atherosclerosis ([address] 2022    Thyroid mass     under care of endo & surgeon(Dr. Ewing)    Thyroid nodule     under care of endo:Dr. Adelaide Caban     Past Surgical History:   Procedure Laterality Date    APPENDECTOMY       SECTION      FIXATION KYPHOPLASTY  2017    Dr. Zafar Garrido    LARYNGOPLASTY Right 2022    MEDIALIZATION LARYNGOPLASTY, Dr. 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[employer]   Tobacco Use    Smoking status: Former     Packs/day: 1.50     Years: 22.00     Pack years: 33.00     Types: Cigarettes     Quit date: 2011     Years since quittin.8    Smokeless tobacco: Never   Vaping Use    Vaping Use: Never used   Substance and Sexual Activity    Alcohol use: Yes     Alcohol/week: 4.0 standard drinks     Types: 4 Cans of beer per week     Comment: occ    Drug use: No    Sexual activity: Yes     Partners: Male   Other Topics Concern    Not on file   Social History Narrative    Not on file     Social Determinants of Health     Financial Resource Strain: Low Risk     Difficulty of Paying Living Expenses: Not hard at all   Food Insecurity: No Food Insecurity    Worried About Running Out of Food in the Last Year: Never true    Ran Out of Food in the Last Year: Never true   Transportation Needs: Not on file   Physical Activity: Insufficiently Active    Days of Exercise per Week: 4 days    Minutes of Exercise per Session: 30 min   Stress: Not on file   Social Connections: Not on file   Intimate Partner Violence: Not on file   Housing Stability: Not on file     FH reviewed, denies FH cardiac issues    Vitals:    23 1019   BP: 134/80   Pulse: 60       Wt 116    Review of Systems   Constitutional:  Negative for activity change, appetite change and fatigue. Respiratory:  Negative for cough, choking, chest tightness and shortness of breath. Cardiovascular:  Negative for chest pain, palpitations and leg swelling. Denies PND or orthopnea. No tachycardia or syncope.     Neurological:  Negative for dizziness, syncope and light-headedness. Psychiatric/Behavioral:  Negative for agitation, behavioral problems and confusion. All other systems reviewed and are negative. Objective:   Physical Exam  Constitutional:       General: She is not in acute distress. Appearance: She is well-developed. HENT:      Head: Normocephalic and atraumatic. Eyes:      General:         Right eye: No discharge. Left eye: No discharge. Conjunctiva/sclera: Conjunctivae normal.   Neck:      Vascular: No JVD. Cardiovascular:      Rate and Rhythm: Normal rate and regular rhythm. Pulses:           Radial pulses are 2+ on the right side and 2+ on the left side. Heart sounds: Normal heart sounds, S1 normal and S2 normal. No murmur heard. No gallop. Pulmonary:      Effort: Pulmonary effort is normal. No respiratory distress. Breath sounds: Normal breath sounds. No wheezing or rales. Abdominal:      General: Bowel sounds are normal.      Palpations: Abdomen is soft. Tenderness: There is no abdominal tenderness. Musculoskeletal:         General: Normal range of motion. Cervical back: Normal range of motion. Skin:     General: Skin is warm and dry. Neurological:      Mental Status: She is alert and oriented to person, place, and time. Psychiatric:         Behavior: Behavior normal.         Thought Content: Thought content normal.       Assessment:       Diagnosis Orders   1. Chronic systolic congestive heart failure ([address]        2. Dilated cardiomyopathy ([address]        3. Hyperlipidemia, unspecified hyperlipidemia type                Plan:      CV  stable. Remains stable. CHF compensated. BP good. Will continue Toprol XL 25 mg qd, lisinopril 2.5 mg qd for CHF/cardiomyop. Reviewed previous records and testing including cath 5/18 and echo 7/22. No changes. Continue to monitor. Follow up 6 months.          Robert Shelton MD

## 2023-01-25 DIAGNOSIS — J43.8 OTHER EMPHYSEMA (HCC): ICD-10-CM

## 2023-01-25 RX ORDER — UMECLIDINIUM BROMIDE AND VILANTEROL TRIFENATATE 62.5; 25 UG/1; UG/1
POWDER RESPIRATORY (INHALATION)
Qty: 60 EACH | Refills: 6 | Status: SHIPPED | OUTPATIENT
Start: 2023-01-25

## 2023-01-30 ENCOUNTER — TELEPHONE (OUTPATIENT)
Dept: INTERNAL MEDICINE CLINIC | Age: 73
End: 2023-01-30

## 2023-01-30 NOTE — TELEPHONE ENCOUNTER
----- Message from [facility] sent at 1/30/2023 11:07 AM EST -----  Subject: Message to Provider    QUESTIONS  Information for Provider? Pt has labs and needs to know if she can walk in   and if she needs to fast for any of them. Please call back  ---------------------------------------------------------------------------  --------------  Desi Marquez INFO  [phone]; OK to leave message on voicemail  ---------------------------------------------------------------------------  --------------  SCRIPT ANSWERS  Relationship to Patient?  Self

## 2023-02-03 DIAGNOSIS — E78.2 MIXED HYPERLIPIDEMIA: ICD-10-CM

## 2023-02-03 DIAGNOSIS — I10 BENIGN ESSENTIAL HTN: ICD-10-CM

## 2023-02-03 DIAGNOSIS — R73.01 IMPAIRED FASTING GLUCOSE: ICD-10-CM

## 2023-02-04 LAB
A/G RATIO: 2.3 (ref 1.1–2.2)
ALBUMIN SERPL-MCNC: 4.5 G/DL (ref 3.4–5)
ALP BLD-CCNC: 49 U/L (ref 40–129)
ALT SERPL-CCNC: 8 U/L (ref 10–40)
ANION GAP SERPL CALCULATED.3IONS-SCNC: 14 MMOL/L (ref 3–16)
AST SERPL-CCNC: 12 U/L (ref 15–37)
BILIRUB SERPL-MCNC: 0.6 MG/DL (ref 0–1)
BUN BLDV-MCNC: 9 MG/DL (ref 7–20)
CALCIUM SERPL-MCNC: 9.7 MG/DL (ref 8.3–10.6)
CHLORIDE BLD-SCNC: 95 MMOL/L (ref 99–110)
CHOLESTEROL, TOTAL: 175 MG/DL (ref 0–199)
CO2: 30 MMOL/L (ref 21–32)
CREAT SERPL-MCNC: 0.7 MG/DL (ref 0.6–1.2)
ESTIMATED AVERAGE GLUCOSE: 116.9 MG/DL
GFR SERPL CREATININE-BSD FRML MDRD: >60 ML/MIN/{1.73_M2}
GLUCOSE BLD-MCNC: 90 MG/DL (ref 70–99)
HBA1C MFR BLD: 5.7 %
HDLC SERPL-MCNC: 79 MG/DL (ref 40–60)
LDL CHOLESTEROL CALCULATED: 82 MG/DL
POTASSIUM SERPL-SCNC: 4.8 MMOL/L (ref 3.5–5.1)
SODIUM BLD-SCNC: 139 MMOL/L (ref 136–145)
TOTAL PROTEIN: 6.5 G/DL (ref 6.4–8.2)
TRIGL SERPL-MCNC: 71 MG/DL (ref 0–150)
VLDLC SERPL CALC-MCNC: 14 MG/DL

## 2023-02-07 ENCOUNTER — TELEPHONE (OUTPATIENT)
Dept: PULMONOLOGY | Age: 73
End: 2023-02-07

## 2023-02-07 RX ORDER — AZITHROMYCIN 250 MG/1
TABLET, FILM COATED ORAL
Qty: 6 TABLET | Refills: 0 | Status: SHIPPED | OUTPATIENT
Start: 2023-02-07

## 2023-02-07 RX ORDER — PREDNISONE 10 MG/1
TABLET ORAL
Qty: 20 TABLET | Refills: 0 | Status: SHIPPED | OUTPATIENT
Start: 2023-02-07

## 2023-02-07 NOTE — TELEPHONE ENCOUNTER
Spoke to Shraddha and sounds like a COPD exacerbation. Orders Placed This Encounter   Medications    predniSONE (DELTASONE) 10 MG tablet     Sig: Take 4 tablets by mouth for 5 days.      Dispense:  20 tablet     Refill:  0    azithromycin (ZITHROMAX) 250 MG tablet     Sig: Take 500 mg by mouth the first day then 250 mg for the remaining four days     Dispense:  6 tablet     Refill:  0

## 2023-02-07 NOTE — TELEPHONE ENCOUNTER
Sxs: Feels like a sinus infection w/sob    Cough? Yes       Productive?  sometimes    Color of Mucus? clear             SOB? yes      Nasal Congestion? Yes                        Runny Nose/Color? Yes    Sore Throat? no         Fever?  no         Last recorded temp? OTC Meds Tried? Tylenol only    Taking routine pulmonary meds?  yes    [facility] [address], [address] #:  [phone]

## 2023-02-25 DIAGNOSIS — I42.8 NONISCHEMIC CARDIOMYOPATHY (HCC): ICD-10-CM

## 2023-02-25 DIAGNOSIS — I95.2 HYPOTENSION DUE TO DRUGS: ICD-10-CM

## 2023-02-25 DIAGNOSIS — I50.22 SYSTOLIC CHF, CHRONIC (HCC): ICD-10-CM

## 2023-02-27 RX ORDER — LISINOPRIL 2.5 MG/1
2.5 TABLET ORAL EVERY EVENING
Qty: 90 TABLET | Refills: 3 | Status: SHIPPED | OUTPATIENT
Start: 2023-02-27

## 2023-03-13 ENCOUNTER — TELEPHONE (OUTPATIENT)
Dept: PULMONOLOGY | Age: 73
End: 2023-03-13

## 2023-03-13 RX ORDER — PREDNISONE 10 MG/1
TABLET ORAL
Qty: 22 TABLET | Refills: 0 | Status: SHIPPED | OUTPATIENT
Start: 2023-03-13 | End: 2023-03-23

## 2023-03-13 NOTE — TELEPHONE ENCOUNTER
Spoke to Carol and she sounds a bit more dyspneic on the phone then with her usual flares.  She's got a non-productive cough and describes some orthopnea as well.  She had a flare about 5 weeks ago as well.  She has an appointment to see me in a week.  In the interim, I'm putting her on a burst and taper of steroids.    Orders Placed This Encounter   Medications    predniSONE (DELTASONE) 10 MG tablet     Si tabs for 3 days, then 2 tabs for 3 days, then 1 tab for 4 days     Dispense:  22 tablet     Refill:  0

## 2023-03-13 NOTE — TELEPHONE ENCOUNTER
Onset: A week ago     Cough? Yes       Productive? No    Color of Mucus? NA             SOB? Yes      Nasal Congestion? No                  Runny Nose/Color? NA    Sore Throat? No         Fever? NO         Last recorded temp? NA    OTC Meds Tried? None    Taking routine pulmonary meds? Nebulizer twice daily. Tightness in Chest. Unable to lay flat, becomes sob.     Ph[facility]acy: [facility] [address] #:  [phone]

## 2023-03-20 ENCOUNTER — OFFICE VISIT (OUTPATIENT)
Dept: PULMONOLOGY | Age: 73
End: 2023-03-20
Payer: MEDICARE

## 2023-03-20 VITALS
OXYGEN SATURATION: 88 % | HEART RATE: 91 BPM | SYSTOLIC BLOOD PRESSURE: 137 MMHG | BODY MASS INDEX: 22.28 KG/M2 | DIASTOLIC BLOOD PRESSURE: 91 MMHG | HEIGHT: 61 IN | WEIGHT: 118 LBS

## 2023-03-20 DIAGNOSIS — J44.1 COPD EXACERBATION (HCC): Primary | ICD-10-CM

## 2023-03-20 DIAGNOSIS — J43.9 PULMONARY EMPHYSEMA, UNSPECIFIED EMPHYSEMA TYPE (HCC): ICD-10-CM

## 2023-03-20 DIAGNOSIS — Z72.0 TOBACCO ABUSE DISORDER: ICD-10-CM

## 2023-03-20 PROCEDURE — 1090F PRES/ABSN URINE INCON ASSESS: CPT | Performed by: INTERNAL MEDICINE

## 2023-03-20 PROCEDURE — 3017F COLORECTAL CA SCREEN DOC REV: CPT | Performed by: INTERNAL MEDICINE

## 2023-03-20 PROCEDURE — 1036F TOBACCO NON-USER: CPT | Performed by: INTERNAL MEDICINE

## 2023-03-20 PROCEDURE — G8484 FLU IMMUNIZE NO ADMIN: HCPCS | Performed by: INTERNAL MEDICINE

## 2023-03-20 PROCEDURE — 3023F SPIROM DOC REV: CPT | Performed by: INTERNAL MEDICINE

## 2023-03-20 PROCEDURE — 1123F ACP DISCUSS/DSCN MKR DOCD: CPT | Performed by: INTERNAL MEDICINE

## 2023-03-20 PROCEDURE — 99214 OFFICE O/P EST MOD 30 MIN: CPT | Performed by: INTERNAL MEDICINE

## 2023-03-20 PROCEDURE — G8399 PT W/DXA RESULTS DOCUMENT: HCPCS | Performed by: INTERNAL MEDICINE

## 2023-03-20 PROCEDURE — 3075F SYST BP GE 130 - 139MM HG: CPT | Performed by: INTERNAL MEDICINE

## 2023-03-20 PROCEDURE — G8427 DOCREV CUR MEDS BY ELIG CLIN: HCPCS | Performed by: INTERNAL MEDICINE

## 2023-03-20 PROCEDURE — G8420 CALC BMI NORM PARAMETERS: HCPCS | Performed by: INTERNAL MEDICINE

## 2023-03-20 PROCEDURE — 3080F DIAST BP >= 90 MM HG: CPT | Performed by: INTERNAL MEDICINE

## 2023-03-20 RX ORDER — PREDNISONE 10 MG/1
TABLET ORAL
Qty: 20 TABLET | Refills: 0 | Status: SHIPPED | OUTPATIENT
Start: 2023-03-20

## 2023-03-20 NOTE — PROGRESS NOTES
[facility]    Outpatient Follow Up Note    Subjective:   CHIEF COMPLAINT / HPI:     The patient is 81 y.o. female who presents today for COPD and pulmonary nodules. Rondal Buerger has had 2 flares already this year and just completed her second burst of steroids over the weekend. She's compliant with the Anoro, but informs me that she and her  stopped filling the pulmicort because they're trying to be judicious about the medications they take. Interval history:  Rondal Buerger comes in by herself this morning and she seems to be at about baseline but says she continues to struggle with her breathing and was surprised to see that she is lost about 5 pounds compared to earlier this year. Interval history:  Rondal Buerger comes in today having finished a burst of steroids in the middle of July. She's still struggling a bit. She'd been on trelegy and really liked it, but then they jacked up the price to $800 and she can't afford it. She is back on anoro but not the flovent. She had her larynx reconstruction in mid June. Interval history:  Mrs. Sybil Loza comes in today without any acute complaints. She had her vocal cord Botox procedure performed and it has helped her breathing some. She maintains on the maintenance prednisone 5 mg as well as Anoro and Flovent. She was vaccinated with Sydnee Fletcher in the winter and is asking about the booster shots. Most recent visit:  Rondal Buerger previously had been on Trelegy and said that it was a struggle for her but I think she may have been rationing her doses because she used some of the friends and said it worked really well for her. Instead of had her on Anoro and Flovent. Cost of medication is a barrier for she and her  to him who I saw earlier today. Overall she is doing pretty well, just having some issues with seasonal allergies. Are trying to figure out the issue with cost of medications.     Previous visit: Rondal Buerger continues to do well on anoro

## 2023-04-25 ENCOUNTER — TELEPHONE (OUTPATIENT)
Dept: PULMONOLOGY | Age: 73
End: 2023-04-25

## 2023-04-25 NOTE — TELEPHONE ENCOUNTER
Pt of Dr Jessy Finn called with c/o sinus issues for approximately 1 week. She has congestion, runny nose,\" the usual sinus stuff that I always get\" and states Dr Elham Bill usually gives her ABX and Prednisone. Please advise. [facility].

## 2023-04-26 RX ORDER — AZITHROMYCIN 250 MG/1
TABLET, FILM COATED ORAL
Qty: 6 TABLET | Refills: 0 | Status: SHIPPED | OUTPATIENT
Start: 2023-04-26

## 2023-04-26 RX ORDER — PREDNISONE 10 MG/1
TABLET ORAL
Qty: 20 TABLET | Refills: 0 | Status: SHIPPED | OUTPATIENT
Start: 2023-04-26

## 2023-04-26 RX ORDER — AZITHROMYCIN 250 MG/1
TABLET, FILM COATED ORAL
Qty: 6 TABLET | Refills: 1 | Status: SHIPPED | OUTPATIENT
Start: 2023-04-26 | End: 2023-05-06

## 2023-04-26 NOTE — TELEPHONE ENCOUNTER
I can't tell if anyone took care of this message, but I can tell that the patient isn't named Wandy Westbrook. I tried calling [facility] and got her voicemail. She gets flares associated with infections and allergies so I put in an Rx for both prednisone and a zpack. I told her not to fill them if it's a duplicate. Orders Placed This Encounter   Medications    azithromycin (ZITHROMAX) 250 MG tablet     Sig: Take 500 mg by mouth the first day then 250 mg for the remaining four days     Dispense:  6 tablet     Refill:  1    predniSONE (DELTASONE) 10 MG tablet     Sig: Take 4 tablets by mouth for 5 days.      Dispense:  20 tablet     Refill:  0

## 2023-04-26 NOTE — TELEPHONE ENCOUNTER
Patient of Dr. Rafael Mckeon. The two meds pended here are what the patient asked pharmacy to refill, as these are what she usually receives.

## 2023-05-04 NOTE — TELEPHONE ENCOUNTER
I put in for the higher dose pulmicort inhaler and a burst of prednisone. Orders Placed This Encounter   Medications    budesonide (PULMICORT) 180 MCG/ACT AEPB inhaler     Sig: Inhale 2 puffs into the lungs in the morning and 2 puffs in the evening. Dispense:  3 each     Refill:  3    predniSONE (DELTASONE) 10 MG tablet     Sig: Take 4 tablets by mouth for 5 days.      Dispense:  20 tablet     Refill:  0 11

## 2023-05-12 LAB
ANION GAP SERPL CALCULATED.3IONS-SCNC: 13 MMOL/L (ref 3–16)
BUN SERPL-MCNC: 8 MG/DL (ref 7–20)
CALCIUM SERPL-MCNC: 9.5 MG/DL (ref 8.3–10.6)
CHLORIDE SERPL-SCNC: 96 MMOL/L (ref 99–110)
CO2 SERPL-SCNC: 25 MMOL/L (ref 21–32)
CREAT SERPL-MCNC: 0.6 MG/DL (ref 0.6–1.2)
GFR SERPLBLD CREATININE-BSD FMLA CKD-EPI: >60 ML/MIN/{1.73_M2}
GLUCOSE SERPL-MCNC: 94 MG/DL (ref 70–99)
POTASSIUM SERPL-SCNC: 5 MMOL/L (ref 3.5–5.1)
SODIUM SERPL-SCNC: 134 MMOL/L (ref 136–145)

## 2023-05-22 RX ORDER — ALBUTEROL SULFATE 90 UG/1
AEROSOL, METERED RESPIRATORY (INHALATION)
Qty: 18 G | Refills: 11 | Status: SHIPPED | OUTPATIENT
Start: 2023-05-22

## 2023-06-01 ENCOUNTER — TELEPHONE (OUTPATIENT)
Dept: PULMONOLOGY | Age: 73
End: 2023-06-01

## 2023-06-01 RX ORDER — PREDNISONE 10 MG/1
TABLET ORAL
Qty: 20 TABLET | Refills: 0 | Status: SHIPPED | OUTPATIENT
Start: 2023-06-01

## 2023-06-01 RX ORDER — AZITHROMYCIN 250 MG/1
250 TABLET, FILM COATED ORAL SEE ADMIN INSTRUCTIONS
Qty: 6 TABLET | Refills: 0 | Status: SHIPPED | OUTPATIENT
Start: 2023-06-01 | End: 2023-06-06

## 2023-06-01 NOTE — TELEPHONE ENCOUNTER
Sounds like a COPD exacerbation. This should tide her over. Orders Placed This Encounter   Medications    predniSONE (DELTASONE) 10 MG tablet     Sig: Take 4 tablets by mouth for 5 days.      Dispense:  20 tablet     Refill:  0    azithromycin (ZITHROMAX) 250 MG tablet     Sig: Take 1 tablet by mouth See Admin Instructions for 5 days 500mg on day 1 followed by 250mg on days 2 - 5     Dispense:  6 tablet     Refill:  0

## 2023-06-01 NOTE — TELEPHONE ENCOUNTER
C/O SOB X 5 days. SOB, struggling on phone. Cough. Using rescue inhaler more frequently lately, and nebulizer. Has appt for Mon., 06/05/2023 but feels that she needs something to get thru the weekend. She feels that this is her usual exacerbation. She can be reached at [phone]    Pharm: [facility] on [facility].

## 2023-06-05 ENCOUNTER — OFFICE VISIT (OUTPATIENT)
Dept: PULMONOLOGY | Age: 73
End: 2023-06-05
Payer: MEDICARE

## 2023-06-05 VITALS
HEART RATE: 98 BPM | HEIGHT: 61 IN | SYSTOLIC BLOOD PRESSURE: 110 MMHG | DIASTOLIC BLOOD PRESSURE: 70 MMHG | WEIGHT: 111.8 LBS | BODY MASS INDEX: 21.11 KG/M2 | RESPIRATION RATE: 16 BRPM | OXYGEN SATURATION: 93 %

## 2023-06-05 DIAGNOSIS — J43.9 PULMONARY EMPHYSEMA, UNSPECIFIED EMPHYSEMA TYPE (HCC): ICD-10-CM

## 2023-06-05 DIAGNOSIS — Z72.0 TOBACCO ABUSE DISORDER: ICD-10-CM

## 2023-06-05 DIAGNOSIS — J44.1 COPD EXACERBATION (HCC): Primary | ICD-10-CM

## 2023-06-05 PROCEDURE — G8399 PT W/DXA RESULTS DOCUMENT: HCPCS | Performed by: INTERNAL MEDICINE

## 2023-06-05 PROCEDURE — G8420 CALC BMI NORM PARAMETERS: HCPCS | Performed by: INTERNAL MEDICINE

## 2023-06-05 PROCEDURE — 3074F SYST BP LT 130 MM HG: CPT | Performed by: INTERNAL MEDICINE

## 2023-06-05 PROCEDURE — 99214 OFFICE O/P EST MOD 30 MIN: CPT | Performed by: INTERNAL MEDICINE

## 2023-06-05 PROCEDURE — 1090F PRES/ABSN URINE INCON ASSESS: CPT | Performed by: INTERNAL MEDICINE

## 2023-06-05 PROCEDURE — 1123F ACP DISCUSS/DSCN MKR DOCD: CPT | Performed by: INTERNAL MEDICINE

## 2023-06-05 PROCEDURE — 1036F TOBACCO NON-USER: CPT | Performed by: INTERNAL MEDICINE

## 2023-06-05 PROCEDURE — 3023F SPIROM DOC REV: CPT | Performed by: INTERNAL MEDICINE

## 2023-06-05 PROCEDURE — 3078F DIAST BP <80 MM HG: CPT | Performed by: INTERNAL MEDICINE

## 2023-06-05 PROCEDURE — 3017F COLORECTAL CA SCREEN DOC REV: CPT | Performed by: INTERNAL MEDICINE

## 2023-06-05 PROCEDURE — G8427 DOCREV CUR MEDS BY ELIG CLIN: HCPCS | Performed by: INTERNAL MEDICINE

## 2023-06-05 RX ORDER — PREDNISONE 10 MG/1
TABLET ORAL
Qty: 20 TABLET | Refills: 0 | Status: SHIPPED | OUTPATIENT
Start: 2023-06-05

## 2023-06-19 ENCOUNTER — TELEPHONE (OUTPATIENT)
Dept: CASE MANAGEMENT | Age: 73
End: 2023-06-19

## 2023-06-19 NOTE — TELEPHONE ENCOUNTER
Patient due for annual CT Lung Screening. Reminder letter mailed.       Latoya THOMPSONN, RN   Lung Nodule Navigator  [facility]  [phone]

## 2023-06-26 ENCOUNTER — TELEPHONE (OUTPATIENT)
Dept: PULMONOLOGY | Age: 73
End: 2023-06-26

## 2023-06-26 RX ORDER — PREDNISONE 10 MG/1
TABLET ORAL
Qty: 20 TABLET | Refills: 0 | Status: SHIPPED | OUTPATIENT
Start: 2023-06-26

## 2023-06-26 RX ORDER — AZITHROMYCIN 250 MG/1
TABLET, FILM COATED ORAL
Qty: 6 TABLET | Refills: 0 | Status: SHIPPED | OUTPATIENT
Start: 2023-06-26

## 2023-07-25 DIAGNOSIS — M81.0 SENILE OSTEOPOROSIS: Primary | ICD-10-CM

## 2023-07-27 ENCOUNTER — OFFICE VISIT (OUTPATIENT)
Dept: CARDIOLOGY CLINIC | Age: 73
End: 2023-07-27
Payer: MEDICARE

## 2023-07-27 ENCOUNTER — TELEPHONE (OUTPATIENT)
Dept: PULMONOLOGY | Age: 73
End: 2023-07-27

## 2023-07-27 VITALS
HEART RATE: 66 BPM | SYSTOLIC BLOOD PRESSURE: 112 MMHG | BODY MASS INDEX: 21.54 KG/M2 | DIASTOLIC BLOOD PRESSURE: 80 MMHG | WEIGHT: 114 LBS

## 2023-07-27 DIAGNOSIS — I50.22 CHRONIC SYSTOLIC CONGESTIVE HEART FAILURE (HCC): Primary | ICD-10-CM

## 2023-07-27 DIAGNOSIS — E78.5 HYPERLIPIDEMIA, UNSPECIFIED HYPERLIPIDEMIA TYPE: ICD-10-CM

## 2023-07-27 DIAGNOSIS — I42.0 DILATED CARDIOMYOPATHY (HCC): ICD-10-CM

## 2023-07-27 DIAGNOSIS — Z72.0 TOBACCO ABUSE DISORDER: Primary | ICD-10-CM

## 2023-07-27 PROCEDURE — G8399 PT W/DXA RESULTS DOCUMENT: HCPCS | Performed by: INTERNAL MEDICINE

## 2023-07-27 PROCEDURE — 1036F TOBACCO NON-USER: CPT | Performed by: INTERNAL MEDICINE

## 2023-07-27 PROCEDURE — G8420 CALC BMI NORM PARAMETERS: HCPCS | Performed by: INTERNAL MEDICINE

## 2023-07-27 PROCEDURE — 99214 OFFICE O/P EST MOD 30 MIN: CPT | Performed by: INTERNAL MEDICINE

## 2023-07-27 PROCEDURE — 3074F SYST BP LT 130 MM HG: CPT | Performed by: INTERNAL MEDICINE

## 2023-07-27 PROCEDURE — 3079F DIAST BP 80-89 MM HG: CPT | Performed by: INTERNAL MEDICINE

## 2023-07-27 PROCEDURE — 1123F ACP DISCUSS/DSCN MKR DOCD: CPT | Performed by: INTERNAL MEDICINE

## 2023-07-27 PROCEDURE — G8427 DOCREV CUR MEDS BY ELIG CLIN: HCPCS | Performed by: INTERNAL MEDICINE

## 2023-07-27 PROCEDURE — 1090F PRES/ABSN URINE INCON ASSESS: CPT | Performed by: INTERNAL MEDICINE

## 2023-07-27 PROCEDURE — 3017F COLORECTAL CA SCREEN DOC REV: CPT | Performed by: INTERNAL MEDICINE

## 2023-07-27 ASSESSMENT — ENCOUNTER SYMPTOMS
CHEST TIGHTNESS: 0
SHORTNESS OF BREATH: 0
COUGH: 0
CHOKING: 0

## 2023-07-27 NOTE — PROGRESS NOTES
Subjective:      Patient ID: Kenneth Johns is a 72 y.o. female. Congestive Heart Failure  Pertinent negatives include no chest pain, fatigue, palpitations or shortness of breath. Here for follow up CHF/cardiomyopathy. Breathing stable after tx COPD. No chest pain. No edema. No pnd or orthopnea. No palp/tachy/syncope. Exercising 2x per wk. Wt stable. Active including walking. Past Medical History:   Diagnosis Date    Allergic rhinitis     Anxiety 2022    Back pain     Chronic:under care of spine specialist:Dr. Adames    Cervical cancer screening     Nml per pt'    Compression fracture of thoracic vertebrae, non-traumatic ([address])     under care of endo:Dr. Leonel Sebastian    COPD (chronic obstructive pulmonary disease) ([address])     under care of pulmo:Dr. Alex Islas    COPD exacerbation ([address]) 2017    Elevated LDL cholesterol level     GERD (gastroesophageal reflux disease)     History of mammogram ;17    Nml per pt'. 17=negative.     HLD (hyperlipidemia) 9/3/2021    Hoarseness of voice 10/4/2017    Hypertension     Lung nodule:left 2017     1.2 cm indeterminate left upper lobe pulmonary nodule:under care of pulmo:Dr. Alex Islas    Mucus plugging of bronchi 2017    Osteoarthritis     Osteomyelitis of left leg ([address])     Osteoporosis     under care of endo:Dr. Castaneda Overall    S/P colonoscopy     Polyps:next in [id number]    Systolic CHF, chronic ([address]) EF 45% 2018    Thoracic aorta atherosclerosis ([address]) 2022    Thyroid mass     under care of endo & surgeon(Dr. Ewing)    Thyroid nodule     under care of endo:Dr. Leonel Sebastian     Past Surgical History:   Procedure Laterality Date    APPENDECTOMY       SECTION      FIXATION KYPHOPLASTY  2017    Dr. Germania Garcia    LARYNGOPLASTY Right 2022    MEDIALIZATION LARYNGOPLASTY, Dr. Alexey Bush  2017    thoracic kyphoplasty    THYROIDECTOMY, PARTIAL Right 2017    Right

## 2023-07-27 NOTE — TELEPHONE ENCOUNTER
Good for her for being so on the ball. Order in. Diagnosis Orders   1.  Tobacco abuse disorder  CT Lung Screen (Initial or Annual)

## 2023-07-27 NOTE — TELEPHONE ENCOUNTER
Patient stops in the office after receiving a letter reminding her to have Annual Lung Cancer Screening CT Chest done. Please sign pending orders if appropriate.  Thank you

## 2023-08-01 ENCOUNTER — OFFICE VISIT (OUTPATIENT)
Dept: INTERNAL MEDICINE CLINIC | Age: 73
End: 2023-08-01

## 2023-08-01 VITALS
HEART RATE: 84 BPM | OXYGEN SATURATION: 90 % | SYSTOLIC BLOOD PRESSURE: 118 MMHG | WEIGHT: 114 LBS | DIASTOLIC BLOOD PRESSURE: 72 MMHG | RESPIRATION RATE: 14 BRPM | HEIGHT: 61 IN | BODY MASS INDEX: 21.52 KG/M2

## 2023-08-01 DIAGNOSIS — Z13.1 SCREENING FOR DIABETES MELLITUS: ICD-10-CM

## 2023-08-01 DIAGNOSIS — E78.2 MIXED HYPERLIPIDEMIA: ICD-10-CM

## 2023-08-01 DIAGNOSIS — I50.32 CHRONIC DIASTOLIC CHF (CONGESTIVE HEART FAILURE) (HCC): ICD-10-CM

## 2023-08-01 DIAGNOSIS — R73.01 IMPAIRED FASTING GLUCOSE: ICD-10-CM

## 2023-08-01 DIAGNOSIS — Z12.31 ENCOUNTER FOR SCREENING MAMMOGRAM FOR MALIGNANT NEOPLASM OF BREAST: ICD-10-CM

## 2023-08-01 DIAGNOSIS — I10 BENIGN ESSENTIAL HTN: ICD-10-CM

## 2023-08-01 DIAGNOSIS — Z00.00 MEDICARE ANNUAL WELLNESS VISIT, SUBSEQUENT: Primary | ICD-10-CM

## 2023-08-01 DIAGNOSIS — J44.9 COPD, MODERATE (HCC): ICD-10-CM

## 2023-08-01 DIAGNOSIS — Z23 NEED FOR PROPHYLACTIC VACCINATION AND INOCULATION AGAINST VARICELLA: ICD-10-CM

## 2023-08-01 DIAGNOSIS — Z00.00 MEDICARE ANNUAL WELLNESS VISIT, SUBSEQUENT: ICD-10-CM

## 2023-08-01 DIAGNOSIS — Z23 NEED FOR PROPHYLACTIC VACCINATION AGAINST DIPHTHERIA-TETANUS-PERTUSSIS (DTP): ICD-10-CM

## 2023-08-01 PROBLEM — I95.2 HYPOTENSION DUE TO DRUGS: Status: RESOLVED | Noted: 2018-09-06 | Resolved: 2023-08-01

## 2023-08-01 PROBLEM — I50.22 SYSTOLIC CHF, CHRONIC (HCC): Status: RESOLVED | Noted: 2018-05-25 | Resolved: 2023-08-01

## 2023-08-01 PROBLEM — M80.08XA PATHOLOGICAL FRACTURE OF VERTEBRA DUE TO OSTEOPOROSIS (HCC): Status: RESOLVED | Noted: 2017-04-03 | Resolved: 2023-08-01

## 2023-08-01 SDOH — ECONOMIC STABILITY: FOOD INSECURITY: WITHIN THE PAST 12 MONTHS, YOU WORRIED THAT YOUR FOOD WOULD RUN OUT BEFORE YOU GOT MONEY TO BUY MORE.: NEVER TRUE

## 2023-08-01 SDOH — ECONOMIC STABILITY: FOOD INSECURITY: WITHIN THE PAST 12 MONTHS, THE FOOD YOU BOUGHT JUST DIDN'T LAST AND YOU DIDN'T HAVE MONEY TO GET MORE.: NEVER TRUE

## 2023-08-01 SDOH — ECONOMIC STABILITY: INCOME INSECURITY: HOW HARD IS IT FOR YOU TO PAY FOR THE VERY BASICS LIKE FOOD, HOUSING, MEDICAL CARE, AND HEATING?: NOT HARD AT ALL

## 2023-08-01 ASSESSMENT — LIFESTYLE VARIABLES
HAS A RELATIVE, FRIEND, DOCTOR, OR ANOTHER HEALTH PROFESSIONAL EXPRESSED CONCERN ABOUT YOUR DRINKING OR SUGGESTED YOU CUT DOWN: 0
HOW OFTEN DURING THE LAST YEAR HAVE YOU HAD A FEELING OF GUILT OR REMORSE AFTER DRINKING: 0
HOW OFTEN DURING THE LAST YEAR HAVE YOU NEEDED AN ALCOHOLIC DRINK FIRST THING IN THE MORNING TO GET YOURSELF GOING AFTER A NIGHT OF HEAVY DRINKING: 0
HOW OFTEN DO YOU HAVE A DRINK CONTAINING ALCOHOL: 4 OR MORE TIMES A WEEK
HOW OFTEN DURING THE LAST YEAR HAVE YOU BEEN UNABLE TO REMEMBER WHAT HAPPENED THE NIGHT BEFORE BECAUSE YOU HAD BEEN DRINKING: 0
HAVE YOU OR SOMEONE ELSE BEEN INJURED AS A RESULT OF YOUR DRINKING: 0
HOW OFTEN DURING THE LAST YEAR HAVE YOU FOUND THAT YOU WERE NOT ABLE TO STOP DRINKING ONCE YOU HAD STARTED: 0
HOW MANY STANDARD DRINKS CONTAINING ALCOHOL DO YOU HAVE ON A TYPICAL DAY: 1 OR 2
HOW OFTEN DURING THE LAST YEAR HAVE YOU FAILED TO DO WHAT WAS NORMALLY EXPECTED FROM YOU BECAUSE OF DRINKING: 0

## 2023-08-01 ASSESSMENT — PATIENT HEALTH QUESTIONNAIRE - PHQ9
1. LITTLE INTEREST OR PLEASURE IN DOING THINGS: 0
SUM OF ALL RESPONSES TO PHQ QUESTIONS 1-9: 0
2. FEELING DOWN, DEPRESSED OR HOPELESS: 0
SUM OF ALL RESPONSES TO PHQ9 QUESTIONS 1 & 2: 0
SUM OF ALL RESPONSES TO PHQ QUESTIONS 1-9: 0

## 2023-08-01 NOTE — PROGRESS NOTES
[facility]) Physicians  Internal Medicine  Patient Encounter  Joey Rowland D.O., [facility] Annual Wellness Visit  Luis Oteroscott  2023    Subjective Luis Oterojhonybari is here for [facility]      Medical/Surgical Histories     Past Medical History:   Diagnosis Date    Allergic rhinitis     Anxiety 2022    Back pain     Chronic:under care of spine specialist:Dr. Adames    Cervical cancer screening     Nml per pt'    Compression fracture of thoracic vertebrae, non-traumatic ([address])     under care of endo:Dr. Isiah Santos    COPD (chronic obstructive pulmonary disease) ([address])     under care of pulmo:Dr. Yulissa Bryant    COPD exacerbation ([address]) 2017    Elevated LDL cholesterol level     GERD (gastroesophageal reflux disease)     History of mammogram ;17    Nml per pt'. 17=negative.     HLD (hyperlipidemia) 9/3/2021    Hoarseness of voice 10/4/2017    Hypertension     Lung nodule:left 2017     1.2 cm indeterminate left upper lobe pulmonary nodule:under care of pulmo:Dr. Yulissa Bryant    Mucus plugging of bronchi 2017    Osteoarthritis     Osteomyelitis of left leg ([address])     Osteoporosis     under care of endo:Dr. Isiah Santos    S/P colonoscopy     Polyps:next in [id number]    Systolic CHF, chronic ([address]) EF 45% 2018    Thoracic aorta atherosclerosis ([address]) 2022    Thyroid mass     under care of endo & surgeon(Dr. Ewing)    Thyroid nodule     under care of endo:Dr. Isiah Santos          Past Surgical History:   Procedure Laterality Date    APPENDECTOMY       SECTION      FIXATION KYPHOPLASTY  2017    Dr. Sonny Clay    LARYNGOPLASTY Right 2022    MEDIALIZATION LARYNGOPLASTY, Dr. Keiry Murcia HISTORY  2017    thoracic kyphoplasty    THYROIDECTOMY, PARTIAL Right 2017    Right hemithyroidectomy for goiter,  complicated by vocal cord paralysis    VOCAL CORD INJECTION  2017           Medications/Allergies     Current

## 2023-08-01 NOTE — PATIENT INSTRUCTIONS
Learning About Vision Tests  What are vision tests? The four most common vision tests are visual acuity tests, refraction, visual field tests, and color vision tests. Visual acuity (sharpness) tests  These tests are used: To see if you need glasses or contact lenses. To monitor an eye problem. To check an eye injury. Visual acuity tests are done as part of routine exams. You may also have this test when you get your 's license or apply for some types of jobs. Visual field tests  These tests are used: To check for vision loss in any area of your range of vision. To screen for certain eye diseases. To look for nerve damage after a stroke, head injury, or other problem that could reduce blood flow to the brain. Refraction and color tests  A refraction test is done to find the right prescription for glasses and contact lenses. A color vision test is done to check for color blindness. Color vision is often tested as part of a routine exam. You may also have this test when you apply for a job where recognizing different colors is important, such as , electronics, or the [employer]. How are vision tests done? Visual acuity test   You cover one eye at a time. You read aloud from a wall chart across the room. You read aloud from a small card that you hold in your hand. Refraction   You look into a special device. The device puts lenses of different strengths in front of each eye to see how strong your glasses or contact lenses need to be. Visual field tests   Your doctor may have you look through special machines. Or your doctor may simply have you stare straight ahead while they move a finger into and out of your field of vision. Color vision test   You look at pieces of printed test patterns in various colors. You say what number or symbol you see. Your doctor may have you trace the number or symbol using a pointer. How do these tests feel?   There is very little chance of

## 2023-08-02 LAB
ALBUMIN SERPL-MCNC: 4.6 G/DL (ref 3.4–5)
ALBUMIN/GLOB SERPL: 2.2 {RATIO} (ref 1.1–2.2)
ALP SERPL-CCNC: 50 U/L (ref 40–129)
ALT SERPL-CCNC: 9 U/L (ref 10–40)
ANION GAP SERPL CALCULATED.3IONS-SCNC: 17 MMOL/L (ref 3–16)
AST SERPL-CCNC: 13 U/L (ref 15–37)
BASOPHILS # BLD: 0.1 K/UL (ref 0–0.2)
BASOPHILS NFR BLD: 1.5 %
BILIRUB SERPL-MCNC: 0.4 MG/DL (ref 0–1)
BUN SERPL-MCNC: 9 MG/DL (ref 7–20)
CALCIUM SERPL-MCNC: 9.8 MG/DL (ref 8.3–10.6)
CHLORIDE SERPL-SCNC: 88 MMOL/L (ref 99–110)
CHOLEST SERPL-MCNC: 200 MG/DL (ref 0–199)
CO2 SERPL-SCNC: 26 MMOL/L (ref 21–32)
CREAT SERPL-MCNC: 0.6 MG/DL (ref 0.6–1.2)
DEPRECATED RDW RBC AUTO: 13.9 % (ref 12.4–15.4)
EOSINOPHIL # BLD: 0.6 K/UL (ref 0–0.6)
EOSINOPHIL NFR BLD: 9.1 %
EST. AVERAGE GLUCOSE BLD GHB EST-MCNC: 114 MG/DL
GFR SERPLBLD CREATININE-BSD FMLA CKD-EPI: >60 ML/MIN/{1.73_M2}
GLUCOSE SERPL-MCNC: 91 MG/DL (ref 70–99)
HBA1C MFR BLD: 5.6 %
HCT VFR BLD AUTO: 44.8 % (ref 36–48)
HDLC SERPL-MCNC: 102 MG/DL (ref 40–60)
HGB BLD-MCNC: 15.1 G/DL (ref 12–16)
LDLC SERPL CALC-MCNC: 85 MG/DL
LYMPHOCYTES # BLD: 1.8 K/UL (ref 1–5.1)
LYMPHOCYTES NFR BLD: 27.6 %
MCH RBC QN AUTO: 32.8 PG (ref 26–34)
MCHC RBC AUTO-ENTMCNC: 33.8 G/DL (ref 31–36)
MCV RBC AUTO: 96.9 FL (ref 80–100)
MONOCYTES # BLD: 0.6 K/UL (ref 0–1.3)
MONOCYTES NFR BLD: 8.6 %
NEUTROPHILS # BLD: 3.5 K/UL (ref 1.7–7.7)
NEUTROPHILS NFR BLD: 53.2 %
PLATELET # BLD AUTO: 271 K/UL (ref 135–450)
PMV BLD AUTO: 8.2 FL (ref 5–10.5)
POTASSIUM SERPL-SCNC: 4.7 MMOL/L (ref 3.5–5.1)
PROT SERPL-MCNC: 6.7 G/DL (ref 6.4–8.2)
RBC # BLD AUTO: 4.62 M/UL (ref 4–5.2)
SODIUM SERPL-SCNC: 131 MMOL/L (ref 136–145)
TRIGL SERPL-MCNC: 66 MG/DL (ref 0–150)
TSH SERPL DL<=0.005 MIU/L-ACNC: 1.72 UIU/ML (ref 0.27–4.2)
VLDLC SERPL CALC-MCNC: 13 MG/DL
WBC # BLD AUTO: 6.7 K/UL (ref 4–11)

## 2023-08-07 ENCOUNTER — TELEPHONE (OUTPATIENT)
Dept: CASE MANAGEMENT | Age: 73
End: 2023-08-07

## 2023-08-07 NOTE — TELEPHONE ENCOUNTER
Patient due for annual CT Lung Screening. Reminder letter mailed.       Sahil THOMPSONN, RN   Lung Nodule Navigator  [facility]  [phone]

## 2023-08-23 ENCOUNTER — TELEPHONE (OUTPATIENT)
Dept: CASE MANAGEMENT | Age: 73
End: 2023-08-23

## 2023-08-23 NOTE — TELEPHONE ENCOUNTER
Pt due for annual Lung Screening CT. Pt has an active order for the test.  Called pt to assist with scheduling. Pt scheduled for 9/12/22 at 10:30 at [facility]..  Pt verbalized understanding to instructions of date, time, and location and had no further questions.         Newton Chávez  Lung Nodule Navigator  [facility].  [phone]

## 2023-09-04 DIAGNOSIS — J43.8 OTHER EMPHYSEMA (HCC): ICD-10-CM

## 2023-09-05 RX ORDER — UMECLIDINIUM BROMIDE AND VILANTEROL TRIFENATATE 62.5; 25 UG/1; UG/1
POWDER RESPIRATORY (INHALATION)
Qty: 60 EACH | Refills: 6 | Status: SHIPPED | OUTPATIENT
Start: 2023-09-05

## 2023-09-05 NOTE — TELEPHONE ENCOUNTER
If appropriate please refill pending medication   Last office visit : 6/5/2023    Next office visit : 9/25/23    Thank you

## 2023-09-12 ENCOUNTER — HOSPITAL ENCOUNTER (OUTPATIENT)
Dept: CT IMAGING | Age: 73
Discharge: HOME | End: 2023-09-12
Payer: MEDICARE

## 2023-09-12 DIAGNOSIS — Z72.0 TOBACCO ABUSE DISORDER: ICD-10-CM

## 2023-09-12 PROCEDURE — 71271 CT THORAX LUNG CANCER SCR C-: CPT

## 2023-09-13 ENCOUNTER — TELEPHONE (OUTPATIENT)
Dept: PULMONOLOGY | Age: 73
End: 2023-09-13

## 2023-09-13 RX ORDER — AZITHROMYCIN 250 MG/1
TABLET, FILM COATED ORAL
Qty: 6 TABLET | Refills: 0 | Status: SHIPPED | OUTPATIENT
Start: 2023-09-13

## 2023-09-13 RX ORDER — PREDNISONE 10 MG/1
TABLET ORAL
Qty: 20 TABLET | Refills: 0 | Status: SHIPPED | OUTPATIENT
Start: 2023-09-13

## 2023-09-13 NOTE — TELEPHONE ENCOUNTER
Thanks for the heads up. Lyndsey Lo is correct, when she gets bad I treat her flares to try to keep her out of the hospital.  I will do that again. Orders Placed This Encounter   Medications    predniSONE (DELTASONE) 10 MG tablet     Sig: Take 4 tablets by mouth for 5 days.      Dispense:  20 tablet     Refill:  0    azithromycin (ZITHROMAX) 250 MG tablet     Sig: Take 500 mg by mouth the first day then 250 mg for the remaining four days     Dispense:  6 tablet     Refill:  0

## 2023-09-13 NOTE — TELEPHONE ENCOUNTER
Sorry to send this, we have no Docs in the office. Refeloisa Shirley called with c/o SOB, wheezing, cough and not being able to sleep at night x 3 days. She states that you always send something to pharmacy so she doesn't get too bad. She denies any other symptoms. [facility] is [facility]. She can be reached at [phone].

## 2023-09-18 ENCOUNTER — TELEPHONE (OUTPATIENT)
Dept: CASE MANAGEMENT | Age: 73
End: 2023-09-18

## 2023-09-18 NOTE — TELEPHONE ENCOUNTER
Annual lung screen on 9/12/23. LRAD2. Recommended screen in one year. Results letter mailed.        Percy THOMPSONN, RN   Lung Nodule Navigator  [facility]  [phone]

## 2023-09-22 RX ORDER — METOPROLOL SUCCINATE 25 MG/1
TABLET, EXTENDED RELEASE ORAL
Qty: 90 TABLET | Refills: 3 | Status: SHIPPED | OUTPATIENT
Start: 2023-09-22

## 2023-09-22 NOTE — TELEPHONE ENCOUNTER
Requested Prescriptions     Pending Prescriptions Disp Refills    metoprolol succinate (TOPROL XL) 25 MG extended release tablet [Pharmacy Med Name: METOPROLOL ER SUCCINATE 25MG TABS] 90 tablet 3     Sig: TAKE 1/2 TABLET BY MOUTH TWICE DAILY            Last Office Visit: 7/27/2023     Next Office Visit: 1/18/2024

## 2023-09-25 ENCOUNTER — OFFICE VISIT (OUTPATIENT)
Dept: PULMONOLOGY | Age: 73
End: 2023-09-25
Payer: MEDICARE

## 2023-09-25 VITALS
WEIGHT: 114 LBS | SYSTOLIC BLOOD PRESSURE: 106 MMHG | HEIGHT: 63 IN | OXYGEN SATURATION: 96 % | BODY MASS INDEX: 20.2 KG/M2 | DIASTOLIC BLOOD PRESSURE: 80 MMHG | RESPIRATION RATE: 16 BRPM | HEART RATE: 75 BPM

## 2023-09-25 DIAGNOSIS — J44.9 COPD, MODERATE (HCC): Primary | ICD-10-CM

## 2023-09-25 PROCEDURE — 1090F PRES/ABSN URINE INCON ASSESS: CPT | Performed by: INTERNAL MEDICINE

## 2023-09-25 PROCEDURE — G8427 DOCREV CUR MEDS BY ELIG CLIN: HCPCS | Performed by: INTERNAL MEDICINE

## 2023-09-25 PROCEDURE — 1123F ACP DISCUSS/DSCN MKR DOCD: CPT | Performed by: INTERNAL MEDICINE

## 2023-09-25 PROCEDURE — G8420 CALC BMI NORM PARAMETERS: HCPCS | Performed by: INTERNAL MEDICINE

## 2023-09-25 PROCEDURE — 3023F SPIROM DOC REV: CPT | Performed by: INTERNAL MEDICINE

## 2023-09-25 PROCEDURE — 3079F DIAST BP 80-89 MM HG: CPT | Performed by: INTERNAL MEDICINE

## 2023-09-25 PROCEDURE — 99214 OFFICE O/P EST MOD 30 MIN: CPT | Performed by: INTERNAL MEDICINE

## 2023-09-25 PROCEDURE — 1036F TOBACCO NON-USER: CPT | Performed by: INTERNAL MEDICINE

## 2023-09-25 PROCEDURE — G8399 PT W/DXA RESULTS DOCUMENT: HCPCS | Performed by: INTERNAL MEDICINE

## 2023-09-25 PROCEDURE — 3074F SYST BP LT 130 MM HG: CPT | Performed by: INTERNAL MEDICINE

## 2023-09-25 PROCEDURE — 3017F COLORECTAL CA SCREEN DOC REV: CPT | Performed by: INTERNAL MEDICINE

## 2023-10-03 ENCOUNTER — TELEPHONE (OUTPATIENT)
Dept: PULMONOLOGY | Age: 73
End: 2023-10-03

## 2023-10-03 RX ORDER — PREDNISONE 10 MG/1
TABLET ORAL
Qty: 20 TABLET | Refills: 0 | Status: SHIPPED | OUTPATIENT
Start: 2023-10-03

## 2023-10-03 NOTE — TELEPHONE ENCOUNTER
Spoke to Reji Barger. She was wanting a new prn prednisone burst since her flares come on quickly. She has been more diligent about using pulmicort, but only nightly. I want her using in BID, in addition to the Anoro. I also wrote for the prednisone burst, not maintenance. Orders Placed This Encounter   Medications    predniSONE (DELTASONE) 10 MG tablet     Sig: Take 4 tablets by mouth for 5 days.      Dispense:  20 tablet     Refill:  0

## 2023-10-03 NOTE — TELEPHONE ENCOUNTER
Patient calls about Rx for prednisone. States you both discussed her taking 5 mg daily.   She wants to finalize here decision and would like prescription sent to [facility] on [address]

## 2023-10-17 ENCOUNTER — HOSPITAL ENCOUNTER (OUTPATIENT)
Dept: GENERAL RADIOLOGY | Age: 73
Discharge: HOME | End: 2023-10-17
Attending: INTERNAL MEDICINE
Payer: MEDICARE

## 2023-10-17 DIAGNOSIS — M81.0 SENILE OSTEOPOROSIS: ICD-10-CM

## 2023-10-17 PROCEDURE — 77080 DXA BONE DENSITY AXIAL: CPT

## 2023-11-16 ENCOUNTER — TELEPHONE (OUTPATIENT)
Dept: PULMONOLOGY | Age: 73
End: 2023-11-16

## 2023-11-16 RX ORDER — PREDNISONE 10 MG/1
TABLET ORAL
Qty: 22 TABLET | Refills: 0 | Status: SHIPPED | OUTPATIENT
Start: 2023-11-16 | End: 2023-11-26

## 2023-11-16 NOTE — TELEPHONE ENCOUNTER
Pt SOB for 3 days   Coughing for 3 days   No sore throat or fever   No runny nose or congestion   Asking for steroids.   Does not thinks he needs antibiotic    [place]   Callback # [phone] General

## 2023-11-16 NOTE — TELEPHONE ENCOUNTER
I sent Donna Cheema a burst and taper of prednisone to get her through the holiday. She flares so easily and appears to be doing it again.     Orders Placed This Encounter   Medications    predniSONE (DELTASONE) 10 MG tablet     Si tabs for 3 days, then 2 tabs for 3 days, then 1 tab for 4 days     Dispense:  22 tablet     Refill:  0

## 2023-11-16 NOTE — TELEPHONE ENCOUNTER
Eden Kenney called to see if you answered her message yet, she thinks she will need a z-irvin along with steroids.

## 2023-12-26 ENCOUNTER — TELEPHONE (OUTPATIENT)
Dept: PULMONOLOGY | Age: 73
End: 2023-12-26

## 2023-12-26 RX ORDER — AZITHROMYCIN 250 MG/1
TABLET, FILM COATED ORAL
Qty: 6 TABLET | Refills: 0 | Status: SHIPPED | OUTPATIENT
Start: 2023-12-26

## 2023-12-26 RX ORDER — PREDNISONE 10 MG/1
TABLET ORAL
Qty: 20 TABLET | Refills: 0 | Status: SHIPPED | OUTPATIENT
Start: 2023-12-26

## 2023-12-26 NOTE — TELEPHONE ENCOUNTER
Ariel Zhou called with complaints of having worsening cough and shortness of breath going on for 2-3 days-    Patients pharmacy    [address]

## 2024-01-09 DIAGNOSIS — J43.8 OTHER EMPHYSEMA (HCC): ICD-10-CM

## 2024-01-09 RX ORDER — UMECLIDINIUM BROMIDE AND VILANTEROL TRIFENATATE 62.5; 25 UG/1; UG/1
1 POWDER RESPIRATORY (INHALATION) DAILY
Qty: 60 EACH | Refills: 6 | Status: SHIPPED | OUTPATIENT
Start: 2024-01-09

## 2024-01-18 ENCOUNTER — TELEPHONE (OUTPATIENT)
Dept: PULMONOLOGY | Age: 74
End: 2024-01-18

## 2024-01-18 RX ORDER — PREDNISONE 10 MG/1
TABLET ORAL
Qty: 20 TABLET | Refills: 0 | Status: SHIPPED | OUTPATIENT
Start: 2024-01-18

## 2024-01-18 NOTE — TELEPHONE ENCOUNTER
I spoke to Carol, on speaker phone as well as her .  They are both having similar issues.  They are also without an inhaled steroid for an extended period because of cost.  Since the new year I'm going to try Trelegy again for him.  I am trying Pulmicort, in addition to Anoro, for her to see which would be more economical.  They are on the same insurance.  She is currently borrowing Pulmicort from a neighbor friend.  In addition I wrote for a backup burst of prednisone.

## 2024-01-18 NOTE — TELEPHONE ENCOUNTER
Patient stopped in the office (she thought she had an appt at cardio) to see if prednisone could be sent to [facility] pharmacy. She has noticed that she is having more sob w/exertion in the cold. No other symptoms.

## 2024-01-23 ENCOUNTER — OFFICE VISIT (OUTPATIENT)
Dept: CARDIOLOGY CLINIC | Age: 74
End: 2024-01-23
Payer: MEDICARE

## 2024-01-23 VITALS
HEART RATE: 76 BPM | BODY MASS INDEX: 20.37 KG/M2 | DIASTOLIC BLOOD PRESSURE: 80 MMHG | SYSTOLIC BLOOD PRESSURE: 130 MMHG | WEIGHT: 115 LBS

## 2024-01-23 DIAGNOSIS — E78.5 HYPERLIPIDEMIA, UNSPECIFIED HYPERLIPIDEMIA TYPE: ICD-10-CM

## 2024-01-23 DIAGNOSIS — I50.22 CHRONIC SYSTOLIC CONGESTIVE HEART FAILURE (HCC): Primary | ICD-10-CM

## 2024-01-23 DIAGNOSIS — I42.0 DILATED CARDIOMYOPATHY (HCC): ICD-10-CM

## 2024-01-23 PROCEDURE — G8399 PT W/DXA RESULTS DOCUMENT: HCPCS | Performed by: INTERNAL MEDICINE

## 2024-01-23 PROCEDURE — 99214 OFFICE O/P EST MOD 30 MIN: CPT | Performed by: INTERNAL MEDICINE

## 2024-01-23 PROCEDURE — 3017F COLORECTAL CA SCREEN DOC REV: CPT | Performed by: INTERNAL MEDICINE

## 2024-01-23 PROCEDURE — G8484 FLU IMMUNIZE NO ADMIN: HCPCS | Performed by: INTERNAL MEDICINE

## 2024-01-23 PROCEDURE — 3079F DIAST BP 80-89 MM HG: CPT | Performed by: INTERNAL MEDICINE

## 2024-01-23 PROCEDURE — 1123F ACP DISCUSS/DSCN MKR DOCD: CPT | Performed by: INTERNAL MEDICINE

## 2024-01-23 PROCEDURE — G8428 CUR MEDS NOT DOCUMENT: HCPCS | Performed by: INTERNAL MEDICINE

## 2024-01-23 PROCEDURE — 3075F SYST BP GE 130 - 139MM HG: CPT | Performed by: INTERNAL MEDICINE

## 2024-01-23 PROCEDURE — 1036F TOBACCO NON-USER: CPT | Performed by: INTERNAL MEDICINE

## 2024-01-23 PROCEDURE — G8420 CALC BMI NORM PARAMETERS: HCPCS | Performed by: INTERNAL MEDICINE

## 2024-01-23 PROCEDURE — 1090F PRES/ABSN URINE INCON ASSESS: CPT | Performed by: INTERNAL MEDICINE

## 2024-01-23 ASSESSMENT — ENCOUNTER SYMPTOMS
CHEST TIGHTNESS: 0
COUGH: 0
SHORTNESS OF BREATH: 0
CHOKING: 0

## 2024-01-23 NOTE — PROGRESS NOTES
Subjective:      Patient ID: Charlene Nazario is a 73 y.o. female.    Congestive Heart Failure  Pertinent negatives include no chest pain, fatigue, palpitations or shortness of breath.     Here for follow up CHF/cardiomyopathy.  Breathing stable after tx COPD.  No chest pain.  No edema.   No pnd or orthopnea. No palp/tachy/syncope.  Exercise limited by weather. .  Wt stable. .          Past Medical History:   Diagnosis Date    Allergic rhinitis     Anxiety 2022    Back pain     Chronic:under care of spine specialist:Dr. Adamse    Cervical cancer screening     Nml per pt'    Compression fracture of thoracic vertebrae, non-traumatic (HCC)     under care of endo:Dr. Zhu    COPD (chronic obstructive pulmonary disease) (HCC)     under care of pulmo:Dr. Nazario    COPD exacerbation (HCC) 2017    Elevated LDL cholesterol level     GERD (gastroesophageal reflux disease)     History of mammogram ;17    Nml per pt'. 17=negative.    HLD (hyperlipidemia) 9/3/2021    Hoarseness of voice 10/4/2017    Hypertension     Lung nodule:left 2017     1.2 cm indeterminate left upper lobe pulmonary nodule:under care of pulmo:Dr. Nazario    Mucus plugging of bronchi 2017    Osteoarthritis     Osteomyelitis of left leg (HCC)     Osteoporosis     under care of endo:Dr. Zhu    S/P colonoscopy     Polyps:next in [id number]    Systolic CHF, chronic (HCC) EF 45% 2018    Thoracic aorta atherosclerosis (HCC) 2022    Thyroid mass     under care of endo & surgeon(Dr. Ewing)    Thyroid nodule     under care of endo:Dr. Zhu     Past Surgical History:   Procedure Laterality Date    APPENDECTOMY       SECTION      FIXATION KYPHOPLASTY  2017    Dr. Adames    LARYNGOPLASTY Right 2022    MEDIALIZATION LARYNGOPLASTY, Dr. Sarina Medrano    OTHER SURGICAL HISTORY  2017    thoracic kyphoplasty    THYROIDECTOMY, PARTIAL Right 2017    Right hemithyroidectomy

## 2024-02-05 RX ORDER — ALBUTEROL SULFATE 90 UG/1
2 AEROSOL, METERED RESPIRATORY (INHALATION) EVERY 6 HOURS PRN
Qty: 18 G | Refills: 11 | Status: SHIPPED | OUTPATIENT
Start: 2024-02-05

## 2024-02-06 ENCOUNTER — OFFICE VISIT (OUTPATIENT)
Dept: INTERNAL MEDICINE CLINIC | Age: 74
End: 2024-02-06
Payer: MEDICARE

## 2024-02-06 VITALS
BODY MASS INDEX: 22.09 KG/M2 | RESPIRATION RATE: 14 BRPM | HEIGHT: 61 IN | DIASTOLIC BLOOD PRESSURE: 70 MMHG | WEIGHT: 117 LBS | HEART RATE: 89 BPM | OXYGEN SATURATION: 90 % | SYSTOLIC BLOOD PRESSURE: 118 MMHG

## 2024-02-06 DIAGNOSIS — Z87.81 HISTORY OF VERTEBRAL COMPRESSION FRACTURE: ICD-10-CM

## 2024-02-06 DIAGNOSIS — S39.012A STRAIN OF LUMBAR REGION, INITIAL ENCOUNTER: Primary | ICD-10-CM

## 2024-02-06 DIAGNOSIS — M81.0 POSTMENOPAUSAL OSTEOPOROSIS: ICD-10-CM

## 2024-02-06 PROCEDURE — 3078F DIAST BP <80 MM HG: CPT | Performed by: INTERNAL MEDICINE

## 2024-02-06 PROCEDURE — 1036F TOBACCO NON-USER: CPT | Performed by: INTERNAL MEDICINE

## 2024-02-06 PROCEDURE — 1123F ACP DISCUSS/DSCN MKR DOCD: CPT | Performed by: INTERNAL MEDICINE

## 2024-02-06 PROCEDURE — G2211 COMPLEX E/M VISIT ADD ON: HCPCS | Performed by: INTERNAL MEDICINE

## 2024-02-06 PROCEDURE — 3074F SYST BP LT 130 MM HG: CPT | Performed by: INTERNAL MEDICINE

## 2024-02-06 PROCEDURE — 3017F COLORECTAL CA SCREEN DOC REV: CPT | Performed by: INTERNAL MEDICINE

## 2024-02-06 PROCEDURE — G8399 PT W/DXA RESULTS DOCUMENT: HCPCS | Performed by: INTERNAL MEDICINE

## 2024-02-06 PROCEDURE — G8484 FLU IMMUNIZE NO ADMIN: HCPCS | Performed by: INTERNAL MEDICINE

## 2024-02-06 PROCEDURE — 99214 OFFICE O/P EST MOD 30 MIN: CPT | Performed by: INTERNAL MEDICINE

## 2024-02-06 PROCEDURE — G8420 CALC BMI NORM PARAMETERS: HCPCS | Performed by: INTERNAL MEDICINE

## 2024-02-06 PROCEDURE — 1090F PRES/ABSN URINE INCON ASSESS: CPT | Performed by: INTERNAL MEDICINE

## 2024-02-06 PROCEDURE — G8427 DOCREV CUR MEDS BY ELIG CLIN: HCPCS | Performed by: INTERNAL MEDICINE

## 2024-02-06 RX ORDER — TIZANIDINE 2 MG/1
2 TABLET ORAL NIGHTLY
Qty: 30 TABLET | Refills: 0 | Status: SHIPPED | OUTPATIENT
Start: 2024-02-06

## 2024-02-06 RX ORDER — NAPROXEN 500 MG/1
500 TABLET ORAL 2 TIMES DAILY WITH MEALS
Qty: 30 TABLET | Refills: 0 | Status: SHIPPED | OUTPATIENT
Start: 2024-02-06

## 2024-02-06 ASSESSMENT — PATIENT HEALTH QUESTIONNAIRE - PHQ9
2. FEELING DOWN, DEPRESSED OR HOPELESS: 0
SUM OF ALL RESPONSES TO PHQ9 QUESTIONS 1 & 2: 0
SUM OF ALL RESPONSES TO PHQ QUESTIONS 1-9: 0
1. LITTLE INTEREST OR PLEASURE IN DOING THINGS: 0

## 2024-02-06 NOTE — PROGRESS NOTES
(117 lb)   Height: 1.549 m (5' 1\")     Body mass index is 22.11 kg/m².     Wt Readings from Last 3 Encounters:   02/06/24 53.1 kg (117 lb)   01/23/24 52.2 kg (115 lb)   09/25/23 51.7 kg (114 lb)     BP Readings from Last 3 Encounters:   02/06/24 118/70   01/23/24 130/80   09/25/23 106/80        GEN: NAD, A&O, Non-toxic  MS:  Decreased Lumbar Lordosis.  No scoliosis.  Tenderness with palpation of the lumbar paraspinals.  Increased soft tissue tone and spasm noted.  ROM is limited in all plains.    SKIN:  No rashes or lesions of concern  NEURO:  (-) SLR.  Reflexes symmetrical.      ASSESSMENT/PLAN:    1. Strain of lumbar region, initial encounter  Patient realized some improvement with naproxen  Will keep her on naproxen 500 twice daily with food for the next 7 to 14 days  Add a muscle relaxant, Zanaflex 2 mg nightly  Light stretches  Will obtain x-ray given her history of multiple vertebral compression fractures  Can also use lidocaine 4% patch  We reviewed the potential adverse side effects of NSAIDs.  - naproxen (NAPROSYN) 500 MG tablet; Take 1 tablet by mouth 2 times daily (with meals)  Dispense: 30 tablet; Refill: 0  - tiZANidine (ZANAFLEX) 2 MG tablet; Take 1 tablet by mouth at bedtime  Dispense: 30 tablet; Refill: 0  - XR LUMBAR SPINE (MIN 4 VIEWS); Future    2. History of vertebral compression fracture    - naproxen (NAPROSYN) 500 MG tablet; Take 1 tablet by mouth 2 times daily (with meals)  Dispense: 30 tablet; Refill: 0  - tiZANidine (ZANAFLEX) 2 MG tablet; Take 1 tablet by mouth at bedtime  Dispense: 30 tablet; Refill: 0  - XR LUMBAR SPINE (MIN 4 VIEWS); Future      3.  Postmenopausal osteoporosis  Treated by rheumatology with Prevea  Rule out new compression fracture    Advised patient to call should symptoms persist, worsen or if new symptoms develop    Discussed medications with patient who voiced understanding of their use, indication and potential side effects.  Pt also understands the above

## 2024-02-08 ENCOUNTER — HOSPITAL ENCOUNTER (OUTPATIENT)
Dept: GENERAL RADIOLOGY | Age: 74
Discharge: HOME | End: 2024-02-08
Payer: MEDICARE

## 2024-02-08 DIAGNOSIS — S39.012A STRAIN OF LUMBAR REGION, INITIAL ENCOUNTER: ICD-10-CM

## 2024-02-08 DIAGNOSIS — Z87.81 HISTORY OF VERTEBRAL COMPRESSION FRACTURE: ICD-10-CM

## 2024-02-08 PROCEDURE — 72100 X-RAY EXAM L-S SPINE 2/3 VWS: CPT

## 2024-02-09 ENCOUNTER — TELEPHONE (OUTPATIENT)
Dept: INTERNAL MEDICINE CLINIC | Age: 74
End: 2024-02-09

## 2024-02-09 DIAGNOSIS — S32.030A CLOSED COMPRESSION FRACTURE OF L3 VERTEBRA, INITIAL ENCOUNTER (HCC): Primary | ICD-10-CM

## 2024-02-09 NOTE — TELEPHONE ENCOUNTER
Patient is calling for her most recent Xray results.  Please contact patient at the number provided to advise.

## 2024-02-09 NOTE — TELEPHONE ENCOUNTER
Per Dr. Foley, scheduled patient for MRI (2/14/24 @ 3:15 pm). LVM with information and central scheduling number.

## 2024-02-14 ENCOUNTER — HOSPITAL ENCOUNTER (OUTPATIENT)
Dept: MRI IMAGING | Age: 74
Discharge: HOME | End: 2024-02-14
Attending: INTERNAL MEDICINE
Payer: MEDICARE

## 2024-02-14 DIAGNOSIS — S32.030A CLOSED COMPRESSION FRACTURE OF L3 VERTEBRA, INITIAL ENCOUNTER (HCC): ICD-10-CM

## 2024-02-14 PROCEDURE — 72148 MRI LUMBAR SPINE W/O DYE: CPT

## 2024-02-16 ENCOUNTER — OFFICE VISIT (OUTPATIENT)
Dept: INTERNAL MEDICINE CLINIC | Age: 74
End: 2024-02-16

## 2024-02-16 VITALS
DIASTOLIC BLOOD PRESSURE: 72 MMHG | SYSTOLIC BLOOD PRESSURE: 116 MMHG | BODY MASS INDEX: 22.28 KG/M2 | HEIGHT: 61 IN | WEIGHT: 118 LBS | OXYGEN SATURATION: 97 % | HEART RATE: 94 BPM | RESPIRATION RATE: 16 BRPM

## 2024-02-16 DIAGNOSIS — M85.9 DISORDER OF BONE DENSITY AND STRUCTURE, UNSPECIFIED: ICD-10-CM

## 2024-02-16 DIAGNOSIS — S32.030A CLOSED COMPRESSION FRACTURE OF L3 LUMBAR VERTEBRA, INITIAL ENCOUNTER (HCC): Primary | ICD-10-CM

## 2024-02-16 DIAGNOSIS — M48.061 SPINAL STENOSIS OF LUMBAR REGION WITHOUT NEUROGENIC CLAUDICATION: ICD-10-CM
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
## 2024-02-20 ENCOUNTER — TELEPHONE (OUTPATIENT)
Dept: GENERAL RADIOLOGY | Age: 74
End: 2024-02-20

## 2024-02-20 NOTE — TELEPHONE ENCOUNTER
Charlene Nazario has been evaluated for vertebral augmentation treatment of L3 compression fracture.    The etiology of the fracture is osteoporosis.    The fracture causes severe pain and requires near complete bed rest. The severity of the pain significantly interferes with all major activities of daily living. Given the severity of the pain and the disability incurred, physical therapy is not a viable option for this patient.    Attempted remedies    Back brace   [ ]    Cane   [ ]    Wheelchair   [ ]    Oral pain medication  [x]       Based on this information and the patient's imaging, vertebral augmentation is the most appropriate therapy to rapidly treat the condition and prevent both further morbidity (which can be incurred by prolonged bed rest) and worsening of the fracture (which can be incurred by inappropriate physical therapy in the setting of an acute fracture).    Physical Examination    Patient was seen and examined with Dr. Foley, please see attached note from patient visit; this included most recent physical exam.        Please do not hesitate to reach out with additional questions or concerns.   Danii Hopkins, FAUSTINA - CNP  9:08 AM  02/20/24  [phone]

## 2024-02-23 ENCOUNTER — HOSPITAL ENCOUNTER (OUTPATIENT)
Dept: INTERVENTIONAL RADIOLOGY/VASCULAR | Age: 74
Discharge: HOME | End: 2024-02-23
Payer: MEDICARE

## 2024-02-23 VITALS
SYSTOLIC BLOOD PRESSURE: 109 MMHG | HEART RATE: 85 BPM | DIASTOLIC BLOOD PRESSURE: 75 MMHG | OXYGEN SATURATION: 92 % | TEMPERATURE: 98 F | RESPIRATION RATE: 24 BRPM

## 2024-02-23 DIAGNOSIS — M85.9 DISORDER OF BONE DENSITY AND STRUCTURE, UNSPECIFIED: ICD-10-CM

## 2024-02-23 DIAGNOSIS — S32.030A CLOSED COMPRESSION FRACTURE OF L3 LUMBAR VERTEBRA, INITIAL ENCOUNTER (HCC): ICD-10-CM

## 2024-02-23 LAB
DEPRECATED RDW RBC AUTO: 14.1 % (ref 12.4–15.4)
HCT VFR BLD AUTO: 43.7 % (ref 36–48)
HGB BLD-MCNC: 14.6 G/DL (ref 12–16)
INR PPP: 0.97 (ref 0.84–1.16)
MCH RBC QN AUTO: 33 PG (ref 26–34)
MCHC RBC AUTO-ENTMCNC: 33.3 G/DL (ref 31–36)
MCV RBC AUTO: 99.1 FL (ref 80–100)
PLATELET # BLD AUTO: 340 K/UL (ref 135–450)
PMV BLD AUTO: 7.5 FL (ref 5–10.5)
PROTHROMBIN TIME: 12.9 SEC (ref 11.5–14.8)
RBC # BLD AUTO: 4.42 M/UL (ref 4–5.2)
WBC # BLD AUTO: 6.1 K/UL (ref 4–11)

## 2024-02-23 PROCEDURE — 2500000003 HC RX 250 WO HCPCS

## 2024-02-23 PROCEDURE — 2580000003 HC RX 258

## 2024-02-23 PROCEDURE — 6370000000 HC RX 637 (ALT 250 FOR IP): Performed by: STUDENT IN AN ORGANIZED HEALTH CARE EDUCATION/TRAINING PROGRAM

## 2024-02-23 PROCEDURE — 22514 PERQ VERTEBRAL AUGMENTATION: CPT

## 2024-02-23 PROCEDURE — 99153 MOD SED SAME PHYS/QHP EA: CPT

## 2024-02-23 PROCEDURE — 85610 PROTHROMBIN TIME: CPT

## 2024-02-23 PROCEDURE — 99152 MOD SED SAME PHYS/QHP 5/>YRS: CPT

## 2024-02-23 PROCEDURE — 85027 COMPLETE CBC AUTOMATED: CPT

## 2024-02-23 PROCEDURE — 6360000002 HC RX W HCPCS

## 2024-02-23 RX ORDER — ACETAMINOPHEN 325 MG/1
975 TABLET ORAL ONCE
Status: COMPLETED | OUTPATIENT
Start: 2024-02-23 | End: 2024-02-23

## 2024-02-23 RX ADMIN — ACETAMINOPHEN 975 MG: 325 TABLET ORAL at 14:35

## 2024-02-23 ASSESSMENT — PAIN SCALES - GENERAL
PAINLEVEL_OUTOF10: 5
PAINLEVEL_OUTOF10: 3

## 2024-02-23 ASSESSMENT — PAIN DESCRIPTION - LOCATION: LOCATION: BACK

## 2024-02-23 NOTE — BRIEF OP NOTE
Interventional Radiology Post Procedure    Date: 2/23/2024    Physician: John Espinoza MD    Pre-op Diagnosis: L3 fracture    Post-op Diagnosis: same    Variation from Planned Procedure: None       Findings: L3 kyphoplasty without complication    Patient condition: Stable    Estimated Blood Loss: Minimal    Specimens:  None      Signed,  John Espinoza MD  12:46 PM  2/23/2024

## 2024-02-23 NOTE — FLOWSHEET NOTE
Discharge     Patient ambulated without difficulty, iv d/c discharge instructions reviewed with patient and  and patient discharged out the main entrance via wheelchair

## 2024-02-23 NOTE — DISCHARGE INSTRUCTIONS
[facility]  Cardiovascular Special Procedures  Vertebroplasty/Kyphoplasty/Sacralplasty  Patient Discharge Instructions      Dr. Espinoza performed a percutaneous vertebroplasty/kyphoplasty/sacralplasty at level(s)       Refrain from driving the day of procedure.  You may gradually return to normal activities, as tolerated.  You may remove the bandage on your back the day following the procedure.  (Please report any redness or signs of infection to us immediately.)  Report any chest pain, back pain (new or increased level of back pain), fever, or change in neurological function (i.e.: difficulty with mobility, numbness of extremities).  You may shower.  You may take Acetaminophen (Tylenol) or Ibuprofen (Advil, Motrin) to alleviate any back discomfort or soreness following the procedure.  You may experience discomfort at the puncture site for 24-48 hours following your procedure.    You may contact [facility] for any questions or problems that may occur at [phone] during the hours of 9am-4pm Monday-Friday, or the hospital  after hours at [phone], to have the interventional radiologist on call paged.        [facility]  Cardiovascular Special Procedures  General Discharge Instructions    PROCEDURE: ____________________________________________________    ____ You may be drowsy or lightheaded after receiving sedation. DO NOT operate a vehicle (automobile, bicycle, motorcycle, machinery, or power tools), make any important decisions or sign any important/legal documents, or drink alcoholic beverages for the next 24 hours  ____ We strongly suggest that a responsible adult be with you for the next 24 hours for your protection and safety  ____ If the intravenous catheter site is painful, apply warm wet compresses on the site until the soreness is relieved and elevate the arm above the heart.  Call your physician if no improvement in 2 to 3 days    DIETARY

## 2024-02-23 NOTE — PROGRESS NOTES
Cath Lab Pre Procedure Flowsheet    Plan of Care:     Hemodynamics and cardiac rhythm will remain stable.   Comfort level will be maintained.   Respiratory function will remain adequate.   Pt/family will verbalize understanding of the procedure.   Procedure will be tolerated without complications.   Patient will recover from procedure without complications.   ID armband on patient and identification verified.   Informed consent obtained.   Non invasive blood pressure cuff applied, monitoring initiated.   EKG pads and pulse oximeter applied, monitoring initiated.   Instructions given. Patient and / or family verbalize understanding.   H&P will be documented by physician in Epic.     Pre-procedure:    NPO Status: Pt has been NPO since midnight. .    Contrast / IV Dye Allergy:     Pregnancy Test: N/A.    Prep Sites: Wrist(s)  Chest    Perez's Test:    Pulses:     Anticoagulants: None    Antiplatelets: Aspirin. Last Dose: 2/23.  Any missed doses:  No..     Chief Complaint:      Diabetic: No    Pre EKG Rhythm: nsr    Pre SBP:140/102    IV access:left fa    Pre-procedure blood work collected by: [facility]    NIH Scale:

## 2024-02-23 NOTE — H&P
Present Diagnosis and Illness: 73 y.o. female who presents with back pain.    No diagnosis found.    Allergies   Allergen Reactions    Codeine     Bee Venom Swelling    Percocet [Oxycodone-Acetaminophen]     Vicodin [Hydrocodone-Acetaminophen]     Adhesive Tape Rash       Current Outpatient Medications   Medication Sig Dispense Refill    naproxen (NAPROSYN) 500 MG tablet Take 1 tablet by mouth 2 times daily (with meals) 30 tablet 0    tiZANidine (ZANAFLEX) 2 MG tablet Take 1 tablet by mouth at bedtime 30 tablet 0    VENTOLIN  (90 Base) MCG/ACT inhaler INHALE 2 PUFFS INTO THE LUNGS EVERY 6 HOURS AS NEEDED FOR WHEEZING 18 g 11    budesonide (PULMICORT FLEXHALER) 180 MCG/ACT AEPB inhaler Inhale 2 puffs into the lungs in the morning and 2 puffs in the evening. 3 each 3    umeclidinium-vilanterol (ANORO ELLIPTA) 62.5-25 MCG/ACT inhaler Inhale 1 puff into the lungs daily 60 each 6    azithromycin (ZITHROMAX) 250 MG tablet Take 500 mg by mouth the first day then 250 mg for the remaining four days (Patient not taking: Reported on 2/23/2024) 6 tablet 0    metoprolol succinate (TOPROL XL) 25 MG extended release tablet TAKE 1/2 TABLET BY MOUTH TWICE DAILY 90 tablet 3    predniSONE (DELTASONE) 10 MG tablet Take 4 tablets by mouth for 5 days. (Patient not taking: Reported on 2/23/2024) 20 tablet 0    lisinopril (PRINIVIL;ZESTRIL) 2.5 MG tablet TAKE 1 TABLET BY MOUTH EVERY EVENING 90 tablet 3    budesonide (PULMICORT) 180 MCG/ACT AEPB inhaler Inhale 2 puffs into the lungs in the morning and 2 puffs in the evening. 3 each 3    albuterol (ACCUNEB) 1.25 MG/3ML nebulizer solution Inhale 3 mLs into the lungs every 4 hours as needed for Wheezing or Shortness of Breath 180 vial 3    albuterol (PROVENTIL) (2.5 MG/3ML) 0.083% nebulizer solution Take 3 mLs by nebulization every 6 hours as needed for Wheezing      melatonin 3 MG TABS tablet Take 1 tablet by mouth nightly as needed      aspirin 81 MG chewable tablet Take 1 tablet by

## 2024-02-26 DIAGNOSIS — I95.2 HYPOTENSION DUE TO DRUGS: ICD-10-CM

## 2024-02-26 DIAGNOSIS — I50.22 SYSTOLIC CHF, CHRONIC (HCC): ICD-10-CM

## 2024-02-26 DIAGNOSIS — I42.8 NONISCHEMIC CARDIOMYOPATHY (HCC): ICD-10-CM

## 2024-02-26 RX ORDER — LISINOPRIL 2.5 MG/1
2.5 TABLET ORAL EVERY EVENING
Qty: 90 TABLET | Refills: 3 | Status: SHIPPED | OUTPATIENT
Start: 2024-02-26

## 2024-03-04 DIAGNOSIS — Z87.81 HISTORY OF VERTEBRAL COMPRESSION FRACTURE: ICD-10-CM

## 2024-03-04 DIAGNOSIS — S39.012A STRAIN OF LUMBAR REGION, INITIAL ENCOUNTER: ICD-10-CM

## 2024-03-05 RX ORDER — TIZANIDINE 2 MG/1
2 TABLET ORAL NIGHTLY
Qty: 30 TABLET | Refills: 1 | Status: SHIPPED | OUTPATIENT
Start: 2024-03-05

## 2024-03-05 NOTE — TELEPHONE ENCOUNTER
Last appointment: 2/16/2024 discuss results)  Next appointment: Visit date not found. Please advise when due for follow up  Last refill: 2/6/2024

## 2024-03-25 ENCOUNTER — TELEPHONE (OUTPATIENT)
Dept: PULMONOLOGY | Age: 74
End: 2024-03-25

## 2024-03-25 RX ORDER — AZITHROMYCIN 250 MG/1
TABLET, FILM COATED ORAL
Qty: 6 TABLET | Refills: 0 | Status: SHIPPED | OUTPATIENT
Start: 2024-03-25

## 2024-03-25 RX ORDER — PREDNISONE 20 MG/1
40 TABLET ORAL DAILY
Qty: 14 TABLET | Refills: 0 | Status: SHIPPED | OUTPATIENT
Start: 2024-03-25

## 2024-03-25 NOTE — TELEPHONE ENCOUNTER
It sounds like Carol is having a COPD exacerbation with an associated acute bronchitis.  I have sent a prednisone burst x 1 week and a Z-Gregg to her pharmacy.  Should she not improve with this she should call us back.

## 2024-03-25 NOTE — TELEPHONE ENCOUNTER
Patient of Dr Nazario     Has COPD   Short of breath and coughing since Thursday   Tightness in chest when coughing; brown phlegm   Runny nose  No fever/ headache or sore throat     [facility] on [address]

## 2024-04-01 ENCOUNTER — OFFICE VISIT (OUTPATIENT)
Dept: PULMONOLOGY | Age: 74
End: 2024-04-01
Payer: MEDICARE

## 2024-04-01 VITALS
WEIGHT: 117 LBS | OXYGEN SATURATION: 92 % | HEART RATE: 95 BPM | SYSTOLIC BLOOD PRESSURE: 110 MMHG | BODY MASS INDEX: 21.53 KG/M2 | HEIGHT: 62 IN | RESPIRATION RATE: 16 BRPM | DIASTOLIC BLOOD PRESSURE: 70 MMHG

## 2024-04-01 DIAGNOSIS — J96.11 CHRONIC RESPIRATORY FAILURE WITH HYPOXIA (HCC): ICD-10-CM

## 2024-04-01 DIAGNOSIS — J43.8 OTHER EMPHYSEMA (HCC): ICD-10-CM

## 2024-04-01 PROCEDURE — 1123F ACP DISCUSS/DSCN MKR DOCD: CPT | Performed by: INTERNAL MEDICINE

## 2024-04-01 PROCEDURE — 3017F COLORECTAL CA SCREEN DOC REV: CPT | Performed by: INTERNAL MEDICINE

## 2024-04-01 PROCEDURE — 1090F PRES/ABSN URINE INCON ASSESS: CPT | Performed by: INTERNAL MEDICINE

## 2024-04-01 PROCEDURE — G8420 CALC BMI NORM PARAMETERS: HCPCS | Performed by: INTERNAL MEDICINE

## 2024-04-01 PROCEDURE — 3023F SPIROM DOC REV: CPT | Performed by: INTERNAL MEDICINE

## 2024-04-01 PROCEDURE — 3078F DIAST BP <80 MM HG: CPT | Performed by: INTERNAL MEDICINE

## 2024-04-01 PROCEDURE — G8399 PT W/DXA RESULTS DOCUMENT: HCPCS | Performed by: INTERNAL MEDICINE

## 2024-04-01 PROCEDURE — 3074F SYST BP LT 130 MM HG: CPT | Performed by: INTERNAL MEDICINE

## 2024-04-01 PROCEDURE — G8427 DOCREV CUR MEDS BY ELIG CLIN: HCPCS | Performed by: INTERNAL MEDICINE

## 2024-04-01 PROCEDURE — 99214 OFFICE O/P EST MOD 30 MIN: CPT | Performed by: INTERNAL MEDICINE

## 2024-04-01 PROCEDURE — 1036F TOBACCO NON-USER: CPT | Performed by: INTERNAL MEDICINE

## 2024-04-01 RX ORDER — UMECLIDINIUM BROMIDE AND VILANTEROL TRIFENATATE 62.5; 25 UG/1; UG/1
1 POWDER RESPIRATORY (INHALATION) DAILY
Qty: 60 EACH | Refills: 6 | Status: SHIPPED | OUTPATIENT
Start: 2024-04-01

## 2024-04-01 RX ORDER — MONTELUKAST SODIUM 10 MG/1
10 TABLET ORAL DAILY
Qty: 30 TABLET | Refills: 3 | Status: SHIPPED | OUTPATIENT
Start: 2024-04-01

## 2024-04-01 NOTE — PROGRESS NOTES
[facility]    Outpatient Follow Up Note    Subjective:   CHIEF COMPLAINT / HPI:     The patient is 73 y.o. female who presents today for COPD.  Carol comes in by herself today and says that she had a flare about a week ago and got steroids and antibiotics from Dr. Adame while I was out of town.  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  She had been on Pulmicort as well but got thrush with it once and is apprehensive about using it again.  She had also been using Trelegy that she got from a neighbor but that has run out.  She did not get thrush with Trelegy.  She had a kyphoplasty since last visit.      Last visit:   Carol called last week because she was having more difficulty with her breathing so I prescribed her a burst of steroids and some azithromycin.  She has been having a flare monthly so far in 2023.  She was determined not to call in May so suffered through shortness of breath until she could call in June.  She continues on Anoro.  She only just restarted the Pulmicort 3 weeks ago.  She is doing better since the steroid burst.  She also has started working out with a .    Carol has had 2 flares already this year and just completed her second burst of steroids over the weekend.  She's compliant with the Anoro, but informs me that she and her  stopped filling the pulmicort because they're trying to be judicious about the medications they take.    Interval history:  Carol comes in by herself this morning and she seems to be at about baseline but says she continues to struggle with her breathing and was surprised to see that she is lost about 5 pounds compared to earlier this year.    Interval history:  Carol comes in today having finished a burst of steroids in the middle of July.  She's still struggling a bit.  She'd been on trelegy and really liked it, but then they jacked up the price to $800 and she can't afford it.  She is

## 2024-04-02 RX ORDER — MONTELUKAST SODIUM 10 MG/1
10 TABLET ORAL DAILY
Qty: 90 TABLET | OUTPATIENT
Start: 2024-04-02

## 2024-05-02 ENCOUNTER — TELEPHONE (OUTPATIENT)
Dept: PULMONOLOGY | Age: 74
End: 2024-05-02

## 2024-05-02 RX ORDER — PREDNISONE 10 MG/1
TABLET ORAL
Qty: 20 TABLET | Refills: 0 | Status: SHIPPED | OUTPATIENT
Start: 2024-05-02

## 2024-05-02 RX ORDER — AZITHROMYCIN 250 MG/1
TABLET, FILM COATED ORAL
Qty: 6 TABLET | Refills: 0 | Status: SHIPPED | OUTPATIENT
Start: 2024-05-02 | End: 2024-05-12

## 2024-05-02 NOTE — TELEPHONE ENCOUNTER
Carol called, complaints of SOB, cough, sleep problems, \"can barely go outside\" seasonal allergies

## 2024-05-02 NOTE — TELEPHONE ENCOUNTER
Spoke to Carol and she and Tez have the same symptoms.  Treating them as flares of their COPD.    Orders Placed This Encounter   Medications    predniSONE (DELTASONE) 10 MG tablet     Sig: Take 4 tablets by mouth for 5 days.     Dispense:  20 tablet     Refill:  0    azithromycin (ZITHROMAX) 250 MG tablet     Simg on day 1 followed by 250mg on days 2 - 5     Dispense:  6 tablet     Refill:  0

## 2024-06-13 ENCOUNTER — TELEPHONE (OUTPATIENT)
Dept: INTERNAL MEDICINE CLINIC | Age: 74
End: 2024-06-13

## 2024-06-13 NOTE — TELEPHONE ENCOUNTER
Patient called in for Dr. Foley to see if she still needs to take the naproxen (NAPROSYN) 500 MG tablet , patient was refilling her pill organizer and saw she still has those and wonder if she needs to keep taking them or not since she is not having any back pain and its such a high dosage.  Patient doesn't want to continue taking the Rx if doesn't have to please advise.    Patients callback# [phone]

## 2024-06-24 ENCOUNTER — TELEPHONE (OUTPATIENT)
Dept: PULMONOLOGY | Age: 74
End: 2024-06-24

## 2024-06-24 RX ORDER — PREDNISONE 10 MG/1
TABLET ORAL
Qty: 20 TABLET | Refills: 0 | Status: SHIPPED | OUTPATIENT
Start: 2024-06-24

## 2024-06-24 NOTE — TELEPHONE ENCOUNTER
I sent an rx for her, and her  since they both called with the same complaint.      Orders Placed This Encounter   Medications    predniSONE (DELTASONE) 10 MG tablet     Sig: Take 4 tablets by mouth for 5 days.     Dispense:  20 tablet     Refill:  0

## 2024-06-24 NOTE — TELEPHONE ENCOUNTER
Patient c/o sob, wheezing, and a cough. No fever. Would like something sent to [facility], please. She is thinking she is in need of prednisone.   Patient may be reached at [phone]

## 2024-07-22 DIAGNOSIS — Z87.81 HISTORY OF VERTEBRAL COMPRESSION FRACTURE: ICD-10-CM

## 2024-07-22 DIAGNOSIS — S39.012A STRAIN OF LUMBAR REGION, INITIAL ENCOUNTER: ICD-10-CM

## 2024-07-22 RX ORDER — TIZANIDINE 2 MG/1
2 TABLET ORAL NIGHTLY
Qty: 30 TABLET | Refills: 3 | Status: SHIPPED | OUTPATIENT
Start: 2024-07-22

## 2024-07-22 NOTE — TELEPHONE ENCOUNTER
Last appointment: 2/16/2024  Next appointment: Visit date not found  Last refill: 3/5/24    Requested Prescriptions     Pending Prescriptions Disp Refills    tiZANidine (ZANAFLEX) 2 MG tablet [Pharmacy Med Name: TIZANIDINE 2MG TABLETS] 30 tablet 1     Sig: TAKE 1 TABLET BY MOUTH AT BEDTIME

## 2024-07-24 NOTE — PATIENT INSTRUCTIONS
Thank you for choosing [facility] for your cardiac care.    During your visit today, we reviewed and confirmed your cardiac medications along with  medication prescribed by your other healthcare team members. Please be sure to discuss any  changes to medication with your providers.    Please bring a list of ALL medications (or the bottles) with you to EVERY appointment.  Also include vitamins and over-the-counter medications.    If you need refills for any cardiac medications, please call your pharmacy and they will reach out to us electronically.    Did your provider order testing today? If yes, then you will receive your results in three  possible ways. You can receive a [employer] message, a phone call, or letter in the mail. Please  note, if you are an active [employer] user, some of your testing will be available within 1-2 days.    Finally, please know that it is good for your heart to exercise and follow a healthy, low-fat diet  as advised by your physician and health care providers.    If you are experiencing a medical emergency, please call 911 immediately.    It's easy to register for a [employer] account if you don't already have one. With a [employer]  account you can manage your health record, view test results, schedule appointments and  more.     Dr. Campo's clinical staff can be reached at the following phone number: [phone]

## 2024-07-24 NOTE — PROGRESS NOTES
[facility]     Outpatient Cardiology         Patient Name:  Charlene Nazario  Primary Care Physician: Gerry Foley DO  08/01/24     Assessment & Plan    Assessment / Plan:     History of reactive airway disease, possible cor pulmonale.  Reduced tricuspid valve excursion suggesting mild RV dysfunction.  No clinical evidence of volume overload.  Patient on low-dose lisinopril and Toprol-XL.  Continue current therapy.  Blood pressure is stable.  Currently not smoking.  Dyslipidemia-has high HDL.  LDL is 85.  Continue to monitor.        Chief Complaint:     Chief Complaint   Patient presents with    New Patient    Follow-up     Former  Pt  CHF        History of Present Illness:       HPI     Charlene Nazariois a 74 y.o. female with PMH of CHF, COPD and  Hyperlipidemia here for a follow up. Previously seen by Dr Pool.  Feeling good today.  Patient denies any chest pain, shortness of breath, palpitations, presyncope or syncope. No TIA. No claudication. No recent hospitalizations    HTN  Lisinopril 2.5 mg daily  Toprol 12.5 mg daily    RTC 6 mth    PMH  Past Medical History:   Diagnosis Date    Allergic rhinitis     Anxiety 02/14/2022    Back pain     Chronic:under care of spine specialist:Dr. Adames    Cervical cancer screening 2010    Nml per pt'    Closed compression fracture of L3 vertebra (HCC) 02/08/2024    Compression fracture of thoracic vertebrae, non-traumatic (HCC)     under care of endo:Dr. Zhu    COPD (chronic obstructive pulmonary disease) (HCC)     under care of pulmo:Dr. Nazario    COPD exacerbation (HCC) 04/14/2017    Elevated LDL cholesterol level     GERD (gastroesophageal reflux disease)     History of mammogram 2012;5/17/17    Nml per pt'. 5/17/17=negative.    HLD (hyperlipidemia) 09/03/2021    Hoarseness of voice 10/04/2017    Hypertension     Lung nodule:left 05/04/2017     1.2 cm indeterminate left upper lobe pulmonary nodule:under care of pulmo:

## 2024-08-01 ENCOUNTER — OFFICE VISIT (OUTPATIENT)
Dept: CARDIOLOGY CLINIC | Age: 74
End: 2024-08-01
Payer: MEDICARE

## 2024-08-01 VITALS
SYSTOLIC BLOOD PRESSURE: 118 MMHG | HEART RATE: 90 BPM | DIASTOLIC BLOOD PRESSURE: 84 MMHG | WEIGHT: 108 LBS | OXYGEN SATURATION: 93 % | BODY MASS INDEX: 19.75 KG/M2

## 2024-08-01 DIAGNOSIS — I27.81 CHRONIC COR PULMONALE (HCC): Primary | ICD-10-CM

## 2024-08-01 PROCEDURE — 99213 OFFICE O/P EST LOW 20 MIN: CPT | Performed by: INTERNAL MEDICINE

## 2024-08-01 PROCEDURE — G8420 CALC BMI NORM PARAMETERS: HCPCS | Performed by: INTERNAL MEDICINE

## 2024-08-01 PROCEDURE — 1123F ACP DISCUSS/DSCN MKR DOCD: CPT | Performed by: INTERNAL MEDICINE

## 2024-08-01 PROCEDURE — 1036F TOBACCO NON-USER: CPT | Performed by: INTERNAL MEDICINE

## 2024-08-01 PROCEDURE — G8427 DOCREV CUR MEDS BY ELIG CLIN: HCPCS | Performed by: INTERNAL MEDICINE

## 2024-08-01 PROCEDURE — 93000 ELECTROCARDIOGRAM COMPLETE: CPT | Performed by: INTERNAL MEDICINE

## 2024-08-01 PROCEDURE — 3074F SYST BP LT 130 MM HG: CPT | Performed by: INTERNAL MEDICINE

## 2024-08-01 PROCEDURE — 3017F COLORECTAL CA SCREEN DOC REV: CPT | Performed by: INTERNAL MEDICINE

## 2024-08-01 PROCEDURE — 1090F PRES/ABSN URINE INCON ASSESS: CPT | Performed by: INTERNAL MEDICINE

## 2024-08-01 PROCEDURE — G8399 PT W/DXA RESULTS DOCUMENT: HCPCS | Performed by: INTERNAL MEDICINE

## 2024-08-01 PROCEDURE — 3079F DIAST BP 80-89 MM HG: CPT | Performed by: INTERNAL MEDICINE

## 2024-08-08 NOTE — TELEPHONE ENCOUNTER
Requested Prescriptions     Pending Prescriptions Disp Refills    montelukast (SINGULAIR) 10 MG tablet [Pharmacy Med Name: MONTELUKAST 10MG TABLETS] 30 tablet 3     Sig: TAKE 1 TABLET BY MOUTH DAILY

## 2024-08-09 RX ORDER — MONTELUKAST SODIUM 10 MG/1
10 TABLET ORAL DAILY
Qty: 30 TABLET | Refills: 5 | Status: SHIPPED | OUTPATIENT
Start: 2024-08-09

## 2024-08-15 ENCOUNTER — TELEPHONE (OUTPATIENT)
Dept: PULMONOLOGY | Age: 74
End: 2024-08-15

## 2024-08-15 RX ORDER — PREDNISONE 10 MG/1
TABLET ORAL
Qty: 22 TABLET | Refills: 0 | Status: SHIPPED | OUTPATIENT
Start: 2024-08-15 | End: 2024-08-25

## 2024-08-15 NOTE — TELEPHONE ENCOUNTER
I put in an order for a burst and taper for Carol.  If she's feeling better at the end of 5 days, she could save the extra for next time.  Also, when she and Hardy come back, I want to talk to them about possibly using Dupixent  Orders Placed This Encounter   Medications    predniSONE (DELTASONE) 10 MG tablet     Si tabs for 3 days, then 2 tabs for 3 days, then 1 tab for 4 days     Dispense:  22 tablet     Refill:  0

## 2024-08-15 NOTE — TELEPHONE ENCOUNTER
Carol's  called in requesting prednisone for Carol-    Increased breathing difficulty  Increased rescue inhaler use

## 2024-10-22 ENCOUNTER — APPOINTMENT (OUTPATIENT)
Dept: GENERAL RADIOLOGY | Age: 74
End: 2024-10-22
Payer: MEDICARE

## 2024-10-22 ENCOUNTER — TELEPHONE (OUTPATIENT)
Dept: PULMONOLOGY | Age: 74
End: 2024-10-22

## 2024-10-22 ENCOUNTER — HOSPITAL ENCOUNTER (INPATIENT)
Age: 74
LOS: 3 days | Discharge: HOME | End: 2024-10-26
Attending: STUDENT IN AN ORGANIZED HEALTH CARE EDUCATION/TRAINING PROGRAM | Admitting: INTERNAL MEDICINE
Payer: MEDICARE

## 2024-10-22 DIAGNOSIS — J44.1 COPD EXACERBATION (HCC): Primary | ICD-10-CM

## 2024-10-22 LAB
ANION GAP SERPL CALCULATED.3IONS-SCNC: 12 MMOL/L (ref 3–16)
BASOPHILS # BLD: 0.1 K/UL (ref 0–0.2)
BASOPHILS NFR BLD: 1.5 %
BUN SERPL-MCNC: 9 MG/DL (ref 7–20)
CALCIUM SERPL-MCNC: 9.8 MG/DL (ref 8.3–10.6)
CHLORIDE SERPL-SCNC: 92 MMOL/L (ref 99–110)
CO2 SERPL-SCNC: 28 MMOL/L (ref 21–32)
CREAT SERPL-MCNC: <0.5 MG/DL (ref 0.6–1.2)
DEPRECATED RDW RBC AUTO: 13.2 % (ref 12.4–15.4)
EOSINOPHIL # BLD: 1 K/UL (ref 0–0.6)
EOSINOPHIL NFR BLD: 12.2 %
FLUAV RNA RESP QL NAA+PROBE: NOT DETECTED
FLUBV RNA RESP QL NAA+PROBE: NOT DETECTED
GFR SERPLBLD CREATININE-BSD FMLA CKD-EPI: >90 ML/MIN/{1.73_M2}
GLUCOSE SERPL-MCNC: 94 MG/DL (ref 70–99)
HCT VFR BLD AUTO: 43.9 % (ref 36–48)
HGB BLD-MCNC: 14.7 G/DL (ref 12–16)
LYMPHOCYTES # BLD: 2.7 K/UL (ref 1–5.1)
LYMPHOCYTES NFR BLD: 34.3 %
MCH RBC QN AUTO: 32.5 PG (ref 26–34)
MCHC RBC AUTO-ENTMCNC: 33.4 G/DL (ref 31–36)
MCV RBC AUTO: 97.4 FL (ref 80–100)
MONOCYTES # BLD: 0.5 K/UL (ref 0–1.3)
MONOCYTES NFR BLD: 6.6 %
NEUTROPHILS # BLD: 3.6 K/UL (ref 1.7–7.7)
NEUTROPHILS NFR BLD: 45.4 %
PLATELET # BLD AUTO: 322 K/UL (ref 135–450)
PMV BLD AUTO: 7.3 FL (ref 5–10.5)
POTASSIUM SERPL-SCNC: 4.6 MMOL/L (ref 3.5–5.1)
RBC # BLD AUTO: 4.51 M/UL (ref 4–5.2)
SARS-COV-2 RNA RESP QL NAA+PROBE: NOT DETECTED
SODIUM SERPL-SCNC: 132 MMOL/L (ref 136–145)
WBC # BLD AUTO: 7.9 K/UL (ref 4–11)

## 2024-10-22 PROCEDURE — 87636 SARSCOV2 & INF A&B AMP PRB: CPT

## 2024-10-22 PROCEDURE — 96365 THER/PROPH/DIAG IV INF INIT: CPT

## 2024-10-22 PROCEDURE — 96375 TX/PRO/DX INJ NEW DRUG ADDON: CPT

## 2024-10-22 PROCEDURE — 80048 BASIC METABOLIC PNL TOTAL CA: CPT

## 2024-10-22 PROCEDURE — 82803 BLOOD GASES ANY COMBINATION: CPT

## 2024-10-22 PROCEDURE — 94640 AIRWAY INHALATION TREATMENT: CPT

## 2024-10-22 PROCEDURE — 6370000000 HC RX 637 (ALT 250 FOR IP): Performed by: STUDENT IN AN ORGANIZED HEALTH CARE EDUCATION/TRAINING PROGRAM

## 2024-10-22 PROCEDURE — 99285 EMERGENCY DEPT VISIT HI MDM: CPT

## 2024-10-22 PROCEDURE — 2580000003 HC RX 258: Performed by: STUDENT IN AN ORGANIZED HEALTH CARE EDUCATION/TRAINING PROGRAM

## 2024-10-22 PROCEDURE — 85025 COMPLETE CBC W/AUTO DIFF WBC: CPT

## 2024-10-22 PROCEDURE — 93005 ELECTROCARDIOGRAM TRACING: CPT | Performed by: STUDENT IN AN ORGANIZED HEALTH CARE EDUCATION/TRAINING PROGRAM

## 2024-10-22 PROCEDURE — 6360000002 HC RX W HCPCS: Performed by: STUDENT IN AN ORGANIZED HEALTH CARE EDUCATION/TRAINING PROGRAM

## 2024-10-22 PROCEDURE — 71045 X-RAY EXAM CHEST 1 VIEW: CPT

## 2024-10-22 RX ORDER — PREDNISONE 10 MG/1
TABLET ORAL
Qty: 20 TABLET | Refills: 0 | Status: ON HOLD | OUTPATIENT
Start: 2024-10-22 | End: 2024-10-26

## 2024-10-22 RX ORDER — MAGNESIUM SULFATE IN WATER 40 MG/ML
2000 INJECTION, SOLUTION INTRAVENOUS ONCE
Status: COMPLETED | OUTPATIENT
Start: 2024-10-22 | End: 2024-10-22

## 2024-10-22 RX ORDER — AZITHROMYCIN 250 MG/1
TABLET, FILM COATED ORAL
Qty: 6 TABLET | Refills: 0 | Status: ON HOLD | OUTPATIENT
Start: 2024-10-22 | End: 2024-10-26 | Stop reason: HOSPADM

## 2024-10-22 RX ORDER — METOPROLOL SUCCINATE 25 MG/1
12.5 TABLET, EXTENDED RELEASE ORAL 2 TIMES DAILY
Qty: 90 TABLET | Refills: 3 | Status: SHIPPED | OUTPATIENT
Start: 2024-10-22

## 2024-10-22 RX ORDER — IPRATROPIUM BROMIDE AND ALBUTEROL SULFATE 2.5; .5 MG/3ML; MG/3ML
2 SOLUTION RESPIRATORY (INHALATION) ONCE
Status: COMPLETED | OUTPATIENT
Start: 2024-10-22 | End: 2024-10-22

## 2024-10-22 RX ADMIN — MAGNESIUM SULFATE HEPTAHYDRATE 2000 MG: 40 INJECTION, SOLUTION INTRAVENOUS at 22:40

## 2024-10-22 RX ADMIN — IPRATROPIUM BROMIDE AND ALBUTEROL SULFATE 2 DOSE: 2.5; .5 SOLUTION RESPIRATORY (INHALATION) at 22:54

## 2024-10-22 RX ADMIN — WATER 125 MG: 1 INJECTION INTRAMUSCULAR; INTRAVENOUS; SUBCUTANEOUS at 22:40

## 2024-10-22 ASSESSMENT — PAIN - FUNCTIONAL ASSESSMENT: PAIN_FUNCTIONAL_ASSESSMENT: NONE - DENIES PAIN

## 2024-10-22 NOTE — TELEPHONE ENCOUNTER
Requested Prescriptions     Pending Prescriptions Disp Refills    metoprolol succinate (TOPROL XL) 25 MG extended release tablet 90 tablet 3     Sig: Take 0.5 tablets by mouth 2 times daily            Checked Correct Pharmacy: Yes    Any changes since last refill? No     Number: 90    Refills: 3    Last Office Visit: 8/1/2024     Next Office Visit: 2/6/2025         Last Labs: 02.23.2024

## 2024-10-22 NOTE — TELEPHONE ENCOUNTER
Carol called for herself    SOB  SINUS ISSUES  COUGH  YELLOW MUCUS      Symptoms started 5 days ago- looking for medication to be sent to pharmacy

## 2024-10-22 NOTE — TELEPHONE ENCOUNTER
Spoke with Carol and she and Hardy have the same symptoms.  They've been rationing and sharing their inhalers and it's caught up with them.  They only have Anoro, no inhaled steroid, unless they get it from a brother in law.     I ordered  Encounter Medications        Orders Placed This Encounter   Medications    azithromycin (ZITHROMAX) 250 MG tablet       Sig: Take 500 mg by mouth the first day then 250 mg for the remaining four days       Dispense:  6 tablet       Refill:  0    predniSONE (DELTASONE) 10 MG tablet       Sig: Take 4 tablets by mouth for 5 days.       Dispense:  20 tablet       Refill:  0    budesonide (PULMICORT FLEXHALER) 180 MCG/ACT AEPB inhaler       Sig: Inhale 2 puffs into the lungs in the morning and 2 puffs in the evening.       Dispense:  1 each       Refill:  3            For both of them.    They also need to reschedule a follow up appointment.

## 2024-10-23 PROBLEM — J44.1 COPD EXACERBATION (HCC): Status: ACTIVE | Noted: 2024-10-23

## 2024-10-23 LAB
ANION GAP SERPL CALCULATED.3IONS-SCNC: 9 MMOL/L (ref 3–16)
BASE EXCESS BLDV CALC-SCNC: 1.1 MMOL/L (ref -2–3)
BASE EXCESS BLDV CALC-SCNC: 2.6 MMOL/L (ref -2–3)
BASOPHILS # BLD: 0 K/UL (ref 0–0.2)
BASOPHILS NFR BLD: 0.2 %
BUN SERPL-MCNC: 10 MG/DL (ref 7–20)
CALCIUM SERPL-MCNC: 8.5 MG/DL (ref 8.3–10.6)
CHLORIDE SERPL-SCNC: 97 MMOL/L (ref 99–110)
CO2 BLDV-SCNC: 31 MMOL/L
CO2 BLDV-SCNC: 32 MMOL/L
CO2 SERPL-SCNC: 29 MMOL/L (ref 21–32)
COHGB MFR BLDV: 1.6 % (ref 0–1.5)
COHGB MFR BLDV: 2.4 % (ref 0–1.5)
CREAT SERPL-MCNC: <0.5 MG/DL (ref 0.6–1.2)
DEPRECATED RDW RBC AUTO: 13.2 % (ref 12.4–15.4)
DO-HGB MFR BLDV: 31.2 %
DO-HGB MFR BLDV: 35.7 %
EKG ATRIAL RATE: 101 BPM
EKG DIAGNOSIS: NORMAL
EKG P AXIS: 76 DEGREES
EKG P-R INTERVAL: 136 MS
EKG Q-T INTERVAL: 342 MS
EKG QRS DURATION: 74 MS
EKG QTC CALCULATION (BAZETT): 443 MS
EKG R AXIS: 53 DEGREES
EKG T AXIS: 71 DEGREES
EKG VENTRICULAR RATE: 101 BPM
EOSINOPHIL # BLD: 0 K/UL (ref 0–0.6)
EOSINOPHIL NFR BLD: 0 %
GFR SERPLBLD CREATININE-BSD FMLA CKD-EPI: >90 ML/MIN/{1.73_M2}
GLUCOSE SERPL-MCNC: 168 MG/DL (ref 70–99)
HCO3 BLDV-SCNC: 29.1 MMOL/L (ref 24–28)
HCO3 BLDV-SCNC: 30 MMOL/L (ref 24–28)
HCT VFR BLD AUTO: 42.9 % (ref 36–48)
HGB BLD-MCNC: 14.3 G/DL (ref 12–16)
LYMPHOCYTES # BLD: 0.3 K/UL (ref 1–5.1)
LYMPHOCYTES NFR BLD: 7.2 %
MAGNESIUM SERPL-MCNC: 2.1 MG/DL (ref 1.8–2.4)
MCH RBC QN AUTO: 32.6 PG (ref 26–34)
MCHC RBC AUTO-ENTMCNC: 33.3 G/DL (ref 31–36)
MCV RBC AUTO: 97.7 FL (ref 80–100)
METHGB MFR BLDV: 0 % (ref 0–1.5)
METHGB MFR BLDV: 0.2 % (ref 0–1.5)
MONOCYTES # BLD: 0 K/UL (ref 0–1.3)
MONOCYTES NFR BLD: 1 %
NEUTROPHILS # BLD: 3.3 K/UL (ref 1.7–7.7)
NEUTROPHILS NFR BLD: 91.6 %
PCO2 BLDV: 56 MMHG (ref 41–51)
PCO2 BLDV: 59.6 MMHG (ref 41–51)
PH BLDV: 7.3 [PH] (ref 7.35–7.45)
PH BLDV: 7.34 [PH] (ref 7.35–7.45)
PLATELET # BLD AUTO: 314 K/UL (ref 135–450)
PMV BLD AUTO: 6.9 FL (ref 5–10.5)
PO2 BLDV: 37.2 MMHG (ref 25–40)
PO2 BLDV: 38.2 MMHG (ref 25–40)
POTASSIUM SERPL-SCNC: 5.4 MMOL/L (ref 3.5–5.1)
RBC # BLD AUTO: 4.39 M/UL (ref 4–5.2)
SAO2 % BLDV: 63 %
SAO2 % BLDV: 68 %
SODIUM SERPL-SCNC: 135 MMOL/L (ref 136–145)
WBC # BLD AUTO: 3.7 K/UL (ref 4–11)

## 2024-10-23 PROCEDURE — 97530 THERAPEUTIC ACTIVITIES: CPT

## 2024-10-23 PROCEDURE — 6370000000 HC RX 637 (ALT 250 FOR IP)

## 2024-10-23 PROCEDURE — 97535 SELF CARE MNGMENT TRAINING: CPT

## 2024-10-23 PROCEDURE — 36415 COLL VENOUS BLD VENIPUNCTURE: CPT

## 2024-10-23 PROCEDURE — 83735 ASSAY OF MAGNESIUM: CPT

## 2024-10-23 PROCEDURE — 96375 TX/PRO/DX INJ NEW DRUG ADDON: CPT

## 2024-10-23 PROCEDURE — 94761 N-INVAS EAR/PLS OXIMETRY MLT: CPT

## 2024-10-23 PROCEDURE — 94640 AIRWAY INHALATION TREATMENT: CPT

## 2024-10-23 PROCEDURE — 97116 GAIT TRAINING THERAPY: CPT

## 2024-10-23 PROCEDURE — 2060000000 HC ICU INTERMEDIATE R&B

## 2024-10-23 PROCEDURE — 82803 BLOOD GASES ANY COMBINATION: CPT

## 2024-10-23 PROCEDURE — 2580000003 HC RX 258: Performed by: INTERNAL MEDICINE

## 2024-10-23 PROCEDURE — 6370000000 HC RX 637 (ALT 250 FOR IP): Performed by: INTERNAL MEDICINE

## 2024-10-23 PROCEDURE — 2580000003 HC RX 258

## 2024-10-23 PROCEDURE — 6360000002 HC RX W HCPCS

## 2024-10-23 PROCEDURE — 5A09357 ASSISTANCE WITH RESPIRATORY VENTILATION, LESS THAN 24 CONSECUTIVE HOURS, CONTINUOUS POSITIVE AIRWAY PRESSURE: ICD-10-PCS | Performed by: INTERNAL MEDICINE

## 2024-10-23 PROCEDURE — 80048 BASIC METABOLIC PNL TOTAL CA: CPT

## 2024-10-23 PROCEDURE — 2700000000 HC OXYGEN THERAPY PER DAY

## 2024-10-23 PROCEDURE — 97162 PT EVAL MOD COMPLEX 30 MIN: CPT

## 2024-10-23 PROCEDURE — 6360000002 HC RX W HCPCS: Performed by: INTERNAL MEDICINE

## 2024-10-23 PROCEDURE — 85025 COMPLETE CBC W/AUTO DIFF WBC: CPT

## 2024-10-23 PROCEDURE — 97166 OT EVAL MOD COMPLEX 45 MIN: CPT

## 2024-10-23 PROCEDURE — 6360000002 HC RX W HCPCS: Performed by: STUDENT IN AN ORGANIZED HEALTH CARE EDUCATION/TRAINING PROGRAM

## 2024-10-23 PROCEDURE — 94660 CPAP INITIATION&MGMT: CPT

## 2024-10-23 RX ORDER — ARFORMOTEROL TARTRATE 15 UG/2ML
15 SOLUTION RESPIRATORY (INHALATION)
Status: DISCONTINUED | OUTPATIENT
Start: 2024-10-23 | End: 2024-10-26 | Stop reason: HOSPADM

## 2024-10-23 RX ORDER — PREDNISONE 20 MG/1
40 TABLET ORAL DAILY
Status: DISCONTINUED | OUTPATIENT
Start: 2024-10-23 | End: 2024-10-23

## 2024-10-23 RX ORDER — IPRATROPIUM BROMIDE AND ALBUTEROL SULFATE 2.5; .5 MG/3ML; MG/3ML
1 SOLUTION RESPIRATORY (INHALATION)
Status: DISCONTINUED | OUTPATIENT
Start: 2024-10-23 | End: 2024-10-24

## 2024-10-23 RX ORDER — ASPIRIN 81 MG/1
81 TABLET, CHEWABLE ORAL DAILY
Status: DISCONTINUED | OUTPATIENT
Start: 2024-10-23 | End: 2024-10-26 | Stop reason: HOSPADM

## 2024-10-23 RX ORDER — MONTELUKAST SODIUM 10 MG/1
10 TABLET ORAL DAILY
Status: DISCONTINUED | OUTPATIENT
Start: 2024-10-23 | End: 2024-10-26 | Stop reason: HOSPADM

## 2024-10-23 RX ORDER — BUDESONIDE 0.5 MG/2ML
0.5 INHALANT ORAL
Status: DISCONTINUED | OUTPATIENT
Start: 2024-10-23 | End: 2024-10-26 | Stop reason: HOSPADM

## 2024-10-23 RX ORDER — SODIUM CHLORIDE 0.9 % (FLUSH) 0.9 %
5-40 SYRINGE (ML) INJECTION PRN
Status: DISCONTINUED | OUTPATIENT
Start: 2024-10-23 | End: 2024-10-26 | Stop reason: HOSPADM

## 2024-10-23 RX ORDER — LISINOPRIL 2.5 MG/1
2.5 TABLET ORAL EVERY EVENING
Status: DISCONTINUED | OUTPATIENT
Start: 2024-10-23 | End: 2024-10-26 | Stop reason: HOSPADM

## 2024-10-23 RX ORDER — ACETAMINOPHEN 650 MG/1
650 SUPPOSITORY RECTAL EVERY 6 HOURS PRN
Status: DISCONTINUED | OUTPATIENT
Start: 2024-10-23 | End: 2024-10-26 | Stop reason: HOSPADM

## 2024-10-23 RX ORDER — IPRATROPIUM BROMIDE AND ALBUTEROL SULFATE 2.5; .5 MG/3ML; MG/3ML
1 SOLUTION RESPIRATORY (INHALATION)
Status: DISCONTINUED | OUTPATIENT
Start: 2024-10-23 | End: 2024-10-23

## 2024-10-23 RX ORDER — SODIUM CHLORIDE 0.9 % (FLUSH) 0.9 %
5-40 SYRINGE (ML) INJECTION EVERY 12 HOURS SCHEDULED
Status: DISCONTINUED | OUTPATIENT
Start: 2024-10-23 | End: 2024-10-26 | Stop reason: HOSPADM

## 2024-10-23 RX ORDER — METOPROLOL SUCCINATE 25 MG/1
12.5 TABLET, EXTENDED RELEASE ORAL 2 TIMES DAILY
Status: DISCONTINUED | OUTPATIENT
Start: 2024-10-23 | End: 2024-10-26 | Stop reason: HOSPADM

## 2024-10-23 RX ORDER — IPRATROPIUM BROMIDE AND ALBUTEROL SULFATE 2.5; .5 MG/3ML; MG/3ML
SOLUTION RESPIRATORY (INHALATION)
Status: DISCONTINUED
Start: 2024-10-23 | End: 2024-10-23

## 2024-10-23 RX ORDER — ONDANSETRON 4 MG/1
4 TABLET, ORALLY DISINTEGRATING ORAL EVERY 8 HOURS PRN
Status: DISCONTINUED | OUTPATIENT
Start: 2024-10-23 | End: 2024-10-26 | Stop reason: HOSPADM

## 2024-10-23 RX ORDER — LORAZEPAM 2 MG/ML
0.5 INJECTION INTRAMUSCULAR ONCE
Status: COMPLETED | OUTPATIENT
Start: 2024-10-23 | End: 2024-10-23

## 2024-10-23 RX ORDER — SODIUM CHLORIDE 9 MG/ML
INJECTION, SOLUTION INTRAVENOUS PRN
Status: DISCONTINUED | OUTPATIENT
Start: 2024-10-23 | End: 2024-10-26 | Stop reason: HOSPADM

## 2024-10-23 RX ORDER — HYDROXYZINE HYDROCHLORIDE 25 MG/1
25 TABLET, FILM COATED ORAL 3 TIMES DAILY PRN
Status: DISCONTINUED | OUTPATIENT
Start: 2024-10-23 | End: 2024-10-26 | Stop reason: HOSPADM

## 2024-10-23 RX ORDER — POLYETHYLENE GLYCOL 3350 17 G/17G
17 POWDER, FOR SOLUTION ORAL DAILY PRN
Status: DISCONTINUED | OUTPATIENT
Start: 2024-10-23 | End: 2024-10-26 | Stop reason: HOSPADM

## 2024-10-23 RX ORDER — ENOXAPARIN SODIUM 100 MG/ML
40 INJECTION SUBCUTANEOUS DAILY
Status: DISCONTINUED | OUTPATIENT
Start: 2024-10-23 | End: 2024-10-25

## 2024-10-23 RX ORDER — ONDANSETRON 2 MG/ML
4 INJECTION INTRAMUSCULAR; INTRAVENOUS EVERY 6 HOURS PRN
Status: DISCONTINUED | OUTPATIENT
Start: 2024-10-23 | End: 2024-10-26 | Stop reason: HOSPADM

## 2024-10-23 RX ORDER — ACETAMINOPHEN 325 MG/1
650 TABLET ORAL EVERY 6 HOURS PRN
Status: DISCONTINUED | OUTPATIENT
Start: 2024-10-23 | End: 2024-10-26 | Stop reason: HOSPADM

## 2024-10-23 RX ADMIN — MONTELUKAST 10 MG: 10 TABLET, FILM COATED ORAL at 09:59

## 2024-10-23 RX ADMIN — ENOXAPARIN SODIUM 40 MG: 100 INJECTION SUBCUTANEOUS at 10:07

## 2024-10-23 RX ADMIN — ACETAMINOPHEN 650 MG: 325 TABLET ORAL at 18:03

## 2024-10-23 RX ADMIN — BUDESONIDE INHALATION 500 MCG: 0.5 SUSPENSION RESPIRATORY (INHALATION) at 21:19

## 2024-10-23 RX ADMIN — PREDNISONE 40 MG: 20 TABLET ORAL at 09:59

## 2024-10-23 RX ADMIN — IPRATROPIUM BROMIDE AND ALBUTEROL SULFATE 1 DOSE: 2.5; .5 SOLUTION RESPIRATORY (INHALATION) at 15:13

## 2024-10-23 RX ADMIN — IPRATROPIUM BROMIDE AND ALBUTEROL SULFATE 1 DOSE: 2.5; .5 SOLUTION RESPIRATORY (INHALATION) at 07:47

## 2024-10-23 RX ADMIN — AMOXICILLIN AND CLAVULANATE POTASSIUM 1 TABLET: 875; 125 TABLET, FILM COATED ORAL at 20:12

## 2024-10-23 RX ADMIN — ARFORMOTEROL TARTRATE 15 MCG: 15 SOLUTION RESPIRATORY (INHALATION) at 21:19

## 2024-10-23 RX ADMIN — HYDROXYZINE HYDROCHLORIDE 25 MG: 25 TABLET ORAL at 14:00

## 2024-10-23 RX ADMIN — SODIUM CHLORIDE, PRESERVATIVE FREE 10 ML: 5 INJECTION INTRAVENOUS at 20:12

## 2024-10-23 RX ADMIN — IPRATROPIUM BROMIDE AND ALBUTEROL SULFATE 1 DOSE: 2.5; .5 SOLUTION RESPIRATORY (INHALATION) at 21:19

## 2024-10-23 RX ADMIN — LISINOPRIL 2.5 MG: 2.5 TABLET ORAL at 18:03

## 2024-10-23 RX ADMIN — SODIUM CHLORIDE, PRESERVATIVE FREE 10 ML: 5 INJECTION INTRAVENOUS at 10:09

## 2024-10-23 RX ADMIN — LORAZEPAM 0.5 MG: 2 INJECTION INTRAMUSCULAR; INTRAVENOUS at 00:32

## 2024-10-23 RX ADMIN — IPRATROPIUM BROMIDE AND ALBUTEROL SULFATE 1 DOSE: 2.5; .5 SOLUTION RESPIRATORY (INHALATION) at 03:48

## 2024-10-23 RX ADMIN — METOPROLOL SUCCINATE 12.5 MG: 25 TABLET, EXTENDED RELEASE ORAL at 20:12

## 2024-10-23 RX ADMIN — METOPROLOL SUCCINATE 12.5 MG: 25 TABLET, EXTENDED RELEASE ORAL at 09:59

## 2024-10-23 RX ADMIN — ASPIRIN 81 MG: 81 TABLET, CHEWABLE ORAL at 09:59

## 2024-10-23 RX ADMIN — IPRATROPIUM BROMIDE AND ALBUTEROL SULFATE 1 DOSE: 2.5; .5 SOLUTION RESPIRATORY (INHALATION) at 11:33

## 2024-10-23 RX ADMIN — WATER 40 MG: 1 INJECTION INTRAMUSCULAR; INTRAVENOUS; SUBCUTANEOUS at 18:04

## 2024-10-23 RX ADMIN — AMOXICILLIN AND CLAVULANATE POTASSIUM 1 TABLET: 875; 125 TABLET, FILM COATED ORAL at 09:59

## 2024-10-23 ASSESSMENT — PAIN DESCRIPTION - DESCRIPTORS
DESCRIPTORS: ACHING

## 2024-10-23 ASSESSMENT — PAIN DESCRIPTION - PAIN TYPE
TYPE: ACUTE PAIN

## 2024-10-23 ASSESSMENT — PAIN DESCRIPTION - LOCATION
LOCATION: HEAD

## 2024-10-23 ASSESSMENT — PAIN DESCRIPTION - FREQUENCY
FREQUENCY: INTERMITTENT

## 2024-10-23 ASSESSMENT — PAIN - FUNCTIONAL ASSESSMENT
PAIN_FUNCTIONAL_ASSESSMENT: ACTIVITIES ARE NOT PREVENTED

## 2024-10-23 ASSESSMENT — PAIN DESCRIPTION - ONSET
ONSET: GRADUAL
ONSET: ON-GOING

## 2024-10-23 ASSESSMENT — PAIN SCALES - GENERAL
PAINLEVEL_OUTOF10: 0
PAINLEVEL_OUTOF10: 0
PAINLEVEL_OUTOF10: 5
PAINLEVEL_OUTOF10: 5
PAINLEVEL_OUTOF10: 3

## 2024-10-23 ASSESSMENT — PAIN DESCRIPTION - ORIENTATION
ORIENTATION: ANTERIOR
ORIENTATION: RIGHT

## 2024-10-23 NOTE — PROGRESS NOTES
4 Eyes Skin Assessment     NAME:  Charlene Nazario  YOB: 1950  MEDICAL RECORD NUMBER:  [id number]    The patient is being assessed for  Admission    I agree that at least one RN has performed a thorough Head to Toe Skin Assessment on the patient. ALL assessment sites listed below have been assessed.      Areas assessed by both nurses:    Head, Face, Ears, Shoulders, Back, Chest, Arms, Elbows, Hands, Sacrum. Buttock, Coccyx, Ischium, Legs. Feet and Heels, and Under Medical Devices           Does the Patient have a Wound? Yes wound(s) were present on assessment. LDA wound assessment was Initiated and completed by RN    Stage 1 coccyx, blanchable redness heels       Oscar Prevention initiated by RN: No  Wound Care Orders initiated by RN: No    Pressure Injury (Stage 3,4, Unstageable, DTI, NWPT, and Complex wounds) if present, place Wound referral order by RN under : No    New Ostomies, if present place, Ostomy referral order under : No     Nurse 1 eSignature: Electronically signed by Adele Hagen RN on 10/23/24 at 2:33 AM EDT    **SHARE this note so that the co-signing nurse can place an eSignature**    Nurse 2 eSignature: Electronically signed by Cherelle Villa RN on 10/23/24 at 2:34 AM EDT

## 2024-10-23 NOTE — PROGRESS NOTES
Internal Medicine Progress Note    Date: 10/23/2024   Patient: Charlene Nazario   [facility] Day: 0      CC: Shortness of Breath (Pt. Presents to ED via EMS from home with c/o SOB starting 45mins ago. One duoneb given by EMS. PMHx. COPD. )       Interval Hx   No acute events overnight, vital signs stable. Patient is resting comfortably in bed when seen this morning, chatting with her  and staff at bedside. She says that she is already feeling much better than when she came in, but does note that she is noticing some shortness of breath with talking.      HPI: \"Patient is a 74-year-old female past medical history of COPD who presents with dyspnea, nonproductive cough, and new oxygen requirement.     Not on oxygen at baseline.  3 weeks ago went to visit daughter and grandchildren.  Returned home 2 weeks ago.  Felt ill on Monday, 10/21.  Called her pulmonologist, Dr. Nazario, who prescribed her azithromycin and prednisone for COPD exacerbation.  The patient did not  this medication from the pharmacy, stating that the heart pharmacy did not have 2 of them available.  Dyspnea worsened the following day so patient presented to the ED for evaluation.     Patient denies fever, chills, diarrhea.  Cough is nonproductive however she feels like she has chest congestion and wants to cough up sputum.     Of note, patient has had poor compliance with inhaler usage.  Due to cost and lack of understanding, patient shares inhalers with her  who also has pulmonary problems.  She uses her maintenance inhalers only as needed.  Last COPD exacerbation according the patient was a couple years ago.     ED Course: Afebrile heart rate mildly tachycardic 103, blood pressure mildly elevated 139/99.  Requiring 2 to 5 L nasal cannula.  Was given Solu-Medrol 125 mg.  VBG in ED was 7.29 significant for primary respiratory acidosis patient was placed on BiPAP mask and transferred to the floor.\"      Objective     Vital

## 2024-10-23 NOTE — RT PROTOCOL NOTE
RT Inhaler-Nebulizer Bronchodilator Protocol Note    There is a bronchodilator order in the chart from a provider indicating to follow the RT Bronchodilator Protocol and there is an “Initiate RT Inhaler-Nebulizer Bronchodilator Protocol” order as well (see protocol at bottom of note).    CXR Findings:  XR CHEST PORTABLE    Result Date: 10/22/2024  No acute pulmonary disease. Electronically signed by Erwin Obrien      The findings from the last RT Protocol Assessment were as follows:   History Pulmonary Disease: Chronic pulmonary disease  Respiratory Pattern: Use of accessory muscles, prolonged exhalation, or RR 26-30 bpm  Breath Sounds: Inspiratory and expiratory or bilateral wheezing and/or rhonchi  Cough: Strong, spontaneous, non-productive  Indication for Bronchodilator Therapy: On home bronchodilators  Bronchodilator Assessment Score: 14    Aerosolized bronchodilator medication orders have been revised according to the RT Inhaler-Nebulizer Bronchodilator Protocol below.    Respiratory Therapist to perform RT Therapy Protocol Assessment initially then follow the protocol.  Repeat RT Therapy Protocol Assessment PRN for score 0-3 or on second treatment, BID, and PRN for scores above 3.    No Indications - adjust the frequency to every 6 hours PRN wheezing or bronchospasm, if no treatments needed after 48 hours then discontinue using Per Protocol order mode.     If indication present, adjust the RT bronchodilator orders based on the Bronchodilator Assessment Score as indicated below.  Use Inhaler orders unless patient has one or more of the following: on home nebulizer, not able to hold breath for 10 seconds, is not alert and oriented, cannot activate and use MDI correctly, or respiratory rate 25 breaths per minute or more, then use the equivalent nebulizer order(s) with same Frequency and PRN reasons based on the score.  If a patient is on this medication at home then do not decrease Frequency below that used at  home.    0-3 - enter or revise RT bronchodilator order(s) to equivalent RT Bronchodilator order with Frequency of every 4 hours PRN for wheezing or increased work of breathing using Per Protocol order mode.        4-6 - enter or revise RT Bronchodilator order(s) to two equivalent RT bronchodilator orders with one order with BID Frequency and one order with Frequency of every 4 hours PRN wheezing or increased work of breathing using Per Protocol order mode.        7-10 - enter or revise RT Bronchodilator order(s) to two equivalent RT bronchodilator orders with one order with TID Frequency and one order with Frequency of every 4 hours PRN wheezing or increased work of breathing using Per Protocol order mode.       11-13 - enter or revise RT Bronchodilator order(s) to one equivalent RT bronchodilator order with QID Frequency and an Albuterol order with Frequency of every 4 hours PRN wheezing or increased work of breathing using Per Protocol order mode.      Greater than 13 - enter or revise RT Bronchodilator order(s) to one equivalent RT bronchodilator order with every 4 hours Frequency and an Albuterol order with Frequency of every 2 hours PRN wheezing or increased work of breathing using Per Protocol order mode.     RT to enter RT Home Evaluation for COPD & MDI Assessment order using Per Protocol order mode.    Electronically signed by Moise Cardenas RCP on 10/22/2024 at 11:08 PM

## 2024-10-23 NOTE — PROGRESS NOTES
Physical Therapy  [address]  Physical Therapy Initial Assessment/Treatment    Name: Charlene Nazario  : 1950  MRN: [id number]  Date of Service: 10/23/2024    Discharge Recommendations:  Home with Home health PT, 24 hour supervision or assist   PT Equipment Recommendations  Equipment Needed: No      Patient Diagnosis(es): The encounter diagnosis was COPD exacerbation (HCC).  Past Medical History:  has a past medical history of Allergic rhinitis, Anxiety, Back pain, Cervical cancer screening, Closed compression fracture of L3 vertebra (HCC), Compression fracture of thoracic vertebrae, non-traumatic (HCC), COPD (chronic obstructive pulmonary disease) (HCC), COPD exacerbation (HCC), Elevated LDL cholesterol level, GERD (gastroesophageal reflux disease), History of mammogram, HLD (hyperlipidemia), Hoarseness of voice, Hypertension, Lung nodule:left, Mucus plugging of bronchi, Osteoarthritis, Osteomyelitis of left leg, Osteoporosis, S/P colonoscopy, Systolic CHF, chronic (HCC) EF 45%, Thoracic aorta atherosclerosis (HCC), Thyroid mass, and Thyroid nodule.  Past Surgical History:  has a past surgical history that includes  section; Appendectomy; Fixation Kyphoplasty (2017); other surgical history (2017); Thyroidectomy, partial (Right, 2017); Vocal cord injection (); laryngoplasty (Right, 2022); and IR KYPHOPLASTY LUMBAR 1 VERTEBRAL BODY (2024).    Assessment  Assessment: pt presents from home where she is typically IND With mobility without AD, on room air at baseline. Pt currently requiring SPV to ambulate up to 120ft without AD, requiring 2L O2 throughout session with MERAZ noted, spO2 85-88% after mobility. Pt quickly recovers to 90% with seated rest breaks and cues for PLB. Pt edu on energy conservation, PLB, O2 line mgmt. Pt demo's dec endurance and would benefit from continued PT at [place], [facility] home with initial 24hr A.  Treatment Diagnosis: decreased

## 2024-10-23 NOTE — H&P
Internal Medicine H&P    Date: 10/23/2024  Patient: Charlene Nazario  Patient : 1950    CC: Shortness of Breath (Pt. Presents to ED via EMS from home with c/o SOB starting 45mins ago. One duoneb given by EMS. PMHx. COPD. )      Subjective     HPI: Patient is a 74-year-old female past medical history of COPD who presents with dyspnea, nonproductive cough, and new oxygen requirement.    Not on oxygen at baseline.  3 weeks ago went to visit daughter and grandchildren.  Returned home 2 weeks ago.  Felt ill on Monday, 10/21.  Called her pulmonologist, Dr. Nazario, who prescribed her azithromycin and prednisone for COPD exacerbation.  The patient did not  this medication from the pharmacy, stating that the heart pharmacy did not have 2 of them available.  Dyspnea worsened the following day so patient presented to the ED for evaluation.    Patient denies fever, chills, diarrhea.  Cough is nonproductive however she feels like she has chest congestion and wants to cough up sputum.    Of note, patient has had poor compliance with inhaler usage.  Due to cost and lack of understanding, patient shares inhalers with her  who also has pulmonary problems.  She uses her maintenance inhalers only as needed.  Last COPD exacerbation according the patient was a couple years ago.    ED Course: Afebrile heart rate mildly tachycardic 103, blood pressure mildly elevated 139/99.  Requiring 2 to 5 L nasal cannula.  Was given Solu-Medrol 125 mg.  VBG in ED was 7.29 significant for primary respiratory acidosis patient was placed on BiPAP mask and transferred to the floor.    PMHx:      Diagnosis Date    Allergic rhinitis     Anxiety 2022    Back pain     Chronic:under care of spine specialist:Dr. Adames    Cervical cancer screening     Nml per pt'    Closed compression fracture of L3 vertebra (HCC) 2024    Compression fracture of thoracic vertebrae, non-traumatic (HCC)     under care of endo:  standard drinks of alcohol     Types: 4 Cans of beer per week     Comment: 1-2 drinks a day    Drug use: No    Sexual activity: Yes     Partners: Male     Social Determinants of Health     Financial Resource Strain: Low Risk  (8/1/2023)    Overall Financial Resource Strain (CARDIA)     Difficulty of Paying Living Expenses: Not hard at all   Transportation Needs: Unknown (8/1/2023)    PRAPARE - Transportation     Lack of Transportation (Non-Medical): No   Physical Activity: Sufficiently Active (8/1/2023)    Exercise Vital Sign     Days of Exercise per Week: 3 days     Minutes of Exercise per Session: 60 min   Stress: Stress Concern Present (7/19/2021)    [employer] - Occupational Stress Questionnaire     Feeling of Stress : To some extent   Social Connections: Moderately Isolated (7/19/2021)    Social Connection and Isolation Panel [NHANES]     Frequency of Communication with Friends and Family: Three times a week     Frequency of Social Gatherings with Friends and Family: Three times a week     Attends Sikh Services: Never     Active Member of Clubs or Organizations: No     Attends Club or Organization Meetings: Never     Marital Status:    Intimate Partner Violence: Not At Risk (7/19/2021)    Humiliation, Afraid, Rape, and Kick questionnaire     Fear of Current or Ex-Partner: No     Emotionally Abused: No     Physically Abused: No     Sexually Abused: No   Housing Stability: Unknown (8/1/2023)    Housing Stability Vital Sign     Unstable Housing in the Last Year: No       FHx:      Problem Relation Age of Onset    Diabetes Mother     No Known Problems Father     No Known Problems Sister     No Known Problems Brother     No Known Problems Maternal Grandmother     No Known Problems Maternal Grandfather     No Known Problems Paternal Grandmother     No Known Problems Paternal Grandfather        ROS:  Please see HPI for complete Review of Systems.      Objective     Vital

## 2024-10-23 NOTE — PLAN OF CARE
Problem: Chronic Conditions and Co-morbidities  Goal: Patient's chronic conditions and co-morbidity symptoms are monitored and maintained or improved  Outcome: Progressing  Flowsheets (Taken 10/23/2024 1552)  Care Plan - Patient's Chronic Conditions and Co-Morbidity Symptoms are Monitored and Maintained or Improved:   Monitor and assess patient's chronic conditions and comorbid symptoms for stability, deterioration, or improvement   Collaborate with multidisciplinary team to address chronic and comorbid conditions and prevent exacerbation or deterioration  Note: Patient on 2L O2. Attempted to wean O2 and saturations dropped to 88%. O2 back to 2L after. No complaints of SOB while at rest. Dyspnea on exertion.     Problem: Safety - Adult  Goal: Free from fall injury  Outcome: Progressing  Flowsheets (Taken 10/23/2024 1552)  Free From Fall Injury: Instruct family/caregiver on patient safety  Note: Pt is a Fall Risk. See Lundberg Fall Risk Score. Pt bed in low position and side rails up and bed alarm on. Call light and belongings in reach. Pt encouraged to call for assistance. Will continue with hourly rounds for PO intake, pain needs, toileting, and repositioning as needed.         Problem: Pain  Goal: Verbalizes/displays adequate comfort level or baseline comfort level  Outcome: Progressing  Flowsheets (Taken 10/23/2024 1552)  Verbalizes/displays adequate comfort level or baseline comfort level:   Encourage patient to monitor pain and request assistance   Assess pain using appropriate pain scale  Note: Patient with headache that is a 3/10. Patient denied intervention. Will continue to monitor

## 2024-10-23 NOTE — ED NOTES
How does patient ambulate?   [x]Low Fall Risk (ambulates by themselves without support)  [x]Stand by assist - patient used the bedside commode with minimal assist  []Contact Guard   []Front wheel walker  []Wheelchair   []Steady  []Bed bound  []History of Lower Extremity Amputation  []Unknown, did not assess in the emergency department   How does patient take pills?  [x]Whole with Water  []Crushed in applesauce  []Crushed in pudding  []Other  []Unknown no oral medications were given in the ED  Is patient alert?   [x]Alert  []Drowsy but responds to voice  []Doesn't respond to voice but responds to painful stimuli  []Unresponsive  Is patient oriented?   [x]To person  [x]To place  [x]To time  [x]To situation  []Confused  []Agitated  [x]Follows commands  If patient is disoriented or from a Skill Nursing Facility has family been notified of admission?   []Yes - N/A patient comes from home, spouse was at bedside and knows she will be admitted to hospital.   []No   Patient belongings?   []Cell phone  []Wallet   []Dentures  [x]Clothing  Any specific patient or family belongings/needs/dynamics?   Patient is currently on BiPap, 20 in right forearm, pt in bed on monitor, alert and oriented, use of bedside commode was successful and patient needed minimal assistance.   Miscellaneous comments/pending orders?  All ED Orders Complete     If there are any additional questions please reach out to the Emergency Department.           Jed Mcgee RN  10/23/24 0020

## 2024-10-23 NOTE — PROGRESS NOTES
Spiritual Health History and Assessment/Progress Note  [facility]    (P) Advance Care Planning,  ,  ,      Name: Charlene Nazario MRN: [id number]    Age: 74 y.o.     Sex: female   Language: English   Pentecostal: Adventist   COPD exacerbation (HCC)     Date: 10/23/2024            Total Time Calculated: (P) 6 min              Spiritual Assessment began in [facility] 4 PCU        Referral/Consult From: (P) Physician   Encounter Overview/Reason: (P) Advance Care Planning  Service Provided For: (P) Patient    [place], Belief, Meaning:   Patient unable to assess at this time  Family/Friends No family/friends present      Importance and Influence:  Patient unable to assess at this time  Family/Friends No family/friends present    Community:  Patient   Family/Friends     Assessment and Plan of Care:     Patient Interventions include: Assisted in Advance Care Planning conversation  Family/Friends Interventions include: No family/friends present    Patient Plan of Care: Spiritual Care available upon further referral- Advanced care planning pack left for pt at bedside. Pt encouraged to let nurse know when she is ready to complete.   Family/Friends Plan of Care: Spiritual Care available upon further referral    Electronically signed by Damion Harelain Intern on 10/23/2024 at 1:45 PM

## 2024-10-23 NOTE — ED PROVIDER NOTES
THE [facility]  EMERGENCY DEPARTMENT ENCOUNTER          ATTENDING PHYSICIAN NOTE       Date of evaluation: 10/22/2024    Chief Complaint     Shortness of Breath (Pt. Presents to ED via EMS from home with c/o SOB starting 45mins ago. One duoneb given by EMS. PMHx. COPD. )      History of Present Illness     Charlene Nazario is a 74 y.o. female who presents with past medical history of COPD presenting with shortness of breath.  She states that she was sitting on her couch when she all of a sudden became short of breath.  She is unsure what her triggers for her COPD are but this does feel like her normal COPD exacerbation for her.  She received 1 breathing treatment by EMS with mild improvement in her symptoms.  She called her pulmonology today and states that she has been sharing inhalers with her partner and has not been using any inhaled steroids.  He ordered a course of budesonide inhaler as well as prednisone and azithromycin.  She denies any chest pain, fevers or chills, or nausea or vomiting.    ASSESSMENT / PLAN  (MEDICAL DECISION MAKING)     INITIAL VITALS: BP: (!) 154/123, Temp: 98.3 °F (36.8 °C), Pulse: (!) 103, Respirations: 28, SpO2: 94 %      Charlene Nazario is a 74 y.o. female with past medical history of COPD presenting with shortness of breath.    On initial examination, she is normoxic on room air though has increased work of breathing and audible expiratory wheezing.  She was given DuoNebs x 2 as well as Solu-Medrol and magnesium.    Workup is reassuring with negative COVID and flu swabs, BMP with mild hyponatremia but without renal dysfunction.  CBC without leukocytosis or anemia.  Chest x-ray does not show any pneumothorax or pneumonia on my independent evaluation.  On reassessment, patient still has increased work of breathing.  Her VBG is consistent with a mild respiratory acidosis consistent with COPD exacerbation.    Given her continued increased work of breathing despite maximal  Specific Question:   Initiate RT Bronchodilator Protocol     Answer:   No    magnesium sulfate 2000 mg in 50 mL IVPB premix    methylPREDNISolone sodium succ (SOLU-MEDROL) 125 mg in sterile water 2 mL injection    LORazepam (ATIVAN) injection 0.5 mg       CONSULTS:  None    Review of Systems     Review of Systems    Past Medical, Surgical, Family, and Social History     She has a past medical history of Allergic rhinitis, Anxiety, Back pain, Cervical cancer screening, Closed compression fracture of L3 vertebra (HCC), Compression fracture of thoracic vertebrae, non-traumatic (HCC), COPD (chronic obstructive pulmonary disease) (HCC), COPD exacerbation (HCC), Elevated LDL cholesterol level, GERD (gastroesophageal reflux disease), History of mammogram, HLD (hyperlipidemia), Hoarseness of voice, Hypertension, Lung nodule:left, Mucus plugging of bronchi, Osteoarthritis, Osteomyelitis of left leg, Osteoporosis, S/P colonoscopy, Systolic CHF, chronic (HCC) EF 45%, Thoracic aorta atherosclerosis (HCC), Thyroid mass, and Thyroid nodule.  She has a past surgical history that includes  section; Appendectomy; Fixation Kyphoplasty (2017); other surgical history (2017); Thyroidectomy, partial (Right, 2017); Vocal cord injection (); laryngoplasty (Right, 2022); and IR KYPHOPLASTY LUMBAR 1 VERTEBRAL BODY (2024).  Her family history includes Diabetes in her mother; No Known Problems in her brother, father, maternal grandfather, maternal grandmother, paternal grandfather, paternal grandmother, and sister.  She reports that she quit smoking about 13 years ago. Her smoking use included cigarettes. She started smoking about 35 years ago. She has a 33 pack-year smoking history. She has never used smokeless tobacco. She reports current alcohol use of about 4.0 standard drinks of alcohol per week. She reports that she does not use drugs.    Medications     Previous Medications    ACETAMINOPHEN

## 2024-10-23 NOTE — PROGRESS NOTES
Occupational Therapy  Facility/Department: [address]  Occupational Therapy Initial Assessment/Treatment    Name: Charlene Nazario  : 1950  MRN: [id number]  Date of Service: 10/23/2024    Discharge Recommendations:  24 hour supervision or assist, Home with Home health OT  OT Equipment Recommendations  Equipment Needed: No       Patient Diagnosis(es): The encounter diagnosis was COPD exacerbation (HCC).  Past Medical History:  has a past medical history of Allergic rhinitis, Anxiety, Back pain, Cervical cancer screening, Closed compression fracture of L3 vertebra (HCC), Compression fracture of thoracic vertebrae, non-traumatic (HCC), COPD (chronic obstructive pulmonary disease) (HCC), COPD exacerbation (HCC), Elevated LDL cholesterol level, GERD (gastroesophageal reflux disease), History of mammogram, HLD (hyperlipidemia), Hoarseness of voice, Hypertension, Lung nodule:left, Mucus plugging of bronchi, Osteoarthritis, Osteomyelitis of left leg, Osteoporosis, S/P colonoscopy, Systolic CHF, chronic (HCC) EF 45%, Thoracic aorta atherosclerosis (HCC), Thyroid mass, and Thyroid nodule.  Past Surgical History:  has a past surgical history that includes  section; Appendectomy; Fixation Kyphoplasty (2017); other surgical history (2017); Thyroidectomy, partial (Right, 2017); Vocal cord injection (); laryngoplasty (Right, 2022); and IR KYPHOPLASTY LUMBAR 1 VERTEBRAL BODY (2024).    Treatment Diagnosis: impaired activity tolerance and mobility      Assessment  Performance deficits / Impairments: Decreased functional mobility ;Decreased endurance;Decreased ADL status  Assessment: Pt is a 75 y/o F from home with  who reports being independent at baseline with ADLs/IADLs and mobility without AD. Pt currently SBA for mobility, transfers, toileting and grooming but is significantly limited by SOB and impaired activity tolerance. Conversation is effortful and SPO2 dropped to 88%  Exceptions: Wears glasses for reading  Hearing  Hearing: Exceptions to WFL  Hearing Exceptions: Hard of hearing/hearing concerns  Cognition  Overall Cognitive Status: WFL  Orientation  Overall Orientation Status: Within Functional Limits                  Education Given To: Patient;Family  Education Provided: Role of Therapy;Plan of Care;ADL Adaptive Strategies;Transfer Training;Family Education  Education Provided Comments: encouraging OOB activity, up for all meals  Education Method: Verbal  Barriers to Learning: None  Education Outcome: Verbalized understanding;Demonstrated understanding                     G-Code     OutComes Score                                                  AM-PAC - ADL  AM-PAC Daily Activity - Inpatient   How much help is needed for putting on and taking off regular lower body clothing?: A Little  How much help is needed for bathing (which includes washing, rinsing, drying)?: A Lot  How much help is needed for toileting (which includes using toilet, bedpan, or urinal)?: A Little  How much help is needed for putting on and taking off regular upper body clothing?: None  How much help is needed for taking care of personal grooming?: A Little  How much help for eating meals?: None  AM-[facility] Inpatient Daily Activity Raw Score: 19  AM-PAC Inpatient ADL T-Scale Score : 40.22  ADL Inpatient CMS 0-100% Score: 42.8  ADL Inpatient CMS G-Code Modifier : CK    Tinneti Score       Goals  Short Term Goals  Time Frame for Short Term Goals: by dc  Short Term Goal 1: Pt will complete LB dressing mod I  Short Term Goal 2: Pt will tolerate 5 minutes in stance for grooming task  Short Term Goal 3: Pt will complete toileting and toilet transfer mod I  Patient Goals   Patient goals : to  go home      Therapy Time   Individual Concurrent Group Co-treatment   Time In 1054         Time Out 1138         Minutes 44         Timed Code Treatment Minutes: 29 minutes + 15 min eval=44 Minutes       Carlyn Wiley, OT

## 2024-10-24 LAB
ANION GAP SERPL CALCULATED.3IONS-SCNC: 11 MMOL/L (ref 3–16)
BASOPHILS # BLD: 0 K/UL (ref 0–0.2)
BASOPHILS NFR BLD: 0.3 %
BUN SERPL-MCNC: 11 MG/DL (ref 7–20)
CALCIUM SERPL-MCNC: 8.9 MG/DL (ref 8.3–10.6)
CHLORIDE SERPL-SCNC: 96 MMOL/L (ref 99–110)
CO2 SERPL-SCNC: 28 MMOL/L (ref 21–32)
CREAT SERPL-MCNC: 0.5 MG/DL (ref 0.6–1.2)
DEPRECATED RDW RBC AUTO: 13.4 % (ref 12.4–15.4)
EOSINOPHIL # BLD: 0 K/UL (ref 0–0.6)
EOSINOPHIL NFR BLD: 0 %
GFR SERPLBLD CREATININE-BSD FMLA CKD-EPI: >90 ML/MIN/{1.73_M2}
GLUCOSE SERPL-MCNC: 135 MG/DL (ref 70–99)
HCT VFR BLD AUTO: 44.5 % (ref 36–48)
HGB BLD-MCNC: 14.8 G/DL (ref 12–16)
LYMPHOCYTES # BLD: 0.6 K/UL (ref 1–5.1)
LYMPHOCYTES NFR BLD: 7.2 %
MAGNESIUM SERPL-MCNC: 2.3 MG/DL (ref 1.8–2.4)
MCH RBC QN AUTO: 32.8 PG (ref 26–34)
MCHC RBC AUTO-ENTMCNC: 33.3 G/DL (ref 31–36)
MCV RBC AUTO: 98.3 FL (ref 80–100)
MONOCYTES # BLD: 0.3 K/UL (ref 0–1.3)
MONOCYTES NFR BLD: 3.3 %
NEUTROPHILS # BLD: 7.7 K/UL (ref 1.7–7.7)
NEUTROPHILS NFR BLD: 89.2 %
PLATELET # BLD AUTO: 341 K/UL (ref 135–450)
PMV BLD AUTO: 7.2 FL (ref 5–10.5)
POTASSIUM SERPL-SCNC: 4.4 MMOL/L (ref 3.5–5.1)
RBC # BLD AUTO: 4.53 M/UL (ref 4–5.2)
SODIUM SERPL-SCNC: 135 MMOL/L (ref 136–145)
WBC # BLD AUTO: 8.6 K/UL (ref 4–11)

## 2024-10-24 PROCEDURE — 6360000002 HC RX W HCPCS: Performed by: INTERNAL MEDICINE

## 2024-10-24 PROCEDURE — 6370000000 HC RX 637 (ALT 250 FOR IP): Performed by: INTERNAL MEDICINE

## 2024-10-24 PROCEDURE — 2060000000 HC ICU INTERMEDIATE R&B

## 2024-10-24 PROCEDURE — 2580000003 HC RX 258: Performed by: INTERNAL MEDICINE

## 2024-10-24 PROCEDURE — 97535 SELF CARE MNGMENT TRAINING: CPT

## 2024-10-24 PROCEDURE — 6370000000 HC RX 637 (ALT 250 FOR IP)

## 2024-10-24 PROCEDURE — 36415 COLL VENOUS BLD VENIPUNCTURE: CPT

## 2024-10-24 PROCEDURE — 97530 THERAPEUTIC ACTIVITIES: CPT

## 2024-10-24 PROCEDURE — 83735 ASSAY OF MAGNESIUM: CPT

## 2024-10-24 PROCEDURE — 2580000003 HC RX 258

## 2024-10-24 PROCEDURE — 6360000002 HC RX W HCPCS

## 2024-10-24 PROCEDURE — 94761 N-INVAS EAR/PLS OXIMETRY MLT: CPT

## 2024-10-24 PROCEDURE — 80048 BASIC METABOLIC PNL TOTAL CA: CPT

## 2024-10-24 PROCEDURE — 2700000000 HC OXYGEN THERAPY PER DAY

## 2024-10-24 PROCEDURE — 94640 AIRWAY INHALATION TREATMENT: CPT

## 2024-10-24 PROCEDURE — 85025 COMPLETE CBC W/AUTO DIFF WBC: CPT

## 2024-10-24 RX ORDER — IPRATROPIUM BROMIDE AND ALBUTEROL SULFATE 2.5; .5 MG/3ML; MG/3ML
1 SOLUTION RESPIRATORY (INHALATION) EVERY 4 HOURS PRN
Status: DISCONTINUED | OUTPATIENT
Start: 2024-10-24 | End: 2024-10-26 | Stop reason: HOSPADM

## 2024-10-24 RX ORDER — GUAIFENESIN 600 MG/1
600 TABLET, EXTENDED RELEASE ORAL 2 TIMES DAILY
Status: DISCONTINUED | OUTPATIENT
Start: 2024-10-24 | End: 2024-10-26 | Stop reason: HOSPADM

## 2024-10-24 RX ORDER — IPRATROPIUM BROMIDE AND ALBUTEROL SULFATE 2.5; .5 MG/3ML; MG/3ML
1 SOLUTION RESPIRATORY (INHALATION)
Status: DISCONTINUED | OUTPATIENT
Start: 2024-10-25 | End: 2024-10-26 | Stop reason: HOSPADM

## 2024-10-24 RX ADMIN — METOPROLOL SUCCINATE 12.5 MG: 25 TABLET, EXTENDED RELEASE ORAL at 09:22

## 2024-10-24 RX ADMIN — MONTELUKAST 10 MG: 10 TABLET, FILM COATED ORAL at 09:22

## 2024-10-24 RX ADMIN — ASPIRIN 81 MG: 81 TABLET, CHEWABLE ORAL at 09:22

## 2024-10-24 RX ADMIN — WATER 40 MG: 1 INJECTION INTRAMUSCULAR; INTRAVENOUS; SUBCUTANEOUS at 05:03

## 2024-10-24 RX ADMIN — WATER 40 MG: 1 INJECTION INTRAMUSCULAR; INTRAVENOUS; SUBCUTANEOUS at 17:42

## 2024-10-24 RX ADMIN — IPRATROPIUM BROMIDE AND ALBUTEROL SULFATE 1 DOSE: 2.5; .5 SOLUTION RESPIRATORY (INHALATION) at 15:57

## 2024-10-24 RX ADMIN — ACETAMINOPHEN 650 MG: 325 TABLET ORAL at 18:22

## 2024-10-24 RX ADMIN — BUDESONIDE INHALATION 500 MCG: 0.5 SUSPENSION RESPIRATORY (INHALATION) at 08:13

## 2024-10-24 RX ADMIN — GUAIFENESIN 600 MG: 600 TABLET ORAL at 12:09

## 2024-10-24 RX ADMIN — LISINOPRIL 2.5 MG: 2.5 TABLET ORAL at 17:42

## 2024-10-24 RX ADMIN — ARFORMOTEROL TARTRATE 15 MCG: 15 SOLUTION RESPIRATORY (INHALATION) at 08:12

## 2024-10-24 RX ADMIN — ARFORMOTEROL TARTRATE 15 MCG: 15 SOLUTION RESPIRATORY (INHALATION) at 20:12

## 2024-10-24 RX ADMIN — IPRATROPIUM BROMIDE AND ALBUTEROL SULFATE 1 DOSE: 2.5; .5 SOLUTION RESPIRATORY (INHALATION) at 20:12

## 2024-10-24 RX ADMIN — BUDESONIDE INHALATION 500 MCG: 0.5 SUSPENSION RESPIRATORY (INHALATION) at 20:12

## 2024-10-24 RX ADMIN — SODIUM CHLORIDE, PRESERVATIVE FREE 10 ML: 5 INJECTION INTRAVENOUS at 09:33

## 2024-10-24 RX ADMIN — ENOXAPARIN SODIUM 40 MG: 100 INJECTION SUBCUTANEOUS at 09:22

## 2024-10-24 RX ADMIN — IPRATROPIUM BROMIDE AND ALBUTEROL SULFATE 1 DOSE: 2.5; .5 SOLUTION RESPIRATORY (INHALATION) at 08:13

## 2024-10-24 RX ADMIN — METOPROLOL SUCCINATE 12.5 MG: 25 TABLET, EXTENDED RELEASE ORAL at 20:33

## 2024-10-24 RX ADMIN — AMOXICILLIN AND CLAVULANATE POTASSIUM 1 TABLET: 875; 125 TABLET, FILM COATED ORAL at 09:22

## 2024-10-24 RX ADMIN — GUAIFENESIN 600 MG: 600 TABLET ORAL at 20:33

## 2024-10-24 RX ADMIN — SODIUM CHLORIDE, PRESERVATIVE FREE 10 ML: 5 INJECTION INTRAVENOUS at 20:34

## 2024-10-24 RX ADMIN — AMOXICILLIN AND CLAVULANATE POTASSIUM 1 TABLET: 875; 125 TABLET, FILM COATED ORAL at 20:33

## 2024-10-24 ASSESSMENT — PAIN DESCRIPTION - ORIENTATION: ORIENTATION: ANTERIOR;LEFT;RIGHT

## 2024-10-24 ASSESSMENT — PAIN DESCRIPTION - FREQUENCY: FREQUENCY: INTERMITTENT

## 2024-10-24 ASSESSMENT — PAIN DESCRIPTION - PAIN TYPE: TYPE: ACUTE PAIN

## 2024-10-24 ASSESSMENT — PAIN SCALES - GENERAL
PAINLEVEL_OUTOF10: 0
PAINLEVEL_OUTOF10: 1
PAINLEVEL_OUTOF10: 0
PAINLEVEL_OUTOF10: 3
PAINLEVEL_OUTOF10: 0

## 2024-10-24 ASSESSMENT — PAIN DESCRIPTION - ONSET: ONSET: GRADUAL

## 2024-10-24 ASSESSMENT — PAIN DESCRIPTION - LOCATION: LOCATION: HEAD

## 2024-10-24 ASSESSMENT — PAIN - FUNCTIONAL ASSESSMENT: PAIN_FUNCTIONAL_ASSESSMENT: ACTIVITIES ARE NOT PREVENTED

## 2024-10-24 ASSESSMENT — PAIN DESCRIPTION - DESCRIPTORS: DESCRIPTORS: DISCOMFORT

## 2024-10-24 NOTE — PROGRESS NOTES
Internal Medicine Progress Note    Date: 10/24/2024   Patient: Charlene Nazario   Hospital Day: 1      CC: Shortness of Breath (Pt. Presents to ED via EMS from home with c/o SOB starting 45mins ago. One duoneb given by EMS. PMHx. COPD. )       Interval Hx   No acute events overnight, vital signs stable. Patient is sitting upright in bed today, stating that she feels somewhat better than yesterday but is still getting shortness of breath when talking. She otherwise has no acute concerns.      HPI: \"Patient is a 74-year-old female past medical history of COPD who presents with dyspnea, nonproductive cough, and new oxygen requirement.     Not on oxygen at baseline.  3 weeks ago went to visit daughter and grandchildren.  Returned home 2 weeks ago.  Felt ill on Monday, 10/21.  Called her pulmonologist, Dr. Nazario, who prescribed her azithromycin and prednisone for COPD exacerbation.  The patient did not  this medication from the pharmacy, stating that the heart pharmacy did not have 2 of them available.  Dyspnea worsened the following day so patient presented to the ED for evaluation.     Patient denies fever, chills, diarrhea.  Cough is nonproductive however she feels like she has chest congestion and wants to cough up sputum.     Of note, patient has had poor compliance with inhaler usage.  Due to cost and lack of understanding, patient shares inhalers with her  who also has pulmonary problems.  She uses her maintenance inhalers only as needed.  Last COPD exacerbation according the patient was a couple years ago.     ED Course: Afebrile heart rate mildly tachycardic 103, blood pressure mildly elevated 139/99.  Requiring 2 to 5 L nasal cannula.  Was given Solu-Medrol 125 mg.  VBG in ED was 7.29 significant for primary respiratory acidosis patient was placed on BiPAP mask and transferred to the floor.\"      Objective     Vital Signs:  Patient Vitals for the past 8 hrs:   BP Temp Temp src Pulse Resp

## 2024-10-24 NOTE — PLAN OF CARE
Problem: Chronic Conditions and Co-morbidities  Goal: Patient's chronic conditions and co-morbidity symptoms are monitored and maintained or improved  Outcome: Progressing  Flowsheets (Taken 10/24/2024 1303)  Care Plan - Patient's Chronic Conditions and Co-Morbidity Symptoms are Monitored and Maintained or Improved:   Monitor and assess patient's chronic conditions and comorbid symptoms for stability, deterioration, or improvement   Collaborate with multidisciplinary team to address chronic and comorbid conditions and prevent exacerbation or deterioration   Update acute care plan with appropriate goals if chronic or comorbid symptoms are exacerbated and prevent overall improvement and discharge  Note: Pt with COPD exacerbation, pt taking IV steroids, Po antibiotics, and breathing treatments on 2L O2 trying to wean down to RA. Acapella, and IS given to patient. Pt educated on IS and acapella with return demonstration and understanding.     Problem: Safety - Adult  Goal: Free from fall injury  Outcome: Progressing  Note: Pt fall risk, All fall precautions in place, including bed alarm, gripper socks, and call light within reach       Problem: Pain  Goal: Verbalizes/displays adequate comfort level or baseline comfort level  Outcome: Progressing  Flowsheets (Taken 10/24/2024 1303)  Verbalizes/displays adequate comfort level or baseline comfort level:   Encourage patient to monitor pain and request assistance   Assess pain using appropriate pain scale   Administer analgesics based on type and severity of pain and evaluate response   Implement non-pharmacological measures as appropriate and evaluate response  Note: Pt denies pain throughout shift.     Problem: Nutrition Deficit:  Goal: Optimize nutritional status  Outcome: Progressing  Flowsheets (Taken 10/24/2024 1303)  Nutrient intake appropriate for improving, restoring, or maintaining nutritional needs:   Assess nutritional status and recommend course of action

## 2024-10-24 NOTE — RT PROTOCOL NOTE
RT Nebulizer Bronchodilator Protocol Note    There is a bronchodilator order in the chart from a provider indicating to follow the RT Bronchodilator Protocol and there is an “Initiate RT Bronchodilator Protocol” order as well (see protocol at bottom of note).    CXR Findings:  XR CHEST PORTABLE    Result Date: 10/22/2024  No acute pulmonary disease. Electronically signed by Erwin Obrien      The findings from the last RT Protocol Assessment were as follows:  Smoking: Chronic pulmonary disease  Respiratory Pattern: Dyspnea on exertion or RR 21-25 bpm  Breath Sounds: Inspiratory and expiratory or bilateral wheezing and/or rhonchi  Cough: Strong, productive  Indication for Bronchodilator Therapy: On home bronchodilators  Bronchodilator Assessment Score: 11    Aerosolized bronchodilator medication orders have been revised according to the RT Nebulizer Bronchodilator Protocol below.    Respiratory Therapist to perform RT Therapy Protocol Assessment initially then follow the protocol.  Repeat RT Therapy Protocol Assessment PRN for score 0-3 or on second treatment, BID, and PRN for scores above 3.    No Indications - adjust the frequency to every 6 hours PRN wheezing or bronchospasm, if no treatments needed after 48 hours then discontinue using Per Protocol order mode.     If indication present, adjust the RT bronchodilator orders based on the Bronchodilator Assessment Score as indicated below.  If a patient is on this medication at home then do not decrease Frequency below that used at home.    0-3 - enter or revise RT bronchodilator order(s) to equivalent RT Bronchodilator order with Frequency of every 4 hours PRN for wheezing or increased work of breathing using Per Protocol order mode.       4-6 - enter or revise RT Bronchodilator order(s) to two equivalent RT bronchodilator orders with one order with BID Frequency and one order with Frequency of every 4 hours PRN wheezing or increased work of breathing using Per  Protocol order mode.         7-10 - enter or revise RT Bronchodilator order(s) to two equivalent RT bronchodilator orders with one order with TID Frequency and one order with Frequency of every 4 hours PRN wheezing or increased work of breathing using Per Protocol order mode.       11-13 - enter or revise RT Bronchodilator order(s) to one equivalent RT bronchodilator order with QID Frequency and an Albuterol order with Frequency of every 4 hours PRN wheezing or increased work of breathing using Per Protocol order mode.      Greater than 13 - enter or revise RT Bronchodilator order(s) to one equivalent RT bronchodilator order with every 4 hours Frequency and an Albuterol order with Frequency of every 2 hours PRN wheezing or increased work of breathing using Per Protocol order mode.     RT to enter RT Home Evaluation for COPD & MDI Assessment order using Per Protocol order mode.    Electronically signed by Ruiz Harkins RCP on 10/24/2024 at 8:33 AM

## 2024-10-24 NOTE — PROGRESS NOTES
Occupational Therapy  Facility/Department: [address]  Occupational Therapy Treatment    Name: Charlene Nazario  : 1950  MRN: [id number]  Date of Service: 10/24/2024    Discharge Recommendations:  24 hour supervision or assist, Home with Home health OT  OT Equipment Recommendations  Equipment Needed: Yes       Patient Diagnosis(es): The encounter diagnosis was COPD exacerbation (HCC).  Past Medical History:  has a past medical history of Allergic rhinitis, Anxiety, Back pain, Cervical cancer screening, Closed compression fracture of L3 vertebra (HCC), Compression fracture of thoracic vertebrae, non-traumatic (HCC), COPD (chronic obstructive pulmonary disease) (HCC), COPD exacerbation (HCC), Elevated LDL cholesterol level, GERD (gastroesophageal reflux disease), History of mammogram, HLD (hyperlipidemia), Hoarseness of voice, Hypertension, Lung nodule:left, Mucus plugging of bronchi, Osteoarthritis, Osteomyelitis of left leg, Osteoporosis, S/P colonoscopy, Systolic CHF, chronic (HCC) EF 45%, Thoracic aorta atherosclerosis (HCC), Thyroid mass, and Thyroid nodule.  Past Surgical History:  has a past surgical history that includes  section; Appendectomy; Fixation Kyphoplasty (2017); other surgical history (2017); Thyroidectomy, partial (Right, 2017); Vocal cord injection (); laryngoplasty (Right, 2022); and IR KYPHOPLASTY LUMBAR 1 VERTEBRAL BODY (2024).    Treatment Diagnosis: Impaired ADL status      Assessment  Performance deficits / Impairments: Decreased functional mobility ;Decreased endurance;Decreased ADL status;Decreased high-level IADLs;Decreased strength  Assessment: Pt from home with spouse and reportedly independent with ADLs and IADLs at baseline. Pt completed sit  <-> stand  transfers with supervision and functional mobility with SBA -> CGA. Pt completed LB dressing and UB dressing with supervision this date and was able to complete grooming in stance at sink  railing  Bathroom Shower/Tub: Walk-in shower  Bathroom Toilet: Handicap height (sink closeby for leverage)  Bathroom Equipment: Hand-held shower, Shower chair  Home Equipment: Walker - Rolling, Cane  Has the patient had two or more falls in the past year or any fall with injury in the past year?: No  ADL Assistance: Independent  Homemaking Assistance: Independent (pt does all indoors,  does yardwork)  Homemaking Responsibilities: Yes  Ambulation Assistance: Independent (no AD)  Transfer Assistance: Independent  Active : Yes  Occupation: Retired  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 education.         Safety Devices  Type of Devices: Left in bed;Gait belt;Call light within reach;Bed alarm in place;Nurse notified           ADL  Grooming: Stand by assistance  Grooming Skilled Clinical Factors: Pt brushed teeth for ~2 minutes in stance at sink with SBA  UE Dressing: Supervision  UE Dressing Skilled Clinical Factors: Pt doffed sweatshirt while seated in bed with supervision  LE Dressing: Supervision  LE Dressing Skilled Clinical Factors: Pt donned shoes while seated EOB with supervision  Toileting:  (denied need)        Bed mobility  Supine to Sit: Modified independent (HOB elevated)  Sit to Supine: Modified independent  Scooting: Independent  Transfers  Stand Step Transfers: Contact guard assistance (SBA -> CGA when patient became SOB)  Sit to stand: Supervision  Stand to sit: Supervision  Transfer Comments: Pt walked from EOB out into hallway with SBA. Pt then walked in hallway with SBA ->CGA. Pt required one rest break at end of parks prior to walking back to the room and over to bed with CGA.  Pt then walked into bathroom with SBA, completed functional mobility, then walked over to bed with SBA     Cognition  Overall Cognitive Status:

## 2024-10-24 NOTE — PROGRESS NOTES
Comprehensive Nutrition Assessment    RECOMMENDATIONS:  PO Diet: Continue Regular  Nutrition Supplement: Continue Ensure +HP tid  Nutrition Education: No recommendation at this time     NUTRITION ASSESSMENT:   Nutritional summary & status: Positive nutrition screen for MST 2. Pt admitted with dyspnea, nonproductive cough, and new oxygen requirement. Reports UBW of 110 lbs with wt loss of 5 lbs(4.5%) in past 2 weeks, triggering as significant wt loss . States that her po  intake has decreased recently. Drinks 1 Ensure/day at home for added nutrition. Suggested pt increase to 2 Ensure per day and provided tips on increasing kcal/pro in diet. Pt voiced understanding. Agreeable to drink Ensure +HP tid for increased kcal/pro during hospital admit.   Admission // PMH: COPD exacerbation//CHF, anxiety, GERD    MALNUTRITION ASSESSMENT  Context of Malnutrition: Acute Illness   Malnutrition Status: Mild malnutrition  Findings of the 6 clinical characteristics of malnutrition (Minimum of 2 out of 6 clinical characteristics is required to make the diagnosis of moderate or severe Protein Calorie Malnutrition based on AND/ASPEN Guidelines):  Energy Intake:  Mild decrease in energy intake (Comment) (decreased po intake prior to admit)  Weight Loss:  1% to 2% over 1 week     Body Fat Loss:  No significant body fat loss     Muscle Mass Loss:  No significant muscle mass loss      NUTRITION DIAGNOSIS   Inadequate oral intake related to inadequate protein-energy intake as evidenced by poor intake prior to admission, weight loss 1%-2% in 1 week    Nutrition Monitoring and Evaluation:   Food/Nutrient Intake Outcomes:  Food and Nutrient Intake, Supplement Intake  Physical Signs/Symptoms Outcomes:  Biochemical Data, Nutrition Focused Physical Findings, Weight     OBJECTIVE DATA: Significant to nutrition assessment  Nutrition Related Findings: Na 135, Glu 135. No BM noted. No edema.  Wounds: Pressure Injury, Stage I (sacrum)  Nutrition

## 2024-10-25 LAB
ANION GAP SERPL CALCULATED.3IONS-SCNC: 10 MMOL/L (ref 3–16)
BASOPHILS # BLD: 0 K/UL (ref 0–0.2)
BASOPHILS NFR BLD: 0.3 %
BUN SERPL-MCNC: 17 MG/DL (ref 7–20)
CALCIUM SERPL-MCNC: 8.5 MG/DL (ref 8.3–10.6)
CHLORIDE SERPL-SCNC: 98 MMOL/L (ref 99–110)
CO2 SERPL-SCNC: 27 MMOL/L (ref 21–32)
CREAT SERPL-MCNC: <0.5 MG/DL (ref 0.6–1.2)
DEPRECATED RDW RBC AUTO: 13.4 % (ref 12.4–15.4)
EOSINOPHIL # BLD: 0 K/UL (ref 0–0.6)
EOSINOPHIL NFR BLD: 0 %
GFR SERPLBLD CREATININE-BSD FMLA CKD-EPI: >90 ML/MIN/{1.73_M2}
GLUCOSE SERPL-MCNC: 106 MG/DL (ref 70–99)
HCT VFR BLD AUTO: 44.5 % (ref 36–48)
HGB BLD-MCNC: 14.8 G/DL (ref 12–16)
LYMPHOCYTES # BLD: 0.6 K/UL (ref 1–5.1)
LYMPHOCYTES NFR BLD: 9.6 %
MAGNESIUM SERPL-MCNC: 2.1 MG/DL (ref 1.8–2.4)
MCH RBC QN AUTO: 32.6 PG (ref 26–34)
MCHC RBC AUTO-ENTMCNC: 33.3 G/DL (ref 31–36)
MCV RBC AUTO: 98 FL (ref 80–100)
MONOCYTES # BLD: 0.2 K/UL (ref 0–1.3)
MONOCYTES NFR BLD: 2.8 %
NEUTROPHILS # BLD: 5.9 K/UL (ref 1.7–7.7)
NEUTROPHILS NFR BLD: 87.3 %
PLATELET # BLD AUTO: 315 K/UL (ref 135–450)
PMV BLD AUTO: 7.5 FL (ref 5–10.5)
POTASSIUM SERPL-SCNC: 4.5 MMOL/L (ref 3.5–5.1)
RBC # BLD AUTO: 4.54 M/UL (ref 4–5.2)
SODIUM SERPL-SCNC: 135 MMOL/L (ref 136–145)
WBC # BLD AUTO: 6.7 K/UL (ref 4–11)

## 2024-10-25 PROCEDURE — 97110 THERAPEUTIC EXERCISES: CPT

## 2024-10-25 PROCEDURE — 6360000002 HC RX W HCPCS: Performed by: INTERNAL MEDICINE

## 2024-10-25 PROCEDURE — 94761 N-INVAS EAR/PLS OXIMETRY MLT: CPT

## 2024-10-25 PROCEDURE — 2060000000 HC ICU INTERMEDIATE R&B

## 2024-10-25 PROCEDURE — 6370000000 HC RX 637 (ALT 250 FOR IP): Performed by: NURSE PRACTITIONER

## 2024-10-25 PROCEDURE — 97535 SELF CARE MNGMENT TRAINING: CPT

## 2024-10-25 PROCEDURE — 6370000000 HC RX 637 (ALT 250 FOR IP)

## 2024-10-25 PROCEDURE — 6370000000 HC RX 637 (ALT 250 FOR IP): Performed by: INTERNAL MEDICINE

## 2024-10-25 PROCEDURE — 94640 AIRWAY INHALATION TREATMENT: CPT

## 2024-10-25 PROCEDURE — 80048 BASIC METABOLIC PNL TOTAL CA: CPT

## 2024-10-25 PROCEDURE — 6360000002 HC RX W HCPCS

## 2024-10-25 PROCEDURE — 36415 COLL VENOUS BLD VENIPUNCTURE: CPT

## 2024-10-25 PROCEDURE — 2580000003 HC RX 258: Performed by: INTERNAL MEDICINE

## 2024-10-25 PROCEDURE — 85025 COMPLETE CBC W/AUTO DIFF WBC: CPT

## 2024-10-25 PROCEDURE — 2580000003 HC RX 258

## 2024-10-25 PROCEDURE — 99222 1ST HOSP IP/OBS MODERATE 55: CPT | Performed by: INTERNAL MEDICINE

## 2024-10-25 PROCEDURE — 83735 ASSAY OF MAGNESIUM: CPT

## 2024-10-25 PROCEDURE — 2700000000 HC OXYGEN THERAPY PER DAY

## 2024-10-25 RX ORDER — ENOXAPARIN SODIUM 100 MG/ML
30 INJECTION SUBCUTANEOUS DAILY
Status: DISCONTINUED | OUTPATIENT
Start: 2024-10-26 | End: 2024-10-26 | Stop reason: HOSPADM

## 2024-10-25 RX ADMIN — IPRATROPIUM BROMIDE AND ALBUTEROL SULFATE 1 DOSE: 2.5; .5 SOLUTION RESPIRATORY (INHALATION) at 20:25

## 2024-10-25 RX ADMIN — AMOXICILLIN AND CLAVULANATE POTASSIUM 1 TABLET: 875; 125 TABLET, FILM COATED ORAL at 08:41

## 2024-10-25 RX ADMIN — METOPROLOL SUCCINATE 12.5 MG: 25 TABLET, EXTENDED RELEASE ORAL at 19:57

## 2024-10-25 RX ADMIN — AMOXICILLIN AND CLAVULANATE POTASSIUM 1 TABLET: 875; 125 TABLET, FILM COATED ORAL at 19:57

## 2024-10-25 RX ADMIN — ASPIRIN 81 MG: 81 TABLET, CHEWABLE ORAL at 08:41

## 2024-10-25 RX ADMIN — METOPROLOL SUCCINATE 12.5 MG: 25 TABLET, EXTENDED RELEASE ORAL at 08:41

## 2024-10-25 RX ADMIN — ARFORMOTEROL TARTRATE 15 MCG: 15 SOLUTION RESPIRATORY (INHALATION) at 20:25

## 2024-10-25 RX ADMIN — LISINOPRIL 2.5 MG: 2.5 TABLET ORAL at 18:28

## 2024-10-25 RX ADMIN — IPRATROPIUM BROMIDE AND ALBUTEROL SULFATE 1 DOSE: 2.5; .5 SOLUTION RESPIRATORY (INHALATION) at 15:40

## 2024-10-25 RX ADMIN — IPRATROPIUM BROMIDE AND ALBUTEROL SULFATE 1 DOSE: 2.5; .5 SOLUTION RESPIRATORY (INHALATION) at 08:07

## 2024-10-25 RX ADMIN — GUAIFENESIN 600 MG: 600 TABLET ORAL at 08:41

## 2024-10-25 RX ADMIN — BENZOCAINE 6 MG-MENTHOL 10 MG LOZENGES 1 LOZENGE: at 20:38

## 2024-10-25 RX ADMIN — GUAIFENESIN 600 MG: 600 TABLET ORAL at 19:57

## 2024-10-25 RX ADMIN — SODIUM CHLORIDE, PRESERVATIVE FREE 10 ML: 5 INJECTION INTRAVENOUS at 08:43

## 2024-10-25 RX ADMIN — WATER 40 MG: 1 INJECTION INTRAMUSCULAR; INTRAVENOUS; SUBCUTANEOUS at 04:58

## 2024-10-25 RX ADMIN — SODIUM CHLORIDE, PRESERVATIVE FREE 10 ML: 5 INJECTION INTRAVENOUS at 19:58

## 2024-10-25 RX ADMIN — BUDESONIDE INHALATION 500 MCG: 0.5 SUSPENSION RESPIRATORY (INHALATION) at 08:07

## 2024-10-25 RX ADMIN — IPRATROPIUM BROMIDE AND ALBUTEROL SULFATE 1 DOSE: 2.5; .5 SOLUTION RESPIRATORY (INHALATION) at 11:42

## 2024-10-25 RX ADMIN — ENOXAPARIN SODIUM 40 MG: 100 INJECTION SUBCUTANEOUS at 08:41

## 2024-10-25 RX ADMIN — BUDESONIDE INHALATION 500 MCG: 0.5 SUSPENSION RESPIRATORY (INHALATION) at 20:25

## 2024-10-25 RX ADMIN — MONTELUKAST 10 MG: 10 TABLET, FILM COATED ORAL at 08:41

## 2024-10-25 RX ADMIN — ARFORMOTEROL TARTRATE 15 MCG: 15 SOLUTION RESPIRATORY (INHALATION) at 08:07

## 2024-10-25 ASSESSMENT — ENCOUNTER SYMPTOMS
SHORTNESS OF BREATH: 1
WHEEZING: 1
VOMITING: 0
NAUSEA: 0
ABDOMINAL PAIN: 0
CONSTIPATION: 0
COUGH: 1
CHEST TIGHTNESS: 0
DIARRHEA: 0

## 2024-10-25 NOTE — PLAN OF CARE
Problem: Chronic Conditions and Co-morbidities  Goal: Patient's chronic conditions and co-morbidity symptoms are monitored and maintained or improved  10/25/2024 0031 by Norberto Melendrez, RN  Outcome: Progressing  Flowsheets (Taken 10/25/2024 0031)  Care Plan - Patient's Chronic Conditions and Co-Morbidity Symptoms are Monitored and Maintained or Improved:   Monitor and assess patient's chronic conditions and comorbid symptoms for stability, deterioration, or improvement   Collaborate with multidisciplinary team to address chronic and comorbid conditions and prevent exacerbation or deterioration   Update acute care plan with appropriate goals if chronic or comorbid symptoms are exacerbated and prevent overall improvement and discharge     Problem: Discharge Planning  Goal: Discharge to home or other facility with appropriate resources  Outcome: Progressing  Flowsheets (Taken 10/25/2024 0031)  Discharge to home or other facility with appropriate resources:   Identify barriers to discharge with patient and caregiver   Arrange for needed discharge resources and transportation as appropriate   Identify discharge learning needs (meds, wound care, etc)   Arrange for interpreters to assist at discharge as needed   Refer to discharge planning if patient needs post-hospital services based on physician order or complex needs related to functional status, cognitive ability or social support system     Problem: ABCDS Injury Assessment  Goal: Absence of physical injury  Outcome: Progressing  Flowsheets (Taken 10/25/2024 0031)  Absence of Physical Injury: Implement safety measures based on patient assessment     Problem: Safety - Adult  Goal: Free from fall injury  10/25/2024 0031 by Norberto Melendrez, RN  Outcome: Progressing  Flowsheets (Taken 10/25/2024 0031)  Free From Fall Injury:   Based on caregiver fall risk screen, instruct family/caregiver to ask for assistance with transferring infant if caregiver noted to have fall  risk factors   Instruct family/caregiver on patient safety     Problem: Pain  Goal: Verbalizes/displays adequate comfort level or baseline comfort level  10/25/2024 0031 by Norberto Melendrez, RN  Outcome: Progressing  Flowsheets (Taken 10/25/2024 0031)  Verbalizes/displays adequate comfort level or baseline comfort level:   Consider cultural and social influences on pain and pain management   Notify Licensed Independent Practitioner if interventions unsuccessful or patient reports new pain   Implement non-pharmacological measures as appropriate and evaluate response   Assess pain using appropriate pain scale   Encourage patient to monitor pain and request assistance   Administer analgesics based on type and severity of pain and evaluate response     Problem: Nutrition Deficit:  Goal: Optimize nutritional status  10/24/2024 1303 by So Lamb, RN  Outcome: Progressing  Flowsheets (Taken 10/24/2024 1303)  Nutrient intake appropriate for improving, restoring, or maintaining nutritional needs:   Assess nutritional status and recommend course of action   Monitor oral intake, labs, and treatment plans   Recommend appropriate diets, oral nutritional supplements, and vitamin/mineral supplements  Note: Pt reports decreased appetite. Ensure ordered for pts meals

## 2024-10-25 NOTE — CARE COORDINATION
CM spoke with pt and  at bedside. Pt from home, IPTA, plan to return home- pt declining HHC at this time. If unable to wean off O2, would need home O2 eval on day of DC. No anticipated CM needs.    IMM Completed:   IMM Letter given to Patient/Family/Significant other/Guardian/POA/by:: pt access  IMM Letter date given:: 10/23/24  IMM Letter time given:: 5801    Thank you  Sarah Garrett RN, BSN, CM  PCU   [phone]

## 2024-10-25 NOTE — PLAN OF CARE
Problem: Chronic Conditions and Co-morbidities  Goal: Patient's chronic conditions and co-morbidity symptoms are monitored and maintained or improved  Outcome: Progressing  Flowsheets (Taken 10/25/2024 1808)  Care Plan - Patient's Chronic Conditions and Co-Morbidity Symptoms are Monitored and Maintained or Improved:   Monitor and assess patient's chronic conditions and comorbid symptoms for stability, deterioration, or improvement   Collaborate with multidisciplinary team to address chronic and comorbid conditions and prevent exacerbation or deterioration     Problem: Discharge Planning  Goal: Discharge to home or other facility with appropriate resources  Outcome: Progressing  Flowsheets (Taken 10/25/2024 1808)  Discharge to home or other facility with appropriate resources:   Identify barriers to discharge with patient and caregiver   Identify discharge learning needs (meds, wound care, etc)   Arrange for needed discharge resources and transportation as appropriate     Problem: ABCDS Injury Assessment  Goal: Absence of physical injury  Outcome: Progressing  Flowsheets (Taken 10/25/2024 1808)  Absence of Physical Injury: Implement safety measures based on patient assessment

## 2024-10-25 NOTE — RT PROTOCOL NOTE
RT Nebulizer Bronchodilator Protocol Note    There is a bronchodilator order in the chart from a provider indicating to follow the RT Bronchodilator Protocol and there is an “Initiate RT Bronchodilator Protocol” order as well (see protocol at bottom of note).    CXR Findings:  XR CHEST PORTABLE    Result Date: 10/22/2024  No acute pulmonary disease. Electronically signed by Erwin Obrien      The findings from the last RT Protocol Assessment were as follows:  Smoking: Chronic pulmonary disease  Respiratory Pattern: Regular pattern and RR 12-20 bpm  Breath Sounds: Severe inspiratory and expiratory wheezing or severely diminished  Cough: Strong, productive  Indication for Bronchodilator Therapy: Wheezing associated with pulm disorder  Bronchodilator Assessment Score: 11    Aerosolized bronchodilator medication orders have been revised according to the RT Nebulizer Bronchodilator Protocol below.    Respiratory Therapist to perform RT Therapy Protocol Assessment initially then follow the protocol.  Repeat RT Therapy Protocol Assessment PRN for score 0-3 or on second treatment, BID, and PRN for scores above 3.    No Indications - adjust the frequency to every 6 hours PRN wheezing or bronchospasm, if no treatments needed after 48 hours then discontinue using Per Protocol order mode.     If indication present, adjust the RT bronchodilator orders based on the Bronchodilator Assessment Score as indicated below.  If a patient is on this medication at home then do not decrease Frequency below that used at home.    0-3 - enter or revise RT bronchodilator order(s) to equivalent RT Bronchodilator order with Frequency of every 4 hours PRN for wheezing or increased work of breathing using Per Protocol order mode.       4-6 - enter or revise RT Bronchodilator order(s) to two equivalent RT bronchodilator orders with one order with BID Frequency and one order with Frequency of every 4 hours PRN wheezing or increased work of breathing  using Per Protocol order mode.         7-10 - enter or revise RT Bronchodilator order(s) to two equivalent RT bronchodilator orders with one order with TID Frequency and one order with Frequency of every 4 hours PRN wheezing or increased work of breathing using Per Protocol order mode.       11-13 - enter or revise RT Bronchodilator order(s) to one equivalent RT bronchodilator order with QID Frequency and an Albuterol order with Frequency of every 4 hours PRN wheezing or increased work of breathing using Per Protocol order mode.      Greater than 13 - enter or revise RT Bronchodilator order(s) to one equivalent RT bronchodilator order with every 4 hours Frequency and an Albuterol order with Frequency of every 2 hours PRN wheezing or increased work of breathing using Per Protocol order mode.     RT to enter RT Home Evaluation for COPD & MDI Assessment order using Per Protocol order mode.    Electronically signed by Oswaldo Gonzalez RCP on 10/24/2024 at 8:17 PM

## 2024-10-25 NOTE — CONSULTS
Initial Pulmonary & Critical Care Consult Note    Patient Name: Charlene Nazario   Patient : 1950   Date: 10/25/2024   Admit Date: 10/22/2024   CC:    Chief Complaint   Patient presents with    Shortness of Breath     Pt. Presents to ED via EMS from home with c/o SOB starting 45mins ago. One duoneb given by EMS. PMHx. COPD.         Reason for Consult: COPD Exacerbation   Requesting Physician: Dr. Amol Hammond MD      Subjective   HPI:    Charlene Nazario is a 73 y/o female with a past medical history of COPD who presented to the ED with dyspnea and non-productive cough. She reports that she spontaneously started feeling really short of breath and quickly presented to the ED. She reports that she has had exacerbations in the past and wanted to get checked out sooner rather than later. She denies any recent sick contacts, travel, or cold and flu symptoms leading up to presentation. She reports a non-productive cough that started a few days before presentation. She reports that she doesn't usually have a cough at baseline. Patient reports that she uses her albuterol inhaler frequently but only occasionally uses her other inhalers at home. Patient is not currently smoking. She reports that she smoked 1.5-2 packs per day on and off for 22 years but she quit 20 years ago.    In the ED, patient was noted to be mildly tachycardic, hypertensive and hypoxic. She was placed on supplemental oxygen that was on 5 L at its highest. VBG was significant for pH 7.29 and primary respiratory acidosis. She was placed on BiPAP. Patient was started on Solu-medrol, Augmentin, and Duonebs. Her home inhalers were also restarted.    On evaluation, patient is resting comfortably. She is satting well on 2 L nasal cannula. She reports that she feels much better but also states that when she gets up to go to the bathroom she does feel short of breath. She reports continuation of her non-productive cough but states that it has improved.  She denies any fever or chills.    Past Medical History:      Diagnosis Date    Allergic rhinitis     Anxiety 2022    Back pain     Chronic:under care of spine specialist:Dr. Adames    Cervical cancer screening     Nml per pt'    Closed compression fracture of L3 vertebra (HCC) 2024    Compression fracture of thoracic vertebrae, non-traumatic (HCC)     under care of endo:Dr. Zhu    COPD (chronic obstructive pulmonary disease) (HCC)     under care of pulmo:Dr. Nazario    COPD exacerbation (HCC) 2017    Elevated LDL cholesterol level     GERD (gastroesophageal reflux disease)     History of mammogram ;17    Nml per pt'. 17=negative.    HLD (hyperlipidemia) 2021    Hoarseness of voice 10/04/2017    Hypertension     Lung nodule:left 2017     1.2 cm indeterminate left upper lobe pulmonary nodule:under care of pulmo:Dr. Nazario    Mucus plugging of bronchi 2017    Osteoarthritis     Osteomyelitis of left leg     Osteoporosis     under care of endo:Dr. Zhu    S/P colonoscopy     Polyps:next in 4gxw=1438    Systolic CHF, chronic (HCC) EF 45% 2018    Thoracic aorta atherosclerosis (HCC) 2022    Thyroid mass     under care of endo & surgeon(Dr. Ewing)    Thyroid nodule     under care of endo:Dr. Zhu        Past Surgical History:        Procedure Laterality Date    APPENDECTOMY       SECTION      FIXATION KYPHOPLASTY  2017    Dr. Adames    IR KYPHOPLASTY LUMBAR FIRST LEVEL  2024    IR KYPHOPLASTY LUMBAR FIRST LEVEL 2024 [facility]    LARYNGOPLASTY Right 2022    MEDIALIZATION LARYNGOPLASTY, Dr. Sarina Medrano    OTHER SURGICAL HISTORY  2017    thoracic kyphoplasty    THYROIDECTOMY, PARTIAL Right 2017    Right hemithyroidectomy for goiter,  complicated by vocal cord paralysis    VOCAL CORD INJECTION         Current Medications:     [START ON 10/26/2024] enoxaparin  30 mg  \"[id number]\", \"PO2ART\" in the last 72 hours.  No results for input(s): \"INR\" in the last 72 hours.  No results for input(s): \"SEDRATE\" in the last 72 hours.  No results for input(s): \"CRP\" in the last 72 hours.      Urinalysis: No results for input(s): \"NITRITE\", \"COLORU\", \"PHUR\", \"LABCAST\", \"WBCUA\", \"RBCUA\", \"MUCUS\", \"TRICHOMONAS\", \"YEAST\", \"BACTERIA\", \"CLARITYU\", \"SPECGRAV\", \"LEUKOCYTESUR\", \"UROBILINOGEN\", \"BILIRUBINUR\", \"BLOODU\", \"GLUCOSEU\", \"AMORPHOUS\" in the last 72 hours.    Invalid input(s): \"KETONEU\"    Micro:    Results       Procedure Component Value Units Date/Time    COVID-19 & Influenza Combo [[id number]] Collected: 10/22/24 2247    Order Status: Completed Specimen: Nasopharyngeal Swab Updated: 10/22/24 2328     SARS-CoV-2 RNA, RT PCR NOT DETECTED     Comment: Not Detected results do not preclude SARS-CoV-2 infection and  should not be used as the sole basis for patient management  decisions.  Results must be combined with clinical observations,  patient history, and epidemiological information.  Testing was performed using [employer] SARS-CoV-2 and Influenza A/B  nucleic acid assay. This test is a multiplex Real-Time Reverse  Transcriptase Polymerase Chain Reaction (RT-PCR)-based in vitro  diagnostic test intended for the qualitative detection of nucleic  acids from SARS-CoV-2, influenza A, and influenza B in nasopharyngeal  and nasal swab specimens for use under the FDA’s Emergency Use  Authorization (EUA) only.    Patient Fact Sheet:  https://www.fda.gov/media/664190/download  Provider Fact Sheet: https://www.fda.gov/media/620842/download  EUA: https://www.fda.gov/media/216561/download  IFU: https://www.fda.gov/media/792854/download    Methodology:  RT-PCR          Influenza A NOT DETECTED     Influenza B NOT DETECTED             Radiology:    CXR 10/22/24: No acute pulmonary disease     PFTs:   2018: FEV1/FVC 60%, DLCO 56%     Echo:  8/11/22: EF 55-60%, mild septal hypertrophy, normal diastolic

## 2024-10-25 NOTE — PROGRESS NOTES
Pharmacist Review and Automatic Dose Adjustment of Prophylactic Enoxaparin    The reviewing pharmacist has made an adjustment to the ordered enoxaparin dose or converted to UFH per the approved [facility] protocol and table as defined below.    Plan / Rationale: Based upon the patient's weight and renal function, the ordered dose of enoxaparin 40 mg daily has been converted to 30 mg daily.    Please call with questions--  Sandra Metzger, PharmD, BCPS  Wireless: [phone]   10/25/2024 10:50 AM      Charlene Nazario is a 74 y.o. female.     Recent Labs     10/23/24  0742 10/24/24  0808 10/25/24  0834   CREATININE <0.5* 0.5* <0.5*       Estimated Creatinine Clearance: 76 mL/min (based on SCr of 0.5 mg/dL).    Recent Labs     10/24/24  0808 10/25/24  0834   HGB 14.8 14.8   HCT 44.5 44.5    315     No results for input(s): \"INR\" in the last 72 hours.    Height:   Ht Readings from Last 1 Encounters:   10/22/24 1.6 m (5' 3\")     Weight:  Wt Readings from Last 1 Encounters:   10/25/24 47.4 kg (104 lb 8 oz)

## 2024-10-25 NOTE — RT PROTOCOL NOTE
RT Inhaler-Nebulizer Bronchodilator Protocol Note    There is a bronchodilator order in the chart from a provider indicating to follow the RT Bronchodilator Protocol and there is an “Initiate RT Inhaler-Nebulizer Bronchodilator Protocol” order as well (see protocol at bottom of note).    CXR Findings:  No results found.    The findings from the last RT Protocol Assessment were as follows:   History Pulmonary Disease: Chronic pulmonary disease  Respiratory Pattern: Regular pattern and RR 12-20 bpm  Breath Sounds: Intermittent or unilateral wheezes  Cough: Strong, spontaneous, non-productive  Indication for Bronchodilator Therapy: Wheezing associated with pulm disorder  Bronchodilator Assessment Score: 6  Will continue with QID.    Aerosolized bronchodilator medication orders have been revised according to the RT Inhaler-Nebulizer Bronchodilator Protocol below.    Respiratory Therapist to perform RT Therapy Protocol Assessment initially then follow the protocol.  Repeat RT Therapy Protocol Assessment PRN for score 0-3 or on second treatment, BID, and PRN for scores above 3.    No Indications - adjust the frequency to every 6 hours PRN wheezing or bronchospasm, if no treatments needed after 48 hours then discontinue using Per Protocol order mode.     If indication present, adjust the RT bronchodilator orders based on the Bronchodilator Assessment Score as indicated below.  Use Inhaler orders unless patient has one or more of the following: on home nebulizer, not able to hold breath for 10 seconds, is not alert and oriented, cannot activate and use MDI correctly, or respiratory rate 25 breaths per minute or more, then use the equivalent nebulizer order(s) with same Frequency and PRN reasons based on the score.  If a patient is on this medication at home then do not decrease Frequency below that used at home.    0-3 - enter or revise RT bronchodilator order(s) to equivalent RT Bronchodilator order with Frequency of every  4 hours PRN for wheezing or increased work of breathing using Per Protocol order mode.        4-6 - enter or revise RT Bronchodilator order(s) to two equivalent RT bronchodilator orders with one order with BID Frequency and one order with Frequency of every 4 hours PRN wheezing or increased work of breathing using Per Protocol order mode.        7-10 - enter or revise RT Bronchodilator order(s) to two equivalent RT bronchodilator orders with one order with TID Frequency and one order with Frequency of every 4 hours PRN wheezing or increased work of breathing using Per Protocol order mode.       11-13 - enter or revise RT Bronchodilator order(s) to one equivalent RT bronchodilator order with QID Frequency and an Albuterol order with Frequency of every 4 hours PRN wheezing or increased work of breathing using Per Protocol order mode.      Greater than 13 - enter or revise RT Bronchodilator order(s) to one equivalent RT bronchodilator order with every 4 hours Frequency and an Albuterol order with Frequency of every 2 hours PRN wheezing or increased work of breathing using Per Protocol order mode.     RT to enter RT Home Evaluation for COPD & MDI Assessment order using Per Protocol order mode.    Electronically signed by JOVANY VARGAS RCP on 10/25/2024 at 8:12 AM

## 2024-10-25 NOTE — PROGRESS NOTES
Internal Medicine Progress Note    Date: 10/25/2024   Patient: Charlene Nazario   Hospital Day: 2      CC: Shortness of Breath (Pt. Presents to ED via EMS from home with c/o SOB starting 45mins ago. One duoneb given by EMS. PMHx. COPD. )       Interval Hx   No acute events overnight, vital signs stable. Patient again sitting upright in bed today. She continues to feel slightly better today, though she isn't sure if she has had much improvement from yesterday. Her shortness of breath during conversation has improved.      HPI: \"Patient is a 74-year-old female past medical history of COPD who presents with dyspnea, nonproductive cough, and new oxygen requirement.     Not on oxygen at baseline.  3 weeks ago went to visit daughter and grandchildren.  Returned home 2 weeks ago.  Felt ill on Monday, 10/21.  Called her pulmonologist, Dr. Nazario, who prescribed her azithromycin and prednisone for COPD exacerbation.  The patient did not  this medication from the pharmacy, stating that the heart pharmacy did not have 2 of them available.  Dyspnea worsened the following day so patient presented to the ED for evaluation.     Patient denies fever, chills, diarrhea.  Cough is nonproductive however she feels like she has chest congestion and wants to cough up sputum.     Of note, patient has had poor compliance with inhaler usage.  Due to cost and lack of understanding, patient shares inhalers with her  who also has pulmonary problems.  She uses her maintenance inhalers only as needed.  Last COPD exacerbation according the patient was a couple years ago.     ED Course: Afebrile heart rate mildly tachycardic 103, blood pressure mildly elevated 139/99.  Requiring 2 to 5 L nasal cannula.  Was given Solu-Medrol 125 mg.  VBG in ED was 7.29 significant for primary respiratory acidosis patient was placed on BiPAP mask and transferred to the floor.\"      Objective     Vital Signs:  Patient Vitals for the past 8 hrs:    \"BILITOT\", \"ALKPHOS\" in the last 72 hours.    Invalid input(s): \"ALB\"      U/A: No results for input(s): \"NITRITE\", \"COLORU\", \"PHUR\", \"LABCAST\", \"WBCUA\", \"RBCUA\", \"MUCUS\", \"TRICHOMONAS\", \"YEAST\", \"BACTERIA\", \"CLARITYU\", \"SPECGRAV\", \"LEUKOCYTESUR\", \"UROBILINOGEN\", \"BILIRUBINUR\", \"BLOODU\", \"GLUCOSEU\", \"KETONES\", \"AMORPHOUS\" in the last 72 hours.    Radiology:  XR CHEST PORTABLE   Final Result      No acute pulmonary disease.         Electronically signed by Erwin Obrien            Assessment & Plan   Charlene Nazario is a 74 y.o. female with PMHx COPD who presents with dyspnea, cough, and oxygen requirement.     # Acute COPD  exacerbation  # Acute hypercarbic respiratory failure  History and exam supportive of and diagnosis.  Requiring 4 to 5 L nasal cannula and BiPAP as needed for hypercarbia.  Does not appear to be a chronic retainer given bicarbonate level and VBG was 30 with pCO2 of 56.  Likely has been more obstructed for the past few days due to exacerbation but not adequately compensated.  - s/p methylprednisolone 125 mg in ED  - Solu-Medrol 40 mg for 5 days (EOT 10/27/2024)  - Augmentin for 5 days (EOT 10/27/2024)  - Titrate oxygen to keep sats 88-92%  - Pulmicort, Brovana nebs with Acapella valve during RT to help with sputum expectoration  - Mucinex PRN for cough  - Pulmonology consulted, recs appreciated      DVT PPx: Lovenox  Diet: ADULT DIET; Regular  ADULT ORAL NUTRITION SUPPLEMENT; Breakfast, Lunch, Dinner; Standard High Calorie/High Protein Oral Supplement   Code status:  Full Code     ELOS: 3 days  Barriers to discharge: COPD exacerbation  Disposition  - Preadmission: home  - Current: PCU  - Upon discharge: home    Will discuss with attending physician Devon March MD     _____________________  Bob Engle MD   Internal Medicine Resident, PGY-1\\    Addendum to Resident Progress note:  Pt seen,examined and evaluated. I have reviewed the current history, physical findings, labs and

## 2024-10-25 NOTE — PROGRESS NOTES
Occupational Therapy  Facility/Department: [facility] PCU  Daily Treatment Note  NAME: Charlene Nazario  : 1950  MRN: [id number]    Date of Service: 10/25/2024    Discharge Recommendations:  24 hour supervision or assist, Home with Home health OT  OT Equipment Recommendations  Equipment Needed: No      Patient Diagnosis(es): The encounter diagnosis was COPD exacerbation (HCC).     Assessment   Assessment: Pt with good tolerance to session, continues to be limited by endurance and mild SOB with mobility but benefits from rest breaks. Pt completes functional mobility, toileting and grooming in stance without AD with supv overall, no LOB noted. Pt O2 sats 95% on 2L with activity, decreased to 90-91% on 1L post exercises. Will continue to follow in [facility] care.  Activity Tolerance: Patient limited by endurance;Patient tolerated treatment well  Discharge Recommendations: 24 hour supervision or assist;Home with Home health OT  Equipment Needed: No     Plan  Occupational Therapy Plan  Times Per Week: 2-5  Current Treatment Recommendations: Strengthening;Self-Care / ADL;Home management training;Balance training;Safety education & training;Patient/Caregiver education & training;Functional mobility training    Restrictions  Position Activity Restriction  Other position/activity restrictions: up as tolerated    Activity Tolerance: Pt SOB after toileting/ grooming in stance ~2 mins on 2 L O2 with 95% O2 sats. Pt then completes BUE/BLE therex on 1 L O2 with O2 sats decreasing to 90-91% with extended rest break. pt on 2L at end of session with RN notified     Subjective  Subjective  Subjective: Pt sitting up in bed upon arrival, agreeable to OT treatment. Pt requesting to toilet and denied pain.  Orientation  Overall Orientation Status: Within Normal Limits  Orientation Level: Oriented X4  Cognition  Overall Cognitive Status: WNL       Objective  Vitals     Bed Mobility Training  Bed Mobility Training: Yes  Supine to Sit:

## 2024-10-26 VITALS
BODY MASS INDEX: 18.52 KG/M2 | TEMPERATURE: 97 F | WEIGHT: 104.5 LBS | HEIGHT: 63 IN | DIASTOLIC BLOOD PRESSURE: 82 MMHG | HEART RATE: 88 BPM | SYSTOLIC BLOOD PRESSURE: 123 MMHG | OXYGEN SATURATION: 94 % | RESPIRATION RATE: 18 BRPM

## 2024-10-26 LAB
ANION GAP SERPL CALCULATED.3IONS-SCNC: 10 MMOL/L (ref 3–16)
BASOPHILS # BLD: 0 K/UL (ref 0–0.2)
BASOPHILS NFR BLD: 0.3 %
BUN SERPL-MCNC: 14 MG/DL (ref 7–20)
CALCIUM SERPL-MCNC: 8.9 MG/DL (ref 8.3–10.6)
CHLORIDE SERPL-SCNC: 98 MMOL/L (ref 99–110)
CO2 SERPL-SCNC: 29 MMOL/L (ref 21–32)
CREAT SERPL-MCNC: <0.5 MG/DL (ref 0.6–1.2)
DEPRECATED RDW RBC AUTO: 13.6 % (ref 12.4–15.4)
EOSINOPHIL # BLD: 0 K/UL (ref 0–0.6)
EOSINOPHIL NFR BLD: 0.2 %
GFR SERPLBLD CREATININE-BSD FMLA CKD-EPI: >90 ML/MIN/{1.73_M2}
GLUCOSE SERPL-MCNC: 114 MG/DL (ref 70–99)
HCT VFR BLD AUTO: 45.6 % (ref 36–48)
HGB BLD-MCNC: 15 G/DL (ref 12–16)
LYMPHOCYTES # BLD: 0.7 K/UL (ref 1–5.1)
LYMPHOCYTES NFR BLD: 8.4 %
MAGNESIUM SERPL-MCNC: 1.8 MG/DL (ref 1.8–2.4)
MCH RBC QN AUTO: 32.6 PG (ref 26–34)
MCHC RBC AUTO-ENTMCNC: 33 G/DL (ref 31–36)
MCV RBC AUTO: 98.9 FL (ref 80–100)
MONOCYTES # BLD: 0.3 K/UL (ref 0–1.3)
MONOCYTES NFR BLD: 3.4 %
NEUTROPHILS # BLD: 7.7 K/UL (ref 1.7–7.7)
NEUTROPHILS NFR BLD: 87.7 %
PLATELET # BLD AUTO: 308 K/UL (ref 135–450)
PMV BLD AUTO: 7.3 FL (ref 5–10.5)
POTASSIUM SERPL-SCNC: 4.3 MMOL/L (ref 3.5–5.1)
RBC # BLD AUTO: 4.61 M/UL (ref 4–5.2)
SODIUM SERPL-SCNC: 137 MMOL/L (ref 136–145)
WBC # BLD AUTO: 8.8 K/UL (ref 4–11)

## 2024-10-26 PROCEDURE — 2580000003 HC RX 258

## 2024-10-26 PROCEDURE — 6360000002 HC RX W HCPCS

## 2024-10-26 PROCEDURE — 94640 AIRWAY INHALATION TREATMENT: CPT

## 2024-10-26 PROCEDURE — 2580000003 HC RX 258: Performed by: INTERNAL MEDICINE

## 2024-10-26 PROCEDURE — 83735 ASSAY OF MAGNESIUM: CPT

## 2024-10-26 PROCEDURE — 2700000000 HC OXYGEN THERAPY PER DAY

## 2024-10-26 PROCEDURE — 6370000000 HC RX 637 (ALT 250 FOR IP)

## 2024-10-26 PROCEDURE — 94669 MECHANICAL CHEST WALL OSCILL: CPT

## 2024-10-26 PROCEDURE — 6360000002 HC RX W HCPCS: Performed by: INTERNAL MEDICINE

## 2024-10-26 PROCEDURE — 36415 COLL VENOUS BLD VENIPUNCTURE: CPT

## 2024-10-26 PROCEDURE — 94761 N-INVAS EAR/PLS OXIMETRY MLT: CPT

## 2024-10-26 PROCEDURE — 85025 COMPLETE CBC W/AUTO DIFF WBC: CPT

## 2024-10-26 PROCEDURE — 94680 O2 UPTK RST&XERS DIR SIMPLE: CPT

## 2024-10-26 PROCEDURE — 80048 BASIC METABOLIC PNL TOTAL CA: CPT

## 2024-10-26 PROCEDURE — 6370000000 HC RX 637 (ALT 250 FOR IP): Performed by: INTERNAL MEDICINE

## 2024-10-26 RX ORDER — GUAIFENESIN 600 MG/1
600 TABLET, EXTENDED RELEASE ORAL 2 TIMES DAILY PRN
Qty: 60 TABLET | Refills: 0 | Status: SHIPPED | OUTPATIENT
Start: 2024-10-26 | End: 2024-11-25

## 2024-10-26 RX ORDER — PREDNISONE 10 MG/1
TABLET ORAL
Qty: 6 TABLET | Refills: 0 | Status: SHIPPED | OUTPATIENT
Start: 2024-10-26 | End: 2024-10-26 | Stop reason: HOSPADM

## 2024-10-26 RX ORDER — PREDNISONE 20 MG/1
20 TABLET ORAL 2 TIMES DAILY
Qty: 4 TABLET | Refills: 0 | Status: SHIPPED | OUTPATIENT
Start: 2024-10-26 | End: 2024-10-28

## 2024-10-26 RX ADMIN — ENOXAPARIN SODIUM 30 MG: 100 INJECTION SUBCUTANEOUS at 08:34

## 2024-10-26 RX ADMIN — IPRATROPIUM BROMIDE AND ALBUTEROL SULFATE 1 DOSE: 2.5; .5 SOLUTION RESPIRATORY (INHALATION) at 11:11

## 2024-10-26 RX ADMIN — BUDESONIDE INHALATION 500 MCG: 0.5 SUSPENSION RESPIRATORY (INHALATION) at 07:40

## 2024-10-26 RX ADMIN — AMOXICILLIN AND CLAVULANATE POTASSIUM 1 TABLET: 875; 125 TABLET, FILM COATED ORAL at 08:24

## 2024-10-26 RX ADMIN — WATER 40 MG: 1 INJECTION INTRAMUSCULAR; INTRAVENOUS; SUBCUTANEOUS at 08:24

## 2024-10-26 RX ADMIN — MONTELUKAST 10 MG: 10 TABLET, FILM COATED ORAL at 08:24

## 2024-10-26 RX ADMIN — SODIUM CHLORIDE, PRESERVATIVE FREE 10 ML: 5 INJECTION INTRAVENOUS at 08:30

## 2024-10-26 RX ADMIN — ARFORMOTEROL TARTRATE 15 MCG: 15 SOLUTION RESPIRATORY (INHALATION) at 07:40

## 2024-10-26 RX ADMIN — GUAIFENESIN 600 MG: 600 TABLET ORAL at 08:24

## 2024-10-26 RX ADMIN — ASPIRIN 81 MG: 81 TABLET, CHEWABLE ORAL at 08:29

## 2024-10-26 RX ADMIN — METOPROLOL SUCCINATE 12.5 MG: 25 TABLET, EXTENDED RELEASE ORAL at 08:24

## 2024-10-26 RX ADMIN — IPRATROPIUM BROMIDE AND ALBUTEROL SULFATE 1 DOSE: 2.5; .5 SOLUTION RESPIRATORY (INHALATION) at 07:40

## 2024-10-26 ASSESSMENT — PAIN SCALES - GENERAL: PAINLEVEL_OUTOF10: 0

## 2024-10-26 NOTE — PLAN OF CARE
Problem: Chronic Conditions and Co-morbidities  Goal: Patient's chronic conditions and co-morbidity symptoms are monitored and maintained or improved  10/25/2024 2249 by Junaid Spivey RN  Outcome: Progressing  10/25/2024 1808 by Mila Sol RN  Outcome: Progressing  Flowsheets (Taken 10/25/2024 1808)  Care Plan - Patient's Chronic Conditions and Co-Morbidity Symptoms are Monitored and Maintained or Improved:   Monitor and assess patient's chronic conditions and comorbid symptoms for stability, deterioration, or improvement   Collaborate with multidisciplinary team to address chronic and comorbid conditions and prevent exacerbation or deterioration     Problem: Discharge Planning  Goal: Discharge to home or other facility with appropriate resources  10/25/2024 2249 by Junaid Spivey RN  Outcome: Progressing  10/25/2024 1808 by Mila Sol RN  Outcome: Progressing  Flowsheets (Taken 10/25/2024 1808)  Discharge to home or other facility with appropriate resources:   Identify barriers to discharge with patient and caregiver   Identify discharge learning needs (meds, wound care, etc)   Arrange for needed discharge resources and transportation as appropriate     Problem: ABCDS Injury Assessment  Goal: Absence of physical injury  10/25/2024 2249 by Junaid Spivey RN  Outcome: Progressing  10/25/2024 1808 by Mila Sol RN  Outcome: Progressing  Flowsheets (Taken 10/25/2024 1808)  Absence of Physical Injury: Implement safety measures based on patient assessment     Problem: Safety - Adult  Goal: Free from fall injury  Outcome: Progressing     Problem: Pain  Goal: Verbalizes/displays adequate comfort level or baseline comfort level  Outcome: Progressing

## 2024-10-26 NOTE — PROGRESS NOTES
Internal Medicine Progress Note    Date: 10/26/2024   Patient: Charlene Nazario   [facility] Day: 3      CC: Shortness of Breath (Pt. Presents to ED via EMS from home with c/o SOB starting 45mins ago. One duoneb given by EMS. PMHx. COPD. )       Interval Hx   No acute events overnight, vital signs stable. Patient is sitting in bed eating breakfast when seen this morning. She says that she continues to feel slightly better, and is looking forward to working with PT/OT today and trying to make extra progress in walking around the unit. Her O2 requirement has continued to improve, but she is still above her baseline of room air at home.      HPI: \"Patient is a 74-year-old female past medical history of COPD who presents with dyspnea, nonproductive cough, and new oxygen requirement.     Not on oxygen at baseline.  3 weeks ago went to visit daughter and grandchildren.  Returned home 2 weeks ago.  Felt ill on Monday, 10/21.  Called her pulmonologist, Dr. Nazario, who prescribed her azithromycin and prednisone for COPD exacerbation.  The patient did not  this medication from the pharmacy, stating that the heart pharmacy did not have 2 of them available.  Dyspnea worsened the following day so patient presented to the ED for evaluation.     Patient denies fever, chills, diarrhea.  Cough is nonproductive however she feels like she has chest congestion and wants to cough up sputum.     Of note, patient has had poor compliance with inhaler usage.  Due to cost and lack of understanding, patient shares inhalers with her  who also has pulmonary problems.  She uses her maintenance inhalers only as needed.  Last COPD exacerbation according the patient was a couple years ago.     ED Course: Afebrile heart rate mildly tachycardic 103, blood pressure mildly elevated 139/99.  Requiring 2 to 5 L nasal cannula.  Was given Solu-Medrol 125 mg.  VBG in ED was 7.29 significant for primary respiratory acidosis patient was

## 2024-10-26 NOTE — DISCHARGE SUMMARY
INTERNAL MEDICINE DEPARTMENT AT THE [facility]  DISCHARGE SUMMARY    Patient ID: Charlene Nazario                                             Discharge Date: 10/26/2024   Patient's PCP: Gerry Foley DO                                          Discharge Physician: Devon Patel MD  Admit Date: 10/22/2024   Admitting Physician: Wilmar Bowden MD    DISCHARGE DIAGNOSES:  1- COPD exacerbation    Hospital Course:  Charlene Nazario is a 73 yo female who presented to te ED on 10/22/2024 with dyspnea and nonproductive cough, found to have a new oxygen requirement on evaluation. She was admitted for COPD exacerbation and treated with Augmentin and Solu-Medrol while admitted with good improvement. Pulmonology was consulted as she follows with Dr. Nazario outpatient. Her oxygen requirement was weaned down. She was assessed prior to discharge and found to have no need for home O2. She was discharged home with plans to follow up with Dr. Nazario within 1 week.      On the date of discharge, the patient reported feeling stable. The patient was found to not be in any acute distress, with vital signs within normal limits, and no new abnormalities on physical examination. Further, the patient expressed appropriate understanding of, and agreement with, the discharge recommendations, medications, and plan.    Physical Exam:  /86   Pulse 88   Temp 98 °F (36.7 °C) (Oral)   Resp 18   Ht 1.6 m (5' 3\")   Wt 47.4 kg (104 lb 8 oz)   SpO2 90%   BMI 18.51 kg/m²   Vitals and nursing note reviewed.   Constitutional:       General: She is not in acute distress.     Appearance: Normal appearance. She is normal weight.   HENT:      Head: Normocephalic and atraumatic.      Right Ear: External ear normal.      Left Ear: External ear normal.      Nose: Nose normal.   Eyes:      General: No scleral icterus.     Extraocular Movements: Extraocular movements intact.      Pupils: Pupils are equal, round, and reactive to light.    Cardiovascular:      Rate and Rhythm: Regular rhythm.     Pulses: Normal pulses.      Heart sounds: Normal heart sounds.   Pulmonary:      Effort: Pulmonary effort is normal.      Breath sounds: Wheezing (lower lung fields) present.   Abdominal:      General: Abdomen is flat.      Tenderness: There is no abdominal tenderness.   Musculoskeletal:         General: No swelling.      Cervical back: Normal range of motion and neck supple.   Skin:     General: Skin is warm and dry.   Neurological:      General: No focal deficit present.      Mental Status: She is alert and oriented to person, place, and time.   Psychiatric:         Mood and Affect: Mood normal.         Behavior: Behavior normal.     Consults: pulmonary/intensive care  Significant Diagnostic Studies:   XR CHEST PORTABLE   Final Result      No acute pulmonary disease.         Electronically signed by Erwin Obrien        Disposition: home  Discharged Condition: Stable  Follow Up: Primary Care Physician in one week    DISCHARGE MEDICATION:     Medication List        START taking these medications      guaiFENesin 600 MG extended release tablet  Commonly known as: MUCINEX  Take 1 tablet by mouth 2 times daily as needed for Congestion            CHANGE how you take these medications      predniSONE 20 MG tablet  Commonly known as: DELTASONE  Take 1 tablet by mouth 2 times daily for 2 days  What changed:   medication strength  how much to take  how to take this  when to take this  additional instructions            CONTINUE taking these medications      acetaminophen 500 MG tablet  Commonly known as: TYLENOL     Anoro Ellipta 62.5-25 MCG/ACT inhaler  Generic drug: umeclidinium-vilanterol  Inhale 1 puff into the lungs daily     aspirin 81 MG chewable tablet  Take 1 tablet by mouth daily     lisinopril 2.5 MG tablet  Commonly known as: PRINIVIL;ZESTRIL  TAKE 1 TABLET BY MOUTH EVERY EVENING     melatonin 3 MG Tabs tablet     metoprolol succinate 25 MG extended

## 2024-10-26 NOTE — DISCHARGE INSTRUCTIONS
Please schedule a follow-up with your pulmonologist, Dr. Nazario, within 1 week.    Please schedule a follow-up appointment with your primary care physician, Dr. Foley, within 1 week.

## 2024-10-26 NOTE — RT PROTOCOL NOTE
RT Nebulizer Bronchodilator Protocol Note    There is a bronchodilator order in the chart from a provider indicating to follow the RT Bronchodilator Protocol and there is an “Initiate RT Bronchodilator Protocol” order as well (see protocol at bottom of note).    CXR Findings:  No results found.    The findings from the last RT Protocol Assessment were as follows:  Smoking: Chronic pulmonary disease  Respiratory Pattern: Regular pattern and RR 12-20 bpm  Breath Sounds: Inspiratory and expiratory or bilateral wheezing and/or rhonchi  Cough: Strong, spontaneous, non-productive  Indication for Bronchodilator Therapy: On home bronchodilators  Bronchodilator Assessment Score: 8    Pt prefers 4 x a day    Aerosolized bronchodilator medication orders have been revised according to the RT Nebulizer Bronchodilator Protocol below.    Respiratory Therapist to perform RT Therapy Protocol Assessment initially then follow the protocol.  Repeat RT Therapy Protocol Assessment PRN for score 0-3 or on second treatment, BID, and PRN for scores above 3.    No Indications - adjust the frequency to every 6 hours PRN wheezing or bronchospasm, if no treatments needed after 48 hours then discontinue using Per Protocol order mode.     If indication present, adjust the RT bronchodilator orders based on the Bronchodilator Assessment Score as indicated below.  If a patient is on this medication at home then do not decrease Frequency below that used at home.    0-3 - enter or revise RT bronchodilator order(s) to equivalent RT Bronchodilator order with Frequency of every 4 hours PRN for wheezing or increased work of breathing using Per Protocol order mode.       4-6 - enter or revise RT Bronchodilator order(s) to two equivalent RT bronchodilator orders with one order with BID Frequency and one order with Frequency of every 4 hours PRN wheezing or increased work of breathing using Per Protocol order mode.         7-10 - enter or revise RT  Bronchodilator order(s) to two equivalent RT bronchodilator orders with one order with TID Frequency and one order with Frequency of every 4 hours PRN wheezing or increased work of breathing using Per Protocol order mode.       11-13 - enter or revise RT Bronchodilator order(s) to one equivalent RT bronchodilator order with QID Frequency and an Albuterol order with Frequency of every 4 hours PRN wheezing or increased work of breathing using Per Protocol order mode.      Greater than 13 - enter or revise RT Bronchodilator order(s) to one equivalent RT bronchodilator order with every 4 hours Frequency and an Albuterol order with Frequency of every 2 hours PRN wheezing or increased work of breathing using Per Protocol order mode.     RT to enter RT Home Evaluation for COPD & MDI Assessment order using Per Protocol order mode.    Electronically signed by Inge Eldridge RCP on 10/26/2024 at 7:47 AM

## 2024-10-26 NOTE — CARE COORDINATION
Case Management Assessment            Discharge Note                    Date / Time of Note: 10/26/2024 12:03 PM                  Discharge Note Completed by: Danial Leo RN    Patient Name: Charlene Nazario   YOB: 1950  Diagnosis: COPD exacerbation (HCC) [J44.1]   Date / Time: 10/22/2024 10:14 PM    Current PCP: Gerry Foley,   Clinic patient: No    Hospitalization in the last 30 days: No       Advance Directives:  Code Status: Full Code  [place] DNR form completed and on chart: Not Indicated    Financial:  Payor: MEDICARE / Plan: MEDICARE PART A AND B / Product Type: *No Product type* /      Pharmacy:    [facility][id number] [address] [facility] [phone] - F [phone]  [address] [facility] [address]  Phone: [phone] Fax: [phone]    [id number] [address] [facility] [phone] - F [phone]  [address] [facility] [address]  Phone: [phone] Fax: [phone]      Assistance purchasing medications?:    Assistance provided by Case Management: None at this time    Does patient want to participate in local refill/ meds to beds program?:      Meds To Beds General Rules:  1. Can ONLY be done Monday- Friday between 8:30am-5pm  2. Prescription(s) must be in pharmacy by 3pm to be filled same day  3.Copy of patient's insurance/ prescription drug card and patient face sheet must be sent along with the prescription(s)  4. Cost of Rx cannot be added to hospital bill. If financial assistance is needed, please contact unit  or ;  or  CANNOT provide pharmacy voucher for patients co-pays  5. Patients can then  the prescription on their way out of the hospital at discharge, or pharmacy can deliver to the bedside if staff is available. (payment due at time of pick-up or delivery - cash, check, or card accepted)     Able to afford home medications/

## 2024-10-26 NOTE — DISCHARGE SUMMARY
Discharge note: Patient has been seen by doctor. Discharge order obtained, and discharge instructions reviewed. Patient educated, using the teach back method, about follow up instructions and discharge instructions. A completed copy of the AVS instructions given to patient and all questions answered. IV catheter removed without complaints, catheter intact, site WNL. Discharged to lobby via wheel chair per transportation.

## 2024-10-26 NOTE — DISCHARGE INSTR - DIET

## 2024-10-26 NOTE — PLAN OF CARE
Problem: Chronic Conditions and Co-morbidities  Goal: Patient's chronic conditions and co-morbidity symptoms are monitored and maintained or improved  Outcome: Progressing  Flowsheets (Taken 10/26/2024 1303)  Care Plan - Patient's Chronic Conditions and Co-Morbidity Symptoms are Monitored and Maintained or Improved: Monitor and assess patient's chronic conditions and comorbid symptoms for stability, deterioration, or improvement     Problem: Discharge Planning  Goal: Discharge to home or other facility with appropriate resources  Outcome: Progressing  Flowsheets (Taken 10/26/2024 1303)  Discharge to home or other facility with appropriate resources:   Identify barriers to discharge with patient and caregiver   Identify discharge learning needs (meds, wound care, etc)   Refer to discharge planning if patient needs post-hospital services based on physician order or complex needs related to functional status, cognitive ability or social support system     Problem: Safety - Adult  Goal: Free from fall injury  Outcome: Progressing  Flowsheets (Taken 10/26/2024 1303)  Free From Fall Injury:   Instruct family/caregiver on patient safety   Based on caregiver fall risk screen, instruct family/caregiver to ask for assistance with transferring infant if caregiver noted to have fall risk factors     Problem: Pain  Goal: Verbalizes/displays adequate comfort level or baseline comfort level  Outcome: Progressing  Flowsheets (Taken 10/26/2024 1303)  Verbalizes/displays adequate comfort level or baseline comfort level:   Encourage patient to monitor pain and request assistance   Assess pain using appropriate pain scale   Implement non-pharmacological measures as appropriate and evaluate response   Administer analgesics based on type and severity of pain and evaluate response

## 2024-10-26 NOTE — CARE COORDINATION
Patient is to discharge home today. Declining HHC at time of discharge. Not recommended for home oxygen per respiratory evaluation. Family transporting.     Electronically signed by Danial Leo RN on 10/26/2024 at 12:01 PM

## 2024-10-26 NOTE — PROGRESS NOTES
10/26/24 1129   Resting (Room Air)   SpO2 92   HR 91   During Walk (Room Air)   SpO2 90   HR 91   Rate of Dyspnea 0   After Walk   SpO2 94   HR 88   Does the Patient Qualify for Home O2 No   Does the Patient Need Portable Oxygen Tanks No

## 2024-10-28 ENCOUNTER — CARE COORDINATION (OUTPATIENT)
Dept: CASE MANAGEMENT | Age: 74
End: 2024-10-28

## 2024-10-28 RX ORDER — ALBUTEROL SULFATE 90 UG/1
2 AEROSOL, METERED RESPIRATORY (INHALATION) EVERY 6 HOURS PRN
Qty: 18 G | Refills: 11 | Status: SHIPPED | OUTPATIENT
Start: 2024-10-28

## 2024-10-28 NOTE — CARE COORDINATION
Care Transitions Note    Final Call     Patient Current Location:  Home: [address] [place] [id number]    LPN Care Coordinator contacted the spouse/partner  by telephone. Verified name and  as identifiers.    Patient graduated from the Care Transitions program on 10/28.  Patient/family verbalizes confidence in the ability to self-manage at this time..      Advance Care Planning   The patient has the following advanced directives on file:  Advance Directives       Power of  Living Will ACP-Advance Directive ACP-Power of     Not on File Filed on 17 Filed Not on File            The patient has appointed the following active healthcare agents:    Primary Decision Maker: Norberto Nazario - Spouse - [phone]    Secondary Decision Maker: Holter,Mary - Brother/Sister - [phone]      Handoff:   Patient was not referred to the ACM team due to no additional needs identified.       Care Summary Note: Spoke with  Norberto who reported that patient is doing good.  stated that patient will call and schedule HFU today.  stated that those people at the hospital are the best. They took good care of his wife.  stated that patient has all medications. No medication review done.  decline any further outreach.     Assessments:  Care Transitions Subsequent and Final Call    Subsequent and Final Calls  Do you have any ongoing symptoms?: No  Have your medications changed?: No  Do you have any questions related to your medications?: No  Do you currently have any active services?: No  Do you have any needs or concerns that I can assist you with?: No  Care Transitions Interventions  No Identified Needs  Other Interventions:              Upcoming Appointments:    Future Appointments         Provider Specialty Dept Phone    2025 2:15 PM Austin Lyn MD Dermatology [phone]    2025 10:40 AM Tru Campo MD Cardiology [phone]            Patient has

## 2024-10-28 NOTE — TELEPHONE ENCOUNTER
Requested Prescriptions     Pending Prescriptions Disp Refills    VENTOLIN  (90 Base) MCG/ACT inhaler [Pharmacy Med Name: VENTOLIN HFA INH W/DOS CTR 200PUFFS] 18 g 11     Sig: INHALE 2 PUFFS INTO THE LUNGS EVERY 6 HOURS AS NEEDED FOR WHEEZING      
1 pair

## 2024-10-31 ENCOUNTER — TELEPHONE (OUTPATIENT)
Dept: PULMONOLOGY | Age: 74
End: 2024-10-31

## 2024-10-31 NOTE — TELEPHONE ENCOUNTER
Yes.  I want Carol, and Hardy for that matter, on an inhaled steroid (which pulmicort is) and the LABA/LAMA combination that is in the Anoro.

## 2024-10-31 NOTE — TELEPHONE ENCOUNTER
Carol's  called wanted to know if she should continue to take Pulmicort.    Hardy said the d/c instructions said to ask their provider about taking Pulmocort.

## 2024-11-08 ENCOUNTER — HOSPITAL ENCOUNTER (OUTPATIENT)
Dept: VASCULAR LAB | Age: 74
Discharge: HOME | End: 2024-11-10
Attending: INTERNAL MEDICINE
Payer: MEDICARE

## 2024-11-08 ENCOUNTER — OFFICE VISIT (OUTPATIENT)
Dept: INTERNAL MEDICINE CLINIC | Age: 74
End: 2024-11-08

## 2024-11-08 VITALS
DIASTOLIC BLOOD PRESSURE: 68 MMHG | HEART RATE: 85 BPM | SYSTOLIC BLOOD PRESSURE: 108 MMHG | TEMPERATURE: 97.4 F | WEIGHT: 118 LBS | BODY MASS INDEX: 20.9 KG/M2 | OXYGEN SATURATION: 92 %

## 2024-11-08 DIAGNOSIS — I50.32 CHRONIC DIASTOLIC CHF (CONGESTIVE HEART FAILURE) (HCC): ICD-10-CM

## 2024-11-08 DIAGNOSIS — J44.1 COPD EXACERBATION (HCC): ICD-10-CM

## 2024-11-08 DIAGNOSIS — R60.0 BILATERAL LEG EDEMA: ICD-10-CM

## 2024-11-08 DIAGNOSIS — Z09 HOSPITAL DISCHARGE FOLLOW-UP: Primary | ICD-10-CM

## 2024-11-08 DIAGNOSIS — I70.0 THORACIC AORTA ATHEROSCLEROSIS (HCC): ICD-10-CM

## 2024-11-08 PROBLEM — R09.02 HYPOXIA: Status: RESOLVED | Noted: 2018-05-22 | Resolved: 2024-11-08

## 2024-11-08 PROBLEM — R09.02 HYPOXEMIA: Status: RESOLVED | Noted: 2017-04-15 | Resolved: 2024-11-08

## 2024-11-08 PROCEDURE — 93970 EXTREMITY STUDY: CPT | Performed by: SURGERY

## 2024-11-08 PROCEDURE — 93970 EXTREMITY STUDY: CPT

## 2024-11-08 RX ORDER — POTASSIUM CHLORIDE 1500 MG/1
TABLET, EXTENDED RELEASE ORAL
Qty: 3 TABLET | Refills: 0 | Status: SHIPPED | OUTPATIENT
Start: 2024-11-08

## 2024-11-08 RX ORDER — FUROSEMIDE 20 MG/1
TABLET ORAL
Qty: 3 TABLET | Refills: 0 | Status: SHIPPED | OUTPATIENT
Start: 2024-11-08

## 2024-11-08 SDOH — ECONOMIC STABILITY: FOOD INSECURITY: WITHIN THE PAST 12 MONTHS, YOU WORRIED THAT YOUR FOOD WOULD RUN OUT BEFORE YOU GOT MONEY TO BUY MORE.: NEVER TRUE

## 2024-11-08 SDOH — ECONOMIC STABILITY: INCOME INSECURITY: HOW HARD IS IT FOR YOU TO PAY FOR THE VERY BASICS LIKE FOOD, HOUSING, MEDICAL CARE, AND HEATING?: NOT HARD AT ALL

## 2024-11-08 SDOH — ECONOMIC STABILITY: FOOD INSECURITY: WITHIN THE PAST 12 MONTHS, THE FOOD YOU BOUGHT JUST DIDN'T LAST AND YOU DIDN'T HAVE MONEY TO GET MORE.: NEVER TRUE

## 2024-11-08 NOTE — PROGRESS NOTES
[facility]  Internal Medicine  Patient Encounter  Gerry Foley D.O., FACOI       \Post-Discharge Transitional Care  Follow Up      Charlene Nazario   YOB: 1950    Date of Office Visit:  11/8/2024  Date of Hospital Admission: 10/22/24  Date of Hospital Discharge: 10/26/24  Risk of hospital readmission (high >=14%. Medium >=10%) :Readmission Risk Score: 7.7      Care management risk score Rising risk (score 2-5) and Complex Care (Scores >=6): No Risk Score On File     Non face to face  following discharge, date last encounter closed (first attempt may have been earlier): *No documented post hospital discharge outreach found in the last 14 days    Call initiated 2 business days of discharge: *No response recorded in the last 14 days    ASSESSMENT/PLAN:       1. Hospital discharge follow-up  74-year-old  female admitted to the hospital with COPD exacerbation.  Since being discharged she has developed increasing swelling particular in the left lower extremity.  - NM DISCHARGE MEDS RECONCILED W/ CURRENT OUTPATIENT MED LIST    2. Bilateral leg edema  Etiology is unclear  May be related to fluid retention, CHF  Rule out DVT  If venous Doppler negative, will treat with a few days of Lasix.  Recommend compression stockings if venous Doppler negative.  - Vascular duplex lower extremity venous bilateral; Future    3. Thoracic aorta atherosclerosis (HCC)  Asymptomatic  Continue annual surveillance    4. COPD exacerbation (HCC)  Much improved  Lungs are clear to auscultation though she has diminished air movement throughout    5. Chronic diastolic CHF (congestive heart failure) (HCC)  Patient is asymptomatic otherwise  She denies any worsening shortness of breath, orthopnea  She does have some lower extremity edema, left greater than the right.              Medical Decision Making: moderate complexity    No follow-ups on file.      On this date 11/8/2024 I have spent 30 minutes reviewing

## 2024-12-02 ENCOUNTER — OFFICE VISIT (OUTPATIENT)
Dept: PULMONOLOGY | Age: 74
End: 2024-12-02
Payer: MEDICARE

## 2024-12-02 VITALS
HEART RATE: 95 BPM | BODY MASS INDEX: 20.55 KG/M2 | DIASTOLIC BLOOD PRESSURE: 70 MMHG | HEIGHT: 63 IN | WEIGHT: 116 LBS | RESPIRATION RATE: 18 BRPM | SYSTOLIC BLOOD PRESSURE: 114 MMHG | OXYGEN SATURATION: 95 %

## 2024-12-02 DIAGNOSIS — J43.8 OTHER EMPHYSEMA (HCC): ICD-10-CM

## 2024-12-02 DIAGNOSIS — J44.9 COPD, MODERATE (HCC): Primary | ICD-10-CM

## 2024-12-02 PROCEDURE — 1159F MED LIST DOCD IN RCRD: CPT | Performed by: INTERNAL MEDICINE

## 2024-12-02 PROCEDURE — G8399 PT W/DXA RESULTS DOCUMENT: HCPCS | Performed by: INTERNAL MEDICINE

## 2024-12-02 PROCEDURE — 1090F PRES/ABSN URINE INCON ASSESS: CPT | Performed by: INTERNAL MEDICINE

## 2024-12-02 PROCEDURE — 1036F TOBACCO NON-USER: CPT | Performed by: INTERNAL MEDICINE

## 2024-12-02 PROCEDURE — 3074F SYST BP LT 130 MM HG: CPT | Performed by: INTERNAL MEDICINE

## 2024-12-02 PROCEDURE — 3078F DIAST BP <80 MM HG: CPT | Performed by: INTERNAL MEDICINE

## 2024-12-02 PROCEDURE — 99214 OFFICE O/P EST MOD 30 MIN: CPT | Performed by: INTERNAL MEDICINE

## 2024-12-02 PROCEDURE — G8484 FLU IMMUNIZE NO ADMIN: HCPCS | Performed by: INTERNAL MEDICINE

## 2024-12-02 PROCEDURE — G8420 CALC BMI NORM PARAMETERS: HCPCS | Performed by: INTERNAL MEDICINE

## 2024-12-02 PROCEDURE — G8427 DOCREV CUR MEDS BY ELIG CLIN: HCPCS | Performed by: INTERNAL MEDICINE

## 2024-12-02 PROCEDURE — 3023F SPIROM DOC REV: CPT | Performed by: INTERNAL MEDICINE

## 2024-12-02 PROCEDURE — 1123F ACP DISCUSS/DSCN MKR DOCD: CPT | Performed by: INTERNAL MEDICINE

## 2024-12-02 PROCEDURE — 3017F COLORECTAL CA SCREEN DOC REV: CPT | Performed by: INTERNAL MEDICINE

## 2024-12-02 PROCEDURE — G2211 COMPLEX E/M VISIT ADD ON: HCPCS | Performed by: INTERNAL MEDICINE

## 2024-12-02 RX ORDER — PREDNISONE 10 MG/1
TABLET ORAL
Qty: 20 TABLET | Refills: 0 | Status: SHIPPED | OUTPATIENT
Start: 2024-12-02

## 2024-12-02 NOTE — PATIENT INSTRUCTIONS
Look into the medication Dupixent and see if it's something you would want to try, and if you think you or Tez could do.

## 2024-12-02 NOTE — PROGRESS NOTES
bronchitic.    Doesn't qualify for screening CTs as she quit smoking in 2008 which is more than 15 years ago.           Diagnosis Orders   1. COPD, moderate (HCC)        2. Other emphysema (HCC)                  The patient is not currently smoking.      RTC 4 months w/ MD. Call or RTC sooner if symptoms persist or worsen acutely.      Denver Nazario MD

## 2024-12-10 DIAGNOSIS — Z87.81 HISTORY OF VERTEBRAL COMPRESSION FRACTURE: ICD-10-CM

## 2024-12-10 DIAGNOSIS — S39.012A STRAIN OF LUMBAR REGION, INITIAL ENCOUNTER: ICD-10-CM

## 2024-12-10 RX ORDER — TIZANIDINE 2 MG/1
2 TABLET ORAL NIGHTLY
Qty: 30 TABLET | Refills: 3 | Status: SHIPPED | OUTPATIENT
Start: 2024-12-10

## 2025-02-03 NOTE — PATIENT INSTRUCTIONS
Thank you for choosing [facility] for your cardiac care.    During your visit today, we reviewed and confirmed your cardiac medications along with  medication prescribed by your other healthcare team members. Please be sure to discuss any  changes to medication with your providers.    Please bring a list of ALL medications (or the bottles) with you to EVERY appointment.  Also include vitamins and over-the-counter medications.    If you need refills for any cardiac medications, please call your pharmacy and they will reach out to us electronically.    Did your provider order testing today? If yes, then you will receive your results in three  possible ways. You can receive a [employer] message, a phone call, or letter in the mail. Please  note, if you are an active Soflow user, some of your testing will be available within 1-2 days.    Finally, please know that it is good for your heart to exercise and follow a healthy, low-fat diet  as advised by your physician and health care providers.    If you are experiencing a medical emergency, please call 911 immediately.    It's easy to register for a [employer] account if you don't already have one. With a [employer]  account you can manage your health record, view test results, schedule appointments and  more.     Dr. Campo's clinical staff can be reached at the following phone number: [phone]    If any cardiac testing is ordered, please contact central scheduling at [phone] to get your test scheduled.

## 2025-02-03 NOTE — PROGRESS NOTES
[facility]     Outpatient Cardiology         Patient Name:  Charlene Nazario  Primary Care Physician: Gerry Foley DO  02/06/25     Assessment & Plan    Assessment / Plan:     History of reactive airway disease, possible cor pulmonale.  Reduced tricuspid valve excursion suggesting mild RV dysfunction.  No evidence of volume overload.  Continue current medications.  No medication changes made.  Blood pressure is stable.  Importance of salt and fluid restriction stressed.  Currently not needing Lasix.  Self titration discussed.    .        Chief Complaint:     Chief Complaint   Patient presents with    Follow-up     6 Month Follow Up-Chronic cor pulmonale (HCC)           History of Present Illness:       HPI     Charlene Nazariois a 74 y.o. female with PMH of CHF, COPD and  Hyperlipidemia here for a follow up.       Today she states she is doing well. No lower leg edema noted.   Patient denies any chest pain, shortness of breath, palpitations, presyncope or syncope. No TIA. No claudication. No recent hospitalizations      PMH  Past Medical History:   Diagnosis Date    Allergic rhinitis     Anxiety 02/14/2022    Back pain     Chronic:under care of spine specialist:Dr. Adames    Cervical cancer screening 2010    Nml per pt'    Closed compression fracture of L3 vertebra (HCC) 02/08/2024    Compression fracture of thoracic vertebrae, non-traumatic (HCC)     under care of endo:Dr. Zhu    COPD (chronic obstructive pulmonary disease) (HCC)     under care of pulmo:Dr. Nazario    COPD exacerbation (HCC) 04/14/2017    Elevated LDL cholesterol level     GERD (gastroesophageal reflux disease)     History of mammogram 2012;5/17/17    Nml per pt'. 5/17/17=negative.    HLD (hyperlipidemia) 09/03/2021    Hoarseness of voice 10/04/2017    Hypertension     Lung nodule:left 05/04/2017     1.2 cm indeterminate left upper lobe pulmonary nodule:under care of pulmo:Dr. Nazario    Mucus plugging of bronchi

## 2025-02-05 ENCOUNTER — TELEPHONE (OUTPATIENT)
Dept: PULMONOLOGY | Age: 75
End: 2025-02-05

## 2025-02-05 RX ORDER — FLUTICASONE FUROATE, UMECLIDINIUM BROMIDE AND VILANTEROL TRIFENATATE 200; 62.5; 25 UG/1; UG/1; UG/1
1 POWDER RESPIRATORY (INHALATION) DAILY
Qty: 1 EACH | Refills: 0 | Status: SHIPPED | COMMUNITY
Start: 2025-02-05

## 2025-02-05 RX ORDER — FLUTICASONE FUROATE 200 UG/1
1 POWDER RESPIRATORY (INHALATION) DAILY
Qty: 1 EACH | Refills: 11 | Status: SHIPPED | OUTPATIENT
Start: 2025-02-05

## 2025-02-05 NOTE — TELEPHONE ENCOUNTER
This seems stupid.  She'll be on Anoro and Arnuity?  Do we know if Trelegy is covered?  Because that's what she'll be getting in two inhalers instead of one.  I wrote for both, in case her insurance coverage is as stupid as I fear.  I'd prefer she just be on Trelegy instead of the other 2 if it's affordable.    Orders Placed This Encounter   Medications    fluticasone (ARNUITY ELLIPTA) 200 MCG/ACT AEPB     Sig: Inhale 1 Inhaler into the lungs daily     Dispense:  1 each     Refill:  11    fluticasone-umeclidin-vilant (TRELEGY ELLIPTA) 200-62.5-25 MCG/ACT AEPB inhaler     Sig: Inhale 1 puff into the lungs daily     Dispense:  1 each     Refill:  0

## 2025-02-05 NOTE — TELEPHONE ENCOUNTER
IVETTE, Medicare RX, [facility] sent a letter stating that Pulmicort INH is not on formulary anymore.  Letter states that Qvar or Arnuity is.  Qvar is not available at this time, so I guess she  will need a rx for Arnuity sent to [facility].  Thanks

## 2025-02-06 ENCOUNTER — OFFICE VISIT (OUTPATIENT)
Dept: CARDIOLOGY CLINIC | Age: 75
End: 2025-02-06
Payer: MEDICARE

## 2025-02-06 VITALS
WEIGHT: 108.8 LBS | SYSTOLIC BLOOD PRESSURE: 120 MMHG | BODY MASS INDEX: 19.27 KG/M2 | DIASTOLIC BLOOD PRESSURE: 86 MMHG | HEART RATE: 96 BPM

## 2025-02-06 DIAGNOSIS — I27.81 CHRONIC COR PULMONALE (HCC): Primary | ICD-10-CM

## 2025-02-06 PROCEDURE — 99213 OFFICE O/P EST LOW 20 MIN: CPT | Performed by: INTERNAL MEDICINE

## 2025-02-06 PROCEDURE — 1160F RVW MEDS BY RX/DR IN RCRD: CPT | Performed by: INTERNAL MEDICINE

## 2025-02-06 PROCEDURE — 1123F ACP DISCUSS/DSCN MKR DOCD: CPT | Performed by: INTERNAL MEDICINE

## 2025-02-06 PROCEDURE — G8427 DOCREV CUR MEDS BY ELIG CLIN: HCPCS | Performed by: INTERNAL MEDICINE

## 2025-02-06 PROCEDURE — 3079F DIAST BP 80-89 MM HG: CPT | Performed by: INTERNAL MEDICINE

## 2025-02-06 PROCEDURE — 1159F MED LIST DOCD IN RCRD: CPT | Performed by: INTERNAL MEDICINE

## 2025-02-06 PROCEDURE — G8420 CALC BMI NORM PARAMETERS: HCPCS | Performed by: INTERNAL MEDICINE

## 2025-02-06 PROCEDURE — 1036F TOBACCO NON-USER: CPT | Performed by: INTERNAL MEDICINE

## 2025-02-06 PROCEDURE — 3074F SYST BP LT 130 MM HG: CPT | Performed by: INTERNAL MEDICINE

## 2025-02-06 PROCEDURE — G8399 PT W/DXA RESULTS DOCUMENT: HCPCS | Performed by: INTERNAL MEDICINE

## 2025-02-06 PROCEDURE — 1090F PRES/ABSN URINE INCON ASSESS: CPT | Performed by: INTERNAL MEDICINE

## 2025-02-06 PROCEDURE — 3017F COLORECTAL CA SCREEN DOC REV: CPT | Performed by: INTERNAL MEDICINE

## 2025-02-15 DIAGNOSIS — R06.02 SOB (SHORTNESS OF BREATH): Primary | ICD-10-CM

## 2025-02-17 RX ORDER — MONTELUKAST SODIUM 10 MG/1
10 TABLET ORAL DAILY
Qty: 30 TABLET | Refills: 5 | Status: SHIPPED | OUTPATIENT
Start: 2025-02-17

## 2025-02-21 ENCOUNTER — TELEPHONE (OUTPATIENT)
Dept: PULMONOLOGY | Age: 75
End: 2025-02-21

## 2025-02-21 NOTE — TELEPHONE ENCOUNTER
Carol and Hardy called- they have been using their inhalers at an increased amount- wanting to know if there are inhalers with a stronger dose that might work quicker and longer.    Also they have not been using their nebulizer, should they start that?

## 2025-02-24 RX ORDER — PREDNISONE 10 MG/1
TABLET ORAL
Qty: 22 TABLET | Refills: 0 | Status: SHIPPED | OUTPATIENT
Start: 2025-02-24 | End: 2025-03-06

## 2025-02-24 NOTE — TELEPHONE ENCOUNTER
I called the number listed as Carol's in the chart and it was actually the number for her  Hardy.  He says they've both been having a rough time and using their rescue inhaler a lot.  Says Carol used it close to 50 time in one day!  I told him that's way too much and can be dangerous, and can drive down her potassium, in addition to being ineffective at that frequency.  If she's very short of breath, she would be better treated with neb treatments and/or an ED evaluation.  He says she's still not great, but better than she was.  They don't have enough prednisone at home for a burst, so I'm going to write for more to try to get her through.    Orders Placed This Encounter   Medications    predniSONE (DELTASONE) 10 MG tablet     Si tabs for 3 days, then 2 tabs for 3 days, then 1 tab for 4 days     Dispense:  22 tablet     Refill:  0

## 2025-02-28 DIAGNOSIS — R06.02 SOB (SHORTNESS OF BREATH): Primary | ICD-10-CM

## 2025-03-04 RX ORDER — BUDESONIDE AND FORMOTEROL FUMARATE DIHYDRATE 160; 4.5 UG/1; UG/1
AEROSOL RESPIRATORY (INHALATION)
Qty: 10.2 G | Refills: 11 | Status: SHIPPED | OUTPATIENT
Start: 2025-03-04

## 2025-03-06 ENCOUNTER — TELEPHONE (OUTPATIENT)
Dept: INTERNAL MEDICINE CLINIC | Age: 75
End: 2025-03-06

## 2025-03-06 NOTE — TELEPHONE ENCOUNTER
The pt spouse Hardy called in for Dr. Foley in regards to getting the pt an appt for wellness check up. The pt has been have something going on with her and may need medication and would like to f/u with Dr. Foley. He didn't go into specifics when asked, also offer another provider if she needs to be sooner, will wait for Dr. Foley since the pt has been dealing with this for a while now. Please advise if we can squeeze the pt in.    Hardy callback# [phone]

## 2025-03-07 NOTE — TELEPHONE ENCOUNTER
Spoke with her  & set up her AWV for 03/26/2025. He did not demonstrate any new or pressing concerns or c/o.

## 2025-03-09 DIAGNOSIS — I42.8 NONISCHEMIC CARDIOMYOPATHY (HCC): ICD-10-CM

## 2025-03-09 DIAGNOSIS — I50.22 SYSTOLIC CHF, CHRONIC (HCC): ICD-10-CM

## 2025-03-09 DIAGNOSIS — I95.2 HYPOTENSION DUE TO DRUGS: ICD-10-CM

## 2025-03-10 RX ORDER — LISINOPRIL 2.5 MG/1
2.5 TABLET ORAL EVERY EVENING
Qty: 90 TABLET | Refills: 3 | Status: SHIPPED | OUTPATIENT
Start: 2025-03-10

## 2025-03-10 NOTE — TELEPHONE ENCOUNTER
Requested Prescriptions     Pending Prescriptions Disp Refills    lisinopril (PRINIVIL;ZESTRIL) 2.5 MG tablet [Pharmacy Med Name: LISINOPRIL 2.5MG TABLETS] 90 tablet 3     Sig: TAKE 1 TABLET BY MOUTH EVERY EVENING            Checked Correct Pharmacy: Yes    Any changes since last refill? No     Number: 90  Refills: 3    Last OV: 2/6/2025 Provider: ALLAN    Next OV: 8/7/2025 Provider: ALLAN    Last Labs:   CBC:   Lab Results   Component Value Date    WBC 8.8 10/26/2024    HGB 15.0 10/26/2024    HCT 45.6 10/26/2024    MCV 98.9 10/26/2024     10/26/2024     BMP:   Lab Results   Component Value Date/Time     10/26/2024 10:48 AM    K 4.3 10/26/2024 10:48 AM    K 4.6 10/22/2024 10:46 PM    CL 98 10/26/2024 10:48 AM    CO2 29 10/26/2024 10:48 AM    BUN 14 10/26/2024 10:48 AM    CREATININE <0.5 10/26/2024 10:48 AM    GLUCOSE 114 10/26/2024 10:48 AM    CALCIUM 8.9 10/26/2024 10:48 AM    LABGLOM >90 10/26/2024 10:48 AM    LABGLOM >60 08/01/2023 02:29 PM       Magnesium:   Lab Results   Component Value Date/Time    MG 1.80 10/26/2024 10:48 AM

## 2025-03-24 ENCOUNTER — HOSPITAL ENCOUNTER (INPATIENT)
Age: 75
LOS: 4 days | Discharge: HOME | End: 2025-03-28
Attending: EMERGENCY MEDICINE | Admitting: INTERNAL MEDICINE
Payer: MEDICARE

## 2025-03-24 ENCOUNTER — APPOINTMENT (OUTPATIENT)
Dept: GENERAL RADIOLOGY | Age: 75
End: 2025-03-24
Payer: MEDICARE

## 2025-03-24 DIAGNOSIS — J96.02 ACUTE RESPIRATORY FAILURE WITH HYPOXIA AND HYPERCAPNIA: ICD-10-CM

## 2025-03-24 DIAGNOSIS — R06.02 SHORTNESS OF BREATH: ICD-10-CM

## 2025-03-24 DIAGNOSIS — J44.1 COPD EXACERBATION (HCC): Primary | ICD-10-CM

## 2025-03-24 DIAGNOSIS — J96.01 ACUTE RESPIRATORY FAILURE WITH HYPOXIA AND HYPERCAPNIA: ICD-10-CM

## 2025-03-24 LAB
ANION GAP SERPL CALCULATED.3IONS-SCNC: 9 MMOL/L (ref 3–16)
BASE EXCESS BLDV CALC-SCNC: 3.8 MMOL/L (ref -2–3)
BASE EXCESS BLDV CALC-SCNC: 6.5 MMOL/L (ref -2–3)
BASOPHILS # BLD: 0.1 K/UL (ref 0–0.2)
BASOPHILS NFR BLD: 1.1 %
BUN SERPL-MCNC: 5 MG/DL (ref 7–20)
CALCIUM SERPL-MCNC: 8.8 MG/DL (ref 8.3–10.6)
CHLORIDE SERPL-SCNC: 93 MMOL/L (ref 99–110)
CO2 BLDV-SCNC: 36 MMOL/L
CO2 BLDV-SCNC: 39 MMOL/L
CO2 SERPL-SCNC: 28 MMOL/L (ref 21–32)
COHGB MFR BLDV: 1.5 % (ref 0–1.5)
COHGB MFR BLDV: 1.6 % (ref 0–1.5)
CREAT SERPL-MCNC: 0.6 MG/DL (ref 0.6–1.2)
DEPRECATED RDW RBC AUTO: 13.7 % (ref 12.4–15.4)
DO-HGB MFR BLDV: 40.1 %
DO-HGB MFR BLDV: 53.9 %
EKG ATRIAL RATE: 109 BPM
EKG DIAGNOSIS: NORMAL
EKG P AXIS: 87 DEGREES
EKG P-R INTERVAL: 138 MS
EKG Q-T INTERVAL: 342 MS
EKG QRS DURATION: 68 MS
EKG QTC CALCULATION (BAZETT): 460 MS
EKG R AXIS: 63 DEGREES
EKG T AXIS: 92 DEGREES
EKG VENTRICULAR RATE: 109 BPM
EOSINOPHIL # BLD: 0.5 K/UL (ref 0–0.6)
EOSINOPHIL NFR BLD: 8.3 %
FLUAV RNA RESP QL NAA+PROBE: NOT DETECTED
FLUBV RNA RESP QL NAA+PROBE: NOT DETECTED
GFR SERPLBLD CREATININE-BSD FMLA CKD-EPI: >90 ML/MIN/{1.73_M2}
GLUCOSE SERPL-MCNC: 118 MG/DL (ref 70–99)
HCO3 BLDV-SCNC: 33.6 MMOL/L (ref 24–28)
HCO3 BLDV-SCNC: 36.6 MMOL/L (ref 24–28)
HCT VFR BLD AUTO: 49 % (ref 36–48)
HGB BLD-MCNC: 16.4 G/DL (ref 12–16)
LACTATE BLDV-SCNC: 1.4 MMOL/L (ref 0.4–2)
LYMPHOCYTES # BLD: 1 K/UL (ref 1–5.1)
LYMPHOCYTES NFR BLD: 16.5 %
MCH RBC QN AUTO: 32.7 PG (ref 26–34)
MCHC RBC AUTO-ENTMCNC: 33.3 G/DL (ref 31–36)
MCV RBC AUTO: 98 FL (ref 80–100)
METHGB MFR BLDV: 0 % (ref 0–1.5)
METHGB MFR BLDV: 0.2 % (ref 0–1.5)
MONOCYTES # BLD: 0.4 K/UL (ref 0–1.3)
MONOCYTES NFR BLD: 7.5 %
NEUTROPHILS # BLD: 3.9 K/UL (ref 1.7–7.7)
NEUTROPHILS NFR BLD: 66.6 %
NT-PROBNP SERPL-MCNC: 93 PG/ML (ref 0–124)
PCO2 BLDV: 71 MMHG (ref 41–51)
PCO2 BLDV: 74.2 MMHG (ref 41–51)
PH BLDV: 7.28 [PH] (ref 7.35–7.45)
PH BLDV: 7.3 [PH] (ref 7.35–7.45)
PLATELET # BLD AUTO: 312 K/UL (ref 135–450)
PMV BLD AUTO: 7.6 FL (ref 5–10.5)
PO2 BLDV: 36.7 MMHG (ref 25–40)
PO2 BLDV: <30 MMHG (ref 25–40)
POTASSIUM SERPL-SCNC: 4.8 MMOL/L (ref 3.5–5.1)
POTASSIUM SERPL-SCNC: ABNORMAL MMOL/L (ref 3.5–5.1)
RBC # BLD AUTO: 5.01 M/UL (ref 4–5.2)
SAO2 % BLDV: 45 %
SAO2 % BLDV: 59 %
SARS-COV-2 RNA RESP QL NAA+PROBE: NOT DETECTED
SODIUM SERPL-SCNC: 130 MMOL/L (ref 136–145)
TROPONIN, HIGH SENSITIVITY: 15 NG/L (ref 0–14)
TROPONIN, HIGH SENSITIVITY: 16 NG/L (ref 0–14)
WBC # BLD AUTO: 5.9 K/UL (ref 4–11)

## 2025-03-24 PROCEDURE — 6370000000 HC RX 637 (ALT 250 FOR IP): Performed by: EMERGENCY MEDICINE

## 2025-03-24 PROCEDURE — 94640 AIRWAY INHALATION TREATMENT: CPT

## 2025-03-24 PROCEDURE — 83605 ASSAY OF LACTIC ACID: CPT

## 2025-03-24 PROCEDURE — 87636 SARSCOV2 & INF A&B AMP PRB: CPT

## 2025-03-24 PROCEDURE — 6360000002 HC RX W HCPCS

## 2025-03-24 PROCEDURE — 6360000002 HC RX W HCPCS: Performed by: EMERGENCY MEDICINE

## 2025-03-24 PROCEDURE — 85025 COMPLETE CBC W/AUTO DIFF WBC: CPT

## 2025-03-24 PROCEDURE — 99285 EMERGENCY DEPT VISIT HI MDM: CPT

## 2025-03-24 PROCEDURE — 93005 ELECTROCARDIOGRAM TRACING: CPT | Performed by: EMERGENCY MEDICINE

## 2025-03-24 PROCEDURE — 2500000003 HC RX 250 WO HCPCS: Performed by: EMERGENCY MEDICINE

## 2025-03-24 PROCEDURE — 2060000000 HC ICU INTERMEDIATE R&B

## 2025-03-24 PROCEDURE — 36415 COLL VENOUS BLD VENIPUNCTURE: CPT

## 2025-03-24 PROCEDURE — 2700000000 HC OXYGEN THERAPY PER DAY

## 2025-03-24 PROCEDURE — 6360000002 HC RX W HCPCS: Performed by: INTERNAL MEDICINE

## 2025-03-24 PROCEDURE — 82803 BLOOD GASES ANY COMBINATION: CPT

## 2025-03-24 PROCEDURE — 2500000003 HC RX 250 WO HCPCS: Performed by: INTERNAL MEDICINE

## 2025-03-24 PROCEDURE — 84484 ASSAY OF TROPONIN QUANT: CPT

## 2025-03-24 PROCEDURE — 71045 X-RAY EXAM CHEST 1 VIEW: CPT

## 2025-03-24 PROCEDURE — 96366 THER/PROPH/DIAG IV INF ADDON: CPT

## 2025-03-24 PROCEDURE — 80048 BASIC METABOLIC PNL TOTAL CA: CPT

## 2025-03-24 PROCEDURE — 96375 TX/PRO/DX INJ NEW DRUG ADDON: CPT

## 2025-03-24 PROCEDURE — 83880 ASSAY OF NATRIURETIC PEPTIDE: CPT

## 2025-03-24 PROCEDURE — 96365 THER/PROPH/DIAG IV INF INIT: CPT

## 2025-03-24 PROCEDURE — 94761 N-INVAS EAR/PLS OXIMETRY MLT: CPT

## 2025-03-24 PROCEDURE — 84132 ASSAY OF SERUM POTASSIUM: CPT

## 2025-03-24 PROCEDURE — 6370000000 HC RX 637 (ALT 250 FOR IP): Performed by: INTERNAL MEDICINE

## 2025-03-24 RX ORDER — ONDANSETRON 4 MG/1
4 TABLET, ORALLY DISINTEGRATING ORAL EVERY 8 HOURS PRN
Status: DISCONTINUED | OUTPATIENT
Start: 2025-03-24 | End: 2025-03-28 | Stop reason: HOSPADM

## 2025-03-24 RX ORDER — SODIUM CHLORIDE 9 MG/ML
INJECTION, SOLUTION INTRAVENOUS PRN
Status: DISCONTINUED | OUTPATIENT
Start: 2025-03-24 | End: 2025-03-28 | Stop reason: HOSPADM

## 2025-03-24 RX ORDER — GUAIFENESIN/DEXTROMETHORPHAN 100-10MG/5
5 SYRUP ORAL EVERY 4 HOURS PRN
Status: DISCONTINUED | OUTPATIENT
Start: 2025-03-24 | End: 2025-03-28 | Stop reason: HOSPADM

## 2025-03-24 RX ORDER — ACETAMINOPHEN 650 MG/1
650 SUPPOSITORY RECTAL EVERY 6 HOURS PRN
Status: DISCONTINUED | OUTPATIENT
Start: 2025-03-24 | End: 2025-03-28 | Stop reason: HOSPADM

## 2025-03-24 RX ORDER — IPRATROPIUM BROMIDE AND ALBUTEROL SULFATE 2.5; .5 MG/3ML; MG/3ML
1 SOLUTION RESPIRATORY (INHALATION) ONCE
Status: COMPLETED | OUTPATIENT
Start: 2025-03-24 | End: 2025-03-24

## 2025-03-24 RX ORDER — MAGNESIUM SULFATE IN WATER 40 MG/ML
2000 INJECTION, SOLUTION INTRAVENOUS ONCE
Status: COMPLETED | OUTPATIENT
Start: 2025-03-24 | End: 2025-03-24

## 2025-03-24 RX ORDER — LEVALBUTEROL 1.25 MG/.5ML
1.25 SOLUTION, CONCENTRATE RESPIRATORY (INHALATION) EVERY 8 HOURS PRN
Status: DISCONTINUED | OUTPATIENT
Start: 2025-03-24 | End: 2025-03-25

## 2025-03-24 RX ORDER — ALBUTEROL SULFATE 0.83 MG/ML
SOLUTION RESPIRATORY (INHALATION)
Status: DISPENSED
Start: 2025-03-24 | End: 2025-03-24

## 2025-03-24 RX ORDER — SODIUM CHLORIDE 0.9 % (FLUSH) 0.9 %
5-40 SYRINGE (ML) INJECTION EVERY 12 HOURS SCHEDULED
Status: DISCONTINUED | OUTPATIENT
Start: 2025-03-24 | End: 2025-03-28 | Stop reason: HOSPADM

## 2025-03-24 RX ORDER — PREDNISONE 20 MG/1
40 TABLET ORAL DAILY
Status: DISCONTINUED | OUTPATIENT
Start: 2025-03-25 | End: 2025-03-26

## 2025-03-24 RX ORDER — ENOXAPARIN SODIUM 100 MG/ML
30 INJECTION SUBCUTANEOUS DAILY
Status: DISCONTINUED | OUTPATIENT
Start: 2025-03-25 | End: 2025-03-28 | Stop reason: HOSPADM

## 2025-03-24 RX ORDER — SODIUM CHLORIDE FOR INHALATION 0.9 %
3 VIAL, NEBULIZER (ML) INHALATION EVERY 8 HOURS PRN
Status: DISCONTINUED | OUTPATIENT
Start: 2025-03-24 | End: 2025-03-28 | Stop reason: HOSPADM

## 2025-03-24 RX ORDER — ALBUTEROL SULFATE 0.83 MG/ML
SOLUTION RESPIRATORY (INHALATION)
Status: COMPLETED
Start: 2025-03-24 | End: 2025-03-24

## 2025-03-24 RX ORDER — ALBUTEROL SULFATE 0.83 MG/ML
2.5 SOLUTION RESPIRATORY (INHALATION) ONCE
Status: COMPLETED | OUTPATIENT
Start: 2025-03-24 | End: 2025-03-24

## 2025-03-24 RX ORDER — ONDANSETRON 2 MG/ML
4 INJECTION INTRAMUSCULAR; INTRAVENOUS EVERY 6 HOURS PRN
Status: DISCONTINUED | OUTPATIENT
Start: 2025-03-24 | End: 2025-03-28 | Stop reason: HOSPADM

## 2025-03-24 RX ORDER — ASPIRIN 81 MG/1
81 TABLET, CHEWABLE ORAL DAILY
Status: DISCONTINUED | OUTPATIENT
Start: 2025-03-25 | End: 2025-03-28 | Stop reason: HOSPADM

## 2025-03-24 RX ORDER — MONTELUKAST SODIUM 10 MG/1
10 TABLET ORAL NIGHTLY
Status: DISCONTINUED | OUTPATIENT
Start: 2025-03-25 | End: 2025-03-28 | Stop reason: HOSPADM

## 2025-03-24 RX ORDER — POLYETHYLENE GLYCOL 3350 17 G/17G
17 POWDER, FOR SOLUTION ORAL DAILY PRN
Status: DISCONTINUED | OUTPATIENT
Start: 2025-03-24 | End: 2025-03-28 | Stop reason: HOSPADM

## 2025-03-24 RX ORDER — LISINOPRIL 2.5 MG/1
2.5 TABLET ORAL EVERY EVENING
Status: DISCONTINUED | OUTPATIENT
Start: 2025-03-25 | End: 2025-03-28 | Stop reason: HOSPADM

## 2025-03-24 RX ORDER — ALBUTEROL SULFATE 90 UG/1
2 INHALANT RESPIRATORY (INHALATION) EVERY 6 HOURS PRN
Qty: 18 G | Refills: 11 | Status: SHIPPED | OUTPATIENT
Start: 2025-03-24

## 2025-03-24 RX ORDER — SODIUM CHLORIDE 0.9 % (FLUSH) 0.9 %
5-40 SYRINGE (ML) INJECTION PRN
Status: DISCONTINUED | OUTPATIENT
Start: 2025-03-24 | End: 2025-03-28 | Stop reason: HOSPADM

## 2025-03-24 RX ORDER — ACETAMINOPHEN 325 MG/1
650 TABLET ORAL EVERY 6 HOURS PRN
Status: DISCONTINUED | OUTPATIENT
Start: 2025-03-24 | End: 2025-03-28 | Stop reason: HOSPADM

## 2025-03-24 RX ORDER — METOPROLOL SUCCINATE 25 MG/1
12.5 TABLET, EXTENDED RELEASE ORAL 2 TIMES DAILY
Status: DISCONTINUED | OUTPATIENT
Start: 2025-03-24 | End: 2025-03-28 | Stop reason: HOSPADM

## 2025-03-24 RX ORDER — ALBUTEROL SULFATE 0.83 MG/ML
15 SOLUTION RESPIRATORY (INHALATION) ONCE
Status: COMPLETED | OUTPATIENT
Start: 2025-03-24 | End: 2025-03-24

## 2025-03-24 RX ADMIN — LEVALBUTEROL 1.25 MG: 1.25 SOLUTION, CONCENTRATE RESPIRATORY (INHALATION) at 18:24

## 2025-03-24 RX ADMIN — WATER 60 MG: 1 INJECTION INTRAMUSCULAR; INTRAVENOUS; SUBCUTANEOUS at 10:26

## 2025-03-24 RX ADMIN — MAGNESIUM SULFATE HEPTAHYDRATE 2000 MG: 40 INJECTION, SOLUTION INTRAVENOUS at 10:37

## 2025-03-24 RX ADMIN — ALBUTEROL SULFATE 2.5 MG: 2.5 SOLUTION RESPIRATORY (INHALATION) at 10:15

## 2025-03-24 RX ADMIN — ALBUTEROL SULFATE 15 MG/HR: 2.5 SOLUTION RESPIRATORY (INHALATION) at 11:52

## 2025-03-24 RX ADMIN — ALBUTEROL SULFATE 2.5 MG: 2.5 SOLUTION RESPIRATORY (INHALATION) at 13:19

## 2025-03-24 RX ADMIN — Medication 3 MG: at 22:17

## 2025-03-24 RX ADMIN — IPRATROPIUM BROMIDE 0.5 MG: 0.5 SOLUTION RESPIRATORY (INHALATION) at 19:53

## 2025-03-24 RX ADMIN — SODIUM CHLORIDE, PRESERVATIVE FREE 10 ML: 5 INJECTION INTRAVENOUS at 22:17

## 2025-03-24 RX ADMIN — IPRATROPIUM BROMIDE AND ALBUTEROL SULFATE 1 DOSE: .5; 2.5 SOLUTION RESPIRATORY (INHALATION) at 10:15

## 2025-03-24 RX ADMIN — METOPROLOL SUCCINATE 12.5 MG: 25 TABLET, EXTENDED RELEASE ORAL at 22:17

## 2025-03-24 ASSESSMENT — LIFESTYLE VARIABLES
HOW OFTEN DO YOU HAVE A DRINK CONTAINING ALCOHOL: MONTHLY OR LESS
HOW MANY STANDARD DRINKS CONTAINING ALCOHOL DO YOU HAVE ON A TYPICAL DAY: 1 OR 2

## 2025-03-24 ASSESSMENT — PAIN - FUNCTIONAL ASSESSMENT
PAIN_FUNCTIONAL_ASSESSMENT: NONE - DENIES PAIN

## 2025-03-24 NOTE — ED NOTES
Patient Name: Charlene Nazario  : 1950 74 y.o.  MRN: [id number]  ED Room #: A12/A12-12     Chief complaint:   Chief Complaint   Patient presents with    Shortness of Breath     Hospital Problem/Diagnosis:       O2 Flow Rate:O2 Device: Nasal cannula O2 Flow Rate (L/min): 3 L/min (if applicable)  Cardiac Rhythm:   (if applicable)  Active LDA's:   Peripheral IV 25 Left;Proximal;Anterior Forearm (Active)            How does patient ambulate? Unknown, did not assess in the Emergency Department    2. How does patient take pills? Unknown, no oral medications were given in the Emergency Department    3. Is patient alert? Alert    4. Is patient oriented? To Person, To Place, To Time, and To Situation    5.   Patient arrived from:  home  Facility Name: ___________________________________________    6. If patient is disoriented or from a Skill Nursing Facility has family been notified of admission? No    7. Patient belongings? Belongings: Cell Phone and Clothing    Disposition of belongings? Kept with Patient     8. Any specific patient or family belongings/needs/dynamics?   a. N/a     9. Miscellaneous comments/pending orders?  a. Pt admitted for copd exacerbation. Pt not improving with breathing treatments.       If there are any additional questions please reach out to the Emergency Department.      Haleigh Tristan RN  [phone]

## 2025-03-24 NOTE — H&P
V2.0  History and Physical      Name:  Charlene Nazario /Age/Sex: 1950  (74 y.o. female)   MRN & CSN:  [id number] & [id number] Encounter Date/Time: 3/24/2025 3:34 PM EDT   Location:  [place] [address] PCP: Gerry Foley DO       Hospital Day: 1    Assessment and Plan:   Charlene Nazario is a 74 y.o. female with a pmh of COPD, not on home oxygen, GERD, osteoporosis, hyperlipidemia, essential hypertension, anxiety, chronic diastolic CHF who presents with complaints of worsening shortness of breath    Hospital Problems           Last Modified POA    * (Principal) COPD with acute exacerbation (HCC) 3/24/2025 Yes   #COPD with acute exacerbation  #Acute respiratory failure with hypoxia and hypercarbia  -VBG with mild respiratory acidosis  -Patient refusing to use BiPAP  -Chest x-ray with trace bilateral pleural effusions    #Malnourished with BMI of 16  #Chronic medical conditions as mentioned in PMH    Plan:  Admit to intermediate care unit  Continue on Atrovent and levalbuterol nebulizers given sinus tachycardia  P.o. steroids as ordered  Check influenza/COVID-19 PCR  Monitor serum electrolytes while on bronchodilator therapy  Symptomatic therapy  Continue home medications appropriately  Pulmonology consult  Supportive therapy    Disposition:   Current Living situation: Home  Expected Disposition: Home  Estimated D/C: 2 days    Diet ADULT DIET; Regular   DVT Prophylaxis [x] Lovenox, []  Heparin, [] SCDs, [] Ambulation,  [] Eliquis, [] Xarelto, [] Coumadin   Code Status Full   Surrogate Decision Maker/ POA Spouse     Personally reviewed Lab Studies CBC, BMP, NT proBNP, VBG and Imaging     Discussed management of the case with ED provider who recommended admission for further management    EKG interpreted personally and results sinus tachycardia, VR = 109, QTc = 460, occasional PVCs, no acute ST-T changes    Imaging that was interpreted personally includes chest x-ray and results no acute cardiopulmonary disease  grandfather, maternal grandmother, paternal grandfather, paternal grandmother, and sister.  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[employer]   Tobacco Use    Smoking status: Former     Current packs/day: 0.00     Average packs/day: 1.5 packs/day for 22.0 years (33.0 ttl pk-yrs)     Types: Cigarettes     Start date: 1989     Quit date: 2011     Years since quittin.9    Smokeless tobacco: Never   Vaping Use    Vaping status: Never Used   Substance and Sexual Activity    Alcohol use: Yes     Alcohol/week: 4.0 standard drinks of alcohol     Types: 4 Cans of beer per week     Comment: 1-2 drinks a day    Drug use: No    Sexual activity: Yes     Partners: Male     Social Drivers of Health     Financial Resource Strain: Low Risk  (2024)    Overall Financial Resource Strain (CARDIA)     Difficulty of Paying Living Expenses: Not hard at all   Food Insecurity: No Food Insecurity (2024)    Hunger Vital Sign     Worried About Running Out of Food in the Last Year: Never true     Ran Out of Food in the Last Year: Never true   Transportation Needs: No Transportation Needs (2024)    PRAPARE - Transportation     Lack of Transportation (Medical): No     Lack of Transportation (Non-Medical): No   Physical Activity: Sufficiently Active (2023)    Exercise Vital Sign     Days of Exercise per Week: 3 days     Minutes of Exercise per Session: 60 min   Stress: Stress Concern Present (2021)    [employer] [place] [employer] - Occupational Stress Questionnaire     Feeling of Stress : To some extent   Social Connections: Moderately Isolated (2021)    Social Connection and Isolation Panel [NHANES]     Frequency of Communication with Friends and Family: Three times a week

## 2025-03-24 NOTE — ED PROVIDER NOTES
THE [facility]  EMERGENCY DEPARTMENT ENCOUNTER          ATTENDING PHYSICIAN NOTE       Date of evaluation: 3/24/2025    Chief Complaint     Shortness of Breath      History of Present Illness     Charlene Nazario is a 74 y.o. female, followed by pulmonology for a known history of COPD, not on home oxygen, who presents with complaints of increasing shortness of breath and wheezing over the last several days, with an increasing need for her nebulizers, but with shortness of breath and wheezing that was no longer able to be managed by her nebulizers today.  Patient denies any particular URI symptoms, fevers or chills, increasing cough productivity.    Patient states that her last hospitalization for COPD was several months ago, and on review of records this appears to have been in October of last year.    Review of Systems     Review of Systems   All other systems reviewed and are negative.      Past Medical, Surgical, Family, and Social History     She has a past medical history of Allergic rhinitis, Anxiety, Back pain, Cervical cancer screening, Closed compression fracture of L3 vertebra (HCC), Compression fracture of thoracic vertebrae, non-traumatic (HCC), COPD (chronic obstructive pulmonary disease) (HCC), COPD exacerbation (HCC), Elevated LDL cholesterol level, GERD (gastroesophageal reflux disease), History of mammogram, HLD (hyperlipidemia), Hoarseness of voice, Hypertension, Lung nodule:left, Mucus plugging of bronchi, Osteoarthritis, Osteomyelitis of left leg (HCC), Osteoporosis, S/P colonoscopy, Systolic CHF, chronic (HCC) EF 45%, Thoracic aorta atherosclerosis, Thyroid mass, Thyroid nodule, and Tibia fracture.  She has a past surgical history that includes  section; Appendectomy; Fixation Kyphoplasty (2017); other surgical history (2017); Thyroidectomy, partial (Right, 2017); Vocal cord injection (); laryngoplasty (Right, 2022); IR KYPHOPLASTY LUMBAR 1 VERTEBRAL

## 2025-03-24 NOTE — ED TRIAGE NOTES
Patient presents to the ED from home via EMS with c/o shortness of breath that began yesterday.  Patient has hx of COPD.  Hypoxic with SpO2 in the 70's upon EMS arrival.  Given duoneb by squad.  Patient is alert and oriented.

## 2025-03-25 LAB
ANION GAP SERPL CALCULATED.3IONS-SCNC: 7 MMOL/L (ref 3–16)
BASE EXCESS BLDA CALC-SCNC: 7.9 MMOL/L (ref -3–3)
BASOPHILS # BLD: 0 K/UL (ref 0–0.2)
BASOPHILS NFR BLD: 0.6 %
BUN SERPL-MCNC: 10 MG/DL (ref 7–20)
CALCIUM SERPL-MCNC: 8.4 MG/DL (ref 8.3–10.6)
CHLORIDE SERPL-SCNC: 95 MMOL/L (ref 99–110)
CO2 BLDA-SCNC: 37 MMOL/L
CO2 SERPL-SCNC: 30 MMOL/L (ref 21–32)
COHGB MFR BLDA: 1.1 % (ref 0–1.5)
CREAT SERPL-MCNC: 0.6 MG/DL (ref 0.6–1.2)
DEPRECATED RDW RBC AUTO: 13.5 % (ref 12.4–15.4)
EOSINOPHIL # BLD: 0 K/UL (ref 0–0.6)
EOSINOPHIL NFR BLD: 0.2 %
GFR SERPLBLD CREATININE-BSD FMLA CKD-EPI: >90 ML/MIN/{1.73_M2}
GLUCOSE SERPL-MCNC: 105 MG/DL (ref 70–99)
HCO3 BLDA-SCNC: 36 MMOL/L (ref 21–29)
HCT VFR BLD AUTO: 46.9 % (ref 36–48)
HGB BLD-MCNC: 15.5 G/DL (ref 12–16)
HGB BLDA-MCNC: 16.7 G/DL
LYMPHOCYTES # BLD: 0.9 K/UL (ref 1–5.1)
LYMPHOCYTES NFR BLD: 16 %
MCH RBC QN AUTO: 32.7 PG (ref 26–34)
MCHC RBC AUTO-ENTMCNC: 33 G/DL (ref 31–36)
MCV RBC AUTO: 99.3 FL (ref 80–100)
METHGB MFR BLDA: 0.1 % (ref 0–1.4)
MONOCYTES # BLD: 0.5 K/UL (ref 0–1.3)
MONOCYTES NFR BLD: 9.6 %
NEUTROPHILS # BLD: 4.2 K/UL (ref 1.7–7.7)
NEUTROPHILS NFR BLD: 73.6 %
PCO2 BLDA: 59 MMHG (ref 35–45)
PH BLDA: 7.39 [PH] (ref 7.35–7.45)
PLATELET # BLD AUTO: 292 K/UL (ref 135–450)
PMV BLD AUTO: 7.6 FL (ref 5–10.5)
PO2 BLDA: 71.6 MMHG (ref 75–108)
POTASSIUM SERPL-SCNC: 4.8 MMOL/L (ref 3.5–5.1)
RBC # BLD AUTO: 4.72 M/UL (ref 4–5.2)
SAO2 % BLDA: 95 % (ref 93–100)
SODIUM SERPL-SCNC: 132 MMOL/L (ref 136–145)
WBC # BLD AUTO: 5.7 K/UL (ref 4–11)

## 2025-03-25 PROCEDURE — 2060000000 HC ICU INTERMEDIATE R&B

## 2025-03-25 PROCEDURE — 94761 N-INVAS EAR/PLS OXIMETRY MLT: CPT

## 2025-03-25 PROCEDURE — 2700000000 HC OXYGEN THERAPY PER DAY

## 2025-03-25 PROCEDURE — 80048 BASIC METABOLIC PNL TOTAL CA: CPT

## 2025-03-25 PROCEDURE — 6370000000 HC RX 637 (ALT 250 FOR IP): Performed by: INTERNAL MEDICINE

## 2025-03-25 PROCEDURE — 6360000002 HC RX W HCPCS: Performed by: INTERNAL MEDICINE

## 2025-03-25 PROCEDURE — 36600 WITHDRAWAL OF ARTERIAL BLOOD: CPT

## 2025-03-25 PROCEDURE — 36415 COLL VENOUS BLD VENIPUNCTURE: CPT

## 2025-03-25 PROCEDURE — 6370000000 HC RX 637 (ALT 250 FOR IP)

## 2025-03-25 PROCEDURE — 85025 COMPLETE CBC W/AUTO DIFF WBC: CPT

## 2025-03-25 PROCEDURE — 2500000003 HC RX 250 WO HCPCS: Performed by: INTERNAL MEDICINE

## 2025-03-25 PROCEDURE — 94640 AIRWAY INHALATION TREATMENT: CPT

## 2025-03-25 PROCEDURE — 82803 BLOOD GASES ANY COMBINATION: CPT

## 2025-03-25 RX ORDER — BENZONATATE 100 MG/1
100 CAPSULE ORAL 3 TIMES DAILY PRN
Status: DISCONTINUED | OUTPATIENT
Start: 2025-03-25 | End: 2025-03-28 | Stop reason: HOSPADM

## 2025-03-25 RX ORDER — LEVALBUTEROL 1.25 MG/.5ML
1.25 SOLUTION, CONCENTRATE RESPIRATORY (INHALATION)
Status: DISCONTINUED | OUTPATIENT
Start: 2025-03-25 | End: 2025-03-27

## 2025-03-25 RX ADMIN — PREDNISONE 40 MG: 20 TABLET ORAL at 08:18

## 2025-03-25 RX ADMIN — Medication 3 MG: at 22:48

## 2025-03-25 RX ADMIN — ASPIRIN 81 MG: 81 TABLET, CHEWABLE ORAL at 08:18

## 2025-03-25 RX ADMIN — IPRATROPIUM BROMIDE 0.5 MG: 0.5 SOLUTION RESPIRATORY (INHALATION) at 20:19

## 2025-03-25 RX ADMIN — ENOXAPARIN SODIUM 30 MG: 100 INJECTION SUBCUTANEOUS at 08:18

## 2025-03-25 RX ADMIN — METOPROLOL SUCCINATE 12.5 MG: 25 TABLET, EXTENDED RELEASE ORAL at 19:33

## 2025-03-25 RX ADMIN — LEVALBUTEROL 1.25 MG: 1.25 SOLUTION, CONCENTRATE RESPIRATORY (INHALATION) at 14:23

## 2025-03-25 RX ADMIN — GUAIFENESIN SYRUP AND DEXTROMETHORPHAN 5 ML: 100; 10 SYRUP ORAL at 17:43

## 2025-03-25 RX ADMIN — LEVALBUTEROL 1.25 MG: 1.25 SOLUTION, CONCENTRATE RESPIRATORY (INHALATION) at 16:40

## 2025-03-25 RX ADMIN — METOPROLOL SUCCINATE 12.5 MG: 25 TABLET, EXTENDED RELEASE ORAL at 08:18

## 2025-03-25 RX ADMIN — LISINOPRIL 2.5 MG: 2.5 TABLET ORAL at 17:57

## 2025-03-25 RX ADMIN — IPRATROPIUM BROMIDE 0.5 MG: 0.5 SOLUTION RESPIRATORY (INHALATION) at 14:22

## 2025-03-25 RX ADMIN — MONTELUKAST 10 MG: 10 TABLET, FILM COATED ORAL at 19:33

## 2025-03-25 RX ADMIN — IPRATROPIUM BROMIDE 0.5 MG: 0.5 SOLUTION RESPIRATORY (INHALATION) at 16:40

## 2025-03-25 RX ADMIN — IPRATROPIUM BROMIDE 0.5 MG: 0.5 SOLUTION RESPIRATORY (INHALATION) at 09:06

## 2025-03-25 RX ADMIN — SODIUM CHLORIDE, PRESERVATIVE FREE 10 ML: 5 INJECTION INTRAVENOUS at 08:18

## 2025-03-25 RX ADMIN — GUAIFENESIN SYRUP AND DEXTROMETHORPHAN 5 ML: 100; 10 SYRUP ORAL at 12:12

## 2025-03-25 RX ADMIN — BENZONATATE 100 MG: 100 CAPSULE ORAL at 15:10

## 2025-03-25 RX ADMIN — LEVALBUTEROL 1.25 MG: 1.25 SOLUTION, CONCENTRATE RESPIRATORY (INHALATION) at 20:19

## 2025-03-25 RX ADMIN — LEVALBUTEROL 1.25 MG: 1.25 SOLUTION, CONCENTRATE RESPIRATORY (INHALATION) at 03:14

## 2025-03-25 NOTE — PROGRESS NOTES
4 Eyes Skin Assessment     NAME:  Charlene Nazario  YOB: 1950  MEDICAL RECORD NUMBER:  [id number]    The patient is being assessed for  Admission    I agree that at least one RN has performed a thorough Head to Toe Skin Assessment on the patient. ALL assessment sites listed below have been assessed.      Areas assessed by both nurses:    Head, Face, Ears, Shoulders, Back, Chest, Arms, Elbows, Hands, Sacrum. Buttock, Coccyx, Ischium, Legs. Feet and Heels, and Under Medical Devices         Does the Patient have a Wound? Yes wound(s) were present on assessment. LDA wound assessment was Initiated and completed by RN     Abrasion to left shin. No drainage or odor present. Healing appropriately    Oscar Prevention initiated by RN: No  Wound Care Orders initiated by RN: No    Pressure Injury (Stage 3,4, Unstageable, DTI, NWPT, and Complex wounds) if present, place Wound referral order by RN under : No    New Ostomies, if present place, Ostomy referral order under : No     Nurse 1 eSignature: Electronically signed by Christina Summerlin, RN on 3/24/25 at 11:08 PM EDT    **SHARE this note so that the co-signing nurse can place an eSignature**    Nurse 2 eSignature: {Esignature:[id number]}

## 2025-03-25 NOTE — PLAN OF CARE
Problem: Chronic Conditions and Co-morbidities  Goal: Patient's chronic conditions and co-morbidity symptoms are monitored and maintained or improved  3/24/2025 2256 by Summerlin, Christina, RN  Outcome: Progressing     Problem: Discharge Planning  Goal: Discharge to home or other facility with appropriate resources  3/24/2025 2256 by Summerlin, Christina, RN  Outcome: Progressing     Problem: Safety - Adult  Goal: Free from fall injury  3/24/2025 2256 by Summerlin, Christina, RN  Outcome: Progressing

## 2025-03-25 NOTE — PROGRESS NOTES
Hospital Medicine Progress Note      Date of Admission: 3/24/2025  Hospital Day: 2    Chief Admission Complaint: Shortness of breath     Subjective: Patient seen and examined at bedside  Continues to have difficulty breathing but better than what she came in with  Currently on 3L NC    Presenting Admission History:       74-year-old female with medical history significant for COPD not on home oxygen, GERD, osteoporosis, hyperlipidemia, essential hypertension, anxiety, chronic HFpEF who presented to the [facility] ER on 3/24 with worsening shortness of breath of 2 to 3 days duration.  Denied any weight gain, leg swelling, PND, orthopnea.  On presentation, mildly hypertensive, tachycardic, tachypneic.  VBG pH 7.3, pCO2 74, pO2 <30, sodium 130, BNP 93, Trop 15--> 16, flu and COVID-negative.  Chest x-ray with trace bilateral pleural effusion.  She was admitted for further management of her COPD exacerbation    Assessment/Plan:      Acute hypoxic hypercapnic respiratory failure  Acute COPD exacerbation  Had elevated pCO2 on VBG at the time of presentation compared to her baseline with a PaO2 of less than 30.  Will try to wean off oxygen as tolerated with SpO2 goal of 88 to 92%  Follows up with Dr. Nazario pulmonologist due to frequent flares.  Pulmonology consulted, appreciate recs  Continue nebulization treatment, Singulair, steroids  No indication for antibiotics at this time  Repeat ABG on 3/25 reviewed, improving  Patient refused BiPAP at the time of admission due to feeling uncomfortable and states it worsened her shortness of breath.    Essential hypertension  Chronic HFpEF  Continue lisinopril 2.5 mg, Toprol-XL 12.5    Goals of care and CODE STATUS  Patient would like to be full code and continue aggressive management    Physical Exam Performed:      General appearance:  No apparent distress  Respiratory: Coarse rhonchi scattered throughout bilateral lung fields  Cardiovascular:  Regular rate and

## 2025-03-25 NOTE — RT PROTOCOL NOTE
RT Inhaler-Nebulizer Bronchodilator Protocol Note    There is a bronchodilator order in the chart from a provider indicating to follow the RT Bronchodilator Protocol and there is an “Initiate RT Inhaler-Nebulizer Bronchodilator Protocol” order as well (see protocol at bottom of note).    CXR Findings:  XR CHEST PORTABLE  Result Date: 3/24/2025  Trace bilateral pleural effusions. Electronically signed by Nilesh Hector      The findings from the last RT Protocol Assessment were as follows:   History Pulmonary Disease: Chronic pulmonary disease  Respiratory Pattern: Dyspnea on exertion or RR 21-25 bpm  Breath Sounds: Intermittent or unilateral wheezes  Cough: Strong, spontaneous, non-productive  Indication for Bronchodilator Therapy:    Bronchodilator Assessment Score: 8    Aerosolized bronchodilator medication orders have been revised according to the RT Inhaler-Nebulizer Bronchodilator Protocol below.    Respiratory Therapist to perform RT Therapy Protocol Assessment initially then follow the protocol.  Repeat RT Therapy Protocol Assessment PRN for score 0-3 or on second treatment, BID, and PRN for scores above 3.    No Indications - adjust the frequency to every 6 hours PRN wheezing or bronchospasm, if no treatments needed after 48 hours then discontinue using Per Protocol order mode.     If indication present, adjust the RT bronchodilator orders based on the Bronchodilator Assessment Score as indicated below.  Use Inhaler orders unless patient has one or more of the following: on home nebulizer, not able to hold breath for 10 seconds, is not alert and oriented, cannot activate and use MDI correctly, or respiratory rate 25 breaths per minute or more, then use the equivalent nebulizer order(s) with same Frequency and PRN reasons based on the score.  If a patient is on this medication at home then do not decrease Frequency below that used at home.    0-3 - enter or revise RT bronchodilator order(s) to equivalent RT  Bronchodilator order with Frequency of every 4 hours PRN for wheezing or increased work of breathing using Per Protocol order mode.        4-6 - enter or revise RT Bronchodilator order(s) to two equivalent RT bronchodilator orders with one order with BID Frequency and one order with Frequency of every 4 hours PRN wheezing or increased work of breathing using Per Protocol order mode.        7-10 - enter or revise RT Bronchodilator order(s) to two equivalent RT bronchodilator orders with one order with TID Frequency and one order with Frequency of every 4 hours PRN wheezing or increased work of breathing using Per Protocol order mode.       11-13 - enter or revise RT Bronchodilator order(s) to one equivalent RT bronchodilator order with QID Frequency and an Albuterol order with Frequency of every 4 hours PRN wheezing or increased work of breathing using Per Protocol order mode.      Greater than 13 - enter or revise RT Bronchodilator order(s) to one equivalent RT bronchodilator order with every 4 hours Frequency and an Albuterol order with Frequency of every 2 hours PRN wheezing or increased work of breathing using Per Protocol order mode.     RT to enter RT Home Evaluation for COPD & MDI Assessment order using Per Protocol order mode.    Electronically signed by Moise Cardenas RCP on 3/24/2025 at 8:00 PM

## 2025-03-25 NOTE — PLAN OF CARE
Problem: Chronic Conditions and Co-morbidities  Goal: Patient's chronic conditions and co-morbidity symptoms are monitored and maintained or improved  Outcome: Progressing  Flowsheets (Taken 3/25/2025 1658)  Care Plan - Patient's Chronic Conditions and Co-Morbidity Symptoms are Monitored and Maintained or Improved:   Monitor and assess patient's chronic conditions and comorbid symptoms for stability, deterioration, or improvement   Collaborate with multidisciplinary team to address chronic and comorbid conditions and prevent exacerbation or deterioration     Problem: Discharge Planning  Goal: Discharge to home or other facility with appropriate resources  Outcome: Progressing  Flowsheets (Taken 3/25/2025 1658)  Discharge to home or other facility with appropriate resources:   Identify barriers to discharge with patient and caregiver   Identify discharge learning needs (meds, wound care, etc)   Arrange for needed discharge resources and transportation as appropriate     Problem: Safety - Adult  Goal: Free from fall injury  Outcome: Progressing  Flowsheets (Taken 3/25/2025 1658)  Free From Fall Injury: Instruct family/caregiver on patient safety  Note: Pt is a Fall Risk. See Lundberg Fall Risk Score. Pt bed in low position and side rails up. Call light and belongings in reach. Pt encouraged to call for assistance. Will continue with hourly rounds for PO intake, pain needs, toileting, and repositioning as needed.

## 2025-03-26 LAB
ANION GAP SERPL CALCULATED.3IONS-SCNC: 7 MMOL/L (ref 3–16)
BASOPHILS # BLD: 0 K/UL (ref 0–0.2)
BASOPHILS NFR BLD: 0.7 %
BUN SERPL-MCNC: 13 MG/DL (ref 7–20)
CALCIUM SERPL-MCNC: 8.3 MG/DL (ref 8.3–10.6)
CHLORIDE SERPL-SCNC: 95 MMOL/L (ref 99–110)
CO2 SERPL-SCNC: 31 MMOL/L (ref 21–32)
CREAT SERPL-MCNC: 0.5 MG/DL (ref 0.6–1.2)
DEPRECATED RDW RBC AUTO: 13.2 % (ref 12.4–15.4)
EOSINOPHIL # BLD: 0.1 K/UL (ref 0–0.6)
EOSINOPHIL NFR BLD: 1.4 %
GFR SERPLBLD CREATININE-BSD FMLA CKD-EPI: >90 ML/MIN/{1.73_M2}
GLUCOSE SERPL-MCNC: 88 MG/DL (ref 70–99)
HCT VFR BLD AUTO: 43 % (ref 36–48)
HGB BLD-MCNC: 14.6 G/DL (ref 12–16)
LYMPHOCYTES # BLD: 1.8 K/UL (ref 1–5.1)
LYMPHOCYTES NFR BLD: 27.5 %
MCH RBC QN AUTO: 32.8 PG (ref 26–34)
MCHC RBC AUTO-ENTMCNC: 33.8 G/DL (ref 31–36)
MCV RBC AUTO: 97.1 FL (ref 80–100)
MONOCYTES # BLD: 0.6 K/UL (ref 0–1.3)
MONOCYTES NFR BLD: 9 %
NEUTROPHILS # BLD: 4.1 K/UL (ref 1.7–7.7)
NEUTROPHILS NFR BLD: 61.4 %
PLATELET # BLD AUTO: 272 K/UL (ref 135–450)
PMV BLD AUTO: 7.3 FL (ref 5–10.5)
POTASSIUM SERPL-SCNC: 4.1 MMOL/L (ref 3.5–5.1)
RBC # BLD AUTO: 4.43 M/UL (ref 4–5.2)
SODIUM SERPL-SCNC: 133 MMOL/L (ref 136–145)
WBC # BLD AUTO: 6.6 K/UL (ref 4–11)

## 2025-03-26 PROCEDURE — 2060000000 HC ICU INTERMEDIATE R&B

## 2025-03-26 PROCEDURE — 6370000000 HC RX 637 (ALT 250 FOR IP): Performed by: INTERNAL MEDICINE

## 2025-03-26 PROCEDURE — 97165 OT EVAL LOW COMPLEX 30 MIN: CPT

## 2025-03-26 PROCEDURE — 97535 SELF CARE MNGMENT TRAINING: CPT

## 2025-03-26 PROCEDURE — 94761 N-INVAS EAR/PLS OXIMETRY MLT: CPT

## 2025-03-26 PROCEDURE — 99223 1ST HOSP IP/OBS HIGH 75: CPT | Performed by: INTERNAL MEDICINE

## 2025-03-26 PROCEDURE — 80048 BASIC METABOLIC PNL TOTAL CA: CPT

## 2025-03-26 PROCEDURE — 97530 THERAPEUTIC ACTIVITIES: CPT

## 2025-03-26 PROCEDURE — 6360000002 HC RX W HCPCS: Performed by: INTERNAL MEDICINE

## 2025-03-26 PROCEDURE — 97116 GAIT TRAINING THERAPY: CPT

## 2025-03-26 PROCEDURE — 94640 AIRWAY INHALATION TREATMENT: CPT

## 2025-03-26 PROCEDURE — 97161 PT EVAL LOW COMPLEX 20 MIN: CPT

## 2025-03-26 PROCEDURE — 2500000003 HC RX 250 WO HCPCS: Performed by: INTERNAL MEDICINE

## 2025-03-26 PROCEDURE — 36415 COLL VENOUS BLD VENIPUNCTURE: CPT

## 2025-03-26 PROCEDURE — 85025 COMPLETE CBC W/AUTO DIFF WBC: CPT

## 2025-03-26 PROCEDURE — 2700000000 HC OXYGEN THERAPY PER DAY

## 2025-03-26 RX ORDER — AZITHROMYCIN 250 MG/1
250 TABLET, FILM COATED ORAL DAILY
Status: DISCONTINUED | OUTPATIENT
Start: 2025-03-26 | End: 2025-03-28 | Stop reason: HOSPADM

## 2025-03-26 RX ORDER — PREDNISONE 20 MG/1
20 TABLET ORAL ONCE
Status: COMPLETED | OUTPATIENT
Start: 2025-03-26 | End: 2025-03-26

## 2025-03-26 RX ADMIN — IPRATROPIUM BROMIDE 0.5 MG: 0.5 SOLUTION RESPIRATORY (INHALATION) at 21:27

## 2025-03-26 RX ADMIN — PREDNISONE 40 MG: 20 TABLET ORAL at 07:36

## 2025-03-26 RX ADMIN — METOPROLOL SUCCINATE 12.5 MG: 25 TABLET, EXTENDED RELEASE ORAL at 07:36

## 2025-03-26 RX ADMIN — ENOXAPARIN SODIUM 30 MG: 100 INJECTION SUBCUTANEOUS at 07:36

## 2025-03-26 RX ADMIN — LEVALBUTEROL 1.25 MG: 1.25 SOLUTION, CONCENTRATE RESPIRATORY (INHALATION) at 16:49

## 2025-03-26 RX ADMIN — IPRATROPIUM BROMIDE 0.5 MG: 0.5 SOLUTION RESPIRATORY (INHALATION) at 09:34

## 2025-03-26 RX ADMIN — LISINOPRIL 2.5 MG: 2.5 TABLET ORAL at 18:15

## 2025-03-26 RX ADMIN — ASPIRIN 81 MG: 81 TABLET, CHEWABLE ORAL at 07:36

## 2025-03-26 RX ADMIN — PREDNISONE 20 MG: 20 TABLET ORAL at 12:53

## 2025-03-26 RX ADMIN — LEVALBUTEROL 1.25 MG: 1.25 SOLUTION, CONCENTRATE RESPIRATORY (INHALATION) at 13:08

## 2025-03-26 RX ADMIN — IPRATROPIUM BROMIDE 0.5 MG: 0.5 SOLUTION RESPIRATORY (INHALATION) at 16:49

## 2025-03-26 RX ADMIN — METOPROLOL SUCCINATE 12.5 MG: 25 TABLET, EXTENDED RELEASE ORAL at 19:21

## 2025-03-26 RX ADMIN — LEVALBUTEROL 1.25 MG: 1.25 SOLUTION, CONCENTRATE RESPIRATORY (INHALATION) at 09:34

## 2025-03-26 RX ADMIN — MONTELUKAST 10 MG: 10 TABLET, FILM COATED ORAL at 19:21

## 2025-03-26 RX ADMIN — LEVALBUTEROL 1.25 MG: 1.25 SOLUTION, CONCENTRATE RESPIRATORY (INHALATION) at 21:27

## 2025-03-26 RX ADMIN — SODIUM CHLORIDE, PRESERVATIVE FREE 10 ML: 5 INJECTION INTRAVENOUS at 19:21

## 2025-03-26 RX ADMIN — AZITHROMYCIN DIHYDRATE 250 MG: 250 TABLET, FILM COATED ORAL at 12:53

## 2025-03-26 RX ADMIN — SODIUM CHLORIDE, PRESERVATIVE FREE 10 ML: 5 INJECTION INTRAVENOUS at 07:37

## 2025-03-26 RX ADMIN — IPRATROPIUM BROMIDE 0.5 MG: 0.5 SOLUTION RESPIRATORY (INHALATION) at 13:08

## 2025-03-26 RX ADMIN — Medication 3 MG: at 22:48

## 2025-03-26 NOTE — CONSULTS
[facility]   PULMONARY AND CRITICAL CARE MEDICINE  PULMONARY MEDICINE PROGRESS NOTE         Reason for Consult: End-stage COPD  Requesting Physician: Dr. Santos    Subjective:   CHIEF COMPLAINT / HPI:                The patient is a 74 y.o. female with significant past medical history of COPD followed by Dr. Nazario presents with complaints of dyspnea on exertion associated with wheezing and cough productive of white phlegm that started over the weekend but worsened sufficiently on Monday to prompt her to come to the emergency room.  She had no associated fevers, chills, headache, rhinorrhea, sore throat, or myalgias.  She is taking Anoro and Arnuity and does have rescue albuterol at home that she uses when needed.    On review of Dr. Nazario's notes it sounds like she has poorly controlled COPD at baseline despite triple therapy and has contemplated Daliresp, azithromycin, chronic prednisone, and Dupixent.  His notes indicate that he favors Dupixent although the patient is unsure about giving herself injections.  Eosinophil on presentation was 500 which would qualify her for Dupixent    Past Medical History:      Diagnosis Date    Allergic rhinitis     Anxiety 02/14/2022    Back pain     Chronic:under care of spine specialist:Dr. Adames    Cervical cancer screening 2010    Nml per pt'    Closed compression fracture of L3 vertebra (HCC) 02/08/2024    Compression fracture of thoracic vertebrae, non-traumatic (HCC)     under care of endo:Dr. Zhu    COPD (chronic obstructive pulmonary disease) ([facility])     under care of pulmo:Dr. Nazario    COPD exacerbation ([facility]) 04/14/2017    Elevated LDL cholesterol level     GERD (gastroesophageal reflux disease)     History of mammogram 2012;5/17/17    Nml per pt'. 5/17/17=negative.    HLD (hyperlipidemia) 09/03/2021    Hoarseness of voice 10/04/2017    Hypertension     Lung nodule:left 05/04/2017     1.2 cm indeterminate left upper lobe pulmonary

## 2025-03-26 NOTE — PLAN OF CARE
Problem: Chronic Conditions and Co-morbidities  Goal: Patient's chronic conditions and co-morbidity symptoms are monitored and maintained or improved  3/26/2025 1936 by Thomas Escalera RN  Outcome: Progressing  Flowsheets (Taken 3/26/2025 1936)  Care Plan - Patient's Chronic Conditions and Co-Morbidity Symptoms are Monitored and Maintained or Improved:   Monitor and assess patient's chronic conditions and comorbid symptoms for stability, deterioration, or improvement   Collaborate with multidisciplinary team to address chronic and comorbid conditions and prevent exacerbation or deterioration   Update acute care plan with appropriate goals if chronic or comorbid symptoms are exacerbated and prevent overall improvement and discharge     Problem: Discharge Planning  Goal: Discharge to home or other facility with appropriate resources  3/26/2025 1936 by Thomas Escalera RN  Outcome: Progressing  Flowsheets (Taken 3/26/2025 1936)  Discharge to home or other facility with appropriate resources:   Identify barriers to discharge with patient and caregiver   Identify discharge learning needs (meds, wound care, etc)   Refer to discharge planning if patient needs post-hospital services based on physician order or complex needs related to functional status, cognitive ability or social support system     Problem: Safety - Adult  Goal: Free from fall injury  3/26/2025 1936 by Thomas Escalera RN  Outcome: Progressing  Flowsheets (Taken 3/26/2025 1936)  Free From Fall Injury:   Instruct family/caregiver on patient safety   Based on caregiver fall risk screen, instruct family/caregiver to ask for assistance with transferring infant if caregiver noted to have fall risk factors

## 2025-03-26 NOTE — PROGRESS NOTES
Physical /Occupational Therapy    Orders received, chart reviewed. Attempted therapy evaluation, pt agreeable however requesting therapy return later this date for follow up. Will follow up as treatment schedule allows.     Lila Mueller, PT, DPT  BALWINDER Rhoades, OTR/L [id number]

## 2025-03-26 NOTE — PLAN OF CARE
Problem: Chronic Conditions and Co-morbidities  Goal: Patient's chronic conditions and co-morbidity symptoms are monitored and maintained or improved  Outcome: Progressing  Flowsheets (Taken 3/25/2025 2007 by Thomas Escalera RN)  Care Plan - Patient's Chronic Conditions and Co-Morbidity Symptoms are Monitored and Maintained or Improved: Monitor and assess patient's chronic conditions and comorbid symptoms for stability, deterioration, or improvement     Problem: Discharge Planning  Goal: Discharge to home or other facility with appropriate resources  Outcome: Progressing  Flowsheets (Taken 3/25/2025 2007 by Thomas Escalera RN)  Discharge to home or other facility with appropriate resources:   Identify barriers to discharge with patient and caregiver   Identify discharge learning needs (meds, wound care, etc)   Refer to discharge planning if patient needs post-hospital services based on physician order or complex needs related to functional status, cognitive ability or social support system     Problem: Safety - Adult  Goal: Free from fall injury  Outcome: Progressing  Flowsheets (Taken 3/26/2025 170)  Free From Fall Injury: Instruct family/caregiver on patient safety

## 2025-03-26 NOTE — PROGRESS NOTES
Physical Therapy  Facility/Department: [facility]  Physical Therapy Initial Assessment/ Discharge    Name: Charlene Nazario  : 1950  MRN: [id number]  Date of Service: 3/26/2025    Discharge Recommendations:  Home with assist PRN   PT Equipment Recommendations  Equipment Needed: No      Patient Diagnosis(es): The primary encounter diagnosis was COPD exacerbation (HCC). Diagnoses of Acute respiratory failure with hypoxia and hypercapnia (HCC) and Shortness of breath were also pertinent to this visit.  Past Medical History:  has a past medical history of Allergic rhinitis, Anxiety, Back pain, Cervical cancer screening, Closed compression fracture of L3 vertebra (HCC), Compression fracture of thoracic vertebrae, non-traumatic (HCC), COPD (chronic obstructive pulmonary disease) (HCC), COPD exacerbation (HCC), Elevated LDL cholesterol level, GERD (gastroesophageal reflux disease), History of mammogram, HLD (hyperlipidemia), Hoarseness of voice, Hypertension, Lung nodule:left, Mucus plugging of bronchi, Osteoarthritis, Osteomyelitis of left leg (HCC), Osteoporosis, S/P colonoscopy, Systolic CHF, chronic (HCC) EF 45%, Thoracic aorta atherosclerosis, Thyroid mass, Thyroid nodule, and Tibia fracture.  Past Surgical History:  has a past surgical history that includes  section; Appendectomy; Fixation Kyphoplasty (2017); other surgical history (2017); Thyroidectomy, partial (Right, 2017); Vocal cord injection (); laryngoplasty (Right, 2022); IR KYPHOPLASTY LUMBAR 1 VERTEBRAL BODY (2024); and Tibia fracture surgery (Left, ).    Assessment  Assessment: Pt is a 75 yo female from home with spouse and IND at baseline. Pt admitted for SOB now on 2-3Lo2. On eval pt demonstrating no acute deficits and is performing all mobility close to baseline function. Transfers and ambulation performed with sup-mod I. Pt expressing no concerns for return home at dc and demonstrates no further needs

## 2025-03-26 NOTE — PLAN OF CARE
Problem: Chronic Conditions and Co-morbidities  Goal: Patient's chronic conditions and co-morbidity symptoms are monitored and maintained or improved  3/25/2025 2007 by Thomas Escalera RN  Outcome: Progressing  Flowsheets (Taken 3/25/2025 2007)  Care Plan - Patient's Chronic Conditions and Co-Morbidity Symptoms are Monitored and Maintained or Improved: Monitor and assess patient's chronic conditions and comorbid symptoms for stability, deterioration, or improvement     Problem: Discharge Planning  Goal: Discharge to home or other facility with appropriate resources  3/25/2025 2007 by Thomas Escalera RN  Outcome: Progressing  Flowsheets (Taken 3/25/2025 2007)  Discharge to home or other facility with appropriate resources:   Identify barriers to discharge with patient and caregiver   Identify discharge learning needs (meds, wound care, etc)   Refer to discharge planning if patient needs post-hospital services based on physician order or complex needs related to functional status, cognitive ability or social support system     Problem: Safety - Adult  Goal: Free from fall injury  3/25/2025 2007 by Thomas Escalera RN  Outcome: Progressing  Flowsheets (Taken 3/25/2025 2007)  Free From Fall Injury:   Instruct family/caregiver on patient safety   Based on caregiver fall risk screen, instruct family/caregiver to ask for assistance with transferring infant if caregiver noted to have fall risk factors

## 2025-03-26 NOTE — PROGRESS NOTES
Occupational Therapy  Facility/Department: [address]  Occupational Therapy Initial Assessment /Treatment/Discharge     Name: Charlene Nazario  : 1950  MRN: [id number]  Date of Service: 3/26/2025    Discharge Recommendations:  Home with assist PRN  OT Equipment Recommendations  Equipment Needed: Yes  Mobility Devices: ADL Assistive Devices  ADL Assistive Devices: Reacher;Long-handled Sponge;Shower Chair with back       Patient Diagnosis(es): The primary encounter diagnosis was COPD exacerbation (HCC). Diagnoses of Acute respiratory failure with hypoxia and hypercapnia (HCC) and Shortness of breath were also pertinent to this visit.  Past Medical History:  has a past medical history of Allergic rhinitis, Anxiety, Back pain, Cervical cancer screening, Closed compression fracture of L3 vertebra (HCC), Compression fracture of thoracic vertebrae, non-traumatic (HCC), COPD (chronic obstructive pulmonary disease) (HCC), COPD exacerbation (HCC), Elevated LDL cholesterol level, GERD (gastroesophageal reflux disease), History of mammogram, HLD (hyperlipidemia), Hoarseness of voice, Hypertension, Lung nodule:left, Mucus plugging of bronchi, Osteoarthritis, Osteomyelitis of left leg (HCC), Osteoporosis, S/P colonoscopy, Systolic CHF, chronic ([facility]) EF 45%, Thoracic aorta atherosclerosis, Thyroid mass, Thyroid nodule, and Tibia fracture.  Past Surgical History:  has a past surgical history that includes  section; Appendectomy; Fixation Kyphoplasty (2017); other surgical history (2017); Thyroidectomy, partial (Right, 2017); Vocal cord injection (); laryngoplasty (Right, 2022); IR KYPHOPLASTY LUMBAR 1 VERTEBRAL BODY (2024); and Tibia fracture surgery (Left, ).           Assessment  Assessment: Pt is from home with , and independent.  Pt is now on 2L O2 and demonstrates MERAZ with ADL and functional mobility.  Pt independent with most activities.  Pt was educated on energy

## 2025-03-27 ENCOUNTER — APPOINTMENT (OUTPATIENT)
Age: 75
End: 2025-03-27
Attending: INTERNAL MEDICINE
Payer: MEDICARE

## 2025-03-27 LAB
ANION GAP SERPL CALCULATED.3IONS-SCNC: 7 MMOL/L (ref 3–16)
BASOPHILS # BLD: 0 K/UL (ref 0–0.2)
BASOPHILS NFR BLD: 0.5 %
BUN SERPL-MCNC: 16 MG/DL (ref 7–20)
CALCIUM SERPL-MCNC: 9 MG/DL (ref 8.3–10.6)
CHLORIDE SERPL-SCNC: 95 MMOL/L (ref 99–110)
CO2 SERPL-SCNC: 30 MMOL/L (ref 21–32)
CREAT SERPL-MCNC: 0.5 MG/DL (ref 0.6–1.2)
DEPRECATED RDW RBC AUTO: 13.2 % (ref 12.4–15.4)
ECHO AO ROOT DIAM: 3.6 CM
ECHO AO ROOT INDEX: 2.47 CM/M2
ECHO AV AREA PEAK VELOCITY: 2.3 CM2
ECHO AV AREA VTI: 1.9 CM2
ECHO AV AREA/BSA PEAK VELOCITY: 1.6 CM2/M2
ECHO AV AREA/BSA VTI: 1.3 CM2/M2
ECHO AV MEAN GRADIENT: 4 MMHG
ECHO AV MEAN VELOCITY: 0.8 M/S
ECHO AV PEAK GRADIENT: 6 MMHG
ECHO AV PEAK VELOCITY: 1.2 M/S
ECHO AV VELOCITY RATIO: 0.67
ECHO AV VTI: 22.4 CM
ECHO BSA: 1.46 M2
ECHO IVC EXP: 1.9 CM
ECHO IVC INSP: 0.5 CM
ECHO LA AREA 2C: 9.5 CM2
ECHO LA AREA 4C: 8 CM2
ECHO LA MAJOR AXIS: 4.2 CM
ECHO LA MINOR AXIS: 3.4 CM
ECHO LA VOL MOD A2C: 21 ML (ref 22–52)
ECHO LA VOL MOD A4C: 12 ML (ref 22–52)
ECHO LA VOLUME INDEX MOD A2C: 14 ML/M2 (ref 16–34)
ECHO LA VOLUME INDEX MOD A4C: 8 ML/M2 (ref 16–34)
ECHO LV E' LATERAL VELOCITY: 9.9 CM/S
ECHO LV E' SEPTAL VELOCITY: 5.66 CM/S
ECHO LV EDV A2C: 100 ML
ECHO LV EDV A4C: 74 ML
ECHO LV EDV INDEX A4C: 51 ML/M2
ECHO LV EDV NDEX A2C: 68 ML/M2
ECHO LV EF PHYSICIAN: 50 %
ECHO LV EJECTION FRACTION A2C: 59 %
ECHO LV EJECTION FRACTION A4C: 37 %
ECHO LV EJECTION FRACTION BIPLANE: 50 % (ref 55–100)
ECHO LV ESV A2C: 41 ML
ECHO LV ESV A4C: 46 ML
ECHO LV ESV INDEX A2C: 28 ML/M2
ECHO LV ESV INDEX A4C: 32 ML/M2
ECHO LV FRACTIONAL SHORTENING: 23 % (ref 28–44)
ECHO LV INTERNAL DIMENSION DIASTOLE INDEX: 1.78 CM/M2
ECHO LV INTERNAL DIMENSION DIASTOLIC: 2.6 CM (ref 3.9–5.3)
ECHO LV INTERNAL DIMENSION SYSTOLIC INDEX: 1.37 CM/M2
ECHO LV INTERNAL DIMENSION SYSTOLIC: 2 CM
ECHO LV IVSD: 0.7 CM (ref 0.6–0.9)
ECHO LV MASS 2D: 43.3 G (ref 67–162)
ECHO LV MASS INDEX 2D: 29.7 G/M2 (ref 43–95)
ECHO LV POSTERIOR WALL DIASTOLIC: 0.8 CM (ref 0.6–0.9)
ECHO LV RELATIVE WALL THICKNESS RATIO: 0.62
ECHO LVOT AREA: 3.5 CM2
ECHO LVOT AV VTI INDEX: 0.55
ECHO LVOT DIAM: 2.1 CM
ECHO LVOT MEAN GRADIENT: 1 MMHG
ECHO LVOT PEAK GRADIENT: 3 MMHG
ECHO LVOT PEAK VELOCITY: 0.8 M/S
ECHO LVOT STROKE VOLUME INDEX: 29.2 ML/M2
ECHO LVOT SV: 42.6 ML
ECHO LVOT VTI: 12.3 CM
ECHO MV REGURGITANT PEAK GRADIENT: 154 MMHG
ECHO MV REGURGITANT PEAK VELOCITY: 6.2 M/S
ECHO RA AREA 4C: 8.5 CM2
ECHO RA END SYSTOLIC VOLUME APICAL 4 CHAMBER INDEX BSA: 10 ML/M2
ECHO RA VOLUME: 14 ML
ECHO RV BASAL DIMENSION: 2.7 CM
ECHO RV FREE WALL PEAK S': 12.6 CM/S
ECHO RV LONGITUDINAL DIMENSION: 6.9 CM
ECHO RV MID DIMENSION: 2.3 CM
ECHO RV TAPSE: 2.1 CM (ref 1.7–?)
ECHO TV REGURGITANT MAX VELOCITY: 2.14 M/S
ECHO TV REGURGITANT PEAK GRADIENT: 18 MMHG
EOSINOPHIL # BLD: 0 K/UL (ref 0–0.6)
EOSINOPHIL NFR BLD: 0.4 %
GFR SERPLBLD CREATININE-BSD FMLA CKD-EPI: >90 ML/MIN/{1.73_M2}
GLUCOSE SERPL-MCNC: 92 MG/DL (ref 70–99)
HCT VFR BLD AUTO: 42.2 % (ref 36–48)
HGB BLD-MCNC: 14.3 G/DL (ref 12–16)
LYMPHOCYTES # BLD: 1.8 K/UL (ref 1–5.1)
LYMPHOCYTES NFR BLD: 25 %
MCH RBC QN AUTO: 32.8 PG (ref 26–34)
MCHC RBC AUTO-ENTMCNC: 33.9 G/DL (ref 31–36)
MCV RBC AUTO: 96.6 FL (ref 80–100)
MONOCYTES # BLD: 0.7 K/UL (ref 0–1.3)
MONOCYTES NFR BLD: 9.3 %
NEUTROPHILS # BLD: 4.6 K/UL (ref 1.7–7.7)
NEUTROPHILS NFR BLD: 64.8 %
PLATELET # BLD AUTO: 273 K/UL (ref 135–450)
PMV BLD AUTO: 7.1 FL (ref 5–10.5)
POTASSIUM SERPL-SCNC: 4.5 MMOL/L (ref 3.5–5.1)
RBC # BLD AUTO: 4.37 M/UL (ref 4–5.2)
SODIUM SERPL-SCNC: 132 MMOL/L (ref 136–145)
WBC # BLD AUTO: 7.1 K/UL (ref 4–11)

## 2025-03-27 PROCEDURE — 6370000000 HC RX 637 (ALT 250 FOR IP)

## 2025-03-27 PROCEDURE — 6370000000 HC RX 637 (ALT 250 FOR IP): Performed by: INTERNAL MEDICINE

## 2025-03-27 PROCEDURE — 2700000000 HC OXYGEN THERAPY PER DAY

## 2025-03-27 PROCEDURE — 80048 BASIC METABOLIC PNL TOTAL CA: CPT

## 2025-03-27 PROCEDURE — 36415 COLL VENOUS BLD VENIPUNCTURE: CPT

## 2025-03-27 PROCEDURE — 6360000002 HC RX W HCPCS: Performed by: INTERNAL MEDICINE

## 2025-03-27 PROCEDURE — 2060000000 HC ICU INTERMEDIATE R&B

## 2025-03-27 PROCEDURE — 2500000003 HC RX 250 WO HCPCS: Performed by: INTERNAL MEDICINE

## 2025-03-27 PROCEDURE — 93306 TTE W/DOPPLER COMPLETE: CPT | Performed by: INTERNAL MEDICINE

## 2025-03-27 PROCEDURE — 99232 SBSQ HOSP IP/OBS MODERATE 35: CPT | Performed by: INTERNAL MEDICINE

## 2025-03-27 PROCEDURE — 94640 AIRWAY INHALATION TREATMENT: CPT

## 2025-03-27 PROCEDURE — 85025 COMPLETE CBC W/AUTO DIFF WBC: CPT

## 2025-03-27 PROCEDURE — 93306 TTE W/DOPPLER COMPLETE: CPT

## 2025-03-27 PROCEDURE — 94761 N-INVAS EAR/PLS OXIMETRY MLT: CPT

## 2025-03-27 RX ORDER — IPRATROPIUM BROMIDE AND ALBUTEROL SULFATE 2.5; .5 MG/3ML; MG/3ML
1 SOLUTION RESPIRATORY (INHALATION)
Status: DISCONTINUED | OUTPATIENT
Start: 2025-03-27 | End: 2025-03-27

## 2025-03-27 RX ORDER — IPRATROPIUM BROMIDE AND ALBUTEROL SULFATE 2.5; .5 MG/3ML; MG/3ML
1 SOLUTION RESPIRATORY (INHALATION)
Status: DISCONTINUED | OUTPATIENT
Start: 2025-03-27 | End: 2025-03-28 | Stop reason: HOSPADM

## 2025-03-27 RX ADMIN — METOPROLOL SUCCINATE 12.5 MG: 25 TABLET, EXTENDED RELEASE ORAL at 07:44

## 2025-03-27 RX ADMIN — SODIUM CHLORIDE, PRESERVATIVE FREE 10 ML: 5 INJECTION INTRAVENOUS at 21:09

## 2025-03-27 RX ADMIN — IPRATROPIUM BROMIDE AND ALBUTEROL SULFATE 1 DOSE: .5; 2.5 SOLUTION RESPIRATORY (INHALATION) at 12:08

## 2025-03-27 RX ADMIN — ENOXAPARIN SODIUM 30 MG: 100 INJECTION SUBCUTANEOUS at 07:44

## 2025-03-27 RX ADMIN — METOPROLOL SUCCINATE 12.5 MG: 25 TABLET, EXTENDED RELEASE ORAL at 20:55

## 2025-03-27 RX ADMIN — Medication 3 MG: at 20:55

## 2025-03-27 RX ADMIN — GUAIFENESIN SYRUP AND DEXTROMETHORPHAN 5 ML: 100; 10 SYRUP ORAL at 20:55

## 2025-03-27 RX ADMIN — ASPIRIN 81 MG: 81 TABLET, CHEWABLE ORAL at 07:44

## 2025-03-27 RX ADMIN — SODIUM CHLORIDE, PRESERVATIVE FREE 10 ML: 5 INJECTION INTRAVENOUS at 07:44

## 2025-03-27 RX ADMIN — PREDNISONE 60 MG: 50 TABLET ORAL at 07:44

## 2025-03-27 RX ADMIN — IPRATROPIUM BROMIDE AND ALBUTEROL SULFATE 1 DOSE: .5; 2.5 SOLUTION RESPIRATORY (INHALATION) at 16:18

## 2025-03-27 RX ADMIN — IPRATROPIUM BROMIDE AND ALBUTEROL SULFATE 1 DOSE: .5; 2.5 SOLUTION RESPIRATORY (INHALATION) at 21:16

## 2025-03-27 RX ADMIN — MONTELUKAST 10 MG: 10 TABLET, FILM COATED ORAL at 20:55

## 2025-03-27 RX ADMIN — AZITHROMYCIN DIHYDRATE 250 MG: 250 TABLET, FILM COATED ORAL at 07:44

## 2025-03-27 RX ADMIN — GUAIFENESIN SYRUP AND DEXTROMETHORPHAN 5 ML: 100; 10 SYRUP ORAL at 04:23

## 2025-03-27 RX ADMIN — LISINOPRIL 2.5 MG: 2.5 TABLET ORAL at 17:49

## 2025-03-27 NOTE — PROGRESS NOTES
Hospital Medicine Progress Note      Date of Admission: 3/24/2025  Hospital Day: 4    Chief Admission Complaint: Shortness of breath     Subjective: Patient seen and examined at bedside  Appears better with her dyspnea but patient still complains of feeling difficulty breathing  On 1 L nasal cannula oxygen   at bedside    Presenting Admission History:       74-year-old female with medical history significant for COPD not on home oxygen, GERD, osteoporosis, hyperlipidemia, essential hypertension, anxiety, chronic HFpEF who presented to the [facility] ER on 3/24 with worsening shortness of breath of 2 to 3 days duration.  Denied any weight gain, leg swelling, PND, orthopnea.  On presentation, mildly hypertensive, tachycardic, tachypneic.  VBG pH 7.3, pCO2 74, pO2 <30, sodium 130, BNP 93, Trop 15--> 16, flu and COVID-negative.  Chest x-ray with trace bilateral pleural effusion.  She was admitted for further management of her COPD exacerbation    Assessment/Plan:      Acute hypoxic hypercapnic respiratory failure  Acute COPD exacerbation  Had elevated pCO2 on VBG at the time of presentation compared to her baseline with a PaO2 of less than 30.  Will try to wean off oxygen as tolerated with SpO2 goal of 88 to 92%  Follows up with Dr. Nazario pulmonologist due to frequent flares.  Pulmonology consulted, prednisone increased to 60 mg daily on 3/26 and started on azithromycin to 50 mg for 5 days.  Continue nebulization treatment, Singulair, steroids    Mild hyponatremia   We will continue to monitor    Essential hypertension  Chronic HFpEF  Continue lisinopril 2.5 mg, Toprol-XL 12.5    Goals of care and CODE STATUS  Patient would like to be full code and continue aggressive management    Physical Exam Performed:      General appearance:  No apparent distress  Respiratory: Coarse rhonchi scattered throughout bilateral lung fields  Cardiovascular:  Regular rate and rhythm.  Abdomen:  Soft, non-tender,

## 2025-03-27 NOTE — RT PROTOCOL NOTE
RT Inhaler-Nebulizer Bronchodilator Protocol Note    There is a bronchodilator order in the chart from a provider indicating to follow the RT Bronchodilator Protocol and there is an “Initiate RT Inhaler-Nebulizer Bronchodilator Protocol” order as well (see protocol at bottom of note).    CXR Findings:  No results found.    The findings from the last RT Protocol Assessment were as follows:   History Pulmonary Disease: Chronic pulmonary disease  Respiratory Pattern: Mild dyspnea at rest, irregular pattern, or RR 21-25 bpm  Breath Sounds: Intermittent or unilateral wheezes  Cough: Strong, productive  Indication for Bronchodilator Therapy: On home bronchodilators  Bronchodilator Assessment Score: 11    Aerosolized bronchodilator medication orders have been revised according to the RT Inhaler-Nebulizer Bronchodilator Protocol below.    Respiratory Therapist to perform RT Therapy Protocol Assessment initially then follow the protocol.  Repeat RT Therapy Protocol Assessment PRN for score 0-3 or on second treatment, BID, and PRN for scores above 3.    No Indications - adjust the frequency to every 6 hours PRN wheezing or bronchospasm, if no treatments needed after 48 hours then discontinue using Per Protocol order mode.     If indication present, adjust the RT bronchodilator orders based on the Bronchodilator Assessment Score as indicated below.  Use Inhaler orders unless patient has one or more of the following: on home nebulizer, not able to hold breath for 10 seconds, is not alert and oriented, cannot activate and use MDI correctly, or respiratory rate 25 breaths per minute or more, then use the equivalent nebulizer order(s) with same Frequency and PRN reasons based on the score.  If a patient is on this medication at home then do not decrease Frequency below that used at home.    0-3 - enter or revise RT bronchodilator order(s) to equivalent RT Bronchodilator order with Frequency of every 4 hours PRN for wheezing or

## 2025-03-27 NOTE — PLAN OF CARE
Problem: Chronic Conditions and Co-morbidities  Goal: Patient's chronic conditions and co-morbidity symptoms are monitored and maintained or improved  Outcome: Progressing  Flowsheets (Taken 3/26/2025 1936 by Thomas Escalera RN)  Care Plan - Patient's Chronic Conditions and Co-Morbidity Symptoms are Monitored and Maintained or Improved:   Monitor and assess patient's chronic conditions and comorbid symptoms for stability, deterioration, or improvement   Collaborate with multidisciplinary team to address chronic and comorbid conditions and prevent exacerbation or deterioration   Update acute care plan with appropriate goals if chronic or comorbid symptoms are exacerbated and prevent overall improvement and discharge     Problem: Discharge Planning  Goal: Discharge to home or other facility with appropriate resources  Outcome: Progressing  Flowsheets (Taken 3/26/2025 1936 by Thomas Escalera RN)  Discharge to home or other facility with appropriate resources:   Identify barriers to discharge with patient and caregiver   Identify discharge learning needs (meds, wound care, etc)   Refer to discharge planning if patient needs post-hospital services based on physician order or complex needs related to functional status, cognitive ability or social support system     Problem: Safety - Adult  Goal: Free from fall injury  Outcome: Progressing  Flowsheets (Taken 3/27/2025 1055)  Free From Fall Injury: Instruct family/caregiver on patient safety

## 2025-03-27 NOTE — PROGRESS NOTES
[facility]/[place]   PULMONARY AND CRITICAL CARE MEDICINE  PULMONARY MEDICINE PROGRESS NOTE           Indication for visit: COPD    Subjective:    Carol does not have much dyspnea but she is not very active either     ipratropium 0.5 mg-albuterol 2.5 mg  1 Dose Inhalation Q4H WA RT    azithromycin  250 mg Oral Daily    predniSONE  60 mg Oral Daily    aspirin  81 mg Oral Daily    lisinopril  2.5 mg Oral QPM    metoprolol succinate  12.5 mg Oral BID    montelukast  10 mg Oral Nightly    sodium chloride flush  5-40 mL IntraVENous 2 times per day    enoxaparin  30 mg SubCUTAneous Daily         PHYSICAL EXAMINATION:  /89   Pulse 86   Temp 98.2 °F (36.8 °C) (Oral)   Resp 16   Ht 1.549 m (5' 1\")   Wt 49.4 kg (109 lb)   SpO2 91%   BMI 20.60 kg/m²     Gen: Well developed, well nourished female in no acute distress. Speaking in full sentences with out using accessory resp muscles  Eyes: PERRL, EOMI, normal conjunctivae  Ears, Nose, Mouth and Throat: Hearing is normal. Oropharynx is normal  Neck: No lymphadenopathy  Respiratory: Less wheezing today  CV: RRR without M/R/R  Abd: +BS, soft, NT/ND  Musculoskeletal: No cyanosis, clubbing, or edema.  Skin: No rashes, ulcers, or subcutaneous nodules  Psychiatric: Alert and oriented to time place and person    DATA  CBC:   Recent Labs     25  0239 25  0246 25  0244   WBC 5.7 6.6 7.1   HGB 15.5 14.6 14.3   HCT 46.9 43.0 42.2   MCV 99.3 97.1 96.6    272 273     BMP:   Recent Labs     25  0239 25  0247 25  0244   * 133* 132*   K 4.8 4.1 4.5   CL 95* 95* 95*   CO2 30 31 30   BUN 10 13 16   CREATININE 0.6 0.5* 0.5*     Recent Labs     25  0915   PHART 7.388   CUG5NCD 59.0*   PO2ART 71.6*     VB.3/74  NT proBNP 93        Cultures:   Procedure Component Value Units Date/Time   COVID-19 & Influenza Combo [[id number]] Collected: 25 1834   Order Status: Completed

## 2025-03-27 NOTE — PROGRESS NOTES
Spiritual Health History and Assessment/Progress Note  [facility]    Spiritual/Emotional Needs,  ,  ,      Name: Charlene Nazario MRN: [id number]    Age: 74 y.o.     Sex: female   Language: English   Tenriism: Yazdanism   COPD with acute exacerbation (HCC)     Date: 3/27/2025            Total Time Calculated: 7 min              Spiritual Assessment began in [facility] 4 PCU        Referral/Consult From: Rounding   Encounter Overview/Reason: Spiritual/Emotional Needs  Service Provided For: Patient    Kendal, Belief, Meaning:   Patient has beliefs or practices that help with coping during difficult times, Communion   Family/Friends No family/friends present      Importance and Influence:  Patient has no beliefs influential to healthcare decision-making identified during this visit  Family/Friends No family/friends present    Community:  Patient feels well-supported. Support system includes: Spouse/Partner  Family/Friends No family/friends present    Assessment and Plan of Care:     Patient Interventions include: Facilitated expression of thoughts and feelings, Explored spiritual coping/struggle/distress, and Affirmed coping skills/support systems  Family/Friends Interventions include: No family/friends present    Patient Plan of Care: No spiritual needs identified for follow-up and Spiritual Care available upon further referral  Family/Friends Plan of Care: No family/friends present    Electronically signed by Chaplain Juliann on 3/27/2025 at 10:07 AM

## 2025-03-28 VITALS
OXYGEN SATURATION: 96 % | BODY MASS INDEX: 20.58 KG/M2 | RESPIRATION RATE: 16 BRPM | HEIGHT: 61 IN | DIASTOLIC BLOOD PRESSURE: 92 MMHG | TEMPERATURE: 97.9 F | HEART RATE: 99 BPM | WEIGHT: 109 LBS | SYSTOLIC BLOOD PRESSURE: 145 MMHG

## 2025-03-28 LAB
ANION GAP SERPL CALCULATED.3IONS-SCNC: 10 MMOL/L (ref 3–16)
BASOPHILS # BLD: 0 K/UL (ref 0–0.2)
BASOPHILS NFR BLD: 0.7 %
BUN SERPL-MCNC: 10 MG/DL (ref 7–20)
CALCIUM SERPL-MCNC: 9.4 MG/DL (ref 8.3–10.6)
CHLORIDE SERPL-SCNC: 95 MMOL/L (ref 99–110)
CO2 SERPL-SCNC: 32 MMOL/L (ref 21–32)
CREAT SERPL-MCNC: 0.5 MG/DL (ref 0.6–1.2)
DEPRECATED RDW RBC AUTO: 13.4 % (ref 12.4–15.4)
EOSINOPHIL # BLD: 0.1 K/UL (ref 0–0.6)
EOSINOPHIL NFR BLD: 1.6 %
GFR SERPLBLD CREATININE-BSD FMLA CKD-EPI: >90 ML/MIN/{1.73_M2}
GLUCOSE SERPL-MCNC: 95 MG/DL (ref 70–99)
HCT VFR BLD AUTO: 46.7 % (ref 36–48)
HGB BLD-MCNC: 15.9 G/DL (ref 12–16)
LYMPHOCYTES # BLD: 2.1 K/UL (ref 1–5.1)
LYMPHOCYTES NFR BLD: 30.4 %
MCH RBC QN AUTO: 32.8 PG (ref 26–34)
MCHC RBC AUTO-ENTMCNC: 34 G/DL (ref 31–36)
MCV RBC AUTO: 96.3 FL (ref 80–100)
MONOCYTES # BLD: 0.6 K/UL (ref 0–1.3)
MONOCYTES NFR BLD: 8.4 %
NEUTROPHILS # BLD: 4.1 K/UL (ref 1.7–7.7)
NEUTROPHILS NFR BLD: 58.9 %
PLATELET # BLD AUTO: 310 K/UL (ref 135–450)
PMV BLD AUTO: 7.2 FL (ref 5–10.5)
POTASSIUM SERPL-SCNC: 3.7 MMOL/L (ref 3.5–5.1)
RBC # BLD AUTO: 4.84 M/UL (ref 4–5.2)
SODIUM SERPL-SCNC: 137 MMOL/L (ref 136–145)
WBC # BLD AUTO: 7 K/UL (ref 4–11)

## 2025-03-28 PROCEDURE — 6360000002 HC RX W HCPCS: Performed by: INTERNAL MEDICINE

## 2025-03-28 PROCEDURE — 6370000000 HC RX 637 (ALT 250 FOR IP): Performed by: NURSE PRACTITIONER

## 2025-03-28 PROCEDURE — 6370000000 HC RX 637 (ALT 250 FOR IP): Performed by: INTERNAL MEDICINE

## 2025-03-28 PROCEDURE — 94761 N-INVAS EAR/PLS OXIMETRY MLT: CPT

## 2025-03-28 PROCEDURE — 85025 COMPLETE CBC W/AUTO DIFF WBC: CPT

## 2025-03-28 PROCEDURE — 80048 BASIC METABOLIC PNL TOTAL CA: CPT

## 2025-03-28 PROCEDURE — 2700000000 HC OXYGEN THERAPY PER DAY

## 2025-03-28 PROCEDURE — 2500000003 HC RX 250 WO HCPCS: Performed by: INTERNAL MEDICINE

## 2025-03-28 PROCEDURE — 36415 COLL VENOUS BLD VENIPUNCTURE: CPT

## 2025-03-28 PROCEDURE — 6370000000 HC RX 637 (ALT 250 FOR IP)

## 2025-03-28 PROCEDURE — 94640 AIRWAY INHALATION TREATMENT: CPT

## 2025-03-28 PROCEDURE — 99232 SBSQ HOSP IP/OBS MODERATE 35: CPT | Performed by: INTERNAL MEDICINE

## 2025-03-28 RX ORDER — PREDNISONE 20 MG/1
TABLET ORAL
Qty: 15 TABLET | Refills: 0 | Status: SHIPPED | OUTPATIENT
Start: 2025-03-28 | End: 2025-04-09

## 2025-03-28 RX ORDER — AZITHROMYCIN 250 MG/1
250 TABLET, FILM COATED ORAL DAILY
Qty: 2 TABLET | Refills: 0 | Status: SHIPPED | OUTPATIENT
Start: 2025-03-29 | End: 2025-03-31

## 2025-03-28 RX ADMIN — AZITHROMYCIN DIHYDRATE 250 MG: 250 TABLET, FILM COATED ORAL at 09:14

## 2025-03-28 RX ADMIN — IPRATROPIUM BROMIDE AND ALBUTEROL SULFATE 1 DOSE: .5; 2.5 SOLUTION RESPIRATORY (INHALATION) at 12:25

## 2025-03-28 RX ADMIN — METOPROLOL SUCCINATE 12.5 MG: 25 TABLET, EXTENDED RELEASE ORAL at 09:14

## 2025-03-28 RX ADMIN — ENOXAPARIN SODIUM 30 MG: 100 INJECTION SUBCUTANEOUS at 09:14

## 2025-03-28 RX ADMIN — PREDNISONE 60 MG: 50 TABLET ORAL at 09:13

## 2025-03-28 RX ADMIN — SODIUM CHLORIDE, PRESERVATIVE FREE 10 ML: 5 INJECTION INTRAVENOUS at 09:14

## 2025-03-28 RX ADMIN — Medication 3 MG: at 00:50

## 2025-03-28 RX ADMIN — IPRATROPIUM BROMIDE AND ALBUTEROL SULFATE 1 DOSE: .5; 2.5 SOLUTION RESPIRATORY (INHALATION) at 08:00

## 2025-03-28 RX ADMIN — ASPIRIN 81 MG: 81 TABLET, CHEWABLE ORAL at 09:14

## 2025-03-28 NOTE — PROGRESS NOTES
[facility]/[place]   PULMONARY AND CRITICAL CARE MEDICINE  PULMONARY MEDICINE PROGRESS NOTE           Indication for visit: COPD    Subjective:    Carol is down to 1 L of oxygen  MERAZ is improving  She feel she is ok to go home     ipratropium 0.5 mg-albuterol 2.5 mg  1 Dose Inhalation Q4H WA RT    azithromycin  250 mg Oral Daily    predniSONE  60 mg Oral Daily    aspirin  81 mg Oral Daily    lisinopril  2.5 mg Oral QPM    metoprolol succinate  12.5 mg Oral BID    montelukast  10 mg Oral Nightly    sodium chloride flush  5-40 mL IntraVENous 2 times per day    enoxaparin  30 mg SubCUTAneous Daily         PHYSICAL EXAMINATION:  /89   Pulse 90   Temp 97.8 °F (36.6 °C) (Oral)   Resp 16   Ht 1.549 m (5' 1\")   Wt 49.4 kg (109 lb)   SpO2 (!) 88%   BMI 20.60 kg/m²     Gen: Well developed, well nourished female in no acute distress. Speaking in full sentences with out using accessory resp muscles  Eyes: PERRL, EOMI, normal conjunctivae  Ears, Nose, Mouth and Throat: Hearing is normal. Oropharynx is normal  Neck: No lymphadenopathy  Respiratory: CTA  CV: RRR without M/R/R  Abd: +BS, soft, NT/ND  Musculoskeletal: No cyanosis, clubbing, or edema.  Skin: No rashes, ulcers, or subcutaneous nodules  Psychiatric: Alert and oriented to time place and person    DATA  CBC:   Recent Labs     25  0246 25  0244 25  0924   WBC 6.6 7.1 7.0   HGB 14.6 14.3 15.9   HCT 43.0 42.2 46.7   MCV 97.1 96.6 96.3    273 310     BMP:   Recent Labs     25  0247 25  0244 25  0924   * 132* 137   K 4.1 4.5 3.7   CL 95* 95* 95*   CO2 31 30 32   BUN 13 16 10   CREATININE 0.5* 0.5* 0.5*     No results for input(s): \"PHART\", \"YFL0FLS\", \"PO2ART\" in the last 72 hours.    VB.3/74  NT proBNP 93        Cultures:   Procedure Component Value Units Date/Time   COVID-19 & Influenza Combo [[id number]] Collected: 25 1834   Order Status: Completed

## 2025-03-28 NOTE — PROGRESS NOTES
Pt discharged home with family via wheelchair. Pt sent with after visit summary, follow up instructions and educational materials. Prescriptions sent electronically to preferred pharmacy. Pt educated on use of home oxygen therapy. All questions answered.

## 2025-03-28 NOTE — CARE COORDINATION
Case Management Assessment            Discharge Note                    Date / Time of Note: 3/28/2025 4:00 PM                  Discharge Note Completed by: Maritza Garrett    Patient Name: Charlene Nazario   YOB: 1950  Diagnosis: COPD exacerbation (HCC) [J44.1]  COPD with acute exacerbation (HCC) [J44.1]  Acute respiratory failure with hypoxia and hypercapnia [J96.01, J96.02]   Date / Time: 3/24/2025  9:24 AM    Current PCP: Gerry Foley DO  Clinic patient: No    Hospitalization in the last 30 days: No       Advance Directives:  Code Status: Full Code  [place] DNR form completed and on chart: Not Indicated    Financial:  Payor: MEDICARE / Plan: MEDICARE PART A AND B / Product Type: *No Product type* /      Pharmacy:    [id number] [address] [facility] P [phone] - F [phone] [address] [place] [id number]  Phone: [phone] Fax: [phone]    [facility] #[id number] [address] [facility] [phone] - F [phone]  [address] [facility] [address]  Phone: [phone] Fax: [phone]      Assistance purchasing medications?:    Assistance provided by Case Management: None at this time    Does patient want to participate in local refill/ meds to beds program?:      Meds To Beds General Rules:  1. Can ONLY be done Monday- Friday between 8:30am-5pm  2. Prescription(s) must be in pharmacy by 3pm to be filled same day  3.Copy of patient's insurance/ prescription drug card and patient face sheet must be sent along with the prescription(s)  4. Cost of Rx cannot be added to hospital bill. If financial assistance is needed, please contact unit  or ;  or  CANNOT provide pharmacy voucher for patients co-pays  5. Patients can then  the prescription on their way out of the hospital at discharge, or pharmacy can deliver to the bedside if staff is available.

## 2025-03-28 NOTE — DISCHARGE INSTRUCTIONS
Complete the prednisone taper as prescribed  Take azithromycin for 2 more days starting tomorrow  Follow-up with Dr. Nazario for outpatient management  Continue using oxygen as needed for maintaining spo2>88%

## 2025-03-28 NOTE — PROGRESS NOTES
Patient was evaluated today for the diagnosis of COPD.  I have reviewed and agree with the oxygen/walk testing as documented in the chart.  I entered a DME order for home oxygen at 2 lpm because the diagnosis and testing require the patient to have supplemental oxygen.  Condition will improve or be benefited by oxygen use.  The patient is  able to perform good mobility in a home setting and therefore does require the use of a portable oxygen system.  The need for this equipment was discussed with the patient and she understands and is in agreement.

## 2025-03-28 NOTE — PLAN OF CARE
Problem: Chronic Conditions and Co-morbidities  Goal: Patient's chronic conditions and co-morbidity symptoms are monitored and maintained or improved  3/27/2025 2243 by Bob Pedro RN  Outcome: Progressing  Flowsheets (Taken 3/27/2025 2000)  Care Plan - Patient's Chronic Conditions and Co-Morbidity Symptoms are Monitored and Maintained or Improved: Monitor and assess patient's chronic conditions and comorbid symptoms for stability, deterioration, or improvement  3/27/2025 1055 by Parveen Bobby RN  Outcome: Progressing  Flowsheets (Taken 3/26/2025 1936 by Thomas Escalera RN)  Care Plan - Patient's Chronic Conditions and Co-Morbidity Symptoms are Monitored and Maintained or Improved:   Monitor and assess patient's chronic conditions and comorbid symptoms for stability, deterioration, or improvement   Collaborate with multidisciplinary team to address chronic and comorbid conditions and prevent exacerbation or deterioration   Update acute care plan with appropriate goals if chronic or comorbid symptoms are exacerbated and prevent overall improvement and discharge     Problem: Discharge Planning  Goal: Discharge to home or other facility with appropriate resources  3/27/2025 2243 by Bob Pedro RN  Outcome: Progressing  Flowsheets (Taken 3/27/2025 2000)  Discharge to home or other facility with appropriate resources: Identify barriers to discharge with patient and caregiver  3/27/2025 1055 by Parveen Bobby RN  Outcome: Progressing  Flowsheets (Taken 3/26/2025 1936 by Thomas Escalera RN)  Discharge to home or other facility with appropriate resources:   Identify barriers to discharge with patient and caregiver   Identify discharge learning needs (meds, wound care, etc)   Refer to discharge planning if patient needs post-hospital services based on physician order or complex needs related to functional status, cognitive ability or social support system     Problem: Safety - Adult  Goal: Free from fall

## 2025-03-28 NOTE — PROGRESS NOTES
[facility]    Respiratory Therapy     Home Oxygen Evaluation        Name: Charlene Nazario  Medical Record Number: [id number]  Age: 74 y.o.  Gender:  female   : 1950  Today's date: 3/28/2025  Room: [address] [place] [address]      Assessment        /89   Pulse 90   Temp 97.8 °F (36.6 °C) (Oral)   Resp 16   Ht 1.549 m (5' 1\")   Wt 49.4 kg (109 lb)   SpO2 (!) 88%   BMI 20.60 kg/m²     Patient Active Problem List   Diagnosis    Incidental pulmonary nodule, greater than or equal to 8mm    Thyroid mass of unclear etiology    Lung nodule:left    Gastroesophageal reflux disease without esophagitis    Age related osteoporosis    Vocal cord paralysis, unilateral partial    Chronic respiratory failure with hypoxia (HCC)    S/P coronary angiogram    Hoarseness of voice    HLD (hyperlipidemia)    Thoracic aorta atherosclerosis    Benign essential HTN    Anxiety    Impaired fasting glucose    COPD, moderate (HCC)    Chronic diastolic CHF (congestive heart failure) (HCC)    COPD with acute exacerbation (HCC)       Social History:  Social History     Tobacco Use    Smoking status: Former     Current packs/day: 0.00     Average packs/day: 1.5 packs/day for 22.0 years (33.0 ttl pk-yrs)     Types: Cigarettes     Start date: 1989     Quit date: 2011     Years since quittin.9    Smokeless tobacco: Never   Vaping Use    Vaping status: Never Used   Substance Use Topics    Alcohol use: Yes     Alcohol/week: 4.0 standard drinks of alcohol     Types: 4 Cans of beer per week     Comment: 1-2 drinks a day    Drug use: No       Patient Room Air saturation at rest 91  %  Patient Room Air saturation upon ambulation 87 %    Oxygen saturations of 88% or less on RA qualifies patient for Home Oxygen    Patient resting on 0  lmp  with an oxygen saturation of  90 %     Patient ambulated on 2 lpm with an oxygen saturation of 90%    Qualifying patient for home oxygen with ambulation and continuous flow  @ 2 lpm.      In your

## 2025-03-28 NOTE — PROGRESS NOTES
Hospital Medicine Progress Note      Date of Admission: 3/24/2025  Hospital Day: 5    Chief Admission Complaint: Shortness of breath     Subjective: Patient seen and examined at bedside  Reports feeling better in her breathing  Tried weaning off oxygen but SpO2 down to 87 on room air    Presenting Admission History:       74-year-old female with medical history significant for COPD not on home oxygen, GERD, osteoporosis, hyperlipidemia, essential hypertension, anxiety, chronic HFpEF who presented to the [facility] ER on 3/24 with worsening shortness of breath of 2 to 3 days duration.  Denied any weight gain, leg swelling, PND, orthopnea.  On presentation, mildly hypertensive, tachycardic, tachypneic.  VBG pH 7.3, pCO2 74, pO2 <30, sodium 130, BNP 93, Trop 15--> 16, flu and COVID-negative.  Chest x-ray with trace bilateral pleural effusion.  She was admitted for further management of her COPD exacerbation    Assessment/Plan:      Acute hypoxic hypercapnic respiratory failure  Acute COPD exacerbation  Had elevated pCO2 on VBG at the time of presentation compared to her baseline with a PaO2 of less than 30.  Will try to wean off oxygen as tolerated with SpO2 goal of 88 to 92%  Follows up with Dr. Nazario pulmonologist due to frequent flares.  Pulmonology consulted, prednisone increased to 60 mg daily on 3/26 and started on azithromycin to 50 mg for 5 days.  Continue nebulization treatment, Singulair, steroids  Ordered Home O2 eval    Mild hyponatremia   We will continue to monitor    Essential hypertension  Chronic HFpEF  Continue lisinopril 2.5 mg, Toprol-XL 12.5    Goals of care and CODE STATUS  Patient would like to be full code and continue aggressive management    Disposition:  Pending clearance from pulmonary and home O2 eval prior to discharge.  Possible discharge today    Physical Exam Performed:      General appearance:  No apparent distress  Respiratory: Decreased bilateral air entry  Cardiovascular:

## 2025-03-31 ENCOUNTER — CARE COORDINATION (OUTPATIENT)
Dept: CASE MANAGEMENT | Age: 75
End: 2025-03-31

## 2025-03-31 ENCOUNTER — TELEPHONE (OUTPATIENT)
Dept: PULMONOLOGY | Age: 75
End: 2025-03-31

## 2025-03-31 DIAGNOSIS — J44.1 COPD WITH ACUTE EXACERBATION (HCC): Primary | ICD-10-CM

## 2025-03-31 PROCEDURE — 1111F DSCHRG MED/CURRENT MED MERGE: CPT | Performed by: INTERNAL MEDICINE

## 2025-03-31 NOTE — TELEPHONE ENCOUNTER
Spouse called to get an appt for a hospital follow up. Patient is scheduled for 4/14/2025 at  for her regular pulm appt. Is this time frame ok to see pt for a hospital follow up?     [phone] [facility] #

## 2025-03-31 NOTE — CARE COORDINATION
Care Transitions Note    Initial Call - Call within 2 business days of discharge: Yes    Patient Current Location:  [place]    Care Transition Nurse contacted the patient by telephone to perform post hospital discharge assessment, verified name and  as identifiers.  Provided introduction to self, and explanation of the Care Transition Nurse role.    Patient: Charlene Nazario    Patient : 1950   MRN: [id number]    Reason for Admission: COPD exacerbation   Discharge Date: 3/28/25  RURS: Readmission Risk Score: 9.6      Last Discharge Facility       Date Complaint Diagnosis Description Type Department Provider    3/24/25 Shortness of Breath COPD exacerbation (HCC) ... ED to Hosp-Admission (Discharged) (ADMITTED) [facility] PCU Hannah Santos MD; Gens...            Was this an external facility discharge? No    Additional needs identified to be addressed with provider   No needs identified             Method of communication with provider: none.    Patients top risk factors for readmission: medical condition-.    Interventions to address risk factors:   Education: .   Review of patient management of conditions/medications: .    Care Summary Note: CTN spoke with patient who reported she is doing good. Patient reported her breathing has been stable on 2 liters with mild sob with exertion. Patient reported her oxygen saturation was 90% on 2 liters goal to remain above 88%. CTN reviewed medications with patient who reported she is taking as prescribed. CTN discussed s/sx to monitor for and when to report to provider or ER. CTN advised patient of use of urgent care or physician’s 24 hr access line if assistance is needed after hours.        Care Transition Nurse reviewed discharge instructions, medical action plan, and red flags with patient. The patient was given an opportunity to ask questions; all questions answered at this time.. The patient verbalized understanding.   Were discharge instructions available

## 2025-03-31 NOTE — TELEPHONE ENCOUNTER
Spoke with spouse and advise of f/u appt for 4/14. He verbalized understanding that it is at [facility] not [facility].

## 2025-04-01 NOTE — DISCHARGE SUMMARY
extended release tablet  Commonly known as: TOPROL XL  Take 0.5 tablets by mouth 2 times daily     montelukast 10 MG tablet  Commonly known as: SINGULAIR  TAKE 1 TABLET BY MOUTH DAILY     tiZANidine 2 MG tablet  Commonly known as: ZANAFLEX  TAKE 1 TABLET BY MOUTH AT BEDTIME     Lydia Ellipta 200-62.5-25 MCG/ACT Aepb inhaler  Generic drug: fluticasone-umeclidin-vilant  Inhale 1 puff into the lungs daily            STOP taking these medications      potassium chloride 20 MEQ extended release tablet  Commonly known as: KLOR-CON M               Where to Get Your Medications        These medications were sent to [id number] - [place], [place] - [address] - P [phone] - F [phone] [address] [address]      Phone: [phone]   azithromycin 250 MG tablet  predniSONE 20 MG tablet        Objective Findings at Discharge:   BP (!) 145/92   Pulse 99   Temp 97.9 °F (36.6 °C) (Oral)   Resp 16   Ht 1.549 m (5' 1\")   Wt 49.4 kg (109 lb)   SpO2 96%   BMI 20.60 kg/m²       Physical Exam:     General: NAD  Eyes: EOMI  ENT: neck supple  Cardiovascular: Regular rate.  Respiratory: Decreased b/l air entry  Gastrointestinal: Soft, non tender  Genitourinary: no suprapubic tenderness  Musculoskeletal: No edema  Skin: warm, dry  Neuro: Alert.  Psych: Mood appropriate.         Labs and Imaging   Echo (TTE) complete (PRN contrast/bubble/strain/3D)  Result Date: 3/27/2025    Image quality is adequate. Technically difficult study owing to low parasternal window and positioning of the patient.   Left Ventricle: Low normal left ventricular systolic function with a visually estimated EF of 50 - 55%. EF by 2D Simpsons Biplane is 50%. Left ventricle size is normal. Normal wall thickness. Unable to assess wall motion due to poor endocardial definition/foreshortened imaging. Indeterminate diastolic function.   Right Ventricle: Right ventricle size is normal. Normal systolic function. TAPSE is

## 2025-04-02 ENCOUNTER — CARE COORDINATION (OUTPATIENT)
Dept: CASE MANAGEMENT | Age: 75
End: 2025-04-02

## 2025-04-02 NOTE — CARE COORDINATION
Per chart review patient scheduled for HFU with PCP 4/3/25.     Lisa Albrecht, MSN, RN, [facility]  Care Transition Nurse  [phone] mobile

## 2025-04-03 ENCOUNTER — OFFICE VISIT (OUTPATIENT)
Dept: INTERNAL MEDICINE CLINIC | Age: 75
End: 2025-04-03

## 2025-04-03 VITALS
OXYGEN SATURATION: 98 % | DIASTOLIC BLOOD PRESSURE: 65 MMHG | WEIGHT: 116.2 LBS | HEART RATE: 92 BPM | TEMPERATURE: 97.4 F | BODY MASS INDEX: 21.96 KG/M2 | SYSTOLIC BLOOD PRESSURE: 115 MMHG

## 2025-04-03 DIAGNOSIS — J96.11 CHRONIC RESPIRATORY FAILURE WITH HYPOXIA: ICD-10-CM

## 2025-04-03 DIAGNOSIS — R41.89 COGNITIVE IMPAIRMENT: ICD-10-CM

## 2025-04-03 DIAGNOSIS — R73.01 IMPAIRED FASTING GLUCOSE: ICD-10-CM

## 2025-04-03 DIAGNOSIS — Z09 HOSPITAL DISCHARGE FOLLOW-UP: Primary | ICD-10-CM

## 2025-04-03 DIAGNOSIS — R68.89 FORGETFULNESS: ICD-10-CM

## 2025-04-03 DIAGNOSIS — E78.2 MIXED HYPERLIPIDEMIA: ICD-10-CM

## 2025-04-03 DIAGNOSIS — Z12.31 ENCOUNTER FOR SCREENING MAMMOGRAM FOR MALIGNANT NEOPLASM OF BREAST: ICD-10-CM

## 2025-04-03 DIAGNOSIS — J44.9 COPD, MODERATE (HCC): ICD-10-CM

## 2025-04-03 DIAGNOSIS — I10 BENIGN ESSENTIAL HTN: ICD-10-CM

## 2025-04-03 DIAGNOSIS — I50.32 CHRONIC DIASTOLIC CHF (CONGESTIVE HEART FAILURE) (HCC): ICD-10-CM

## 2025-04-03 PROBLEM — J44.1 COPD WITH ACUTE EXACERBATION (HCC): Status: RESOLVED | Noted: 2025-03-24 | Resolved: 2025-04-03

## 2025-04-03 RX ORDER — ALBUTEROL SULFATE 0.83 MG/ML
2.5 SOLUTION RESPIRATORY (INHALATION) EVERY 6 HOURS PRN
COMMUNITY

## 2025-04-03 ASSESSMENT — PATIENT HEALTH QUESTIONNAIRE - PHQ9
SUM OF ALL RESPONSES TO PHQ QUESTIONS 1-9: 0
SUM OF ALL RESPONSES TO PHQ QUESTIONS 1-9: 0
1. LITTLE INTEREST OR PLEASURE IN DOING THINGS: NOT AT ALL
2. FEELING DOWN, DEPRESSED OR HOPELESS: NOT AT ALL
SUM OF ALL RESPONSES TO PHQ QUESTIONS 1-9: 0
SUM OF ALL RESPONSES TO PHQ QUESTIONS 1-9: 0

## 2025-04-03 NOTE — PROGRESS NOTES
[facility]  Internal Medicine  Patient Encounter  Gerry Foley D.O., FACOI        Post-Discharge Transitional Care  Follow Up      Charlene Nazario   YOB: 1950    Date of Office Visit:  4/3/2025  Date of Hospital Admission: 3/24/25  Date of Hospital Discharge: 3/28/25  Risk of hospital readmission (high >=14%. Medium >=10%) :Readmission Risk Score: 9.6      Care management risk score Rising risk (score 2-5) and Complex Care (Scores >=6): No Risk Score On File     Non face to face  following discharge, date last encounter closed (first attempt may have been earlier): 03/31/2025    Call initiated 2 business days of discharge: Yes    ASSESSMENT/PLAN:       1. Hospital discharge follow-up  75-year-old  female with known history of COPD and chronic hypoxic respiratory failure presented to the emergency department with progressively worsening shortness of breath, cough, wheezing.  Patient treated in the usual fashion with antibiotics, nebulizer therapy, steroids  - [place] DISCHARGE MEDS RECONCILED W/ CURRENT OUTPATIENT MED LIST    2. COPD, moderate (HCC)  Improved after hospitalized for exacerbation  It is really not clear which inhalers she is currently taking  Patient and  confirm that she switches back and forth from Anoro, Arnuity, Pulmicort, Trelegy.   states it depends on what they have and cost.  They both agree that Trelegy works best  Encouraged patient to try and stay on a set regimen.  Consider patient assistance in order to get Trelegy.    3. Chronic respiratory failure with hypoxia  Encouraged patient to continue wearing oxygen at night and monitor SaO2 during the day.  Apply oxygen if SaO2 90% or less during the day.    4. Encounter for screening mammogram for malignant neoplasm of breast  Encouraged patient to update her mammogram  - [facility] DIGITAL SCREEN W OR WO CAD BILATERAL; Future    5. Chronic diastolic CHF (congestive heart failure) (HCC)  Condition

## 2025-04-04 LAB
ALBUMIN SERPL-MCNC: 4 G/DL (ref 3.4–5)
ALBUMIN/GLOB SERPL: 2.5 {RATIO} (ref 1.1–2.2)
ALP SERPL-CCNC: 43 U/L (ref 40–129)
ALT SERPL-CCNC: 20 U/L (ref 10–40)
ANION GAP SERPL CALCULATED.3IONS-SCNC: 8 MMOL/L (ref 3–16)
AST SERPL-CCNC: 17 U/L (ref 15–37)
BASOPHILS # BLD: 0.1 K/UL (ref 0–0.2)
BASOPHILS NFR BLD: 0.8 %
BILIRUB SERPL-MCNC: 0.4 MG/DL (ref 0–1)
BUN SERPL-MCNC: 10 MG/DL (ref 7–20)
CALCIUM SERPL-MCNC: 9.1 MG/DL (ref 8.3–10.6)
CHLORIDE SERPL-SCNC: 97 MMOL/L (ref 99–110)
CHOLEST SERPL-MCNC: 192 MG/DL (ref 0–199)
CO2 SERPL-SCNC: 32 MMOL/L (ref 21–32)
CREAT SERPL-MCNC: 0.6 MG/DL (ref 0.6–1.2)
DEPRECATED RDW RBC AUTO: 13.1 % (ref 12.4–15.4)
EOSINOPHIL # BLD: 0 K/UL (ref 0–0.6)
EOSINOPHIL NFR BLD: 0.5 %
EST. AVERAGE GLUCOSE BLD GHB EST-MCNC: 114 MG/DL
FOLATE SERPL-MCNC: 6.09 NG/ML (ref 4.78–24.2)
GFR SERPLBLD CREATININE-BSD FMLA CKD-EPI: >90 ML/MIN/{1.73_M2}
GLUCOSE SERPL-MCNC: 95 MG/DL (ref 70–99)
HBA1C MFR BLD: 5.6 %
HCT VFR BLD AUTO: 43.6 % (ref 36–48)
HDLC SERPL-MCNC: 99 MG/DL (ref 40–60)
HGB BLD-MCNC: 14.6 G/DL (ref 12–16)
LDLC SERPL CALC-MCNC: 80 MG/DL
LYMPHOCYTES # BLD: 0.8 K/UL (ref 1–5.1)
LYMPHOCYTES NFR BLD: 11.3 %
MCH RBC QN AUTO: 33.3 PG (ref 26–34)
MCHC RBC AUTO-ENTMCNC: 33.5 G/DL (ref 31–36)
MCV RBC AUTO: 99.3 FL (ref 80–100)
MONOCYTES # BLD: 0.2 K/UL (ref 0–1.3)
MONOCYTES NFR BLD: 2.4 %
NEUTROPHILS # BLD: 6.1 K/UL (ref 1.7–7.7)
NEUTROPHILS NFR BLD: 85 %
PLATELET # BLD AUTO: 347 K/UL (ref 135–450)
PMV BLD AUTO: 7.9 FL (ref 5–10.5)
POTASSIUM SERPL-SCNC: 5 MMOL/L (ref 3.5–5.1)
PROT SERPL-MCNC: 5.6 G/DL (ref 6.4–8.2)
RBC # BLD AUTO: 4.39 M/UL (ref 4–5.2)
SODIUM SERPL-SCNC: 137 MMOL/L (ref 136–145)
TRIGL SERPL-MCNC: 64 MG/DL (ref 0–150)
TSH SERPL DL<=0.005 MIU/L-ACNC: 0.62 UIU/ML (ref 0.27–4.2)
VIT B12 SERPL-MCNC: 843 PG/ML (ref 211–911)
VLDLC SERPL CALC-MCNC: 13 MG/DL
WBC # BLD AUTO: 7.1 K/UL (ref 4–11)

## 2025-04-06 ENCOUNTER — RESULTS FOLLOW-UP (OUTPATIENT)
Dept: INTERNAL MEDICINE CLINIC | Age: 75
End: 2025-04-06

## 2025-04-07 ENCOUNTER — CARE COORDINATION (OUTPATIENT)
Dept: CASE MANAGEMENT | Age: 75
End: 2025-04-07

## 2025-04-07 NOTE — CARE COORDINATION
Care Transitions Note    Follow Up Call     Attempted to reach patient for transitions of care follow up.  Unable to reach patient.      Outreach Attempts:   Multiple attempts to contact patient at phone numbers on file.     Care Summary Note: LVM on mobile number.   Attempted home number listed and patient's  answered the phone and requested CTN call patient's mobile number.     Follow Up Appointment:   Future Appointments         Provider Specialty Dept Phone    4/14/2025 1:00 PM Denver Nazario MD Pulmonology [phone]    5/23/2025 3:30 PM (Arrive by 3:15 PM) Gerry Foley DO Internal Medicine [phone]    8/7/2025 10:15 AM Tru Campo MD Cardiology [phone]            Plan for follow-up call in 6-10 days based on severity of symptoms and risk factors. Plan for next call: self management-.     Lisa Albrecht, MSN, RN, [facility]  Care Transition Nurse  [phone] mobile

## 2025-04-14 ENCOUNTER — CARE COORDINATION (OUTPATIENT)
Dept: CASE MANAGEMENT | Age: 75
End: 2025-04-14

## 2025-04-14 NOTE — CARE COORDINATION
Care Transitions Note    Follow Up Call     Patient Current Location:  [place]    Care Transition Nurse contacted the patient by telephone. Verified name and  as identifiers.    Additional needs identified to be addressed with provider   No needs identified                 Method of communication with provider: none.    Care Summary Note: CTN spoke with patient who reported she is doing good. Patient is no longer using oxygen and reported her oxygen saturation has been in the low 90's. Patient reported her breathing is stable. Denies any acute needs at present time.  Agreeable to f/u calls.  Educated on the use of urgent care or physician’s 24 hr access line if assistance is needed after hours.     Plan of care updates since last contact:  Review of patient management of conditions/medications: .       Advance Care Planning:   Does patient have an Advance Directive: reviewed during previous call, see note. .    Medication Review:  No changes since last call.     Remote Patient Monitoring:  Offered patient enrollment in the Remote Patient Monitoring (RPM) program for in-home monitoring: Yes, but did not enroll at this time: declined to enroll in the program becausemonitoring on her own  .    Assessments:  Care Transitions Subsequent and Final Call    Subsequent and Final Calls  Do you have any ongoing symptoms?: No  Have your medications changed?: No  Do you have any questions related to your medications?: No  Do you currently have any active services?: No  Do you have any needs or concerns that I can assist you with?: No  Identified Barriers: None  Care Transitions Interventions  Other Interventions:              Follow Up Appointment:   Reviewed upcoming appointment(s).  Future Appointments         Provider Specialty Dept Phone    2025 11:20 AM Denver Nazario MD Pulmonology [phone]    2025 3:30 PM (Arrive by 3:15 PM) Gerry Foley DO Internal Medicine [phone]    2025 10:15 AM

## 2025-04-17 ENCOUNTER — CARE COORDINATION (OUTPATIENT)
Dept: CASE MANAGEMENT | Age: 75
End: 2025-04-17

## 2025-04-17 DIAGNOSIS — S39.012A STRAIN OF LUMBAR REGION, INITIAL ENCOUNTER: ICD-10-CM

## 2025-04-17 DIAGNOSIS — Z87.81 HISTORY OF VERTEBRAL COMPRESSION FRACTURE: ICD-10-CM

## 2025-04-17 NOTE — CARE COORDINATION
Care Transitions Note    Follow Up Call     Attempted to reach patient for transitions of care follow up.  Unable to reach patient.      Outreach Attempts:   CTN received a vm from patient requesting a call back. CTN contacted patient on mobile number and patient's spouse answered and requested CTN call home number to reach patient. CTN reached out to patient via home number several times and no answer.       Follow Up Appointment:   Future Appointments         Provider Specialty Dept Phone    4/22/2025 11:20 AM Denver Nazario MD Pulmonology [phone]    5/23/2025 3:30 PM (Arrive by 3:15 PM) Gerry Foley DO Internal Medicine [phone]    8/7/2025 10:15 AM Tru Campo MD Cardiology [phone]            Plan for follow-up call in 6-10 days based on severity of symptoms and risk factors. Plan for next call: self management-.    Lisa Albrecht, MSN, RN, [facility]  Care Transition Nurse  [phone] mobile

## 2025-04-18 RX ORDER — TIZANIDINE 2 MG/1
2 TABLET ORAL NIGHTLY
Qty: 30 TABLET | Refills: 3 | Status: ON HOLD | OUTPATIENT
Start: 2025-04-18

## 2025-04-19 ENCOUNTER — HOSPITAL ENCOUNTER (INPATIENT)
Age: 75
LOS: 8 days | Discharge: HOME HEALTH | DRG: 191 | End: 2025-04-27
Attending: STUDENT IN AN ORGANIZED HEALTH CARE EDUCATION/TRAINING PROGRAM | Admitting: STUDENT IN AN ORGANIZED HEALTH CARE EDUCATION/TRAINING PROGRAM
Payer: MEDICARE

## 2025-04-19 ENCOUNTER — APPOINTMENT (OUTPATIENT)
Dept: GENERAL RADIOLOGY | Age: 75
DRG: 191 | End: 2025-04-19
Payer: MEDICARE

## 2025-04-19 DIAGNOSIS — J44.1 COPD EXACERBATION (HCC): Primary | ICD-10-CM

## 2025-04-19 LAB
ANION GAP SERPL CALCULATED.3IONS-SCNC: 8 MMOL/L (ref 3–16)
BASE EXCESS BLDV CALC-SCNC: 6.8 MMOL/L (ref -2–3)
BASOPHILS # BLD: 0.1 K/UL (ref 0–0.2)
BASOPHILS NFR BLD: 1.5 %
BUN SERPL-MCNC: 8 MG/DL (ref 7–20)
CALCIUM SERPL-MCNC: 9.2 MG/DL (ref 8.3–10.6)
CHLORIDE SERPL-SCNC: 98 MMOL/L (ref 99–110)
CO2 BLDV-SCNC: 38 MMOL/L
CO2 SERPL-SCNC: 32 MMOL/L (ref 21–32)
COHGB MFR BLDV: 1.2 % (ref 0–1.5)
CREAT SERPL-MCNC: 0.5 MG/DL (ref 0.6–1.2)
DEPRECATED RDW RBC AUTO: 12.8 % (ref 12.4–15.4)
DO-HGB MFR BLDV: 64.6 %
EKG ATRIAL RATE: 105 BPM
EKG DIAGNOSIS: NORMAL
EKG P AXIS: 81 DEGREES
EKG P-R INTERVAL: 152 MS
EKG Q-T INTERVAL: 348 MS
EKG QRS DURATION: 70 MS
EKG QTC CALCULATION (BAZETT): 459 MS
EKG R AXIS: 77 DEGREES
EKG T AXIS: 79 DEGREES
EKG VENTRICULAR RATE: 105 BPM
EOSINOPHIL # BLD: 0.7 K/UL (ref 0–0.6)
EOSINOPHIL NFR BLD: 11.8 %
GFR SERPLBLD CREATININE-BSD FMLA CKD-EPI: >90 ML/MIN/{1.73_M2}
GLUCOSE SERPL-MCNC: 111 MG/DL (ref 70–99)
HCO3 BLDV-SCNC: 36 MMOL/L (ref 24–28)
HCT VFR BLD AUTO: 45 % (ref 36–48)
HGB BLD-MCNC: 15.2 G/DL (ref 12–16)
LYMPHOCYTES # BLD: 1.6 K/UL (ref 1–5.1)
LYMPHOCYTES NFR BLD: 26.6 %
MCH RBC QN AUTO: 32.3 PG (ref 26–34)
MCHC RBC AUTO-ENTMCNC: 33.7 G/DL (ref 31–36)
MCV RBC AUTO: 95.8 FL (ref 80–100)
METHGB MFR BLDV: 0.2 % (ref 0–1.5)
MONOCYTES # BLD: 0.4 K/UL (ref 0–1.3)
MONOCYTES NFR BLD: 6 %
NEUTROPHILS # BLD: 3.3 K/UL (ref 1.7–7.7)
NEUTROPHILS NFR BLD: 54.1 %
PCO2 BLDV: 70.5 MMHG (ref 41–51)
PH BLDV: 7.32 [PH] (ref 7.35–7.45)
PLATELET # BLD AUTO: 265 K/UL (ref 135–450)
PMV BLD AUTO: 7.6 FL (ref 5–10.5)
PO2 BLDV: <30 MMHG (ref 25–40)
POTASSIUM SERPL-SCNC: 4.2 MMOL/L (ref 3.5–5.1)
RBC # BLD AUTO: 4.7 M/UL (ref 4–5.2)
SAO2 % BLDV: 35 %
SODIUM SERPL-SCNC: 138 MMOL/L (ref 136–145)
TROPONIN, HIGH SENSITIVITY: 21 NG/L (ref 0–14)
TROPONIN, HIGH SENSITIVITY: 23 NG/L (ref 0–14)
WBC # BLD AUTO: 6.1 K/UL (ref 4–11)

## 2025-04-19 PROCEDURE — 6370000000 HC RX 637 (ALT 250 FOR IP): Performed by: STUDENT IN AN ORGANIZED HEALTH CARE EDUCATION/TRAINING PROGRAM

## 2025-04-19 PROCEDURE — 1200000000 HC SEMI PRIVATE

## 2025-04-19 PROCEDURE — 82803 BLOOD GASES ANY COMBINATION: CPT

## 2025-04-19 PROCEDURE — 6360000002 HC RX W HCPCS: Performed by: STUDENT IN AN ORGANIZED HEALTH CARE EDUCATION/TRAINING PROGRAM

## 2025-04-19 PROCEDURE — 85025 COMPLETE CBC W/AUTO DIFF WBC: CPT

## 2025-04-19 PROCEDURE — 2500000003 HC RX 250 WO HCPCS: Performed by: STUDENT IN AN ORGANIZED HEALTH CARE EDUCATION/TRAINING PROGRAM

## 2025-04-19 PROCEDURE — 99285 EMERGENCY DEPT VISIT HI MDM: CPT

## 2025-04-19 PROCEDURE — 71046 X-RAY EXAM CHEST 2 VIEWS: CPT

## 2025-04-19 PROCEDURE — 94761 N-INVAS EAR/PLS OXIMETRY MLT: CPT

## 2025-04-19 PROCEDURE — 96374 THER/PROPH/DIAG INJ IV PUSH: CPT

## 2025-04-19 PROCEDURE — 2700000000 HC OXYGEN THERAPY PER DAY

## 2025-04-19 PROCEDURE — 94640 AIRWAY INHALATION TREATMENT: CPT

## 2025-04-19 PROCEDURE — 84484 ASSAY OF TROPONIN QUANT: CPT

## 2025-04-19 PROCEDURE — 93005 ELECTROCARDIOGRAM TRACING: CPT | Performed by: EMERGENCY MEDICINE

## 2025-04-19 PROCEDURE — 80048 BASIC METABOLIC PNL TOTAL CA: CPT

## 2025-04-19 RX ORDER — ALBUTEROL SULFATE 0.83 MG/ML
2.5 SOLUTION RESPIRATORY (INHALATION) CONTINUOUS
Status: CANCELLED | OUTPATIENT
Start: 2025-04-19

## 2025-04-19 RX ORDER — IPRATROPIUM BROMIDE AND ALBUTEROL SULFATE 2.5; .5 MG/3ML; MG/3ML
1 SOLUTION RESPIRATORY (INHALATION)
Status: DISCONTINUED | OUTPATIENT
Start: 2025-04-19 | End: 2025-04-19

## 2025-04-19 RX ORDER — SODIUM CHLORIDE 9 MG/ML
INJECTION, SOLUTION INTRAVENOUS PRN
Status: DISCONTINUED | OUTPATIENT
Start: 2025-04-19 | End: 2025-04-27 | Stop reason: HOSPADM

## 2025-04-19 RX ORDER — ONDANSETRON 2 MG/ML
4 INJECTION INTRAMUSCULAR; INTRAVENOUS EVERY 6 HOURS PRN
Status: DISCONTINUED | OUTPATIENT
Start: 2025-04-19 | End: 2025-04-27 | Stop reason: HOSPADM

## 2025-04-19 RX ORDER — ENOXAPARIN SODIUM 100 MG/ML
30 INJECTION SUBCUTANEOUS DAILY
Status: DISCONTINUED | OUTPATIENT
Start: 2025-04-19 | End: 2025-04-27 | Stop reason: HOSPADM

## 2025-04-19 RX ORDER — GUAIFENESIN 600 MG/1
600 TABLET, EXTENDED RELEASE ORAL 2 TIMES DAILY
Status: DISCONTINUED | OUTPATIENT
Start: 2025-04-19 | End: 2025-04-27 | Stop reason: HOSPADM

## 2025-04-19 RX ORDER — AZITHROMYCIN 250 MG/1
500 TABLET, FILM COATED ORAL ONCE
Status: COMPLETED | OUTPATIENT
Start: 2025-04-19 | End: 2025-04-19

## 2025-04-19 RX ORDER — ARFORMOTEROL TARTRATE 15 UG/2ML
15 SOLUTION RESPIRATORY (INHALATION)
Status: DISCONTINUED | OUTPATIENT
Start: 2025-04-19 | End: 2025-04-27 | Stop reason: HOSPADM

## 2025-04-19 RX ORDER — ACETAMINOPHEN 325 MG/1
650 TABLET ORAL EVERY 6 HOURS PRN
Status: DISCONTINUED | OUTPATIENT
Start: 2025-04-19 | End: 2025-04-27 | Stop reason: HOSPADM

## 2025-04-19 RX ORDER — SODIUM CHLORIDE 0.9 % (FLUSH) 0.9 %
5-40 SYRINGE (ML) INJECTION PRN
Status: DISCONTINUED | OUTPATIENT
Start: 2025-04-19 | End: 2025-04-27 | Stop reason: HOSPADM

## 2025-04-19 RX ORDER — ONDANSETRON 4 MG/1
4 TABLET, ORALLY DISINTEGRATING ORAL EVERY 8 HOURS PRN
Status: DISCONTINUED | OUTPATIENT
Start: 2025-04-19 | End: 2025-04-27 | Stop reason: HOSPADM

## 2025-04-19 RX ORDER — MONTELUKAST SODIUM 10 MG/1
10 TABLET ORAL DAILY
Status: DISCONTINUED | OUTPATIENT
Start: 2025-04-19 | End: 2025-04-27 | Stop reason: HOSPADM

## 2025-04-19 RX ORDER — METOPROLOL SUCCINATE 25 MG/1
12.5 TABLET, EXTENDED RELEASE ORAL 2 TIMES DAILY
Status: DISCONTINUED | OUTPATIENT
Start: 2025-04-19 | End: 2025-04-27 | Stop reason: HOSPADM

## 2025-04-19 RX ORDER — ASPIRIN 81 MG/1
81 TABLET, CHEWABLE ORAL DAILY
Status: DISCONTINUED | OUTPATIENT
Start: 2025-04-19 | End: 2025-04-27 | Stop reason: HOSPADM

## 2025-04-19 RX ORDER — LISINOPRIL 2.5 MG/1
2.5 TABLET ORAL EVERY EVENING
Status: DISCONTINUED | OUTPATIENT
Start: 2025-04-19 | End: 2025-04-27 | Stop reason: HOSPADM

## 2025-04-19 RX ORDER — IPRATROPIUM BROMIDE AND ALBUTEROL SULFATE 2.5; .5 MG/3ML; MG/3ML
1 SOLUTION RESPIRATORY (INHALATION) ONCE
Status: COMPLETED | OUTPATIENT
Start: 2025-04-19 | End: 2025-04-19

## 2025-04-19 RX ORDER — ALBUTEROL SULFATE 0.83 MG/ML
2.5 SOLUTION RESPIRATORY (INHALATION) EVERY 4 HOURS PRN
Status: DISCONTINUED | OUTPATIENT
Start: 2025-04-19 | End: 2025-04-27 | Stop reason: HOSPADM

## 2025-04-19 RX ORDER — ALBUTEROL SULFATE 0.83 MG/ML
2.5 SOLUTION RESPIRATORY (INHALATION)
Status: DISCONTINUED | OUTPATIENT
Start: 2025-04-19 | End: 2025-04-19

## 2025-04-19 RX ORDER — PREDNISONE 20 MG/1
40 TABLET ORAL DAILY
Status: DISCONTINUED | OUTPATIENT
Start: 2025-04-20 | End: 2025-04-21

## 2025-04-19 RX ORDER — IPRATROPIUM BROMIDE AND ALBUTEROL SULFATE 2.5; .5 MG/3ML; MG/3ML
1 SOLUTION RESPIRATORY (INHALATION)
Status: DISCONTINUED | OUTPATIENT
Start: 2025-04-20 | End: 2025-04-21

## 2025-04-19 RX ORDER — SODIUM CHLORIDE 0.9 % (FLUSH) 0.9 %
5-40 SYRINGE (ML) INJECTION EVERY 12 HOURS SCHEDULED
Status: DISCONTINUED | OUTPATIENT
Start: 2025-04-19 | End: 2025-04-27 | Stop reason: HOSPADM

## 2025-04-19 RX ORDER — POLYETHYLENE GLYCOL 3350 17 G/17G
17 POWDER, FOR SOLUTION ORAL DAILY PRN
Status: DISCONTINUED | OUTPATIENT
Start: 2025-04-19 | End: 2025-04-27 | Stop reason: HOSPADM

## 2025-04-19 RX ORDER — BENZONATATE 100 MG/1
100 CAPSULE ORAL 3 TIMES DAILY PRN
Status: DISCONTINUED | OUTPATIENT
Start: 2025-04-19 | End: 2025-04-27 | Stop reason: HOSPADM

## 2025-04-19 RX ORDER — BUDESONIDE 0.5 MG/2ML
1 INHALANT ORAL
Status: DISCONTINUED | OUTPATIENT
Start: 2025-04-19 | End: 2025-04-27 | Stop reason: HOSPADM

## 2025-04-19 RX ORDER — ACETAMINOPHEN 650 MG/1
650 SUPPOSITORY RECTAL EVERY 6 HOURS PRN
Status: DISCONTINUED | OUTPATIENT
Start: 2025-04-19 | End: 2025-04-27 | Stop reason: HOSPADM

## 2025-04-19 RX ORDER — AZITHROMYCIN 250 MG/1
500 TABLET, FILM COATED ORAL DAILY
Status: COMPLETED | OUTPATIENT
Start: 2025-04-20 | End: 2025-04-21

## 2025-04-19 RX ADMIN — IPRATROPIUM BROMIDE AND ALBUTEROL SULFATE 1 DOSE: .5; 2.5 SOLUTION RESPIRATORY (INHALATION) at 11:13

## 2025-04-19 RX ADMIN — IPRATROPIUM BROMIDE AND ALBUTEROL SULFATE 1 DOSE: .5; 2.5 SOLUTION RESPIRATORY (INHALATION) at 07:45

## 2025-04-19 RX ADMIN — WATER 125 MG: 1 INJECTION INTRAMUSCULAR; INTRAVENOUS; SUBCUTANEOUS at 08:02

## 2025-04-19 RX ADMIN — ACETAMINOPHEN 650 MG: 325 TABLET, FILM COATED ORAL at 13:05

## 2025-04-19 RX ADMIN — AZITHROMYCIN DIHYDRATE 500 MG: 250 TABLET, FILM COATED ORAL at 08:06

## 2025-04-19 RX ADMIN — Medication 3 MG: at 21:56

## 2025-04-19 RX ADMIN — ASPIRIN 81 MG: 81 TABLET, CHEWABLE ORAL at 16:30

## 2025-04-19 RX ADMIN — TIZANIDINE 2 MG: 4 TABLET ORAL at 20:32

## 2025-04-19 RX ADMIN — SODIUM CHLORIDE, PRESERVATIVE FREE 10 ML: 5 INJECTION INTRAVENOUS at 20:32

## 2025-04-19 RX ADMIN — METOPROLOL SUCCINATE 12.5 MG: 25 TABLET, EXTENDED RELEASE ORAL at 20:32

## 2025-04-19 RX ADMIN — BUDESONIDE 1000 MCG: 0.5 SUSPENSION RESPIRATORY (INHALATION) at 21:36

## 2025-04-19 RX ADMIN — LISINOPRIL 2.5 MG: 2.5 TABLET ORAL at 21:56

## 2025-04-19 RX ADMIN — ARFORMOTEROL TARTRATE 15 MCG: 15 SOLUTION RESPIRATORY (INHALATION) at 21:37

## 2025-04-19 RX ADMIN — GUAIFENESIN 600 MG: 600 TABLET ORAL at 21:56

## 2025-04-19 RX ADMIN — ALBUTEROL SULFATE 2.5 MG: 2.5 SOLUTION RESPIRATORY (INHALATION) at 07:45

## 2025-04-19 RX ADMIN — SODIUM CHLORIDE, PRESERVATIVE FREE 10 ML: 5 INJECTION INTRAVENOUS at 13:06

## 2025-04-19 RX ADMIN — MONTELUKAST 10 MG: 10 TABLET, FILM COATED ORAL at 16:30

## 2025-04-19 RX ADMIN — IPRATROPIUM BROMIDE AND ALBUTEROL SULFATE 1 DOSE: .5; 2.5 SOLUTION RESPIRATORY (INHALATION) at 21:36

## 2025-04-19 RX ADMIN — IPRATROPIUM BROMIDE AND ALBUTEROL SULFATE 1 DOSE: .5; 2.5 SOLUTION RESPIRATORY (INHALATION) at 15:13

## 2025-04-19 ASSESSMENT — PAIN DESCRIPTION - LOCATION: LOCATION: HEAD

## 2025-04-19 ASSESSMENT — PAIN DESCRIPTION - DIRECTION: RADIATING_TOWARDS: NO

## 2025-04-19 ASSESSMENT — PAIN SCALES - GENERAL
PAINLEVEL_OUTOF10: 0
PAINLEVEL_OUTOF10: 3

## 2025-04-19 ASSESSMENT — PAIN - FUNCTIONAL ASSESSMENT
PAIN_FUNCTIONAL_ASSESSMENT: ACTIVITIES ARE NOT PREVENTED
PAIN_FUNCTIONAL_ASSESSMENT: NONE - DENIES PAIN

## 2025-04-19 ASSESSMENT — LIFESTYLE VARIABLES
HOW MANY STANDARD DRINKS CONTAINING ALCOHOL DO YOU HAVE ON A TYPICAL DAY: 1 OR 2
HOW OFTEN DO YOU HAVE A DRINK CONTAINING ALCOHOL: 4 OR MORE TIMES A WEEK

## 2025-04-19 ASSESSMENT — PAIN DESCRIPTION - ONSET: ONSET: ON-GOING

## 2025-04-19 ASSESSMENT — PAIN DESCRIPTION - PAIN TYPE: TYPE: ACUTE PAIN

## 2025-04-19 ASSESSMENT — PAIN DESCRIPTION - ORIENTATION: ORIENTATION: MID

## 2025-04-19 ASSESSMENT — PAIN DESCRIPTION - DESCRIPTORS: DESCRIPTORS: ACHING

## 2025-04-19 ASSESSMENT — PAIN DESCRIPTION - FREQUENCY: FREQUENCY: INTERMITTENT

## 2025-04-19 NOTE — ED NOTES
Patient Name: Charlene Nazario  : 1950 75 y.o.  MRN: [id number]  ED Room #: A07/A07-07     Chief complaint:   Chief Complaint   Patient presents with    Shortness of Breath     Patient presents to ED with report of shortness of breath starting yesterday afternoon. Patient reports history of COPD, reports home nebulizer not helping. Reports she has oxygen at home but does not wear it regularly.     Hospital Problem/Diagnosis:   Hospital Problems           Last Modified POA    * (Principal) COPD exacerbation ([facility]) 2025 Yes         O2 Flow Rate:O2 Device: Nasal cannula O2 Flow Rate (L/min): 2 L/min (if applicable)  Cardiac Rhythm:   (if applicable)  Active LDA's:   Peripheral IV 25 Right Forearm (Active)   Site Assessment Clean, dry & intact 25 0710            How does patient ambulate? Unknown, did not assess in the Emergency Department    2. How does patient take pills? Whole with Water    3. Is patient alert? Alert    4. Is patient oriented? To Person, To Place, To Time, To Situation, and Follows Commands    5.   Patient arrived from:  home  Facility Name: ___________________________________________    6. If patient is disoriented or from a Skill Nursing Facility has family been notified of admission?     7. Patient belongings? Belongings: Clothing    Disposition of belongings? Kept with Patient     8. Any specific patient or family belongings/needs/dynamics?   a.     9. Miscellaneous comments/pending orders?  a. This patient is alert and fully oriented. Her vitals have been stable while in ED. She is from home independently. Reports that she ambulates without difficulty at baseline. Denies any recent falls. IV in R forearm. She is on room air at baseline, will wear O2 PRN.       If there are any additional questions please reach out to the Emergency Department.       Ankit Fung, RN  [phone]

## 2025-04-19 NOTE — PROGRESS NOTES
4 Eyes Admission Assessment     I agree as the admission nurse that 2 RN's have performed a thorough Head to Toe Skin Assessment on the patient. ALL assessment sites listed below have been assessed on admission.       Areas assessed by both nurses: Mago & Tameka  [x]   Head, Face, and Ears   [x]   Shoulders, Back, and Chest  [x]   Arms, Elbows, and Hands   [x]   Coccyx, Sacrum, and Ischum  [x]   Legs, Feet, and Heels        Does the Patient have Skin Breakdown?  No         Oscar Prevention initiated:  No   Wound Care Orders initiated:  No      [facility] nurse consulted for Pressure Injury (Stage 3,4, Unstageable, DTI, NWPT, and Complex wounds):  No      Nurse 1 eSignature: Electronically signed by Mago Georges RN on 4/19/25 at 5:58 PM EDT    **SHARE this note so that the co-signing nurse is able to place an eSignature**    Nurse 2 eSignature: Electronically signed by Tameka Cisneros RN on 4/19/25 at 6:18 PM EDT

## 2025-04-19 NOTE — H&P
V2.0  History and Physical      Name:  Charlene Nazario /Age/Sex: 1950  (75 y.o. female)   MRN & CSN:  [id number] & [id number] Encounter Date/Time: 2025 10:59 AM EDT   [facility]CP: Gerry Foley DO       Hospital Day: 1    Assessment and Plan:   Charlene Nazario is a 75 y.o. female with a pmh of COPD, HTN who presents with COPD exacerbation (HCC)    Hospital Problems           Last Modified POA    * (Principal) COPD exacerbation (HCC) 2025 Yes   #HTN    Plan:  Admit to hospital  Tele  Start inhaled DuoNebs  Continue prednisone for 5 day course total  Continue Azithromycin 3 day course  Resume home medications: Singulair, lisinopril, metoprolol succinate, ASA    Disposition:   Current Living situation: Home  Expected Disposition: Home  Estimated D/C: 2+ days   Diet ADULT DIET; Regular   DVT Prophylaxis [] Lovenox, []  Heparin, [] SCDs, [] Ambulation,  [] Eliquis, [] Xarelto, [] Coumadin   Code Status Full Code   Surrogate Decision Maker/ POA Norberto Nazario, spouse     Personally reviewed Lab Studies and Imaging     Discussed management of the case with ED physician who recommended hospitalization    EKG interpreted personally and results NSR    Imaging that was interpreted personally includes CXR and results hyperinflated lung fields no focal consolidations        History from:     patient    History of Present Illness:     Chief Complaint: Dyspnea    Charlene Nazario is a 75 y.o. female with pmh of COPD, HTN who presents with dyspnea. Symptoms started yesterday. Has wheezing and cough but no increased sputum production. Denies associated chest pain, extremity edema, fevers, hemoptysis.     In the ED mildly hypertensive with other vitals stable. Diffuse wheezing and rhonchi on lung exam. Blood gas with hypercarbia and mild respiratory acidosis. Chest x-ray shows no infiltrates and her EKG is similar to prior normal. Started on treatment for COPD exacerbation and admitted for    Allergies   Allergen Reactions    Bee Venom Swelling    Codeine Hives and Nausea And Vomiting    Hydrocodone Hives and Nausea And Vomiting    Oxycodone Nausea And Vomiting and Other (See Comments)     Projectile vomiting    Adhesive Tape Rash     Fam HX:  family history includes Diabetes in her mother; No Known Problems in her brother, father, maternal grandfather, maternal grandmother, paternal grandfather, paternal grandmother, and sister.  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   Tobacco Use    Smoking status: Former     Current packs/day: 0.00     Average packs/day: 1.5 packs/day for 22.0 years (33.0 ttl pk-yrs)     Types: Cigarettes     Start date: 1989     Quit date: 2011     Years since quittin.0    Smokeless tobacco: Never   Vaping Use    Vaping status: Never Used   Substance and Sexual Activity    Alcohol use: Yes     Alcohol/week: 4.0 standard drinks of alcohol     Types: 4 Cans of beer per week     Comment: 1-2 drinks a day    Drug use: No    Sexual activity: Yes     Partners: Male     Social Drivers of Health     Financial Resource Strain: Low Risk  (2024)    Overall Financial Resource Strain (CARDIA)     Difficulty of Paying Living Expenses: Not hard at all   Food Insecurity: No Food Insecurity (3/24/2025)    Hunger Vital Sign     Worried About Running Out of Food in the Last Year: Never true     Ran Out of Food in the Last Year: Never true   Transportation Needs: No Transportation Needs (3/24/2025)    PRAPARE - Transportation     Lack of Transportation (Medical): No     Lack of Transportation (Non-Medical): No   Physical Activity: Sufficiently Active (2023)    Exercise Vital Sign     Days of Exercise per Week: 3 days     Minutes of Exercise per Session: 60 min   Stress: Stress Concern Present (2021)    [place]

## 2025-04-19 NOTE — ED PROVIDER NOTES
THE [facility]  EMERGENCY DEPARTMENT ENCOUNTER          ATTENDING PHYSICIAN NOTE       Date of evaluation: 4/19/2025    Chief Complaint     Shortness of Breath (Patient presents to ED with report of shortness of breath starting yesterday afternoon. Patient reports history of COPD, reports home nebulizer not helping. Reports she has oxygen at home but does not wear it regularly.)      History of Present Illness     Charlene Nazario is a 75 y.o. female who presents to the emergency department shortness of breath.  Patient is a history of COPD and reports that she has been having difficulty breathing that was acutely worsening overnight.  She takes her inhalers and used her rescue inhaler today without improvement in her symptoms.  Patient has had multiple admissions for COPD exacerbations in the past.  Denies any fevers sputum changes or recent infectious symptoms.  Denies associated chest pain, extremity edema, hemoptysis.    ASSESSMENT / PLAN  (MEDICAL DECISION MAKING)     INITIAL VITALS: BP: (!) 150/106, Temp: 98.1 °F (36.7 °C), Pulse: 91, Respirations: 24, SpO2: 96 %      Charlene Nazario is a 75 y.o. female with past medical history of COPD heart failure, hyperlipidemia, hypertension who presents emergency department with shortness of breath.  Patient reports that since yesterday she has been having severe shortness of breath unresponsive to her inhalers at home.  No sputum production but does report diffuse wheezing.  Patient noted to be sitting tripod on stretcher.  On auscultation he did have biphasic wheezing and rhonchi diffusely.  Patient was noted to be using accessory muscles.  Patient treated with steroids, azithromycin, DuoNeb, and albuterol.  On repeat assessment she continued to have wheezing with some improvement in aeration but still fairly symptomatic.  Obtained another round of nebulizer therapy.  Chest x-ray shows no infiltrates and her EKG is similar to prior normal.  Her VBG is noted to      None    ED Course     Nursing Notes, Past Medical Hx, Past Surgical Hx, Social Hx,Allergies, and Family Hx were reviewed.         The patient was given the following medications:  Orders Placed This Encounter   Medications    methylPREDNISolone sodium succ (SOLU-MEDROL) 125 mg in sterile water 2 mL injection    ipratropium 0.5 mg-albuterol 2.5 mg (DUONEB) nebulizer solution 1 Dose     Initiate RT Bronchodilator Protocol:   No    azithromycin (ZITHROMAX) tablet 500 mg     Antimicrobial Indications:   COPD Exacerbation    albuterol (PROVENTIL) (2.5 MG/3ML) 0.083% nebulizer solution 2.5 mg     Initiate RT Bronchodilator Protocol:   No    ipratropium 0.5 mg-albuterol 2.5 mg (DUONEB) nebulizer solution 1 Dose     Initiate RT Bronchodilator Protocol:   Yes - Inpatient Protocol       CONSULTS:  None    Review of Systems     Review of Systems   Constitutional:  Negative for chills, fatigue, fever and unexpected weight change.   HENT:  Negative for congestion, ear pain, rhinorrhea, sore throat and trouble swallowing.    Eyes:  Negative for pain and redness.   Respiratory:  Positive for shortness of breath and wheezing. Negative for cough.         No hemoptysis   Cardiovascular:  Negative for chest pain, palpitations and leg swelling.   Gastrointestinal:  Negative for abdominal pain, blood in stool, diarrhea, nausea and vomiting.   Genitourinary:  Negative for dysuria and hematuria.   Musculoskeletal:  Negative for back pain and myalgias.   Skin:  Negative for rash and wound.   Neurological:  Negative for dizziness, weakness and headaches.        No sensation changes   Psychiatric/Behavioral:  Negative for dysphoric mood and suicidal ideas.    All other systems reviewed and are negative.      Past Medical, Surgical, Family, and Social History     She has a past medical history of Allergic rhinitis, Anxiety, Back pain, Cervical cancer screening, Closed compression fracture of L3 vertebra (HCC), Compression fracture of

## 2025-04-19 NOTE — PROGRESS NOTES
Clinical Pharmacy Consult Note  Medication History     Admit Date: 4/19/2025    List of current medications patient is taking is complete. Home Medication list in Epic updated to reflect changes noted below.    Source of information: I spoke to the patient, her , and viewed her dispense report.     Patient's home pharmacy: [id number] [address] - P [phone] - F [phone]      Changes made to medication list:   Medications removed: (include reason, ex: therapy completed, patient no longer taking, etc.)  Pulmicort- Not taking  Arnuity- Not taking  Anoro- Not taking  Medications added:   None  Medication doses adjusted:   None  Other notes:   The patient reported that she currently uses Trelegy as her maintenance inhaler and that she filled it more recently than 5/27/2022.     Current Outpatient Medications   Medication Instructions    acetaminophen (TYLENOL) 500 mg, Oral, EVERY 6 HOURS PRN    albuterol (PROVENTIL) 2.5 mg, Nebulization, EVERY 6 HOURS PRN    albuterol sulfate HFA (PROVENTIL;VENTOLIN;PROAIR) 108 (90 Base) MCG/ACT inhaler 2 puffs, Inhalation, EVERY 6 HOURS PRN, for wheezing    aspirin 81 mg, Oral, DAILY    fluticasone-umeclidin-vilant (TRELEGY ELLIPTA) 200-62.5-25 MCG/ACT AEPB inhaler 1 puff, Inhalation, DAILY    lisinopril (PRINIVIL;ZESTRIL) 2.5 mg, Oral, EVERY EVENING    melatonin 3 mg, Oral, NIGHTLY PRN    metoprolol succinate (TOPROL XL) 12.5 mg, Oral, 2 TIMES DAILY    montelukast (SINGULAIR) 10 mg, Oral, DAILY    tiZANidine (ZANAFLEX) 2 mg, Oral, NIGHTLY       Thank you for consulting pharmacy,    Ivan Beaulieu, Pharmacy Intern

## 2025-04-20 LAB
ANION GAP SERPL CALCULATED.3IONS-SCNC: 7 MMOL/L (ref 3–16)
BASOPHILS # BLD: 0 K/UL (ref 0–0.2)
BASOPHILS NFR BLD: 0.2 %
BUN SERPL-MCNC: 17 MG/DL (ref 7–20)
CALCIUM SERPL-MCNC: 8.9 MG/DL (ref 8.3–10.6)
CHLORIDE SERPL-SCNC: 97 MMOL/L (ref 99–110)
CO2 SERPL-SCNC: 30 MMOL/L (ref 21–32)
CREAT SERPL-MCNC: 0.6 MG/DL (ref 0.6–1.2)
DEPRECATED RDW RBC AUTO: 12.9 % (ref 12.4–15.4)
EOSINOPHIL # BLD: 0 K/UL (ref 0–0.6)
EOSINOPHIL NFR BLD: 0.5 %
GFR SERPLBLD CREATININE-BSD FMLA CKD-EPI: >90 ML/MIN/{1.73_M2}
GLUCOSE SERPL-MCNC: 107 MG/DL (ref 70–99)
HCT VFR BLD AUTO: 39.6 % (ref 36–48)
HGB BLD-MCNC: 13.4 G/DL (ref 12–16)
LYMPHOCYTES # BLD: 1.4 K/UL (ref 1–5.1)
LYMPHOCYTES NFR BLD: 15.4 %
MCH RBC QN AUTO: 32.1 PG (ref 26–34)
MCHC RBC AUTO-ENTMCNC: 33.7 G/DL (ref 31–36)
MCV RBC AUTO: 95.3 FL (ref 80–100)
MONOCYTES # BLD: 0.7 K/UL (ref 0–1.3)
MONOCYTES NFR BLD: 8 %
NEUTROPHILS # BLD: 7.1 K/UL (ref 1.7–7.7)
NEUTROPHILS NFR BLD: 75.9 %
PLATELET # BLD AUTO: 249 K/UL (ref 135–450)
PMV BLD AUTO: 7.6 FL (ref 5–10.5)
POTASSIUM SERPL-SCNC: 4.4 MMOL/L (ref 3.5–5.1)
RBC # BLD AUTO: 4.16 M/UL (ref 4–5.2)
SODIUM SERPL-SCNC: 134 MMOL/L (ref 136–145)
WBC # BLD AUTO: 9.3 K/UL (ref 4–11)

## 2025-04-20 PROCEDURE — 6370000000 HC RX 637 (ALT 250 FOR IP): Performed by: STUDENT IN AN ORGANIZED HEALTH CARE EDUCATION/TRAINING PROGRAM

## 2025-04-20 PROCEDURE — 1200000000 HC SEMI PRIVATE

## 2025-04-20 PROCEDURE — 94640 AIRWAY INHALATION TREATMENT: CPT

## 2025-04-20 PROCEDURE — 2500000003 HC RX 250 WO HCPCS: Performed by: STUDENT IN AN ORGANIZED HEALTH CARE EDUCATION/TRAINING PROGRAM

## 2025-04-20 PROCEDURE — 2700000000 HC OXYGEN THERAPY PER DAY

## 2025-04-20 PROCEDURE — 94761 N-INVAS EAR/PLS OXIMETRY MLT: CPT

## 2025-04-20 PROCEDURE — 36415 COLL VENOUS BLD VENIPUNCTURE: CPT

## 2025-04-20 PROCEDURE — 85025 COMPLETE CBC W/AUTO DIFF WBC: CPT

## 2025-04-20 PROCEDURE — 6360000002 HC RX W HCPCS: Performed by: STUDENT IN AN ORGANIZED HEALTH CARE EDUCATION/TRAINING PROGRAM

## 2025-04-20 PROCEDURE — 80048 BASIC METABOLIC PNL TOTAL CA: CPT

## 2025-04-20 RX ADMIN — TIOTROPIUM BROMIDE INHALATION SPRAY 2 PUFF: 3.12 SPRAY, METERED RESPIRATORY (INHALATION) at 08:28

## 2025-04-20 RX ADMIN — ACETAMINOPHEN 650 MG: 325 TABLET, FILM COATED ORAL at 08:49

## 2025-04-20 RX ADMIN — BUDESONIDE 1000 MCG: 0.5 SUSPENSION RESPIRATORY (INHALATION) at 20:19

## 2025-04-20 RX ADMIN — MONTELUKAST 10 MG: 10 TABLET, FILM COATED ORAL at 08:19

## 2025-04-20 RX ADMIN — IPRATROPIUM BROMIDE AND ALBUTEROL SULFATE 1 DOSE: .5; 2.5 SOLUTION RESPIRATORY (INHALATION) at 08:28

## 2025-04-20 RX ADMIN — IPRATROPIUM BROMIDE AND ALBUTEROL SULFATE 1 DOSE: .5; 2.5 SOLUTION RESPIRATORY (INHALATION) at 20:19

## 2025-04-20 RX ADMIN — GUAIFENESIN 600 MG: 600 TABLET ORAL at 19:59

## 2025-04-20 RX ADMIN — IPRATROPIUM BROMIDE AND ALBUTEROL SULFATE 1 DOSE: .5; 2.5 SOLUTION RESPIRATORY (INHALATION) at 16:16

## 2025-04-20 RX ADMIN — METOPROLOL SUCCINATE 12.5 MG: 25 TABLET, EXTENDED RELEASE ORAL at 08:19

## 2025-04-20 RX ADMIN — ARFORMOTEROL TARTRATE 15 MCG: 15 SOLUTION RESPIRATORY (INHALATION) at 20:19

## 2025-04-20 RX ADMIN — ACETAMINOPHEN 650 MG: 325 TABLET, FILM COATED ORAL at 23:56

## 2025-04-20 RX ADMIN — BENZONATATE 100 MG: 100 CAPSULE ORAL at 22:37

## 2025-04-20 RX ADMIN — SODIUM CHLORIDE, PRESERVATIVE FREE 10 ML: 5 INJECTION INTRAVENOUS at 19:59

## 2025-04-20 RX ADMIN — SODIUM CHLORIDE, PRESERVATIVE FREE 10 ML: 5 INJECTION INTRAVENOUS at 08:22

## 2025-04-20 RX ADMIN — IPRATROPIUM BROMIDE AND ALBUTEROL SULFATE 1 DOSE: .5; 2.5 SOLUTION RESPIRATORY (INHALATION) at 12:59

## 2025-04-20 RX ADMIN — GUAIFENESIN 600 MG: 600 TABLET ORAL at 08:19

## 2025-04-20 RX ADMIN — AZITHROMYCIN DIHYDRATE 500 MG: 250 TABLET, FILM COATED ORAL at 08:19

## 2025-04-20 RX ADMIN — ARFORMOTEROL TARTRATE 15 MCG: 15 SOLUTION RESPIRATORY (INHALATION) at 08:28

## 2025-04-20 RX ADMIN — ASPIRIN 81 MG: 81 TABLET, CHEWABLE ORAL at 08:19

## 2025-04-20 RX ADMIN — Medication 3 MG: at 23:59

## 2025-04-20 RX ADMIN — BUDESONIDE 1000 MCG: 0.5 SUSPENSION RESPIRATORY (INHALATION) at 08:28

## 2025-04-20 RX ADMIN — PREDNISONE 40 MG: 20 TABLET ORAL at 08:19

## 2025-04-20 RX ADMIN — TIZANIDINE 2 MG: 4 TABLET ORAL at 19:59

## 2025-04-20 RX ADMIN — METOPROLOL SUCCINATE 12.5 MG: 25 TABLET, EXTENDED RELEASE ORAL at 20:00

## 2025-04-20 RX ADMIN — LISINOPRIL 2.5 MG: 2.5 TABLET ORAL at 19:59

## 2025-04-20 ASSESSMENT — PAIN DESCRIPTION - PAIN TYPE: TYPE: ACUTE PAIN

## 2025-04-20 ASSESSMENT — PAIN DESCRIPTION - DIRECTION: RADIATING_TOWARDS: NO

## 2025-04-20 ASSESSMENT — PAIN DESCRIPTION - ORIENTATION: ORIENTATION: MID

## 2025-04-20 ASSESSMENT — PAIN SCALES - GENERAL: PAINLEVEL_OUTOF10: 1

## 2025-04-20 ASSESSMENT — PAIN DESCRIPTION - FREQUENCY: FREQUENCY: INTERMITTENT

## 2025-04-20 ASSESSMENT — PAIN - FUNCTIONAL ASSESSMENT: PAIN_FUNCTIONAL_ASSESSMENT: PREVENTS OR INTERFERES SOME ACTIVE ACTIVITIES AND ADLS

## 2025-04-20 ASSESSMENT — PAIN DESCRIPTION - DESCRIPTORS: DESCRIPTORS: ACHING

## 2025-04-20 ASSESSMENT — PAIN DESCRIPTION - LOCATION: LOCATION: HEAD

## 2025-04-20 ASSESSMENT — PAIN DESCRIPTION - ONSET: ONSET: ON-GOING

## 2025-04-20 NOTE — PLAN OF CARE
Problem: Chronic Conditions and Co-morbidities  Goal: Patient's chronic conditions and co-morbidity symptoms are monitored and maintained or improved  4/20/2025 1722 by Mago Georges RN  Outcome: Progressing     Problem: Discharge Planning  Goal: Discharge to home or other facility with appropriate resources  4/20/2025 1722 by Mago Georges RN  Outcome: Progressing     Problem: Respiratory - Adult  Goal: Achieves optimal ventilation and oxygenation  4/20/2025 1722 by Mago Georges RN  Outcome: Progressing     Problem: Safety - Adult  Goal: Free from fall injury  4/20/2025 1722 by Mago Georges RN  Outcome: Progressing     Problem: Cardiovascular - Adult  Goal: Maintains optimal cardiac output and hemodynamic stability  4/20/2025 1722 by Mago Georges RN  Outcome: Progressing

## 2025-04-20 NOTE — RT PROTOCOL NOTE
RT Inhaler-Nebulizer Bronchodilator Protocol Note    There is a bronchodilator order in the chart from a provider indicating to follow the RT Bronchodilator Protocol and there is an “Initiate RT Inhaler-Nebulizer Bronchodilator Protocol” order as well (see protocol at bottom of note).    CXR Findings:  No results found.    The findings from the last RT Protocol Assessment were as follows:   History Pulmonary Disease: Chronic pulmonary disease  Respiratory Pattern: Mild dyspnea at rest, irregular pattern, or RR 21-25 bpm  Breath Sounds: Inspiratory and expiratory or bilateral wheezing and/or rhonchi  Cough: Strong, spontaneous, non-productive  Indication for Bronchodilator Therapy: On home bronchodilators, Wheezing associated with pulm disorder  Bronchodilator Assessment Score: 12  Hhn with duoneb q4wa and albuterol q4prn  Aerosolized bronchodilator medication orders have been revised according to the RT Inhaler-Nebulizer Bronchodilator Protocol below.    Respiratory Therapist to perform RT Therapy Protocol Assessment initially then follow the protocol.  Repeat RT Therapy Protocol Assessment PRN for score 0-3 or on second treatment, BID, and PRN for scores above 3.    No Indications - adjust the frequency to every 6 hours PRN wheezing or bronchospasm, if no treatments needed after 48 hours then discontinue using Per Protocol order mode.     If indication present, adjust the RT bronchodilator orders based on the Bronchodilator Assessment Score as indicated below.  Use Inhaler orders unless patient has one or more of the following: on home nebulizer, not able to hold breath for 10 seconds, is not alert and oriented, cannot activate and use MDI correctly, or respiratory rate 25 breaths per minute or more, then use the equivalent nebulizer order(s) with same Frequency and PRN reasons based on the score.  If a patient is on this medication at home then do not decrease Frequency below that used at home.    0-3 - enter or  revise RT bronchodilator order(s) to equivalent RT Bronchodilator order with Frequency of every 4 hours PRN for wheezing or increased work of breathing using Per Protocol order mode.        4-6 - enter or revise RT Bronchodilator order(s) to two equivalent RT bronchodilator orders with one order with BID Frequency and one order with Frequency of every 4 hours PRN wheezing or increased work of breathing using Per Protocol order mode.        7-10 - enter or revise RT Bronchodilator order(s) to two equivalent RT bronchodilator orders with one order with TID Frequency and one order with Frequency of every 4 hours PRN wheezing or increased work of breathing using Per Protocol order mode.       11-13 - enter or revise RT Bronchodilator order(s) to one equivalent RT bronchodilator order with QID Frequency and an Albuterol order with Frequency of every 4 hours PRN wheezing or increased work of breathing using Per Protocol order mode.      Greater than 13 - enter or revise RT Bronchodilator order(s) to one equivalent RT bronchodilator order with every 4 hours Frequency and an Albuterol order with Frequency of every 2 hours PRN wheezing or increased work of breathing using Per Protocol order mode.     RT to enter RT Home Evaluation for COPD & MDI Assessment order using Per Protocol order mode.    Electronically signed by Pilar Perez RCP on 4/19/2025 at 9:44 PM

## 2025-04-20 NOTE — PLAN OF CARE
Problem: Chronic Conditions and Co-morbidities  Goal: Patient's chronic conditions and co-morbidity symptoms are monitored and maintained or improved  4/20/2025 1103 by Mago Georges RN  Outcome: Progressing     Problem: Discharge Planning  Goal: Discharge to home or other facility with appropriate resources  4/20/2025 1103 by Mago Georges RN  Outcome: Progressing     Problem: Respiratory - Adult  Goal: Achieves optimal ventilation and oxygenation  4/20/2025 1103 by Mago Georges RN  Outcome: Progressing     Problem: Safety - Adult  Goal: Free from fall injury  4/20/2025 1103 by Mago Georges RN  Outcome: Progressing     Problem: Cardiovascular - Adult  Goal: Maintains optimal cardiac output and hemodynamic stability  4/20/2025 1103 by Mago Georges RN  Outcome: Progressing

## 2025-04-20 NOTE — CARE COORDINATION
CM  following for  d/c planning:  -   Pt is from home with spouse, IPTA.      -   Pt currently on 2 L O2. And will continue to wean.     -   Pt has O2 at home through [employer] and only uses it as needed.       -   If unable to wean prior to discharge, pt will need home O2 eval.       -   Pt on IV steroids, Pulm consulted regarding SOB.         -    Consulted:  Pharmacy  for  Insurance  coverage  on Inhalers:          -    Sputum Cx, Home O 2  eval  prior to  d/c  and  O/P follow up                with  [facility]  recommended      CM  cont to  follow and assist  as needed.           Electronically signed by Gela Antoine RN on 4/24/2025 at 12:26 PM         Gela Antoine RN Case Manager  [facility]  [address]  [place] [id number] [phone]  Fax [phone]

## 2025-04-20 NOTE — PROGRESS NOTES
[place]    [facility] [place]      Name:  Charlene Nazario /Age/Sex: 1950  (75 y.o. female)   MRN & CSN:  [id number] & [id number] Encounter Date/Time: 2025 11:12 AM EDT   Location:  6304/6304-01 PCP: Gerry Foley DO     Attending:Erwin Romero MD       Hospital Day: 2    Assessment and Recommendations   Charlene Nazario is a 75 y.o. female with pmh of COPD, HTN who presents with COPD exacerbation (HCC)      COPD Exacerbation  Continue inhaled DuoNebs  Continue prednisone course, day 2/5  Continue Azithromycin day 2/3  Continue home Singulair    HTN  Resume home lisinopril, metoprolol succinate, ASA       Diet ADULT DIET; Regular   DVT Prophylaxis [] Lovenox, []  Heparin, [] SCDs, [] Ambulation,  [] Eliquis, [] Xarelto  [] Coumadin   Code Status Full Code   Disposition From: Home  Expected Disposition: Home  Estimated Date of Discharge: 1-2 days  Patient requires continued admission due to frequent bronchodilators   Surrogate Decision Maker/ POA  Norberto Nazario, spouse      Personally reviewed Lab Studies and Imaging         Subjective:     Chief Complaint: Dyspnea    Charlene Nazario is a 75 y.o. female with COPD who presents with increased dyspnea and admitted for management of COPD exacerbation.    No acute events reported overnight. Vitals stable on 3 L O2. Feels about the same today.      Review of Systems:      Pertinent positives and negatives discussed in HPI    Objective:     Intake/Output Summary (Last 24 hours) at 2025 1112  Last data filed at 2025 1602  Gross per 24 hour   Intake 840 ml   Output 0 ml   Net 840 ml      Vitals:   Vitals:    25 0241 25 0652 25 0810 25 0830   BP: 122/84  (!) 142/88    Pulse: 92  87    Resp: 20  18    Temp:   98 °F (36.7 °C)    TempSrc:   Axillary    SpO2: 93%  94% 95%   Weight:  48.1 kg (106 lb 1.6 oz)     Height:             Physical Exam:      General: NAD  Eyes: EOMI  ENT: neck supple  Cardiovascular: Regular

## 2025-04-20 NOTE — PLAN OF CARE
Problem: Chronic Conditions and Co-morbidities  Goal: Patient's chronic conditions and co-morbidity symptoms are monitored and maintained or improved  4/19/2025 2228 by Danyell Kidd, RN  Outcome: Progressing  Flowsheets (Taken 4/19/2025 2228)  Care Plan - Patient's Chronic Conditions and Co-Morbidity Symptoms are Monitored and Maintained or Improved:   Monitor and assess patient's chronic conditions and comorbid symptoms for stability, deterioration, or improvement   Collaborate with multidisciplinary team to address chronic and comorbid conditions and prevent exacerbation or deterioration   Update acute care plan with appropriate goals if chronic or comorbid symptoms are exacerbated and prevent overall improvement and discharge     Problem: Discharge Planning  Goal: Discharge to home or other facility with appropriate resources  4/19/2025 2228 by Danyell Kidd, RN  Outcome: Progressing     Problem: Respiratory - Adult  Goal: Achieves optimal ventilation and oxygenation  Outcome: Progressing  Flowsheets (Taken 4/19/2025 2228)  Achieves optimal ventilation and oxygenation:   Assess for changes in respiratory status   Assess for changes in mentation and behavior   Position to facilitate oxygenation and minimize respiratory effort   Oxygen supplementation based on oxygen saturation or arterial blood gases   Respiratory therapy support as indicated   Assess and instruct to report shortness of breath or any respiratory difficulty     Problem: Safety - Adult  Goal: Free from fall injury  Outcome: Progressing  Flowsheets (Taken 4/19/2025 2228)  Free From Fall Injury:   Instruct family/caregiver on patient safety   Based on caregiver fall risk screen, instruct family/caregiver to ask for assistance with transferring infant if caregiver noted to have fall risk factors     Problem: Cardiovascular - Adult  Goal: Maintains optimal cardiac output and hemodynamic stability  Outcome: Progressing  Flowsheets (Taken

## 2025-04-21 ENCOUNTER — CARE COORDINATION (OUTPATIENT)
Dept: CASE MANAGEMENT | Age: 75
End: 2025-04-21

## 2025-04-21 LAB
ANION GAP SERPL CALCULATED.3IONS-SCNC: 5 MMOL/L (ref 3–16)
BUN SERPL-MCNC: 14 MG/DL (ref 7–20)
CALCIUM SERPL-MCNC: 8.7 MG/DL (ref 8.3–10.6)
CHLORIDE SERPL-SCNC: 99 MMOL/L (ref 99–110)
CO2 SERPL-SCNC: 33 MMOL/L (ref 21–32)
CREAT SERPL-MCNC: 0.5 MG/DL (ref 0.6–1.2)
GFR SERPLBLD CREATININE-BSD FMLA CKD-EPI: >90 ML/MIN/{1.73_M2}
GLUCOSE SERPL-MCNC: 86 MG/DL (ref 70–99)
POTASSIUM SERPL-SCNC: 4.2 MMOL/L (ref 3.5–5.1)
SODIUM SERPL-SCNC: 137 MMOL/L (ref 136–145)

## 2025-04-21 PROCEDURE — 6370000000 HC RX 637 (ALT 250 FOR IP): Performed by: STUDENT IN AN ORGANIZED HEALTH CARE EDUCATION/TRAINING PROGRAM

## 2025-04-21 PROCEDURE — 94640 AIRWAY INHALATION TREATMENT: CPT

## 2025-04-21 PROCEDURE — 94761 N-INVAS EAR/PLS OXIMETRY MLT: CPT

## 2025-04-21 PROCEDURE — 80048 BASIC METABOLIC PNL TOTAL CA: CPT

## 2025-04-21 PROCEDURE — 6370000000 HC RX 637 (ALT 250 FOR IP): Performed by: NURSE PRACTITIONER

## 2025-04-21 PROCEDURE — 1200000000 HC SEMI PRIVATE

## 2025-04-21 PROCEDURE — 2700000000 HC OXYGEN THERAPY PER DAY

## 2025-04-21 PROCEDURE — 2500000003 HC RX 250 WO HCPCS: Performed by: STUDENT IN AN ORGANIZED HEALTH CARE EDUCATION/TRAINING PROGRAM

## 2025-04-21 PROCEDURE — 6360000002 HC RX W HCPCS: Performed by: STUDENT IN AN ORGANIZED HEALTH CARE EDUCATION/TRAINING PROGRAM

## 2025-04-21 PROCEDURE — 36415 COLL VENOUS BLD VENIPUNCTURE: CPT

## 2025-04-21 RX ORDER — IPRATROPIUM BROMIDE AND ALBUTEROL SULFATE 2.5; .5 MG/3ML; MG/3ML
1 SOLUTION RESPIRATORY (INHALATION)
Status: DISCONTINUED | OUTPATIENT
Start: 2025-04-21 | End: 2025-04-26

## 2025-04-21 RX ADMIN — GUAIFENESIN 600 MG: 600 TABLET ORAL at 21:25

## 2025-04-21 RX ADMIN — BUDESONIDE 1000 MCG: 0.5 SUSPENSION RESPIRATORY (INHALATION) at 08:12

## 2025-04-21 RX ADMIN — Medication 3 MG: at 21:26

## 2025-04-21 RX ADMIN — METOPROLOL SUCCINATE 12.5 MG: 25 TABLET, EXTENDED RELEASE ORAL at 21:25

## 2025-04-21 RX ADMIN — BUDESONIDE 1000 MCG: 0.5 SUSPENSION RESPIRATORY (INHALATION) at 21:47

## 2025-04-21 RX ADMIN — ENOXAPARIN SODIUM 30 MG: 100 INJECTION SUBCUTANEOUS at 09:41

## 2025-04-21 RX ADMIN — ASPIRIN 81 MG: 81 TABLET, CHEWABLE ORAL at 09:42

## 2025-04-21 RX ADMIN — SODIUM CHLORIDE, PRESERVATIVE FREE 10 ML: 5 INJECTION INTRAVENOUS at 09:44

## 2025-04-21 RX ADMIN — METHYLPREDNISOLONE SODIUM SUCCINATE 20 MG: 40 INJECTION, POWDER, LYOPHILIZED, FOR SOLUTION INTRAMUSCULAR; INTRAVENOUS at 16:48

## 2025-04-21 RX ADMIN — IPRATROPIUM BROMIDE AND ALBUTEROL SULFATE 1 DOSE: .5; 2.5 SOLUTION RESPIRATORY (INHALATION) at 08:12

## 2025-04-21 RX ADMIN — AZITHROMYCIN DIHYDRATE 500 MG: 250 TABLET, FILM COATED ORAL at 09:42

## 2025-04-21 RX ADMIN — PHENOL 1 SPRAY: 1.5 LIQUID ORAL at 04:46

## 2025-04-21 RX ADMIN — ARFORMOTEROL TARTRATE 15 MCG: 15 SOLUTION RESPIRATORY (INHALATION) at 21:47

## 2025-04-21 RX ADMIN — SODIUM CHLORIDE, PRESERVATIVE FREE 10 ML: 5 INJECTION INTRAVENOUS at 21:27

## 2025-04-21 RX ADMIN — LISINOPRIL 2.5 MG: 2.5 TABLET ORAL at 18:42

## 2025-04-21 RX ADMIN — METOPROLOL SUCCINATE 12.5 MG: 25 TABLET, EXTENDED RELEASE ORAL at 09:42

## 2025-04-21 RX ADMIN — TIOTROPIUM BROMIDE INHALATION SPRAY 2 PUFF: 3.12 SPRAY, METERED RESPIRATORY (INHALATION) at 08:13

## 2025-04-21 RX ADMIN — MONTELUKAST 10 MG: 10 TABLET, FILM COATED ORAL at 09:42

## 2025-04-21 RX ADMIN — PHENOL 1 SPRAY: 1.5 LIQUID ORAL at 21:27

## 2025-04-21 RX ADMIN — IPRATROPIUM BROMIDE AND ALBUTEROL SULFATE 1 DOSE: .5; 2.5 SOLUTION RESPIRATORY (INHALATION) at 11:34

## 2025-04-21 RX ADMIN — GUAIFENESIN 600 MG: 600 TABLET ORAL at 09:42

## 2025-04-21 RX ADMIN — IPRATROPIUM BROMIDE AND ALBUTEROL SULFATE 1 DOSE: .5; 2.5 SOLUTION RESPIRATORY (INHALATION) at 16:24

## 2025-04-21 RX ADMIN — IPRATROPIUM BROMIDE AND ALBUTEROL SULFATE 1 DOSE: .5; 2.5 SOLUTION RESPIRATORY (INHALATION) at 21:47

## 2025-04-21 RX ADMIN — TIZANIDINE 2 MG: 4 TABLET ORAL at 21:26

## 2025-04-21 RX ADMIN — ARFORMOTEROL TARTRATE 15 MCG: 15 SOLUTION RESPIRATORY (INHALATION) at 08:12

## 2025-04-21 RX ADMIN — PHENOL 1 SPRAY: 1.5 LIQUID ORAL at 00:06

## 2025-04-21 RX ADMIN — ALBUTEROL SULFATE 2.5 MG: 2.5 SOLUTION RESPIRATORY (INHALATION) at 05:26

## 2025-04-21 RX ADMIN — PREDNISONE 40 MG: 20 TABLET ORAL at 09:41

## 2025-04-21 ASSESSMENT — PAIN SCALES - GENERAL
PAINLEVEL_OUTOF10: 6
PAINLEVEL_OUTOF10: 0

## 2025-04-21 ASSESSMENT — PAIN DESCRIPTION - ORIENTATION: ORIENTATION: INNER

## 2025-04-21 ASSESSMENT — PAIN DESCRIPTION - PAIN TYPE: TYPE: ACUTE PAIN

## 2025-04-21 ASSESSMENT — PAIN DESCRIPTION - LOCATION: LOCATION: THROAT

## 2025-04-21 ASSESSMENT — PAIN DESCRIPTION - ONSET: ONSET: GRADUAL

## 2025-04-21 ASSESSMENT — PAIN DESCRIPTION - DESCRIPTORS: DESCRIPTORS: SORE

## 2025-04-21 NOTE — PLAN OF CARE
Problem: Chronic Conditions and Co-morbidities  Goal: Patient's chronic conditions and co-morbidity symptoms are monitored and maintained or improved  4/21/2025 0143 by Danyell Kidd, RN  Outcome: Progressing  Flowsheets (Taken 4/21/2025 0143)  Care Plan - Patient's Chronic Conditions and Co-Morbidity Symptoms are Monitored and Maintained or Improved:   Monitor and assess patient's chronic conditions and comorbid symptoms for stability, deterioration, or improvement   Collaborate with multidisciplinary team to address chronic and comorbid conditions and prevent exacerbation or deterioration   Update acute care plan with appropriate goals if chronic or comorbid symptoms are exacerbated and prevent overall improvement and discharge     Problem: Discharge Planning  Goal: Discharge to home or other facility with appropriate resources  4/21/2025 0143 by Danyell Kidd, RN  Outcome: Progressing     Problem: Respiratory - Adult  Goal: Achieves optimal ventilation and oxygenation  4/21/2025 0143 by Danyell Kidd RN  Outcome: Progressing  Flowsheets (Taken 4/21/2025 0143)  Achieves optimal ventilation and oxygenation:   Assess for changes in respiratory status   Assess for changes in mentation and behavior   Position to facilitate oxygenation and minimize respiratory effort   Oxygen supplementation based on oxygen saturation or arterial blood gases   Respiratory therapy support as indicated     Problem: Safety - Adult  Goal: Free from fall injury  4/21/2025 0143 by Danyell Kidd, RN  Outcome: Progressing  Flowsheets (Taken 4/21/2025 0143)  Free From Fall Injury:   Instruct family/caregiver on patient safety   Based on caregiver fall risk screen, instruct family/caregiver to ask for assistance with transferring infant if caregiver noted to have fall risk factors     Problem: Cardiovascular - Adult  Goal: Maintains optimal cardiac output and hemodynamic stability  4/21/2025 0143 by Danyell Kidd,

## 2025-04-21 NOTE — PLAN OF CARE
Problem: Chronic Conditions and Co-morbidities  Goal: Patient's chronic conditions and co-morbidity symptoms are monitored and maintained or improved  Outcome: Progressing  Flowsheets (Taken 4/21/2025 1647)  Care Plan - Patient's Chronic Conditions and Co-Morbidity Symptoms are Monitored and Maintained or Improved:   Monitor and assess patient's chronic conditions and comorbid symptoms for stability, deterioration, or improvement   Collaborate with multidisciplinary team to address chronic and comorbid conditions and prevent exacerbation or deterioration   Update acute care plan with appropriate goals if chronic or comorbid symptoms are exacerbated and prevent overall improvement and discharge     Problem: Discharge Planning  Goal: Discharge to home or other facility with appropriate resources  Outcome: Progressing  Flowsheets (Taken 4/21/2025 1647)  Discharge to home or other facility with appropriate resources:   Identify barriers to discharge with patient and caregiver   Identify discharge learning needs (meds, wound care, etc)   Arrange for needed discharge resources and transportation as appropriate     Problem: Respiratory - Adult  Goal: Achieves optimal ventilation and oxygenation  Outcome: Progressing  Flowsheets (Taken 4/21/2025 1647)  Achieves optimal ventilation and oxygenation:   Assess for changes in respiratory status   Position to facilitate oxygenation and minimize respiratory effort   Oxygen supplementation based on oxygen saturation or arterial blood gases   Encourage broncho-pulmonary hygiene including cough, deep breathe, incentive spirometry   Assess and instruct to report shortness of breath or any respiratory difficulty   Assess for changes in mentation and behavior   Respiratory therapy support as indicated     Problem: Cardiovascular - Adult  Goal: Maintains optimal cardiac output and hemodynamic stability  Outcome: Progressing  Flowsheets (Taken 4/21/2025 1647)  Maintains optimal cardiac

## 2025-04-21 NOTE — PROGRESS NOTES
[place]2.[place]    [facility] [place]      Name:  Charlene Nazario /Age/Sex: 1950  (75 y.o. female)   MRN & CSN:  [id number] & [id number] Encounter Date/Time: 2025 11:12 AM EDT   Location:  6304/6304-01 PCP: Gerry Foley DO     Attending:Erwin Romero MD       Hospital Day: 3    Assessment and Recommendations   Charlene Nazario is a 75 y.o. female with pmh of COPD, HTN who presents with COPD exacerbation (HCC)      COPD Exacerbation  Continue inhaled DuoNebs  Escalate steroid dosing to Solumedrol 60 mg qd  Continue Azithromycin day 3/3  Continue home Singulair  Follows with Dr. Nazario as outpatient. Consider Pulmonology consult tomorrow if still not improving clinically    HTN  Resume home lisinopril, metoprolol succinate, ASA       Diet ADULT DIET; Regular   DVT Prophylaxis [] Lovenox, []  Heparin, [] SCDs, [] Ambulation,  [] Eliquis, [] Xarelto  [] Coumadin   Code Status Full Code   Disposition From: Home  Expected Disposition: Home  Estimated Date of Discharge: 1-2 days  Patient requires continued admission due to frequent bronchodilators   Surrogate Decision Maker/ POA  Norberto Aravind, spouse      Personally reviewed Lab Studies and Imaging         Subjective:     Chief Complaint: Dyspnea    Charlene Nazario is a 75 y.o. female with COPD who presents with increased dyspnea and admitted for management of COPD exacerbation.    No acute clinical events reported overnight. Vitals remain stable on 3 L O2. Does not feel much better yet.      Review of Systems:      Pertinent positives and negatives discussed in HPI    Objective:     Intake/Output Summary (Last 24 hours) at 2025 1546  Last data filed at 2025 1249  Gross per 24 hour   Intake 1480 ml   Output 0 ml   Net 1480 ml      Vitals:   Vitals:    25 0812 25 0924 25 1134 25 1249   BP:  132/79  (!) 155/95   Pulse: 96 (!) 122 (!) 117 96   Resp: 18 20 20 20   Temp:  98 °F (36.7 °C)  98 °F (36.7 °C)   TempSrc:  Oral  Oral    SpO2: 96% 90% 92% 93%   Weight:       Height:             Physical Exam:      General: NAD  Eyes: EOMI  ENT: neck supple  Cardiovascular: Regular rate.  Respiratory: Clear to auscultation; poor air entry bilterally  Gastrointestinal: Soft, non tender  Genitourinary: no suprapubic tenderness  Musculoskeletal: No edema  Skin: warm, dry  Neuro: Alert.  Psych: Mood appropriate.         Medications:   Medications:    ipratropium 0.5 mg-albuterol 2.5 mg  1 Dose Inhalation Q4H RT    methylPREDNISolone  20 mg IntraVENous Once    [START ON 4/22/2025] methylPREDNISolone  60 mg IntraVENous Daily    sodium chloride flush  5-40 mL IntraVENous 2 times per day    enoxaparin  30 mg SubCUTAneous Daily    guaiFENesin  600 mg Oral BID    aspirin  81 mg Oral Daily    budesonide  1 mg Nebulization BID RT    And    arformoterol tartrate  15 mcg Nebulization BID RT    And    tiotropium  2 puff Inhalation Daily RT    montelukast  10 mg Oral Daily    metoprolol succinate  12.5 mg Oral BID    lisinopril  2.5 mg Oral QPM    tiZANidine  2 mg Oral Nightly      Infusions:    sodium chloride       PRN Meds: phenol, 1 spray, Q2H PRN  sodium chloride flush, 5-40 mL, PRN  sodium chloride, , PRN  ondansetron, 4 mg, Q8H PRN   Or  ondansetron, 4 mg, Q6H PRN  polyethylene glycol, 17 g, Daily PRN  acetaminophen, 650 mg, Q6H PRN   Or  acetaminophen, 650 mg, Q6H PRN  benzonatate, 100 mg, TID PRN  albuterol, 2.5 mg, Q4H PRN  melatonin, 3 mg, Nightly PRN        Labs and Imaging   XR CHEST (2 VW)  Result Date: 4/19/2025  CHEST PA AND LATERAL: HISTORY: Short of breath COMPARISON: 3/24/2025 FINDINGS: The heart size is within normal limits. The lungs appear clear of acute parenchymal infiltrate.     No acute disease. Electronically signed by Avel Delarosa      CBC:   Recent Labs     04/19/25  0729 04/20/25  0413   WBC 6.1 9.3   HGB 15.2 13.4    249     BMP:    Recent Labs     04/19/25  0729 04/20/25  0413 04/21/25  0440    134* 137   K 4.2 4.4

## 2025-04-21 NOTE — CARE COORDINATION
Case Management Assessment  Initial Evaluation    Date/Time of Evaluation: 4/21/2025 5:09 PM  Assessment Completed by: Gela Antoine RN    If patient is discharged prior to next notation, then this note serves as note for discharge by case management.    Patient Name: Charlene Nazario                   YOB: 1950  Diagnosis: COPD exacerbation (HCC) [J44.1]                   Date / Time: 4/19/2025  6:48 AM    Patient Admission Status: Inpatient   Readmission Risk (Low < 19, Mod (19-27), High > 27): Readmission Risk Score: 13.8    Current PCP: Gerry Foley, DO  PCP verified by CM? No    Chart Reviewed: Yes      History Provided by: Patient, Medical Record  Patient Orientation: Alert and Oriented, Person, Place, Situation    Patient Cognition: Alert    Hospitalization in the last 30 days (Readmission):  No    If yes, Readmission Assessment in  Navigator will be completed.    Advance Directives:      Code Status: Full Code   Patient's Primary Decision Maker is: Legal Next of Kin    Primary Decision Maker: Norberto Nazario - Spouse - [phone]    Secondary Decision Maker: Holter,Mary - Brother/Sister - [phone]    Discharge Planning:    Patient lives with: Spouse/Significant Other Type of Home: House  Primary Care Giver: Self  Patient Support Systems include: Spouse/Significant Other   Current Financial resources: Medicare  Current community resources: None  Current services prior to admission: None, Durable Medical Equipment            Current DME: Oxygen Therapy (Comment), Home Aerosol            Type of Home Care services:  None    ADLS  Prior functional level: Independent in ADLs/IADLs  Current functional level: Independent in ADLs/IADLs    PT AM-PAC:   /24  OT AM-PAC:   /24    Family can provide assistance at DC: Yes  Would you like Case Management to discuss the discharge plan with any other family members/significant others, and if so, who? No  Plans to Return to Present Housing:

## 2025-04-21 NOTE — CARE COORDINATION
Per chart review patient readmitted to J. Care transitions will continue to monitor.    Lisa Albrecht, MSN, RN, [facility]  Care Transition Nurse  [phone] mobile

## 2025-04-21 NOTE — RT PROTOCOL NOTE
RT Nebulizer Bronchodilator Protocol Note    There is a bronchodilator order in the chart from a provider indicating to follow the RT Bronchodilator Protocol and there is an “Initiate RT Bronchodilator Protocol” order as well (see protocol at bottom of note).    CXR Findings:  No results found.    The findings from the last RT Protocol Assessment were as follows:  Smoking: Chronic pulmonary disease  Respiratory Pattern: Regular pattern and RR 12-20 bpm  Breath Sounds: Inspiratory and expiratory or bilateral wheezing and/or rhonchi  Cough: Strong, productive  Indication for Bronchodilator Therapy: On home bronchodilators, Wheezing associated with pulm disorder  Bronchodilator Assessment Score: 9    Aerosolized bronchodilator medication orders have been revised according to the RT Nebulizer Bronchodilator Protocol below.    Respiratory Therapist to perform RT Therapy Protocol Assessment initially then follow the protocol.  Repeat RT Therapy Protocol Assessment PRN for score 0-3 or on second treatment, BID, and PRN for scores above 3.    No Indications - adjust the frequency to every 6 hours PRN wheezing or bronchospasm, if no treatments needed after 48 hours then discontinue using Per Protocol order mode.     If indication present, adjust the RT bronchodilator orders based on the Bronchodilator Assessment Score as indicated below.  If a patient is on this medication at home then do not decrease Frequency below that used at home.    0-3 - enter or revise RT bronchodilator order(s) to equivalent RT Bronchodilator order with Frequency of every 4 hours PRN for wheezing or increased work of breathing using Per Protocol order mode.       4-6 - enter or revise RT Bronchodilator order(s) to two equivalent RT bronchodilator orders with one order with BID Frequency and one order with Frequency of every 4 hours PRN wheezing or increased work of breathing using Per Protocol order mode.         7-10 - enter or revise RT

## 2025-04-21 NOTE — RT PROTOCOL NOTE
RT Inhaler-Nebulizer Bronchodilator Protocol Note    There is a bronchodilator order in the chart from a provider indicating to follow the RT Bronchodilator Protocol and there is an “Initiate RT Inhaler-Nebulizer Bronchodilator Protocol” order as well (see protocol at bottom of note).    CXR Findings:  No results found.    The findings from the last RT Protocol Assessment were as follows:   History Pulmonary Disease: Chronic pulmonary disease  Respiratory Pattern: Severe use of accessory muscle or RR>30 bpm  Breath Sounds: Inspiratory and expiratory or bilateral wheezing and/or rhonchi  Cough: Strong, productive  Indication for Bronchodilator Therapy: On home bronchodilators  Bronchodilator Assessment Score: 17    Aerosolized bronchodilator medication orders have been revised according to the RT Inhaler-Nebulizer Bronchodilator Protocol below.    Respiratory Therapist to perform RT Therapy Protocol Assessment initially then follow the protocol.  Repeat RT Therapy Protocol Assessment PRN for score 0-3 or on second treatment, BID, and PRN for scores above 3.    No Indications - adjust the frequency to every 6 hours PRN wheezing or bronchospasm, if no treatments needed after 48 hours then discontinue using Per Protocol order mode.     If indication present, adjust the RT bronchodilator orders based on the Bronchodilator Assessment Score as indicated below.  Use Inhaler orders unless patient has one or more of the following: on home nebulizer, not able to hold breath for 10 seconds, is not alert and oriented, cannot activate and use MDI correctly, or respiratory rate 25 breaths per minute or more, then use the equivalent nebulizer order(s) with same Frequency and PRN reasons based on the score.  If a patient is on this medication at home then do not decrease Frequency below that used at home.    0-3 - enter or revise RT bronchodilator order(s) to equivalent RT Bronchodilator order with Frequency of every 4 hours PRN for  wheezing or increased work of breathing using Per Protocol order mode.        4-6 - enter or revise RT Bronchodilator order(s) to two equivalent RT bronchodilator orders with one order with BID Frequency and one order with Frequency of every 4 hours PRN wheezing or increased work of breathing using Per Protocol order mode.        7-10 - enter or revise RT Bronchodilator order(s) to two equivalent RT bronchodilator orders with one order with TID Frequency and one order with Frequency of every 4 hours PRN wheezing or increased work of breathing using Per Protocol order mode.       11-13 - enter or revise RT Bronchodilator order(s) to one equivalent RT bronchodilator order with QID Frequency and an Albuterol order with Frequency of every 4 hours PRN wheezing or increased work of breathing using Per Protocol order mode.      Greater than 13 - enter or revise RT Bronchodilator order(s) to one equivalent RT bronchodilator order with every 4 hours Frequency and an Albuterol order with Frequency of every 2 hours PRN wheezing or increased work of breathing using Per Protocol order mode.     RT to enter RT Home Evaluation for COPD & MDI Assessment order using Per Protocol order mode.    Electronically signed by Moise Cardenas RCP on 4/21/2025 at 5:37 AM

## 2025-04-22 LAB
ANION GAP SERPL CALCULATED.3IONS-SCNC: 8 MMOL/L (ref 3–16)
BUN SERPL-MCNC: 16 MG/DL (ref 7–20)
CALCIUM SERPL-MCNC: 8.4 MG/DL (ref 8.3–10.6)
CHLORIDE SERPL-SCNC: 99 MMOL/L (ref 99–110)
CO2 SERPL-SCNC: 28 MMOL/L (ref 21–32)
CREAT SERPL-MCNC: 0.6 MG/DL (ref 0.6–1.2)
GFR SERPLBLD CREATININE-BSD FMLA CKD-EPI: >90 ML/MIN/{1.73_M2}
GLUCOSE SERPL-MCNC: 112 MG/DL (ref 70–99)
POTASSIUM SERPL-SCNC: 4.8 MMOL/L (ref 3.5–5.1)
SODIUM SERPL-SCNC: 135 MMOL/L (ref 136–145)

## 2025-04-22 PROCEDURE — 6370000000 HC RX 637 (ALT 250 FOR IP): Performed by: STUDENT IN AN ORGANIZED HEALTH CARE EDUCATION/TRAINING PROGRAM

## 2025-04-22 PROCEDURE — 2500000003 HC RX 250 WO HCPCS: Performed by: STUDENT IN AN ORGANIZED HEALTH CARE EDUCATION/TRAINING PROGRAM

## 2025-04-22 PROCEDURE — 6360000002 HC RX W HCPCS: Performed by: STUDENT IN AN ORGANIZED HEALTH CARE EDUCATION/TRAINING PROGRAM

## 2025-04-22 PROCEDURE — 94640 AIRWAY INHALATION TREATMENT: CPT

## 2025-04-22 PROCEDURE — 2500000003 HC RX 250 WO HCPCS

## 2025-04-22 PROCEDURE — 2700000000 HC OXYGEN THERAPY PER DAY

## 2025-04-22 PROCEDURE — 36415 COLL VENOUS BLD VENIPUNCTURE: CPT

## 2025-04-22 PROCEDURE — 94761 N-INVAS EAR/PLS OXIMETRY MLT: CPT

## 2025-04-22 PROCEDURE — 80048 BASIC METABOLIC PNL TOTAL CA: CPT

## 2025-04-22 PROCEDURE — 1200000000 HC SEMI PRIVATE

## 2025-04-22 RX ORDER — GUAIFENESIN 200 MG/10ML
200 LIQUID ORAL EVERY 4 HOURS PRN
Status: DISCONTINUED | OUTPATIENT
Start: 2025-04-22 | End: 2025-04-27 | Stop reason: HOSPADM

## 2025-04-22 RX ADMIN — TIZANIDINE 2 MG: 4 TABLET ORAL at 19:32

## 2025-04-22 RX ADMIN — ARFORMOTEROL TARTRATE 15 MCG: 15 SOLUTION RESPIRATORY (INHALATION) at 20:34

## 2025-04-22 RX ADMIN — ARFORMOTEROL TARTRATE 15 MCG: 15 SOLUTION RESPIRATORY (INHALATION) at 09:20

## 2025-04-22 RX ADMIN — IPRATROPIUM BROMIDE AND ALBUTEROL SULFATE 1 DOSE: .5; 2.5 SOLUTION RESPIRATORY (INHALATION) at 20:34

## 2025-04-22 RX ADMIN — PHENOL 1 SPRAY: 1.5 LIQUID ORAL at 05:04

## 2025-04-22 RX ADMIN — BUDESONIDE 1000 MCG: 0.5 SUSPENSION RESPIRATORY (INHALATION) at 09:20

## 2025-04-22 RX ADMIN — METOPROLOL SUCCINATE 12.5 MG: 25 TABLET, EXTENDED RELEASE ORAL at 09:06

## 2025-04-22 RX ADMIN — IPRATROPIUM BROMIDE AND ALBUTEROL SULFATE 1 DOSE: .5; 2.5 SOLUTION RESPIRATORY (INHALATION) at 12:25

## 2025-04-22 RX ADMIN — IPRATROPIUM BROMIDE AND ALBUTEROL SULFATE 1 DOSE: .5; 2.5 SOLUTION RESPIRATORY (INHALATION) at 09:20

## 2025-04-22 RX ADMIN — LISINOPRIL 2.5 MG: 2.5 TABLET ORAL at 17:06

## 2025-04-22 RX ADMIN — GUAIFENESIN 200 MG: 100 SOLUTION ORAL at 22:29

## 2025-04-22 RX ADMIN — SODIUM CHLORIDE, PRESERVATIVE FREE 10 ML: 5 INJECTION INTRAVENOUS at 09:13

## 2025-04-22 RX ADMIN — ASPIRIN 81 MG: 81 TABLET, CHEWABLE ORAL at 09:06

## 2025-04-22 RX ADMIN — WATER 60 MG: 1 INJECTION INTRAMUSCULAR; INTRAVENOUS; SUBCUTANEOUS at 09:06

## 2025-04-22 RX ADMIN — ENOXAPARIN SODIUM 30 MG: 100 INJECTION SUBCUTANEOUS at 09:07

## 2025-04-22 RX ADMIN — BUDESONIDE 1000 MCG: 0.5 SUSPENSION RESPIRATORY (INHALATION) at 20:34

## 2025-04-22 RX ADMIN — TIOTROPIUM BROMIDE INHALATION SPRAY 2 PUFF: 3.12 SPRAY, METERED RESPIRATORY (INHALATION) at 09:20

## 2025-04-22 RX ADMIN — IPRATROPIUM BROMIDE AND ALBUTEROL SULFATE 1 DOSE: .5; 2.5 SOLUTION RESPIRATORY (INHALATION) at 00:33

## 2025-04-22 RX ADMIN — IPRATROPIUM BROMIDE AND ALBUTEROL SULFATE 1 DOSE: .5; 2.5 SOLUTION RESPIRATORY (INHALATION) at 16:13

## 2025-04-22 RX ADMIN — MONTELUKAST 10 MG: 10 TABLET, FILM COATED ORAL at 09:06

## 2025-04-22 RX ADMIN — IPRATROPIUM BROMIDE AND ALBUTEROL SULFATE 1 DOSE: .5; 2.5 SOLUTION RESPIRATORY (INHALATION) at 23:35

## 2025-04-22 RX ADMIN — METOPROLOL SUCCINATE 12.5 MG: 25 TABLET, EXTENDED RELEASE ORAL at 19:32

## 2025-04-22 RX ADMIN — GUAIFENESIN 600 MG: 600 TABLET ORAL at 09:06

## 2025-04-22 RX ADMIN — GUAIFENESIN 600 MG: 600 TABLET ORAL at 19:32

## 2025-04-22 RX ADMIN — IPRATROPIUM BROMIDE AND ALBUTEROL SULFATE 1 DOSE: .5; 2.5 SOLUTION RESPIRATORY (INHALATION) at 04:12

## 2025-04-22 NOTE — PLAN OF CARE
Problem: Chronic Conditions and Co-morbidities  Goal: Patient's chronic conditions and co-morbidity symptoms are monitored and maintained or improved  4/22/2025 0100 by Danyell Kidd RN  Outcome: Progressing  Flowsheets (Taken 4/22/2025 0100)  Care Plan - Patient's Chronic Conditions and Co-Morbidity Symptoms are Monitored and Maintained or Improved:   Monitor and assess patient's chronic conditions and comorbid symptoms for stability, deterioration, or improvement   Collaborate with multidisciplinary team to address chronic and comorbid conditions and prevent exacerbation or deterioration   Update acute care plan with appropriate goals if chronic or comorbid symptoms are exacerbated and prevent overall improvement and discharge     Problem: Discharge Planning  Goal: Discharge to home or other facility with appropriate resources  4/22/2025 0100 by Danyell Kidd RN  Outcome: Progressing     Problem: Respiratory - Adult  Goal: Achieves optimal ventilation and oxygenation  4/22/2025 0100 by Danyell Kidd RN  Outcome: Progressing  Flowsheets (Taken 4/22/2025 0100)  Achieves optimal ventilation and oxygenation:   Assess for changes in respiratory status   Assess for changes in mentation and behavior   Position to facilitate oxygenation and minimize respiratory effort   Oxygen supplementation based on oxygen saturation or arterial blood gases   Respiratory therapy support as indicated     Problem: Safety - Adult  Goal: Free from fall injury  4/22/2025 0100 by Danyell Kidd RN  Outcome: Progressing  Flowsheets (Taken 4/22/2025 0100)  Free From Fall Injury:   Instruct family/caregiver on patient safety   Based on caregiver fall risk screen, instruct family/caregiver to ask for assistance with transferring infant if caregiver noted to have fall risk factors     Problem: Cardiovascular - Adult  Goal: Maintains optimal cardiac output and hemodynamic stability  4/22/2025 0100 by Danyell Kidd

## 2025-04-22 NOTE — RT PROTOCOL NOTE
RT Inhaler-Nebulizer Bronchodilator Protocol Note    There is a bronchodilator order in the chart from a provider indicating to follow the RT Bronchodilator Protocol and there is an “Initiate RT Inhaler-Nebulizer Bronchodilator Protocol” order as well (see protocol at bottom of note).    CXR Findings:  No results found.    The findings from the last RT Protocol Assessment were as follows:   History Pulmonary Disease: Chronic pulmonary disease  Respiratory Pattern: Dyspnea on exertion or RR 21-25 bpm  Breath Sounds: Inspiratory and expiratory or bilateral wheezing and/or rhonchi  Cough: Strong, productive  Indication for Bronchodilator Therapy: On home bronchodilators  Bronchodilator Assessment Score: 11    Aerosolized bronchodilator medication orders have been revised according to the RT Inhaler-Nebulizer Bronchodilator Protocol below.    Respiratory Therapist to perform RT Therapy Protocol Assessment initially then follow the protocol.  Repeat RT Therapy Protocol Assessment PRN for score 0-3 or on second treatment, BID, and PRN for scores above 3.    No Indications - adjust the frequency to every 6 hours PRN wheezing or bronchospasm, if no treatments needed after 48 hours then discontinue using Per Protocol order mode.     If indication present, adjust the RT bronchodilator orders based on the Bronchodilator Assessment Score as indicated below.  Use Inhaler orders unless patient has one or more of the following: on home nebulizer, not able to hold breath for 10 seconds, is not alert and oriented, cannot activate and use MDI correctly, or respiratory rate 25 breaths per minute or more, then use the equivalent nebulizer order(s) with same Frequency and PRN reasons based on the score.  If a patient is on this medication at home then do not decrease Frequency below that used at home.    0-3 - enter or revise RT bronchodilator order(s) to equivalent RT Bronchodilator order with Frequency of every 4 hours PRN for  wheezing or increased work of breathing using Per Protocol order mode.        4-6 - enter or revise RT Bronchodilator order(s) to two equivalent RT bronchodilator orders with one order with BID Frequency and one order with Frequency of every 4 hours PRN wheezing or increased work of breathing using Per Protocol order mode.        7-10 - enter or revise RT Bronchodilator order(s) to two equivalent RT bronchodilator orders with one order with TID Frequency and one order with Frequency of every 4 hours PRN wheezing or increased work of breathing using Per Protocol order mode.       11-13 - enter or revise RT Bronchodilator order(s) to one equivalent RT bronchodilator order with QID Frequency and an Albuterol order with Frequency of every 4 hours PRN wheezing or increased work of breathing using Per Protocol order mode.      Greater than 13 - enter or revise RT Bronchodilator order(s) to one equivalent RT bronchodilator order with every 4 hours Frequency and an Albuterol order with Frequency of every 2 hours PRN wheezing or increased work of breathing using Per Protocol order mode.     RT to enter RT Home Evaluation for COPD & MDI Assessment order using Per Protocol order mode.    Electronically signed by Moise Cardenas RCP on 4/21/2025 at 9:57 PM

## 2025-04-22 NOTE — PLAN OF CARE
Problem: Chronic Conditions and Co-morbidities  Goal: Patient's chronic conditions and co-morbidity symptoms are monitored and maintained or improved  Outcome: Progressing  Flowsheets (Taken 4/22/2025 1515)  Care Plan - Patient's Chronic Conditions and Co-Morbidity Symptoms are Monitored and Maintained or Improved:   Collaborate with multidisciplinary team to address chronic and comorbid conditions and prevent exacerbation or deterioration   Update acute care plan with appropriate goals if chronic or comorbid symptoms are exacerbated and prevent overall improvement and discharge   Monitor and assess patient's chronic conditions and comorbid symptoms for stability, deterioration, or improvement     Problem: Discharge Planning  Goal: Discharge to home or other facility with appropriate resources  Outcome: Progressing  Flowsheets (Taken 4/22/2025 1515)  Discharge to home or other facility with appropriate resources:   Identify barriers to discharge with patient and caregiver   Identify discharge learning needs (meds, wound care, etc)   Arrange for needed discharge resources and transportation as appropriate   Refer to discharge planning if patient needs post-hospital services based on physician order or complex needs related to functional status, cognitive ability or social support system     Problem: Respiratory - Adult  Goal: Achieves optimal ventilation and oxygenation  Outcome: Progressing  Flowsheets (Taken 4/22/2025 1515)  Achieves optimal ventilation and oxygenation:   Assess for changes in respiratory status   Position to facilitate oxygenation and minimize respiratory effort   Respiratory therapy support as indicated   Assess for changes in mentation and behavior   Oxygen supplementation based on oxygen saturation or arterial blood gases   Encourage broncho-pulmonary hygiene including cough, deep breathe, incentive spirometry   Assess and instruct to report shortness of breath or any respiratory difficulty      Problem: Cardiovascular - Adult  Goal: Maintains optimal cardiac output and hemodynamic stability  Outcome: Progressing  Flowsheets (Taken 4/22/2025 1515)  Maintains optimal cardiac output and hemodynamic stability:   Monitor blood pressure and heart rate   Monitor urine output and notify Licensed Independent Practitioner for values outside of normal range   Assess for signs of decreased cardiac output     Problem: Safety - Adult  Goal: Free from fall injury  Outcome: Progressing  Flowsheets (Taken 4/22/2025 1515)  Free From Fall Injury: Instruct family/caregiver on patient safety     Problem: Pain  Goal: Verbalizes/displays adequate comfort level or baseline comfort level  Outcome: Progressing  Flowsheets (Taken 4/22/2025 1515)  Verbalizes/displays adequate comfort level or baseline comfort level:   Encourage patient to monitor pain and request assistance   Assess pain using appropriate pain scale   Administer analgesics based on type and severity of pain and evaluate response   Implement non-pharmacological measures as appropriate and evaluate response   Consider cultural and social influences on pain and pain management   Notify Licensed Independent Practitioner if interventions unsuccessful or patient reports new pain

## 2025-04-22 NOTE — PROGRESS NOTES
V2.0    [facility] [place] Progress Note      Name:  Charlene Nazario /Age/Sex: 1950  (75 y.o. female)   MRN & CSN:  [id number] & [id number] Encounter Date/Time: 2025 1:31 PM EDT   Location:  [facility]- PCP: Gerry Foley DO     Attending:Mark Rayo MD       Hospital Day: 4    Assessment and Recommendations   Charlene Nazario is a 75 y.o. female with pmh of  who presents with COPD exacerbation (HCC)      Plan:       Telemetry monitering  Oxygen  Duonebs   Steroids : iv solumedrol   Moniter O2 sat  Wean off oxygen as able to to keep oxygen saturation more than 90%  Patient still on the liter oxygen she does not use 3 L at home per patient she use oxygen as needed  She may need a new home O2 eval at discharge    Took me more than 51 minutes for clinical management, coordination of care, reviewing labs , ordering medication as needed , reviewing consultants notes and recommendation  including plan of care discussion with the patient ,nursing staff and case management and consultants          Diet ADULT DIET; Regular   DVT Prophylaxis [] Lovenox, []  Heparin, [] SCDs, [] Ambulation,  [] Eliquis, [] Xarelto  [] Coumadin   Code Status Full Code   Disposition From:   Expected Disposition:   Estimated Date of Discharge:   Patient requires continued admission due to   Surrogate Decision Maker/ POA         Personally reviewed Lab Studies and Imaging           Telemetry reviewed by myself no ST elevation           Drugs that require monitoring for toxicity include IV steroids    and the method of monitoring was CBC , BMP and vitals monitering            Medical Decision Making:  The following items were considered in medical decision making:  Discussion of patient care with other providers  Reviewed clinical lab tests  Reviewed radiology tests  Reviewed other diagnostic tests/interventions  Independent review of radiologic images  Microbiology cultures and other micro tests reviewed            Subjective:

## 2025-04-22 NOTE — CARE COORDINATION
CM following for discharge planning.     Pt is from home with spouse, LILIANA. Pt currently on 3 L O2. Pt has O2 at home through [employer] and only uses it as needed.      If unable to wean prior to discharge, pt will need home O2 eval.  Pt on IV steroids.     Divine Domingo RN, BSN, CM  Case Management Department  [phone]

## 2025-04-23 LAB
BASOPHILS # BLD: 0 K/UL (ref 0–0.2)
BASOPHILS NFR BLD: 0.2 %
DEPRECATED RDW RBC AUTO: 12.7 % (ref 12.4–15.4)
EOSINOPHIL # BLD: 0 K/UL (ref 0–0.6)
EOSINOPHIL NFR BLD: 0.1 %
HCT VFR BLD AUTO: 44.6 % (ref 36–48)
HGB BLD-MCNC: 14.9 G/DL (ref 12–16)
LYMPHOCYTES # BLD: 0.5 K/UL (ref 1–5.1)
LYMPHOCYTES NFR BLD: 7.1 %
MCH RBC QN AUTO: 32.2 PG (ref 26–34)
MCHC RBC AUTO-ENTMCNC: 33.5 G/DL (ref 31–36)
MCV RBC AUTO: 96 FL (ref 80–100)
MONOCYTES # BLD: 0.2 K/UL (ref 0–1.3)
MONOCYTES NFR BLD: 2.4 %
NEUTROPHILS # BLD: 6 K/UL (ref 1.7–7.7)
NEUTROPHILS NFR BLD: 90.2 %
PLATELET # BLD AUTO: 276 K/UL (ref 135–450)
PMV BLD AUTO: 7.8 FL (ref 5–10.5)
RBC # BLD AUTO: 4.64 M/UL (ref 4–5.2)
WBC # BLD AUTO: 6.6 K/UL (ref 4–11)

## 2025-04-23 PROCEDURE — 2700000000 HC OXYGEN THERAPY PER DAY

## 2025-04-23 PROCEDURE — 1200000000 HC SEMI PRIVATE

## 2025-04-23 PROCEDURE — 6360000002 HC RX W HCPCS: Performed by: STUDENT IN AN ORGANIZED HEALTH CARE EDUCATION/TRAINING PROGRAM

## 2025-04-23 PROCEDURE — 6370000000 HC RX 637 (ALT 250 FOR IP): Performed by: STUDENT IN AN ORGANIZED HEALTH CARE EDUCATION/TRAINING PROGRAM

## 2025-04-23 PROCEDURE — 94640 AIRWAY INHALATION TREATMENT: CPT

## 2025-04-23 PROCEDURE — 99222 1ST HOSP IP/OBS MODERATE 55: CPT | Performed by: INTERNAL MEDICINE

## 2025-04-23 PROCEDURE — 2500000003 HC RX 250 WO HCPCS: Performed by: STUDENT IN AN ORGANIZED HEALTH CARE EDUCATION/TRAINING PROGRAM

## 2025-04-23 PROCEDURE — 85025 COMPLETE CBC W/AUTO DIFF WBC: CPT

## 2025-04-23 PROCEDURE — 94760 N-INVAS EAR/PLS OXIMETRY 1: CPT

## 2025-04-23 PROCEDURE — 94761 N-INVAS EAR/PLS OXIMETRY MLT: CPT

## 2025-04-23 PROCEDURE — 36415 COLL VENOUS BLD VENIPUNCTURE: CPT

## 2025-04-23 PROCEDURE — 6370000000 HC RX 637 (ALT 250 FOR IP)

## 2025-04-23 RX ORDER — LEVOFLOXACIN 500 MG/1
500 TABLET, FILM COATED ORAL EVERY 24 HOURS
Status: DISCONTINUED | OUTPATIENT
Start: 2025-04-23 | End: 2025-04-27 | Stop reason: HOSPADM

## 2025-04-23 RX ADMIN — BUDESONIDE 1000 MCG: 0.5 SUSPENSION RESPIRATORY (INHALATION) at 07:40

## 2025-04-23 RX ADMIN — ENOXAPARIN SODIUM 30 MG: 100 INJECTION SUBCUTANEOUS at 07:31

## 2025-04-23 RX ADMIN — LISINOPRIL 2.5 MG: 2.5 TABLET ORAL at 17:52

## 2025-04-23 RX ADMIN — TIZANIDINE 2 MG: 4 TABLET ORAL at 19:24

## 2025-04-23 RX ADMIN — METOPROLOL SUCCINATE 12.5 MG: 25 TABLET, EXTENDED RELEASE ORAL at 19:24

## 2025-04-23 RX ADMIN — IPRATROPIUM BROMIDE AND ALBUTEROL SULFATE 1 DOSE: .5; 2.5 SOLUTION RESPIRATORY (INHALATION) at 15:08

## 2025-04-23 RX ADMIN — ARFORMOTEROL TARTRATE 15 MCG: 15 SOLUTION RESPIRATORY (INHALATION) at 07:41

## 2025-04-23 RX ADMIN — IPRATROPIUM BROMIDE AND ALBUTEROL SULFATE 1 DOSE: .5; 2.5 SOLUTION RESPIRATORY (INHALATION) at 23:40

## 2025-04-23 RX ADMIN — ARFORMOTEROL TARTRATE 15 MCG: 15 SOLUTION RESPIRATORY (INHALATION) at 21:34

## 2025-04-23 RX ADMIN — GUAIFENESIN 600 MG: 600 TABLET ORAL at 07:31

## 2025-04-23 RX ADMIN — LEVOFLOXACIN 500 MG: 500 TABLET, FILM COATED ORAL at 14:27

## 2025-04-23 RX ADMIN — GUAIFENESIN 600 MG: 600 TABLET ORAL at 19:24

## 2025-04-23 RX ADMIN — BUDESONIDE 1000 MCG: 0.5 SUSPENSION RESPIRATORY (INHALATION) at 21:34

## 2025-04-23 RX ADMIN — SODIUM CHLORIDE, PRESERVATIVE FREE 10 ML: 5 INJECTION INTRAVENOUS at 07:31

## 2025-04-23 RX ADMIN — WATER 60 MG: 1 INJECTION INTRAMUSCULAR; INTRAVENOUS; SUBCUTANEOUS at 07:31

## 2025-04-23 RX ADMIN — IPRATROPIUM BROMIDE AND ALBUTEROL SULFATE 1 DOSE: .5; 2.5 SOLUTION RESPIRATORY (INHALATION) at 21:34

## 2025-04-23 RX ADMIN — IPRATROPIUM BROMIDE AND ALBUTEROL SULFATE 1 DOSE: .5; 2.5 SOLUTION RESPIRATORY (INHALATION) at 11:52

## 2025-04-23 RX ADMIN — MONTELUKAST 10 MG: 10 TABLET, FILM COATED ORAL at 07:30

## 2025-04-23 RX ADMIN — TIOTROPIUM BROMIDE INHALATION SPRAY 2 PUFF: 3.12 SPRAY, METERED RESPIRATORY (INHALATION) at 07:40

## 2025-04-23 RX ADMIN — ASPIRIN 81 MG: 81 TABLET, CHEWABLE ORAL at 07:31

## 2025-04-23 RX ADMIN — IPRATROPIUM BROMIDE AND ALBUTEROL SULFATE 1 DOSE: .5; 2.5 SOLUTION RESPIRATORY (INHALATION) at 07:40

## 2025-04-23 RX ADMIN — METOPROLOL SUCCINATE 12.5 MG: 25 TABLET, EXTENDED RELEASE ORAL at 07:31

## 2025-04-23 NOTE — CARE COORDINATION
Pt is from home with spouse, LILIANA.     Pt currently on 2 L O2. And will continue to wean.    Pt has O2 at home through [employer] and only uses it as needed.       If unable to wean prior to discharge, pt will need home O2 eval.      Pt on IV steroids, Pulm consult pending, regarding SOB.    Divine Domingo RN, BSN, CM  Case Management Department  [phone]

## 2025-04-23 NOTE — PLAN OF CARE
Problem: Safety - Adult  Goal: Free from fall injury  4/23/2025 0104 by Clifton Tejada, RN  Outcome: Progressing  4/22/2025 1515 by Zayra Byrne, RN  Outcome: Progressing  Flowsheets (Taken 4/22/2025 1515)  Free From Fall Injury: Instruct family/caregiver on patient safety

## 2025-04-23 NOTE — CONSULTS
[facility]   PULMONARY AND CRITICAL CARE MEDICINE  PULMONARY MEDICINE PROGRESS NOTE             Reason for Consult: COPD exacerbation   Requesting Physician: Girma Flores MD    Subjective:   CHIEF COMPLAINT / HPI:    The patient is a 75 y.o. female with significant past medical history of COPD, HTN, and HFpEF presents with complaints of shortness of breath.     Pt. Reports previously required intermittent home O2 on exertion. Dupixent was discussed at last visit however she did not feel like she could handle injections. Continues pulmicort and Anoro inhaler use. Per chart review has had trouble covering cost of Trelegy in past. In 2023 she was having monthly flares.    Activity wise she reports mostly staying at home and is sometimes able to enjoy gardening although this time of year (spring) she feels her symptoms are worse.    Upon discharge 3/31 short course pred taper.    Past Medical History:      Diagnosis Date    Allergic rhinitis     Anxiety 02/14/2022    Back pain     Chronic:under care of spine specialist:Dr. Adames    Cervical cancer screening 2010    Nml per pt'    Closed compression fracture of L3 vertebra (HCC) 02/08/2024    Compression fracture of thoracic vertebrae, non-traumatic (HCC)     under care of endo:Dr. Zhu    COPD (chronic obstructive pulmonary disease) (HCC)     under care of pulmo:Dr. Nazario    COPD exacerbation (HCC) 04/14/2017    Elevated LDL cholesterol level     GERD (gastroesophageal reflux disease)     History of mammogram 2012;5/17/17    Nml per pt'. 5/17/17=negative.    HLD (hyperlipidemia) 09/03/2021    Hoarseness of voice 10/04/2017    Hypertension     Lung nodule:left 05/04/2017     1.2 cm indeterminate left upper lobe pulmonary nodule:under care of pulmo:Dr. Nazario    Mucus plugging of bronchi 04/14/2017    Osteoarthritis     Osteomyelitis of left leg     Osteoporosis     under care of endo:Dr. Zhu    S/P colonoscopy 2011     thyroid gland measures 4.5 cm in vertical  dimension, by 1.5 x 1.6 cm in axial dimensions. There are 3 small  subcentimeter complex hypoechoic nodule is in the superior aspect  of the right lobe, the largest measuring 5 mm in diameter.  Projecting from the inferior pole of the right lobe, there is an  exophytic mass seen posteriorly, which appears complex, possibly  mixed solid and cystic. It measures 6.3 x 3.8 x 6.2 cm in  vertical, transverse and AP dimensions.    The left lobe measures 4.2 x 1.6 x 1.5 cm in size, and  demonstrates a solitary small complex cystic lesion in the mid  aspect measuring 1.2 x 0.7 x 0.7 cm in size. The left lobe is  otherwise unremarkable.    The isthmus measures 2-3 mm in diameter, and appears normal.    Impression  Large complex cystic/solid mass projecting from the inferior right  lobe of the thyroid gland posteriorly.    Several small hypoechoic nonspecific subcentimeter nodules  bilaterally as described.       Assessment     COPD exacerbation  Chronic hypercapnic respiratory failure   GOLD E criteria   No consolidation visualized on chest X-ray   Frequent exacerbations last 3/28/2025  2L O2 requirement  Reconsider dupixent   May require long-term steroids  Eosinophil count 700     Plan  - Continue IV solumedrol   - check CBC  - Levaquin started   - sputum culture  - continue singulair   - Continue triple therapy with pulmicort, brovana, and spiriva  - home O2 eval    (Please note that portions of this note were completed with a voice recognition program.  Efforts were made to edit the dictations but occasionally words are mis-transcribed.)      Jose Vaughn PGY-1 MD    Patient discussed and staffed with Dr. Shaw.  Pulmonary and Critical Care Medicine      PULMONARY AND CRITICAL CARE ATTENDING:  Patient was seen, examined and discussed with Dr. Vaughn PGY-1. I agree with the history of present illness, past medical/surgical histories, family history, social history, medication

## 2025-04-23 NOTE — PLAN OF CARE
Problem: Safety - Adult  Goal: Free from fall injury  4/23/2025 0744 by Yonis Edmond RN  Outcome: Progressing  Flowsheets (Taken 4/23/2025 0744)  Free From Fall Injury:   Instruct family/caregiver on patient safety   Based on caregiver fall risk screen, instruct family/caregiver to ask for assistance with transferring infant if caregiver noted to have fall risk factors  Note: Patient is A/O x4 and independent in the room. Bed is in lowest position, room is free of clutter, and call light is within reach of patient at this time. Education provided on calling when assistance is needed. Patient verbalized understanding at this time.     Problem: Pain  Goal: Verbalizes/displays adequate comfort level or baseline comfort level  Outcome: Progressing  Flowsheets (Taken 4/23/2025 0744)  Verbalizes/displays adequate comfort level or baseline comfort level:   Encourage patient to monitor pain and request assistance   Assess pain using appropriate pain scale   Administer analgesics based on type and severity of pain and evaluate response  Note: Pain level assessed and PRN medication made available when needed for breakthrough pain. Patient stated that she was not experiencing any pain at this time. Will continue to monitor per protocol.

## 2025-04-23 NOTE — PROGRESS NOTES
V2.0    [facility] [place] Progress Note      Name:  Charlene Nazario /Age/Sex: 1950  (75 y.o. female)   MRN & CSN:  [id number] & [id number] Encounter Date/Time: 2025 1:31 PM EDT   Location:  [facility] PCP: Gerry Foley DO     Attending:Mark Rayo MD       Hospital Day: 5    Assessment and Recommendations   Charlene Nazario is a 75 y.o. female with pmh of  who presents with COPD exacerbation (HCC)      Plan:       Will consult pulmonology for assistance with management of COPD exacerbation  Patient still significantly SOB at rest and exertion  Does not feel like going home on  due to SOB    Telemetry monitering  Oxygen  Duonebs   Steroids : iv solumedrol   Moniter O2 sat  Wean off oxygen as able to to keep oxygen saturation more than 90%    She may need a new home O2 eval at discharge    Took me more than 52  minutes for clinical management, coordination of care, reviewing labs , ordering medication as needed , reviewing consultants notes and recommendation  including plan of care discussion with the patient ,nursing staff and case management and consultants          Diet ADULT DIET; Regular   DVT Prophylaxis [] Lovenox, []  Heparin, [] SCDs, [] Ambulation,  [] Eliquis, [] Xarelto  [] Coumadin   Code Status Full Code   Disposition From:   Expected Disposition:   Estimated Date of Discharge:   Patient requires continued admission due to   Surrogate Decision Maker/ POA         Personally reviewed Lab Studies and Imaging           Telemetry reviewed by myself no ST elevation           Drugs that require monitoring for toxicity include IV steroids    and the method of monitoring was CBC , BMP and vitals monitering            Medical Decision Making:  The following items were considered in medical decision making:  Discussion of patient care with other providers  Reviewed clinical lab tests  Reviewed radiology tests  Reviewed other diagnostic tests/interventions  Independent review of radiologic  Or  ondansetron, 4 mg, Q6H PRN  polyethylene glycol, 17 g, Daily PRN  acetaminophen, 650 mg, Q6H PRN   Or  acetaminophen, 650 mg, Q6H PRN  benzonatate, 100 mg, TID PRN  albuterol, 2.5 mg, Q4H PRN  melatonin, 3 mg, Nightly PRN        Labs and Imaging   XR CHEST (2 VW)  Result Date: 4/19/2025  CHEST PA AND LATERAL: HISTORY: Short of breath COMPARISON: 3/24/2025 FINDINGS: The heart size is within normal limits. The lungs appear clear of acute parenchymal infiltrate.     No acute disease. Electronically signed by Avel Delarosa      CBC:   No results for input(s): \"WBC\", \"HGB\", \"PLT\" in the last 72 hours.    BMP:    Recent Labs     04/21/25  0440 04/22/25  0345    135*   K 4.2 4.8   CL 99 99   CO2 33* 28   BUN 14 16   CREATININE 0.5* 0.6   GLUCOSE 86 112*     Hepatic: No results for input(s): \"AST\", \"ALT\", \"BILITOT\", \"ALKPHOS\" in the last 72 hours.    Invalid input(s): \"ALB\"  Lipids:   Lab Results   Component Value Date/Time    CHOL 192 04/03/2025 12:34 PM    HDL 99 04/03/2025 12:34 PM    TRIG 64 04/03/2025 12:34 PM     Hemoglobin A1C:   Lab Results   Component Value Date/Time    LABA1C 5.6 04/03/2025 12:34 PM     TSH:   Lab Results   Component Value Date/Time    TSH 0.62 04/03/2025 12:34 PM    TSH 1.66 07/20/2021 11:29 AM     Troponin: No results found for: \"TROPONINT\"  Lactic Acid: No results for input(s): \"LACTA\" in the last 72 hours.  BNP: No results for input(s): \"PROBNP\" in the last 72 hours.  UA:  Lab Results   Component Value Date/Time    NITRU Negative 06/20/2022 08:46 PM    COLORU Yellow 06/20/2022 08:46 PM    PHUR 7.5 06/20/2022 08:46 PM    WBCUA None seen 06/20/2022 08:46 PM    RBCUA None seen 06/20/2022 08:46 PM    CLARITYU Clear 06/20/2022 08:46 PM    LEUKOCYTESUR Negative 06/20/2022 08:46 PM    UROBILINOGEN 0.2 06/20/2022 08:46 PM    BILIRUBINUR Negative 06/20/2022 08:46 PM    BLOODU Negative 06/20/2022 08:46 PM    GLUCOSEU Negative 06/20/2022 08:46 PM    KETUA 15 06/20/2022 08:46 PM     Urine

## 2025-04-24 PROCEDURE — 99232 SBSQ HOSP IP/OBS MODERATE 35: CPT | Performed by: INTERNAL MEDICINE

## 2025-04-24 PROCEDURE — 6360000002 HC RX W HCPCS: Performed by: STUDENT IN AN ORGANIZED HEALTH CARE EDUCATION/TRAINING PROGRAM

## 2025-04-24 PROCEDURE — 1200000000 HC SEMI PRIVATE

## 2025-04-24 PROCEDURE — 6370000000 HC RX 637 (ALT 250 FOR IP): Performed by: STUDENT IN AN ORGANIZED HEALTH CARE EDUCATION/TRAINING PROGRAM

## 2025-04-24 PROCEDURE — 94618 PULMONARY STRESS TESTING: CPT

## 2025-04-24 PROCEDURE — 94640 AIRWAY INHALATION TREATMENT: CPT

## 2025-04-24 PROCEDURE — 2500000003 HC RX 250 WO HCPCS: Performed by: STUDENT IN AN ORGANIZED HEALTH CARE EDUCATION/TRAINING PROGRAM

## 2025-04-24 PROCEDURE — 94761 N-INVAS EAR/PLS OXIMETRY MLT: CPT

## 2025-04-24 PROCEDURE — 2500000003 HC RX 250 WO HCPCS

## 2025-04-24 PROCEDURE — 2700000000 HC OXYGEN THERAPY PER DAY

## 2025-04-24 PROCEDURE — 6370000000 HC RX 637 (ALT 250 FOR IP)

## 2025-04-24 RX ORDER — PREDNISONE 20 MG/1
40 TABLET ORAL DAILY
Status: DISCONTINUED | OUTPATIENT
Start: 2025-04-25 | End: 2025-04-27 | Stop reason: HOSPADM

## 2025-04-24 RX ADMIN — METOPROLOL SUCCINATE 12.5 MG: 25 TABLET, EXTENDED RELEASE ORAL at 07:33

## 2025-04-24 RX ADMIN — LISINOPRIL 2.5 MG: 2.5 TABLET ORAL at 17:53

## 2025-04-24 RX ADMIN — ASPIRIN 81 MG: 81 TABLET, CHEWABLE ORAL at 07:33

## 2025-04-24 RX ADMIN — IPRATROPIUM BROMIDE AND ALBUTEROL SULFATE 1 DOSE: .5; 2.5 SOLUTION RESPIRATORY (INHALATION) at 03:11

## 2025-04-24 RX ADMIN — IPRATROPIUM BROMIDE AND ALBUTEROL SULFATE 1 DOSE: .5; 2.5 SOLUTION RESPIRATORY (INHALATION) at 23:57

## 2025-04-24 RX ADMIN — SODIUM CHLORIDE, PRESERVATIVE FREE 10 ML: 5 INJECTION INTRAVENOUS at 20:41

## 2025-04-24 RX ADMIN — IPRATROPIUM BROMIDE AND ALBUTEROL SULFATE 1 DOSE: .5; 2.5 SOLUTION RESPIRATORY (INHALATION) at 08:06

## 2025-04-24 RX ADMIN — METOPROLOL SUCCINATE 12.5 MG: 25 TABLET, EXTENDED RELEASE ORAL at 20:41

## 2025-04-24 RX ADMIN — ENOXAPARIN SODIUM 30 MG: 100 INJECTION SUBCUTANEOUS at 07:33

## 2025-04-24 RX ADMIN — ARFORMOTEROL TARTRATE 15 MCG: 15 SOLUTION RESPIRATORY (INHALATION) at 20:05

## 2025-04-24 RX ADMIN — TIOTROPIUM BROMIDE INHALATION SPRAY 2 PUFF: 3.12 SPRAY, METERED RESPIRATORY (INHALATION) at 08:06

## 2025-04-24 RX ADMIN — TIZANIDINE 2 MG: 4 TABLET ORAL at 20:41

## 2025-04-24 RX ADMIN — GUAIFENESIN 600 MG: 600 TABLET ORAL at 07:33

## 2025-04-24 RX ADMIN — SODIUM CHLORIDE, PRESERVATIVE FREE 10 ML: 5 INJECTION INTRAVENOUS at 07:34

## 2025-04-24 RX ADMIN — IPRATROPIUM BROMIDE AND ALBUTEROL SULFATE 1 DOSE: .5; 2.5 SOLUTION RESPIRATORY (INHALATION) at 20:05

## 2025-04-24 RX ADMIN — LEVOFLOXACIN 500 MG: 500 TABLET, FILM COATED ORAL at 13:33

## 2025-04-24 RX ADMIN — BUDESONIDE 1000 MCG: 0.5 SUSPENSION RESPIRATORY (INHALATION) at 08:06

## 2025-04-24 RX ADMIN — IPRATROPIUM BROMIDE AND ALBUTEROL SULFATE 1 DOSE: .5; 2.5 SOLUTION RESPIRATORY (INHALATION) at 15:41

## 2025-04-24 RX ADMIN — ARFORMOTEROL TARTRATE 15 MCG: 15 SOLUTION RESPIRATORY (INHALATION) at 08:06

## 2025-04-24 RX ADMIN — WATER 60 MG: 1 INJECTION INTRAMUSCULAR; INTRAVENOUS; SUBCUTANEOUS at 07:33

## 2025-04-24 RX ADMIN — GUAIFENESIN 600 MG: 600 TABLET ORAL at 20:41

## 2025-04-24 RX ADMIN — GUAIFENESIN 200 MG: 100 SOLUTION ORAL at 20:41

## 2025-04-24 RX ADMIN — BUDESONIDE 500 MCG: 0.5 SUSPENSION RESPIRATORY (INHALATION) at 20:05

## 2025-04-24 RX ADMIN — MONTELUKAST 10 MG: 10 TABLET, FILM COATED ORAL at 07:33

## 2025-04-24 RX ADMIN — IPRATROPIUM BROMIDE AND ALBUTEROL SULFATE 1 DOSE: .5; 2.5 SOLUTION RESPIRATORY (INHALATION) at 11:59

## 2025-04-24 NOTE — PROGRESS NOTES
[facility]   PULMONARY AND CRITICAL CARE MEDICINE  PULMONARY MEDICINE PROGRESS NOTE             Reason for Consult: COPD exacerbation   Requesting Physician: Girma Flores MD    Subjective:   Interval History: Minimal wheeze appreciated on exam now improved to 1L.     CHIEF COMPLAINT / HPI:    The patient is a 75 y.o. female with significant past medical history of COPD, HTN, and HFpEF presents with complaints of shortness of breath.     Pt. Reports previously required intermittent home O2 on exertion. Dupixent was discussed at last visit however she did not feel like she could handle injections. Continues pulmicort and Anoro inhaler use. Per chart review has had trouble covering cost of Trelegy in past. In 2023 she was having monthly flares.    Activity wise she reports mostly staying at home and is sometimes able to enjoy gardening although this time of year (spring) she feels her symptoms are worse.    Upon discharge 3/31 short course pred taper.    Past Medical History:      Diagnosis Date    Allergic rhinitis     Anxiety 02/14/2022    Back pain     Chronic:under care of spine specialist:Dr. Adames    Cervical cancer screening 2010    Nml per pt'    Closed compression fracture of L3 vertebra (HCC) 02/08/2024    Compression fracture of thoracic vertebrae, non-traumatic (HCC)     under care of endo:Dr. Zhu    COPD (chronic obstructive pulmonary disease) (HCC)     under care of pulmo:Dr. Nazario    COPD exacerbation (HCC) 04/14/2017    Elevated LDL cholesterol level     GERD (gastroesophageal reflux disease)     History of mammogram 2012;5/17/17    Nml per pt'. 5/17/17=negative.    HLD (hyperlipidemia) 09/03/2021    Hoarseness of voice 10/04/2017    Hypertension     Lung nodule:left 05/04/2017     1.2 cm indeterminate left upper lobe pulmonary nodule:under care of pulmo:Dr. Nazario    Mucus plugging of bronchi 04/14/2017    Osteoarthritis     Osteomyelitis of left leg (HCC)      Osteoporosis     under care of endo:Dr. Zhu    S/P colonoscopy     Polyps:next in 1aup=2203    Systolic CHF, chronic (HCC) EF 45% 2018    Thoracic aorta atherosclerosis 2022    Thyroid mass     under care of endo & surgeon(Dr. Ewing)    Thyroid nodule     under care of endo:Dr. Zhu    Tibia fracture 2008    Left      Past Surgical History:        Procedure Laterality Date    APPENDECTOMY       SECTION      FIXATION KYPHOPLASTY  2017    Dr. Adames    IR KYPHOPLASTY LUMBAR 1 VERTEBRAL BODY  2024    IR KYPHOPLASTY LUMBAR FIRST LEVEL 2024 [facility]    LARYNGOPLASTY Right 2022    MEDIALIZATION LARYNGOPLASTY, Dr. Sarina Medrano    OTHER SURGICAL HISTORY  2017    thoracic kyphoplasty    THYROIDECTOMY, PARTIAL Right 2017    Right hemithyroidectomy for goiter,  complicated by vocal cord paralysis    TIBIA FRACTURE SURGERY Left     Dr. Swenson    VOCAL CORD INJECTION       Current Medications:     levoFLOXacin  500 mg Oral Q24H    ipratropium 0.5 mg-albuterol 2.5 mg  1 Dose Inhalation Q4H RT    methylPREDNISolone  60 mg IntraVENous Daily    sodium chloride flush  5-40 mL IntraVENous 2 times per day    enoxaparin  30 mg SubCUTAneous Daily    guaiFENesin  600 mg Oral BID    aspirin  81 mg Oral Daily    budesonide  1 mg Nebulization BID RT    And    arformoterol tartrate  15 mcg Nebulization BID RT    And    tiotropium  2 puff Inhalation Daily RT    montelukast  10 mg Oral Daily    metoprolol succinate  12.5 mg Oral BID    lisinopril  2.5 mg Oral QPM    tiZANidine  2 mg Oral Nightly        Social History:        Family History:       REVIEW OF SYSTEMS:    CONSTITUTIONAL:  negative for fevers, chills, diaphoresis, activity change, appetite change, fatigue, night sweats and unexpected weight change.   EYES:  negative for blurred vision, eye discharge, visual disturbance and icterus  HEENT:  negative for hearing loss,

## 2025-04-24 NOTE — PROGRESS NOTES
04/24/25 1547   Resting (Room Air)   SpO2 89      During Walk (Room Air)   SpO2 87      Walk/Assistance Device Ambulation   Rate of Dyspnea 1   During Walk (On O2)   SpO2 95      O2 Device Nasal cannula   O2 Flow Rate (l/min) 2 l/min   Need Additional O2 Flow Rate Rows No   Walk/Assistance Device Ambulation   Rate of Dyspnea 1   After Walk   SpO2 93      O2 Device Nasal cannula   O2 Flow Rate (l/min) 2 l/min   Rate of Dyspnea 0   Does the Patient Qualify for Home O2 Yes   Liter Flow at Rest 0   Liter Flow on Exertion 2   Does the Patient Need Portable Oxygen Tanks Yes

## 2025-04-24 NOTE — PROGRESS NOTES
V2.0    [facility] Progress Note      Name:  Charlene Nazario /Age/Sex: 1950  (75 y.o. female)   MRN & CSN:  [id number] & [id number] Encounter Date/Time: 2025 1:31 PM EDT   Location:  [facility]6304-01 PCP: Gerry Foley DO     Attending:Juan Dahl MD       Hospital Day: 6    Assessment and Recommendations   Charlene Nazario is a 75 y.o. female with pmh of  who presents with COPD exacerbation (HCC)    COPD exacerbation  Home medication-Trelegy Ellipta, as needed albuterol  Follows with pulmonology outpatient  Patient was initially treated with DuoNeb every 4hr, nebulized Pulmicort and Brovana, and systemic steroid  Due to lack of expected improvement pulmonology team were consulted who started patient on levofloxacin.  -Minimal wheezes could be appreciated  - Discussed with patient and family that in the setting of her advanced COPD she will likely still have some dyspnea and may still need oxygen  - Home O2 evaluation ordered-at baseline she uses oxygen as needed  -Patient should be stable for discharge to home in the next 24 hours    Took me more than 52  minutes for clinical management, coordination of care, reviewing labs , ordering medication as needed , reviewing consultants notes and recommendation  including plan of care discussion with the patient ,nursing staff and case management and consultants          Diet ADULT DIET; Regular   DVT Prophylaxis [] Lovenox, []  Heparin, [] SCDs, [] Ambulation,  [] Eliquis, [] Xarelto  [] Coumadin   Code Status Full Code   Disposition From:   Expected Disposition:   Estimated Date of Discharge:   Patient requires continued admission due to   Surrogate Decision Maker/ POA         Personally reviewed Lab Studies and Imaging           Telemetry reviewed by myself no ST elevation           Drugs that require monitoring for toxicity include IV steroids    and the method of monitoring was CBC , BMP and vitals monitering            Medical Decision Making:  The

## 2025-04-24 NOTE — DISCHARGE INSTRUCTIONS
PLEASE ASK YOUR PHARMACY OR INSURANCE ABOUT THE COVERAGE ON THE FOLLOWING INHALERS    OPTION 1  ICS/LABA/LAMA  Trelegy Ellipta (fluticasone/vilanterol/umeclidinium 100 mcg/62.5 mcg/25 mcg 200 mcg/62.5 mcg/25 mcg)  Breztri Aerosphere® (budesonide glycopyrrolate formoterol fumarate 160/9/4.8 mcg)     OPTION 2  ICS  Alvesco® HFA (ciclesonide 80/160 mcg)  ArmonAir™ RespiClick® (fluticasone propionate 55/113/232 mcg)  Arnuity® Ellipta® (fluticasone turoate 100/200 mcg)  Asmanex® HFA (mometasone furoate 100/200 mcg)  Asmanex® Twisthaler (mometasone furoate 110/220 mcg)  Flovent Discus (fluticasone propionate 50/100/250 mcg)  Flovent® HFA (fluticasone propionate 44/110/220 mcg)*  Pulmicort® Flexhaler® (budesonide 90/180 mcg)*  QVAR® Redihaler™ (beclomethasone dipropionate 40/80 mcg)  ---PLUS---  LAMA/LABA  Anoro® Ellipta (umeclidinium and vilanterol 55/22, 62.5/25 mcg )  Stiolto® Respimat® (olodaterol and tiotropium bromide 2.5/2.5 mcg)  Bevespi Aerosphere® (glycopyrrolate and formoterol 9/4.8 mcg)  Duaklir® Pressair® (aclidinium and formoterol 400/12 mcg)    OPTION 3  LAMA  Spiriva® HandiHaler® (tiotropium bromide 18 mcg)  Spiriva® Respimat® (tiotropium bromide 1.25/2.5 mcg)*  Incruse® Ellipta® (umeclidinium 62.5 mcg)  Tudorza™ Pressair™ (aclidinim bromide 400 mcg)  ---PLUS---  ICS/LABA  Breo Ellipta® (Fluticasone, Vilanterol)*  Symbicort® (Budesonide and Formoterol)*  Breyna™ (Budesonide and Formoterol)*  Advair Diskus® (Fluticasone and Salmeterol)*   Advair® HFA (Fluticasone and Salmeterol)*  AirDuo RespiClick® (Fluticasone and Salmeterol)*    Wixela Inhub® (Fluticasone and Salmeterol)*  Dulera® (Mometasone and formoterol)

## 2025-04-24 NOTE — PLAN OF CARE
Problem: Safety - Adult  Goal: Free from fall injury  4/24/2025 0749 by Yonis Edmond RN  Outcome: Progressing  Flowsheets (Taken 4/24/2025 0749)  Free From Fall Injury:   Instruct family/caregiver on patient safety   Based on caregiver fall risk screen, instruct family/caregiver to ask for assistance with transferring infant if caregiver noted to have fall risk factors  Note: Patient is A/O x4 and independent in the room. Bed is in lowest position, room is free of clutter, and call light is within reach of patient at this time. Education provided on calling when assistance is needed. Patient verbalized understanding at this time.     Problem: Pain  Goal: Verbalizes/displays adequate comfort level or baseline comfort level  Outcome: Progressing  Flowsheets (Taken 4/24/2025 0749)  Verbalizes/displays adequate comfort level or baseline comfort level:   Encourage patient to monitor pain and request assistance   Assess pain using appropriate pain scale   Administer analgesics based on type and severity of pain and evaluate response  Note: Pain level assessed and PRN medication made available when needed for breakthrough pain. Patient stated that she was not experiencing any pain at this time. Will continue to monitor per protocol.

## 2025-04-25 PROBLEM — E44.0 MODERATE MALNUTRITION: Chronic | Status: ACTIVE | Noted: 2025-04-25

## 2025-04-25 PROCEDURE — 94761 N-INVAS EAR/PLS OXIMETRY MLT: CPT

## 2025-04-25 PROCEDURE — 2500000003 HC RX 250 WO HCPCS: Performed by: STUDENT IN AN ORGANIZED HEALTH CARE EDUCATION/TRAINING PROGRAM

## 2025-04-25 PROCEDURE — 6370000000 HC RX 637 (ALT 250 FOR IP)

## 2025-04-25 PROCEDURE — 6370000000 HC RX 637 (ALT 250 FOR IP): Performed by: STUDENT IN AN ORGANIZED HEALTH CARE EDUCATION/TRAINING PROGRAM

## 2025-04-25 PROCEDURE — 94640 AIRWAY INHALATION TREATMENT: CPT

## 2025-04-25 PROCEDURE — 1200000000 HC SEMI PRIVATE

## 2025-04-25 PROCEDURE — 2500000003 HC RX 250 WO HCPCS

## 2025-04-25 PROCEDURE — 6360000002 HC RX W HCPCS: Performed by: STUDENT IN AN ORGANIZED HEALTH CARE EDUCATION/TRAINING PROGRAM

## 2025-04-25 PROCEDURE — 2700000000 HC OXYGEN THERAPY PER DAY

## 2025-04-25 RX ADMIN — METOPROLOL SUCCINATE 12.5 MG: 25 TABLET, EXTENDED RELEASE ORAL at 19:44

## 2025-04-25 RX ADMIN — TIOTROPIUM BROMIDE INHALATION SPRAY 2 PUFF: 3.12 SPRAY, METERED RESPIRATORY (INHALATION) at 09:33

## 2025-04-25 RX ADMIN — IPRATROPIUM BROMIDE AND ALBUTEROL SULFATE 1 DOSE: .5; 2.5 SOLUTION RESPIRATORY (INHALATION) at 20:46

## 2025-04-25 RX ADMIN — LISINOPRIL 2.5 MG: 2.5 TABLET ORAL at 17:20

## 2025-04-25 RX ADMIN — MONTELUKAST 10 MG: 10 TABLET, FILM COATED ORAL at 08:31

## 2025-04-25 RX ADMIN — LEVOFLOXACIN 500 MG: 500 TABLET, FILM COATED ORAL at 17:20

## 2025-04-25 RX ADMIN — SODIUM CHLORIDE, PRESERVATIVE FREE 10 ML: 5 INJECTION INTRAVENOUS at 19:44

## 2025-04-25 RX ADMIN — ARFORMOTEROL TARTRATE 15 MCG: 15 SOLUTION RESPIRATORY (INHALATION) at 09:32

## 2025-04-25 RX ADMIN — GUAIFENESIN 600 MG: 600 TABLET ORAL at 08:30

## 2025-04-25 RX ADMIN — SODIUM CHLORIDE, PRESERVATIVE FREE 10 ML: 5 INJECTION INTRAVENOUS at 08:35

## 2025-04-25 RX ADMIN — BUDESONIDE 1000 MCG: 0.5 SUSPENSION RESPIRATORY (INHALATION) at 20:46

## 2025-04-25 RX ADMIN — METOPROLOL SUCCINATE 12.5 MG: 25 TABLET, EXTENDED RELEASE ORAL at 08:31

## 2025-04-25 RX ADMIN — ARFORMOTEROL TARTRATE 15 MCG: 15 SOLUTION RESPIRATORY (INHALATION) at 20:46

## 2025-04-25 RX ADMIN — GUAIFENESIN 200 MG: 100 SOLUTION ORAL at 19:45

## 2025-04-25 RX ADMIN — ENOXAPARIN SODIUM 30 MG: 100 INJECTION SUBCUTANEOUS at 08:32

## 2025-04-25 RX ADMIN — BUDESONIDE 1000 MCG: 0.5 SUSPENSION RESPIRATORY (INHALATION) at 09:32

## 2025-04-25 RX ADMIN — PREDNISONE 40 MG: 20 TABLET ORAL at 08:31

## 2025-04-25 RX ADMIN — TIZANIDINE 2 MG: 4 TABLET ORAL at 19:45

## 2025-04-25 RX ADMIN — ASPIRIN 81 MG: 81 TABLET, CHEWABLE ORAL at 08:30

## 2025-04-25 RX ADMIN — GUAIFENESIN 600 MG: 600 TABLET ORAL at 19:45

## 2025-04-25 RX ADMIN — GUAIFENESIN 200 MG: 100 SOLUTION ORAL at 10:08

## 2025-04-25 RX ADMIN — IPRATROPIUM BROMIDE AND ALBUTEROL SULFATE 1 DOSE: .5; 2.5 SOLUTION RESPIRATORY (INHALATION) at 16:13

## 2025-04-25 RX ADMIN — IPRATROPIUM BROMIDE AND ALBUTEROL SULFATE 1 DOSE: .5; 2.5 SOLUTION RESPIRATORY (INHALATION) at 09:32

## 2025-04-25 NOTE — PLAN OF CARE
Problem: Chronic Conditions and Co-morbidities  Goal: Patient's chronic conditions and co-morbidity symptoms are monitored and maintained or improved  Outcome: Progressing     Problem: Discharge Planning  Goal: Discharge to home or other facility with appropriate resources  Outcome: Progressing     Problem: Respiratory - Adult  Goal: Achieves optimal ventilation and oxygenation  Outcome: Progressing

## 2025-04-25 NOTE — CARE COORDINATION
CM  following for  d/c planning:  -   Pt is from home with spouse, IPTA.      -   Pt currently on 3 L O2. ( Up from 2 L yest  ) will continue to wean.     -   Pt has O2 at home through [employer] and only uses it as needed.       -   If unable to wean prior to discharge, pt will need home O2 eval and update  DME Company        -   Pt on IV steroids, Pulm consulted regarding SOB.         -    Consulted:  Pharmacy  for  Insurance  coverage  on Inhalers:          -    Sputum Cx, Home O 2  eval  prior to  d/c  and  O/P follow up                with  [facility]  recommended      CM  cont to  follow and assist  as needed.   Per  MD  pt  still very  wheezy NMR   poss  w/e  d/c. Pending improvement .      Electronically signed by Gela Antoine RN on 4/25/2025 at 1:41 PM         Gela Antoine RN Case Manager  [facility] [address]  [facility] [phone]  Fax [phone]

## 2025-04-25 NOTE — PROGRESS NOTES
V2.0    [facility] Progress Note      Name:  Charlene Nazario /Age/Sex: 1950  (75 y.o. female)   MRN & CSN:  [id number] & [id number] Encounter Date/Time: 2025 1:31 PM EDT   Location:  [facility]01 PCP: Gerry Foley DO     Attending:Juan Dahl MD       Hospital Day: 7    Assessment and Recommendations   Charlene Nazario is a 75 y.o. female with pmh of  who presents with COPD exacerbation (HCC)    COPD exacerbation  Home medication-Trelegy Ellipta, as needed albuterol  Follows with pulmonology outpatient  Patient was initially treated with DuoNeb every 4hr, nebulized Pulmicort and Brovana, and systemic steroid  Due to lack of expected improvement pulmonology team were consulted who started patient on levofloxacin.  -Increase wheezes and some ronchi today  - Discussed with patient and family that in the setting of her advanced COPD she will likely still have some dyspnea and improvement to baseline will likely to slow paced  - Home O2 evaluation done - needs oxygen 2L on ambulation as well, has been using nightly at home; but found her on oxygen at rest this morning  -Patient should be stable for discharge to home in the next 24 hours    Took me more than 52  minutes for clinical management, coordination of care, reviewing labs , ordering medication as needed , reviewing consultants notes and recommendation  including plan of care discussion with the patient ,nursing staff and case management and consultants          Diet ADULT DIET; Regular  ADULT ORAL NUTRITION SUPPLEMENT; Lunch, Dinner; Standard High Calorie/High Protein Oral Supplement   DVT Prophylaxis [] Lovenox, []  Heparin, [] SCDs, [] Ambulation,  [] Eliquis, [] Xarelto  [] Coumadin   Code Status Full Code   Disposition From:   Expected Disposition: home  Estimated Date of Discharge:   Patient requires continued admission due to   Surrogate Decision Maker/ POA         Personally reviewed Lab Studies and Imaging           Telemetry reviewed  1.66 07/20/2021 11:29 AM     Troponin: No results found for: \"TROPONINT\"  Lactic Acid: No results for input(s): \"LACTA\" in the last 72 hours.  BNP: No results for input(s): \"PROBNP\" in the last 72 hours.  UA:  Lab Results   Component Value Date/Time    NITRU Negative 06/20/2022 08:46 PM    COLORU Yellow 06/20/2022 08:46 PM    PHUR 7.5 06/20/2022 08:46 PM    WBCUA None seen 06/20/2022 08:46 PM    RBCUA None seen 06/20/2022 08:46 PM    CLARITYU Clear 06/20/2022 08:46 PM    LEUKOCYTESUR Negative 06/20/2022 08:46 PM    UROBILINOGEN 0.2 06/20/2022 08:46 PM    BILIRUBINUR Negative 06/20/2022 08:46 PM    BLOODU Negative 06/20/2022 08:46 PM    GLUCOSEU Negative 06/20/2022 08:46 PM    KETUA 15 06/20/2022 08:46 PM     Urine Cultures: No results found for: \"LABURIN\"  Blood Cultures: No results found for: \"BC\"  No results found for: \"BLOODCULT2\"  Organism:   Lab Results   Component Value Date/Time    ORG Moraxella (Branhamella) catarrhalis 01/03/2018 08:53 PM         Electronically signed by Juan Dahl MD on 4/25/2025 at 2:19 PM

## 2025-04-25 NOTE — PROGRESS NOTES
Comprehensive Nutrition Assessment    RECOMMENDATIONS:  PO Diet: Continue Regular  Nutrition Supplement: Continue Ensure+ HP BID  Nutrition Education: No recommendation at this time     NUTRITION ASSESSMENT:   Nutritional summary & status: LOS. Presented to ED w/ SOB r/t COPD exacerbation per ED notes and H&P. Per wt hx in EMR, pt had 8% wt loss over 4 months 4/24-8/24, but wt stable x8 months since. Per chart review of previous RD note, pt drinks Ensure at home and received throughout previous admit; just ordered Ensure+ HP BID for additional calories and protein r/t increased nutrient needs w/ COPD. Upon visit, mild body fat and muscle mass loss noted upon NFPE; meets criteria for moderate malnutrition. Pt reports \"iffy\" appetite pta as well as currently, however feels like she's been having increased intake since admit r/t receiving regularly scheduled meals. Documented PO intake per EMR shows most at 51-75% consumed. Will continue to monitor PO intake and any other changes to nutritional status.   Admission // PMH: COPD exacerbation // GERD, HTN, HLD, CHF, COPD, osteoporosis, osteoarthritis, thyroid mass    MALNUTRITION ASSESSMENT  Context of Malnutrition: Chronic Illness   Malnutrition Status: Moderate malnutrition  Findings of the 6 clinical characteristics of malnutrition (Minimum of 2 out of 6 clinical characteristics is required to make the diagnosis of moderate or severe Protein Calorie Malnutrition based on AND/ASPEN Guidelines):  Energy Intake:  No decrease in energy intake  Weight Loss:  7.5% over 3 months (8% over 4 months 4/2024-8/2024, stable since)     Body Fat Loss:  Mild body fat loss Orbital, Buccal region   Muscle Mass Loss:  Mild muscle mass loss Temples (temporalis), Clavicles (pectoralis & deltoids)    NUTRITION DIAGNOSIS   Moderate malnutrition, in context of chronic illness related to inadequate protein-energy intake as evidenced by criteria as identified in malnutrition

## 2025-04-26 PROCEDURE — 94618 PULMONARY STRESS TESTING: CPT

## 2025-04-26 PROCEDURE — 1200000000 HC SEMI PRIVATE

## 2025-04-26 PROCEDURE — 94640 AIRWAY INHALATION TREATMENT: CPT

## 2025-04-26 PROCEDURE — 2700000000 HC OXYGEN THERAPY PER DAY

## 2025-04-26 PROCEDURE — 6370000000 HC RX 637 (ALT 250 FOR IP): Performed by: INTERNAL MEDICINE

## 2025-04-26 PROCEDURE — 6370000000 HC RX 637 (ALT 250 FOR IP): Performed by: STUDENT IN AN ORGANIZED HEALTH CARE EDUCATION/TRAINING PROGRAM

## 2025-04-26 PROCEDURE — 2500000003 HC RX 250 WO HCPCS: Performed by: STUDENT IN AN ORGANIZED HEALTH CARE EDUCATION/TRAINING PROGRAM

## 2025-04-26 PROCEDURE — 2500000003 HC RX 250 WO HCPCS

## 2025-04-26 PROCEDURE — 6360000002 HC RX W HCPCS: Performed by: STUDENT IN AN ORGANIZED HEALTH CARE EDUCATION/TRAINING PROGRAM

## 2025-04-26 PROCEDURE — 94761 N-INVAS EAR/PLS OXIMETRY MLT: CPT

## 2025-04-26 PROCEDURE — 6370000000 HC RX 637 (ALT 250 FOR IP)

## 2025-04-26 RX ORDER — GUAIFENESIN 600 MG/1
600 TABLET, EXTENDED RELEASE ORAL 2 TIMES DAILY
Qty: 20 TABLET | Refills: 0 | Status: SHIPPED | OUTPATIENT
Start: 2025-04-26 | End: 2025-05-06

## 2025-04-26 RX ORDER — BENZONATATE 100 MG/1
100 CAPSULE ORAL 3 TIMES DAILY PRN
Qty: 30 CAPSULE | Refills: 0 | Status: SHIPPED | OUTPATIENT
Start: 2025-04-26 | End: 2025-05-06

## 2025-04-26 RX ORDER — LEVOFLOXACIN 500 MG/1
500 TABLET, FILM COATED ORAL EVERY 24 HOURS
Qty: 2 TABLET | Refills: 0 | Status: SHIPPED | OUTPATIENT
Start: 2025-04-26 | End: 2025-04-28

## 2025-04-26 RX ORDER — IPRATROPIUM BROMIDE AND ALBUTEROL SULFATE 2.5; .5 MG/3ML; MG/3ML
1 SOLUTION RESPIRATORY (INHALATION)
Status: DISCONTINUED | OUTPATIENT
Start: 2025-04-26 | End: 2025-04-27 | Stop reason: HOSPADM

## 2025-04-26 RX ORDER — PREDNISONE 20 MG/1
40 TABLET ORAL DAILY
Qty: 8 TABLET | Refills: 0 | Status: SHIPPED | OUTPATIENT
Start: 2025-04-27 | End: 2025-05-01

## 2025-04-26 RX ADMIN — SODIUM CHLORIDE, PRESERVATIVE FREE 10 ML: 5 INJECTION INTRAVENOUS at 20:59

## 2025-04-26 RX ADMIN — LISINOPRIL 2.5 MG: 2.5 TABLET ORAL at 17:53

## 2025-04-26 RX ADMIN — TIOTROPIUM BROMIDE INHALATION SPRAY 2 PUFF: 3.12 SPRAY, METERED RESPIRATORY (INHALATION) at 08:12

## 2025-04-26 RX ADMIN — GUAIFENESIN 200 MG: 100 SOLUTION ORAL at 08:37

## 2025-04-26 RX ADMIN — GUAIFENESIN 600 MG: 600 TABLET ORAL at 20:57

## 2025-04-26 RX ADMIN — IPRATROPIUM BROMIDE AND ALBUTEROL SULFATE 1 DOSE: .5; 2.5 SOLUTION RESPIRATORY (INHALATION) at 04:48

## 2025-04-26 RX ADMIN — METOPROLOL SUCCINATE 12.5 MG: 25 TABLET, EXTENDED RELEASE ORAL at 20:57

## 2025-04-26 RX ADMIN — MONTELUKAST 10 MG: 10 TABLET, FILM COATED ORAL at 08:36

## 2025-04-26 RX ADMIN — IPRATROPIUM BROMIDE AND ALBUTEROL SULFATE 1 DOSE: .5; 2.5 SOLUTION RESPIRATORY (INHALATION) at 12:36

## 2025-04-26 RX ADMIN — BUDESONIDE 1000 MCG: 0.5 SUSPENSION RESPIRATORY (INHALATION) at 21:07

## 2025-04-26 RX ADMIN — ASPIRIN 81 MG: 81 TABLET, CHEWABLE ORAL at 08:38

## 2025-04-26 RX ADMIN — ARFORMOTEROL TARTRATE 15 MCG: 15 SOLUTION RESPIRATORY (INHALATION) at 08:12

## 2025-04-26 RX ADMIN — IPRATROPIUM BROMIDE AND ALBUTEROL SULFATE 1 DOSE: .5; 2.5 SOLUTION RESPIRATORY (INHALATION) at 08:12

## 2025-04-26 RX ADMIN — IPRATROPIUM BROMIDE AND ALBUTEROL SULFATE 1 DOSE: .5; 2.5 SOLUTION RESPIRATORY (INHALATION) at 15:40

## 2025-04-26 RX ADMIN — LEVOFLOXACIN 500 MG: 500 TABLET, FILM COATED ORAL at 14:17

## 2025-04-26 RX ADMIN — IPRATROPIUM BROMIDE AND ALBUTEROL SULFATE 1 DOSE: .5; 2.5 SOLUTION RESPIRATORY (INHALATION) at 21:07

## 2025-04-26 RX ADMIN — IPRATROPIUM BROMIDE AND ALBUTEROL SULFATE 1 DOSE: .5; 2.5 SOLUTION RESPIRATORY (INHALATION) at 00:41

## 2025-04-26 RX ADMIN — GUAIFENESIN 200 MG: 100 SOLUTION ORAL at 17:55

## 2025-04-26 RX ADMIN — SODIUM CHLORIDE, PRESERVATIVE FREE 10 ML: 5 INJECTION INTRAVENOUS at 08:38

## 2025-04-26 RX ADMIN — GUAIFENESIN 600 MG: 600 TABLET ORAL at 08:36

## 2025-04-26 RX ADMIN — ARFORMOTEROL TARTRATE 15 MCG: 15 SOLUTION RESPIRATORY (INHALATION) at 21:07

## 2025-04-26 RX ADMIN — BUDESONIDE 1000 MCG: 0.5 SUSPENSION RESPIRATORY (INHALATION) at 08:12

## 2025-04-26 RX ADMIN — PREDNISONE 40 MG: 20 TABLET ORAL at 08:37

## 2025-04-26 RX ADMIN — METOPROLOL SUCCINATE 12.5 MG: 25 TABLET, EXTENDED RELEASE ORAL at 08:36

## 2025-04-26 RX ADMIN — TIZANIDINE 2 MG: 4 TABLET ORAL at 20:58

## 2025-04-26 NOTE — CARE COORDINATION
Case Management Assessment            Discharge Note                    Date / Time of Note: 4/26/2025 1:32 PM                  Discharge Note Completed by: Miky Lviingston RN    Patient Name: Charlene Nazario   YOB: 1950  Diagnosis: COPD exacerbation (HCC) [J44.1]   Date / Time: 4/19/2025  6:48 AM    Current PCP: Gerry Foley DO  Clinic patient: No    Hospitalization in the last 30 days: Yes  Readmission Assessment  Number of Days since last admission?: 8-30 days  Previous Disposition: Home with Family  Who is being Interviewed: Patient  What was the patient's/caregiver's perception as to why they think they needed to return back to the hospital?: Other (Comment) (COnt SOB  nebulizer not helping)  Did you visit your Primary Care Physician after you left the hospital, before you returned this time?: No  Why weren't you able to visit your PCP?: Did not have an appointment  Did you see a specialist, such as Cardiac, Pulmonary, Orthopedic Physician, etc. after you left the hospital?: Other (Comment) (Pulm / Cards)  Who advised the patient to return to the hospital?: Self-referral  Does the patient report anything that got in the way of taking their medications?: No  In our efforts to provide the best possible care to you and others like you, can you think of anything that we could have done to help you after you left the hospital the first time, so that you might not have needed to return so soon?: Teach back instructions regarding management of illness, Discharge instructions that are concise, clear, and non contradictory, Teaching during hospitalization regarding your illness    Advance Directives:  Code Status: Full Code  [place] DNR form completed and on chart: Not Indicated    Financial:  Payor: MEDICARE / Plan: MEDICARE PART A AND B / Product Type: *No Product type* /      Pharmacy:    [id number] [address] - P [phone] - F [phone]  [address]

## 2025-04-26 NOTE — PROGRESS NOTES
04/26/25 1240   Resting (Room Air)   SpO2 91   HR 94   During Walk (Room Air)   SpO2 89      Walk/Assistance Device Ambulation   Rate of Dyspnea 1   Symptoms Shortness of breath   During Walk (On O2)   SpO2 92      O2 Device Nasal cannula   O2 Flow Rate (l/min) 1 l/min   Need Additional O2 Flow Rate Rows No   Walk/Assistance Device Ambulation   Rate of Dyspnea 1   Symptoms Shortness of breath   After Walk   SpO2 93      O2 Device Nasal cannula   O2 Flow Rate (l/min) 2 l/min   Rate of Dyspnea 1   Does the Patient Qualify for Home O2 Yes   Liter Flow at Rest 0   Liter Flow on Exertion 1   Does the Patient Need Portable Oxygen Tanks Yes

## 2025-04-26 NOTE — DISCHARGE SUMMARY
V2.0  Discharge Summary    Name:  Charlene Nazario /Age/Sex: 1950 (75 y.o. female)   Admit Date: 2025  Discharge Date: 25    MRN & CSN:  [id number] & [id number] Encounter Date and Time 25 11:18 AM EDT    Attending:  Anastasiia Ugalde MD Discharging Provider: Anastasiia Ugalde MD       Hospital Course:     Brief HPI: 75 y.o. female with significant past medical history of COPD, HTN, and HFpEF presents with complaints of shortness of breath. Symptoms started yesterday. Has wheezing and cough but no increased sputum production. Denies associated chest pain, extremity edema, fevers, hemoptysis.      In the ED mildly hypertensive with other vitals stable. Diffuse wheezing and rhonchi on lung exam. Blood gas with hypercarbia and mild respiratory acidosis. Chest x-ray shows no infiltrates and her EKG is similar to prior normal. Started on treatment for COPD exacerbation and admitted for further care.    Brief Problem Based Course:   COPD exacerbation, chronic hypoxic respiratory failure  HFpEF  Hypertension    Plan:  *Chest x-ray was negative  Patient treated with oxygen supplementation, wean as tolerated  Reports being on 2 L oxygen via nasal cannula at baseline.  Patient had a prolonged course of hospitalization due to ongoing wheezing despite being on scheduled bronchodilators, anticholinergics and ICS  IV steroids switched to p.o. prednisone, completed 5 days  Was seen by pulmonary who started the patient on 5 days of Levaquin  Patient currently on Trelegy at home and reports 26 more puffs available  Per documentation patient's insurance will no longer cover Trelegy and will need to switch to combination therapy  Discussed with patient and  at bedside, they would like to discuss this with (Dr. Nazario) patient's pulmonologist after discharge at an appointment so that they can either get prior authorization or switch to a suitable alternative  Patient would also like to get a portable oxygen

## 2025-04-26 NOTE — CARE COORDINATION
ADDENDUM:  4:56 PM    All files uploaded to the portal.     Electronically signed by MANDIE Lee, MANDEEP, SHEMAR on 4/26/2025 at 4:56 PM    4:32 PM    Fax will not come through from [facility]. [employer] LVM for [facility] regarding DC appeal key code.       Electronically signed by MANDIE Lee LSW, SHEMAR on 4/26/2025 at 4:32 PM      3:47 PM    [employer] delivered O2 tank to bedside. Pt doesnt feel comfortable to discharge. SW spoke to MD he is still in agreement with DC.   Patient has chosen to appeal discharge. Detailed Notice of Discharge delivered to patient and reviewed with patient. Patient aware they must initiate appeal process with [facility], number provided for contact.         [facility] Appeal Case #: [id number]         MANDIE Lee, MANDEEP  [facility]  Case Management Department  Ph:   [phone]

## 2025-04-26 NOTE — CARE COORDINATION
1:56 PM  On call tech will deliver a portable tank to patient at bedside prior to dc.     Electronically signed by Miky Livingston RN, CM on 4/26/2025 at 1:57 PM.  Phone: [phone]  Fax: [phone]      1:52 PM  Called and spoke with Inge at the on call [employer] rep for portable concentrator.     Electronically signed by Miky Livingston RN, CM on 4/26/2025 at 1:53 PM.  Phone: [phone]  Fax: [phone]         1:44 PM  LVM for Collette at [facility] for portable tank    Electronically signed by Miky Livingston RN, CM on 4/26/2025 at 1:44 PM.  Phone: [phone]  Fax: [phone]

## 2025-04-26 NOTE — RT PROTOCOL NOTE
RT Nebulizer Bronchodilator Protocol Note    There is a bronchodilator order in the chart from a provider indicating to follow the RT Bronchodilator Protocol and there is an “Initiate RT Bronchodilator Protocol” order as well (see protocol at bottom of note).    CXR Findings:  No results found.    The findings from the last RT Protocol Assessment were as follows:  Smoking: Chronic pulmonary disease  Respiratory Pattern: Mild dyspnea at rest, irregular pattern, or RR 21-25 bpm  Breath Sounds: Intermittent or unilateral wheezes  Cough: Strong, spontaneous, non-productive  Indication for Bronchodilator Therapy: On home bronchodilators  Bronchodilator Assessment Score: 10    Pt will go to  Q4 WA schedule    Aerosolized bronchodilator medication orders have been revised according to the RT Nebulizer Bronchodilator Protocol below.    Respiratory Therapist to perform RT Therapy Protocol Assessment initially then follow the protocol.  Repeat RT Therapy Protocol Assessment PRN for score 0-3 or on second treatment, BID, and PRN for scores above 3.    No Indications - adjust the frequency to every 6 hours PRN wheezing or bronchospasm, if no treatments needed after 48 hours then discontinue using Per Protocol order mode.     If indication present, adjust the RT bronchodilator orders based on the Bronchodilator Assessment Score as indicated below.  If a patient is on this medication at home then do not decrease Frequency below that used at home.    0-3 - enter or revise RT bronchodilator order(s) to equivalent RT Bronchodilator order with Frequency of every 4 hours PRN for wheezing or increased work of breathing using Per Protocol order mode.       4-6 - enter or revise RT Bronchodilator order(s) to two equivalent RT bronchodilator orders with one order with BID Frequency and one order with Frequency of every 4 hours PRN wheezing or increased work of breathing using Per Protocol order mode.         7-10 - enter or revise RT

## 2025-04-27 VITALS
WEIGHT: 110.1 LBS | HEART RATE: 78 BPM | HEIGHT: 61 IN | BODY MASS INDEX: 20.79 KG/M2 | RESPIRATION RATE: 16 BRPM | TEMPERATURE: 98 F | DIASTOLIC BLOOD PRESSURE: 91 MMHG | SYSTOLIC BLOOD PRESSURE: 135 MMHG | OXYGEN SATURATION: 97 %

## 2025-04-27 PROCEDURE — 6370000000 HC RX 637 (ALT 250 FOR IP): Performed by: STUDENT IN AN ORGANIZED HEALTH CARE EDUCATION/TRAINING PROGRAM

## 2025-04-27 PROCEDURE — 6370000000 HC RX 637 (ALT 250 FOR IP)

## 2025-04-27 PROCEDURE — 6370000000 HC RX 637 (ALT 250 FOR IP): Performed by: INTERNAL MEDICINE

## 2025-04-27 PROCEDURE — 2500000003 HC RX 250 WO HCPCS: Performed by: STUDENT IN AN ORGANIZED HEALTH CARE EDUCATION/TRAINING PROGRAM

## 2025-04-27 PROCEDURE — 6360000002 HC RX W HCPCS: Performed by: STUDENT IN AN ORGANIZED HEALTH CARE EDUCATION/TRAINING PROGRAM

## 2025-04-27 PROCEDURE — 2700000000 HC OXYGEN THERAPY PER DAY

## 2025-04-27 PROCEDURE — 94640 AIRWAY INHALATION TREATMENT: CPT

## 2025-04-27 PROCEDURE — 94761 N-INVAS EAR/PLS OXIMETRY MLT: CPT

## 2025-04-27 RX ADMIN — TIOTROPIUM BROMIDE INHALATION SPRAY 2 PUFF: 3.12 SPRAY, METERED RESPIRATORY (INHALATION) at 08:53

## 2025-04-27 RX ADMIN — METOPROLOL SUCCINATE 12.5 MG: 25 TABLET, EXTENDED RELEASE ORAL at 08:55

## 2025-04-27 RX ADMIN — SODIUM CHLORIDE, PRESERVATIVE FREE 10 ML: 5 INJECTION INTRAVENOUS at 08:56

## 2025-04-27 RX ADMIN — MONTELUKAST 10 MG: 10 TABLET, FILM COATED ORAL at 08:55

## 2025-04-27 RX ADMIN — BUDESONIDE 1000 MCG: 0.5 SUSPENSION RESPIRATORY (INHALATION) at 08:53

## 2025-04-27 RX ADMIN — PREDNISONE 40 MG: 20 TABLET ORAL at 08:55

## 2025-04-27 RX ADMIN — IPRATROPIUM BROMIDE AND ALBUTEROL SULFATE 1 DOSE: .5; 2.5 SOLUTION RESPIRATORY (INHALATION) at 08:53

## 2025-04-27 RX ADMIN — ASPIRIN 81 MG: 81 TABLET, CHEWABLE ORAL at 08:55

## 2025-04-27 RX ADMIN — GUAIFENESIN 600 MG: 600 TABLET ORAL at 08:55

## 2025-04-27 RX ADMIN — ARFORMOTEROL TARTRATE 15 MCG: 15 SOLUTION RESPIRATORY (INHALATION) at 08:53

## 2025-04-27 NOTE — PROGRESS NOTES
Patient seen and evaluated at bedside in the presence of her .  Discussed with bedside RN and case management.  Patient already discharged on 4/26/2025 however patient had appealed her discharge on account of not feeling well.  Patient remained hemodynamically stable overnight and did not require any change in medications.  Patient and  now agreeable to discharge home.  Advised follow-up with pulmonary as an outpatient.    Anastasiia Ugalde MD  [facility] Medicine  [facility]

## 2025-04-27 NOTE — PROGRESS NOTES
Patient discharged home with . Discharge instructions and medication instructions were discussed. Patient verbalized understanding. Oxygen tank delivered to bedside. IV removed with no complications noted.

## 2025-04-27 NOTE — PLAN OF CARE
Problem: Respiratory - Adult  Goal: Achieves optimal ventilation and oxygenation  Outcome: Progressing   Pt on one liters NC, sats 93%. Denies, minimal coughing noted  Problem: Cardiovascular - Adult  Goal: Maintains optimal cardiac output and hemodynamic stability  Outcome: Progressing   Denies any chest pain. Vital signs stable, afebrile.  Problem: Safety - Adult  Goal: Free from fall injury  Outcome: Progressing   Remains free from falls

## 2025-04-27 NOTE — CARE COORDINATION
Case Management Assessment            Discharge Note                    Date / Time of Note: 4/27/2025 11:37 AM                  Discharge Note Completed by: MANDIE Lee, MARW    Patient Name: Charlene Nazario   YOB: 1950  Diagnosis: COPD exacerbation (HCC) [J44.1]   Date / Time: 4/19/2025  6:48 AM    Current PCP: Gerry Foley DO  Clinic patient: No    Hospitalization in the last 30 days: Yes  Readmission Assessment  Number of Days since last admission?: 8-30 days  Previous Disposition: Home with Family  Who is being Interviewed: Patient  What was the patient's/caregiver's perception as to why they think they needed to return back to the hospital?: Other (Comment) (COnt SOB  nebulizer not helping)  Did you visit your Primary Care Physician after you left the hospital, before you returned this time?: No  Why weren't you able to visit your PCP?: Did not have an appointment  Did you see a specialist, such as Cardiac, Pulmonary, Orthopedic Physician, etc. after you left the hospital?: Other (Comment) (Pulm / Cards)  Who advised the patient to return to the hospital?: Self-referral  Does the patient report anything that got in the way of taking their medications?: No  In our efforts to provide the best possible care to you and others like you, can you think of anything that we could have done to help you after you left the hospital the first time, so that you might not have needed to return so soon?: Teach back instructions regarding management of illness, Discharge instructions that are concise, clear, and non contradictory, Teaching during hospitalization regarding your illness    Advance Directives:  Code Status: Full Code  [place] DNR form completed and on chart: Not Indicated    Financial:  Payor: MEDICARE / Plan: MEDICARE PART A AND B / Product Type: *No Product type* /      Pharmacy:    [id number] [address] - P [phone] - F  letter #2 on the the appropriate lines. Patient and/or family/POA, provided copy of letter and they are aware that original copy of IMM letter #2 is available prior to discharge from the paper chart on the unit.  Electronic documentation has been entered into epic for IMM letter #2 and original paper copy has been added to the paper chart at the nurses station.         Transportation:  Transportation PLAN for discharge: family   Mode of Transport: Private Car    Home Care:  Home Care ordered at discharge: Yes  Home Care Agency: [facility]  Phone: [phone]  Fax: [phone]  Orders faxed: Yes      Home Oxygen and Respiratory Equipment:  Oxygen needed at discharge?: Yes  Home Oxygen Company: "[employer]  Phone: [phone]  Portable tank available for discharge?: Yes    Additional CM Notes: patient to dc home with a rotech o2 tank, they will bring portable to pt's home tomorrow, [facility]. Family to transport home.  Pt appealed DC yesterday but now would like to DC today prior to appeal determination. Hinn-12 not given. Pt denies any additional needs for DC.     COVID Result:    Lab Results   Component Value Date/Time    COVID19 NOT DETECTED 03/24/2025 06:34 PM    COVID19 NOT DETECTED 01/29/2021 06:17 PM       The Plan for Transition of Care is related to the following treatment goals of COPD exacerbation (HCC) [J44.1]    The Patient and/or patient representative Charlene and her family were provided with a choice of provider and agrees with the discharge plan Yes    Freedom of choice list was provided with basic dialogue that supports the patient's individualized plan of care/goals and shares the quality data associated with the providers. Yes    Care Transitions patient: Yes    Barbie Kyle, MSW, LSW  [facility]  Case Management Department  Ph: [phone]  Fax: [phone]

## 2025-04-28 ENCOUNTER — CARE COORDINATION (OUTPATIENT)
Dept: CASE MANAGEMENT | Age: 75
End: 2025-04-28

## 2025-04-28 DIAGNOSIS — J44.1 COPD EXACERBATION (HCC): Primary | ICD-10-CM

## 2025-04-28 PROCEDURE — 1111F DSCHRG MED/CURRENT MED MERGE: CPT | Performed by: INTERNAL MEDICINE

## 2025-04-28 NOTE — CARE COORDINATION
Home Equipment: Oxygen, Nebulizer  Do you have support at home?: Partner/Spouse/SO, Child, Grandchild  Do you feel like you have everything you need to keep you well at home?: Yes  Are you an active caregiver in your home?: No  Care Transitions Interventions          Follow Up Appointment:   Patient does not have a follow up appointment scheduled at time of call.  Currently there is no HFU appt scheduled with the PCP.   Writer scheduled for 04/30/2025. Charlene states she will provide her own transportation.  Future Appointments         Provider Specialty Dept Phone    4/30/2025 1:30 PM (Arrive by 1:15 PM) Gerry Foley,  Internal Medicine [phone]    5/23/2025 3:30 PM (Arrive by 3:15 PM) Gerry Foley DO Internal Medicine [phone]    8/7/2025 10:15 AM Tru Campo MD Cardiology [phone]            Care Transition Nurse provided contact information.  Plan for follow-up call in 6-10 days based on severity of symptoms and risk factors.  Plan for next call:  SOB - med review - HFU appt results      Ashley Villalobos RN BSN  Care Transition Nurse  [phone]

## 2025-04-29 ENCOUNTER — TELEPHONE (OUTPATIENT)
Dept: PULMONOLOGY | Age: 75
End: 2025-04-29

## 2025-04-29 NOTE — TELEPHONE ENCOUNTER
Carol was recently in [facility] and needs a f/u appt.  AVS states she should be seen for 2 wk f/u which would be 5/11.  You have no appts until Sept.  When and where do you want to see her.  Please Advise.

## 2025-04-29 NOTE — TELEPHONE ENCOUNTER
You're right I do need to get her in, she's had 2 flares in a month.  I also need to get her started on Dupixent, but Niharika says I can't order it in EPIC and need to have her sign consents and what not.  If she's willing to come to [facility], I can have her see one of the NPs and they can coordinate with me.  I'm on an inpatient rotation the next three weeks straight!

## 2025-05-01 ENCOUNTER — TELEPHONE (OUTPATIENT)
Dept: PULMONOLOGY | Age: 75
End: 2025-05-01

## 2025-05-01 NOTE — TELEPHONE ENCOUNTER
[facility] Carol to have portable 02.  Order was put in basket to be signed.  Would you like one of the Drs here to sign it or faxed to you to be signed.

## 2025-05-07 ENCOUNTER — TELEPHONE (OUTPATIENT)
Dept: INTERNAL MEDICINE CLINIC | Age: 75
End: 2025-05-07

## 2025-05-07 ENCOUNTER — CARE COORDINATION (OUTPATIENT)
Dept: CASE MANAGEMENT | Age: 75
End: 2025-05-07

## 2025-05-07 RX ORDER — FUROSEMIDE 20 MG/1
20 TABLET ORAL DAILY
Qty: 3 TABLET | Refills: 0 | Status: SHIPPED | OUTPATIENT
Start: 2025-05-07

## 2025-05-07 NOTE — CARE COORDINATION
Care Transitions Note    Follow Up Call     Patient Current Location:  Home: [address]  [place] [address]    LPN Care Coordinator contacted the patient by telephone. Verified name and  as identifiers.    Additional needs identified to be addressed with provider                    Method of communication with provider: .    Care Summary Note:   Patient reports she is feeling alright. She is checking BP and WT. Asked patient how much she weighs. She stated they have them written down. Call was disconnected. Called patient again call went to voicemail, message left. Tried again, no answer.    Plan of care updates since last contact:    Advance Care Planning:   Does patient have an Advance Directive: .    Medication Review:      Remote Patient Monitoring:  Offered patient enrollment in the Remote Patient Monitoring (RPM) program for in-home monitoring: .    Assessments:  Care Transitions Subsequent and Final Call    Subsequent and Final Calls  Care Transitions Interventions  Other Interventions:              Follow Up Appointment:     Future Appointments         Provider Specialty Dept Phone    2025 11:00 AM (Arrive by 10:45 AM) Gerry Foley DO Internal Medicine [phone]    2025 3:30 PM (Arrive by 3:15 PM) Gerry Foley DO Internal Medicine [phone]    2025 10:15 AM Tru Campo MD Cardiology [phone]             provided contact information.  Plan for follow-up call in 2-5 days based on severity of symptoms and risk factors.  Plan for next call: symptom management-   self management-       Francesca Vallecillo LPN

## 2025-05-07 NOTE — TELEPHONE ENCOUNTER
Hard to know what to do at this moment.  It is not clear why she has swelling.  She can take Lasix 20 mg today only.  We will reevaluate tomorrow.  Please osvaldo up Lasix 20 mg, take 1 tab daily, 3 tablets only

## 2025-05-07 NOTE — TELEPHONE ENCOUNTER
Patient is calling in for  in regards to current sx of ASHWIN leg swelling.Per patient noticed her legs have gotten really swollen since yesterday from the bottom to up to the knee. Per patient isnt taking any heart medication's or anything like that but did get DC from the hospital and is due for a hosp f.u with  tomorrow 5/8/25.     Patient has been elevating legs and styoing off of them as much as possible. Patient will start icing on and off but is wanting to know what she should do in the mean time.    Please advise.

## 2025-05-08 ENCOUNTER — OFFICE VISIT (OUTPATIENT)
Dept: INTERNAL MEDICINE CLINIC | Age: 75
End: 2025-05-08

## 2025-05-08 ENCOUNTER — HOSPITAL ENCOUNTER (OUTPATIENT)
Dept: VASCULAR LAB | Age: 75
Discharge: HOME | End: 2025-05-10
Attending: INTERNAL MEDICINE
Payer: MEDICARE

## 2025-05-08 VITALS
HEART RATE: 98 BPM | BODY MASS INDEX: 21.96 KG/M2 | OXYGEN SATURATION: 96 % | DIASTOLIC BLOOD PRESSURE: 80 MMHG | WEIGHT: 116.2 LBS | SYSTOLIC BLOOD PRESSURE: 122 MMHG

## 2025-05-08 DIAGNOSIS — R60.0 BILATERAL LEG EDEMA: ICD-10-CM

## 2025-05-08 DIAGNOSIS — J96.11 CHRONIC RESPIRATORY FAILURE WITH HYPOXIA (HCC): ICD-10-CM

## 2025-05-08 DIAGNOSIS — J44.9 COPD, MODERATE (HCC): ICD-10-CM

## 2025-05-08 DIAGNOSIS — Z87.891 PERSONAL HISTORY OF TOBACCO USE: ICD-10-CM

## 2025-05-08 DIAGNOSIS — Z09 HOSPITAL DISCHARGE FOLLOW-UP: Primary | ICD-10-CM

## 2025-05-08 DIAGNOSIS — I87.2 CHRONIC VENOUS INSUFFICIENCY: ICD-10-CM

## 2025-05-08 DIAGNOSIS — I50.32 CHRONIC DIASTOLIC CHF (CONGESTIVE HEART FAILURE) (HCC): ICD-10-CM

## 2025-05-08 PROCEDURE — 93970 EXTREMITY STUDY: CPT

## 2025-05-08 NOTE — PATIENT INSTRUCTIONS
To do list:    #1 please schedule your mammogram    #2 please schedule your MRI brain which has been obtained to evaluate cognitive decline    #3  Follow-up with Dr. Nazario regarding getting a portable O2 concentrator         Learning About Lung Cancer Screening  What is screening for lung cancer?     Lung cancer screening is a way to find some lung cancers early, before a person has any symptoms of the cancer.  Lung cancer screening may help those who have the highest risk for lung cancer--people age 50 and older who are or were heavy smokers. For most people, who aren't at increased risk, screening for lung cancer probably isn't helpful.  Screening won't prevent cancer. And it may not find all lung cancers. Lung cancer screening may lower the risk of dying from lung cancer in a small number of people.  How is it done?  Lung cancer screening is done with a low-dose CT (computed tomography) scan. A CT scan uses X-rays, or radiation, to make detailed pictures of your body. Experts recommend that screening be done in medical centers that focus on finding and treating lung cancer.  Who is screening recommended for?  Lung cancer screening is recommended for people age 50 and older who are or were heavy smokers. That means people with a smoking history of at least 20 pack years. A pack year is a way to measure how heavy a smoker you are or were.  To figure out your pack years, multiply how many packs a day on average (assuming 20 cigarettes per pack) you have smoked by how many years you have smoked. For example:  If you smoked 1 pack a day for 20 years, that's 1 times 20. So you have a smoking history of 20 pack years.  If you smoked 2 packs a day for 10 years, that's 2 times 10. So you have a smoking history of 20 pack years.  Experts agree that screening is for people who have a high risk of lung cancer. But experts don't agree on what high risk means. Some say people age 50 or older with at least a 20-pack-year

## 2025-05-08 NOTE — PROGRESS NOTES
[facility]  Internal Medicine  Patient Encounter  Gerry Foley D.O., FACOI      Post-Discharge Transitional Care  Follow Up      Charlene KELLOGG Nazario   YOB: 1950    Date of Office Visit:  5/8/2025  Date of Hospital Admission: 4/19/25  Date of Hospital Discharge: 4/27/25  Risk of hospital readmission (high >=14%. Medium >=10%) :Readmission Risk Score: 13.5      Care management risk score Rising risk (score 2-5) and Complex Care (Scores >=6): No Risk Score On File     Non face to face  following discharge, date last encounter closed (first attempt may have been earlier): 04/28/2025    Call initiated 2 business days of discharge: Yes    ASSESSMENT/PLAN:       1. Hospital discharge follow-up  75-year-old  female recently admitted for exacerbation COPD.  Since arriving home she has been doing much better.  Breathing is easier.  She no longer has any wheezing or cough.  2 or 3 days ago she started to experience increasing lower extremity swelling, left more than the right.  She denies any particular trauma.    - SC DISCHARGE MEDS RECONCILED W/ CURRENT OUTPATIENT MED LIST    2. Bilateral leg edema  Swelling seems to worsen as the day progresses and improves by morning  Suspect most of her swelling is due to venous insufficiency  Rule out acute DVT particularly due to the fact that the swelling seems to be asymmetric  Venous Doppler ordered stat today  Assuming this is negative, will recommend compression stockings in a day or 2 of furosemide.  - Vascular duplex lower extremity venous bilateral; Future    3. Chronic respiratory failure with hypoxia (HCC)  Stable  No sign of increasing respiratory distress  Patient advised to follow-up with pulmonary.  They are due to get a portable O2 concentrator  Continue O2    4. Chronic diastolic CHF (congestive heart failure) (HCC)  Seems to be compensated despite some increased edema in both lower extremities.  No increased orthopnea or increased

## 2025-05-09 ENCOUNTER — CARE COORDINATION (OUTPATIENT)
Dept: CARE COORDINATION | Age: 75
End: 2025-05-09

## 2025-05-09 NOTE — CARE COORDINATION
Care Transitions Note    Follow Up Call     Patient Current Location:  Home: [address]  [place] [address]    Care Transition Nurse contacted the patient by telephone. Verified name and  as identifiers.    Additional needs identified to be addressed with provider   No needs identified                 Method of communication with provider: none.    Care Summary Note: States she's doing fine. She had a venous duplex BLE yesterday. She also started furosemide yesterday. Reports her BLE swelling is already improved and redness is reduced. States her SOB is at baseline. Denies cough, congestion, wheezing, CP, palpitations, or other new/worsening symptoms. Reports BP has been around 130/82. She monitors daily. She does not weigh herself daily. Discussed importance of daily weights and when to call the doctor. Discussed use of RANDALL hose and elevating legs when sitting. Reports taking meds as prescribed and denies questions or concerns at this time.     Plan of care updates since last contact:  Education:    Review of patient management of conditions/medications:         Advance Care Planning:   Does patient have an Advance Directive: reviewed during previous call, see note. .    Medication Review:  Medications changed since last call, reviewed today.     Remote Patient Monitoring:  Offered patient enrollment in the Remote Patient Monitoring (RPM) program for in-home monitoring: deferred.    Assessments:  Care Transitions Subsequent and Final Call    Subsequent and Final Calls  Care Transitions Interventions  Other Interventions:              Follow Up Appointment:   Reviewed upcoming appointment(s).  Future Appointments         Provider Specialty Dept Phone    2025 3:30 PM (Arrive by 3:15 PM) Gerry Foley DO Internal Medicine [phone]    2025 10:15 AM Tru Campo MD Cardiology [phone]            Care Transition Nurse provided contact information.  Plan for follow-up call in 2-5 days

## 2025-05-11 NOTE — PROGRESS NOTES
with other vitals stable. Diffuse wheezing and   rhonchi on lung exam. Blood gas with hypercarbia and mild respiratory   acidosis. COPD exacerbation, chronic hypoxic respiratory failure. Patient   treated with oxygen supplementation, wean as tolerated, Reports being on 2 L   oxygen via nasal cannula at baseline - Discharge Summary by Anastasiia Ugalde MD   at 4/26/2025    PH: 7.3 (4/19), PCO2: 70.5 (4/19) - EPIC Results Review    2 - 4L NC - EPIC Oxygen Therapy    Thank you,  Layla Frye S, CDS  Options provided:  -- Acute on chronic hypercapnic respiratory failure and Chronic hypoxic   respiratory Failure  -- Chronic respiratory failure with hypercapnia and hypoxia  -- Other - I will add my own diagnosis  -- Disagree - Not applicable / Not valid  -- Disagree - Clinically unable to determine / Unknown  -- Refer to Clinical Documentation Reviewer    PROVIDER RESPONSE TEXT:    This patient is in Acute on chronic hypercapnic respiratory failure and   Chronic hypoxic respiratory Failure.    Query created by: Layla Frye on 5/1/2025 4:45 AM      Electronically signed by:  Aftab Laurent MD 5/11/2025 9:36 AM

## 2025-05-12 ENCOUNTER — RESULTS FOLLOW-UP (OUTPATIENT)
Dept: INTERNAL MEDICINE CLINIC | Age: 75
End: 2025-05-12

## 2025-05-13 ENCOUNTER — CARE COORDINATION (OUTPATIENT)
Dept: CASE MANAGEMENT | Age: 75
End: 2025-05-13

## 2025-05-13 NOTE — CARE COORDINATION
Care Transitions Note    Follow Up Call     Attempted to reach patient for transitions of care follow up.  Unable to reach patient.      Outreach Attempts:   HIPAA compliant voicemail left for patient.     Follow Up Appointment:   Future Appointments         Provider Specialty Dept Phone    5/23/2025 3:30 PM (Arrive by 3:15 PM) Gerry Foley DO Internal Medicine [phone]    8/7/2025 10:15 AM Tru Campo MD Cardiology [phone]          Plan for follow-up call in 2-5 days based on severity of symptoms and risk factors.   Plan for next call: symptom management-cardiac issues, respiratory issues, activity tolerance, signs of infection, other  self management-home mgmt of symptoms, other needs, new issues    Bryanna España RN

## 2025-05-15 ENCOUNTER — CARE COORDINATION (OUTPATIENT)
Dept: CASE MANAGEMENT | Age: 75
End: 2025-05-15

## 2025-05-15 NOTE — CARE COORDINATION
Care Transitions Note    Follow Up Call     Patient Current Location:  Home: [address]  [place] [address]    Care Transition Nurse contacted the patient by telephone. Verified name and  as identifiers.    Additional needs identified to be addressed with provider   No needs identified                 Method of communication with provider: none.    Care Summary Note: Carol states she is doing \"well.\"  Carol states she is using her continuous oxygen at 2lpm. She states her most recent O2 sat = 96%.  She states her SOB is at baseline.  Carol states they have not checked her B/P today.  She states she has not needed to use the nebulizer today.  Carol states [facility] is not active - she states they (she and her ) declined the [facility].  She denies any chest pain, fever, or cough at this time.  Carol denies any needs at this time.    Plan of care updates since last contact:  N/A       Advance Care Planning:   Does patient have an Advance Directive: reviewed during previous call, see note. .    Medication Review:  No medications were reviewed today.    Remote Patient Monitoring:  Offered patient enrollment in the Remote Patient Monitoring (RPM) program for in-home monitoring: Yes, but did not enroll at this time: declined to enroll in the program becauseis not interested at this time. .    Assessments:  Care Transitions Subsequent and Final Call    Subsequent and Final Calls  Do you have any ongoing symptoms?: No  Do you have any questions related to your medications?: No  Do you currently have any active services?: Yes  Are you currently active with any services?: Home Health  Do you have any needs or concerns that I can assist you with?: No  Identified Barriers: None  Care Transitions Interventions  Other Interventions:              Follow Up Appointment:   Reviewed upcoming appointment(s).  Future Appointments         Provider Specialty Dept Phone    2025 10:15 AM (Arrive by 9:45 AM) PATRICIO MRI RM 1

## 2025-05-16 ENCOUNTER — APPOINTMENT (OUTPATIENT)
Dept: CT IMAGING | Age: 75
DRG: 207 | End: 2025-05-16
Payer: MEDICARE

## 2025-05-16 ENCOUNTER — APPOINTMENT (OUTPATIENT)
Dept: GENERAL RADIOLOGY | Age: 75
DRG: 207 | End: 2025-05-16
Payer: MEDICARE

## 2025-05-16 ENCOUNTER — HOSPITAL ENCOUNTER (INPATIENT)
Age: 75
LOS: 19 days | Discharge: NURSING, REHAB OR LONG-TERM CARE | DRG: 207 | End: 2025-06-04
Attending: STUDENT IN AN ORGANIZED HEALTH CARE EDUCATION/TRAINING PROGRAM | Admitting: SURGERY
Payer: MEDICARE

## 2025-05-16 DIAGNOSIS — J44.1 COPD EXACERBATION (HCC): ICD-10-CM

## 2025-05-16 DIAGNOSIS — R06.00 DYSPNEA AND RESPIRATORY ABNORMALITIES: Primary | ICD-10-CM

## 2025-05-16 DIAGNOSIS — R06.89 DYSPNEA AND RESPIRATORY ABNORMALITIES: Primary | ICD-10-CM

## 2025-05-16 DIAGNOSIS — J44.1 COPD WITH ACUTE EXACERBATION (HCC): ICD-10-CM

## 2025-05-16 DIAGNOSIS — J96.11 CHRONIC RESPIRATORY FAILURE WITH HYPOXIA (HCC): ICD-10-CM

## 2025-05-16 LAB
ALBUMIN SERPL-MCNC: 4.1 G/DL (ref 3.4–5)
ALP SERPL-CCNC: 43 U/L (ref 40–129)
ALT SERPL-CCNC: 8 U/L (ref 10–40)
ANION GAP SERPL CALCULATED.3IONS-SCNC: 11 MMOL/L (ref 3–16)
AST SERPL-CCNC: 16 U/L (ref 15–37)
BASE EXCESS BLDA CALC-SCNC: 8 MMOL/L (ref -3–3)
BASE EXCESS BLDV CALC-SCNC: 5 MMOL/L (ref -2–3)
BASOPHILS # BLD: 0.1 K/UL (ref 0–0.2)
BASOPHILS NFR BLD: 1.1 %
BILIRUB DIRECT SERPL-MCNC: 0.2 MG/DL (ref 0–0.3)
BILIRUB INDIRECT SERPL-MCNC: 0.5 MG/DL (ref 0–1)
BILIRUB SERPL-MCNC: 0.7 MG/DL (ref 0–1)
BUN SERPL-MCNC: 9 MG/DL (ref 7–20)
CALCIUM SERPL-MCNC: 9 MG/DL (ref 8.3–10.6)
CHLORIDE SERPL-SCNC: 97 MMOL/L (ref 99–110)
CO2 BLDA-SCNC: 36 MMOL/L
CO2 BLDV-SCNC: 35 MMOL/L
CO2 SERPL-SCNC: 26 MMOL/L (ref 21–32)
COHGB MFR BLDV: 1.3 % (ref 0–1.5)
CREAT SERPL-MCNC: 0.5 MG/DL (ref 0.6–1.2)
DEPRECATED RDW RBC AUTO: 13 % (ref 12.4–15.4)
DO-HGB MFR BLDV: 20.6 %
EKG ATRIAL RATE: 118 BPM
EKG DIAGNOSIS: NORMAL
EKG P AXIS: 78 DEGREES
EKG P-R INTERVAL: 86 MS
EKG Q-T INTERVAL: 364 MS
EKG QRS DURATION: 102 MS
EKG QTC CALCULATION (BAZETT): 510 MS
EKG R AXIS: 56 DEGREES
EKG T AXIS: 109 DEGREES
EKG VENTRICULAR RATE: 118 BPM
EOSINOPHIL # BLD: 1.2 K/UL (ref 0–0.6)
EOSINOPHIL NFR BLD: 16.6 %
FLUAV RNA RESP QL NAA+PROBE: NOT DETECTED
FLUBV RNA RESP QL NAA+PROBE: NOT DETECTED
GFR SERPLBLD CREATININE-BSD FMLA CKD-EPI: >90 ML/MIN/{1.73_M2}
GLUCOSE SERPL-MCNC: 106 MG/DL (ref 70–99)
HCO3 BLDA-SCNC: 34.1 MMOL/L (ref 21–29)
HCO3 BLDV-SCNC: 33.2 MMOL/L (ref 24–28)
HCT VFR BLD AUTO: 40 % (ref 36–48)
HGB BLD-MCNC: 13.6 G/DL (ref 12–16)
LYMPHOCYTES # BLD: 1.7 K/UL (ref 1–5.1)
LYMPHOCYTES NFR BLD: 23.9 %
MAGNESIUM SERPL-MCNC: 1.75 MG/DL (ref 1.8–2.4)
MCH RBC QN AUTO: 32 PG (ref 26–34)
MCHC RBC AUTO-ENTMCNC: 34 G/DL (ref 31–36)
MCV RBC AUTO: 94.3 FL (ref 80–100)
METHGB MFR BLDV: 0.1 % (ref 0–1.5)
MONOCYTES # BLD: 0.5 K/UL (ref 0–1.3)
MONOCYTES NFR BLD: 6.5 %
NEUTROPHILS # BLD: 3.8 K/UL (ref 1.7–7.7)
NEUTROPHILS NFR BLD: 51.9 %
NT-PROBNP SERPL-MCNC: 173 PG/ML (ref 0–124)
OSMOLALITY SERPL: 285 MOSM/KG (ref 278–305)
PCO2 BLDA: 64.3 MM HG (ref 35–45)
PCO2 BLDV: 64.6 MMHG (ref 41–51)
PERFORMED ON: ABNORMAL
PH BLDA: 7.33 [PH] (ref 7.35–7.45)
PH BLDV: 7.32 [PH] (ref 7.35–7.45)
PLATELET # BLD AUTO: 267 K/UL (ref 135–450)
PMV BLD AUTO: 7.8 FL (ref 5–10.5)
PO2 BLDA: 134.2 MM HG (ref 75–108)
PO2 BLDV: 48 MMHG (ref 25–40)
POC SAMPLE TYPE: ABNORMAL
POTASSIUM SERPL-SCNC: 4 MMOL/L (ref 3.5–5.1)
PROCALCITONIN SERPL IA-MCNC: 0.03 NG/ML (ref 0–0.15)
PROT SERPL-MCNC: 6.5 G/DL (ref 6.4–8.2)
RBC # BLD AUTO: 4.24 M/UL (ref 4–5.2)
SAO2 % BLDA: 99 % (ref 93–100)
SAO2 % BLDV: 79 %
SARS-COV-2 RNA RESP QL NAA+PROBE: NOT DETECTED
SODIUM SERPL-SCNC: 134 MMOL/L (ref 136–145)
SODIUM UR-SCNC: 50 MMOL/L
TROPONIN, HIGH SENSITIVITY: 19 NG/L (ref 0–14)
TROPONIN, HIGH SENSITIVITY: 23 NG/L (ref 0–14)
TSH SERPL DL<=0.005 MIU/L-ACNC: 0.65 UIU/ML (ref 0.27–4.2)
WBC # BLD AUTO: 7.3 K/UL (ref 4–11)

## 2025-05-16 PROCEDURE — 94761 N-INVAS EAR/PLS OXIMETRY MLT: CPT

## 2025-05-16 PROCEDURE — 84300 ASSAY OF URINE SODIUM: CPT

## 2025-05-16 PROCEDURE — 6360000004 HC RX CONTRAST MEDICATION

## 2025-05-16 PROCEDURE — 2000000000 HC ICU R&B

## 2025-05-16 PROCEDURE — 6360000002 HC RX W HCPCS: Performed by: STUDENT IN AN ORGANIZED HEALTH CARE EDUCATION/TRAINING PROGRAM

## 2025-05-16 PROCEDURE — 85025 COMPLETE CBC W/AUTO DIFF WBC: CPT

## 2025-05-16 PROCEDURE — 6370000000 HC RX 637 (ALT 250 FOR IP): Performed by: STUDENT IN AN ORGANIZED HEALTH CARE EDUCATION/TRAINING PROGRAM

## 2025-05-16 PROCEDURE — 82803 BLOOD GASES ANY COMBINATION: CPT

## 2025-05-16 PROCEDURE — 96374 THER/PROPH/DIAG INJ IV PUSH: CPT

## 2025-05-16 PROCEDURE — 94640 AIRWAY INHALATION TREATMENT: CPT

## 2025-05-16 PROCEDURE — 84443 ASSAY THYROID STIM HORMONE: CPT

## 2025-05-16 PROCEDURE — 6370000000 HC RX 637 (ALT 250 FOR IP)

## 2025-05-16 PROCEDURE — 2700000000 HC OXYGEN THERAPY PER DAY

## 2025-05-16 PROCEDURE — 93005 ELECTROCARDIOGRAM TRACING: CPT | Performed by: STUDENT IN AN ORGANIZED HEALTH CARE EDUCATION/TRAINING PROGRAM

## 2025-05-16 PROCEDURE — 80076 HEPATIC FUNCTION PANEL: CPT

## 2025-05-16 PROCEDURE — 83735 ASSAY OF MAGNESIUM: CPT

## 2025-05-16 PROCEDURE — 6360000002 HC RX W HCPCS: Performed by: EMERGENCY MEDICINE

## 2025-05-16 PROCEDURE — 6360000002 HC RX W HCPCS

## 2025-05-16 PROCEDURE — 71275 CT ANGIOGRAPHY CHEST: CPT

## 2025-05-16 PROCEDURE — 6370000000 HC RX 637 (ALT 250 FOR IP): Performed by: EMERGENCY MEDICINE

## 2025-05-16 PROCEDURE — 84484 ASSAY OF TROPONIN QUANT: CPT

## 2025-05-16 PROCEDURE — 94660 CPAP INITIATION&MGMT: CPT

## 2025-05-16 PROCEDURE — 2500000003 HC RX 250 WO HCPCS

## 2025-05-16 PROCEDURE — 84145 PROCALCITONIN (PCT): CPT

## 2025-05-16 PROCEDURE — 87636 SARSCOV2 & INF A&B AMP PRB: CPT

## 2025-05-16 PROCEDURE — 80048 BASIC METABOLIC PNL TOTAL CA: CPT

## 2025-05-16 PROCEDURE — 5A09457 ASSISTANCE WITH RESPIRATORY VENTILATION, 24-96 CONSECUTIVE HOURS, CONTINUOUS POSITIVE AIRWAY PRESSURE: ICD-10-PCS | Performed by: EMERGENCY MEDICINE

## 2025-05-16 PROCEDURE — 99285 EMERGENCY DEPT VISIT HI MDM: CPT

## 2025-05-16 PROCEDURE — 71045 X-RAY EXAM CHEST 1 VIEW: CPT

## 2025-05-16 PROCEDURE — 99223 1ST HOSP IP/OBS HIGH 75: CPT | Performed by: INTERNAL MEDICINE

## 2025-05-16 PROCEDURE — 83880 ASSAY OF NATRIURETIC PEPTIDE: CPT

## 2025-05-16 PROCEDURE — 82570 ASSAY OF URINE CREATININE: CPT

## 2025-05-16 PROCEDURE — 83930 ASSAY OF BLOOD OSMOLALITY: CPT

## 2025-05-16 PROCEDURE — 36415 COLL VENOUS BLD VENIPUNCTURE: CPT

## 2025-05-16 PROCEDURE — 99291 CRITICAL CARE FIRST HOUR: CPT

## 2025-05-16 RX ORDER — AZITHROMYCIN 250 MG/1
500 TABLET, FILM COATED ORAL ONCE
Status: COMPLETED | OUTPATIENT
Start: 2025-05-16 | End: 2025-05-16

## 2025-05-16 RX ORDER — PREDNISONE 20 MG/1
40 TABLET ORAL DAILY
Status: DISCONTINUED | OUTPATIENT
Start: 2025-05-18 | End: 2025-05-16

## 2025-05-16 RX ORDER — METOPROLOL SUCCINATE 25 MG/1
12.5 TABLET, EXTENDED RELEASE ORAL 2 TIMES DAILY
Status: DISCONTINUED | OUTPATIENT
Start: 2025-05-16 | End: 2025-05-23

## 2025-05-16 RX ORDER — PREDNISONE 20 MG/1
40 TABLET ORAL DAILY
Status: DISCONTINUED | OUTPATIENT
Start: 2025-05-17 | End: 2025-05-19

## 2025-05-16 RX ORDER — ACETAMINOPHEN 500 MG
500 TABLET ORAL NIGHTLY
COMMUNITY

## 2025-05-16 RX ORDER — ACETAMINOPHEN 325 MG/1
650 TABLET ORAL EVERY 6 HOURS PRN
Status: DISCONTINUED | OUTPATIENT
Start: 2025-05-16 | End: 2025-06-04 | Stop reason: HOSPADM

## 2025-05-16 RX ORDER — LORAZEPAM 2 MG/ML
0.5 INJECTION INTRAMUSCULAR ONCE
Status: COMPLETED | OUTPATIENT
Start: 2025-05-16 | End: 2025-05-16

## 2025-05-16 RX ORDER — LANOLIN ALCOHOL/MO/W.PET/CERES
500 CREAM (GRAM) TOPICAL NIGHTLY
Status: DISCONTINUED | OUTPATIENT
Start: 2025-05-16 | End: 2025-06-04 | Stop reason: HOSPADM

## 2025-05-16 RX ORDER — ONDANSETRON 4 MG/1
4 TABLET, ORALLY DISINTEGRATING ORAL EVERY 8 HOURS PRN
Status: DISCONTINUED | OUTPATIENT
Start: 2025-05-16 | End: 2025-05-16

## 2025-05-16 RX ORDER — BUDESONIDE 0.5 MG/2ML
0.5 INHALANT ORAL
Status: DISCONTINUED | OUTPATIENT
Start: 2025-05-16 | End: 2025-06-04 | Stop reason: HOSPADM

## 2025-05-16 RX ORDER — IPRATROPIUM BROMIDE AND ALBUTEROL SULFATE 2.5; .5 MG/3ML; MG/3ML
1 SOLUTION RESPIRATORY (INHALATION)
Status: DISCONTINUED | OUTPATIENT
Start: 2025-05-16 | End: 2025-05-17

## 2025-05-16 RX ORDER — FUROSEMIDE 20 MG/1
20 TABLET ORAL DAILY
Status: DISCONTINUED | OUTPATIENT
Start: 2025-05-17 | End: 2025-05-16 | Stop reason: ALTCHOICE

## 2025-05-16 RX ORDER — IPRATROPIUM BROMIDE AND ALBUTEROL SULFATE 2.5; .5 MG/3ML; MG/3ML
1 SOLUTION RESPIRATORY (INHALATION) ONCE
Status: COMPLETED | OUTPATIENT
Start: 2025-05-16 | End: 2025-05-16

## 2025-05-16 RX ORDER — ARFORMOTEROL TARTRATE 15 UG/2ML
15 SOLUTION RESPIRATORY (INHALATION)
Status: DISCONTINUED | OUTPATIENT
Start: 2025-05-16 | End: 2025-06-04 | Stop reason: HOSPADM

## 2025-05-16 RX ORDER — ALBUTEROL SULFATE 0.83 MG/ML
2.5 SOLUTION RESPIRATORY (INHALATION) ONCE
Status: COMPLETED | OUTPATIENT
Start: 2025-05-16 | End: 2025-05-16

## 2025-05-16 RX ORDER — ENOXAPARIN SODIUM 100 MG/ML
30 INJECTION SUBCUTANEOUS DAILY
Status: DISCONTINUED | OUTPATIENT
Start: 2025-05-17 | End: 2025-05-22

## 2025-05-16 RX ORDER — SODIUM CHLORIDE 0.9 % (FLUSH) 0.9 %
5-40 SYRINGE (ML) INJECTION PRN
Status: DISCONTINUED | OUTPATIENT
Start: 2025-05-16 | End: 2025-06-04 | Stop reason: HOSPADM

## 2025-05-16 RX ORDER — LISINOPRIL 2.5 MG/1
2.5 TABLET ORAL EVERY EVENING
Status: DISCONTINUED | OUTPATIENT
Start: 2025-05-16 | End: 2025-05-23

## 2025-05-16 RX ORDER — PROCHLORPERAZINE MALEATE 5 MG/1
5 TABLET ORAL EVERY 6 HOURS PRN
Status: DISCONTINUED | OUTPATIENT
Start: 2025-05-16 | End: 2025-06-04 | Stop reason: HOSPADM

## 2025-05-16 RX ORDER — ACETAMINOPHEN 650 MG/1
650 SUPPOSITORY RECTAL EVERY 6 HOURS PRN
Status: DISCONTINUED | OUTPATIENT
Start: 2025-05-16 | End: 2025-06-04 | Stop reason: HOSPADM

## 2025-05-16 RX ORDER — ASPIRIN 81 MG/1
81 TABLET, CHEWABLE ORAL DAILY
Status: DISCONTINUED | OUTPATIENT
Start: 2025-05-17 | End: 2025-06-04 | Stop reason: HOSPADM

## 2025-05-16 RX ORDER — PREDNISONE 20 MG/1
40 TABLET ORAL ONCE
Status: COMPLETED | OUTPATIENT
Start: 2025-05-16 | End: 2025-05-16

## 2025-05-16 RX ORDER — ALBUTEROL SULFATE 0.83 MG/ML
SOLUTION RESPIRATORY (INHALATION)
Status: DISPENSED
Start: 2025-05-16 | End: 2025-05-16

## 2025-05-16 RX ORDER — POLYETHYLENE GLYCOL 3350 17 G/17G
17 POWDER, FOR SOLUTION ORAL DAILY PRN
Status: DISCONTINUED | OUTPATIENT
Start: 2025-05-16 | End: 2025-05-19

## 2025-05-16 RX ORDER — MULTIVITAMIN WITH IRON
500 TABLET ORAL NIGHTLY
COMMUNITY

## 2025-05-16 RX ORDER — SODIUM CHLORIDE 9 MG/ML
INJECTION, SOLUTION INTRAVENOUS PRN
Status: DISCONTINUED | OUTPATIENT
Start: 2025-05-16 | End: 2025-06-04 | Stop reason: HOSPADM

## 2025-05-16 RX ORDER — IOPAMIDOL 755 MG/ML
75 INJECTION, SOLUTION INTRAVASCULAR
Status: COMPLETED | OUTPATIENT
Start: 2025-05-16 | End: 2025-05-16

## 2025-05-16 RX ORDER — ONDANSETRON 2 MG/ML
4 INJECTION INTRAMUSCULAR; INTRAVENOUS EVERY 6 HOURS PRN
Status: DISCONTINUED | OUTPATIENT
Start: 2025-05-16 | End: 2025-05-16

## 2025-05-16 RX ORDER — IPRATROPIUM BROMIDE AND ALBUTEROL SULFATE 2.5; .5 MG/3ML; MG/3ML
1 SOLUTION RESPIRATORY (INHALATION)
Status: DISCONTINUED | OUTPATIENT
Start: 2025-05-16 | End: 2025-05-16 | Stop reason: SDUPTHER

## 2025-05-16 RX ORDER — VITAMIN B COMPLEX
1000 TABLET ORAL NIGHTLY
Status: DISCONTINUED | OUTPATIENT
Start: 2025-05-16 | End: 2025-06-04 | Stop reason: HOSPADM

## 2025-05-16 RX ORDER — AZITHROMYCIN 250 MG/1
500 TABLET, FILM COATED ORAL DAILY
Status: DISCONTINUED | OUTPATIENT
Start: 2025-05-17 | End: 2025-05-18

## 2025-05-16 RX ORDER — SODIUM CHLORIDE 0.9 % (FLUSH) 0.9 %
5-40 SYRINGE (ML) INJECTION EVERY 12 HOURS SCHEDULED
Status: DISCONTINUED | OUTPATIENT
Start: 2025-05-16 | End: 2025-06-04 | Stop reason: HOSPADM

## 2025-05-16 RX ORDER — ALBUTEROL SULFATE 0.83 MG/ML
15 SOLUTION RESPIRATORY (INHALATION) ONCE
Status: COMPLETED | OUTPATIENT
Start: 2025-05-16 | End: 2025-05-16

## 2025-05-16 RX ORDER — MAGNESIUM SULFATE IN WATER 40 MG/ML
2000 INJECTION, SOLUTION INTRAVENOUS ONCE
Status: COMPLETED | OUTPATIENT
Start: 2025-05-16 | End: 2025-05-16

## 2025-05-16 RX ORDER — PROCHLORPERAZINE EDISYLATE 5 MG/ML
5 INJECTION INTRAMUSCULAR; INTRAVENOUS EVERY 6 HOURS PRN
Status: DISCONTINUED | OUTPATIENT
Start: 2025-05-16 | End: 2025-06-04 | Stop reason: HOSPADM

## 2025-05-16 RX ORDER — CALCIUM CARBONATE 500(1250)
500 TABLET ORAL NIGHTLY
COMMUNITY

## 2025-05-16 RX ORDER — LACTOBACILLUS RHAMNOSUS GG 10B CELL
1 CAPSULE ORAL DAILY
COMMUNITY

## 2025-05-16 RX ADMIN — ACETAMINOPHEN 650 MG: 325 TABLET ORAL at 12:14

## 2025-05-16 RX ADMIN — MAGNESIUM SULFATE HEPTAHYDRATE 2000 MG: 40 INJECTION, SOLUTION INTRAVENOUS at 19:52

## 2025-05-16 RX ADMIN — IPRATROPIUM BROMIDE AND ALBUTEROL SULFATE 1 DOSE: 2.5; .5 SOLUTION RESPIRATORY (INHALATION) at 20:37

## 2025-05-16 RX ADMIN — IPRATROPIUM BROMIDE AND ALBUTEROL SULFATE 1 DOSE: 2.5; .5 SOLUTION RESPIRATORY (INHALATION) at 08:15

## 2025-05-16 RX ADMIN — ALBUTEROL SULFATE 15 MG/HR: 2.5 SOLUTION RESPIRATORY (INHALATION) at 08:56

## 2025-05-16 RX ADMIN — ALBUTEROL SULFATE 2.5 MG: 2.5 SOLUTION RESPIRATORY (INHALATION) at 08:15

## 2025-05-16 RX ADMIN — IOPAMIDOL 75 ML: 755 INJECTION, SOLUTION INTRAVENOUS at 14:07

## 2025-05-16 RX ADMIN — AZITHROMYCIN DIHYDRATE 500 MG: 250 TABLET, FILM COATED ORAL at 07:53

## 2025-05-16 RX ADMIN — METOPROLOL SUCCINATE 12.5 MG: 25 TABLET, EXTENDED RELEASE ORAL at 19:54

## 2025-05-16 RX ADMIN — ARFORMOTEROL TARTRATE 15 MCG: 15 SOLUTION RESPIRATORY (INHALATION) at 20:37

## 2025-05-16 RX ADMIN — IPRATROPIUM BROMIDE AND ALBUTEROL SULFATE 1 DOSE: 2.5; .5 SOLUTION RESPIRATORY (INHALATION) at 16:05

## 2025-05-16 RX ADMIN — LISINOPRIL 2.5 MG: 2.5 TABLET ORAL at 17:28

## 2025-05-16 RX ADMIN — ALBUTEROL SULFATE 2.5 MG: 2.5 SOLUTION RESPIRATORY (INHALATION) at 06:47

## 2025-05-16 RX ADMIN — WATER 40 MG: 1 INJECTION INTRAMUSCULAR; INTRAVENOUS; SUBCUTANEOUS at 12:14

## 2025-05-16 RX ADMIN — IPRATROPIUM BROMIDE AND ALBUTEROL SULFATE 1 DOSE: 2.5; .5 SOLUTION RESPIRATORY (INHALATION) at 11:56

## 2025-05-16 RX ADMIN — CYANOCOBALAMIN TAB 1000 MCG 500 MCG: 1000 TAB at 19:55

## 2025-05-16 RX ADMIN — Medication 1000 UNITS: at 19:54

## 2025-05-16 RX ADMIN — LORAZEPAM 0.5 MG: 2 INJECTION INTRAMUSCULAR; INTRAVENOUS at 09:39

## 2025-05-16 RX ADMIN — BUDESONIDE 500 MCG: 0.5 SUSPENSION RESPIRATORY (INHALATION) at 20:37

## 2025-05-16 RX ADMIN — PREDNISONE 40 MG: 20 TABLET ORAL at 07:53

## 2025-05-16 ASSESSMENT — PAIN - FUNCTIONAL ASSESSMENT: PAIN_FUNCTIONAL_ASSESSMENT: NONE - DENIES PAIN

## 2025-05-16 ASSESSMENT — PAIN SCALES - GENERAL
PAINLEVEL_OUTOF10: 0
PAINLEVEL_OUTOF10: 2

## 2025-05-16 ASSESSMENT — PAIN DESCRIPTION - ORIENTATION: ORIENTATION: MID

## 2025-05-16 ASSESSMENT — PAIN DESCRIPTION - DESCRIPTORS: DESCRIPTORS: ACHING

## 2025-05-16 ASSESSMENT — LIFESTYLE VARIABLES
HOW MANY STANDARD DRINKS CONTAINING ALCOHOL DO YOU HAVE ON A TYPICAL DAY: PATIENT DOES NOT DRINK
HOW OFTEN DO YOU HAVE A DRINK CONTAINING ALCOHOL: NEVER

## 2025-05-16 ASSESSMENT — PAIN DESCRIPTION - LOCATION: LOCATION: HEAD

## 2025-05-16 NOTE — ED NOTES
Pt starting to feel anxious with bipap mask, MD notified, new med order given, pt medicated with ativan for comfort     Carey Le RN  05/16/25 0821

## 2025-05-16 NOTE — ED NOTES
Pt reports increased difficulty breathing, states she has been using her personal inhaler, MD notified, resp called to place pt on bipap, pt advised not to user her home medications (inhaler) without talking to doctor. Pts resp improved on bipap.        Carey Le RN  05/16/25 2365

## 2025-05-16 NOTE — PROGRESS NOTES
Clinical Pharmacy Progress Note  Medication History     Admit Date: 10/22/2024    List of current medications patient is taking is complete. Home Medication list in EPIC updated to reflect changes noted below.    Source of information: Rx dispense history, outpatient notes    Changes made to medication list:   Medications removed: (include reason, ex: therapy completed, patient no longer taking, etc.)  Albuterol nebs - Rx from 2018, 2021 - no recent fills  Naproxen - Rx from Feb 2024 x 1 fill (30 tabs)  Duplicate entry for prednisone  Medications added:     Medication doses adjusted:     Other notes:   Per outpatient notes from Dr. Nazario - pt has Anoro at home (rations and shares w/ ) -- does not have inhaled steroid.  Prescribed azithromycin, prednisone & Pulmicort on 10/22/24.    Current Outpatient Medications   Medication Instructions    acetaminophen (TYLENOL) 500 mg, Oral, EVERY 6 HOURS PRN    aspirin 81 mg, Oral, DAILY    azithromycin (ZITHROMAX) 250 MG tablet 500mg on day 1 followed by 250mg on days 2 - 5    budesonide (PULMICORT FLEXHALER) 180 MCG/ACT AEPB inhaler 2 puffs, Inhalation, 2 TIMES DAILY RESP    lisinopril (PRINIVIL;ZESTRIL) 2.5 mg, Oral, EVERY EVENING    melatonin 3 mg, Oral, NIGHTLY PRN    metoprolol succinate (TOPROL XL) 12.5 mg, Oral, 2 TIMES DAILY    montelukast (SINGULAIR) 10 mg, Oral, DAILY    predniSONE (DELTASONE) 10 MG tablet Take 4 tablets by mouth for 5 days.    tiZANidine (ZANAFLEX) 2 mg, Oral, Nightly    umeclidinium-vilanterol (ANORO ELLIPTA) 62.5-25 MCG/ACT inhaler 1 puff, Inhalation, DAILY    VENTOLIN  (90 Base) MCG/ACT inhaler 2 puffs, Inhalation, EVERY 6 HOURS PRN, for wheezing     Please call with questions--  Sharmila EchevarriaD, [facility]  Wireless: [phone]   10/23/2024 12:04 PM       none

## 2025-05-16 NOTE — H&P
AST 16   ALT 8*   BILITOT 0.7   ALKPHOS 43       Troponin:   Recent Labs     05/16/25  0648 05/16/25  0904   TROPHS 23* 19*     Lactic acid: Invalid input(s): \"LACTICACIDSEPSIS\"     Pro-BNP:   Recent Labs     05/16/25  0648   PROBNP 173*     VBGs:   Recent Labs     05/16/25  0648   PHVEN 7.320*   ZCB8LMI 64.6*   PO2VEN 48.0*     MICROBIOLOGY:  Results       Procedure Component Value Units Date/Time    COVID-19 & Influenza Combo [[id number]] Collected: 05/16/25 0708    Order Status: Completed Specimen: Nasopharyngeal Swab Updated: 05/16/25 0749     SARS-CoV-2 RNA, RT PCR NOT DETECTED     Comment: Not Detected results do not preclude SARS-CoV-2 infection and  should not be used as the sole basis for patient management  decisions.  Results must be combined with clinical observations,  patient history, and epidemiological information.  Testing was performed using [employer] LORENA SARS-CoV-2 and Influenza A/B  nucleic acid assay. This test is a multiplex Real-Time Reverse  Transcriptase Polymerase Chain Reaction (RT-PCR)-based in vitro  diagnostic test intended for the qualitative detection of nucleic  acids from SARS-CoV-2, influenza A, and influenza B in nasopharyngeal  and nasal swab specimens for use under the FDA’s Emergency Use  Authorization (EUA) only.    Patient Fact Sheet:  https://www.fda.gov/media/906828/download  Provider Fact Sheet: https://www.fda.gov/media/786470/download  EUA: https://www.fda.gov/media/317047/download  IFU: https://www.fda.gov/media/917699/download    Methodology:  RT-PCR          Influenza A NOT DETECTED     Influenza B NOT DETECTED             RADIOLOGY:  XR CHEST PORTABLE   Final Result   No acute cardiopulmonary abnormality.      Electronically signed by Lawrence Josep          Assessment & Plan   75 y.o. female who presented to the ED on 5/16 with shortness of breath and admitted for acute COPD exacerbation. Patient is currently clinically stable and admitted for further

## 2025-05-16 NOTE — ED PROVIDER NOTES
THE [facility]  EMERGENCY DEPARTMENT ENCOUNTER          ATTENDING PHYSICIAN NOTE       Date of evaluation: 5/16/2025    ADDENDUM:      Care of this patient was assumed from Dr. Caban.  The patient was seen for Shortness of Breath  .  The patient's initial evaluation and plan have been discussed with the prior provider who initially evaluated the patient.  Nursing Notes, Past Medical Hx, Past Surgical Hx, Social Hx, Allergies, and Family Hx were all reviewed.      MEDICAL DECISION MAKING / ASSESSMENT / PLAN     Charlene Nazario is a 75 y.o. female with COPD on 2L home O2. Also has HFpEF. Has leg swelling and recent initiation of duiretic after negative DVT study. Woke up at 0400 SOB. Similar presentations previously.      Pending:   Labs  Reassessment    ED Course as of 05/16/25 1016   Fri May 16, 2025   0714 On my repeat assessment patient remains tachypneic, sitting in tripod position with pursed lip breathing.  Steroids, antibiotics have been ordered by outgoing provider.  Will give additional nebulizer treatments.  EKG obtained and interpreted by me severely limited by artifact but with sinus rhythm, normal axis, tachycardic rate, no obvious ST or T wave segment changes. [MM]   0840 Chest x-ray without acute cardiopulmonary findings.  Labs remarkable for stable elevation of troponin.  Negative COVID flu.  Mild elevation in BNP relative to her baseline.  Trivial hyponatremia, otherwise largely unremarkable BMP.  No leukocytosis or anemia.  VBG with minimal respiratory acidosis, overall similar to prior. [MM]   0844 Patient with some improvement after second round of nebulizer treatments but then worsened again.  On my repeat assessment, she is again sitting upright in the bed tachypneic with pursed lipped breathing.  She is still able to speak in short phrases and is mentating normally.  Will initiate BiPAP and continuous albuterol. [MM]   1014 Patient improved following initiation of BiPAP and continuous 
  THE [facility]  EMERGENCY DEPARTMENT ENCOUNTER          ATTENDING PHYSICIAN NOTE       Date of evaluation: 5/16/2025    Chief Complaint     Shortness of Breath      History of Present Illness     Charlene Nazario is a 75 y.o. female who presents with shortness of breath    Patient and recently had some bilateral lower extremity edema that she was started on a diuretic and had a vascular imaging study from her PCP for (exam date 5/8/25, \"No evidence of deep or superficial venous thrombosis in the bilateral lower extremities.\")  The bilateral lower extremity edema had improved somewhat    This morning she was awoken from sleep around 2 and half hours prior to my evaluation with acute onset dyspnea.  The shortness of breath does get worse when she lays flat.  She tried inhaler at home without help. Squad gave 2 nebs (albuterol, duoneb) en route. She has not had recent increased mucus production. She has not had any significant cough.    She does have history of COPD and is on 2 L nasal cannula at baseline.  She follows with Dr. Nazario of pulmonology and is on several controller inhalers.    Her last COPD exacerbation admission was in April, and was a week until April 26.  She completed course of steroids and Levaquin at that time      PMHx: COPD, HFpEF, and HTN  SH: , former smoker (quit >15 years ago)      ASSESSMENT / PLAN  (MEDICAL DECISION MAKING)     INITIAL VITALS: BP: (!) 149/85,  , Pulse: 62, Respirations: 22, SpO2: 100 %      Charlene Nazario is a 75 y.o. female with shortness of breath.    Differential at presentation would include COPD exacerbation vs heart failure exacerbation vs viral URI. Could consider bacterial pneumonia, but lower suspicion given no fever or significant cough; will check procal. Lower suspicion for PE given absence of asymmetric leg findings and recent negative DVT ultrasound. Symptoms also do not seem reminiscent of an anginal equivalent, but will check troponin.    CXR 
none

## 2025-05-16 NOTE — RT PROTOCOL NOTE
RT Inhaler-Nebulizer Bronchodilator Protocol Note    There is a bronchodilator order in the chart from a provider indicating to follow the RT Bronchodilator Protocol and there is an “Initiate RT Inhaler-Nebulizer Bronchodilator Protocol” order as well (see protocol at bottom of note).    CXR Findings:  XR CHEST PORTABLE  Result Date: 5/16/2025  No acute cardiopulmonary abnormality. Electronically signed by Lawrence Car      The findings from the last RT Protocol Assessment were as follows:   History Pulmonary Disease: Chronic pulmonary disease  Respiratory Pattern: Mild dyspnea at rest, irregular pattern, or RR 21-25 bpm  Breath Sounds: Slightly diminished and/or crackles  Cough: Strong, productive  Indication for Bronchodilator Therapy: On home bronchodilators  Bronchodilator Assessment Score: 9    Aerosolized bronchodilator medication orders have been revised according to the RT Inhaler-Nebulizer Bronchodilator Protocol below.    Respiratory Therapist to perform RT Therapy Protocol Assessment initially then follow the protocol.  Repeat RT Therapy Protocol Assessment PRN for score 0-3 or on second treatment, BID, and PRN for scores above 3.    No Indications - adjust the frequency to every 6 hours PRN wheezing or bronchospasm, if no treatments needed after 48 hours then discontinue using Per Protocol order mode.     If indication present, adjust the RT bronchodilator orders based on the Bronchodilator Assessment Score as indicated below.  Use Inhaler orders unless patient has one or more of the following: on home nebulizer, not able to hold breath for 10 seconds, is not alert and oriented, cannot activate and use MDI correctly, or respiratory rate 25 breaths per minute or more, then use the equivalent nebulizer order(s) with same Frequency and PRN reasons based on the score.  If a patient is on this medication at home then do not decrease Frequency below that used at home.    0-3 - enter or revise RT bronchodilator

## 2025-05-17 ENCOUNTER — APPOINTMENT (OUTPATIENT)
Dept: GENERAL RADIOLOGY | Age: 75
DRG: 207 | End: 2025-05-17
Payer: MEDICARE

## 2025-05-17 LAB
ALBUMIN SERPL-MCNC: 4 G/DL (ref 3.4–5)
ANION GAP SERPL CALCULATED.3IONS-SCNC: 10 MMOL/L (ref 3–16)
BASE EXCESS BLDA CALC-SCNC: 4.4 MMOL/L (ref -3–3)
BASE EXCESS BLDA CALC-SCNC: 6 MMOL/L (ref -3–3)
BASE EXCESS BLDV CALC-SCNC: 4.3 MMOL/L (ref -2–3)
BASE EXCESS BLDV CALC-SCNC: 5 MMOL/L (ref -3–3)
BASE EXCESS BLDV CALC-SCNC: 7.4 MMOL/L (ref -2–3)
BASE EXCESS BLDV CALC-SCNC: 9 MMOL/L (ref -3–3)
BASOPHILS # BLD: 0 K/UL (ref 0–0.2)
BASOPHILS NFR BLD: 0.5 %
BUN SERPL-MCNC: 9 MG/DL (ref 7–20)
CALCIUM SERPL-MCNC: 8.6 MG/DL (ref 8.3–10.6)
CHLORIDE SERPL-SCNC: 98 MMOL/L (ref 99–110)
CO2 BLDA-SCNC: 36 MMOL/L
CO2 BLDA-SCNC: 37 MMOL/L
CO2 BLDV-SCNC: 35 MMOL/L
CO2 BLDV-SCNC: 38 MMOL/L
CO2 BLDV-SCNC: 38 MMOL/L
CO2 BLDV-SCNC: 39 MMOL/L
CO2 SERPL-SCNC: 29 MMOL/L (ref 21–32)
COHGB MFR BLDA: 0.7 % (ref 0–1.5)
COHGB MFR BLDV: 1 % (ref 0–1.5)
COHGB MFR BLDV: 1.1 % (ref 0–1.5)
CREAT SERPL-MCNC: 0.5 MG/DL (ref 0.6–1.2)
CREAT UR-MCNC: 85.2 MG/DL (ref 28–259)
DEPRECATED RDW RBC AUTO: 12.9 % (ref 12.4–15.4)
DO-HGB MFR BLDV: 48.3 %
DO-HGB MFR BLDV: 51.6 %
EKG ATRIAL RATE: 120 BPM
EKG DIAGNOSIS: NORMAL
EKG P AXIS: 84 DEGREES
EKG P-R INTERVAL: 146 MS
EKG Q-T INTERVAL: 312 MS
EKG QRS DURATION: 68 MS
EKG QTC CALCULATION (BAZETT): 440 MS
EKG R AXIS: 74 DEGREES
EKG T AXIS: 78 DEGREES
EKG VENTRICULAR RATE: 120 BPM
EOSINOPHIL # BLD: 0.1 K/UL (ref 0–0.6)
EOSINOPHIL NFR BLD: 1.5 %
GFR SERPLBLD CREATININE-BSD FMLA CKD-EPI: >90 ML/MIN/{1.73_M2}
GLUCOSE BLD-MCNC: 121 MG/DL (ref 70–99)
GLUCOSE SERPL-MCNC: 90 MG/DL (ref 70–99)
HCO3 BLDA-SCNC: 34 MMOL/L (ref 21–29)
HCO3 BLDA-SCNC: 34.1 MMOL/L (ref 21–29)
HCO3 BLDV-SCNC: 32.3 MMOL/L (ref 23–29)
HCO3 BLDV-SCNC: 35 MMOL/L (ref 24–28)
HCO3 BLDV-SCNC: 36 MMOL/L (ref 24–28)
HCO3 BLDV-SCNC: 36.3 MMOL/L (ref 23–29)
HCT VFR BLD AUTO: 39 % (ref 36–48)
HGB BLD-MCNC: 13.4 G/DL (ref 12–16)
HGB BLDA-MCNC: 13.6 G/DL
LYMPHOCYTES # BLD: 1.5 K/UL (ref 1–5.1)
LYMPHOCYTES NFR BLD: 20 %
MAGNESIUM SERPL-MCNC: 2.39 MG/DL (ref 1.8–2.4)
MCH RBC QN AUTO: 32.7 PG (ref 26–34)
MCHC RBC AUTO-ENTMCNC: 34.4 G/DL (ref 31–36)
MCV RBC AUTO: 95.1 FL (ref 80–100)
METHGB MFR BLDA: 0.2 % (ref 0–1.4)
METHGB MFR BLDV: 0.2 % (ref 0–1.5)
METHGB MFR BLDV: 0.3 % (ref 0–1.5)
MONOCYTES # BLD: 0.9 K/UL (ref 0–1.3)
MONOCYTES NFR BLD: 11.8 %
NEUTROPHILS # BLD: 4.9 K/UL (ref 1.7–7.7)
NEUTROPHILS NFR BLD: 66.2 %
NT-PROBNP SERPL-MCNC: 163 PG/ML (ref 0–124)
NT-PROBNP SERPL-MCNC: 211 PG/ML (ref 0–124)
PCO2 BLDA: 71.3 MMHG (ref 35–45)
PCO2 BLDA: 93.5 MM HG (ref 35–45)
PCO2 BLDV: 69.6 MMHG (ref 41–51)
PCO2 BLDV: 77 MM HG (ref 40–50)
PCO2 BLDV: 83.6 MM HG (ref 40–50)
PCO2 BLDV: 86.4 MMHG (ref 41–51)
PERFORMED ON: ABNORMAL
PH BLDA: 7.17 [PH] (ref 7.35–7.45)
PH BLDA: 7.28 [PH] (ref 7.35–7.45)
PH BLDV: 7.22 [PH] (ref 7.35–7.45)
PH BLDV: 7.23 [PH] (ref 7.35–7.45)
PH BLDV: 7.25 [PH] (ref 7.35–7.45)
PH BLDV: 7.32 [PH] (ref 7.35–7.45)
PHOSPHATE SERPL-MCNC: 3 MG/DL (ref 2.5–4.9)
PLATELET # BLD AUTO: 243 K/UL (ref 135–450)
PMV BLD AUTO: 7.4 FL (ref 5–10.5)
PO2 BLDA: 130 MMHG (ref 75–108)
PO2 BLDA: 49.4 MM HG (ref 75–108)
PO2 BLDV: 30.4 MMHG (ref 25–40)
PO2 BLDV: 37 MM HG
PO2 BLDV: 41 MM HG
PO2 BLDV: <30 MMHG (ref 25–40)
POC SAMPLE TYPE: ABNORMAL
POTASSIUM SERPL-SCNC: 4.2 MMOL/L (ref 3.5–5.1)
RBC # BLD AUTO: 4.1 M/UL (ref 4–5.2)
SAO2 % BLDA: 71 % (ref 93–100)
SAO2 % BLDA: 99 % (ref 93–100)
SAO2 % BLDV: 48 %
SAO2 % BLDV: 51 %
SAO2 % BLDV: 57 %
SAO2 % BLDV: 64 %
SODIUM SERPL-SCNC: 137 MMOL/L (ref 136–145)
TROPONIN, HIGH SENSITIVITY: 23 NG/L (ref 0–14)
WBC # BLD AUTO: 7.3 K/UL (ref 4–11)

## 2025-05-17 PROCEDURE — 6370000000 HC RX 637 (ALT 250 FOR IP)

## 2025-05-17 PROCEDURE — 31500 INSERT EMERGENCY AIRWAY: CPT

## 2025-05-17 PROCEDURE — 6360000002 HC RX W HCPCS

## 2025-05-17 PROCEDURE — 85025 COMPLETE CBC W/AUTO DIFF WBC: CPT

## 2025-05-17 PROCEDURE — 83880 ASSAY OF NATRIURETIC PEPTIDE: CPT

## 2025-05-17 PROCEDURE — 2500000003 HC RX 250 WO HCPCS

## 2025-05-17 PROCEDURE — 82947 ASSAY GLUCOSE BLOOD QUANT: CPT

## 2025-05-17 PROCEDURE — 94761 N-INVAS EAR/PLS OXIMETRY MLT: CPT

## 2025-05-17 PROCEDURE — 06HM33Z INSERTION OF INFUSION DEVICE INTO RIGHT FEMORAL VEIN, PERCUTANEOUS APPROACH: ICD-10-PCS

## 2025-05-17 PROCEDURE — 71045 X-RAY EXAM CHEST 1 VIEW: CPT

## 2025-05-17 PROCEDURE — 84484 ASSAY OF TROPONIN QUANT: CPT

## 2025-05-17 PROCEDURE — 74018 RADEX ABDOMEN 1 VIEW: CPT

## 2025-05-17 PROCEDURE — 2000000000 HC ICU R&B

## 2025-05-17 PROCEDURE — 93005 ELECTROCARDIOGRAM TRACING: CPT

## 2025-05-17 PROCEDURE — 2580000003 HC RX 258

## 2025-05-17 PROCEDURE — 80069 RENAL FUNCTION PANEL: CPT

## 2025-05-17 PROCEDURE — 0BH17EZ INSERTION OF ENDOTRACHEAL AIRWAY INTO TRACHEA, VIA NATURAL OR ARTIFICIAL OPENING: ICD-10-PCS

## 2025-05-17 PROCEDURE — 36600 WITHDRAWAL OF ARTERIAL BLOOD: CPT

## 2025-05-17 PROCEDURE — 94002 VENT MGMT INPAT INIT DAY: CPT

## 2025-05-17 PROCEDURE — 5A1955Z RESPIRATORY VENTILATION, GREATER THAN 96 CONSECUTIVE HOURS: ICD-10-PCS

## 2025-05-17 PROCEDURE — 94640 AIRWAY INHALATION TREATMENT: CPT

## 2025-05-17 PROCEDURE — 99291 CRITICAL CARE FIRST HOUR: CPT | Performed by: INTERNAL MEDICINE

## 2025-05-17 PROCEDURE — 31500 INSERT EMERGENCY AIRWAY: CPT | Performed by: INTERNAL MEDICINE

## 2025-05-17 PROCEDURE — 83735 ASSAY OF MAGNESIUM: CPT

## 2025-05-17 PROCEDURE — 82803 BLOOD GASES ANY COMBINATION: CPT

## 2025-05-17 PROCEDURE — 2700000000 HC OXYGEN THERAPY PER DAY

## 2025-05-17 PROCEDURE — 93010 ELECTROCARDIOGRAM REPORT: CPT | Performed by: INTERNAL MEDICINE

## 2025-05-17 PROCEDURE — 36415 COLL VENOUS BLD VENIPUNCTURE: CPT

## 2025-05-17 RX ORDER — PHENYLEPHRINE HCL IN 0.9% NACL 1 MG/10 ML
100 SYRINGE (ML) INTRAVENOUS PRN
Status: DISCONTINUED | OUTPATIENT
Start: 2025-05-17 | End: 2025-05-22 | Stop reason: ALTCHOICE

## 2025-05-17 RX ORDER — DEXMEDETOMIDINE HYDROCHLORIDE 4 UG/ML
.1-1.5 INJECTION, SOLUTION INTRAVENOUS CONTINUOUS
Status: DISCONTINUED | OUTPATIENT
Start: 2025-05-17 | End: 2025-05-17

## 2025-05-17 RX ORDER — LORAZEPAM 2 MG/ML
1 INJECTION INTRAMUSCULAR ONCE
Status: COMPLETED | OUTPATIENT
Start: 2025-05-17 | End: 2025-05-17

## 2025-05-17 RX ORDER — SODIUM CHLORIDE FOR INHALATION 0.9 %
3 VIAL, NEBULIZER (ML) INHALATION ONCE
Status: DISCONTINUED | OUTPATIENT
Start: 2025-05-17 | End: 2025-05-18

## 2025-05-17 RX ORDER — LEVALBUTEROL 1.25 MG/.5ML
1.25 SOLUTION, CONCENTRATE RESPIRATORY (INHALATION)
Status: DISCONTINUED | OUTPATIENT
Start: 2025-05-17 | End: 2025-05-29

## 2025-05-17 RX ORDER — ROCURONIUM BROMIDE 10 MG/ML
0.6 INJECTION, SOLUTION INTRAVENOUS ONCE
Status: DISCONTINUED | OUTPATIENT
Start: 2025-05-17 | End: 2025-05-18

## 2025-05-17 RX ORDER — LORAZEPAM 2 MG/ML
2 INJECTION INTRAMUSCULAR ONCE
Status: COMPLETED | OUTPATIENT
Start: 2025-05-17 | End: 2025-05-17

## 2025-05-17 RX ORDER — PHENYLEPHRINE HCL IN 0.9% NACL 1 MG/10 ML
SYRINGE (ML) INTRAVENOUS
Status: COMPLETED
Start: 2025-05-17 | End: 2025-05-17

## 2025-05-17 RX ORDER — DEXMEDETOMIDINE HYDROCHLORIDE 4 UG/ML
.1-1.5 INJECTION, SOLUTION INTRAVENOUS CONTINUOUS
Status: DISCONTINUED | OUTPATIENT
Start: 2025-05-17 | End: 2025-05-18

## 2025-05-17 RX ORDER — HYDROXYZINE HYDROCHLORIDE 25 MG/1
25 TABLET, FILM COATED ORAL 3 TIMES DAILY PRN
Status: DISCONTINUED | OUTPATIENT
Start: 2025-05-17 | End: 2025-05-28

## 2025-05-17 RX ORDER — LEVALBUTEROL 1.25 MG/.5ML
1.25 SOLUTION, CONCENTRATE RESPIRATORY (INHALATION) ONCE
Status: COMPLETED | OUTPATIENT
Start: 2025-05-17 | End: 2025-05-17

## 2025-05-17 RX ORDER — LORAZEPAM 0.5 MG/1
0.5 TABLET ORAL EVERY 6 HOURS PRN
Status: DISCONTINUED | OUTPATIENT
Start: 2025-05-17 | End: 2025-05-17

## 2025-05-17 RX ORDER — MORPHINE SULFATE 2 MG/ML
1 INJECTION, SOLUTION INTRAMUSCULAR; INTRAVENOUS ONCE
Status: COMPLETED | OUTPATIENT
Start: 2025-05-17 | End: 2025-05-17

## 2025-05-17 RX ORDER — PHENYLEPHRINE HCL IN 0.9% NACL 1 MG/10 ML
100 SYRINGE (ML) INTRAVENOUS ONCE
Status: COMPLETED | OUTPATIENT
Start: 2025-05-17 | End: 2025-05-17

## 2025-05-17 RX ORDER — BUSPIRONE HYDROCHLORIDE 10 MG/1
5 TABLET ORAL 3 TIMES DAILY
Status: DISCONTINUED | OUTPATIENT
Start: 2025-05-17 | End: 2025-06-04 | Stop reason: HOSPADM

## 2025-05-17 RX ORDER — MORPHINE SULFATE 2 MG/ML
2 INJECTION, SOLUTION INTRAMUSCULAR; INTRAVENOUS ONCE
Refills: 0 | Status: COMPLETED | OUTPATIENT
Start: 2025-05-17 | End: 2025-05-17

## 2025-05-17 RX ORDER — SODIUM CHLORIDE FOR INHALATION 0.9 %
3 VIAL, NEBULIZER (ML) INHALATION EVERY 8 HOURS PRN
Status: DISCONTINUED | OUTPATIENT
Start: 2025-05-17 | End: 2025-05-20

## 2025-05-17 RX ORDER — PROPOFOL 10 MG/ML
INJECTION, EMULSION INTRAVENOUS
Status: DISCONTINUED
Start: 2025-05-17 | End: 2025-05-18

## 2025-05-17 RX ORDER — PROPOFOL 10 MG/ML
5-50 INJECTION, EMULSION INTRAVENOUS CONTINUOUS
Status: DISCONTINUED | OUTPATIENT
Start: 2025-05-17 | End: 2025-05-18

## 2025-05-17 RX ORDER — ROCURONIUM BROMIDE 10 MG/ML
INJECTION, SOLUTION INTRAVENOUS
Status: DISCONTINUED
Start: 2025-05-17 | End: 2025-05-18

## 2025-05-17 RX ORDER — LORAZEPAM 2 MG/ML
0.5 INJECTION INTRAMUSCULAR EVERY 6 HOURS PRN
Status: DISCONTINUED | OUTPATIENT
Start: 2025-05-17 | End: 2025-05-17

## 2025-05-17 RX ORDER — LORAZEPAM 0.5 MG/1
0.5 TABLET ORAL EVERY 4 HOURS PRN
Status: DISCONTINUED | OUTPATIENT
Start: 2025-05-17 | End: 2025-05-17

## 2025-05-17 RX ADMIN — BUDESONIDE 500 MCG: 0.5 SUSPENSION RESPIRATORY (INHALATION) at 08:42

## 2025-05-17 RX ADMIN — LORAZEPAM 2 MG: 2 INJECTION INTRAMUSCULAR; INTRAVENOUS at 17:15

## 2025-05-17 RX ADMIN — LEVALBUTEROL 1.25 MG: 1.25 SOLUTION, CONCENTRATE RESPIRATORY (INHALATION) at 14:15

## 2025-05-17 RX ADMIN — CYANOCOBALAMIN TAB 1000 MCG 500 MCG: 1000 TAB at 21:55

## 2025-05-17 RX ADMIN — Medication 0.1 MG: at 19:15

## 2025-05-17 RX ADMIN — Medication 1000 UNITS: at 21:55

## 2025-05-17 RX ADMIN — BUSPIRONE HYDROCHLORIDE 5 MG: 10 TABLET ORAL at 21:29

## 2025-05-17 RX ADMIN — LORAZEPAM 1 MG: 2 INJECTION INTRAMUSCULAR; INTRAVENOUS at 14:27

## 2025-05-17 RX ADMIN — LEVALBUTEROL 1.25 MG: 1.25 SOLUTION, CONCENTRATE RESPIRATORY (INHALATION) at 12:55

## 2025-05-17 RX ADMIN — MORPHINE SULFATE 2 MG: 2 INJECTION, SOLUTION INTRAMUSCULAR; INTRAVENOUS at 11:15

## 2025-05-17 RX ADMIN — MORPHINE SULFATE 1 MG: 2 INJECTION, SOLUTION INTRAMUSCULAR; INTRAVENOUS at 09:40

## 2025-05-17 RX ADMIN — DEXMEDETOMIDINE 0.2 MCG/KG/HR: 100 INJECTION, SOLUTION INTRAVENOUS at 17:35

## 2025-05-17 RX ADMIN — ARFORMOTEROL TARTRATE 15 MCG: 15 SOLUTION RESPIRATORY (INHALATION) at 08:41

## 2025-05-17 RX ADMIN — Medication 1 MG: at 19:11

## 2025-05-17 RX ADMIN — IPRATROPIUM BROMIDE 0.5 MG: 0.5 SOLUTION RESPIRATORY (INHALATION) at 12:55

## 2025-05-17 RX ADMIN — LORAZEPAM 0.5 MG: 2 INJECTION INTRAMUSCULAR; INTRAVENOUS at 11:40

## 2025-05-17 RX ADMIN — IPRATROPIUM BROMIDE AND ALBUTEROL SULFATE 1 DOSE: 2.5; .5 SOLUTION RESPIRATORY (INHALATION) at 08:41

## 2025-05-17 RX ADMIN — PROPOFOL 20 MCG/KG/MIN: 10 INJECTION, EMULSION INTRAVENOUS at 18:45

## 2025-05-17 RX ADMIN — BUDESONIDE 500 MCG: 0.5 SUSPENSION RESPIRATORY (INHALATION) at 19:44

## 2025-05-17 RX ADMIN — SODIUM CHLORIDE, PRESERVATIVE FREE 10 ML: 5 INJECTION INTRAVENOUS at 20:22

## 2025-05-17 RX ADMIN — PROPOFOL 50 MG: 10 INJECTION, EMULSION INTRAVENOUS at 18:57

## 2025-05-17 RX ADMIN — LEVALBUTEROL 1.25 MG: 1.25 SOLUTION, CONCENTRATE RESPIRATORY (INHALATION) at 16:55

## 2025-05-17 RX ADMIN — IPRATROPIUM BROMIDE 0.5 MG: 0.5 SOLUTION RESPIRATORY (INHALATION) at 19:44

## 2025-05-17 RX ADMIN — ARFORMOTEROL TARTRATE 15 MCG: 15 SOLUTION RESPIRATORY (INHALATION) at 19:44

## 2025-05-17 RX ADMIN — PROPOFOL 50 MG: 10 INJECTION, EMULSION INTRAVENOUS at 18:35

## 2025-05-17 RX ADMIN — DEXMEDETOMIDINE 0.1 MCG/KG/HR: 100 INJECTION, SOLUTION INTRAVENOUS at 20:21

## 2025-05-17 RX ADMIN — LEVALBUTEROL 1.25 MG: 1.25 SOLUTION, CONCENTRATE RESPIRATORY (INHALATION) at 19:44

## 2025-05-17 RX ADMIN — IPRATROPIUM BROMIDE 0.5 MG: 0.5 SOLUTION RESPIRATORY (INHALATION) at 16:55

## 2025-05-17 ASSESSMENT — PULMONARY FUNCTION TESTS
PIF_VALUE: 53
PIF_VALUE: 32
PIF_VALUE: 36

## 2025-05-17 NOTE — PROCEDURES
PROCEDURE NOTE  Date: 5/17/2025   Name: Charlene Nazario  YOB: 1950    Intubation    Date/Time: 5/17/2025 7:00 PM    Performed by: Travis Montenegro DO  Authorized by: Travis Montenegro DO  Consent: The procedure was performed in an emergent situation. Verbal consent obtained.  Consent given by: spouse  Required items: required blood products, implants, devices, and special equipment available  Patient identity confirmed: hospital-assigned identification number  Indications: respiratory failure  Intubation method: fiberoptic oral  Patient status: sedated  Preoxygenation: BVM  Pretreatment medications: none  Sedatives: propofol  Paralytic: none  Tube size: 7.5 mm  Tube type: cuffed  Number of attempts: 1  Cricoid pressure: no  Cords visualized: yes  Post-procedure assessment: chest rise and ETCO2 monitor  Breath sounds: equal  Cuff inflated: yes  ETT to teeth: 21 cm  Tube secured with: ETT nash and bite block  Chest x-ray interpreted by other physician.  Patient tolerance: patient tolerated the procedure well with no immediate complications

## 2025-05-18 ENCOUNTER — APPOINTMENT (OUTPATIENT)
Dept: GENERAL RADIOLOGY | Age: 75
DRG: 207 | End: 2025-05-18
Payer: MEDICARE

## 2025-05-18 LAB
ALBUMIN SERPL-MCNC: 4.1 G/DL (ref 3.4–5)
ANION GAP SERPL CALCULATED.3IONS-SCNC: 8 MMOL/L (ref 3–16)
BASE EXCESS BLDV CALC-SCNC: 0.3 MMOL/L (ref -2–3)
BASE EXCESS BLDV CALC-SCNC: 1 MMOL/L (ref -3–3)
BASE EXCESS BLDV CALC-SCNC: 4.3 MMOL/L (ref -2–3)
BASE EXCESS BLDV CALC-SCNC: 5 MMOL/L (ref -3–3)
BASOPHILS # BLD: 0 K/UL (ref 0–0.2)
BASOPHILS NFR BLD: 0.2 %
BUN SERPL-MCNC: 20 MG/DL (ref 7–20)
CALCIUM SERPL-MCNC: 8.2 MG/DL (ref 8.3–10.6)
CHLORIDE SERPL-SCNC: 99 MMOL/L (ref 99–110)
CO2 BLDV-SCNC: 30 MMOL/L
CO2 BLDV-SCNC: 33 MMOL/L
CO2 BLDV-SCNC: 35 MMOL/L
CO2 BLDV-SCNC: 37 MMOL/L
CO2 SERPL-SCNC: 30 MMOL/L (ref 21–32)
COHGB MFR BLDV: 1.2 % (ref 0–1.5)
COHGB MFR BLDV: 1.2 % (ref 0–1.5)
CREAT SERPL-MCNC: 0.8 MG/DL (ref 0.6–1.2)
DEPRECATED RDW RBC AUTO: 13.4 % (ref 12.4–15.4)
DO-HGB MFR BLDV: 21.7 %
DO-HGB MFR BLDV: 36.4 %
EOSINOPHIL # BLD: 0.1 K/UL (ref 0–0.6)
EOSINOPHIL NFR BLD: 0.9 %
GFR SERPLBLD CREATININE-BSD FMLA CKD-EPI: 78 ML/MIN/{1.73_M2}
GLUCOSE SERPL-MCNC: 105 MG/DL (ref 70–99)
HCO3 BLDV-SCNC: 28.3 MMOL/L (ref 23–29)
HCO3 BLDV-SCNC: 30.2 MMOL/L (ref 24–28)
HCO3 BLDV-SCNC: 32.7 MMOL/L (ref 23–29)
HCO3 BLDV-SCNC: 34.5 MMOL/L (ref 24–28)
HCT VFR BLD AUTO: 38.1 % (ref 36–48)
HGB BLD-MCNC: 12.7 G/DL (ref 12–16)
LACTATE BLDV-SCNC: 1 MMOL/L (ref 0.4–2)
LYMPHOCYTES # BLD: 1.2 K/UL (ref 1–5.1)
LYMPHOCYTES NFR BLD: 9.4 %
MAGNESIUM SERPL-MCNC: 2.18 MG/DL (ref 1.8–2.4)
MCH RBC QN AUTO: 32.1 PG (ref 26–34)
MCHC RBC AUTO-ENTMCNC: 33.2 G/DL (ref 31–36)
MCV RBC AUTO: 96.6 FL (ref 80–100)
METHGB MFR BLDV: 0.2 % (ref 0–1.5)
METHGB MFR BLDV: 0.3 % (ref 0–1.5)
MONOCYTES # BLD: 0.9 K/UL (ref 0–1.3)
MONOCYTES NFR BLD: 6.7 %
NEUTROPHILS # BLD: 10.9 K/UL (ref 1.7–7.7)
NEUTROPHILS NFR BLD: 82.8 %
PCO2 BLDV: 68.2 MM HG (ref 40–50)
PCO2 BLDV: 74.4 MMHG (ref 41–51)
PCO2 BLDV: 75.1 MM HG (ref 40–50)
PCO2 BLDV: 80.1 MMHG (ref 41–51)
PERFORMED ON: ABNORMAL
PERFORMED ON: ABNORMAL
PH BLDV: 7.22 [PH] (ref 7.35–7.45)
PH BLDV: 7.23 [PH] (ref 7.35–7.45)
PH BLDV: 7.24 [PH] (ref 7.35–7.45)
PH BLDV: 7.25 [PH] (ref 7.35–7.45)
PHOSPHATE SERPL-MCNC: 4.3 MG/DL (ref 2.5–4.9)
PLATELET # BLD AUTO: 251 K/UL (ref 135–450)
PMV BLD AUTO: 7.9 FL (ref 5–10.5)
PO2 BLDV: 37.2 MMHG (ref 25–40)
PO2 BLDV: 42 MM HG
PO2 BLDV: 46.6 MMHG (ref 25–40)
PO2 BLDV: 60 MM HG
POC SAMPLE TYPE: ABNORMAL
POC SAMPLE TYPE: ABNORMAL
POTASSIUM SERPL-SCNC: 5.1 MMOL/L (ref 3.5–5.1)
RBC # BLD AUTO: 3.95 M/UL (ref 4–5.2)
REPORT: NORMAL
RESP PATH DNA+RNA PNL L RESP NAA+NON-PRB: NORMAL
SAO2 % BLDV: 63 %
SAO2 % BLDV: 66 %
SAO2 % BLDV: 78 %
SAO2 % BLDV: 85 %
SODIUM SERPL-SCNC: 137 MMOL/L (ref 136–145)
URATE SERPL-MCNC: 4.8 MG/DL (ref 2.6–6)
WBC # BLD AUTO: 13.2 K/UL (ref 4–11)

## 2025-05-18 PROCEDURE — 2700000000 HC OXYGEN THERAPY PER DAY

## 2025-05-18 PROCEDURE — 99291 CRITICAL CARE FIRST HOUR: CPT | Performed by: INTERNAL MEDICINE

## 2025-05-18 PROCEDURE — 83735 ASSAY OF MAGNESIUM: CPT

## 2025-05-18 PROCEDURE — 82803 BLOOD GASES ANY COMBINATION: CPT

## 2025-05-18 PROCEDURE — 2500000003 HC RX 250 WO HCPCS

## 2025-05-18 PROCEDURE — 87070 CULTURE OTHR SPECIMN AEROBIC: CPT

## 2025-05-18 PROCEDURE — 36415 COLL VENOUS BLD VENIPUNCTURE: CPT

## 2025-05-18 PROCEDURE — 6360000002 HC RX W HCPCS

## 2025-05-18 PROCEDURE — 2000000000 HC ICU R&B

## 2025-05-18 PROCEDURE — 84550 ASSAY OF BLOOD/URIC ACID: CPT

## 2025-05-18 PROCEDURE — 85025 COMPLETE CBC W/AUTO DIFF WBC: CPT

## 2025-05-18 PROCEDURE — 87633 RESP VIRUS 12-25 TARGETS: CPT

## 2025-05-18 PROCEDURE — 87205 SMEAR GRAM STAIN: CPT

## 2025-05-18 PROCEDURE — 80069 RENAL FUNCTION PANEL: CPT

## 2025-05-18 PROCEDURE — 2580000003 HC RX 258

## 2025-05-18 PROCEDURE — 94640 AIRWAY INHALATION TREATMENT: CPT

## 2025-05-18 PROCEDURE — 94003 VENT MGMT INPAT SUBQ DAY: CPT

## 2025-05-18 PROCEDURE — 6370000000 HC RX 637 (ALT 250 FOR IP)

## 2025-05-18 PROCEDURE — 51798 US URINE CAPACITY MEASURE: CPT

## 2025-05-18 PROCEDURE — 83605 ASSAY OF LACTIC ACID: CPT

## 2025-05-18 PROCEDURE — 87581 M.PNEUMON DNA AMP PROBE: CPT

## 2025-05-18 PROCEDURE — 94761 N-INVAS EAR/PLS OXIMETRY MLT: CPT

## 2025-05-18 PROCEDURE — 71045 X-RAY EXAM CHEST 1 VIEW: CPT

## 2025-05-18 RX ORDER — PANTOPRAZOLE SODIUM 40 MG/1
40 TABLET, DELAYED RELEASE ORAL
Status: DISCONTINUED | OUTPATIENT
Start: 2025-05-19 | End: 2025-05-18

## 2025-05-18 RX ORDER — PROPOFOL 10 MG/ML
5-50 INJECTION, EMULSION INTRAVENOUS CONTINUOUS
Status: DISCONTINUED | OUTPATIENT
Start: 2025-05-18 | End: 2025-05-21

## 2025-05-18 RX ORDER — NOREPINEPHRINE BITARTRATE 1 MG/ML
INJECTION, SOLUTION INTRAVENOUS
Status: COMPLETED
Start: 2025-05-18 | End: 2025-05-18

## 2025-05-18 RX ORDER — PROPOFOL 10 MG/ML
5-50 INJECTION, EMULSION INTRAVENOUS CONTINUOUS
Status: DISCONTINUED | OUTPATIENT
Start: 2025-05-18 | End: 2025-05-18

## 2025-05-18 RX ORDER — AZITHROMYCIN 250 MG/1
500 TABLET, FILM COATED ORAL DAILY
Status: DISCONTINUED | OUTPATIENT
Start: 2025-05-19 | End: 2025-05-18

## 2025-05-18 RX ORDER — PHENYLEPHRINE HCL IN 0.9% NACL 1 MG/10 ML
100 SYRINGE (ML) INTRAVENOUS PRN
Status: DISCONTINUED | OUTPATIENT
Start: 2025-05-18 | End: 2025-05-18 | Stop reason: SDUPTHER

## 2025-05-18 RX ORDER — SODIUM CHLORIDE, SODIUM LACTATE, POTASSIUM CHLORIDE, AND CALCIUM CHLORIDE .6; .31; .03; .02 G/100ML; G/100ML; G/100ML; G/100ML
1000 INJECTION, SOLUTION INTRAVENOUS ONCE
Status: COMPLETED | OUTPATIENT
Start: 2025-05-18 | End: 2025-05-18

## 2025-05-18 RX ORDER — DEXMEDETOMIDINE HYDROCHLORIDE 4 UG/ML
.1-1.5 INJECTION, SOLUTION INTRAVENOUS CONTINUOUS
Status: DISCONTINUED | OUTPATIENT
Start: 2025-05-18 | End: 2025-05-22 | Stop reason: ALTCHOICE

## 2025-05-18 RX ORDER — DEXMEDETOMIDINE HYDROCHLORIDE 4 UG/ML
.1-1.5 INJECTION, SOLUTION INTRAVENOUS CONTINUOUS
Status: DISCONTINUED | OUTPATIENT
Start: 2025-05-18 | End: 2025-05-18

## 2025-05-18 RX ORDER — 0.9 % SODIUM CHLORIDE 0.9 %
500 INTRAVENOUS SOLUTION INTRAVENOUS ONCE
Status: COMPLETED | OUTPATIENT
Start: 2025-05-18 | End: 2025-05-18

## 2025-05-18 RX ORDER — AZITHROMYCIN 250 MG/1
500 TABLET, FILM COATED ORAL DAILY
Status: DISCONTINUED | OUTPATIENT
Start: 2025-05-19 | End: 2025-05-19

## 2025-05-18 RX ADMIN — PROPOFOL 45 MCG/KG/MIN: 10 INJECTION, EMULSION INTRAVENOUS at 20:57

## 2025-05-18 RX ADMIN — PROPOFOL 30 MCG/KG/MIN: 10 INJECTION, EMULSION INTRAVENOUS at 12:56

## 2025-05-18 RX ADMIN — SODIUM CHLORIDE, SODIUM LACTATE, POTASSIUM CHLORIDE, AND CALCIUM CHLORIDE 1000 ML: .6; .31; .03; .02 INJECTION, SOLUTION INTRAVENOUS at 16:56

## 2025-05-18 RX ADMIN — ARFORMOTEROL TARTRATE 15 MCG: 15 SOLUTION RESPIRATORY (INHALATION) at 20:25

## 2025-05-18 RX ADMIN — SODIUM CHLORIDE 500 ML: 0.9 INJECTION, SOLUTION INTRAVENOUS at 12:08

## 2025-05-18 RX ADMIN — SODIUM CHLORIDE, PRESERVATIVE FREE 10 ML: 5 INJECTION INTRAVENOUS at 21:00

## 2025-05-18 RX ADMIN — BUSPIRONE HYDROCHLORIDE 5 MG: 10 TABLET ORAL at 21:37

## 2025-05-18 RX ADMIN — LEVALBUTEROL 1.25 MG: 1.25 SOLUTION, CONCENTRATE RESPIRATORY (INHALATION) at 08:15

## 2025-05-18 RX ADMIN — LEVALBUTEROL 1.25 MG: 1.25 SOLUTION, CONCENTRATE RESPIRATORY (INHALATION) at 16:06

## 2025-05-18 RX ADMIN — BUDESONIDE 500 MCG: 0.5 SUSPENSION RESPIRATORY (INHALATION) at 08:15

## 2025-05-18 RX ADMIN — Medication 0.6 MCG/KG/HR: at 13:03

## 2025-05-18 RX ADMIN — Medication 0.6 MCG/KG/HR: at 19:13

## 2025-05-18 RX ADMIN — IPRATROPIUM BROMIDE 0.5 MG: 0.5 SOLUTION RESPIRATORY (INHALATION) at 08:15

## 2025-05-18 RX ADMIN — NOREPINEPHRINE BITARTRATE 16 MG: 1 INJECTION INTRAVENOUS at 12:55

## 2025-05-18 RX ADMIN — IPRATROPIUM BROMIDE 0.5 MG: 0.5 SOLUTION RESPIRATORY (INHALATION) at 16:06

## 2025-05-18 RX ADMIN — LEVALBUTEROL 1.25 MG: 1.25 SOLUTION, CONCENTRATE RESPIRATORY (INHALATION) at 20:25

## 2025-05-18 RX ADMIN — Medication 1000 UNITS: at 21:37

## 2025-05-18 RX ADMIN — IPRATROPIUM BROMIDE 0.5 MG: 0.5 SOLUTION RESPIRATORY (INHALATION) at 12:02

## 2025-05-18 RX ADMIN — SODIUM CHLORIDE, PRESERVATIVE FREE 10 ML: 5 INJECTION INTRAVENOUS at 09:10

## 2025-05-18 RX ADMIN — SODIUM CHLORIDE, PRESERVATIVE FREE 40 MG: 5 INJECTION INTRAVENOUS at 11:46

## 2025-05-18 RX ADMIN — PROPOFOL 35 MCG/KG/MIN: 10 INJECTION, EMULSION INTRAVENOUS at 00:39

## 2025-05-18 RX ADMIN — BUSPIRONE HYDROCHLORIDE 5 MG: 10 TABLET ORAL at 13:14

## 2025-05-18 RX ADMIN — IPRATROPIUM BROMIDE 0.5 MG: 0.5 SOLUTION RESPIRATORY (INHALATION) at 20:25

## 2025-05-18 RX ADMIN — LEVALBUTEROL 1.25 MG: 1.25 SOLUTION, CONCENTRATE RESPIRATORY (INHALATION) at 12:03

## 2025-05-18 RX ADMIN — SODIUM CHLORIDE 5 MCG/MIN: 0.9 INJECTION, SOLUTION INTRAVENOUS at 12:57

## 2025-05-18 RX ADMIN — Medication 100 MCG: at 16:45

## 2025-05-18 RX ADMIN — Medication 100 MCG: at 16:46

## 2025-05-18 RX ADMIN — BUDESONIDE 500 MCG: 0.5 SUSPENSION RESPIRATORY (INHALATION) at 20:25

## 2025-05-18 RX ADMIN — CYANOCOBALAMIN TAB 1000 MCG 500 MCG: 1000 TAB at 21:37

## 2025-05-18 RX ADMIN — PROPOFOL 30 MCG/KG/MIN: 10 INJECTION, EMULSION INTRAVENOUS at 11:43

## 2025-05-18 RX ADMIN — ENOXAPARIN SODIUM 30 MG: 100 INJECTION SUBCUTANEOUS at 09:10

## 2025-05-18 RX ADMIN — ARFORMOTEROL TARTRATE 15 MCG: 15 SOLUTION RESPIRATORY (INHALATION) at 08:15

## 2025-05-18 ASSESSMENT — PULMONARY FUNCTION TESTS
PIF_VALUE: 46
PIF_VALUE: 51
PIF_VALUE: 52
PIF_VALUE: 52
PIF_VALUE: 51
PIF_VALUE: 51
PIF_VALUE: 52
PIF_VALUE: 32
PIF_VALUE: 52
PIF_VALUE: 44
PIF_VALUE: 46
PIF_VALUE: 46
PIF_VALUE: 47
PIF_VALUE: 46
PIF_VALUE: 48
PIF_VALUE: 44
PIF_VALUE: 44
PIF_VALUE: 51
PIF_VALUE: 53
PIF_VALUE: 49
PIF_VALUE: 46
PIF_VALUE: 52
PIF_VALUE: 50
PIF_VALUE: 48
PIF_VALUE: 44
PIF_VALUE: 40
PIF_VALUE: 48
PIF_VALUE: 41
PIF_VALUE: 29
PIF_VALUE: 53
PIF_VALUE: 51
PIF_VALUE: 26
PIF_VALUE: 47
PIF_VALUE: 46
PIF_VALUE: 40
PIF_VALUE: 50
PIF_VALUE: 46
PIF_VALUE: 52
PIF_VALUE: 45
PIF_VALUE: 53
PIF_VALUE: 46
PIF_VALUE: 51
PIF_VALUE: 45
PIF_VALUE: 50
PIF_VALUE: 47
PIF_VALUE: 37
PIF_VALUE: 51
PIF_VALUE: 50
PIF_VALUE: 51
PIF_VALUE: 49
PIF_VALUE: 42
PIF_VALUE: 50
PIF_VALUE: 46
PIF_VALUE: 41
PIF_VALUE: 53
PIF_VALUE: 53
PIF_VALUE: 48
PIF_VALUE: 32

## 2025-05-18 ASSESSMENT — PAIN SCALES - GENERAL
PAINLEVEL_OUTOF10: 0

## 2025-05-18 NOTE — SIGNIFICANT EVENT
RN notified this MD that patient continues to be hypotensive with pressor requirements increasing. Went to bedside to assess. Removed patient necklace and gave to spouse, at bedside, for safe keeping in preparation for line placement. Shortly after, patient began coughing and peak pressure alarm on ventilator was sounding. Patient visibly turned blue and began desatting to 70s so RT at bedside to bag mask patient.   16:42 BP 51/35  16:43 levo increased from 7 to 15mcg  16:45 BP 89/61, 200mcg hyun push administered  16:49 staff assist button pressed, and crash cart brought into room  16:50 1L LR bolus administered wide open    Patient BP stabilized and pulse present throughout event. FiO2 increased to 60%. Suspected that patient may have mucus plugged. CXR ordered with no acute changes. Decision made to place femoral central line for access. Spouse notified and updated at bedside.    Holly Pimentel MD, PGY-1  Internal Medicine Resident  5/18/2025

## 2025-05-18 NOTE — PROCEDURES
PROCEDURE NOTE  Date: 5/18/2025   Name: Charlene Nazario  YOB: 1950    CENTRAL LINE    Date/Time: 5/18/2025 5:51 PM    Performed by: Holly Pimentel MD  Authorized by: Holly Pimentel MD  Consent: Verbal consent obtained. Written consent obtained.  Risks and benefits: risks, benefits and alternatives were discussed  Consent given by: spouse  Patient understanding: patient states understanding of the procedure being performed  Patient consent: the patient's understanding of the procedure matches consent given  Procedure consent: procedure consent matches procedure scheduled  Relevant documents: relevant documents present and verified  Test results: test results available and properly labeled  Site marked: the operative site was marked  Imaging studies: imaging studies available  Patient identity confirmed: arm band and hospital-assigned identification number  Time out: Immediately prior to procedure a \"time out\" was called to verify the correct patient, procedure, equipment, support staff and site/side marked as required.  Indications: vascular access  Anesthesia: see MAR for details    Sedation:  Patient sedated: yes  Sedatives: propofol and see MAR for details  Vitals: Vital signs were monitored during sedation.    Preparation: skin prepped with 2% chlorhexidine  Skin prep agent dried: skin prep agent completely dried prior to procedure  Sterile barriers: all five maximum sterile barriers used - cap, mask, sterile gown, sterile gloves, and large sterile sheet  Hand hygiene: hand hygiene performed prior to central venous catheter insertion  Location details: right femoral  Patient position: flat  Catheter type: triple lumen  Catheter size: 7 Fr  Pre-procedure: landmarks identified  Ultrasound guidance: yes  Sterile ultrasound techniques: sterile gel and sterile probe covers were used  Number of attempts: 1  Successful placement: yes  Post-procedure: line sutured and dressing applied  Assessment: blood

## 2025-05-19 ENCOUNTER — APPOINTMENT (OUTPATIENT)
Dept: GENERAL RADIOLOGY | Age: 75
DRG: 207 | End: 2025-05-19
Payer: MEDICARE

## 2025-05-19 LAB
ALBUMIN SERPL-MCNC: 3.7 G/DL (ref 3.4–5)
ALBUMIN SERPL-MCNC: 3.7 G/DL (ref 3.4–5)
ALBUMIN SERPL-MCNC: 3.8 G/DL (ref 3.4–5)
ALBUMIN SERPL-MCNC: 4.1 G/DL (ref 3.4–5)
ANION GAP SERPL CALCULATED.3IONS-SCNC: 10 MMOL/L (ref 3–16)
ANION GAP SERPL CALCULATED.3IONS-SCNC: 8 MMOL/L (ref 3–16)
ANION GAP SERPL CALCULATED.3IONS-SCNC: 8 MMOL/L (ref 3–16)
ANION GAP SERPL CALCULATED.3IONS-SCNC: 9 MMOL/L (ref 3–16)
BASE EXCESS BLDA CALC-SCNC: 3 MMOL/L (ref -3–3)
BASE EXCESS BLDA CALC-SCNC: 3 MMOL/L (ref -3–3)
BASE EXCESS BLDV CALC-SCNC: 1.1 MMOL/L (ref -2–3)
BASOPHILS # BLD: 0.1 K/UL (ref 0–0.2)
BASOPHILS NFR BLD: 0.5 %
BUN SERPL-MCNC: 32 MG/DL (ref 7–20)
BUN SERPL-MCNC: 35 MG/DL (ref 7–20)
BUN SERPL-MCNC: 38 MG/DL (ref 7–20)
BUN SERPL-MCNC: 43 MG/DL (ref 7–20)
CALCIUM SERPL-MCNC: 8.1 MG/DL (ref 8.3–10.6)
CALCIUM SERPL-MCNC: 8.1 MG/DL (ref 8.3–10.6)
CALCIUM SERPL-MCNC: 8.2 MG/DL (ref 8.3–10.6)
CALCIUM SERPL-MCNC: 8.5 MG/DL (ref 8.3–10.6)
CHLORIDE SERPL-SCNC: 100 MMOL/L (ref 99–110)
CHLORIDE SERPL-SCNC: 98 MMOL/L (ref 99–110)
CK SERPL-CCNC: 28 U/L (ref 26–192)
CO2 BLDA-SCNC: 32 MMOL/L
CO2 BLDA-SCNC: 33 MMOL/L
CO2 BLDV-SCNC: 33 MMOL/L
CO2 SERPL-SCNC: 26 MMOL/L (ref 21–32)
CO2 SERPL-SCNC: 27 MMOL/L (ref 21–32)
CO2 SERPL-SCNC: 27 MMOL/L (ref 21–32)
CO2 SERPL-SCNC: 30 MMOL/L (ref 21–32)
COHGB MFR BLDV: 1.2 % (ref 0–1.5)
CREAT SERPL-MCNC: 1 MG/DL (ref 0.6–1.2)
CREAT SERPL-MCNC: 1.1 MG/DL (ref 0.6–1.2)
CREAT SERPL-MCNC: 1.1 MG/DL (ref 0.6–1.2)
CREAT SERPL-MCNC: 1.3 MG/DL (ref 0.6–1.2)
CRP SERPL-MCNC: 71.3 MG/L (ref 0–5.1)
D-DIMER QUANTITATIVE: 1.71 UG/ML FEU (ref 0–0.6)
DEPRECATED RDW RBC AUTO: 13.4 % (ref 12.4–15.4)
DO-HGB MFR BLDV: 32.8 %
EOSINOPHIL # BLD: 0.1 K/UL (ref 0–0.6)
EOSINOPHIL NFR BLD: 1 %
ERYTHROCYTE [SEDIMENTATION RATE] IN BLOOD BY WESTERGREN METHOD: 14 MM/HR (ref 0–30)
GFR SERPLBLD CREATININE-BSD FMLA CKD-EPI: 43 ML/MIN/{1.73_M2}
GFR SERPLBLD CREATININE-BSD FMLA CKD-EPI: 53 ML/MIN/{1.73_M2}
GFR SERPLBLD CREATININE-BSD FMLA CKD-EPI: 53 ML/MIN/{1.73_M2}
GFR SERPLBLD CREATININE-BSD FMLA CKD-EPI: 59 ML/MIN/{1.73_M2}
GLUCOSE SERPL-MCNC: 135 MG/DL (ref 70–99)
GLUCOSE SERPL-MCNC: 144 MG/DL (ref 70–99)
GLUCOSE SERPL-MCNC: 152 MG/DL (ref 70–99)
GLUCOSE SERPL-MCNC: 157 MG/DL (ref 70–99)
HCO3 BLDA-SCNC: 29.6 MMOL/L (ref 21–29)
HCO3 BLDA-SCNC: 30.7 MMOL/L (ref 21–29)
HCO3 BLDV-SCNC: 30.7 MMOL/L (ref 24–28)
HCT VFR BLD AUTO: 40.8 % (ref 36–48)
HGB BLD-MCNC: 13.6 G/DL (ref 12–16)
LYMPHOCYTES # BLD: 0.7 K/UL (ref 1–5.1)
LYMPHOCYTES NFR BLD: 6.2 %
MAGNESIUM SERPL-MCNC: 2.42 MG/DL (ref 1.8–2.4)
MCH RBC QN AUTO: 32.1 PG (ref 26–34)
MCHC RBC AUTO-ENTMCNC: 33.4 G/DL (ref 31–36)
MCV RBC AUTO: 96.1 FL (ref 80–100)
METHGB MFR BLDV: 0.2 % (ref 0–1.5)
MONOCYTES # BLD: 1 K/UL (ref 0–1.3)
MONOCYTES NFR BLD: 8.5 %
NEUTROPHILS # BLD: 9.7 K/UL (ref 1.7–7.7)
NEUTROPHILS NFR BLD: 83.8 %
PCO2 BLDA: 65 MM HG (ref 35–45)
PCO2 BLDA: 74.4 MM HG (ref 35–45)
PCO2 BLDV: 72.7 MMHG (ref 41–51)
PERFORMED ON: ABNORMAL
PERFORMED ON: ABNORMAL
PH BLDA: 7.22 [PH] (ref 7.35–7.45)
PH BLDA: 7.27 [PH] (ref 7.35–7.45)
PH BLDV: 7.23 [PH] (ref 7.35–7.45)
PHOSPHATE SERPL-MCNC: 3.5 MG/DL (ref 2.5–4.9)
PHOSPHATE SERPL-MCNC: 3.6 MG/DL (ref 2.5–4.9)
PHOSPHATE SERPL-MCNC: 4.4 MG/DL (ref 2.5–4.9)
PHOSPHATE SERPL-MCNC: 4.7 MG/DL (ref 2.5–4.9)
PLATELET # BLD AUTO: 246 K/UL (ref 135–450)
PMV BLD AUTO: 7.6 FL (ref 5–10.5)
PO2 BLDA: 142.6 MM HG (ref 75–108)
PO2 BLDA: 170.1 MM HG (ref 75–108)
PO2 BLDV: 38.7 MMHG (ref 25–40)
POC SAMPLE TYPE: ABNORMAL
POC SAMPLE TYPE: ABNORMAL
POTASSIUM SERPL-SCNC: 5.5 MMOL/L (ref 3.5–5.1)
POTASSIUM SERPL-SCNC: 5.5 MMOL/L (ref 3.5–5.1)
POTASSIUM SERPL-SCNC: 5.7 MMOL/L (ref 3.5–5.1)
POTASSIUM SERPL-SCNC: 5.8 MMOL/L (ref 3.5–5.1)
POTASSIUM SERPL-SCNC: 5.9 MMOL/L (ref 3.5–5.1)
PROCALCITONIN SERPL IA-MCNC: 0.09 NG/ML (ref 0–0.15)
RBC # BLD AUTO: 4.24 M/UL (ref 4–5.2)
SAO2 % BLDA: 99 % (ref 93–100)
SAO2 % BLDA: 99 % (ref 93–100)
SAO2 % BLDV: 67 %
SODIUM SERPL-SCNC: 135 MMOL/L (ref 136–145)
SODIUM SERPL-SCNC: 136 MMOL/L (ref 136–145)
TRIGL SERPL-MCNC: 154 MG/DL (ref 0–150)
WBC # BLD AUTO: 11.6 K/UL (ref 4–11)

## 2025-05-19 PROCEDURE — 2500000003 HC RX 250 WO HCPCS

## 2025-05-19 PROCEDURE — 93005 ELECTROCARDIOGRAM TRACING: CPT

## 2025-05-19 PROCEDURE — 99221 1ST HOSP IP/OBS SF/LOW 40: CPT | Performed by: NURSE PRACTITIONER

## 2025-05-19 PROCEDURE — 2700000000 HC OXYGEN THERAPY PER DAY

## 2025-05-19 PROCEDURE — 51798 US URINE CAPACITY MEASURE: CPT

## 2025-05-19 PROCEDURE — 6370000000 HC RX 637 (ALT 250 FOR IP)

## 2025-05-19 PROCEDURE — 85025 COMPLETE CBC W/AUTO DIFF WBC: CPT

## 2025-05-19 PROCEDURE — 87040 BLOOD CULTURE FOR BACTERIA: CPT

## 2025-05-19 PROCEDURE — 6360000002 HC RX W HCPCS

## 2025-05-19 PROCEDURE — 94003 VENT MGMT INPAT SUBQ DAY: CPT

## 2025-05-19 PROCEDURE — 0152U NFCT DS DNA UNTRGT NGNRJ SEQ: CPT

## 2025-05-19 PROCEDURE — 2580000003 HC RX 258

## 2025-05-19 PROCEDURE — 87449 NOS EACH ORGANISM AG IA: CPT

## 2025-05-19 PROCEDURE — 85652 RBC SED RATE AUTOMATED: CPT

## 2025-05-19 PROCEDURE — 86140 C-REACTIVE PROTEIN: CPT

## 2025-05-19 PROCEDURE — 94640 AIRWAY INHALATION TREATMENT: CPT

## 2025-05-19 PROCEDURE — 2000000000 HC ICU R&B

## 2025-05-19 PROCEDURE — 71045 X-RAY EXAM CHEST 1 VIEW: CPT

## 2025-05-19 PROCEDURE — 80069 RENAL FUNCTION PANEL: CPT

## 2025-05-19 PROCEDURE — 84478 ASSAY OF TRIGLYCERIDES: CPT

## 2025-05-19 PROCEDURE — 82550 ASSAY OF CK (CPK): CPT

## 2025-05-19 PROCEDURE — 84145 PROCALCITONIN (PCT): CPT

## 2025-05-19 PROCEDURE — 36415 COLL VENOUS BLD VENIPUNCTURE: CPT

## 2025-05-19 PROCEDURE — 84132 ASSAY OF SERUM POTASSIUM: CPT

## 2025-05-19 PROCEDURE — 51701 INSERT BLADDER CATHETER: CPT

## 2025-05-19 PROCEDURE — 85379 FIBRIN DEGRADATION QUANT: CPT

## 2025-05-19 PROCEDURE — 82803 BLOOD GASES ANY COMBINATION: CPT

## 2025-05-19 PROCEDURE — 94761 N-INVAS EAR/PLS OXIMETRY MLT: CPT

## 2025-05-19 PROCEDURE — 83735 ASSAY OF MAGNESIUM: CPT

## 2025-05-19 RX ORDER — SODIUM CHLORIDE 9 MG/ML
INJECTION, SOLUTION INTRAVENOUS CONTINUOUS
Status: ACTIVE | OUTPATIENT
Start: 2025-05-19 | End: 2025-05-19

## 2025-05-19 RX ORDER — POLYETHYLENE GLYCOL 3350 17 G/17G
17 POWDER, FOR SOLUTION ORAL DAILY
Status: DISCONTINUED | OUTPATIENT
Start: 2025-05-20 | End: 2025-05-20

## 2025-05-19 RX ORDER — SODIUM CHLORIDE, SODIUM LACTATE, POTASSIUM CHLORIDE, CALCIUM CHLORIDE 600; 310; 30; 20 MG/100ML; MG/100ML; MG/100ML; MG/100ML
INJECTION, SOLUTION INTRAVENOUS CONTINUOUS
Status: DISCONTINUED | OUTPATIENT
Start: 2025-05-19 | End: 2025-05-19

## 2025-05-19 RX ORDER — SENNA AND DOCUSATE SODIUM 50; 8.6 MG/1; MG/1
2 TABLET, FILM COATED ORAL DAILY
Status: DISCONTINUED | OUTPATIENT
Start: 2025-05-20 | End: 2025-05-26

## 2025-05-19 RX ADMIN — LEVALBUTEROL 1.25 MG: 1.25 SOLUTION, CONCENTRATE RESPIRATORY (INHALATION) at 11:47

## 2025-05-19 RX ADMIN — SODIUM ZIRCONIUM CYCLOSILICATE 10 G: 5 POWDER, FOR SUSPENSION ORAL at 12:21

## 2025-05-19 RX ADMIN — BUSPIRONE HYDROCHLORIDE 5 MG: 10 TABLET ORAL at 08:45

## 2025-05-19 RX ADMIN — PROPOFOL 45 MCG/KG/MIN: 10 INJECTION, EMULSION INTRAVENOUS at 12:39

## 2025-05-19 RX ADMIN — PROPOFOL 45 MCG/KG/MIN: 10 INJECTION, EMULSION INTRAVENOUS at 03:11

## 2025-05-19 RX ADMIN — ENOXAPARIN SODIUM 30 MG: 100 INJECTION SUBCUTANEOUS at 08:45

## 2025-05-19 RX ADMIN — ACETAMINOPHEN 650 MG: 325 TABLET ORAL at 08:45

## 2025-05-19 RX ADMIN — SODIUM CHLORIDE, PRESERVATIVE FREE 10 ML: 5 INJECTION INTRAVENOUS at 08:46

## 2025-05-19 RX ADMIN — ACETAMINOPHEN 650 MG: 325 TABLET ORAL at 18:04

## 2025-05-19 RX ADMIN — SODIUM ZIRCONIUM CYCLOSILICATE 10 G: 10 POWDER, FOR SUSPENSION ORAL at 22:11

## 2025-05-19 RX ADMIN — WATER 60 MG: 1 INJECTION INTRAMUSCULAR; INTRAVENOUS; SUBCUTANEOUS at 23:42

## 2025-05-19 RX ADMIN — LEVALBUTEROL 1.25 MG: 1.25 SOLUTION, CONCENTRATE RESPIRATORY (INHALATION) at 19:46

## 2025-05-19 RX ADMIN — SODIUM CHLORIDE, PRESERVATIVE FREE 10 ML: 5 INJECTION INTRAVENOUS at 22:11

## 2025-05-19 RX ADMIN — KETAMINE HYDROCHLORIDE 0.2 MG/KG/HR: 50 INJECTION, SOLUTION INTRAMUSCULAR; INTRAVENOUS at 13:10

## 2025-05-19 RX ADMIN — SODIUM CHLORIDE: 0.9 INJECTION, SOLUTION INTRAVENOUS at 09:09

## 2025-05-19 RX ADMIN — BUDESONIDE 500 MCG: 0.5 SUSPENSION RESPIRATORY (INHALATION) at 08:10

## 2025-05-19 RX ADMIN — BUDESONIDE 500 MCG: 0.5 SUSPENSION RESPIRATORY (INHALATION) at 19:46

## 2025-05-19 RX ADMIN — PROPOFOL 45 MCG/KG/MIN: 10 INJECTION, EMULSION INTRAVENOUS at 09:33

## 2025-05-19 RX ADMIN — IPRATROPIUM BROMIDE 0.5 MG: 0.5 SOLUTION RESPIRATORY (INHALATION) at 16:08

## 2025-05-19 RX ADMIN — LEVALBUTEROL 1.25 MG: 1.25 SOLUTION, CONCENTRATE RESPIRATORY (INHALATION) at 08:10

## 2025-05-19 RX ADMIN — IPRATROPIUM BROMIDE 0.5 MG: 0.5 SOLUTION RESPIRATORY (INHALATION) at 19:46

## 2025-05-19 RX ADMIN — SODIUM CHLORIDE, PRESERVATIVE FREE 40 MG: 5 INJECTION INTRAVENOUS at 08:46

## 2025-05-19 RX ADMIN — IPRATROPIUM BROMIDE 0.5 MG: 0.5 SOLUTION RESPIRATORY (INHALATION) at 11:47

## 2025-05-19 RX ADMIN — BUSPIRONE HYDROCHLORIDE 5 MG: 10 TABLET ORAL at 22:11

## 2025-05-19 RX ADMIN — CYANOCOBALAMIN TAB 1000 MCG 500 MCG: 1000 TAB at 22:11

## 2025-05-19 RX ADMIN — BUSPIRONE HYDROCHLORIDE 5 MG: 10 TABLET ORAL at 14:00

## 2025-05-19 RX ADMIN — Medication 1000 UNITS: at 22:11

## 2025-05-19 RX ADMIN — AZITHROMYCIN DIHYDRATE 500 MG: 250 TABLET, FILM COATED ORAL at 08:45

## 2025-05-19 RX ADMIN — IPRATROPIUM BROMIDE 0.5 MG: 0.5 SOLUTION RESPIRATORY (INHALATION) at 08:09

## 2025-05-19 RX ADMIN — PROPOFOL 35 MCG/KG/MIN: 10 INJECTION, EMULSION INTRAVENOUS at 23:19

## 2025-05-19 RX ADMIN — ASPIRIN 81 MG: 81 TABLET, CHEWABLE ORAL at 08:45

## 2025-05-19 RX ADMIN — PREDNISONE 40 MG: 20 TABLET ORAL at 08:45

## 2025-05-19 RX ADMIN — LEVALBUTEROL 1.25 MG: 1.25 SOLUTION, CONCENTRATE RESPIRATORY (INHALATION) at 16:08

## 2025-05-19 RX ADMIN — ARFORMOTEROL TARTRATE 15 MCG: 15 SOLUTION RESPIRATORY (INHALATION) at 19:46

## 2025-05-19 RX ADMIN — ARFORMOTEROL TARTRATE 15 MCG: 15 SOLUTION RESPIRATORY (INHALATION) at 08:09

## 2025-05-19 ASSESSMENT — PULMONARY FUNCTION TESTS
PIF_VALUE: 47
PIF_VALUE: 49
PIF_VALUE: 43
PIF_VALUE: 48
PIF_VALUE: 45
PIF_VALUE: 48
PIF_VALUE: 50
PIF_VALUE: 50
PIF_VALUE: 42
PIF_VALUE: 49
PIF_VALUE: 46
PIF_VALUE: 54
PIF_VALUE: 41
PIF_VALUE: 51
PIF_VALUE: 46
PIF_VALUE: 49
PIF_VALUE: 44
PIF_VALUE: 48
PIF_VALUE: 42
PIF_VALUE: 44
PIF_VALUE: 48
PIF_VALUE: 41
PIF_VALUE: 48
PIF_VALUE: 43
PIF_VALUE: 48
PIF_VALUE: 48
PIF_VALUE: 41
PIF_VALUE: 43
PIF_VALUE: 42
PIF_VALUE: 54
PIF_VALUE: 47
PIF_VALUE: 51
PIF_VALUE: 49
PIF_VALUE: 53
PIF_VALUE: 47
PIF_VALUE: 42
PIF_VALUE: 47
PIF_VALUE: 43
PIF_VALUE: 46
PIF_VALUE: 49
PIF_VALUE: 50
PIF_VALUE: 50
PIF_VALUE: 51
PIF_VALUE: 45

## 2025-05-19 ASSESSMENT — PAIN SCALES - GENERAL
PAINLEVEL_OUTOF10: 0
PAINLEVEL_OUTOF10: 0

## 2025-05-19 NOTE — CARE COORDINATION
Case Management Assessment  Initial Evaluation    Date/Time of Evaluation: 5/19/2025 2:04 PM  Assessment Completed by: Miky Livingston RN    If patient is discharged prior to next notation, then this note serves as note for discharge by case management.    Patient Name: Charlene Nazario                   YOB: 1950  Diagnosis: Dyspnea and respiratory abnormalities [R06.00, R06.89]  COPD exacerbation (HCC) [J44.1]  COPD with acute exacerbation (HCC) [J44.1]                   Date / Time: 5/16/2025  6:39 AM    Patient Admission Status: Inpatient   Readmission Risk (Low < 19, Mod (19-27), High > 27): Readmission Risk Score: 20.1    Current PCP: Gerry Foley, DO  PCP verified by CM? Yes    Chart Reviewed: Yes      History Provided by: Medical Record  Patient Orientation: Sedated (int/sed)    Patient Cognition: Severely Impaired    Hospitalization in the last 30 days (Readmission):  Yes    If yes, Readmission Assessment in CM Navigator will be completed and shown below.  Readmission Assessment  Number of Days since last admission?: 8-30 days  Previous Disposition: Home with Family  Who is being Interviewed: Patient  What was the patient's/caregiver's perception as to why they think they needed to return back to the hospital?: Other (Comment) (new sx)  Did you visit your Primary Care Physician after you left the hospital, before you returned this time?: Yes  Did you see a specialist, such as Cardiac, Pulmonary, Orthopedic Physician, etc. after you left the hospital?: Yes (pulm)  Who advised the patient to return to the hospital?: Self-referral  Does the patient report anything that got in the way of taking their medications?: No  In our efforts to provide the best possible care to you and others like you, can you think of anything that we could have done to help you after you left the hospital the first time, so that you might not have needed to return so soon?: Other (Comment) (na)       [facility]

## 2025-05-20 ENCOUNTER — TELEPHONE (OUTPATIENT)
Dept: INTERNAL MEDICINE CLINIC | Age: 75
End: 2025-05-20

## 2025-05-20 ENCOUNTER — APPOINTMENT (OUTPATIENT)
Dept: GENERAL RADIOLOGY | Age: 75
DRG: 207 | End: 2025-05-20
Payer: MEDICARE

## 2025-05-20 LAB
ALBUMIN SERPL-MCNC: 3.4 G/DL (ref 3.4–5)
ALBUMIN SERPL-MCNC: 3.9 G/DL (ref 3.4–5)
ANION GAP SERPL CALCULATED.3IONS-SCNC: 6 MMOL/L (ref 3–16)
ANION GAP SERPL CALCULATED.3IONS-SCNC: 8 MMOL/L (ref 3–16)
BACTERIA SPEC RESP CULT: NORMAL
BASE EXCESS BLDV CALC-SCNC: -1.2 MMOL/L (ref -2–3)
BASE EXCESS BLDV CALC-SCNC: 1.1 MMOL/L (ref -2–3)
BASE EXCESS BLDV CALC-SCNC: 1.9 MMOL/L (ref -2–3)
BASOPHILS # BLD: 0 K/UL (ref 0–0.2)
BASOPHILS NFR BLD: 0 %
BILIRUB UR QL STRIP.AUTO: NEGATIVE
BUN SERPL-MCNC: 42 MG/DL (ref 7–20)
BUN SERPL-MCNC: 46 MG/DL (ref 7–20)
CALCIUM SERPL-MCNC: 8 MG/DL (ref 8.3–10.6)
CALCIUM SERPL-MCNC: 9 MG/DL (ref 8.3–10.6)
CHLORIDE SERPL-SCNC: 100 MMOL/L (ref 99–110)
CHLORIDE SERPL-SCNC: 104 MMOL/L (ref 99–110)
CLARITY UR: CLEAR
CO2 BLDV-SCNC: 29 MMOL/L
CO2 BLDV-SCNC: 33 MMOL/L
CO2 BLDV-SCNC: 35 MMOL/L
CO2 SERPL-SCNC: 29 MMOL/L (ref 21–32)
CO2 SERPL-SCNC: 30 MMOL/L (ref 21–32)
COHGB MFR BLDV: 0.9 % (ref 0–1.5)
COHGB MFR BLDV: 1 % (ref 0–1.5)
COHGB MFR BLDV: 1 % (ref 0–1.5)
COLOR UR: YELLOW
CREAT SERPL-MCNC: 0.8 MG/DL (ref 0.6–1.2)
CREAT SERPL-MCNC: 1.2 MG/DL (ref 0.6–1.2)
DEPRECATED RDW RBC AUTO: 13.9 % (ref 12.4–15.4)
DO-HGB MFR BLDV: 3.3 %
DO-HGB MFR BLDV: 33.6 %
DO-HGB MFR BLDV: 8 %
EKG ATRIAL RATE: 129 BPM
EKG DIAGNOSIS: NORMAL
EKG P AXIS: 83 DEGREES
EKG P-R INTERVAL: 170 MS
EKG Q-T INTERVAL: 280 MS
EKG QRS DURATION: 68 MS
EKG QTC CALCULATION (BAZETT): 410 MS
EKG R AXIS: 67 DEGREES
EKG T AXIS: 94 DEGREES
EKG VENTRICULAR RATE: 129 BPM
EOSINOPHIL # BLD: 0 K/UL (ref 0–0.6)
EOSINOPHIL NFR BLD: 0 %
EPI CELLS #/AREA URNS HPF: ABNORMAL /HPF (ref 0–5)
GFR SERPLBLD CREATININE-BSD FMLA CKD-EPI: 48 ML/MIN/{1.73_M2}
GFR SERPLBLD CREATININE-BSD FMLA CKD-EPI: 78 ML/MIN/{1.73_M2}
GLUCOSE SERPL-MCNC: 165 MG/DL (ref 70–99)
GLUCOSE SERPL-MCNC: 184 MG/DL (ref 70–99)
GLUCOSE UR STRIP.AUTO-MCNC: NEGATIVE MG/DL
GRAM STN SPEC: NORMAL
HCO3 BLDV-SCNC: 27.1 MMOL/L (ref 24–28)
HCO3 BLDV-SCNC: 31 MMOL/L (ref 24–28)
HCO3 BLDV-SCNC: 32.1 MMOL/L (ref 24–28)
HCT VFR BLD AUTO: 38.2 % (ref 36–48)
HGB BLD-MCNC: 12.8 G/DL (ref 12–16)
HGB UR QL STRIP.AUTO: NEGATIVE
HYALINE CASTS #/AREA URNS LPF: ABNORMAL /LPF (ref 0–2)
KETONES UR STRIP.AUTO-MCNC: NEGATIVE MG/DL
LEGIONELLA AG UR QL: NORMAL
LEUKOCYTE ESTERASE UR QL STRIP.AUTO: NEGATIVE
LYMPHOCYTES # BLD: 0.3 K/UL (ref 1–5.1)
LYMPHOCYTES NFR BLD: 2.7 %
MAGNESIUM SERPL-MCNC: 2.73 MG/DL (ref 1.8–2.4)
MCH RBC QN AUTO: 32.2 PG (ref 26–34)
MCHC RBC AUTO-ENTMCNC: 33.4 G/DL (ref 31–36)
MCV RBC AUTO: 96.6 FL (ref 80–100)
METHGB MFR BLDV: 0.1 % (ref 0–1.5)
METHGB MFR BLDV: 0.1 % (ref 0–1.5)
METHGB MFR BLDV: 0.4 % (ref 0–1.5)
MONOCYTES # BLD: 0.3 K/UL (ref 0–1.3)
MONOCYTES NFR BLD: 3 %
MUCOUS THREADS #/AREA URNS LPF: ABNORMAL /LPF
NEUTROPHILS # BLD: 10.7 K/UL (ref 1.7–7.7)
NEUTROPHILS NFR BLD: 94.3 %
NITRITE UR QL STRIP.AUTO: NEGATIVE
OSMOLALITY UR: 510 MOSM/KG (ref 390–1070)
PCO2 BLDV: 64.4 MMHG (ref 41–51)
PCO2 BLDV: 76.1 MMHG (ref 41–51)
PCO2 BLDV: 80.4 MMHG (ref 41–51)
PH BLDV: 7.21 [PH] (ref 7.35–7.45)
PH BLDV: 7.22 [PH] (ref 7.35–7.45)
PH BLDV: 7.23 [PH] (ref 7.35–7.45)
PH UR STRIP.AUTO: 6 [PH] (ref 5–8)
PHOSPHATE SERPL-MCNC: 2.8 MG/DL (ref 2.5–4.9)
PHOSPHATE SERPL-MCNC: 5.1 MG/DL (ref 2.5–4.9)
PLATELET # BLD AUTO: 235 K/UL (ref 135–450)
PMV BLD AUTO: 8.1 FL (ref 5–10.5)
PO2 BLDV: 35.7 MMHG (ref 25–40)
PO2 BLDV: 67.9 MMHG (ref 25–40)
PO2 BLDV: 89 MMHG (ref 25–40)
POTASSIUM SERPL-SCNC: 4.4 MMOL/L (ref 3.5–5.1)
POTASSIUM SERPL-SCNC: 5.3 MMOL/L (ref 3.5–5.1)
PROT UR STRIP.AUTO-MCNC: ABNORMAL MG/DL
RBC # BLD AUTO: 3.96 M/UL (ref 4–5.2)
RBC #/AREA URNS HPF: ABNORMAL /HPF (ref 0–4)
S PNEUM AG UR QL: ABNORMAL
SAO2 % BLDV: 66 %
SAO2 % BLDV: 92 %
SAO2 % BLDV: 97 %
SODIUM SERPL-SCNC: 138 MMOL/L (ref 136–145)
SODIUM SERPL-SCNC: 139 MMOL/L (ref 136–145)
SODIUM UR-SCNC: <20 MMOL/L
SP GR UR STRIP.AUTO: >=1.03 (ref 1–1.03)
UA COMPLETE W REFLEX CULTURE PNL UR: ABNORMAL
UA DIPSTICK W REFLEX MICRO PNL UR: YES
URN SPEC COLLECT METH UR: ABNORMAL
UROBILINOGEN UR STRIP-ACNC: 0.2 E.U./DL
WBC # BLD AUTO: 11.4 K/UL (ref 4–11)
WBC #/AREA URNS HPF: ABNORMAL /HPF (ref 0–5)

## 2025-05-20 PROCEDURE — 6370000000 HC RX 637 (ALT 250 FOR IP)

## 2025-05-20 PROCEDURE — 82570 ASSAY OF URINE CREATININE: CPT

## 2025-05-20 PROCEDURE — 0202U NFCT DS 22 TRGT SARS-COV-2: CPT

## 2025-05-20 PROCEDURE — 2580000003 HC RX 258

## 2025-05-20 PROCEDURE — 94640 AIRWAY INHALATION TREATMENT: CPT

## 2025-05-20 PROCEDURE — 0152U NFCT DS DNA UNTRGT NGNRJ SEQ: CPT

## 2025-05-20 PROCEDURE — 71045 X-RAY EXAM CHEST 1 VIEW: CPT

## 2025-05-20 PROCEDURE — 6360000002 HC RX W HCPCS

## 2025-05-20 PROCEDURE — 82803 BLOOD GASES ANY COMBINATION: CPT

## 2025-05-20 PROCEDURE — 0B938ZZ DRAINAGE OF RIGHT MAIN BRONCHUS, VIA NATURAL OR ARTIFICIAL OPENING ENDOSCOPIC: ICD-10-PCS | Performed by: INTERNAL MEDICINE

## 2025-05-20 PROCEDURE — 0B9F8ZZ DRAINAGE OF RIGHT LOWER LUNG LOBE, VIA NATURAL OR ARTIFICIAL OPENING ENDOSCOPIC: ICD-10-PCS | Performed by: INTERNAL MEDICINE

## 2025-05-20 PROCEDURE — 94003 VENT MGMT INPAT SUBQ DAY: CPT

## 2025-05-20 PROCEDURE — 2700000000 HC OXYGEN THERAPY PER DAY

## 2025-05-20 PROCEDURE — 2500000003 HC RX 250 WO HCPCS

## 2025-05-20 PROCEDURE — 80069 RENAL FUNCTION PANEL: CPT

## 2025-05-20 PROCEDURE — 87205 SMEAR GRAM STAIN: CPT

## 2025-05-20 PROCEDURE — 31622 DX BRONCHOSCOPE/WASH: CPT

## 2025-05-20 PROCEDURE — 83935 ASSAY OF URINE OSMOLALITY: CPT

## 2025-05-20 PROCEDURE — 51701 INSERT BLADDER CATHETER: CPT

## 2025-05-20 PROCEDURE — 94761 N-INVAS EAR/PLS OXIMETRY MLT: CPT

## 2025-05-20 PROCEDURE — 87070 CULTURE OTHR SPECIMN AEROBIC: CPT

## 2025-05-20 PROCEDURE — 85025 COMPLETE CBC W/AUTO DIFF WBC: CPT

## 2025-05-20 PROCEDURE — 87252 VIRUS INOCULATION TISSUE: CPT

## 2025-05-20 PROCEDURE — 84300 ASSAY OF URINE SODIUM: CPT

## 2025-05-20 PROCEDURE — 2000000000 HC ICU R&B

## 2025-05-20 PROCEDURE — 83735 ASSAY OF MAGNESIUM: CPT

## 2025-05-20 PROCEDURE — 0B978ZZ DRAINAGE OF LEFT MAIN BRONCHUS, VIA NATURAL OR ARTIFICIAL OPENING ENDOSCOPIC: ICD-10-PCS | Performed by: INTERNAL MEDICINE

## 2025-05-20 PROCEDURE — 51798 US URINE CAPACITY MEASURE: CPT

## 2025-05-20 PROCEDURE — 81001 URINALYSIS AUTO W/SCOPE: CPT

## 2025-05-20 PROCEDURE — 93010 ELECTROCARDIOGRAM REPORT: CPT | Performed by: INTERNAL MEDICINE

## 2025-05-20 RX ORDER — LEVOFLOXACIN 5 MG/ML
750 INJECTION, SOLUTION INTRAVENOUS
Status: DISCONTINUED | OUTPATIENT
Start: 2025-05-22 | End: 2025-05-20

## 2025-05-20 RX ORDER — SODIUM CHLORIDE 9 MG/ML
INJECTION, SOLUTION INTRAVENOUS CONTINUOUS
Status: ACTIVE | OUTPATIENT
Start: 2025-05-20 | End: 2025-05-20

## 2025-05-20 RX ORDER — FENTANYL CITRATE 50 UG/ML
INJECTION, SOLUTION INTRAMUSCULAR; INTRAVENOUS
Status: COMPLETED
Start: 2025-05-20 | End: 2025-05-20

## 2025-05-20 RX ORDER — FENTANYL CITRATE 50 UG/ML
50 INJECTION, SOLUTION INTRAMUSCULAR; INTRAVENOUS ONCE
Status: COMPLETED | OUTPATIENT
Start: 2025-05-20 | End: 2025-05-20

## 2025-05-20 RX ORDER — METOPROLOL TARTRATE 25 MG/1
12.5 TABLET, FILM COATED ORAL 2 TIMES DAILY
Status: CANCELLED | OUTPATIENT
Start: 2025-05-20

## 2025-05-20 RX ORDER — POLYETHYLENE GLYCOL 3350 17 G/17G
17 POWDER, FOR SOLUTION ORAL 2 TIMES DAILY
Status: DISCONTINUED | OUTPATIENT
Start: 2025-05-20 | End: 2025-05-21

## 2025-05-20 RX ORDER — SODIUM CHLORIDE FOR INHALATION 3 %
4 VIAL, NEBULIZER (ML) INHALATION
Status: DISCONTINUED | OUTPATIENT
Start: 2025-05-20 | End: 2025-05-23

## 2025-05-20 RX ORDER — LEVOFLOXACIN 5 MG/ML
750 INJECTION, SOLUTION INTRAVENOUS
Status: DISCONTINUED | OUTPATIENT
Start: 2025-05-20 | End: 2025-05-21

## 2025-05-20 RX ORDER — LEVOFLOXACIN 5 MG/ML
750 INJECTION, SOLUTION INTRAVENOUS EVERY 24 HOURS
Status: DISCONTINUED | OUTPATIENT
Start: 2025-05-20 | End: 2025-05-20

## 2025-05-20 RX ADMIN — Medication 100 MCG: at 15:30

## 2025-05-20 RX ADMIN — LEVALBUTEROL 1.25 MG: 1.25 SOLUTION, CONCENTRATE RESPIRATORY (INHALATION) at 20:08

## 2025-05-20 RX ADMIN — ARFORMOTEROL TARTRATE 15 MCG: 15 SOLUTION RESPIRATORY (INHALATION) at 20:09

## 2025-05-20 RX ADMIN — SODIUM ZIRCONIUM CYCLOSILICATE 10 G: 10 POWDER, FOR SUSPENSION ORAL at 09:22

## 2025-05-20 RX ADMIN — BUSPIRONE HYDROCHLORIDE 5 MG: 10 TABLET ORAL at 13:50

## 2025-05-20 RX ADMIN — Medication 100 MCG: at 15:12

## 2025-05-20 RX ADMIN — SODIUM CHLORIDE, PRESERVATIVE FREE 10 ML: 5 INJECTION INTRAVENOUS at 22:02

## 2025-05-20 RX ADMIN — WATER 60 MG: 1 INJECTION INTRAMUSCULAR; INTRAVENOUS; SUBCUTANEOUS at 09:22

## 2025-05-20 RX ADMIN — LEVALBUTEROL 1.25 MG: 1.25 SOLUTION, CONCENTRATE RESPIRATORY (INHALATION) at 02:52

## 2025-05-20 RX ADMIN — SODIUM CHLORIDE 30 MG/ML INHALATION SOLUTION 4 ML: 30 SOLUTION INHALANT at 08:48

## 2025-05-20 RX ADMIN — Medication 100 MCG: at 15:16

## 2025-05-20 RX ADMIN — LEVALBUTEROL 1.25 MG: 1.25 SOLUTION, CONCENTRATE RESPIRATORY (INHALATION) at 11:42

## 2025-05-20 RX ADMIN — Medication 1000 UNITS: at 21:41

## 2025-05-20 RX ADMIN — SODIUM CHLORIDE, PRESERVATIVE FREE 10 ML: 5 INJECTION INTRAVENOUS at 08:38

## 2025-05-20 RX ADMIN — IPRATROPIUM BROMIDE 0.5 MG: 0.5 SOLUTION RESPIRATORY (INHALATION) at 15:15

## 2025-05-20 RX ADMIN — Medication 100 MCG: at 15:18

## 2025-05-20 RX ADMIN — Medication 100 MCG: at 15:19

## 2025-05-20 RX ADMIN — IPRATROPIUM BROMIDE 0.5 MG: 0.5 SOLUTION RESPIRATORY (INHALATION) at 11:42

## 2025-05-20 RX ADMIN — BUDESONIDE 500 MCG: 0.5 SUSPENSION RESPIRATORY (INHALATION) at 08:47

## 2025-05-20 RX ADMIN — PROPOFOL 45 MCG/KG/MIN: 10 INJECTION, EMULSION INTRAVENOUS at 06:07

## 2025-05-20 RX ADMIN — LEVOFLOXACIN 750 MG: 750 INJECTION, SOLUTION INTRAVENOUS at 10:56

## 2025-05-20 RX ADMIN — FENTANYL CITRATE 50 MCG: 50 INJECTION, SOLUTION INTRAMUSCULAR; INTRAVENOUS at 14:54

## 2025-05-20 RX ADMIN — Medication 100 MCG: at 15:31

## 2025-05-20 RX ADMIN — IPRATROPIUM BROMIDE 0.5 MG: 0.5 SOLUTION RESPIRATORY (INHALATION) at 20:08

## 2025-05-20 RX ADMIN — Medication 100 MCG: at 15:09

## 2025-05-20 RX ADMIN — SODIUM CHLORIDE: 0.9 INJECTION, SOLUTION INTRAVENOUS at 08:41

## 2025-05-20 RX ADMIN — SODIUM CHLORIDE: 0.9 INJECTION, SOLUTION INTRAVENOUS at 08:42

## 2025-05-20 RX ADMIN — DOCUSATE SODIUM 50 MG AND SENNOSIDES 8.6 MG 2 TABLET: 8.6; 5 TABLET, FILM COATED ORAL at 08:37

## 2025-05-20 RX ADMIN — WATER 60 MG: 1 INJECTION INTRAMUSCULAR; INTRAVENOUS; SUBCUTANEOUS at 21:41

## 2025-05-20 RX ADMIN — KETAMINE HYDROCHLORIDE 0.85 MG/KG/HR: 50 INJECTION, SOLUTION INTRAMUSCULAR; INTRAVENOUS at 17:11

## 2025-05-20 RX ADMIN — LEVALBUTEROL 1.25 MG: 1.25 SOLUTION, CONCENTRATE RESPIRATORY (INHALATION) at 08:48

## 2025-05-20 RX ADMIN — ARFORMOTEROL TARTRATE 15 MCG: 15 SOLUTION RESPIRATORY (INHALATION) at 08:47

## 2025-05-20 RX ADMIN — SODIUM CHLORIDE 30 MG/ML INHALATION SOLUTION 4 ML: 30 SOLUTION INHALANT at 15:15

## 2025-05-20 RX ADMIN — Medication 100 MCG: at 15:23

## 2025-05-20 RX ADMIN — BUDESONIDE 500 MCG: 0.5 SUSPENSION RESPIRATORY (INHALATION) at 20:09

## 2025-05-20 RX ADMIN — ASPIRIN 81 MG: 81 TABLET, CHEWABLE ORAL at 08:37

## 2025-05-20 RX ADMIN — Medication 100 MCG: at 15:10

## 2025-05-20 RX ADMIN — Medication 100 MCG: at 19:07

## 2025-05-20 RX ADMIN — PROPOFOL 35 MCG/KG/MIN: 10 INJECTION, EMULSION INTRAVENOUS at 14:48

## 2025-05-20 RX ADMIN — Medication 100 MCG: at 15:40

## 2025-05-20 RX ADMIN — CYANOCOBALAMIN TAB 1000 MCG 500 MCG: 1000 TAB at 21:41

## 2025-05-20 RX ADMIN — LEVALBUTEROL 1.25 MG: 1.25 SOLUTION, CONCENTRATE RESPIRATORY (INHALATION) at 15:15

## 2025-05-20 RX ADMIN — IPRATROPIUM BROMIDE 0.5 MG: 0.5 SOLUTION RESPIRATORY (INHALATION) at 08:48

## 2025-05-20 RX ADMIN — ENOXAPARIN SODIUM 30 MG: 100 INJECTION SUBCUTANEOUS at 08:37

## 2025-05-20 RX ADMIN — BUSPIRONE HYDROCHLORIDE 5 MG: 10 TABLET ORAL at 21:41

## 2025-05-20 RX ADMIN — SODIUM CHLORIDE, PRESERVATIVE FREE 40 MG: 5 INJECTION INTRAVENOUS at 08:37

## 2025-05-20 RX ADMIN — POLYETHYLENE GLYCOL 3350 17 G: 17 POWDER, FOR SOLUTION ORAL at 21:42

## 2025-05-20 RX ADMIN — SODIUM CHLORIDE 30 MG/ML INHALATION SOLUTION 4 ML: 30 SOLUTION INHALANT at 20:30

## 2025-05-20 RX ADMIN — FENTANYL CITRATE 50 MCG: 50 INJECTION INTRAMUSCULAR; INTRAVENOUS at 14:54

## 2025-05-20 RX ADMIN — Medication 100 MCG: at 15:38

## 2025-05-20 RX ADMIN — BUSPIRONE HYDROCHLORIDE 5 MG: 10 TABLET ORAL at 08:37

## 2025-05-20 ASSESSMENT — PULMONARY FUNCTION TESTS
PIF_VALUE: 33
PIF_VALUE: 38
PIF_VALUE: 29
PIF_VALUE: 44
PIF_VALUE: 29
PIF_VALUE: 43
PIF_VALUE: 58
PIF_VALUE: 34
PIF_VALUE: 33
PIF_VALUE: 42
PIF_VALUE: 31
PIF_VALUE: 36
PIF_VALUE: 37
PIF_VALUE: 29
PIF_VALUE: 34
PIF_VALUE: 32
PIF_VALUE: 36
PIF_VALUE: 35
PIF_VALUE: 34
PIF_VALUE: 37
PIF_VALUE: 38
PIF_VALUE: 35
PIF_VALUE: 27
PIF_VALUE: 29
PIF_VALUE: 34
PIF_VALUE: 58
PIF_VALUE: 37
PIF_VALUE: 34
PIF_VALUE: 27
PIF_VALUE: 25
PIF_VALUE: 32
PIF_VALUE: 55
PIF_VALUE: 37
PIF_VALUE: 28
PIF_VALUE: 36
PIF_VALUE: 33
PIF_VALUE: 40
PIF_VALUE: 31
PIF_VALUE: 34
PIF_VALUE: 55
PIF_VALUE: 32
PIF_VALUE: 35
PIF_VALUE: 31
PIF_VALUE: 63
PIF_VALUE: 52
PIF_VALUE: 31
PIF_VALUE: 29
PIF_VALUE: 40
PIF_VALUE: 37
PIF_VALUE: 34
PIF_VALUE: 25
PIF_VALUE: 44
PIF_VALUE: 36
PIF_VALUE: 55
PIF_VALUE: 34
PIF_VALUE: 38
PIF_VALUE: 40
PIF_VALUE: 24
PIF_VALUE: 44
PIF_VALUE: 36
PIF_VALUE: 35
PIF_VALUE: 36
PIF_VALUE: 34
PIF_VALUE: 56
PIF_VALUE: 41
PIF_VALUE: 40
PIF_VALUE: 40
PIF_VALUE: 37
PIF_VALUE: 31
PIF_VALUE: 32
PIF_VALUE: 36
PIF_VALUE: 43
PIF_VALUE: 35
PIF_VALUE: 33
PIF_VALUE: 29
PIF_VALUE: 29
PIF_VALUE: 55
PIF_VALUE: 44
PIF_VALUE: 32
PIF_VALUE: 59
PIF_VALUE: 36
PIF_VALUE: 30
PIF_VALUE: 33
PIF_VALUE: 42
PIF_VALUE: 32
PIF_VALUE: 29
PIF_VALUE: 30
PIF_VALUE: 36
PIF_VALUE: 39
PIF_VALUE: 37
PIF_VALUE: 33
PIF_VALUE: 38
PIF_VALUE: 32
PIF_VALUE: 31
PIF_VALUE: 29

## 2025-05-20 ASSESSMENT — PAIN SCALES - GENERAL
PAINLEVEL_OUTOF10: 0

## 2025-05-20 NOTE — CARE COORDINATION
CM following for discharge planning.     Pt is a readmit.   Pt is from home with spouse and has home O2 at 2 L with [employer].   Pt was offered [facility] on last admission but declined.     Pt remains intubated and sedated at this time     Addendum: 15:40   CM met with pt's daughter Nesha in hallway and at bedside.     Nesha stated pt has been to [facility] in the past.     [employer] resource given to daughter as well for future planning as requested.     Divine Domingo RN, BSN, CM  Case Management Department  [phone]

## 2025-05-20 NOTE — TELEPHONE ENCOUNTER
ADAN  The patient spouse Norberto called in to cancel his wife's appt for Friday and to also let Dr. Foley know the patient is currently in the [facility] in the ICU since last Friday. Looks like she is in there for COPD with acute exacerbation.

## 2025-05-20 NOTE — PROCEDURES
[facility]   PULMONARY AND CRITICAL CARE MEDICINE    BRONCHOSCOPIC PROCEDURE NOTE       Procedure(s) performed:  37517 - Dx bronchoscope/BAL  99730 - Bronchoscopy w/Therapeutic aspiration - initial    Preprocedure diagnosis: Pneumonia, acute COPD exacerbation  Post procedure diagnosis: Same    DESCRIPTION OF PROCEDURE:   - Consent:  Informed consent was obtained from the patient's family member after explaining the risks and benefits. A time out was taken.    - Sedation:  TYPE OF SEDATION USED: Moderate Sedation  Physician/patient face-to-face sedation start time: 1445h  Physician/patient face-to-face sedation stop time: 1455h  Total moderate sedation time in minutes: 10 minutes  Medications: Fentanyl 50 mcg and Propofol @ 35 mcg/kg/min    Patient was monitored continuously 1:1 throughout the entire procedure while sedation was administered     - Procedure details:  Bronchoscope was inserted into the main airway via the ETT.  Vocal cords were note assessed due to ETT presence. Lidocaine was was not used. The bronchial trees were examined to the subsegmental level.   Extensive amount of secretions with obstructive and non-obstructive mucus plugging were noted at the RMB, RLL, and LMB, therapeutic aspiration was performed at the mentioned segments, 30 ml NS were used to aid secretion management.     The bronchoscope was advanced and wedged against the RUL takeoff, a total of 60 ml of NS were instilled and a total of 10 ml were obtained in return.     - Findings:  Airway mucosa: small ulcer at the proximal RMB, bronchospastic.  Endobronchial lesions: Not visualized  Lymphadenopathy: Not evaluated    - Samples:  Sent for Microbiology    The patient tolerated the procedure well. No immediate complication    EBL: 1 ml     Jeremy Ballesteros \"Jake\" Valeria Brown MD  Pulmonary and Critical Care Medicine

## 2025-05-21 ENCOUNTER — APPOINTMENT (OUTPATIENT)
Dept: GENERAL RADIOLOGY | Age: 75
DRG: 207 | End: 2025-05-21
Payer: MEDICARE

## 2025-05-21 ENCOUNTER — APPOINTMENT (OUTPATIENT)
Dept: CT IMAGING | Age: 75
DRG: 207 | End: 2025-05-21
Payer: MEDICARE

## 2025-05-21 LAB
ALBUMIN SERPL-MCNC: 3.3 G/DL (ref 3.4–5)
ALBUMIN SERPL-MCNC: 3.5 G/DL (ref 3.4–5)
ANION GAP SERPL CALCULATED.3IONS-SCNC: 6 MMOL/L (ref 3–16)
ANION GAP SERPL CALCULATED.3IONS-SCNC: 8 MMOL/L (ref 3–16)
BASE EXCESS BLDA CALC-SCNC: 6.5 MMOL/L (ref -3–3)
BASOPHILS # BLD: 0 K/UL (ref 0–0.2)
BASOPHILS NFR BLD: 0.1 %
BILIRUB UR QL STRIP.AUTO: NEGATIVE
BUN SERPL-MCNC: 47 MG/DL (ref 7–20)
BUN SERPL-MCNC: 51 MG/DL (ref 7–20)
CALCIUM SERPL-MCNC: 9.5 MG/DL (ref 8.3–10.6)
CALCIUM SERPL-MCNC: 9.5 MG/DL (ref 8.3–10.6)
CHLORIDE SERPL-SCNC: 102 MMOL/L (ref 99–110)
CHLORIDE SERPL-SCNC: 102 MMOL/L (ref 99–110)
CLARITY UR: CLEAR
CO2 BLDA-SCNC: 36 MMOL/L
CO2 SERPL-SCNC: 29 MMOL/L (ref 21–32)
CO2 SERPL-SCNC: 31 MMOL/L (ref 21–32)
COHGB MFR BLDA: 0.8 % (ref 0–1.5)
COLOR UR: YELLOW
CREAT SERPL-MCNC: 0.8 MG/DL (ref 0.6–1.2)
CREAT SERPL-MCNC: 0.8 MG/DL (ref 0.6–1.2)
CREAT UR-MCNC: 76.5 MG/DL (ref 28–259)
DEPRECATED RDW RBC AUTO: 13.6 % (ref 12.4–15.4)
EOSINOPHIL # BLD: 0 K/UL (ref 0–0.6)
EOSINOPHIL NFR BLD: 0 %
GFR SERPLBLD CREATININE-BSD FMLA CKD-EPI: 78 ML/MIN/{1.73_M2}
GFR SERPLBLD CREATININE-BSD FMLA CKD-EPI: 78 ML/MIN/{1.73_M2}
GLUCOSE SERPL-MCNC: 148 MG/DL (ref 70–99)
GLUCOSE SERPL-MCNC: 169 MG/DL (ref 70–99)
GLUCOSE UR STRIP.AUTO-MCNC: NEGATIVE MG/DL
HCO3 BLDA-SCNC: 34 MMOL/L (ref 21–29)
HCT VFR BLD AUTO: 34.6 % (ref 36–48)
HGB BLD-MCNC: 11.7 G/DL (ref 12–16)
HGB BLDA-MCNC: 10.7 G/DL
HGB UR QL STRIP.AUTO: NEGATIVE
KETONES UR STRIP.AUTO-MCNC: NEGATIVE MG/DL
LEUKOCYTE ESTERASE UR QL STRIP.AUTO: NEGATIVE
LYMPHOCYTES # BLD: 0.2 K/UL (ref 1–5.1)
LYMPHOCYTES NFR BLD: 2.6 %
MAGNESIUM SERPL-MCNC: 2.06 MG/DL (ref 1.8–2.4)
MAGNESIUM SERPL-MCNC: 2.23 MG/DL (ref 1.8–2.4)
MCH RBC QN AUTO: 32.5 PG (ref 26–34)
MCHC RBC AUTO-ENTMCNC: 33.8 G/DL (ref 31–36)
MCV RBC AUTO: 96.1 FL (ref 80–100)
METHGB MFR BLDA: 0.1 % (ref 0–1.4)
MONOCYTES # BLD: 0.6 K/UL (ref 0–1.3)
MONOCYTES NFR BLD: 6.1 %
NEUTROPHILS # BLD: 8.5 K/UL (ref 1.7–7.7)
NEUTROPHILS NFR BLD: 91.2 %
NITRITE UR QL STRIP.AUTO: NEGATIVE
PCO2 BLDA: 61.4 MMHG (ref 35–45)
PH BLDA: 7.35 [PH] (ref 7.35–7.45)
PH UR STRIP.AUTO: 6 [PH] (ref 5–8)
PHOSPHATE SERPL-MCNC: 2.1 MG/DL (ref 2.5–4.9)
PHOSPHATE SERPL-MCNC: 2.2 MG/DL (ref 2.5–4.9)
PLATELET # BLD AUTO: 219 K/UL (ref 135–450)
PMV BLD AUTO: 8.1 FL (ref 5–10.5)
PO2 BLDA: 151 MMHG (ref 75–108)
POTASSIUM SERPL-SCNC: 4.5 MMOL/L (ref 3.5–5.1)
POTASSIUM SERPL-SCNC: 5 MMOL/L (ref 3.5–5.1)
PROT UR STRIP.AUTO-MCNC: NEGATIVE MG/DL
RBC # BLD AUTO: 3.6 M/UL (ref 4–5.2)
SAO2 % BLDA: 100 % (ref 93–100)
SODIUM SERPL-SCNC: 139 MMOL/L (ref 136–145)
SODIUM SERPL-SCNC: 139 MMOL/L (ref 136–145)
SP GR UR STRIP.AUTO: 1.02 (ref 1–1.03)
UA COMPLETE W REFLEX CULTURE PNL UR: NORMAL
UA DIPSTICK W REFLEX MICRO PNL UR: NORMAL
URN SPEC COLLECT METH UR: NORMAL
UROBILINOGEN UR STRIP-ACNC: 0.2 E.U./DL
WBC # BLD AUTO: 9.3 K/UL (ref 4–11)

## 2025-05-21 PROCEDURE — 71045 X-RAY EXAM CHEST 1 VIEW: CPT

## 2025-05-21 PROCEDURE — 2500000003 HC RX 250 WO HCPCS

## 2025-05-21 PROCEDURE — 81003 URINALYSIS AUTO W/O SCOPE: CPT

## 2025-05-21 PROCEDURE — 2000000000 HC ICU R&B

## 2025-05-21 PROCEDURE — 2580000003 HC RX 258

## 2025-05-21 PROCEDURE — 51701 INSERT BLADDER CATHETER: CPT

## 2025-05-21 PROCEDURE — 2700000000 HC OXYGEN THERAPY PER DAY

## 2025-05-21 PROCEDURE — 6370000000 HC RX 637 (ALT 250 FOR IP)

## 2025-05-21 PROCEDURE — 6360000002 HC RX W HCPCS

## 2025-05-21 PROCEDURE — 83735 ASSAY OF MAGNESIUM: CPT

## 2025-05-21 PROCEDURE — 51702 INSERT TEMP BLADDER CATH: CPT

## 2025-05-21 PROCEDURE — 94640 AIRWAY INHALATION TREATMENT: CPT

## 2025-05-21 PROCEDURE — 82803 BLOOD GASES ANY COMBINATION: CPT

## 2025-05-21 PROCEDURE — 80069 RENAL FUNCTION PANEL: CPT

## 2025-05-21 PROCEDURE — 94761 N-INVAS EAR/PLS OXIMETRY MLT: CPT

## 2025-05-21 PROCEDURE — 71250 CT THORAX DX C-: CPT

## 2025-05-21 PROCEDURE — 36415 COLL VENOUS BLD VENIPUNCTURE: CPT

## 2025-05-21 PROCEDURE — 51798 US URINE CAPACITY MEASURE: CPT

## 2025-05-21 PROCEDURE — 94003 VENT MGMT INPAT SUBQ DAY: CPT

## 2025-05-21 PROCEDURE — 85025 COMPLETE CBC W/AUTO DIFF WBC: CPT

## 2025-05-21 RX ORDER — LEVOFLOXACIN 5 MG/ML
750 INJECTION, SOLUTION INTRAVENOUS EVERY 24 HOURS
Status: COMPLETED | OUTPATIENT
Start: 2025-05-21 | End: 2025-05-26

## 2025-05-21 RX ORDER — CASTOR OIL AND BALSAM, PERU 788; 87 MG/G; MG/G
OINTMENT TOPICAL 2 TIMES DAILY
Status: DISCONTINUED | OUTPATIENT
Start: 2025-05-21 | End: 2025-06-04 | Stop reason: HOSPADM

## 2025-05-21 RX ORDER — FENTANYL CITRATE-0.9 % NACL/PF 10 MCG/ML
25-200 PLASTIC BAG, INJECTION (ML) INTRAVENOUS CONTINUOUS
Status: DISCONTINUED | OUTPATIENT
Start: 2025-05-21 | End: 2025-05-22

## 2025-05-21 RX ORDER — POLYETHYLENE GLYCOL 3350 17 G/17G
17 POWDER, FOR SOLUTION ORAL DAILY
Status: DISCONTINUED | OUTPATIENT
Start: 2025-05-21 | End: 2025-05-26

## 2025-05-21 RX ORDER — PROPOFOL 10 MG/ML
5-50 INJECTION, EMULSION INTRAVENOUS CONTINUOUS
Status: DISCONTINUED | OUTPATIENT
Start: 2025-05-21 | End: 2025-05-22

## 2025-05-21 RX ORDER — FENTANYL CITRATE-0.9 % NACL/PF 10 MCG/ML
25-200 PLASTIC BAG, INJECTION (ML) INTRAVENOUS CONTINUOUS
Refills: 0 | Status: DISCONTINUED | OUTPATIENT
Start: 2025-05-21 | End: 2025-05-21

## 2025-05-21 RX ADMIN — PROPOFOL 10 MCG/KG/MIN: 10 INJECTION, EMULSION INTRAVENOUS at 18:58

## 2025-05-21 RX ADMIN — LEVALBUTEROL 1.25 MG: 1.25 SOLUTION, CONCENTRATE RESPIRATORY (INHALATION) at 20:34

## 2025-05-21 RX ADMIN — SODIUM CHLORIDE 30 MG/ML INHALATION SOLUTION 4 ML: 30 SOLUTION INHALANT at 16:15

## 2025-05-21 RX ADMIN — Medication: at 13:39

## 2025-05-21 RX ADMIN — IPRATROPIUM BROMIDE 0.5 MG: 0.5 SOLUTION RESPIRATORY (INHALATION) at 08:24

## 2025-05-21 RX ADMIN — PROPOFOL 35 MCG/KG/MIN: 10 INJECTION, EMULSION INTRAVENOUS at 09:40

## 2025-05-21 RX ADMIN — IPRATROPIUM BROMIDE 0.5 MG: 0.5 SOLUTION RESPIRATORY (INHALATION) at 16:14

## 2025-05-21 RX ADMIN — BUSPIRONE HYDROCHLORIDE 5 MG: 10 TABLET ORAL at 14:10

## 2025-05-21 RX ADMIN — ENOXAPARIN SODIUM 30 MG: 100 INJECTION SUBCUTANEOUS at 09:29

## 2025-05-21 RX ADMIN — LEVALBUTEROL 1.25 MG: 1.25 SOLUTION, CONCENTRATE RESPIRATORY (INHALATION) at 12:03

## 2025-05-21 RX ADMIN — BUDESONIDE 500 MCG: 0.5 SUSPENSION RESPIRATORY (INHALATION) at 20:34

## 2025-05-21 RX ADMIN — IPRATROPIUM BROMIDE 0.5 MG: 0.5 SOLUTION RESPIRATORY (INHALATION) at 20:33

## 2025-05-21 RX ADMIN — PROPOFOL 35 MCG/KG/MIN: 10 INJECTION, EMULSION INTRAVENOUS at 01:46

## 2025-05-21 RX ADMIN — ASPIRIN 81 MG: 81 TABLET, CHEWABLE ORAL at 09:29

## 2025-05-21 RX ADMIN — BUDESONIDE 500 MCG: 0.5 SUSPENSION RESPIRATORY (INHALATION) at 08:24

## 2025-05-21 RX ADMIN — WATER 60 MG: 1 INJECTION INTRAMUSCULAR; INTRAVENOUS; SUBCUTANEOUS at 09:29

## 2025-05-21 RX ADMIN — LEVOFLOXACIN 750 MG: 750 INJECTION, SOLUTION INTRAVENOUS at 13:39

## 2025-05-21 RX ADMIN — SODIUM CHLORIDE 30 MG/ML INHALATION SOLUTION 4 ML: 30 SOLUTION INHALANT at 08:24

## 2025-05-21 RX ADMIN — Medication: at 21:09

## 2025-05-21 RX ADMIN — SODIUM CHLORIDE 30 MG/ML INHALATION SOLUTION 4 ML: 30 SOLUTION INHALANT at 12:03

## 2025-05-21 RX ADMIN — PROPOFOL 10 MCG/KG/MIN: 10 INJECTION, EMULSION INTRAVENOUS at 19:48

## 2025-05-21 RX ADMIN — ARFORMOTEROL TARTRATE 15 MCG: 15 SOLUTION RESPIRATORY (INHALATION) at 08:24

## 2025-05-21 RX ADMIN — BUSPIRONE HYDROCHLORIDE 5 MG: 10 TABLET ORAL at 09:29

## 2025-05-21 RX ADMIN — SODIUM CHLORIDE, PRESERVATIVE FREE 40 MG: 5 INJECTION INTRAVENOUS at 09:29

## 2025-05-21 RX ADMIN — WATER 40 MG: 1 INJECTION INTRAMUSCULAR; INTRAVENOUS; SUBCUTANEOUS at 21:11

## 2025-05-21 RX ADMIN — POLYETHYLENE GLYCOL 3350 17 G: 17 POWDER, FOR SOLUTION ORAL at 09:30

## 2025-05-21 RX ADMIN — KETAMINE HYDROCHLORIDE 1.4 MG/KG/HR: 50 INJECTION, SOLUTION INTRAMUSCULAR; INTRAVENOUS at 03:18

## 2025-05-21 RX ADMIN — IPRATROPIUM BROMIDE 0.5 MG: 0.5 SOLUTION RESPIRATORY (INHALATION) at 12:03

## 2025-05-21 RX ADMIN — ACETAMINOPHEN 650 MG: 325 TABLET ORAL at 03:47

## 2025-05-21 RX ADMIN — LEVALBUTEROL 1.25 MG: 1.25 SOLUTION, CONCENTRATE RESPIRATORY (INHALATION) at 08:24

## 2025-05-21 RX ADMIN — SODIUM CHLORIDE, PRESERVATIVE FREE 10 ML: 5 INJECTION INTRAVENOUS at 21:09

## 2025-05-21 RX ADMIN — FENTANYL CITRATE 25 MCG/HR: 0.05 INJECTION, SOLUTION INTRAMUSCULAR; INTRAVENOUS at 12:03

## 2025-05-21 RX ADMIN — ARFORMOTEROL TARTRATE 15 MCG: 15 SOLUTION RESPIRATORY (INHALATION) at 20:34

## 2025-05-21 RX ADMIN — CYANOCOBALAMIN TAB 1000 MCG 500 MCG: 1000 TAB at 21:10

## 2025-05-21 RX ADMIN — BUSPIRONE HYDROCHLORIDE 5 MG: 10 TABLET ORAL at 21:10

## 2025-05-21 RX ADMIN — SODIUM CHLORIDE 30 MG/ML INHALATION SOLUTION 4 ML: 30 SOLUTION INHALANT at 20:39

## 2025-05-21 RX ADMIN — Medication 1000 UNITS: at 21:14

## 2025-05-21 RX ADMIN — SODIUM CHLORIDE 20 MMOL: 9 INJECTION, SOLUTION INTRAVENOUS at 19:52

## 2025-05-21 RX ADMIN — KETAMINE HYDROCHLORIDE 1.75 MG/KG/HR: 50 INJECTION, SOLUTION INTRAMUSCULAR; INTRAVENOUS at 10:52

## 2025-05-21 RX ADMIN — FENTANYL CITRATE 50 MCG/HR: 0.05 INJECTION, SOLUTION INTRAMUSCULAR; INTRAVENOUS at 19:42

## 2025-05-21 RX ADMIN — DOCUSATE SODIUM 50 MG AND SENNOSIDES 8.6 MG 2 TABLET: 8.6; 5 TABLET, FILM COATED ORAL at 09:29

## 2025-05-21 RX ADMIN — LEVALBUTEROL 1.25 MG: 1.25 SOLUTION, CONCENTRATE RESPIRATORY (INHALATION) at 16:14

## 2025-05-21 ASSESSMENT — PULMONARY FUNCTION TESTS
PIF_VALUE: 29
PIF_VALUE: 29
PIF_VALUE: 31
PIF_VALUE: 28
PIF_VALUE: 30
PIF_VALUE: 29
PIF_VALUE: 34
PIF_VALUE: 29
PIF_VALUE: 32
PIF_VALUE: 33
PIF_VALUE: 34
PIF_VALUE: 33
PIF_VALUE: 40
PIF_VALUE: 30
PIF_VALUE: 32
PIF_VALUE: 31
PIF_VALUE: 28
PIF_VALUE: 31
PIF_VALUE: 25
PIF_VALUE: 32
PIF_VALUE: 27
PIF_VALUE: 30
PIF_VALUE: 31
PIF_VALUE: 33
PIF_VALUE: 27
PIF_VALUE: 31
PIF_VALUE: 28
PIF_VALUE: 26
PIF_VALUE: 29
PIF_VALUE: 30
PIF_VALUE: 36
PIF_VALUE: 35
PIF_VALUE: 30
PIF_VALUE: 28
PIF_VALUE: 28
PIF_VALUE: 33
PIF_VALUE: 33
PIF_VALUE: 30
PIF_VALUE: 30
PIF_VALUE: 26
PIF_VALUE: 29
PIF_VALUE: 45
PIF_VALUE: 26
PIF_VALUE: 29
PIF_VALUE: 26
PIF_VALUE: 34
PIF_VALUE: 33
PIF_VALUE: 34
PIF_VALUE: 32
PIF_VALUE: 31
PIF_VALUE: 42
PIF_VALUE: 29
PIF_VALUE: 38
PIF_VALUE: 34
PIF_VALUE: 26
PIF_VALUE: 35
PIF_VALUE: 28
PIF_VALUE: 26
PIF_VALUE: 31
PIF_VALUE: 32
PIF_VALUE: 27
PIF_VALUE: 31
PIF_VALUE: 26
PIF_VALUE: 30
PIF_VALUE: 34
PIF_VALUE: 33
PIF_VALUE: 27
PIF_VALUE: 40
PIF_VALUE: 35
PIF_VALUE: 31
PIF_VALUE: 25
PIF_VALUE: 30
PIF_VALUE: 28
PIF_VALUE: 29
PIF_VALUE: 28
PIF_VALUE: 36
PIF_VALUE: 33
PIF_VALUE: 28
PIF_VALUE: 31
PIF_VALUE: 31
PIF_VALUE: 30
PIF_VALUE: 32
PIF_VALUE: 40
PIF_VALUE: 31
PIF_VALUE: 28
PIF_VALUE: 32
PIF_VALUE: 28
PIF_VALUE: 34
PIF_VALUE: 31
PIF_VALUE: 35
PIF_VALUE: 29
PIF_VALUE: 36
PIF_VALUE: 29
PIF_VALUE: 32
PIF_VALUE: 36
PIF_VALUE: 31
PIF_VALUE: 36
PIF_VALUE: 31
PIF_VALUE: 34

## 2025-05-21 ASSESSMENT — PAIN SCALES - GENERAL
PAINLEVEL_OUTOF10: 0

## 2025-05-21 NOTE — CARE COORDINATION
CM following for discharge planning.     [place] is [place] with her spouse, LILIANA. Pt has O2 at 3 L baseline with [employer].     Pt has not had HHC in past but has been to [facility] in past.     Pt remains intubated and sedated. Bronch done yesterday and fluid sent for culture. Pt on IV abx, IV steroids.     Discharge plan TBD by hospital course. No precert needed for placement.     Divine Domingo RN, BSN, CM  Case Management Department  [phone]

## 2025-05-22 ENCOUNTER — APPOINTMENT (OUTPATIENT)
Dept: GENERAL RADIOLOGY | Age: 75
DRG: 207 | End: 2025-05-22
Payer: MEDICARE

## 2025-05-22 LAB
ALBUMIN SERPL-MCNC: 3.4 G/DL (ref 3.4–5)
ANION GAP SERPL CALCULATED.3IONS-SCNC: 7 MMOL/L (ref 3–16)
BACTERIA SPEC RESP CULT: ABNORMAL
BACTERIA SPEC RESP CULT: ABNORMAL
BASE EXCESS BLDA CALC-SCNC: 10 MMOL/L (ref -3–3)
BASE EXCESS BLDA CALC-SCNC: 11 MMOL/L (ref -3–3)
BASOPHILS # BLD: 0 K/UL (ref 0–0.2)
BASOPHILS NFR BLD: 0.2 %
BUN SERPL-MCNC: 55 MG/DL (ref 7–20)
CALCIUM SERPL-MCNC: 9.7 MG/DL (ref 8.3–10.6)
CHLORIDE SERPL-SCNC: 102 MMOL/L (ref 99–110)
CO2 BLDA-SCNC: 37 MMOL/L
CO2 BLDA-SCNC: 38 MMOL/L
CO2 SERPL-SCNC: 30 MMOL/L (ref 21–32)
CREAT SERPL-MCNC: 0.8 MG/DL (ref 0.6–1.2)
DEPRECATED RDW RBC AUTO: 13.7 % (ref 12.4–15.4)
EOSINOPHIL # BLD: 0 K/UL (ref 0–0.6)
EOSINOPHIL NFR BLD: 0 %
GFR SERPLBLD CREATININE-BSD FMLA CKD-EPI: 78 ML/MIN/{1.73_M2}
GLUCOSE SERPL-MCNC: 146 MG/DL (ref 70–99)
GRAM STN SPEC: ABNORMAL
HCO3 BLDA-SCNC: 35.1 MMOL/L (ref 21–29)
HCO3 BLDA-SCNC: 35.9 MMOL/L (ref 21–29)
HCT VFR BLD AUTO: 33.1 % (ref 36–48)
HGB BLD-MCNC: 11 G/DL (ref 12–16)
LYMPHOCYTES # BLD: 0.5 K/UL (ref 1–5.1)
LYMPHOCYTES NFR BLD: 6.7 %
M PNEUMO DNA SPEC QL NAA+PROBE: ABNORMAL
MAGNESIUM SERPL-MCNC: 2.03 MG/DL (ref 1.8–2.4)
MCH RBC QN AUTO: 31.8 PG (ref 26–34)
MCHC RBC AUTO-ENTMCNC: 33.1 G/DL (ref 31–36)
MCV RBC AUTO: 96.1 FL (ref 80–100)
MONOCYTES # BLD: 0.6 K/UL (ref 0–1.3)
MONOCYTES NFR BLD: 7.5 %
NEUTROPHILS # BLD: 6.6 K/UL (ref 1.7–7.7)
NEUTROPHILS NFR BLD: 85.6 %
ORGANISM: ABNORMAL
PCO2 BLDA: 53.7 MM HG (ref 35–45)
PCO2 BLDA: 68.5 MM HG (ref 35–45)
PERFORMED ON: ABNORMAL
PERFORMED ON: ABNORMAL
PH BLDA: 7.33 [PH] (ref 7.35–7.45)
PH BLDA: 7.42 [PH] (ref 7.35–7.45)
PHOSPHATE SERPL-MCNC: 3.9 MG/DL (ref 2.5–4.9)
PLATELET # BLD AUTO: 218 K/UL (ref 135–450)
PMV BLD AUTO: 8 FL (ref 5–10.5)
PO2 BLDA: 124.4 MM HG (ref 75–108)
PO2 BLDA: 79.4 MM HG (ref 75–108)
POC SAMPLE TYPE: ABNORMAL
POC SAMPLE TYPE: ABNORMAL
POTASSIUM SERPL-SCNC: 5 MMOL/L (ref 3.5–5.1)
RBC # BLD AUTO: 3.44 M/UL (ref 4–5.2)
REPORT: NORMAL
RESP PATH DNA+RNA PNL NPH NAA+NON-PROBE: NORMAL
SAO2 % BLDA: 96 % (ref 93–100)
SAO2 % BLDA: 98 % (ref 93–100)
SODIUM SERPL-SCNC: 139 MMOL/L (ref 136–145)
SPECIMEN SOURCE: ABNORMAL
TRIGL SERPL-MCNC: 75 MG/DL (ref 0–150)
WBC # BLD AUTO: 7.7 K/UL (ref 4–11)

## 2025-05-22 PROCEDURE — 2500000003 HC RX 250 WO HCPCS

## 2025-05-22 PROCEDURE — 86331 IMMUNODIFFUSION OUCHTERLONY: CPT

## 2025-05-22 PROCEDURE — 2580000003 HC RX 258

## 2025-05-22 PROCEDURE — 82785 ASSAY OF IGE: CPT

## 2025-05-22 PROCEDURE — 6370000000 HC RX 637 (ALT 250 FOR IP)

## 2025-05-22 PROCEDURE — 86606 ASPERGILLUS ANTIBODY: CPT

## 2025-05-22 PROCEDURE — 6360000002 HC RX W HCPCS

## 2025-05-22 PROCEDURE — 2000000000 HC ICU R&B

## 2025-05-22 PROCEDURE — 94761 N-INVAS EAR/PLS OXIMETRY MLT: CPT

## 2025-05-22 PROCEDURE — 83735 ASSAY OF MAGNESIUM: CPT

## 2025-05-22 PROCEDURE — 84478 ASSAY OF TRIGLYCERIDES: CPT

## 2025-05-22 PROCEDURE — 2700000000 HC OXYGEN THERAPY PER DAY

## 2025-05-22 PROCEDURE — 87305 ASPERGILLUS AG IA: CPT

## 2025-05-22 PROCEDURE — 71045 X-RAY EXAM CHEST 1 VIEW: CPT

## 2025-05-22 PROCEDURE — 86003 ALLG SPEC IGE CRUDE XTRC EA: CPT

## 2025-05-22 PROCEDURE — 82803 BLOOD GASES ANY COMBINATION: CPT

## 2025-05-22 PROCEDURE — 87449 NOS EACH ORGANISM AG IA: CPT

## 2025-05-22 PROCEDURE — 86005 ALLG SPEC IGE MULTIALLG SCR: CPT

## 2025-05-22 PROCEDURE — 36415 COLL VENOUS BLD VENIPUNCTURE: CPT

## 2025-05-22 PROCEDURE — 94003 VENT MGMT INPAT SUBQ DAY: CPT

## 2025-05-22 PROCEDURE — 85025 COMPLETE CBC W/AUTO DIFF WBC: CPT

## 2025-05-22 PROCEDURE — 80069 RENAL FUNCTION PANEL: CPT

## 2025-05-22 PROCEDURE — 94640 AIRWAY INHALATION TREATMENT: CPT

## 2025-05-22 RX ORDER — SODIUM CHLORIDE 9 MG/ML
INJECTION, SOLUTION INTRAVENOUS CONTINUOUS
Status: DISCONTINUED | OUTPATIENT
Start: 2025-05-22 | End: 2025-05-22

## 2025-05-22 RX ORDER — WATER 10 ML/10ML
INJECTION INTRAMUSCULAR; INTRAVENOUS; SUBCUTANEOUS
Status: DISPENSED
Start: 2025-05-22 | End: 2025-05-23

## 2025-05-22 RX ORDER — ENOXAPARIN SODIUM 100 MG/ML
40 INJECTION SUBCUTANEOUS DAILY
Status: DISCONTINUED | OUTPATIENT
Start: 2025-05-23 | End: 2025-06-03

## 2025-05-22 RX ORDER — DEXMEDETOMIDINE HYDROCHLORIDE 4 UG/ML
.1-1.5 INJECTION, SOLUTION INTRAVENOUS CONTINUOUS
Status: DISCONTINUED | OUTPATIENT
Start: 2025-05-22 | End: 2025-05-30

## 2025-05-22 RX ORDER — OLANZAPINE 10 MG/2ML
INJECTION, POWDER, FOR SOLUTION INTRAMUSCULAR
Status: DISPENSED
Start: 2025-05-22 | End: 2025-05-23

## 2025-05-22 RX ORDER — FENTANYL CITRATE-0.9 % NACL/PF 10 MCG/ML
25-200 PLASTIC BAG, INJECTION (ML) INTRAVENOUS CONTINUOUS
Status: DISCONTINUED | OUTPATIENT
Start: 2025-05-22 | End: 2025-05-22

## 2025-05-22 RX ORDER — PROPOFOL 10 MG/ML
5-50 INJECTION, EMULSION INTRAVENOUS CONTINUOUS
Status: DISCONTINUED | OUTPATIENT
Start: 2025-05-22 | End: 2025-05-29

## 2025-05-22 RX ADMIN — WATER 10 MG: 1 INJECTION INTRAMUSCULAR; INTRAVENOUS; SUBCUTANEOUS at 20:50

## 2025-05-22 RX ADMIN — SODIUM CHLORIDE, PRESERVATIVE FREE 40 MG: 5 INJECTION INTRAVENOUS at 07:36

## 2025-05-22 RX ADMIN — SODIUM CHLORIDE 30 MG/ML INHALATION SOLUTION 4 ML: 30 SOLUTION INHALANT at 20:05

## 2025-05-22 RX ADMIN — Medication: at 07:36

## 2025-05-22 RX ADMIN — SODIUM CHLORIDE 30 MG/ML INHALATION SOLUTION 4 ML: 30 SOLUTION INHALANT at 11:29

## 2025-05-22 RX ADMIN — Medication 1000 UNITS: at 22:04

## 2025-05-22 RX ADMIN — LEVALBUTEROL 1.25 MG: 1.25 SOLUTION, CONCENTRATE RESPIRATORY (INHALATION) at 07:55

## 2025-05-22 RX ADMIN — FENTANYL CITRATE 125 MCG/HR: 0.05 INJECTION, SOLUTION INTRAMUSCULAR; INTRAVENOUS at 10:37

## 2025-05-22 RX ADMIN — WATER 40 MG: 1 INJECTION INTRAMUSCULAR; INTRAVENOUS; SUBCUTANEOUS at 22:02

## 2025-05-22 RX ADMIN — SODIUM CHLORIDE 30 MG/ML INHALATION SOLUTION 4 ML: 30 SOLUTION INHALANT at 07:56

## 2025-05-22 RX ADMIN — CYANOCOBALAMIN TAB 1000 MCG 500 MCG: 1000 TAB at 22:03

## 2025-05-22 RX ADMIN — IPRATROPIUM BROMIDE 0.5 MG: 0.5 SOLUTION RESPIRATORY (INHALATION) at 20:05

## 2025-05-22 RX ADMIN — Medication: at 22:03

## 2025-05-22 RX ADMIN — IPRATROPIUM BROMIDE 0.5 MG: 0.5 SOLUTION RESPIRATORY (INHALATION) at 15:39

## 2025-05-22 RX ADMIN — BUSPIRONE HYDROCHLORIDE 5 MG: 10 TABLET ORAL at 14:16

## 2025-05-22 RX ADMIN — Medication 1.2 MCG/KG/HR: at 18:42

## 2025-05-22 RX ADMIN — PROPOFOL 40 MCG/KG/MIN: 10 INJECTION, EMULSION INTRAVENOUS at 12:00

## 2025-05-22 RX ADMIN — ASPIRIN 81 MG: 81 TABLET, CHEWABLE ORAL at 07:36

## 2025-05-22 RX ADMIN — IPRATROPIUM BROMIDE 0.5 MG: 0.5 SOLUTION RESPIRATORY (INHALATION) at 07:55

## 2025-05-22 RX ADMIN — PROPOFOL 40 MCG/KG/MIN: 10 INJECTION, EMULSION INTRAVENOUS at 06:01

## 2025-05-22 RX ADMIN — BUDESONIDE 500 MCG: 0.5 SUSPENSION RESPIRATORY (INHALATION) at 07:56

## 2025-05-22 RX ADMIN — BUDESONIDE 500 MCG: 0.5 SUSPENSION RESPIRATORY (INHALATION) at 20:05

## 2025-05-22 RX ADMIN — LEVOFLOXACIN 750 MG: 750 INJECTION, SOLUTION INTRAVENOUS at 14:16

## 2025-05-22 RX ADMIN — POLYETHYLENE GLYCOL 3350 17 G: 17 POWDER, FOR SOLUTION ORAL at 07:35

## 2025-05-22 RX ADMIN — ARFORMOTEROL TARTRATE 15 MCG: 15 SOLUTION RESPIRATORY (INHALATION) at 20:05

## 2025-05-22 RX ADMIN — PROPOFOL 25 MCG/KG/MIN: 10 INJECTION, EMULSION INTRAVENOUS at 18:44

## 2025-05-22 RX ADMIN — SODIUM CHLORIDE: 0.9 INJECTION, SOLUTION INTRAVENOUS at 07:30

## 2025-05-22 RX ADMIN — Medication 0.2 MCG/KG/HR: at 11:46

## 2025-05-22 RX ADMIN — ARFORMOTEROL TARTRATE 15 MCG: 15 SOLUTION RESPIRATORY (INHALATION) at 07:55

## 2025-05-22 RX ADMIN — ENOXAPARIN SODIUM 30 MG: 100 INJECTION SUBCUTANEOUS at 07:35

## 2025-05-22 RX ADMIN — SODIUM CHLORIDE 30 MG/ML INHALATION SOLUTION 4 ML: 30 SOLUTION INHALANT at 15:39

## 2025-05-22 RX ADMIN — BUSPIRONE HYDROCHLORIDE 5 MG: 10 TABLET ORAL at 22:02

## 2025-05-22 RX ADMIN — LEVALBUTEROL 1.25 MG: 1.25 SOLUTION, CONCENTRATE RESPIRATORY (INHALATION) at 20:05

## 2025-05-22 RX ADMIN — WATER 40 MG: 1 INJECTION INTRAMUSCULAR; INTRAVENOUS; SUBCUTANEOUS at 08:41

## 2025-05-22 RX ADMIN — IPRATROPIUM BROMIDE 0.5 MG: 0.5 SOLUTION RESPIRATORY (INHALATION) at 11:29

## 2025-05-22 RX ADMIN — LEVALBUTEROL 1.25 MG: 1.25 SOLUTION, CONCENTRATE RESPIRATORY (INHALATION) at 11:29

## 2025-05-22 RX ADMIN — BUSPIRONE HYDROCHLORIDE 5 MG: 10 TABLET ORAL at 07:36

## 2025-05-22 RX ADMIN — FENTANYL CITRATE 100 MCG/HR: 0.05 INJECTION, SOLUTION INTRAMUSCULAR; INTRAVENOUS at 03:19

## 2025-05-22 RX ADMIN — LEVALBUTEROL 1.25 MG: 1.25 SOLUTION, CONCENTRATE RESPIRATORY (INHALATION) at 15:39

## 2025-05-22 ASSESSMENT — PULMONARY FUNCTION TESTS
PIF_VALUE: 35
PIF_VALUE: 29
PIF_VALUE: 34
PIF_VALUE: 35
PIF_VALUE: 34
PIF_VALUE: 33
PIF_VALUE: 33
PIF_VALUE: 34
PIF_VALUE: 35
PIF_VALUE: 34
PIF_VALUE: 39
PIF_VALUE: 25
PIF_VALUE: 32
PIF_VALUE: 33
PIF_VALUE: 34
PIF_VALUE: 33
PIF_VALUE: 32
PIF_VALUE: 29
PIF_VALUE: 33
PIF_VALUE: 33
PIF_VALUE: 32
PIF_VALUE: 35
PIF_VALUE: 40
PIF_VALUE: 33
PIF_VALUE: 38
PIF_VALUE: 41
PIF_VALUE: 36
PIF_VALUE: 35
PIF_VALUE: 33
PIF_VALUE: 36
PIF_VALUE: 35
PIF_VALUE: 31
PIF_VALUE: 29
PIF_VALUE: 31
PIF_VALUE: 33
PIF_VALUE: 39
PIF_VALUE: 35
PIF_VALUE: 29
PIF_VALUE: 31
PIF_VALUE: 33
PIF_VALUE: 40
PIF_VALUE: 36
PIF_VALUE: 32
PIF_VALUE: 34
PIF_VALUE: 40
PIF_VALUE: 33
PIF_VALUE: 30
PIF_VALUE: 31
PIF_VALUE: 26
PIF_VALUE: 45
PIF_VALUE: 33
PIF_VALUE: 38
PIF_VALUE: 32
PIF_VALUE: 32
PIF_VALUE: 39
PIF_VALUE: 33
PIF_VALUE: 35
PIF_VALUE: 33
PIF_VALUE: 31
PIF_VALUE: 37
PIF_VALUE: 33
PIF_VALUE: 31
PIF_VALUE: 40
PIF_VALUE: 36
PIF_VALUE: 32
PIF_VALUE: 33
PIF_VALUE: 32
PIF_VALUE: 29
PIF_VALUE: 33
PIF_VALUE: 32
PIF_VALUE: 32
PIF_VALUE: 37
PIF_VALUE: 31
PIF_VALUE: 35
PIF_VALUE: 33
PIF_VALUE: 32
PIF_VALUE: 33
PIF_VALUE: 35
PIF_VALUE: 30
PIF_VALUE: 32
PIF_VALUE: 34
PIF_VALUE: 32
PIF_VALUE: 33
PIF_VALUE: 33
PIF_VALUE: 34
PIF_VALUE: 33
PIF_VALUE: 38

## 2025-05-22 ASSESSMENT — PAIN SCALES - GENERAL
PAINLEVEL_OUTOF10: 0

## 2025-05-22 NOTE — FLOWSHEET NOTE
05/22/25 [facility]   RASS   Freire Agitation Sedation Scale (RASS) +3   Oxygen Therapy   SpO2 (!) (S)  78 %   Pulse Oximetry Type Continuous   SPO2 High Alarm Limit 100   SPO2 Low Alarm Limit POX 92   Pulse Oximeter Device Mode Continuous   Pulse Oximeter Device Location Right;Finger   O2 Device Ventilator   Oximetry Probe Site Changed Yes   Skin Assessment Clean, dry, & intact   Skin Protection for O2 Device Yes   Orientation Bilateral   Location Cheek   Intervention(s) Hydrocolloid Dressing   FiO2  35 %  (increased to 70%)   Blood Gas  Performed? No   Oxygen Therapy Supplemental oxygen   Vent Mode AC/PRVC   Vent Settings   Resp Rate (Set) 26 bpm   Rate Measured 26 br/min   Vt (Set, mL) 400 mL   Vt (Measured) 244 mL   PEEP/CPAP (cmH2O) 5   Peak Inspiratory Pressure (cmH2O) 29 cmH2O

## 2025-05-22 NOTE — CARE COORDINATION
Pt is from [facility] home with her spouse, LILIANA. Pt has O2 at 3 L baseline with [employer]. Pt has not had HHC in past but has been to [facility] in past. Pt remains intubated and sedated and unable  to wean at present. TBD disposition. Electronically signed by Kristine Arnold RN on 5/22/2025 at 5:17 PM    Addendum:  Made referral to [facility] and she will follow. Electronically signed by Kristine Arnold RN on 5/22/2025 at 6:01 PM    .

## 2025-05-22 NOTE — FLOWSHEET NOTE
05/22/25 1949   Vitals   Pulse (!) 106   Respirations (!) 37   ETCO2 (mmHg) 44 mmHg   BP (!) 192/104   MAP (Calculated) 133   MAP (mmHg) 173     Extreme agitation, fighting ventilator, propofol and precedex increased, RASS +2-+3 currently with goal of -1, -2

## 2025-05-23 ENCOUNTER — APPOINTMENT (OUTPATIENT)
Dept: GENERAL RADIOLOGY | Age: 75
DRG: 207 | End: 2025-05-23
Payer: MEDICARE

## 2025-05-23 LAB
ALBUMIN SERPL-MCNC: 3.8 G/DL (ref 3.4–5)
ANION GAP SERPL CALCULATED.3IONS-SCNC: 9 MMOL/L (ref 3–16)
BACTERIA BLD CULT ORG #2: NORMAL
BACTERIA BLD CULT: NORMAL
BASOPHILS # BLD: 0 K/UL (ref 0–0.2)
BASOPHILS NFR BLD: 0.1 %
BUN SERPL-MCNC: 52 MG/DL (ref 7–20)
CALCIUM SERPL-MCNC: 10 MG/DL (ref 8.3–10.6)
CHLORIDE SERPL-SCNC: 100 MMOL/L (ref 99–110)
CO2 SERPL-SCNC: 32 MMOL/L (ref 21–32)
CREAT SERPL-MCNC: 0.6 MG/DL (ref 0.6–1.2)
DEPRECATED RDW RBC AUTO: 13 % (ref 12.4–15.4)
EOSINOPHIL # BLD: 0 K/UL (ref 0–0.6)
EOSINOPHIL NFR BLD: 0 %
GFR SERPLBLD CREATININE-BSD FMLA CKD-EPI: >90 ML/MIN/{1.73_M2}
GLUCOSE SERPL-MCNC: 114 MG/DL (ref 70–99)
HCT VFR BLD AUTO: 35.9 % (ref 36–48)
HGB BLD-MCNC: 12.2 G/DL (ref 12–16)
LYMPHOCYTES # BLD: 0.7 K/UL (ref 1–5.1)
LYMPHOCYTES NFR BLD: 7.8 %
MAGNESIUM SERPL-MCNC: 1.83 MG/DL (ref 1.8–2.4)
MCH RBC QN AUTO: 32.7 PG (ref 26–34)
MCHC RBC AUTO-ENTMCNC: 34 G/DL (ref 31–36)
MCV RBC AUTO: 96.1 FL (ref 80–100)
MONOCYTES # BLD: 0.6 K/UL (ref 0–1.3)
MONOCYTES NFR BLD: 7.3 %
NEUTROPHILS # BLD: 7.5 K/UL (ref 1.7–7.7)
NEUTROPHILS NFR BLD: 84.8 %
PHOSPHATE SERPL-MCNC: 4.1 MG/DL (ref 2.5–4.9)
PLATELET # BLD AUTO: 242 K/UL (ref 135–450)
PMV BLD AUTO: 8.1 FL (ref 5–10.5)
POTASSIUM SERPL-SCNC: 4.6 MMOL/L (ref 3.5–5.1)
RBC # BLD AUTO: 3.74 M/UL (ref 4–5.2)
SODIUM SERPL-SCNC: 141 MMOL/L (ref 136–145)
WBC # BLD AUTO: 8.8 K/UL (ref 4–11)

## 2025-05-23 PROCEDURE — 2700000000 HC OXYGEN THERAPY PER DAY

## 2025-05-23 PROCEDURE — 36415 COLL VENOUS BLD VENIPUNCTURE: CPT

## 2025-05-23 PROCEDURE — 94640 AIRWAY INHALATION TREATMENT: CPT

## 2025-05-23 PROCEDURE — 6360000002 HC RX W HCPCS

## 2025-05-23 PROCEDURE — 2500000003 HC RX 250 WO HCPCS

## 2025-05-23 PROCEDURE — 93005 ELECTROCARDIOGRAM TRACING: CPT

## 2025-05-23 PROCEDURE — 2580000003 HC RX 258

## 2025-05-23 PROCEDURE — 2000000000 HC ICU R&B

## 2025-05-23 PROCEDURE — 94003 VENT MGMT INPAT SUBQ DAY: CPT

## 2025-05-23 PROCEDURE — 85025 COMPLETE CBC W/AUTO DIFF WBC: CPT

## 2025-05-23 PROCEDURE — 71045 X-RAY EXAM CHEST 1 VIEW: CPT

## 2025-05-23 PROCEDURE — 94761 N-INVAS EAR/PLS OXIMETRY MLT: CPT

## 2025-05-23 PROCEDURE — 6370000000 HC RX 637 (ALT 250 FOR IP)

## 2025-05-23 PROCEDURE — 80069 RENAL FUNCTION PANEL: CPT

## 2025-05-23 PROCEDURE — 83735 ASSAY OF MAGNESIUM: CPT

## 2025-05-23 RX ORDER — QUETIAPINE FUMARATE 25 MG/1
50 TABLET, FILM COATED ORAL 2 TIMES DAILY
Status: DISCONTINUED | OUTPATIENT
Start: 2025-05-23 | End: 2025-06-04 | Stop reason: HOSPADM

## 2025-05-23 RX ORDER — QUETIAPINE FUMARATE 25 MG/1
25 TABLET, FILM COATED ORAL 2 TIMES DAILY
Status: DISCONTINUED | OUTPATIENT
Start: 2025-05-23 | End: 2025-05-23

## 2025-05-23 RX ORDER — SODIUM CHLORIDE FOR INHALATION 3 %
4 VIAL, NEBULIZER (ML) INHALATION
Status: DISCONTINUED | OUTPATIENT
Start: 2025-05-24 | End: 2025-06-04 | Stop reason: HOSPADM

## 2025-05-23 RX ORDER — LISINOPRIL 2.5 MG/1
2.5 TABLET ORAL EVERY EVENING
Status: DISCONTINUED | OUTPATIENT
Start: 2025-05-23 | End: 2025-06-04 | Stop reason: HOSPADM

## 2025-05-23 RX ORDER — METOPROLOL SUCCINATE 25 MG/1
12.5 TABLET, EXTENDED RELEASE ORAL 2 TIMES DAILY
Status: DISCONTINUED | OUTPATIENT
Start: 2025-05-23 | End: 2025-06-04 | Stop reason: HOSPADM

## 2025-05-23 RX ADMIN — LEVOFLOXACIN 750 MG: 750 INJECTION, SOLUTION INTRAVENOUS at 13:30

## 2025-05-23 RX ADMIN — HYDROXYZINE HYDROCHLORIDE 25 MG: 25 TABLET, FILM COATED ORAL at 06:11

## 2025-05-23 RX ADMIN — Medication 1.3 MCG/KG/HR: at 00:36

## 2025-05-23 RX ADMIN — POLYETHYLENE GLYCOL 3350 17 G: 17 POWDER, FOR SOLUTION ORAL at 08:42

## 2025-05-23 RX ADMIN — QUETIAPINE FUMARATE 50 MG: 25 TABLET ORAL at 21:44

## 2025-05-23 RX ADMIN — WATER 2.5 MG: 1 INJECTION INTRAMUSCULAR; INTRAVENOUS; SUBCUTANEOUS at 09:42

## 2025-05-23 RX ADMIN — LEVALBUTEROL 1.25 MG: 1.25 SOLUTION, CONCENTRATE RESPIRATORY (INHALATION) at 12:23

## 2025-05-23 RX ADMIN — SODIUM CHLORIDE, PRESERVATIVE FREE 10 ML: 5 INJECTION INTRAVENOUS at 21:47

## 2025-05-23 RX ADMIN — ASPIRIN 81 MG: 81 TABLET, CHEWABLE ORAL at 08:42

## 2025-05-23 RX ADMIN — ENOXAPARIN SODIUM 40 MG: 100 INJECTION SUBCUTANEOUS at 08:43

## 2025-05-23 RX ADMIN — Medication: at 21:46

## 2025-05-23 RX ADMIN — BUDESONIDE 500 MCG: 0.5 SUSPENSION RESPIRATORY (INHALATION) at 19:58

## 2025-05-23 RX ADMIN — LEVALBUTEROL 1.25 MG: 1.25 SOLUTION, CONCENTRATE RESPIRATORY (INHALATION) at 15:43

## 2025-05-23 RX ADMIN — SODIUM CHLORIDE, PRESERVATIVE FREE 40 MG: 5 INJECTION INTRAVENOUS at 08:42

## 2025-05-23 RX ADMIN — Medication 1.3 MCG/KG/HR: at 17:53

## 2025-05-23 RX ADMIN — ARFORMOTEROL TARTRATE 15 MCG: 15 SOLUTION RESPIRATORY (INHALATION) at 08:44

## 2025-05-23 RX ADMIN — PROPOFOL 45 MCG/KG/MIN: 10 INJECTION, EMULSION INTRAVENOUS at 20:01

## 2025-05-23 RX ADMIN — SODIUM CHLORIDE 30 MG/ML INHALATION SOLUTION 4 ML: 30 SOLUTION INHALANT at 08:45

## 2025-05-23 RX ADMIN — METOPROLOL SUCCINATE 12.5 MG: 25 TABLET, FILM COATED, EXTENDED RELEASE ORAL at 03:31

## 2025-05-23 RX ADMIN — WATER 40 MG: 1 INJECTION INTRAMUSCULAR; INTRAVENOUS; SUBCUTANEOUS at 21:44

## 2025-05-23 RX ADMIN — CYANOCOBALAMIN TAB 1000 MCG 500 MCG: 1000 TAB at 21:44

## 2025-05-23 RX ADMIN — PROPOFOL 45 MCG/KG/MIN: 10 INJECTION, EMULSION INTRAVENOUS at 06:19

## 2025-05-23 RX ADMIN — ARFORMOTEROL TARTRATE 15 MCG: 15 SOLUTION RESPIRATORY (INHALATION) at 19:58

## 2025-05-23 RX ADMIN — IPRATROPIUM BROMIDE 0.5 MG: 0.5 SOLUTION RESPIRATORY (INHALATION) at 12:23

## 2025-05-23 RX ADMIN — SODIUM CHLORIDE 30 MG/ML INHALATION SOLUTION 4 ML: 30 SOLUTION INHALANT at 15:43

## 2025-05-23 RX ADMIN — Medication 1.5 MCG/KG/HR: at 11:05

## 2025-05-23 RX ADMIN — Medication 1.3 MCG/KG/HR: at 06:21

## 2025-05-23 RX ADMIN — BUSPIRONE HYDROCHLORIDE 5 MG: 10 TABLET ORAL at 21:44

## 2025-05-23 RX ADMIN — PROPOFOL 50 MCG/KG/MIN: 10 INJECTION, EMULSION INTRAVENOUS at 00:00

## 2025-05-23 RX ADMIN — SODIUM CHLORIDE 30 MG/ML INHALATION SOLUTION 4 ML: 30 SOLUTION INHALANT at 12:24

## 2025-05-23 RX ADMIN — LEVALBUTEROL 1.25 MG: 1.25 SOLUTION, CONCENTRATE RESPIRATORY (INHALATION) at 08:43

## 2025-05-23 RX ADMIN — Medication: at 08:41

## 2025-05-23 RX ADMIN — IPRATROPIUM BROMIDE 0.5 MG: 0.5 SOLUTION RESPIRATORY (INHALATION) at 15:43

## 2025-05-23 RX ADMIN — LEVALBUTEROL 1.25 MG: 1.25 SOLUTION, CONCENTRATE RESPIRATORY (INHALATION) at 19:58

## 2025-05-23 RX ADMIN — Medication 1.5 MCG/KG/HR: at 23:11

## 2025-05-23 RX ADMIN — Medication 1000 UNITS: at 21:44

## 2025-05-23 RX ADMIN — BUDESONIDE 500 MCG: 0.5 SUSPENSION RESPIRATORY (INHALATION) at 08:44

## 2025-05-23 RX ADMIN — IPRATROPIUM BROMIDE 0.5 MG: 0.5 SOLUTION RESPIRATORY (INHALATION) at 19:58

## 2025-05-23 RX ADMIN — LISINOPRIL 2.5 MG: 2.5 TABLET ORAL at 03:33

## 2025-05-23 RX ADMIN — BUSPIRONE HYDROCHLORIDE 5 MG: 10 TABLET ORAL at 08:42

## 2025-05-23 RX ADMIN — SODIUM CHLORIDE 30 MG/ML INHALATION SOLUTION 4 ML: 30 SOLUTION INHALANT at 19:58

## 2025-05-23 RX ADMIN — WATER 40 MG: 1 INJECTION INTRAMUSCULAR; INTRAVENOUS; SUBCUTANEOUS at 08:43

## 2025-05-23 RX ADMIN — QUETIAPINE FUMARATE 25 MG: 25 TABLET ORAL at 08:42

## 2025-05-23 RX ADMIN — IPRATROPIUM BROMIDE 0.5 MG: 0.5 SOLUTION RESPIRATORY (INHALATION) at 08:43

## 2025-05-23 RX ADMIN — BUSPIRONE HYDROCHLORIDE 5 MG: 10 TABLET ORAL at 13:23

## 2025-05-23 RX ADMIN — DOCUSATE SODIUM 50 MG AND SENNOSIDES 8.6 MG 2 TABLET: 8.6; 5 TABLET, FILM COATED ORAL at 08:42

## 2025-05-23 RX ADMIN — PROPOFOL 40 MCG/KG/MIN: 10 INJECTION, EMULSION INTRAVENOUS at 14:13

## 2025-05-23 ASSESSMENT — PULMONARY FUNCTION TESTS
PIF_VALUE: 26
PIF_VALUE: 33
PIF_VALUE: 30
PIF_VALUE: 29
PIF_VALUE: 30
PIF_VALUE: 32
PIF_VALUE: 31
PIF_VALUE: 27
PIF_VALUE: 28
PIF_VALUE: 30
PIF_VALUE: 31
PIF_VALUE: 26
PIF_VALUE: 31
PIF_VALUE: 27
PIF_VALUE: 24
PIF_VALUE: 29
PIF_VALUE: 27
PIF_VALUE: 25
PIF_VALUE: 35
PIF_VALUE: 22
PIF_VALUE: 33
PIF_VALUE: 26
PIF_VALUE: 26
PIF_VALUE: 31
PIF_VALUE: 29
PIF_VALUE: 29
PIF_VALUE: 30
PIF_VALUE: 29
PIF_VALUE: 37
PIF_VALUE: 31
PIF_VALUE: 31
PIF_VALUE: 27
PIF_VALUE: 29
PIF_VALUE: 31
PIF_VALUE: 26
PIF_VALUE: 31
PIF_VALUE: 26
PIF_VALUE: 27
PIF_VALUE: 44
PIF_VALUE: 28
PIF_VALUE: 27
PIF_VALUE: 34
PIF_VALUE: 33
PIF_VALUE: 27
PIF_VALUE: 34
PIF_VALUE: 29
PIF_VALUE: 29
PIF_VALUE: 31
PIF_VALUE: 32
PIF_VALUE: 29
PIF_VALUE: 30
PIF_VALUE: 31
PIF_VALUE: 32
PIF_VALUE: 29
PIF_VALUE: 35
PIF_VALUE: 26
PIF_VALUE: 23
PIF_VALUE: 24
PIF_VALUE: 30
PIF_VALUE: 26
PIF_VALUE: 27

## 2025-05-23 ASSESSMENT — PAIN SCALES - GENERAL
PAINLEVEL_OUTOF10: 0
PAINLEVEL_OUTOF10: 0

## 2025-05-23 NOTE — CARE COORDINATION
Case Management Assessment           Daily Note                 Date/ Time of Note: 5/23/2025 10:52 AM         Note completed by: Miky Livingston RN    Patient Name: Charlene Nazario  YOB: 1950    Diagnosis:Dyspnea and respiratory abnormalities [R06.00, R06.89]  COPD exacerbation (HCC) [J44.1]  COPD with acute exacerbation (HCC) [J44.1]  Patient Admission Status: Inpatient  Date of Admission:5/16/2025  6:39 AM    Length of Stay: 7 GLOS: GMLOS: 12.8 Readmission Risk Score: Readmission Risk Score: 22    Current Plan of Care: intb/sed; trial SBT, became agitated; cx pending,     ________________________________________________________________________________________  Discharge Plan: To Be Determined DUE TO: medical course  Pre-cert required for SNF: NO  COVID Result:    Lab Results   Component Value Date/Time    COVID19 NOT DETECTED 05/16/2025 07:08 AM    COVID19 NOT DETECTED 01/29/2021 06:17 PM       Transportation PLAN for discharge:  tbd    Tentative discharge date: tbd    Potential assistance Purchasing Medications: Potential Assistance Purchasing Medications: No  Does Patient want to participate in local refill/ meds to beds program?:      Current barriers to discharge: Medical complications    Referrals completed: [facility]: [facility]    Resources/ information provided:   ________________________________________________________________________________________  Case Management Notes: patient is from home, active with [employer] O2.   [facility] following for post acute needs. Remains intubated and sedated at this time. CM following    Charlene and her family were provided with choice of provider; she and her family are in agreement with the discharge plan.    Emergency Contacts:  Extended Emergency Contact Information  Primary Emergency Contact: Norberto Nazario   [facility]  Home Phone: [phone]  Mobile Phone: [phone]  Relation: Spouse  Secondary Emergency Contact:

## 2025-05-23 NOTE — FLOWSHEET NOTE
05/23/25 0230 05/23/25 0234 05/23/25 0237   Vitals   Pulse 64 67 65   Heart Rate Source  --   --  Monitor   Respirations 26 26 26   ETCO2 (mmHg) 30 mmHg 30 mmHg 30 mmHg   BP (!) 180/111 (!) 186/103 (!) 181/111   MAP (Calculated) 134 131 134   MAP (mmHg) 132 125  --    BP Location (S)  Right upper arm  (small BP cuff) (S)  Left upper arm  (changed to Regular bp cuff) (S)  Right upper arm  (regular BP cuff)   BP Method Automatic Automatic  --    Patient Position Semi fowlers Semi fowlers  --    Cardiac Rhythm Sinus rhythm Sinus rhythm  --      ICU Resident Summer contacted via perfect serve. Requesting prn anti htn

## 2025-05-23 NOTE — FLOWSHEET NOTE
05/23/25 0100 05/23/25 0105 05/23/25 0200   Vitals   Pulse 60 58 59   Respirations 26 26 26   ETCO2 (mmHg) 27 mmHg 27 mmHg 29 mmHg   BP (!) 159/102 (!) 169/104 (!) 168/106   MAP (Calculated) 121 126 127   MAP (mmHg) 118 119 122   Cardiac Rhythm  --  Sinus lorenzo Sinus lorenzo     ICU resident aware of BP trending upward, discussed reordering pts home anti htn medication or adding prn anti htn IV with parameters

## 2025-05-24 ENCOUNTER — APPOINTMENT (OUTPATIENT)
Dept: GENERAL RADIOLOGY | Age: 75
DRG: 207 | End: 2025-05-24
Payer: MEDICARE

## 2025-05-24 LAB
ALBUMIN SERPL-MCNC: 3.3 G/DL (ref 3.4–5)
ANION GAP SERPL CALCULATED.3IONS-SCNC: 8 MMOL/L (ref 3–16)
BASOPHILS # BLD: 0 K/UL (ref 0–0.2)
BASOPHILS NFR BLD: 0.1 %
BUN SERPL-MCNC: 41 MG/DL (ref 7–20)
CALCIUM SERPL-MCNC: 9.6 MG/DL (ref 8.3–10.6)
CHLORIDE SERPL-SCNC: 102 MMOL/L (ref 99–110)
CO2 SERPL-SCNC: 33 MMOL/L (ref 21–32)
CREAT SERPL-MCNC: 0.6 MG/DL (ref 0.6–1.2)
DEPRECATED RDW RBC AUTO: 13.6 % (ref 12.4–15.4)
EKG ATRIAL RATE: 106 BPM
EKG ATRIAL RATE: 126 BPM
EKG DIAGNOSIS: NORMAL
EKG DIAGNOSIS: NORMAL
EKG P AXIS: 79 DEGREES
EKG P AXIS: 83 DEGREES
EKG P-R INTERVAL: 144 MS
EKG P-R INTERVAL: 150 MS
EKG Q-T INTERVAL: 282 MS
EKG Q-T INTERVAL: 304 MS
EKG QRS DURATION: 66 MS
EKG QRS DURATION: 70 MS
EKG QTC CALCULATION (BAZETT): 403 MS
EKG QTC CALCULATION (BAZETT): 408 MS
EKG R AXIS: 48 DEGREES
EKG R AXIS: 65 DEGREES
EKG T AXIS: 104 DEGREES
EKG T AXIS: 75 DEGREES
EKG VENTRICULAR RATE: 106 BPM
EKG VENTRICULAR RATE: 126 BPM
EOSINOPHIL # BLD: 0 K/UL (ref 0–0.6)
EOSINOPHIL NFR BLD: 0 %
GALACTOMANNAN AG SERPL IA-ACNC: 0.03
GALACTOMANNAN AG SERPL QL IA: NEGATIVE
GFR SERPLBLD CREATININE-BSD FMLA CKD-EPI: >90 ML/MIN/{1.73_M2}
GLUCOSE SERPL-MCNC: 176 MG/DL (ref 70–99)
HCT VFR BLD AUTO: 32.4 % (ref 36–48)
HGB BLD-MCNC: 11 G/DL (ref 12–16)
LYMPHOCYTES # BLD: 0.5 K/UL (ref 1–5.1)
LYMPHOCYTES NFR BLD: 8.4 %
Lab: NORMAL
MAGNESIUM SERPL-MCNC: 1.69 MG/DL (ref 1.8–2.4)
MCH RBC QN AUTO: 32.2 PG (ref 26–34)
MCHC RBC AUTO-ENTMCNC: 33.8 G/DL (ref 31–36)
MCV RBC AUTO: 95.3 FL (ref 80–100)
MONOCYTES # BLD: 0.4 K/UL (ref 0–1.3)
MONOCYTES NFR BLD: 6 %
NEUTROPHILS # BLD: 5.1 K/UL (ref 1.7–7.7)
NEUTROPHILS NFR BLD: 85.5 %
PHOSPHATE SERPL-MCNC: 4 MG/DL (ref 2.5–4.9)
PLATELET # BLD AUTO: 218 K/UL (ref 135–450)
PMV BLD AUTO: 8.2 FL (ref 5–10.5)
POTASSIUM SERPL-SCNC: 4.9 MMOL/L (ref 3.5–5.1)
RBC # BLD AUTO: 3.4 M/UL (ref 4–5.2)
REPORT: NORMAL
SODIUM SERPL-SCNC: 143 MMOL/L (ref 136–145)
THIS TEST SENT TO: NORMAL
WBC # BLD AUTO: 6 K/UL (ref 4–11)

## 2025-05-24 PROCEDURE — 6370000000 HC RX 637 (ALT 250 FOR IP)

## 2025-05-24 PROCEDURE — 83735 ASSAY OF MAGNESIUM: CPT

## 2025-05-24 PROCEDURE — 6360000002 HC RX W HCPCS

## 2025-05-24 PROCEDURE — 94640 AIRWAY INHALATION TREATMENT: CPT

## 2025-05-24 PROCEDURE — 2700000000 HC OXYGEN THERAPY PER DAY

## 2025-05-24 PROCEDURE — 2500000003 HC RX 250 WO HCPCS

## 2025-05-24 PROCEDURE — 93010 ELECTROCARDIOGRAM REPORT: CPT | Performed by: INTERNAL MEDICINE

## 2025-05-24 PROCEDURE — 85025 COMPLETE CBC W/AUTO DIFF WBC: CPT

## 2025-05-24 PROCEDURE — 36415 COLL VENOUS BLD VENIPUNCTURE: CPT

## 2025-05-24 PROCEDURE — 94003 VENT MGMT INPAT SUBQ DAY: CPT

## 2025-05-24 PROCEDURE — 71045 X-RAY EXAM CHEST 1 VIEW: CPT

## 2025-05-24 PROCEDURE — 2000000000 HC ICU R&B

## 2025-05-24 PROCEDURE — 80069 RENAL FUNCTION PANEL: CPT

## 2025-05-24 PROCEDURE — 93005 ELECTROCARDIOGRAM TRACING: CPT

## 2025-05-24 PROCEDURE — 2580000003 HC RX 258

## 2025-05-24 PROCEDURE — 94761 N-INVAS EAR/PLS OXIMETRY MLT: CPT

## 2025-05-24 PROCEDURE — 2580000003 HC RX 258: Performed by: INTERNAL MEDICINE

## 2025-05-24 RX ORDER — METOPROLOL TARTRATE 25 MG/1
12.5 TABLET, FILM COATED ORAL 2 TIMES DAILY
Status: DISCONTINUED | OUTPATIENT
Start: 2025-05-24 | End: 2025-05-24

## 2025-05-24 RX ORDER — MAGNESIUM SULFATE IN WATER 40 MG/ML
2000 INJECTION, SOLUTION INTRAVENOUS ONCE
Status: COMPLETED | OUTPATIENT
Start: 2025-05-24 | End: 2025-05-24

## 2025-05-24 RX ORDER — METOPROLOL TARTRATE 25 MG/1
12.5 TABLET, FILM COATED ORAL 2 TIMES DAILY
Status: DISCONTINUED | OUTPATIENT
Start: 2025-05-24 | End: 2025-05-28

## 2025-05-24 RX ADMIN — LEVALBUTEROL 1.25 MG: 1.25 SOLUTION, CONCENTRATE RESPIRATORY (INHALATION) at 07:44

## 2025-05-24 RX ADMIN — DOCUSATE SODIUM 50 MG AND SENNOSIDES 8.6 MG 2 TABLET: 8.6; 5 TABLET, FILM COATED ORAL at 08:32

## 2025-05-24 RX ADMIN — Medication 1.5 MCG/KG/HR: at 15:15

## 2025-05-24 RX ADMIN — ENOXAPARIN SODIUM 40 MG: 100 INJECTION SUBCUTANEOUS at 08:32

## 2025-05-24 RX ADMIN — QUETIAPINE FUMARATE 50 MG: 25 TABLET ORAL at 19:46

## 2025-05-24 RX ADMIN — SODIUM CHLORIDE 5 MCG/MIN: 0.9 INJECTION, SOLUTION INTRAVENOUS at 20:47

## 2025-05-24 RX ADMIN — Medication 1.5 MCG/KG/HR: at 21:11

## 2025-05-24 RX ADMIN — IPRATROPIUM BROMIDE 0.5 MG: 0.5 SOLUTION RESPIRATORY (INHALATION) at 10:59

## 2025-05-24 RX ADMIN — Medication: at 19:46

## 2025-05-24 RX ADMIN — BUDESONIDE 500 MCG: 0.5 SUSPENSION RESPIRATORY (INHALATION) at 20:50

## 2025-05-24 RX ADMIN — PROPOFOL 45 MCG/KG/MIN: 10 INJECTION, EMULSION INTRAVENOUS at 04:11

## 2025-05-24 RX ADMIN — Medication 1.5 MCG/KG/HR: at 10:00

## 2025-05-24 RX ADMIN — SODIUM CHLORIDE 0.2 MG/KG/HR: 9 INJECTION, SOLUTION INTRAVENOUS at 18:44

## 2025-05-24 RX ADMIN — CYANOCOBALAMIN TAB 1000 MCG 500 MCG: 1000 TAB at 19:45

## 2025-05-24 RX ADMIN — LEVALBUTEROL 1.25 MG: 1.25 SOLUTION, CONCENTRATE RESPIRATORY (INHALATION) at 10:59

## 2025-05-24 RX ADMIN — BUSPIRONE HYDROCHLORIDE 5 MG: 10 TABLET ORAL at 14:12

## 2025-05-24 RX ADMIN — WATER 2.5 MG: 1 INJECTION INTRAMUSCULAR; INTRAVENOUS; SUBCUTANEOUS at 12:32

## 2025-05-24 RX ADMIN — Medication 4 ML: at 20:49

## 2025-05-24 RX ADMIN — Medication 1.5 MCG/KG/HR: at 04:56

## 2025-05-24 RX ADMIN — IPRATROPIUM BROMIDE 0.5 MG: 0.5 SOLUTION RESPIRATORY (INHALATION) at 07:44

## 2025-05-24 RX ADMIN — WATER 40 MG: 1 INJECTION INTRAMUSCULAR; INTRAVENOUS; SUBCUTANEOUS at 20:40

## 2025-05-24 RX ADMIN — Medication 4 ML: at 07:44

## 2025-05-24 RX ADMIN — IPRATROPIUM BROMIDE 0.5 MG: 0.5 SOLUTION RESPIRATORY (INHALATION) at 15:12

## 2025-05-24 RX ADMIN — LEVALBUTEROL 1.25 MG: 1.25 SOLUTION, CONCENTRATE RESPIRATORY (INHALATION) at 15:12

## 2025-05-24 RX ADMIN — Medication: at 08:33

## 2025-05-24 RX ADMIN — ARFORMOTEROL TARTRATE 15 MCG: 15 SOLUTION RESPIRATORY (INHALATION) at 20:50

## 2025-05-24 RX ADMIN — ARFORMOTEROL TARTRATE 15 MCG: 15 SOLUTION RESPIRATORY (INHALATION) at 07:44

## 2025-05-24 RX ADMIN — PROPOFOL 15 MCG/KG/MIN: 10 INJECTION, EMULSION INTRAVENOUS at 20:59

## 2025-05-24 RX ADMIN — Medication 1000 UNITS: at 19:46

## 2025-05-24 RX ADMIN — BUSPIRONE HYDROCHLORIDE 5 MG: 10 TABLET ORAL at 08:32

## 2025-05-24 RX ADMIN — MAGNESIUM SULFATE HEPTAHYDRATE 2000 MG: 40 INJECTION, SOLUTION INTRAVENOUS at 08:30

## 2025-05-24 RX ADMIN — SODIUM CHLORIDE, PRESERVATIVE FREE 40 MG: 5 INJECTION INTRAVENOUS at 08:33

## 2025-05-24 RX ADMIN — SODIUM CHLORIDE, PRESERVATIVE FREE 10 ML: 5 INJECTION INTRAVENOUS at 19:46

## 2025-05-24 RX ADMIN — POLYETHYLENE GLYCOL 3350 17 G: 17 POWDER, FOR SOLUTION ORAL at 08:32

## 2025-05-24 RX ADMIN — BUDESONIDE 500 MCG: 0.5 SUSPENSION RESPIRATORY (INHALATION) at 07:44

## 2025-05-24 RX ADMIN — LEVOFLOXACIN 750 MG: 750 INJECTION, SOLUTION INTRAVENOUS at 14:12

## 2025-05-24 RX ADMIN — IPRATROPIUM BROMIDE 0.5 MG: 0.5 SOLUTION RESPIRATORY (INHALATION) at 20:50

## 2025-05-24 RX ADMIN — ACETAMINOPHEN 650 MG: 325 TABLET ORAL at 20:40

## 2025-05-24 RX ADMIN — WATER 40 MG: 1 INJECTION INTRAMUSCULAR; INTRAVENOUS; SUBCUTANEOUS at 10:05

## 2025-05-24 RX ADMIN — PROPOFOL 45 MCG/KG/MIN: 10 INJECTION, EMULSION INTRAVENOUS at 10:04

## 2025-05-24 RX ADMIN — ASPIRIN 81 MG: 81 TABLET, CHEWABLE ORAL at 08:32

## 2025-05-24 RX ADMIN — BUSPIRONE HYDROCHLORIDE 5 MG: 10 TABLET ORAL at 19:46

## 2025-05-24 RX ADMIN — ACETAMINOPHEN 650 MG: 325 TABLET ORAL at 14:45

## 2025-05-24 RX ADMIN — LEVALBUTEROL 1.25 MG: 1.25 SOLUTION, CONCENTRATE RESPIRATORY (INHALATION) at 20:50

## 2025-05-24 RX ADMIN — SODIUM CHLORIDE, PRESERVATIVE FREE 10 ML: 5 INJECTION INTRAVENOUS at 08:33

## 2025-05-24 RX ADMIN — QUETIAPINE FUMARATE 50 MG: 25 TABLET ORAL at 08:32

## 2025-05-24 ASSESSMENT — PULMONARY FUNCTION TESTS
PIF_VALUE: 32
PIF_VALUE: 26
PIF_VALUE: 31
PIF_VALUE: 29
PIF_VALUE: 32
PIF_VALUE: 27
PIF_VALUE: 30
PIF_VALUE: 27
PIF_VALUE: 28
PIF_VALUE: 30
PIF_VALUE: 30
PIF_VALUE: 32
PIF_VALUE: 33
PIF_VALUE: 27
PIF_VALUE: 31
PIF_VALUE: 29
PIF_VALUE: 28
PIF_VALUE: 27
PIF_VALUE: 40
PIF_VALUE: 28
PIF_VALUE: 33
PIF_VALUE: 31
PIF_VALUE: 32
PIF_VALUE: 31
PIF_VALUE: 30
PIF_VALUE: 33
PIF_VALUE: 33
PIF_VALUE: 32
PIF_VALUE: 31
PIF_VALUE: 29
PIF_VALUE: 35
PIF_VALUE: 28
PIF_VALUE: 28
PIF_VALUE: 29
PIF_VALUE: 31
PIF_VALUE: 31
PIF_VALUE: 29
PIF_VALUE: 25
PIF_VALUE: 28
PIF_VALUE: 29
PIF_VALUE: 29
PIF_VALUE: 27
PIF_VALUE: 35
PIF_VALUE: 33
PIF_VALUE: 33
PIF_VALUE: 31
PIF_VALUE: 29
PIF_VALUE: 30
PIF_VALUE: 33
PIF_VALUE: 27
PIF_VALUE: 25
PIF_VALUE: 35
PIF_VALUE: 32
PIF_VALUE: 29
PIF_VALUE: 33
PIF_VALUE: 28
PIF_VALUE: 30
PIF_VALUE: 25
PIF_VALUE: 30
PIF_VALUE: 29
PIF_VALUE: 30
PIF_VALUE: 26
PIF_VALUE: 29
PIF_VALUE: 29
PIF_VALUE: 30
PIF_VALUE: 31
PIF_VALUE: 30
PIF_VALUE: 29
PIF_VALUE: 29
PIF_VALUE: 34
PIF_VALUE: 34
PIF_VALUE: 25
PIF_VALUE: 27
PIF_VALUE: 34
PIF_VALUE: 29

## 2025-05-24 ASSESSMENT — PAIN SCALES - GENERAL
PAINLEVEL_OUTOF10: 0
PAINLEVEL_OUTOF10: 3
PAINLEVEL_OUTOF10: 0

## 2025-05-24 NOTE — PROGRESS NOTES
Physician Progress Note      PATIENT:               VIVIANA MARTIN  CSN #:                  [id number]  :                       1950  ADMIT DATE:       2025 6:48 AM  DISCH DATE:        2025 1:49 PM  RESPONDING  PROVIDER #:        Aftab Laurent MD          QUERY TEXT:    Dear Dr. Ugalde,  Please clarify the patient?s nutritional status:    The clinical indicators include:  74 yo f presents with sob, hx of copd  -COPD CHF HLD HTN  -Noted documentation of, 'Moderate Malnutrition' per dietitian consult note   dated , manifested by, weight loss of 7.5 % over 3 months, mild body fat   loss orbitals buccal region, muscle mass loss to temples clavicles  -Dietitian consult, continue regular diet, Ensure + HP twice daily, monitor   pertinent labs, I/Os, bowel regime, response to treatment, consistent   documentation of % of meals consumed      Thank you, in advance,  Adele Gonzalez RN BSN CRCR  Clinical   [email or web address]  Options provided:  -- Protein calorie malnutrition moderate  -- Other - I will add my own diagnosis  -- Disagree - Not applicable / Not valid  -- Disagree - Clinically unable to determine / Unknown  -- Refer to Clinical Documentation Reviewer    PROVIDER RESPONSE TEXT:    This patient has moderate protein calorie malnutrition.    Query created by: Adele Gonzalez on 2025 4:23 PM      Electronically signed by:  Aftab Laurent MD 2025 11:26 AM

## 2025-05-25 ENCOUNTER — APPOINTMENT (OUTPATIENT)
Dept: GENERAL RADIOLOGY | Age: 75
DRG: 207 | End: 2025-05-25
Payer: MEDICARE

## 2025-05-25 LAB
(1,3)-BETA-D-GLUCAN (FUNGITELL) INTERPRETATION: NEGATIVE
1,3 BETA GLUCAN SER-MCNC: <31 PG/ML
ALBUMIN SERPL-MCNC: 3.6 G/DL (ref 3.4–5)
ALBUMIN SERPL-MCNC: 3.6 G/DL (ref 3.4–5)
ALP SERPL-CCNC: 47 U/L (ref 40–129)
ALT SERPL-CCNC: 25 U/L (ref 10–40)
ANION GAP SERPL CALCULATED.3IONS-SCNC: 9 MMOL/L (ref 3–16)
AST SERPL-CCNC: 15 U/L (ref 15–37)
BASOPHILS # BLD: 0 K/UL (ref 0–0.2)
BASOPHILS NFR BLD: 0.4 %
BILIRUB DIRECT SERPL-MCNC: <0.1 MG/DL (ref 0–0.3)
BILIRUB INDIRECT SERPL-MCNC: 0.2 MG/DL (ref 0–1)
BILIRUB SERPL-MCNC: 0.3 MG/DL (ref 0–1)
BUN SERPL-MCNC: 39 MG/DL (ref 7–20)
CALCIUM SERPL-MCNC: 9.3 MG/DL (ref 8.3–10.6)
CHLORIDE SERPL-SCNC: 103 MMOL/L (ref 99–110)
CO2 SERPL-SCNC: 33 MMOL/L (ref 21–32)
CREAT SERPL-MCNC: 0.6 MG/DL (ref 0.6–1.2)
DEPRECATED RDW RBC AUTO: 13.6 % (ref 12.4–15.4)
EOSINOPHIL # BLD: 0.1 K/UL (ref 0–0.6)
EOSINOPHIL NFR BLD: 1.3 %
GFR SERPLBLD CREATININE-BSD FMLA CKD-EPI: >90 ML/MIN/{1.73_M2}
GLUCOSE SERPL-MCNC: 93 MG/DL (ref 70–99)
HCT VFR BLD AUTO: 35.5 % (ref 36–48)
HGB BLD-MCNC: 11.8 G/DL (ref 12–16)
IGE SERPL-ACNC: 63 KU/L
LYMPHOCYTES # BLD: 1.2 K/UL (ref 1–5.1)
LYMPHOCYTES NFR BLD: 15.4 %
MAGNESIUM SERPL-MCNC: 1.84 MG/DL (ref 1.8–2.4)
MCH RBC QN AUTO: 31.8 PG (ref 26–34)
MCHC RBC AUTO-ENTMCNC: 33.2 G/DL (ref 31–36)
MCV RBC AUTO: 95.7 FL (ref 80–100)
MONOCYTES # BLD: 0.8 K/UL (ref 0–1.3)
MONOCYTES NFR BLD: 9.8 %
NEUTROPHILS # BLD: 5.9 K/UL (ref 1.7–7.7)
NEUTROPHILS NFR BLD: 73.1 %
PHOSPHATE SERPL-MCNC: 3.2 MG/DL (ref 2.5–4.9)
PLATELET # BLD AUTO: 228 K/UL (ref 135–450)
PMV BLD AUTO: 8.4 FL (ref 5–10.5)
POTASSIUM SERPL-SCNC: 4.3 MMOL/L (ref 3.5–5.1)
PROT SERPL-MCNC: 5.9 G/DL (ref 6.4–8.2)
RBC # BLD AUTO: 3.71 M/UL (ref 4–5.2)
SODIUM SERPL-SCNC: 145 MMOL/L (ref 136–145)
TRIGL SERPL-MCNC: 117 MG/DL (ref 0–150)
WBC # BLD AUTO: 8.1 K/UL (ref 4–11)

## 2025-05-25 PROCEDURE — 85025 COMPLETE CBC W/AUTO DIFF WBC: CPT

## 2025-05-25 PROCEDURE — 80076 HEPATIC FUNCTION PANEL: CPT

## 2025-05-25 PROCEDURE — 6360000002 HC RX W HCPCS

## 2025-05-25 PROCEDURE — 94761 N-INVAS EAR/PLS OXIMETRY MLT: CPT

## 2025-05-25 PROCEDURE — 80069 RENAL FUNCTION PANEL: CPT

## 2025-05-25 PROCEDURE — 4A133B1 MONITORING OF ARTERIAL PRESSURE, PERIPHERAL, PERCUTANEOUS APPROACH: ICD-10-PCS

## 2025-05-25 PROCEDURE — 2700000000 HC OXYGEN THERAPY PER DAY

## 2025-05-25 PROCEDURE — 2580000003 HC RX 258

## 2025-05-25 PROCEDURE — 2000000000 HC ICU R&B

## 2025-05-25 PROCEDURE — 71045 X-RAY EXAM CHEST 1 VIEW: CPT

## 2025-05-25 PROCEDURE — 2580000003 HC RX 258: Performed by: INTERNAL MEDICINE

## 2025-05-25 PROCEDURE — 6370000000 HC RX 637 (ALT 250 FOR IP)

## 2025-05-25 PROCEDURE — 2500000003 HC RX 250 WO HCPCS

## 2025-05-25 PROCEDURE — 84478 ASSAY OF TRIGLYCERIDES: CPT

## 2025-05-25 PROCEDURE — 93005 ELECTROCARDIOGRAM TRACING: CPT

## 2025-05-25 PROCEDURE — 94003 VENT MGMT INPAT SUBQ DAY: CPT

## 2025-05-25 PROCEDURE — 36415 COLL VENOUS BLD VENIPUNCTURE: CPT

## 2025-05-25 PROCEDURE — 94640 AIRWAY INHALATION TREATMENT: CPT

## 2025-05-25 PROCEDURE — 83735 ASSAY OF MAGNESIUM: CPT

## 2025-05-25 PROCEDURE — 4A133J1 MONITORING OF ARTERIAL PULSE, PERIPHERAL, PERCUTANEOUS APPROACH: ICD-10-PCS

## 2025-05-25 PROCEDURE — 03HY32Z INSERTION OF MONITORING DEVICE INTO UPPER ARTERY, PERCUTANEOUS APPROACH: ICD-10-PCS

## 2025-05-25 PROCEDURE — 36620 INSERTION CATHETER ARTERY: CPT

## 2025-05-25 RX ORDER — ITRACONAZOLE 10 MG/ML
200 SOLUTION ORAL 2 TIMES DAILY
Status: DISCONTINUED | OUTPATIENT
Start: 2025-05-25 | End: 2025-05-30

## 2025-05-25 RX ORDER — ITRACONAZOLE 100 MG/1
200 CAPSULE ORAL 2 TIMES DAILY
Status: DISCONTINUED | OUTPATIENT
Start: 2025-05-25 | End: 2025-05-25 | Stop reason: ALTCHOICE

## 2025-05-25 RX ADMIN — ARFORMOTEROL TARTRATE 15 MCG: 15 SOLUTION RESPIRATORY (INHALATION) at 20:50

## 2025-05-25 RX ADMIN — ARFORMOTEROL TARTRATE 15 MCG: 15 SOLUTION RESPIRATORY (INHALATION) at 07:22

## 2025-05-25 RX ADMIN — ITRACONAZOLE 200 MG: 10 SOLUTION ORAL at 13:15

## 2025-05-25 RX ADMIN — Medication 1.5 MCG/KG/HR: at 17:09

## 2025-05-25 RX ADMIN — CYANOCOBALAMIN TAB 1000 MCG 500 MCG: 1000 TAB at 20:10

## 2025-05-25 RX ADMIN — BUSPIRONE HYDROCHLORIDE 5 MG: 10 TABLET ORAL at 07:51

## 2025-05-25 RX ADMIN — LEVOFLOXACIN 750 MG: 750 INJECTION, SOLUTION INTRAVENOUS at 14:56

## 2025-05-25 RX ADMIN — SODIUM CHLORIDE 1.07 MG/KG/HR: 9 INJECTION, SOLUTION INTRAVENOUS at 18:22

## 2025-05-25 RX ADMIN — WATER 2.5 MG: 1 INJECTION INTRAMUSCULAR; INTRAVENOUS; SUBCUTANEOUS at 09:00

## 2025-05-25 RX ADMIN — LEVALBUTEROL 1.25 MG: 1.25 SOLUTION, CONCENTRATE RESPIRATORY (INHALATION) at 20:50

## 2025-05-25 RX ADMIN — SODIUM CHLORIDE 0.77 MG/KG/HR: 9 INJECTION, SOLUTION INTRAVENOUS at 06:06

## 2025-05-25 RX ADMIN — ENOXAPARIN SODIUM 40 MG: 100 INJECTION SUBCUTANEOUS at 07:52

## 2025-05-25 RX ADMIN — LEVALBUTEROL 1.25 MG: 1.25 SOLUTION, CONCENTRATE RESPIRATORY (INHALATION) at 07:22

## 2025-05-25 RX ADMIN — Medication 1000 UNITS: at 20:10

## 2025-05-25 RX ADMIN — QUETIAPINE FUMARATE 50 MG: 25 TABLET ORAL at 20:10

## 2025-05-25 RX ADMIN — IPRATROPIUM BROMIDE 0.5 MG: 0.5 SOLUTION RESPIRATORY (INHALATION) at 11:21

## 2025-05-25 RX ADMIN — BUSPIRONE HYDROCHLORIDE 5 MG: 10 TABLET ORAL at 20:10

## 2025-05-25 RX ADMIN — SODIUM CHLORIDE 5 MCG/MIN: 0.9 INJECTION, SOLUTION INTRAVENOUS at 20:27

## 2025-05-25 RX ADMIN — Medication 1.5 MCG/KG/HR: at 07:48

## 2025-05-25 RX ADMIN — PROPOFOL 25 MG: 10 INJECTION, EMULSION INTRAVENOUS at 17:40

## 2025-05-25 RX ADMIN — Medication: at 20:10

## 2025-05-25 RX ADMIN — SODIUM CHLORIDE, PRESERVATIVE FREE 10 ML: 5 INJECTION INTRAVENOUS at 20:11

## 2025-05-25 RX ADMIN — ACETAMINOPHEN 650 MG: 325 TABLET ORAL at 17:04

## 2025-05-25 RX ADMIN — IPRATROPIUM BROMIDE 0.5 MG: 0.5 SOLUTION RESPIRATORY (INHALATION) at 07:22

## 2025-05-25 RX ADMIN — BUSPIRONE HYDROCHLORIDE 5 MG: 10 TABLET ORAL at 14:46

## 2025-05-25 RX ADMIN — Medication: at 07:52

## 2025-05-25 RX ADMIN — QUETIAPINE FUMARATE 50 MG: 25 TABLET ORAL at 07:51

## 2025-05-25 RX ADMIN — SODIUM CHLORIDE, PRESERVATIVE FREE 10 ML: 5 INJECTION INTRAVENOUS at 07:53

## 2025-05-25 RX ADMIN — Medication 1.5 MCG/KG/HR: at 12:47

## 2025-05-25 RX ADMIN — ITRACONAZOLE 200 MG: 10 SOLUTION ORAL at 20:11

## 2025-05-25 RX ADMIN — Medication 4 ML: at 07:23

## 2025-05-25 RX ADMIN — WATER 40 MG: 1 INJECTION INTRAMUSCULAR; INTRAVENOUS; SUBCUTANEOUS at 22:03

## 2025-05-25 RX ADMIN — Medication 1.5 MCG/KG/HR: at 02:52

## 2025-05-25 RX ADMIN — BUDESONIDE 500 MCG: 0.5 SUSPENSION RESPIRATORY (INHALATION) at 20:50

## 2025-05-25 RX ADMIN — Medication 1.5 MCG/KG/HR: at 20:27

## 2025-05-25 RX ADMIN — WATER 40 MG: 1 INJECTION INTRAMUSCULAR; INTRAVENOUS; SUBCUTANEOUS at 10:23

## 2025-05-25 RX ADMIN — ASPIRIN 81 MG: 81 TABLET, CHEWABLE ORAL at 07:51

## 2025-05-25 RX ADMIN — SODIUM CHLORIDE 1 MCG/MIN: 0.9 INJECTION, SOLUTION INTRAVENOUS at 15:49

## 2025-05-25 RX ADMIN — SODIUM CHLORIDE: 0.9 INJECTION, SOLUTION INTRAVENOUS at 14:55

## 2025-05-25 RX ADMIN — LEVALBUTEROL 1.25 MG: 1.25 SOLUTION, CONCENTRATE RESPIRATORY (INHALATION) at 15:33

## 2025-05-25 RX ADMIN — BUDESONIDE 500 MCG: 0.5 SUSPENSION RESPIRATORY (INHALATION) at 07:23

## 2025-05-25 RX ADMIN — LEVALBUTEROL 1.25 MG: 1.25 SOLUTION, CONCENTRATE RESPIRATORY (INHALATION) at 11:21

## 2025-05-25 RX ADMIN — SODIUM CHLORIDE, PRESERVATIVE FREE 40 MG: 5 INJECTION INTRAVENOUS at 07:51

## 2025-05-25 RX ADMIN — IPRATROPIUM BROMIDE 0.5 MG: 0.5 SOLUTION RESPIRATORY (INHALATION) at 15:33

## 2025-05-25 RX ADMIN — IPRATROPIUM BROMIDE 0.5 MG: 0.5 SOLUTION RESPIRATORY (INHALATION) at 20:50

## 2025-05-25 RX ADMIN — Medication 4 ML: at 20:50

## 2025-05-25 ASSESSMENT — PULMONARY FUNCTION TESTS
PIF_VALUE: 43
PIF_VALUE: 35
PIF_VALUE: 36
PIF_VALUE: 34
PIF_VALUE: 30
PIF_VALUE: 35
PIF_VALUE: 36
PIF_VALUE: 31
PIF_VALUE: 33
PIF_VALUE: 37
PIF_VALUE: 36
PIF_VALUE: 33
PIF_VALUE: 33
PIF_VALUE: 35
PIF_VALUE: 35
PIF_VALUE: 30
PIF_VALUE: 29
PIF_VALUE: 38
PIF_VALUE: 33
PIF_VALUE: 27
PIF_VALUE: 30
PIF_VALUE: 40
PIF_VALUE: 38
PIF_VALUE: 36
PIF_VALUE: 27
PIF_VALUE: 34
PIF_VALUE: 39
PIF_VALUE: 40
PIF_VALUE: 33
PIF_VALUE: 42
PIF_VALUE: 28
PIF_VALUE: 36
PIF_VALUE: 40
PIF_VALUE: 33
PIF_VALUE: 35
PIF_VALUE: 37
PIF_VALUE: 28
PIF_VALUE: 28
PIF_VALUE: 36
PIF_VALUE: 29
PIF_VALUE: 42
PIF_VALUE: 33
PIF_VALUE: 25
PIF_VALUE: 36
PIF_VALUE: 35
PIF_VALUE: 31
PIF_VALUE: 34
PIF_VALUE: 40
PIF_VALUE: 43
PIF_VALUE: 33
PIF_VALUE: 35
PIF_VALUE: 29
PIF_VALUE: 32
PIF_VALUE: 32
PIF_VALUE: 36
PIF_VALUE: 36
PIF_VALUE: 30
PIF_VALUE: 29
PIF_VALUE: 28
PIF_VALUE: 30
PIF_VALUE: 36
PIF_VALUE: 32
PIF_VALUE: 34
PIF_VALUE: 32
PIF_VALUE: 34
PIF_VALUE: 37
PIF_VALUE: 36
PIF_VALUE: 31
PIF_VALUE: 35
PIF_VALUE: 27
PIF_VALUE: 32
PIF_VALUE: 33
PIF_VALUE: 28
PIF_VALUE: 38
PIF_VALUE: 36
PIF_VALUE: 39
PIF_VALUE: 36
PIF_VALUE: 34
PIF_VALUE: 32
PIF_VALUE: 35
PIF_VALUE: 34
PIF_VALUE: 31
PIF_VALUE: 33
PIF_VALUE: 35
PIF_VALUE: 36
PIF_VALUE: 39
PIF_VALUE: 36
PIF_VALUE: 32
PIF_VALUE: 33
PIF_VALUE: 29
PIF_VALUE: 29
PIF_VALUE: 31
PIF_VALUE: 40
PIF_VALUE: 33
PIF_VALUE: 34
PIF_VALUE: 38
PIF_VALUE: 48
PIF_VALUE: 39
PIF_VALUE: 33
PIF_VALUE: 30
PIF_VALUE: 29
PIF_VALUE: 27

## 2025-05-25 ASSESSMENT — PAIN SCALES - GENERAL
PAINLEVEL_OUTOF10: 3
PAINLEVEL_OUTOF10: 0

## 2025-05-26 ENCOUNTER — APPOINTMENT (OUTPATIENT)
Dept: GENERAL RADIOLOGY | Age: 75
DRG: 207 | End: 2025-05-26
Payer: MEDICARE

## 2025-05-26 LAB
ALBUMIN SERPL-MCNC: 3.3 G/DL (ref 3.4–5)
ANION GAP SERPL CALCULATED.3IONS-SCNC: 7 MMOL/L (ref 3–16)
BASE EXCESS BLDA CALC-SCNC: 16 MMOL/L (ref -3–3)
BASE EXCESS BLDA CALC-SCNC: 17 MMOL/L (ref -3–3)
BASOPHILS # BLD: 0 K/UL (ref 0–0.2)
BASOPHILS NFR BLD: 0.1 %
BUN SERPL-MCNC: 38 MG/DL (ref 7–20)
CA-I BLD-SCNC: 1.24 MMOL/L (ref 1.12–1.32)
CA-I BLD-SCNC: 1.27 MMOL/L (ref 1.12–1.32)
CALCIUM SERPL-MCNC: 8.9 MG/DL (ref 8.3–10.6)
CHLORIDE SERPL-SCNC: 102 MMOL/L (ref 99–110)
CO2 BLDA-SCNC: 43 MMOL/L
CO2 BLDA-SCNC: 43 MMOL/L
CO2 SERPL-SCNC: 33 MMOL/L (ref 21–32)
CREAT SERPL-MCNC: 0.5 MG/DL (ref 0.6–1.2)
DEPRECATED RDW RBC AUTO: 13.2 % (ref 12.4–15.4)
EKG ATRIAL RATE: 116 BPM
EKG ATRIAL RATE: 74 BPM
EKG DIAGNOSIS: NORMAL
EKG DIAGNOSIS: NORMAL
EKG P AXIS: 67 DEGREES
EKG P AXIS: 76 DEGREES
EKG P-R INTERVAL: 134 MS
EKG P-R INTERVAL: 188 MS
EKG Q-T INTERVAL: 296 MS
EKG Q-T INTERVAL: 374 MS
EKG QRS DURATION: 68 MS
EKG QRS DURATION: 70 MS
EKG QTC CALCULATION (BAZETT): 411 MS
EKG QTC CALCULATION (BAZETT): 415 MS
EKG R AXIS: 55 DEGREES
EKG R AXIS: 65 DEGREES
EKG T AXIS: 68 DEGREES
EKG T AXIS: 82 DEGREES
EKG VENTRICULAR RATE: 116 BPM
EKG VENTRICULAR RATE: 74 BPM
EOSINOPHIL # BLD: 0 K/UL (ref 0–0.6)
EOSINOPHIL NFR BLD: 0.2 %
GFR SERPLBLD CREATININE-BSD FMLA CKD-EPI: >90 ML/MIN/{1.73_M2}
GLUCOSE BLD-MCNC: 139 MG/DL (ref 70–99)
GLUCOSE BLD-MCNC: 160 MG/DL (ref 70–99)
GLUCOSE SERPL-MCNC: 193 MG/DL (ref 70–99)
HCO3 BLDA-SCNC: 40.6 MMOL/L (ref 21–29)
HCO3 BLDA-SCNC: 41.3 MMOL/L (ref 21–29)
HCT VFR BLD AUTO: 31.1 % (ref 36–48)
HGB BLD-MCNC: 10.6 G/DL (ref 12–16)
LACTATE BLD-SCNC: 1.18 MMOL/L (ref 0.4–2)
LACTATE BLD-SCNC: 1.44 MMOL/L (ref 0.4–2)
LYMPHOCYTES # BLD: 0.6 K/UL (ref 1–5.1)
LYMPHOCYTES NFR BLD: 6.8 %
MAGNESIUM SERPL-MCNC: 1.66 MG/DL (ref 1.8–2.4)
MCH RBC QN AUTO: 32.1 PG (ref 26–34)
MCHC RBC AUTO-ENTMCNC: 33.9 G/DL (ref 31–36)
MCV RBC AUTO: 94.7 FL (ref 80–100)
MONOCYTES # BLD: 0.2 K/UL (ref 0–1.3)
MONOCYTES NFR BLD: 2.8 %
NEUTROPHILS # BLD: 7.5 K/UL (ref 1.7–7.7)
NEUTROPHILS NFR BLD: 90.1 %
PCO2 BLDA: 59.5 MM HG (ref 35–45)
PCO2 BLDA: 63.8 MM HG (ref 35–45)
PERFORMED ON: ABNORMAL
PERFORMED ON: ABNORMAL
PH BLDA: 7.41 [PH] (ref 7.35–7.45)
PH BLDA: 7.45 [PH] (ref 7.35–7.45)
PHOSPHATE SERPL-MCNC: 2.8 MG/DL (ref 2.5–4.9)
PLATELET # BLD AUTO: 225 K/UL (ref 135–450)
PMV BLD AUTO: 7.9 FL (ref 5–10.5)
PO2 BLDA: 87.6 MM HG (ref 75–108)
PO2 BLDA: 91 MM HG (ref 75–108)
POC SAMPLE TYPE: ABNORMAL
POC SAMPLE TYPE: ABNORMAL
POTASSIUM BLD-SCNC: 3.5 MMOL/L (ref 3.5–5.1)
POTASSIUM BLD-SCNC: 3.7 MMOL/L (ref 3.5–5.1)
POTASSIUM SERPL-SCNC: 4.1 MMOL/L (ref 3.5–5.1)
RBC # BLD AUTO: 3.29 M/UL (ref 4–5.2)
SAO2 % BLDA: 97 % (ref 93–100)
SAO2 % BLDA: 97 % (ref 93–100)
SODIUM BLD-SCNC: 145 MMOL/L (ref 136–145)
SODIUM BLD-SCNC: 145 MMOL/L (ref 136–145)
SODIUM SERPL-SCNC: 142 MMOL/L (ref 136–145)
WBC # BLD AUTO: 8.4 K/UL (ref 4–11)

## 2025-05-26 PROCEDURE — 2580000003 HC RX 258

## 2025-05-26 PROCEDURE — 93010 ELECTROCARDIOGRAM REPORT: CPT | Performed by: INTERNAL MEDICINE

## 2025-05-26 PROCEDURE — 82330 ASSAY OF CALCIUM: CPT

## 2025-05-26 PROCEDURE — 84132 ASSAY OF SERUM POTASSIUM: CPT

## 2025-05-26 PROCEDURE — 94640 AIRWAY INHALATION TREATMENT: CPT

## 2025-05-26 PROCEDURE — 2500000003 HC RX 250 WO HCPCS

## 2025-05-26 PROCEDURE — 6370000000 HC RX 637 (ALT 250 FOR IP)

## 2025-05-26 PROCEDURE — 6360000002 HC RX W HCPCS

## 2025-05-26 PROCEDURE — 2580000003 HC RX 258: Performed by: INTERNAL MEDICINE

## 2025-05-26 PROCEDURE — 82947 ASSAY GLUCOSE BLOOD QUANT: CPT

## 2025-05-26 PROCEDURE — 80069 RENAL FUNCTION PANEL: CPT

## 2025-05-26 PROCEDURE — 85025 COMPLETE CBC W/AUTO DIFF WBC: CPT

## 2025-05-26 PROCEDURE — 94003 VENT MGMT INPAT SUBQ DAY: CPT

## 2025-05-26 PROCEDURE — 83735 ASSAY OF MAGNESIUM: CPT

## 2025-05-26 PROCEDURE — 94761 N-INVAS EAR/PLS OXIMETRY MLT: CPT

## 2025-05-26 PROCEDURE — 83605 ASSAY OF LACTIC ACID: CPT

## 2025-05-26 PROCEDURE — 84295 ASSAY OF SERUM SODIUM: CPT

## 2025-05-26 PROCEDURE — 2700000000 HC OXYGEN THERAPY PER DAY

## 2025-05-26 PROCEDURE — 93005 ELECTROCARDIOGRAM TRACING: CPT

## 2025-05-26 PROCEDURE — 82803 BLOOD GASES ANY COMBINATION: CPT

## 2025-05-26 PROCEDURE — 71045 X-RAY EXAM CHEST 1 VIEW: CPT

## 2025-05-26 PROCEDURE — 2000000000 HC ICU R&B

## 2025-05-26 RX ORDER — FUROSEMIDE 10 MG/ML
20 INJECTION INTRAMUSCULAR; INTRAVENOUS ONCE
Status: CANCELLED | OUTPATIENT
Start: 2025-05-26

## 2025-05-26 RX ORDER — LORAZEPAM 2 MG/ML
5 INJECTION INTRAMUSCULAR ONCE
Status: COMPLETED | OUTPATIENT
Start: 2025-05-26 | End: 2025-05-26

## 2025-05-26 RX ORDER — MAGNESIUM SULFATE IN WATER 40 MG/ML
2000 INJECTION, SOLUTION INTRAVENOUS ONCE
Status: COMPLETED | OUTPATIENT
Start: 2025-05-26 | End: 2025-05-26

## 2025-05-26 RX ORDER — POLYETHYLENE GLYCOL 3350 17 G/17G
17 POWDER, FOR SOLUTION ORAL DAILY PRN
Status: DISCONTINUED | OUTPATIENT
Start: 2025-05-26 | End: 2025-05-28

## 2025-05-26 RX ADMIN — BUDESONIDE 500 MCG: 0.5 SUSPENSION RESPIRATORY (INHALATION) at 07:18

## 2025-05-26 RX ADMIN — BUDESONIDE 500 MCG: 0.5 SUSPENSION RESPIRATORY (INHALATION) at 21:14

## 2025-05-26 RX ADMIN — SODIUM CHLORIDE 5 MCG/MIN: 0.9 INJECTION, SOLUTION INTRAVENOUS at 21:43

## 2025-05-26 RX ADMIN — SODIUM CHLORIDE, PRESERVATIVE FREE 10 ML: 5 INJECTION INTRAVENOUS at 07:54

## 2025-05-26 RX ADMIN — Medication 1.5 MCG/KG/HR: at 00:52

## 2025-05-26 RX ADMIN — Medication 1.5 MCG/KG/HR: at 11:06

## 2025-05-26 RX ADMIN — Medication 1000 UNITS: at 20:52

## 2025-05-26 RX ADMIN — Medication 1.5 MCG/KG/HR: at 05:56

## 2025-05-26 RX ADMIN — QUETIAPINE FUMARATE 50 MG: 25 TABLET ORAL at 07:53

## 2025-05-26 RX ADMIN — LEVALBUTEROL 1.25 MG: 1.25 SOLUTION, CONCENTRATE RESPIRATORY (INHALATION) at 21:14

## 2025-05-26 RX ADMIN — IPRATROPIUM BROMIDE 0.5 MG: 0.5 SOLUTION RESPIRATORY (INHALATION) at 21:14

## 2025-05-26 RX ADMIN — SODIUM CHLORIDE, PRESERVATIVE FREE 10 ML: 5 INJECTION INTRAVENOUS at 20:55

## 2025-05-26 RX ADMIN — Medication 4 ML: at 07:18

## 2025-05-26 RX ADMIN — LORAZEPAM 5 MG: 2 INJECTION INTRAMUSCULAR; INTRAVENOUS at 11:34

## 2025-05-26 RX ADMIN — SODIUM CHLORIDE 0.2 MG/KG/HR: 9 INJECTION, SOLUTION INTRAVENOUS at 18:44

## 2025-05-26 RX ADMIN — MAGNESIUM SULFATE HEPTAHYDRATE 2000 MG: 40 INJECTION, SOLUTION INTRAVENOUS at 04:21

## 2025-05-26 RX ADMIN — Medication 0.7 MCG/KG/HR: at 16:29

## 2025-05-26 RX ADMIN — Medication: at 07:54

## 2025-05-26 RX ADMIN — SODIUM CHLORIDE, PRESERVATIVE FREE 40 MG: 5 INJECTION INTRAVENOUS at 07:53

## 2025-05-26 RX ADMIN — SODIUM CHLORIDE: 0.9 INJECTION, SOLUTION INTRAVENOUS at 13:48

## 2025-05-26 RX ADMIN — ARFORMOTEROL TARTRATE 15 MCG: 15 SOLUTION RESPIRATORY (INHALATION) at 21:14

## 2025-05-26 RX ADMIN — CYANOCOBALAMIN TAB 1000 MCG 500 MCG: 1000 TAB at 20:52

## 2025-05-26 RX ADMIN — WATER 40 MG: 1 INJECTION INTRAMUSCULAR; INTRAVENOUS; SUBCUTANEOUS at 20:52

## 2025-05-26 RX ADMIN — PROPOFOL 5 MCG/KG/MIN: 10 INJECTION, EMULSION INTRAVENOUS at 18:16

## 2025-05-26 RX ADMIN — BUSPIRONE HYDROCHLORIDE 5 MG: 10 TABLET ORAL at 20:53

## 2025-05-26 RX ADMIN — Medication 4 ML: at 21:14

## 2025-05-26 RX ADMIN — ITRACONAZOLE 200 MG: 10 SOLUTION ORAL at 20:55

## 2025-05-26 RX ADMIN — SODIUM CHLORIDE 1.07 MG/KG/HR: 9 INJECTION, SOLUTION INTRAVENOUS at 02:57

## 2025-05-26 RX ADMIN — ENOXAPARIN SODIUM 40 MG: 100 INJECTION SUBCUTANEOUS at 07:53

## 2025-05-26 RX ADMIN — LEVALBUTEROL 1.25 MG: 1.25 SOLUTION, CONCENTRATE RESPIRATORY (INHALATION) at 07:18

## 2025-05-26 RX ADMIN — LEVALBUTEROL 1.25 MG: 1.25 SOLUTION, CONCENTRATE RESPIRATORY (INHALATION) at 15:54

## 2025-05-26 RX ADMIN — BUSPIRONE HYDROCHLORIDE 5 MG: 10 TABLET ORAL at 07:53

## 2025-05-26 RX ADMIN — ASPIRIN 81 MG: 81 TABLET, CHEWABLE ORAL at 07:53

## 2025-05-26 RX ADMIN — PROPOFOL 20 MCG/KG/MIN: 10 INJECTION, EMULSION INTRAVENOUS at 00:55

## 2025-05-26 RX ADMIN — QUETIAPINE FUMARATE 50 MG: 25 TABLET ORAL at 20:53

## 2025-05-26 RX ADMIN — IPRATROPIUM BROMIDE 0.5 MG: 0.5 SOLUTION RESPIRATORY (INHALATION) at 07:18

## 2025-05-26 RX ADMIN — IPRATROPIUM BROMIDE 0.5 MG: 0.5 SOLUTION RESPIRATORY (INHALATION) at 15:53

## 2025-05-26 RX ADMIN — WATER 40 MG: 1 INJECTION INTRAMUSCULAR; INTRAVENOUS; SUBCUTANEOUS at 10:05

## 2025-05-26 RX ADMIN — ARFORMOTEROL TARTRATE 15 MCG: 15 SOLUTION RESPIRATORY (INHALATION) at 07:18

## 2025-05-26 RX ADMIN — BUSPIRONE HYDROCHLORIDE 5 MG: 10 TABLET ORAL at 13:54

## 2025-05-26 RX ADMIN — IPRATROPIUM BROMIDE 0.5 MG: 0.5 SOLUTION RESPIRATORY (INHALATION) at 11:31

## 2025-05-26 RX ADMIN — Medication: at 20:55

## 2025-05-26 RX ADMIN — ITRACONAZOLE 200 MG: 10 SOLUTION ORAL at 07:57

## 2025-05-26 RX ADMIN — LEVALBUTEROL 1.25 MG: 1.25 SOLUTION, CONCENTRATE RESPIRATORY (INHALATION) at 11:31

## 2025-05-26 RX ADMIN — LEVOFLOXACIN 750 MG: 750 INJECTION, SOLUTION INTRAVENOUS at 13:52

## 2025-05-26 ASSESSMENT — PULMONARY FUNCTION TESTS
PIF_VALUE: 35
PIF_VALUE: 22
PIF_VALUE: 33
PIF_VALUE: 29
PIF_VALUE: 35
PIF_VALUE: 32
PIF_VALUE: 27
PIF_VALUE: 34
PIF_VALUE: 35
PIF_VALUE: 35
PIF_VALUE: 34
PIF_VALUE: 34
PIF_VALUE: 14
PIF_VALUE: 30
PIF_VALUE: 33
PIF_VALUE: 32
PIF_VALUE: 32
PIF_VALUE: 35
PIF_VALUE: 34
PIF_VALUE: 14
PIF_VALUE: 29
PIF_VALUE: 33
PIF_VALUE: 34
PIF_VALUE: 31
PIF_VALUE: 35
PIF_VALUE: 34
PIF_VALUE: 35
PIF_VALUE: 29
PIF_VALUE: 33
PIF_VALUE: 29
PIF_VALUE: 30
PIF_VALUE: 32
PIF_VALUE: 34
PIF_VALUE: 34
PIF_VALUE: 32
PIF_VALUE: 35
PIF_VALUE: 40
PIF_VALUE: 33
PIF_VALUE: 31
PIF_VALUE: 26
PIF_VALUE: 34
PIF_VALUE: 34
PIF_VALUE: 22
PIF_VALUE: 36
PIF_VALUE: 37
PIF_VALUE: 34
PIF_VALUE: 29
PIF_VALUE: 35
PIF_VALUE: 36
PIF_VALUE: 33
PIF_VALUE: 33
PIF_VALUE: 34
PIF_VALUE: 34
PIF_VALUE: 32
PIF_VALUE: 33
PIF_VALUE: 31
PIF_VALUE: 28
PIF_VALUE: 29
PIF_VALUE: 22
PIF_VALUE: 37
PIF_VALUE: 34
PIF_VALUE: 29
PIF_VALUE: 26
PIF_VALUE: 25
PIF_VALUE: 32
PIF_VALUE: 31
PIF_VALUE: 32
PIF_VALUE: 32
PIF_VALUE: 34
PIF_VALUE: 32
PIF_VALUE: 33
PIF_VALUE: 32
PIF_VALUE: 30
PIF_VALUE: 21
PIF_VALUE: 30
PIF_VALUE: 32
PIF_VALUE: 34
PIF_VALUE: 28
PIF_VALUE: 34
PIF_VALUE: 26
PIF_VALUE: 35
PIF_VALUE: 31
PIF_VALUE: 32
PIF_VALUE: 34
PIF_VALUE: 26
PIF_VALUE: 28
PIF_VALUE: 33
PIF_VALUE: 24
PIF_VALUE: 35
PIF_VALUE: 31
PIF_VALUE: 33
PIF_VALUE: 36
PIF_VALUE: 33
PIF_VALUE: 31
PIF_VALUE: 33
PIF_VALUE: 34
PIF_VALUE: 14
PIF_VALUE: 24
PIF_VALUE: 31
PIF_VALUE: 34
PIF_VALUE: 28
PIF_VALUE: 33
PIF_VALUE: 23

## 2025-05-26 ASSESSMENT — PAIN SCALES - GENERAL
PAINLEVEL_OUTOF10: 0

## 2025-05-27 ENCOUNTER — APPOINTMENT (OUTPATIENT)
Dept: GENERAL RADIOLOGY | Age: 75
DRG: 207 | End: 2025-05-27
Payer: MEDICARE

## 2025-05-27 LAB
ALBUMIN SERPL-MCNC: 3.2 G/DL (ref 3.4–5)
ANION GAP SERPL CALCULATED.3IONS-SCNC: 7 MMOL/L (ref 3–16)
BASE EXCESS BLDA CALC-SCNC: 16 MMOL/L (ref -3–3)
BASOPHILS # BLD: 0 K/UL (ref 0–0.2)
BASOPHILS NFR BLD: 0.2 %
BUN SERPL-MCNC: 38 MG/DL (ref 7–20)
CALCIUM SERPL-MCNC: 8.8 MG/DL (ref 8.3–10.6)
CHLORIDE SERPL-SCNC: 103 MMOL/L (ref 99–110)
CO2 BLDA-SCNC: 41 MMOL/L
CO2 SERPL-SCNC: 32 MMOL/L (ref 21–32)
CREAT SERPL-MCNC: 0.5 MG/DL (ref 0.6–1.2)
DEPRECATED RDW RBC AUTO: 13.3 % (ref 12.4–15.4)
EKG ATRIAL RATE: 82 BPM
EKG DIAGNOSIS: NORMAL
EKG P AXIS: 75 DEGREES
EKG P-R INTERVAL: 138 MS
EKG Q-T INTERVAL: 366 MS
EKG QRS DURATION: 74 MS
EKG QTC CALCULATION (BAZETT): 427 MS
EKG R AXIS: 61 DEGREES
EKG T AXIS: 67 DEGREES
EKG VENTRICULAR RATE: 82 BPM
EOSINOPHIL # BLD: 0 K/UL (ref 0–0.6)
EOSINOPHIL NFR BLD: 0.1 %
GFR SERPLBLD CREATININE-BSD FMLA CKD-EPI: >90 ML/MIN/{1.73_M2}
GLUCOSE BLD-MCNC: 139 MG/DL (ref 70–99)
GLUCOSE SERPL-MCNC: 182 MG/DL (ref 70–99)
HCO3 BLDA-SCNC: 39.6 MMOL/L (ref 21–29)
HCT VFR BLD AUTO: 31.7 % (ref 36–48)
HGB BLD-MCNC: 10.8 G/DL (ref 12–16)
LYMPHOCYTES # BLD: 0.4 K/UL (ref 1–5.1)
LYMPHOCYTES NFR BLD: 4.6 %
MAGNESIUM SERPL-MCNC: 1.98 MG/DL (ref 1.8–2.4)
MCH RBC QN AUTO: 32.2 PG (ref 26–34)
MCHC RBC AUTO-ENTMCNC: 34.1 G/DL (ref 31–36)
MCV RBC AUTO: 94.6 FL (ref 80–100)
MONOCYTES # BLD: 0.3 K/UL (ref 0–1.3)
MONOCYTES NFR BLD: 3.5 %
NEUTROPHILS # BLD: 8.3 K/UL (ref 1.7–7.7)
NEUTROPHILS NFR BLD: 91.6 %
PCO2 BLDA: 57.9 MM HG (ref 35–45)
PERFORMED ON: ABNORMAL
PH BLDA: 7.44 [PH] (ref 7.35–7.45)
PHOSPHATE SERPL-MCNC: 3 MG/DL (ref 2.5–4.9)
PLATELET # BLD AUTO: 229 K/UL (ref 135–450)
PMV BLD AUTO: 7.9 FL (ref 5–10.5)
PO2 BLDA: 100.3 MM HG (ref 75–108)
POC SAMPLE TYPE: ABNORMAL
POTASSIUM SERPL-SCNC: 4 MMOL/L (ref 3.5–5.1)
RBC # BLD AUTO: 3.35 M/UL (ref 4–5.2)
SAO2 % BLDA: 98 % (ref 93–100)
SODIUM SERPL-SCNC: 142 MMOL/L (ref 136–145)
WBC # BLD AUTO: 9.1 K/UL (ref 4–11)

## 2025-05-27 PROCEDURE — 6360000002 HC RX W HCPCS

## 2025-05-27 PROCEDURE — 93010 ELECTROCARDIOGRAM REPORT: CPT | Performed by: INTERNAL MEDICINE

## 2025-05-27 PROCEDURE — 2580000003 HC RX 258

## 2025-05-27 PROCEDURE — 80069 RENAL FUNCTION PANEL: CPT

## 2025-05-27 PROCEDURE — 94003 VENT MGMT INPAT SUBQ DAY: CPT

## 2025-05-27 PROCEDURE — 6370000000 HC RX 637 (ALT 250 FOR IP)

## 2025-05-27 PROCEDURE — 93005 ELECTROCARDIOGRAM TRACING: CPT

## 2025-05-27 PROCEDURE — 94761 N-INVAS EAR/PLS OXIMETRY MLT: CPT

## 2025-05-27 PROCEDURE — 99291 CRITICAL CARE FIRST HOUR: CPT | Performed by: INTERNAL MEDICINE

## 2025-05-27 PROCEDURE — 2500000003 HC RX 250 WO HCPCS

## 2025-05-27 PROCEDURE — 82947 ASSAY GLUCOSE BLOOD QUANT: CPT

## 2025-05-27 PROCEDURE — 94799 UNLISTED PULMONARY SVC/PX: CPT

## 2025-05-27 PROCEDURE — 94640 AIRWAY INHALATION TREATMENT: CPT

## 2025-05-27 PROCEDURE — 83735 ASSAY OF MAGNESIUM: CPT

## 2025-05-27 PROCEDURE — 2000000000 HC ICU R&B

## 2025-05-27 PROCEDURE — 85025 COMPLETE CBC W/AUTO DIFF WBC: CPT

## 2025-05-27 PROCEDURE — 2700000000 HC OXYGEN THERAPY PER DAY

## 2025-05-27 PROCEDURE — 82803 BLOOD GASES ANY COMBINATION: CPT

## 2025-05-27 PROCEDURE — 71045 X-RAY EXAM CHEST 1 VIEW: CPT

## 2025-05-27 RX ORDER — PREDNISONE 20 MG/1
60 TABLET ORAL DAILY
Status: DISCONTINUED | OUTPATIENT
Start: 2025-05-28 | End: 2025-05-30

## 2025-05-27 RX ORDER — PREDNISONE 20 MG/1
20 TABLET ORAL ONCE
Status: COMPLETED | OUTPATIENT
Start: 2025-05-27 | End: 2025-05-27

## 2025-05-27 RX ORDER — LORAZEPAM 2 MG/ML
3 INJECTION INTRAMUSCULAR ONCE
Status: COMPLETED | OUTPATIENT
Start: 2025-05-27 | End: 2025-05-27

## 2025-05-27 RX ADMIN — LEVALBUTEROL 1.25 MG: 1.25 SOLUTION, CONCENTRATE RESPIRATORY (INHALATION) at 20:39

## 2025-05-27 RX ADMIN — SODIUM CHLORIDE 0.45 MG/KG/HR: 9 INJECTION, SOLUTION INTRAVENOUS at 14:05

## 2025-05-27 RX ADMIN — Medication 1.5 MCG/KG/HR: at 17:26

## 2025-05-27 RX ADMIN — ENOXAPARIN SODIUM 40 MG: 100 INJECTION SUBCUTANEOUS at 08:59

## 2025-05-27 RX ADMIN — WATER 40 MG: 1 INJECTION INTRAMUSCULAR; INTRAVENOUS; SUBCUTANEOUS at 09:06

## 2025-05-27 RX ADMIN — Medication 4 ML: at 20:39

## 2025-05-27 RX ADMIN — Medication: at 09:03

## 2025-05-27 RX ADMIN — Medication 1.3 MCG/KG/HR: at 00:15

## 2025-05-27 RX ADMIN — Medication 1.5 MCG/KG/HR: at 23:11

## 2025-05-27 RX ADMIN — PREDNISONE 20 MG: 20 TABLET ORAL at 17:21

## 2025-05-27 RX ADMIN — BUDESONIDE 500 MCG: 0.5 SUSPENSION RESPIRATORY (INHALATION) at 20:39

## 2025-05-27 RX ADMIN — SODIUM CHLORIDE, PRESERVATIVE FREE 40 MG: 5 INJECTION INTRAVENOUS at 09:00

## 2025-05-27 RX ADMIN — ARFORMOTEROL TARTRATE 15 MCG: 15 SOLUTION RESPIRATORY (INHALATION) at 20:39

## 2025-05-27 RX ADMIN — IPRATROPIUM BROMIDE 0.5 MG: 0.5 SOLUTION RESPIRATORY (INHALATION) at 10:52

## 2025-05-27 RX ADMIN — BUSPIRONE HYDROCHLORIDE 5 MG: 10 TABLET ORAL at 09:00

## 2025-05-27 RX ADMIN — IPRATROPIUM BROMIDE 0.5 MG: 0.5 SOLUTION RESPIRATORY (INHALATION) at 07:09

## 2025-05-27 RX ADMIN — BUDESONIDE 500 MCG: 0.5 SUSPENSION RESPIRATORY (INHALATION) at 07:09

## 2025-05-27 RX ADMIN — PROPOFOL 10 MCG/KG/MIN: 10 INJECTION, EMULSION INTRAVENOUS at 02:51

## 2025-05-27 RX ADMIN — Medication 1.5 MCG/KG/HR: at 05:58

## 2025-05-27 RX ADMIN — ARFORMOTEROL TARTRATE 15 MCG: 15 SOLUTION RESPIRATORY (INHALATION) at 07:09

## 2025-05-27 RX ADMIN — LORAZEPAM 3 MG: 2 INJECTION INTRAMUSCULAR; INTRAVENOUS at 12:03

## 2025-05-27 RX ADMIN — ITRACONAZOLE 200 MG: 10 SOLUTION ORAL at 09:00

## 2025-05-27 RX ADMIN — Medication 1.5 MCG/KG/HR: at 11:42

## 2025-05-27 RX ADMIN — CYANOCOBALAMIN TAB 1000 MCG 500 MCG: 1000 TAB at 22:03

## 2025-05-27 RX ADMIN — LEVALBUTEROL 1.25 MG: 1.25 SOLUTION, CONCENTRATE RESPIRATORY (INHALATION) at 07:09

## 2025-05-27 RX ADMIN — SODIUM CHLORIDE, PRESERVATIVE FREE 10 ML: 5 INJECTION INTRAVENOUS at 08:59

## 2025-05-27 RX ADMIN — IPRATROPIUM BROMIDE 0.5 MG: 0.5 SOLUTION RESPIRATORY (INHALATION) at 14:47

## 2025-05-27 RX ADMIN — ASPIRIN 81 MG: 81 TABLET, CHEWABLE ORAL at 09:00

## 2025-05-27 RX ADMIN — Medication 4 ML: at 07:09

## 2025-05-27 RX ADMIN — BUSPIRONE HYDROCHLORIDE 5 MG: 10 TABLET ORAL at 22:02

## 2025-05-27 RX ADMIN — LEVALBUTEROL 1.25 MG: 1.25 SOLUTION, CONCENTRATE RESPIRATORY (INHALATION) at 10:52

## 2025-05-27 RX ADMIN — Medication 1000 UNITS: at 22:03

## 2025-05-27 RX ADMIN — HYDROXYZINE HYDROCHLORIDE 25 MG: 25 TABLET, FILM COATED ORAL at 22:03

## 2025-05-27 RX ADMIN — Medication: at 22:00

## 2025-05-27 RX ADMIN — ITRACONAZOLE 200 MG: 10 SOLUTION ORAL at 21:30

## 2025-05-27 RX ADMIN — QUETIAPINE FUMARATE 50 MG: 25 TABLET ORAL at 22:02

## 2025-05-27 RX ADMIN — BUSPIRONE HYDROCHLORIDE 5 MG: 10 TABLET ORAL at 13:34

## 2025-05-27 RX ADMIN — IPRATROPIUM BROMIDE 0.5 MG: 0.5 SOLUTION RESPIRATORY (INHALATION) at 20:39

## 2025-05-27 RX ADMIN — QUETIAPINE FUMARATE 50 MG: 25 TABLET ORAL at 09:00

## 2025-05-27 RX ADMIN — LEVALBUTEROL 1.25 MG: 1.25 SOLUTION, CONCENTRATE RESPIRATORY (INHALATION) at 14:47

## 2025-05-27 ASSESSMENT — PULMONARY FUNCTION TESTS
PIF_VALUE: 24
PIF_VALUE: 13
PIF_VALUE: 32
PIF_VALUE: 38
PIF_VALUE: 30
PIF_VALUE: 20
PIF_VALUE: 25
PIF_VALUE: 29
PIF_VALUE: 30
PIF_VALUE: 30
PIF_VALUE: 31
PIF_VALUE: 29
PIF_VALUE: 13
PIF_VALUE: 32
PIF_VALUE: 31
PIF_VALUE: 32
PIF_VALUE: 25
PIF_VALUE: 24
PIF_VALUE: 27
PIF_VALUE: 29
PIF_VALUE: 28
PIF_VALUE: 26
PIF_VALUE: 23
PIF_VALUE: 29
PIF_VALUE: 27
PIF_VALUE: 30
PIF_VALUE: 30
PIF_VALUE: 26
PIF_VALUE: 13
PIF_VALUE: 33
PIF_VALUE: 31
PIF_VALUE: 27
PIF_VALUE: 25
PIF_VALUE: 35
PIF_VALUE: 23
PIF_VALUE: 33
PIF_VALUE: 22
PIF_VALUE: 20
PIF_VALUE: 23
PIF_VALUE: 23
PIF_VALUE: 13
PIF_VALUE: 32
PIF_VALUE: 13
PIF_VALUE: 30
PIF_VALUE: 29
PIF_VALUE: 26
PIF_VALUE: 34
PIF_VALUE: 24
PIF_VALUE: 31
PIF_VALUE: 30
PIF_VALUE: 31
PIF_VALUE: 29
PIF_VALUE: 24
PIF_VALUE: 32
PIF_VALUE: 30
PIF_VALUE: 20
PIF_VALUE: 31
PIF_VALUE: 30
PIF_VALUE: 22
PIF_VALUE: 24
PIF_VALUE: 29
PIF_VALUE: 26
PIF_VALUE: 33
PIF_VALUE: 29
PIF_VALUE: 31
PIF_VALUE: 30
PIF_VALUE: 30
PIF_VALUE: 29
PIF_VALUE: 33
PIF_VALUE: 26
PIF_VALUE: 32
PIF_VALUE: 27
PIF_VALUE: 30
PIF_VALUE: 32
PIF_VALUE: 29
PIF_VALUE: 24
PIF_VALUE: 29
PIF_VALUE: 29
PIF_VALUE: 27
PIF_VALUE: 32
PIF_VALUE: 13

## 2025-05-27 ASSESSMENT — PAIN SCALES - GENERAL
PAINLEVEL_OUTOF10: 0

## 2025-05-27 NOTE — CONSULTS
Comprehensive Nutrition Assessment    RECOMMENDATIONS:  Nutrition Support:  Continue Nepro (renal formula) due to hx elevated lytes (K+/Phos)  Continue at goal rate of 30 ml/  Add one ProSource modular daily via OG  Water Flushes: 30ml q4 (Maintenance)  Monitor propofol and need to adjust EN regimen (currently providing 266 kcal via lipids)    NUTRITION ASSESSMENT:   Nutritional summary & status: Pt failed SBT this am and became agitated, remains on vent sedated with propofol and precedex. EN continues to run at goal rate and patient tolerating well, +BM today and bowel regimen scheduled. RD will add ProSource daily via OGT and continue to monitor nutritional status.   Admission // PMH: COPD Exacerbation // HTN, HLD, CHF, COPD, GERD    MALNUTRITION ASSESSMENT  Context of Malnutrition: Acute Illness   Malnutrition Status: At risk for malnutrition (NPO on vent)  Findings of the 6 clinical characteristics of malnutrition (Minimum of 2 out of 6 clinical characteristics is required to make the diagnosis of moderate or severe Protein Calorie Malnutrition based on AND/ASPEN Guidelines):  Energy Intake:  Mild decrease in energy intake  Weight Loss:  No weight loss     Body Fat Loss:  Unable to assess     Muscle Mass Loss:  Unable to assess    Fluid Accumulation:  No fluid accumulation     Strength:  Not Performed    NUTRITION DIAGNOSIS   Inadequate oral intake related to impaired respiratory function as evidenced by NPO or clear liquid status due to medical condition, intubation    Nutrition Monitoring and Evaluation:   Food/Nutrient Intake Outcomes:  Enteral Nutrition Intake/Tolerance  Physical Signs/Symptoms Outcomes:  Biochemical Data, GI Status, Hemodynamic Status, Nutrition Focused Physical Findings, Skin, Weight     OBJECTIVE DATA: Significant to nutrition assessment  Nutrition Related Findings: LBM 5/20, , Lytes WNL  Wounds: None  Nutrition Goals: Tolerate nutrition support at goal rate     CURRENT 
  Comprehensive Nutrition Assessment    RECOMMENDATIONS:  Nutrition Support:  Continue Nepro (renal formula) due to hx elevated lytes (K+/Phos)  Continue at goal rate of 30 ml/hr  Add one ProSource modular daily via OG  Water Flushes: 30ml q4 (Maintenance)    NUTRITION ASSESSMENT:   Nutritional summary & status: Patient sedated on vent with precedex, ketamine and propofol, SBT this afternoon. Pt tolerating EN at goal rate, renal formula ordered due to elevated lytes last week which finally improved with formula modification. Bowels moving, labs reviewed and pt slowly improving per MD, hopeful for extubation. RD will continue to monitor EN tolerance and provide further intervention as appropriate.   Admission // PMH: COPD Exacerbation // HTN, HLD, CHF, COPD, GERD    MALNUTRITION ASSESSMENT  Context of Malnutrition: Acute Illness   Malnutrition Status: At risk for malnutrition (NPO on vent)  Findings of the 6 clinical characteristics of malnutrition (Minimum of 2 out of 6 clinical characteristics is required to make the diagnosis of moderate or severe Protein Calorie Malnutrition based on AND/ASPEN Guidelines):  Energy Intake:  Mild decrease in energy intake  Weight Loss:  No weight loss     Body Fat Loss:  Unable to assess     Muscle Mass Loss:  Unable to assess    Fluid Accumulation:  No fluid accumulation     Strength:  Not Performed    NUTRITION DIAGNOSIS   Inadequate oral intake related to impaired respiratory function as evidenced by NPO or clear liquid status due to medical condition, intubation    Nutrition Monitoring and Evaluation:   Food/Nutrient Intake Outcomes:  Enteral Nutrition Intake/Tolerance  Physical Signs/Symptoms Outcomes:  Biochemical Data, GI Status, Hemodynamic Status, Nutrition Focused Physical Findings, Skin, Weight     OBJECTIVE DATA: Significant to nutrition assessment  Nutrition Related Findings: LBM 5/25, , Na+ 142  Wounds: None  Nutrition Goals: Tolerate nutrition support at 
  Comprehensive Nutrition Assessment    RECOMMENDATIONS:  Nutrition Support:  Continue Nepro (renal formula) due to hx elevated lytes (K+/Phos)  Continue at goal rate of 30 ml/hr  Water Flushes: 30ml q4 (Maintenance)  Monitor propofol and need to adjust EN regimen (currently providing 266 kcal via lipids)    NUTRITION ASSESSMENT:   Nutritional summary & status: Follow up. Pt continues on vent sedated with fentanyl, ketamine and propofol. Patients lytes are much improved with enteral formula modified to nepro and pt tolerating at goal rate. Bowel regimen scheduled. RD will continue to monitor.   Admission // PMH: COPD Exacerbation // HTN, HLD, CHF, COPD, GERD    MALNUTRITION ASSESSMENT  Context of Malnutrition: Acute Illness   Malnutrition Status: At risk for malnutrition (NPO on vent)  Findings of the 6 clinical characteristics of malnutrition (Minimum of 2 out of 6 clinical characteristics is required to make the diagnosis of moderate or severe Protein Calorie Malnutrition based on AND/ASPEN Guidelines):  Energy Intake:  Mild decrease in energy intake  Weight Loss:  No weight loss     Body Fat Loss:  Unable to assess     Muscle Mass Loss:  Unable to assess    Fluid Accumulation:  No fluid accumulation     Strength:  Not Performed    NUTRITION DIAGNOSIS   Inadequate oral intake related to impaired respiratory function as evidenced by NPO or clear liquid status due to medical condition, intubation    Nutrition Monitoring and Evaluation:   Food/Nutrient Intake Outcomes:  Progression of Nutrition, Enteral Nutrition Intake/Tolerance  Physical Signs/Symptoms Outcomes:  Biochemical Data, GI Status, Hemodynamic Status, Nutrition Focused Physical Findings, Skin, Weight     OBJECTIVE DATA: Significant to nutrition assessment  Nutrition Related Findings: LBM 5/20, Mg/K+/Phos WNL, Bowel regimen scheduled  Wounds: None  Nutrition Goals: Tolerate nutrition support at goal rate     CURRENT NUTRITION THERAPIES  Current Tube 
  Comprehensive Nutrition Assessment    RECOMMENDATIONS:  Nutrition Support:  Modify formula to Nepro (renal formula) due to elevated lytes (K+/Phos)  Start at goal rate of 30 ml/hr  Water Flushes: 30ml q4 (Maintenance)  Monitor propofol and need to adjust EN regimen (currently providing 266 kcal via lipids)    NUTRITION ASSESSMENT:   Nutritional summary & status: Patient continues with elevated K+ and phos, recommend modifying formula to Nepro (renal formula) which will provide approximately half the K+/Phos vs current formula. Noted +BM today with start of increased bowel regimen. Will continue to monitor nutritional status.   Admission // PMH: COPD Exacerbation // HTN, HLD, CHF, COPD, GERD    MALNUTRITION ASSESSMENT  Context of Malnutrition: Acute Illness   Malnutrition Status: At risk for malnutrition (NPO on vent)  Findings of the 6 clinical characteristics of malnutrition (Minimum of 2 out of 6 clinical characteristics is required to make the diagnosis of moderate or severe Protein Calorie Malnutrition based on AND/ASPEN Guidelines):  Energy Intake:  Mild decrease in energy intake  Weight Loss:  No weight loss     Body Fat Loss:  Unable to assess     Muscle Mass Loss:  Unable to assess    Fluid Accumulation:  No fluid accumulation     Strength:  Not Performed    NUTRITION DIAGNOSIS   Inadequate oral intake related to impaired respiratory function as evidenced by NPO or clear liquid status due to medical condition, intubation    Nutrition Monitoring and Evaluation:   Food/Nutrient Intake Outcomes:  Progression of Nutrition, Enteral Nutrition Intake/Tolerance  Physical Signs/Symptoms Outcomes:  Biochemical Data, GI Status, Hemodynamic Status, Nutrition Focused Physical Findings, Skin, Weight     OBJECTIVE DATA: Significant to nutrition assessment  Nutrition Related Findings: LBM 5/15, K+ 5.5, Mg 2.42,   Wounds: None  Nutrition Goals: Tolerate nutrition support at goal rate     CURRENT NUTRITION 
  Comprehensive Nutrition Assessment    RECOMMENDATIONS:  Nutrition Support:  Modify formula to Vital AF to better meet nutrient needs  Advance rate by 10 ml Q6 hrs to goal rate of 35 ml/hr  Water Flushes: 30ml q4 (Maintenance)  Monitor propofol and need to adjust EN regimen (currently providing 340 kcal via lipids)    NUTRITION ASSESSMENT:   Nutritional summary & status: Consult for tube feed ordering and management. Patient admitted with respiratory failure however required intubation 5/17, currently on propofol and pressors off. Trophic feeds of Jevity 1.5 started this am, discussed with RN recommendations to modify formula to best meet needs. Noted K+ and Mg elevated, new formula will be lower in K+. Will monitor EN tolerance and provide further intervention as appropriate.   Admission // PMH: COPD Exacerbation // HTN, HLD, CHF, COPD, GERD    MALNUTRITION ASSESSMENT  Context of Malnutrition: Acute Illness   Malnutrition Status: At risk for malnutrition (NPO on vent)  Findings of the 6 clinical characteristics of malnutrition (Minimum of 2 out of 6 clinical characteristics is required to make the diagnosis of moderate or severe Protein Calorie Malnutrition based on AND/ASPEN Guidelines):  Energy Intake:  Mild decrease in energy intake  Weight Loss:  No weight loss     Body Fat Loss:  Unable to assess     Muscle Mass Loss:  Unable to assess    Fluid Accumulation:  No fluid accumulation     Strength:  Not Performed    NUTRITION DIAGNOSIS   Inadequate oral intake related to impaired respiratory function as evidenced by NPO or clear liquid status due to medical condition, intubation    Nutrition Monitoring and Evaluation:   Food/Nutrient Intake Outcomes:  Progression of Nutrition, Enteral Nutrition Intake/Tolerance  Physical Signs/Symptoms Outcomes:  Biochemical Data, GI Status, Hemodynamic Status, Nutrition Focused Physical Findings, Skin, Weight     OBJECTIVE DATA: Significant to nutrition assessment  Nutrition 
[facility]  Palliative Medicine Consultation Note      Date Of Admission:5/16/2025  Date of consult: 05/19/25  Seen by PC in the past:  No    Recommendations:        Charlene Nazario is a 74 yo F with hx of COPD, HFpEF (50-55%), HTN currently admitted for COPD exacerbation. She had an acute change in her respiratory status and was intubated 5/18.     Spoke with patient's NOK ( Norberto Nazario) to introduce palliative care and provide updates. I explained that patient did get more tachycardic and this morning had spiked a fever and that this is day 2 of being on the vent. Norberto has good  understanding of patient's current clinical status. I readdressed code status with him today and  he is adamant that he \"wants everything done\" to save her life, including chest compressions, shocks, resuscitative meds if needed. He notes, that patient had previously voiced wanting everything to be done to save her life. There is no formal paperwork, legal documents reiterating these wishes.     Patient also has 5 adult children (daughters), and Norberto is their stepfather. I met with patient's oldest daughter, Nesha, along with her younger daughter, as well patient's sister Carey at bedside and introduced palliative care to them as well. Nesha expresses concern for \"the next steps\" and worried about patient's return to baseline. At baseline, patient is on 3L oxygen and more recently has required this 24/7 since her most recent hospitalization for COPD exacerbation. She elaborates that patient is otherwise able to complete all ADLs, has good social support and finds sandrine in outings with her . She is worried that patient has had multiple hospitalizations for COPD exacerbation and does not want her mother to suffer. I explained that this is only day 2 of her being on the vent and the end goal would be to have her extubated and return to her baseline requirements. I did bring up code status and 
bilaterally   She is able to speak short sentences.    Abdominal:      General: Bowel sounds are normal.      Palpations: Abdomen is soft.   Musculoskeletal:         General: No deformity.      Cervical back: Neck supple.   Skin:     General: Skin is warm and dry.   Neurological:      Mental Status: She is alert and oriented to person, place, and time.   Psychiatric:         Behavior: Behavior normal.         LABS:  Recent Labs     05/16/25  0648   WBC 7.3   HGB 13.6   HCT 40.0                                                                     Recent Labs     05/16/25  0648   *   K 4.0   CL 97*   CO2 26   BUN 9   CREATININE 0.5*   GLUCOSE 106*     Recent Labs     05/16/25  0648   AST 16   ALT 8*   BILITOT 0.7   ALKPHOS 43     No results for input(s): \"TROPONINI\" in the last 72 hours.  No results for input(s): \"BNP\" in the last 72 hours.  Lab Results   Component Value Date/Time    PHART 7.332 05/16/2025 11:44 AM    TGC0MMK 64.3 05/16/2025 11:44 AM    PO2ART 134.2 05/16/2025 11:44 AM     No results for input(s): \"INR\" in the last 72 hours.  No results for input(s): \"NITRITE\", \"COLORU\", \"PHUR\", \"LABCAST\", \"WBCUA\", \"RBCUA\", \"MUCUS\", \"TRICHOMONAS\", \"YEAST\", \"BACTERIA\", \"CLARITYU\", \"SPECGRAV\", \"LEUKOCYTESUR\", \"UROBILINOGEN\", \"BILIRUBINUR\", \"BLOODU\", \"GLUCOSEU\", \"AMORPHOUS\" in the last 72 hours.    Invalid input(s): \"KETONESU\"     DATA:  CXR  IMPRESSION:  No acute cardiopulmonary abnormality.    EKG  Sinus tachycardia with short CT with Fusion complexes  Low voltage QRS  Lateral infarct , age undetermined  Abnormal ECG  When compared with ECG of 19-APR-2025 06:56,  Fusion complexes are now Present  CT interval has decreased  Lateral infarct is now Present  ST now depressed in Lateral leads ...    Assessment &Plan:    Patient Active Problem List:     Incidental pulmonary nodule, greater than or equal to 8mm     Thyroid mass of unclear etiology     Lung nodule:left     Gastroesophageal reflux disease without

## 2025-05-28 ENCOUNTER — TELEPHONE (OUTPATIENT)
Dept: PULMONOLOGY | Age: 75
End: 2025-05-28

## 2025-05-28 LAB
ALBUMIN SERPL-MCNC: 3.1 G/DL (ref 3.4–5)
ANION GAP SERPL CALCULATED.3IONS-SCNC: 9 MMOL/L (ref 3–16)
BASE EXCESS BLDA CALC-SCNC: 18 MMOL/L (ref -3–3)
BASOPHILS # BLD: 0 K/UL (ref 0–0.2)
BASOPHILS NFR BLD: 0.1 %
BUN SERPL-MCNC: 33 MG/DL (ref 7–20)
CA-I BLD-SCNC: 1.23 MMOL/L (ref 1.12–1.32)
CALCIUM SERPL-MCNC: 8.7 MG/DL (ref 8.3–10.6)
CHLORIDE SERPL-SCNC: 104 MMOL/L (ref 99–110)
CO2 BLDA-SCNC: 44 MMOL/L
CO2 SERPL-SCNC: 30 MMOL/L (ref 21–32)
CREAT SERPL-MCNC: 0.5 MG/DL (ref 0.6–1.2)
DEPRECATED RDW RBC AUTO: 13.4 % (ref 12.4–15.4)
EKG ATRIAL RATE: 64 BPM
EKG DIAGNOSIS: NORMAL
EKG P AXIS: 37 DEGREES
EKG P-R INTERVAL: 140 MS
EKG Q-T INTERVAL: 414 MS
EKG QRS DURATION: 84 MS
EKG QTC CALCULATION (BAZETT): 427 MS
EKG R AXIS: 29 DEGREES
EKG T AXIS: 54 DEGREES
EKG VENTRICULAR RATE: 64 BPM
EOSINOPHIL # BLD: 0 K/UL (ref 0–0.6)
EOSINOPHIL NFR BLD: 0 %
GFR SERPLBLD CREATININE-BSD FMLA CKD-EPI: >90 ML/MIN/{1.73_M2}
GLUCOSE BLD-MCNC: 126 MG/DL (ref 70–99)
GLUCOSE SERPL-MCNC: 142 MG/DL (ref 70–99)
HCO3 BLDA-SCNC: 41.8 MMOL/L (ref 21–29)
HCT VFR BLD AUTO: 32.9 % (ref 36–48)
HGB BLD-MCNC: 11.1 G/DL (ref 12–16)
LACTATE BLD-SCNC: 0.73 MMOL/L (ref 0.4–2)
LYMPHOCYTES # BLD: 0.8 K/UL (ref 1–5.1)
LYMPHOCYTES NFR BLD: 8 %
MAGNESIUM SERPL-MCNC: 1.82 MG/DL (ref 1.8–2.4)
MCH RBC QN AUTO: 32.2 PG (ref 26–34)
MCHC RBC AUTO-ENTMCNC: 33.8 G/DL (ref 31–36)
MCV RBC AUTO: 95 FL (ref 80–100)
MONOCYTES # BLD: 0.7 K/UL (ref 0–1.3)
MONOCYTES NFR BLD: 7.5 %
NEUTROPHILS # BLD: 8.4 K/UL (ref 1.7–7.7)
NEUTROPHILS NFR BLD: 84.4 %
PCO2 BLDA: 61.5 MM HG (ref 35–45)
PERFORMED ON: ABNORMAL
PH BLDA: 7.44 [PH] (ref 7.35–7.45)
PHOSPHATE SERPL-MCNC: 3 MG/DL (ref 2.5–4.9)
PLATELET # BLD AUTO: 231 K/UL (ref 135–450)
PMV BLD AUTO: 8.6 FL (ref 5–10.5)
PO2 BLDA: 106.4 MM HG (ref 75–108)
POC SAMPLE TYPE: ABNORMAL
POTASSIUM BLD-SCNC: 3.2 MMOL/L (ref 3.5–5.1)
POTASSIUM SERPL-SCNC: 3.4 MMOL/L (ref 3.5–5.1)
RBC # BLD AUTO: 3.46 M/UL (ref 4–5.2)
SAO2 % BLDA: 98 % (ref 93–100)
SODIUM BLD-SCNC: 150 MMOL/L (ref 136–145)
SODIUM SERPL-SCNC: 143 MMOL/L (ref 136–145)
WBC # BLD AUTO: 9.9 K/UL (ref 4–11)

## 2025-05-28 PROCEDURE — 94660 CPAP INITIATION&MGMT: CPT

## 2025-05-28 PROCEDURE — 82947 ASSAY GLUCOSE BLOOD QUANT: CPT

## 2025-05-28 PROCEDURE — 2580000003 HC RX 258

## 2025-05-28 PROCEDURE — 83735 ASSAY OF MAGNESIUM: CPT

## 2025-05-28 PROCEDURE — 83605 ASSAY OF LACTIC ACID: CPT

## 2025-05-28 PROCEDURE — 2580000003 HC RX 258: Performed by: INTERNAL MEDICINE

## 2025-05-28 PROCEDURE — 2700000000 HC OXYGEN THERAPY PER DAY

## 2025-05-28 PROCEDURE — 6360000002 HC RX W HCPCS

## 2025-05-28 PROCEDURE — 2500000003 HC RX 250 WO HCPCS

## 2025-05-28 PROCEDURE — 6370000000 HC RX 637 (ALT 250 FOR IP)

## 2025-05-28 PROCEDURE — 84295 ASSAY OF SERUM SODIUM: CPT

## 2025-05-28 PROCEDURE — 94640 AIRWAY INHALATION TREATMENT: CPT

## 2025-05-28 PROCEDURE — 85025 COMPLETE CBC W/AUTO DIFF WBC: CPT

## 2025-05-28 PROCEDURE — 82330 ASSAY OF CALCIUM: CPT

## 2025-05-28 PROCEDURE — 93005 ELECTROCARDIOGRAM TRACING: CPT

## 2025-05-28 PROCEDURE — 37799 UNLISTED PX VASCULAR SURGERY: CPT

## 2025-05-28 PROCEDURE — 82803 BLOOD GASES ANY COMBINATION: CPT

## 2025-05-28 PROCEDURE — 84132 ASSAY OF SERUM POTASSIUM: CPT

## 2025-05-28 PROCEDURE — 2000000000 HC ICU R&B

## 2025-05-28 PROCEDURE — 94761 N-INVAS EAR/PLS OXIMETRY MLT: CPT

## 2025-05-28 PROCEDURE — 93010 ELECTROCARDIOGRAM REPORT: CPT | Performed by: INTERNAL MEDICINE

## 2025-05-28 PROCEDURE — 80069 RENAL FUNCTION PANEL: CPT

## 2025-05-28 PROCEDURE — 99291 CRITICAL CARE FIRST HOUR: CPT | Performed by: INTERNAL MEDICINE

## 2025-05-28 RX ORDER — POLYETHYLENE GLYCOL 3350 17 G/17G
17 POWDER, FOR SOLUTION ORAL DAILY
Status: DISCONTINUED | OUTPATIENT
Start: 2025-05-28 | End: 2025-06-04 | Stop reason: HOSPADM

## 2025-05-28 RX ORDER — FUROSEMIDE 10 MG/ML
20 INJECTION INTRAMUSCULAR; INTRAVENOUS ONCE
Status: COMPLETED | OUTPATIENT
Start: 2025-05-28 | End: 2025-05-28

## 2025-05-28 RX ORDER — ALBUTEROL SULFATE 0.83 MG/ML
2.5 SOLUTION RESPIRATORY (INHALATION)
Status: DISCONTINUED | OUTPATIENT
Start: 2025-05-28 | End: 2025-06-04 | Stop reason: HOSPADM

## 2025-05-28 RX ORDER — LORAZEPAM 2 MG/ML
0.5 INJECTION INTRAMUSCULAR ONCE
Status: COMPLETED | OUTPATIENT
Start: 2025-05-28 | End: 2025-05-28

## 2025-05-28 RX ORDER — HYDROXYZINE HYDROCHLORIDE 25 MG/1
25 TABLET, FILM COATED ORAL 3 TIMES DAILY PRN
Status: DISCONTINUED | OUTPATIENT
Start: 2025-05-28 | End: 2025-06-04 | Stop reason: HOSPADM

## 2025-05-28 RX ADMIN — POLYETHYLENE GLYCOL 3350 17 G: 17 POWDER, FOR SOLUTION ORAL at 08:13

## 2025-05-28 RX ADMIN — BUSPIRONE HYDROCHLORIDE 5 MG: 10 TABLET ORAL at 08:13

## 2025-05-28 RX ADMIN — PREDNISONE 60 MG: 20 TABLET ORAL at 08:12

## 2025-05-28 RX ADMIN — POTASSIUM BICARBONATE 40 MEQ: 782 TABLET, EFFERVESCENT ORAL at 08:13

## 2025-05-28 RX ADMIN — LEVALBUTEROL 1.25 MG: 1.25 SOLUTION, CONCENTRATE RESPIRATORY (INHALATION) at 09:53

## 2025-05-28 RX ADMIN — BUDESONIDE 500 MCG: 0.5 SUSPENSION RESPIRATORY (INHALATION) at 09:53

## 2025-05-28 RX ADMIN — Medication 4 ML: at 09:53

## 2025-05-28 RX ADMIN — LEVALBUTEROL 1.25 MG: 1.25 SOLUTION, CONCENTRATE RESPIRATORY (INHALATION) at 20:07

## 2025-05-28 RX ADMIN — IPRATROPIUM BROMIDE 0.5 MG: 0.5 SOLUTION RESPIRATORY (INHALATION) at 09:53

## 2025-05-28 RX ADMIN — Medication: at 08:15

## 2025-05-28 RX ADMIN — Medication 4 ML: at 20:10

## 2025-05-28 RX ADMIN — ARFORMOTEROL TARTRATE 15 MCG: 15 SOLUTION RESPIRATORY (INHALATION) at 20:06

## 2025-05-28 RX ADMIN — SODIUM CHLORIDE, PRESERVATIVE FREE 40 MG: 5 INJECTION INTRAVENOUS at 08:14

## 2025-05-28 RX ADMIN — ALBUTEROL SULFATE 2.5 MG: 2.5 SOLUTION RESPIRATORY (INHALATION) at 22:19

## 2025-05-28 RX ADMIN — FUROSEMIDE 20 MG: 10 INJECTION, SOLUTION INTRAMUSCULAR; INTRAVENOUS at 21:28

## 2025-05-28 RX ADMIN — Medication: at 21:28

## 2025-05-28 RX ADMIN — IPRATROPIUM BROMIDE 0.5 MG: 0.5 SOLUTION RESPIRATORY (INHALATION) at 12:15

## 2025-05-28 RX ADMIN — IPRATROPIUM BROMIDE 0.5 MG: 0.5 SOLUTION RESPIRATORY (INHALATION) at 20:06

## 2025-05-28 RX ADMIN — ASPIRIN 81 MG: 81 TABLET, CHEWABLE ORAL at 08:13

## 2025-05-28 RX ADMIN — Medication 1.5 MCG/KG/HR: at 16:39

## 2025-05-28 RX ADMIN — Medication 1.5 MCG/KG/HR: at 04:57

## 2025-05-28 RX ADMIN — LEVALBUTEROL 1.25 MG: 1.25 SOLUTION, CONCENTRATE RESPIRATORY (INHALATION) at 12:15

## 2025-05-28 RX ADMIN — PROPOFOL 35 MCG/KG/MIN: 10 INJECTION, EMULSION INTRAVENOUS at 04:57

## 2025-05-28 RX ADMIN — LEVALBUTEROL 1.25 MG: 1.25 SOLUTION, CONCENTRATE RESPIRATORY (INHALATION) at 16:16

## 2025-05-28 RX ADMIN — IPRATROPIUM BROMIDE 0.5 MG: 0.5 SOLUTION RESPIRATORY (INHALATION) at 16:16

## 2025-05-28 RX ADMIN — LORAZEPAM 0.5 MG: 2 INJECTION INTRAMUSCULAR; INTRAVENOUS at 21:28

## 2025-05-28 RX ADMIN — BUDESONIDE 500 MCG: 0.5 SUSPENSION RESPIRATORY (INHALATION) at 20:06

## 2025-05-28 RX ADMIN — ARFORMOTEROL TARTRATE 15 MCG: 15 SOLUTION RESPIRATORY (INHALATION) at 09:53

## 2025-05-28 RX ADMIN — SODIUM CHLORIDE 0.9 MG/KG/HR: 9 INJECTION, SOLUTION INTRAVENOUS at 13:38

## 2025-05-28 RX ADMIN — QUETIAPINE FUMARATE 50 MG: 25 TABLET ORAL at 08:13

## 2025-05-28 RX ADMIN — Medication 1.5 MCG/KG/HR: at 22:35

## 2025-05-28 RX ADMIN — FUROSEMIDE 20 MG: 10 INJECTION, SOLUTION INTRAMUSCULAR; INTRAVENOUS at 06:21

## 2025-05-28 RX ADMIN — ITRACONAZOLE 200 MG: 10 SOLUTION ORAL at 08:13

## 2025-05-28 RX ADMIN — Medication 1.5 MCG/KG/HR: at 11:01

## 2025-05-28 RX ADMIN — ENOXAPARIN SODIUM 40 MG: 100 INJECTION SUBCUTANEOUS at 08:12

## 2025-05-28 ASSESSMENT — PULMONARY FUNCTION TESTS
PIF_VALUE: 21
PIF_VALUE: 21
PIF_VALUE: 28
PIF_VALUE: 30
PIF_VALUE: 27
PIF_VALUE: 28
PIF_VALUE: 29
PIF_VALUE: 29
PIF_VALUE: 16
PIF_VALUE: 26
PIF_VALUE: 18
PIF_VALUE: 19
PIF_VALUE: 27
PIF_VALUE: 16
PIF_VALUE: 20
PIF_VALUE: 29
PIF_VALUE: 27
PIF_VALUE: 28
PIF_VALUE: 16
PIF_VALUE: 28
PIF_VALUE: 16
PIF_VALUE: 29
PIF_VALUE: 32
PIF_VALUE: 16
PIF_VALUE: 29
PIF_VALUE: 16

## 2025-05-28 ASSESSMENT — PAIN SCALES - GENERAL: PAINLEVEL_OUTOF10: 0

## 2025-05-28 NOTE — TELEPHONE ENCOUNTER
Dr. Erlinda Taveras is calling from [facility] to follow up on a lab result you order with them for this patient   Dr Taveras can be reached at [phone]

## 2025-05-29 ENCOUNTER — APPOINTMENT (OUTPATIENT)
Dept: GENERAL RADIOLOGY | Age: 75
DRG: 207 | End: 2025-05-29
Payer: MEDICARE

## 2025-05-29 LAB
A FLAVUS AB SER QL ID: NORMAL
A FUMIGATUS1 AB SER QL ID: NORMAL
A FUMIGATUS2 AB SER QL: NORMAL
A FUMIGATUS3 AB SER QL ID: NORMAL
A FUMIGATUS6 AB SER QL ID: NORMAL
A PULLULANS AB SER QL ID: NORMAL
ALBUMIN SERPL-MCNC: 3.2 G/DL (ref 3.4–5)
ANION GAP SERPL CALCULATED.3IONS-SCNC: 10 MMOL/L (ref 3–16)
BASE EXCESS BLDV CALC-SCNC: 19 MMOL/L (ref -3–3)
BASOPHILS # BLD: 0 K/UL (ref 0–0.2)
BASOPHILS NFR BLD: 0.2 %
BUN SERPL-MCNC: 28 MG/DL (ref 7–20)
CALCIUM SERPL-MCNC: 8.4 MG/DL (ref 8.3–10.6)
CHLORIDE SERPL-SCNC: 101 MMOL/L (ref 99–110)
CO2 BLDV-SCNC: 46 MMOL/L
CO2 SERPL-SCNC: 34 MMOL/L (ref 21–32)
CREAT SERPL-MCNC: 0.5 MG/DL (ref 0.6–1.2)
DEPRECATED MISC ALLERGEN IGE RAST QL: NORMAL
DEPRECATED RDW RBC AUTO: 13.2 % (ref 12.4–15.4)
EOSINOPHIL # BLD: 0.1 K/UL (ref 0–0.6)
EOSINOPHIL NFR BLD: 0.9 %
EPID ALLERG MIX73 IGE QN: NEGATIVE KU/L
GFR SERPLBLD CREATININE-BSD FMLA CKD-EPI: >90 ML/MIN/{1.73_M2}
GLUCOSE SERPL-MCNC: 81 MG/DL (ref 70–99)
HCO3 BLDV-SCNC: 43.5 MMOL/L (ref 23–29)
HCT VFR BLD AUTO: 34.3 % (ref 36–48)
HGB BLD-MCNC: 11.5 G/DL (ref 12–16)
LYMPHOCYTES # BLD: 1.7 K/UL (ref 1–5.1)
LYMPHOCYTES NFR BLD: 15.8 %
MAGNESIUM SERPL-MCNC: 1.6 MG/DL (ref 1.8–2.4)
MCH RBC QN AUTO: 31.9 PG (ref 26–34)
MCHC RBC AUTO-ENTMCNC: 33.4 G/DL (ref 31–36)
MCV RBC AUTO: 95.3 FL (ref 80–100)
MONOCYTES # BLD: 1.1 K/UL (ref 0–1.3)
MONOCYTES NFR BLD: 9.6 %
NEUTROPHILS # BLD: 8.1 K/UL (ref 1.7–7.7)
NEUTROPHILS NFR BLD: 73.5 %
P BETAE IGE QN: <0.1 KU/L
PCO2 BLDV: 67.9 MM HG (ref 40–50)
PERFORMED ON: ABNORMAL
PH BLDV: 7.42 [PH] (ref 7.35–7.45)
PHOSPHATE SERPL-MCNC: 2.5 MG/DL (ref 2.5–4.9)
PIGEON SERUM AB QL ID: NORMAL
PLATELET # BLD AUTO: 263 K/UL (ref 135–450)
PMV BLD AUTO: 8.2 FL (ref 5–10.5)
PO2 BLDV: 30 MM HG
POC SAMPLE TYPE: ABNORMAL
POTASSIUM SERPL-SCNC: 3.1 MMOL/L (ref 3.5–5.1)
RBC # BLD AUTO: 3.6 M/UL (ref 4–5.2)
S RECTIVIRGULA AB SER QL ID: NORMAL
S VIRIDIS AB SER QL: NORMAL
SAO2 % BLDV: 54 %
SODIUM SERPL-SCNC: 145 MMOL/L (ref 136–145)
T CANDIDUS AB SER QL: NORMAL
TRIGL SERPL-MCNC: 99 MG/DL (ref 0–150)
WBC # BLD AUTO: 11 K/UL (ref 4–11)

## 2025-05-29 PROCEDURE — 2580000003 HC RX 258: Performed by: INTERNAL MEDICINE

## 2025-05-29 PROCEDURE — 82803 BLOOD GASES ANY COMBINATION: CPT

## 2025-05-29 PROCEDURE — 94761 N-INVAS EAR/PLS OXIMETRY MLT: CPT

## 2025-05-29 PROCEDURE — 92526 ORAL FUNCTION THERAPY: CPT

## 2025-05-29 PROCEDURE — 6360000002 HC RX W HCPCS

## 2025-05-29 PROCEDURE — 6370000000 HC RX 637 (ALT 250 FOR IP)

## 2025-05-29 PROCEDURE — 2500000003 HC RX 250 WO HCPCS

## 2025-05-29 PROCEDURE — 74018 RADEX ABDOMEN 1 VIEW: CPT

## 2025-05-29 PROCEDURE — 6370000000 HC RX 637 (ALT 250 FOR IP): Performed by: INTERNAL MEDICINE

## 2025-05-29 PROCEDURE — 2000000000 HC ICU R&B

## 2025-05-29 PROCEDURE — 84478 ASSAY OF TRIGLYCERIDES: CPT

## 2025-05-29 PROCEDURE — 2580000003 HC RX 258

## 2025-05-29 PROCEDURE — 80069 RENAL FUNCTION PANEL: CPT

## 2025-05-29 PROCEDURE — 83735 ASSAY OF MAGNESIUM: CPT

## 2025-05-29 PROCEDURE — 2700000000 HC OXYGEN THERAPY PER DAY

## 2025-05-29 PROCEDURE — 85025 COMPLETE CBC W/AUTO DIFF WBC: CPT

## 2025-05-29 PROCEDURE — 92610 EVALUATE SWALLOWING FUNCTION: CPT

## 2025-05-29 PROCEDURE — 99233 SBSQ HOSP IP/OBS HIGH 50: CPT | Performed by: INTERNAL MEDICINE

## 2025-05-29 PROCEDURE — 94640 AIRWAY INHALATION TREATMENT: CPT

## 2025-05-29 RX ORDER — IPRATROPIUM BROMIDE AND ALBUTEROL SULFATE 2.5; .5 MG/3ML; MG/3ML
1 SOLUTION RESPIRATORY (INHALATION)
Status: DISCONTINUED | OUTPATIENT
Start: 2025-05-29 | End: 2025-06-03

## 2025-05-29 RX ORDER — POTASSIUM CHLORIDE 7.45 MG/ML
10 INJECTION INTRAVENOUS
Status: DISCONTINUED | OUTPATIENT
Start: 2025-05-29 | End: 2025-05-29

## 2025-05-29 RX ORDER — LIDOCAINE HYDROCHLORIDE 20 MG/ML
JELLY TOPICAL ONCE
Status: DISCONTINUED | OUTPATIENT
Start: 2025-05-29 | End: 2025-05-30

## 2025-05-29 RX ORDER — MAGNESIUM SULFATE IN WATER 40 MG/ML
2000 INJECTION, SOLUTION INTRAVENOUS ONCE
Status: COMPLETED | OUTPATIENT
Start: 2025-05-29 | End: 2025-05-29

## 2025-05-29 RX ORDER — IPRATROPIUM BROMIDE AND ALBUTEROL SULFATE 2.5; .5 MG/3ML; MG/3ML
1 SOLUTION RESPIRATORY (INHALATION)
Status: DISCONTINUED | OUTPATIENT
Start: 2025-05-29 | End: 2025-05-29

## 2025-05-29 RX ORDER — LORAZEPAM 2 MG/ML
0.5 INJECTION INTRAMUSCULAR ONCE
Status: COMPLETED | OUTPATIENT
Start: 2025-05-29 | End: 2025-05-29

## 2025-05-29 RX ORDER — OXYMETAZOLINE HYDROCHLORIDE 0.05 G/100ML
2 SPRAY NASAL ONCE
Status: DISCONTINUED | OUTPATIENT
Start: 2025-05-29 | End: 2025-05-30

## 2025-05-29 RX ADMIN — IPRATROPIUM BROMIDE 0.5 MG: 0.5 SOLUTION RESPIRATORY (INHALATION) at 08:45

## 2025-05-29 RX ADMIN — LORAZEPAM 0.5 MG: 2 INJECTION INTRAMUSCULAR; INTRAVENOUS at 20:36

## 2025-05-29 RX ADMIN — ASPIRIN 81 MG: 81 TABLET, CHEWABLE ORAL at 14:18

## 2025-05-29 RX ADMIN — Medication 1.5 MCG/KG/HR: at 22:28

## 2025-05-29 RX ADMIN — BUSPIRONE HYDROCHLORIDE 5 MG: 10 TABLET ORAL at 20:07

## 2025-05-29 RX ADMIN — ENOXAPARIN SODIUM 40 MG: 100 INJECTION SUBCUTANEOUS at 08:33

## 2025-05-29 RX ADMIN — LEVALBUTEROL 1.25 MG: 1.25 SOLUTION, CONCENTRATE RESPIRATORY (INHALATION) at 08:44

## 2025-05-29 RX ADMIN — SODIUM CHLORIDE, PRESERVATIVE FREE 10 ML: 5 INJECTION INTRAVENOUS at 09:00

## 2025-05-29 RX ADMIN — ITRACONAZOLE 200 MG: 10 SOLUTION ORAL at 14:27

## 2025-05-29 RX ADMIN — TIZANIDINE 2 MG: 4 TABLET ORAL at 20:07

## 2025-05-29 RX ADMIN — ARFORMOTEROL TARTRATE 15 MCG: 15 SOLUTION RESPIRATORY (INHALATION) at 20:45

## 2025-05-29 RX ADMIN — IPRATROPIUM BROMIDE AND ALBUTEROL SULFATE 1 DOSE: 2.5; .5 SOLUTION RESPIRATORY (INHALATION) at 17:36

## 2025-05-29 RX ADMIN — BUSPIRONE HYDROCHLORIDE 5 MG: 10 TABLET ORAL at 14:17

## 2025-05-29 RX ADMIN — SODIUM CHLORIDE 0.2 MG/KG/HR: 9 INJECTION, SOLUTION INTRAVENOUS at 19:48

## 2025-05-29 RX ADMIN — WATER 5 MG: 1 INJECTION INTRAMUSCULAR; INTRAVENOUS; SUBCUTANEOUS at 16:13

## 2025-05-29 RX ADMIN — PREDNISONE 60 MG: 20 TABLET ORAL at 14:18

## 2025-05-29 RX ADMIN — IPRATROPIUM BROMIDE AND ALBUTEROL SULFATE 1 DOSE: 2.5; .5 SOLUTION RESPIRATORY (INHALATION) at 20:45

## 2025-05-29 RX ADMIN — BUDESONIDE 500 MCG: 0.5 SUSPENSION RESPIRATORY (INHALATION) at 20:45

## 2025-05-29 RX ADMIN — Medication: at 21:00

## 2025-05-29 RX ADMIN — Medication 1000 UNITS: at 20:07

## 2025-05-29 RX ADMIN — WATER 2.5 MG: 1 INJECTION INTRAMUSCULAR; INTRAVENOUS; SUBCUTANEOUS at 15:15

## 2025-05-29 RX ADMIN — Medication 1.5 MCG/KG/HR: at 04:10

## 2025-05-29 RX ADMIN — ITRACONAZOLE 200 MG: 10 SOLUTION ORAL at 20:37

## 2025-05-29 RX ADMIN — ARFORMOTEROL TARTRATE 15 MCG: 15 SOLUTION RESPIRATORY (INHALATION) at 08:45

## 2025-05-29 RX ADMIN — BUDESONIDE 500 MCG: 0.5 SUSPENSION RESPIRATORY (INHALATION) at 08:45

## 2025-05-29 RX ADMIN — Medication 4 ML: at 20:45

## 2025-05-29 RX ADMIN — Medication 1.5 MCG/KG/HR: at 15:47

## 2025-05-29 RX ADMIN — POTASSIUM BICARBONATE 40 MEQ: 782 TABLET, EFFERVESCENT ORAL at 18:13

## 2025-05-29 RX ADMIN — ACETAMINOPHEN 650 MG: 325 TABLET ORAL at 14:17

## 2025-05-29 RX ADMIN — ALBUTEROL SULFATE 2.5 MG: 2.5 SOLUTION RESPIRATORY (INHALATION) at 03:09

## 2025-05-29 RX ADMIN — QUETIAPINE FUMARATE 50 MG: 25 TABLET ORAL at 20:08

## 2025-05-29 RX ADMIN — SODIUM CHLORIDE 1 MG/KG/HR: 9 INJECTION, SOLUTION INTRAVENOUS at 02:30

## 2025-05-29 RX ADMIN — HYDROXYZINE HYDROCHLORIDE 25 MG: 25 TABLET ORAL at 14:17

## 2025-05-29 RX ADMIN — POTASSIUM BICARBONATE 40 MEQ: 782 TABLET, EFFERVESCENT ORAL at 14:32

## 2025-05-29 RX ADMIN — CYANOCOBALAMIN TAB 1000 MCG 500 MCG: 1000 TAB at 20:08

## 2025-05-29 RX ADMIN — IPRATROPIUM BROMIDE AND ALBUTEROL SULFATE 1 DOSE: 2.5; .5 SOLUTION RESPIRATORY (INHALATION) at 12:46

## 2025-05-29 RX ADMIN — Medication 4 ML: at 08:45

## 2025-05-29 RX ADMIN — Medication: at 09:00

## 2025-05-29 RX ADMIN — QUETIAPINE FUMARATE 50 MG: 25 TABLET ORAL at 14:17

## 2025-05-29 RX ADMIN — Medication 1.5 MCG/KG/HR: at 08:50

## 2025-05-29 RX ADMIN — MAGNESIUM SULFATE HEPTAHYDRATE 2000 MG: 40 INJECTION, SOLUTION INTRAVENOUS at 08:48

## 2025-05-30 ENCOUNTER — APPOINTMENT (OUTPATIENT)
Dept: GENERAL RADIOLOGY | Age: 75
DRG: 207 | End: 2025-05-30
Payer: MEDICARE

## 2025-05-30 LAB
ALBUMIN SERPL-MCNC: 3.3 G/DL (ref 3.4–5)
ALBUMIN SERPL-MCNC: 3.5 G/DL (ref 3.4–5)
ALP SERPL-CCNC: 53 U/L (ref 40–129)
ALT SERPL-CCNC: 42 U/L (ref 10–40)
ANION GAP SERPL CALCULATED.3IONS-SCNC: 11 MMOL/L (ref 3–16)
ANION GAP SERPL CALCULATED.3IONS-SCNC: 8 MMOL/L (ref 3–16)
AST SERPL-CCNC: 30 U/L (ref 15–37)
BASE EXCESS BLDA CALC-SCNC: 19 MMOL/L (ref -3–3)
BASOPHILS # BLD: 0.1 K/UL (ref 0–0.2)
BASOPHILS NFR BLD: 0.6 %
BILIRUB DIRECT SERPL-MCNC: 0.2 MG/DL (ref 0–0.3)
BILIRUB INDIRECT SERPL-MCNC: 0.6 MG/DL (ref 0–1)
BILIRUB SERPL-MCNC: 0.8 MG/DL (ref 0–1)
BUN SERPL-MCNC: 18 MG/DL (ref 7–20)
BUN SERPL-MCNC: 19 MG/DL (ref 7–20)
CALCIUM SERPL-MCNC: 8.5 MG/DL (ref 8.3–10.6)
CALCIUM SERPL-MCNC: 8.6 MG/DL (ref 8.3–10.6)
CHLORIDE SERPL-SCNC: 97 MMOL/L (ref 99–110)
CHLORIDE SERPL-SCNC: 99 MMOL/L (ref 99–110)
CO2 BLDA-SCNC: 45 MMOL/L
CO2 SERPL-SCNC: 32 MMOL/L (ref 21–32)
CO2 SERPL-SCNC: 34 MMOL/L (ref 21–32)
CREAT SERPL-MCNC: 0.5 MG/DL (ref 0.6–1.2)
CREAT SERPL-MCNC: 0.5 MG/DL (ref 0.6–1.2)
DEPRECATED RDW RBC AUTO: 12.8 % (ref 12.4–15.4)
EOSINOPHIL # BLD: 0.1 K/UL (ref 0–0.6)
EOSINOPHIL NFR BLD: 0.8 %
GFR SERPLBLD CREATININE-BSD FMLA CKD-EPI: >90 ML/MIN/{1.73_M2}
GFR SERPLBLD CREATININE-BSD FMLA CKD-EPI: >90 ML/MIN/{1.73_M2}
GLUCOSE SERPL-MCNC: 102 MG/DL (ref 70–99)
GLUCOSE SERPL-MCNC: 84 MG/DL (ref 70–99)
HCO3 BLDA-SCNC: 43.3 MMOL/L (ref 21–29)
HCT VFR BLD AUTO: 34.6 % (ref 36–48)
HGB BLD-MCNC: 11.5 G/DL (ref 12–16)
LYMPHOCYTES # BLD: 0.9 K/UL (ref 1–5.1)
LYMPHOCYTES NFR BLD: 8.1 %
Lab: NORMAL
MAGNESIUM SERPL-MCNC: 1.97 MG/DL (ref 1.8–2.4)
MCH RBC QN AUTO: 31.6 PG (ref 26–34)
MCHC RBC AUTO-ENTMCNC: 33.3 G/DL (ref 31–36)
MCV RBC AUTO: 94.7 FL (ref 80–100)
MONOCYTES # BLD: 0.9 K/UL (ref 0–1.3)
MONOCYTES NFR BLD: 7.7 %
NEUTROPHILS # BLD: 9.4 K/UL (ref 1.7–7.7)
NEUTROPHILS NFR BLD: 82.8 %
PCO2 BLDA: 61.6 MM HG (ref 35–45)
PERFORMED ON: ABNORMAL
PH BLDA: 7.46 [PH] (ref 7.35–7.45)
PHOSPHATE SERPL-MCNC: 2.2 MG/DL (ref 2.5–4.9)
PHOSPHATE SERPL-MCNC: 2.6 MG/DL (ref 2.5–4.9)
PLATELET # BLD AUTO: 286 K/UL (ref 135–450)
PMV BLD AUTO: 8.2 FL (ref 5–10.5)
PO2 BLDA: 157.1 MM HG (ref 75–108)
POC SAMPLE TYPE: ABNORMAL
POTASSIUM SERPL-SCNC: 3.6 MMOL/L (ref 3.5–5.1)
POTASSIUM SERPL-SCNC: 3.9 MMOL/L (ref 3.5–5.1)
PROT SERPL-MCNC: 5.6 G/DL (ref 6.4–8.2)
RBC # BLD AUTO: 3.65 M/UL (ref 4–5.2)
REASON FOR REJECTION: NORMAL
REJECTED TEST: NORMAL
REPORT: NORMAL
SAO2 % BLDA: 99 % (ref 93–100)
SODIUM SERPL-SCNC: 140 MMOL/L (ref 136–145)
SODIUM SERPL-SCNC: 141 MMOL/L (ref 136–145)
THIS TEST SENT TO: NORMAL
WBC # BLD AUTO: 11.4 K/UL (ref 4–11)

## 2025-05-30 PROCEDURE — 97530 THERAPEUTIC ACTIVITIES: CPT

## 2025-05-30 PROCEDURE — 6370000000 HC RX 637 (ALT 250 FOR IP)

## 2025-05-30 PROCEDURE — 6360000002 HC RX W HCPCS

## 2025-05-30 PROCEDURE — 80069 RENAL FUNCTION PANEL: CPT

## 2025-05-30 PROCEDURE — 2700000000 HC OXYGEN THERAPY PER DAY

## 2025-05-30 PROCEDURE — 6360000002 HC RX W HCPCS: Performed by: INTERNAL MEDICINE

## 2025-05-30 PROCEDURE — 92526 ORAL FUNCTION THERAPY: CPT

## 2025-05-30 PROCEDURE — 2500000003 HC RX 250 WO HCPCS

## 2025-05-30 PROCEDURE — 51798 US URINE CAPACITY MEASURE: CPT

## 2025-05-30 PROCEDURE — 2580000003 HC RX 258: Performed by: INTERNAL MEDICINE

## 2025-05-30 PROCEDURE — 97166 OT EVAL MOD COMPLEX 45 MIN: CPT

## 2025-05-30 PROCEDURE — 36415 COLL VENOUS BLD VENIPUNCTURE: CPT

## 2025-05-30 PROCEDURE — 94640 AIRWAY INHALATION TREATMENT: CPT

## 2025-05-30 PROCEDURE — 83735 ASSAY OF MAGNESIUM: CPT

## 2025-05-30 PROCEDURE — 97162 PT EVAL MOD COMPLEX 30 MIN: CPT

## 2025-05-30 PROCEDURE — 74018 RADEX ABDOMEN 1 VIEW: CPT

## 2025-05-30 PROCEDURE — 94761 N-INVAS EAR/PLS OXIMETRY MLT: CPT

## 2025-05-30 PROCEDURE — 99233 SBSQ HOSP IP/OBS HIGH 50: CPT | Performed by: INTERNAL MEDICINE

## 2025-05-30 PROCEDURE — 2580000003 HC RX 258

## 2025-05-30 PROCEDURE — 85025 COMPLETE CBC W/AUTO DIFF WBC: CPT

## 2025-05-30 PROCEDURE — 2000000000 HC ICU R&B

## 2025-05-30 PROCEDURE — 2500000003 HC RX 250 WO HCPCS: Performed by: INTERNAL MEDICINE

## 2025-05-30 PROCEDURE — 80076 HEPATIC FUNCTION PANEL: CPT

## 2025-05-30 RX ORDER — PANTOPRAZOLE SODIUM 40 MG/10ML
40 INJECTION, POWDER, LYOPHILIZED, FOR SOLUTION INTRAVENOUS DAILY
Status: DISCONTINUED | OUTPATIENT
Start: 2025-05-30 | End: 2025-06-03

## 2025-05-30 RX ORDER — PANTOPRAZOLE SODIUM 40 MG/1
40 TABLET, DELAYED RELEASE ORAL
Status: DISCONTINUED | OUTPATIENT
Start: 2025-05-31 | End: 2025-05-30

## 2025-05-30 RX ORDER — SODIUM CHLORIDE, SODIUM LACTATE, POTASSIUM CHLORIDE, CALCIUM CHLORIDE 600; 310; 30; 20 MG/100ML; MG/100ML; MG/100ML; MG/100ML
INJECTION, SOLUTION INTRAVENOUS CONTINUOUS
Status: DISCONTINUED | OUTPATIENT
Start: 2025-05-30 | End: 2025-06-04 | Stop reason: HOSPADM

## 2025-05-30 RX ORDER — LORAZEPAM 2 MG/ML
1 INJECTION INTRAMUSCULAR ONCE
Status: COMPLETED | OUTPATIENT
Start: 2025-05-30 | End: 2025-05-30

## 2025-05-30 RX ADMIN — WATER 5 MG: 1 INJECTION INTRAMUSCULAR; INTRAVENOUS; SUBCUTANEOUS at 18:22

## 2025-05-30 RX ADMIN — Medication: at 21:36

## 2025-05-30 RX ADMIN — ARFORMOTEROL TARTRATE 15 MCG: 15 SOLUTION RESPIRATORY (INHALATION) at 09:04

## 2025-05-30 RX ADMIN — ARFORMOTEROL TARTRATE 15 MCG: 15 SOLUTION RESPIRATORY (INHALATION) at 19:53

## 2025-05-30 RX ADMIN — SODIUM CHLORIDE 20 MMOL: 9 INJECTION, SOLUTION INTRAVENOUS at 18:21

## 2025-05-30 RX ADMIN — ENOXAPARIN SODIUM 40 MG: 100 INJECTION SUBCUTANEOUS at 10:36

## 2025-05-30 RX ADMIN — BUDESONIDE 500 MCG: 0.5 SUSPENSION RESPIRATORY (INHALATION) at 19:53

## 2025-05-30 RX ADMIN — PANTOPRAZOLE SODIUM 40 MG: 40 INJECTION, POWDER, FOR SOLUTION INTRAVENOUS at 14:10

## 2025-05-30 RX ADMIN — Medication 4 ML: at 19:53

## 2025-05-30 RX ADMIN — METHYLPREDNISOLONE SODIUM SUCCINATE 40 MG: 40 INJECTION, POWDER, LYOPHILIZED, FOR SOLUTION INTRAMUSCULAR; INTRAVENOUS at 14:08

## 2025-05-30 RX ADMIN — WATER 5 MG: 1 INJECTION INTRAMUSCULAR; INTRAVENOUS; SUBCUTANEOUS at 21:32

## 2025-05-30 RX ADMIN — SODIUM CHLORIDE, SODIUM LACTATE, POTASSIUM CHLORIDE, AND CALCIUM CHLORIDE: .6; .31; .03; .02 INJECTION, SOLUTION INTRAVENOUS at 16:35

## 2025-05-30 RX ADMIN — IPRATROPIUM BROMIDE AND ALBUTEROL SULFATE 1 DOSE: 2.5; .5 SOLUTION RESPIRATORY (INHALATION) at 16:44

## 2025-05-30 RX ADMIN — BUDESONIDE 500 MCG: 0.5 SUSPENSION RESPIRATORY (INHALATION) at 09:04

## 2025-05-30 RX ADMIN — IPRATROPIUM BROMIDE AND ALBUTEROL SULFATE 1 DOSE: 2.5; .5 SOLUTION RESPIRATORY (INHALATION) at 19:53

## 2025-05-30 RX ADMIN — LORAZEPAM 1 MG: 2 INJECTION INTRAMUSCULAR; INTRAVENOUS at 06:30

## 2025-05-30 RX ADMIN — Medication: at 10:33

## 2025-05-30 RX ADMIN — WATER 2.5 MG: 1 INJECTION INTRAMUSCULAR; INTRAVENOUS; SUBCUTANEOUS at 13:10

## 2025-05-30 RX ADMIN — Medication 1.5 MCG/KG/HR: at 03:36

## 2025-05-30 RX ADMIN — SODIUM CHLORIDE 0.45 MG/KG/HR: 9 INJECTION, SOLUTION INTRAVENOUS at 04:56

## 2025-05-30 RX ADMIN — IPRATROPIUM BROMIDE AND ALBUTEROL SULFATE 1 DOSE: 2.5; .5 SOLUTION RESPIRATORY (INHALATION) at 09:04

## 2025-05-30 RX ADMIN — Medication 1.5 MCG/KG/HR: at 09:19

## 2025-05-30 RX ADMIN — Medication 4 ML: at 09:06

## 2025-05-30 ASSESSMENT — PAIN SCALES - GENERAL: PAINLEVEL_OUTOF10: 0

## 2025-05-30 NOTE — RT PROTOCOL NOTE
RT Inhaler-Nebulizer Bronchodilator Protocol Note    There is a bronchodilator order in the chart from a provider indicating to follow the RT Bronchodilator Protocol and there is an “Initiate RT Inhaler-Nebulizer Bronchodilator Protocol” order as well (see protocol at bottom of note).    CXR Findings:  No results found.    The findings from the last RT Protocol Assessment were as follows:   History Pulmonary Disease: Chronic pulmonary disease  Respiratory Pattern: Use of accessory muscles, prolonged exhalation, or RR 26-30 bpm (Pt has accessory muscle usage and prolonged exhalation at baseline)  Breath Sounds: Slightly diminished and/or crackles  Cough: Strong, productive  Indication for Bronchodilator Therapy: On home bronchodilators  Bronchodilator Assessment Score: 11    Aerosolized bronchodilator medication orders have been revised according to the RT Inhaler-Nebulizer Bronchodilator Protocol below.    Respiratory Therapist to perform RT Therapy Protocol Assessment initially then follow the protocol.  Repeat RT Therapy Protocol Assessment PRN for score 0-3 or on second treatment, BID, and PRN for scores above 3.    No Indications - adjust the frequency to every 6 hours PRN wheezing or bronchospasm, if no treatments needed after 48 hours then discontinue using Per Protocol order mode.     If indication present, adjust the RT bronchodilator orders based on the Bronchodilator Assessment Score as indicated below.  Use Inhaler orders unless patient has one or more of the following: on home nebulizer, not able to hold breath for 10 seconds, is not alert and oriented, cannot activate and use MDI correctly, or respiratory rate 25 breaths per minute or more, then use the equivalent nebulizer order(s) with same Frequency and PRN reasons based on the score.  If a patient is on this medication at home then do not decrease Frequency below that used at home.    0-3 - enter or revise RT bronchodilator order(s) to equivalent RT

## 2025-05-30 NOTE — CARE COORDINATION
Discharge Planning:    CM continues to follow for DCP- pt greatly improved today vs yesterday. No longer on sedation or vapotherm. Plan for SLP eval with MBS to determine if NGT will need to be replaced or if pt can start on PO diet. No longer qualifying for LTACH at DC due to great improvements- CM provided SNF list to family at bedside, requesting at least 3 top preferences for placement. Pt and  at bedside have preference of [facility], [facility], and [facility]. Pt will need NG out and to be tolerating PO, vs long term feeding plan, rectal tube will also have to come out, and will need to remain out of restraints, prior to SNF placement. Pt will not require precert for placement. CM will continue to follow.    Thank you  Cat Gerry BRASHER, BSN,   ICU   [phone]

## 2025-05-30 NOTE — CARE COORDINATION
Discharge Planning:    Dominique with [facility] continues to follow for DCP- they can accept pt once medically ready to transition to LTACH, no precert needed. Dominique spoke with  at bedside- stated he wanted to discuss with the physicians before deciding anything/signing consents. Dominique will f/u tomorrow and attempt to obtain phone consents with  in case pt becomes ready for LTACH prior to Monday. ROSA will f/u tomorrow.    Thank you  Cat Gerry RN, BSN,   ICU   [phone]

## 2025-05-31 ENCOUNTER — APPOINTMENT (OUTPATIENT)
Dept: GENERAL RADIOLOGY | Age: 75
DRG: 207 | End: 2025-05-31
Payer: MEDICARE

## 2025-05-31 LAB
ALBUMIN SERPL-MCNC: 3.5 G/DL (ref 3.4–5)
ANION GAP SERPL CALCULATED.3IONS-SCNC: 12 MMOL/L (ref 3–16)
BASE EXCESS BLDV CALC-SCNC: 6.1 MMOL/L (ref -2–3)
BASOPHILS # BLD: 0 K/UL (ref 0–0.2)
BASOPHILS NFR BLD: 0.1 %
BUN SERPL-MCNC: 20 MG/DL (ref 7–20)
CALCIUM SERPL-MCNC: 8.1 MG/DL (ref 8.3–10.6)
CHLORIDE SERPL-SCNC: 100 MMOL/L (ref 99–110)
CO2 BLDV-SCNC: 32 MMOL/L
CO2 SERPL-SCNC: 30 MMOL/L (ref 21–32)
COHGB MFR BLDV: 1.4 % (ref 0–1.5)
CREAT SERPL-MCNC: 0.5 MG/DL (ref 0.6–1.2)
DEPRECATED RDW RBC AUTO: 13 % (ref 12.4–15.4)
DO-HGB MFR BLDV: 60.7 %
EOSINOPHIL # BLD: 0 K/UL (ref 0–0.6)
EOSINOPHIL NFR BLD: 0 %
GFR SERPLBLD CREATININE-BSD FMLA CKD-EPI: >90 ML/MIN/{1.73_M2}
GLUCOSE SERPL-MCNC: 82 MG/DL (ref 70–99)
HCO3 BLDV-SCNC: 30.8 MMOL/L (ref 24–28)
HCT VFR BLD AUTO: 34.2 % (ref 36–48)
HGB BLD-MCNC: 11.6 G/DL (ref 12–16)
LYMPHOCYTES # BLD: 0.6 K/UL (ref 1–5.1)
LYMPHOCYTES NFR BLD: 4.2 %
MAGNESIUM SERPL-MCNC: 1.68 MG/DL (ref 1.8–2.4)
MCH RBC QN AUTO: 31.8 PG (ref 26–34)
MCHC RBC AUTO-ENTMCNC: 34 G/DL (ref 31–36)
MCV RBC AUTO: 93.5 FL (ref 80–100)
METHGB MFR BLDV: 0.1 % (ref 0–1.5)
MONOCYTES # BLD: 0.6 K/UL (ref 0–1.3)
MONOCYTES NFR BLD: 4.1 %
NEUTROPHILS # BLD: 13 K/UL (ref 1.7–7.7)
NEUTROPHILS NFR BLD: 91.6 %
PCO2 BLDV: 44.2 MMHG (ref 41–51)
PH BLDV: 7.45 [PH] (ref 7.35–7.45)
PHOSPHATE SERPL-MCNC: 1.7 MG/DL (ref 2.5–4.9)
PLATELET # BLD AUTO: 354 K/UL (ref 135–450)
PMV BLD AUTO: 8.4 FL (ref 5–10.5)
PO2 BLDV: <30 MMHG (ref 25–40)
POTASSIUM SERPL-SCNC: 3.3 MMOL/L (ref 3.5–5.1)
RBC # BLD AUTO: 3.65 M/UL (ref 4–5.2)
SAO2 % BLDV: 38 %
SODIUM SERPL-SCNC: 142 MMOL/L (ref 136–145)
WBC # BLD AUTO: 14.1 K/UL (ref 4–11)

## 2025-05-31 PROCEDURE — 2580000003 HC RX 258: Performed by: INTERNAL MEDICINE

## 2025-05-31 PROCEDURE — 99233 SBSQ HOSP IP/OBS HIGH 50: CPT | Performed by: INTERNAL MEDICINE

## 2025-05-31 PROCEDURE — 2500000003 HC RX 250 WO HCPCS

## 2025-05-31 PROCEDURE — 2500000003 HC RX 250 WO HCPCS: Performed by: INTERNAL MEDICINE

## 2025-05-31 PROCEDURE — 6360000002 HC RX W HCPCS

## 2025-05-31 PROCEDURE — 6370000000 HC RX 637 (ALT 250 FOR IP)

## 2025-05-31 PROCEDURE — 2580000003 HC RX 258

## 2025-05-31 PROCEDURE — 51701 INSERT BLADDER CATHETER: CPT

## 2025-05-31 PROCEDURE — 80069 RENAL FUNCTION PANEL: CPT

## 2025-05-31 PROCEDURE — 83735 ASSAY OF MAGNESIUM: CPT

## 2025-05-31 PROCEDURE — 2000000000 HC ICU R&B

## 2025-05-31 PROCEDURE — 82803 BLOOD GASES ANY COMBINATION: CPT

## 2025-05-31 PROCEDURE — 6360000002 HC RX W HCPCS: Performed by: INTERNAL MEDICINE

## 2025-05-31 PROCEDURE — 94640 AIRWAY INHALATION TREATMENT: CPT

## 2025-05-31 PROCEDURE — 51798 US URINE CAPACITY MEASURE: CPT

## 2025-05-31 PROCEDURE — 85025 COMPLETE CBC W/AUTO DIFF WBC: CPT

## 2025-05-31 PROCEDURE — 93005 ELECTROCARDIOGRAM TRACING: CPT

## 2025-05-31 PROCEDURE — 71045 X-RAY EXAM CHEST 1 VIEW: CPT

## 2025-05-31 PROCEDURE — 94761 N-INVAS EAR/PLS OXIMETRY MLT: CPT

## 2025-05-31 PROCEDURE — 2700000000 HC OXYGEN THERAPY PER DAY

## 2025-05-31 PROCEDURE — 36415 COLL VENOUS BLD VENIPUNCTURE: CPT

## 2025-05-31 RX ORDER — POTASSIUM CHLORIDE 7.45 MG/ML
10 INJECTION INTRAVENOUS
Status: COMPLETED | OUTPATIENT
Start: 2025-05-31 | End: 2025-05-31

## 2025-05-31 RX ORDER — LORAZEPAM 2 MG/ML
0.5 INJECTION INTRAMUSCULAR ONCE
Status: COMPLETED | OUTPATIENT
Start: 2025-05-31 | End: 2025-05-31

## 2025-05-31 RX ORDER — MAGNESIUM SULFATE IN WATER 40 MG/ML
2000 INJECTION, SOLUTION INTRAVENOUS ONCE
Status: COMPLETED | OUTPATIENT
Start: 2025-05-31 | End: 2025-05-31

## 2025-05-31 RX ORDER — POTASSIUM CHLORIDE 7.45 MG/ML
10 INJECTION INTRAVENOUS
Status: DISCONTINUED | OUTPATIENT
Start: 2025-05-31 | End: 2025-05-31

## 2025-05-31 RX ORDER — HALOPERIDOL 5 MG/ML
10 INJECTION INTRAMUSCULAR ONCE
Status: DISCONTINUED | OUTPATIENT
Start: 2025-05-31 | End: 2025-06-02 | Stop reason: ALTCHOICE

## 2025-05-31 RX ORDER — DEXMEDETOMIDINE HYDROCHLORIDE 4 UG/ML
.1-1.5 INJECTION, SOLUTION INTRAVENOUS CONTINUOUS
Status: DISCONTINUED | OUTPATIENT
Start: 2025-05-31 | End: 2025-06-03

## 2025-05-31 RX ADMIN — Medication 4 ML: at 08:27

## 2025-05-31 RX ADMIN — POTASSIUM PHOSPHATE, MONOBASIC AND POTASSIUM PHOSPHATE, DIBASIC 20 MMOL: 224; 236 INJECTION, SOLUTION, CONCENTRATE INTRAVENOUS at 09:59

## 2025-05-31 RX ADMIN — POTASSIUM CHLORIDE 10 MEQ: 10 INJECTION, SOLUTION INTRAVENOUS at 16:03

## 2025-05-31 RX ADMIN — ARFORMOTEROL TARTRATE 15 MCG: 15 SOLUTION RESPIRATORY (INHALATION) at 20:26

## 2025-05-31 RX ADMIN — POTASSIUM CHLORIDE 10 MEQ: 10 INJECTION, SOLUTION INTRAVENOUS at 17:19

## 2025-05-31 RX ADMIN — Medication: at 11:24

## 2025-05-31 RX ADMIN — SODIUM CHLORIDE, PRESERVATIVE FREE 10 ML: 5 INJECTION INTRAVENOUS at 09:23

## 2025-05-31 RX ADMIN — WATER 5 MG: 1 INJECTION INTRAMUSCULAR; INTRAVENOUS; SUBCUTANEOUS at 01:56

## 2025-05-31 RX ADMIN — POTASSIUM CHLORIDE 10 MEQ: 10 INJECTION, SOLUTION INTRAVENOUS at 19:24

## 2025-05-31 RX ADMIN — METHYLPREDNISOLONE SODIUM SUCCINATE 40 MG: 40 INJECTION, POWDER, LYOPHILIZED, FOR SOLUTION INTRAMUSCULAR; INTRAVENOUS at 09:11

## 2025-05-31 RX ADMIN — MAGNESIUM SULFATE HEPTAHYDRATE 2000 MG: 40 INJECTION, SOLUTION INTRAVENOUS at 09:54

## 2025-05-31 RX ADMIN — SODIUM CHLORIDE, PRESERVATIVE FREE 10 ML: 5 INJECTION INTRAVENOUS at 20:23

## 2025-05-31 RX ADMIN — BUDESONIDE 500 MCG: 0.5 SUSPENSION RESPIRATORY (INHALATION) at 08:27

## 2025-05-31 RX ADMIN — DEXMEDETOMIDINE 0.2 MCG/KG/HR: 100 INJECTION, SOLUTION INTRAVENOUS at 19:20

## 2025-05-31 RX ADMIN — WATER 5 MG: 1 INJECTION INTRAMUSCULAR; INTRAVENOUS; SUBCUTANEOUS at 08:48

## 2025-05-31 RX ADMIN — IPRATROPIUM BROMIDE AND ALBUTEROL SULFATE 1 DOSE: 2.5; .5 SOLUTION RESPIRATORY (INHALATION) at 11:21

## 2025-05-31 RX ADMIN — PANTOPRAZOLE SODIUM 40 MG: 40 INJECTION, POWDER, FOR SOLUTION INTRAVENOUS at 09:18

## 2025-05-31 RX ADMIN — ARFORMOTEROL TARTRATE 15 MCG: 15 SOLUTION RESPIRATORY (INHALATION) at 08:27

## 2025-05-31 RX ADMIN — BUDESONIDE 500 MCG: 0.5 SUSPENSION RESPIRATORY (INHALATION) at 20:26

## 2025-05-31 RX ADMIN — IPRATROPIUM BROMIDE AND ALBUTEROL SULFATE 1 DOSE: 2.5; .5 SOLUTION RESPIRATORY (INHALATION) at 20:26

## 2025-05-31 RX ADMIN — IPRATROPIUM BROMIDE AND ALBUTEROL SULFATE 1 DOSE: 2.5; .5 SOLUTION RESPIRATORY (INHALATION) at 08:27

## 2025-05-31 RX ADMIN — POTASSIUM CHLORIDE 10 MEQ: 10 INJECTION, SOLUTION INTRAVENOUS at 14:57

## 2025-05-31 RX ADMIN — LORAZEPAM 0.5 MG: 2 INJECTION INTRAMUSCULAR; INTRAVENOUS at 04:33

## 2025-05-31 RX ADMIN — IPRATROPIUM BROMIDE AND ALBUTEROL SULFATE 1 DOSE: 2.5; .5 SOLUTION RESPIRATORY (INHALATION) at 15:28

## 2025-05-31 RX ADMIN — Medication: at 20:12

## 2025-05-31 RX ADMIN — SODIUM CHLORIDE, SODIUM LACTATE, POTASSIUM CHLORIDE, AND CALCIUM CHLORIDE: .6; .31; .03; .02 INJECTION, SOLUTION INTRAVENOUS at 10:33

## 2025-05-31 RX ADMIN — Medication 4 ML: at 20:26

## 2025-05-31 RX ADMIN — ENOXAPARIN SODIUM 40 MG: 100 INJECTION SUBCUTANEOUS at 09:27

## 2025-05-31 ASSESSMENT — PAIN SCALES - GENERAL
PAINLEVEL_OUTOF10: 0
PAINLEVEL_OUTOF10: 0

## 2025-06-01 ENCOUNTER — APPOINTMENT (OUTPATIENT)
Dept: GENERAL RADIOLOGY | Age: 75
DRG: 207 | End: 2025-06-01
Payer: MEDICARE

## 2025-06-01 LAB
ALBUMIN SERPL-MCNC: 3 G/DL (ref 3.4–5)
ANION GAP SERPL CALCULATED.3IONS-SCNC: 10 MMOL/L (ref 3–16)
BASOPHILS # BLD: 0 K/UL (ref 0–0.2)
BASOPHILS NFR BLD: 0.4 %
BUN SERPL-MCNC: 22 MG/DL (ref 7–20)
CALCIUM SERPL-MCNC: 7.1 MG/DL (ref 8.3–10.6)
CHLORIDE SERPL-SCNC: 103 MMOL/L (ref 99–110)
CO2 SERPL-SCNC: 24 MMOL/L (ref 21–32)
CREAT SERPL-MCNC: 0.5 MG/DL (ref 0.6–1.2)
DEPRECATED RDW RBC AUTO: 12.8 % (ref 12.4–15.4)
EKG ATRIAL RATE: 100 BPM
EKG DIAGNOSIS: NORMAL
EKG P AXIS: 27 DEGREES
EKG P-R INTERVAL: 138 MS
EKG Q-T INTERVAL: 342 MS
EKG QRS DURATION: 70 MS
EKG QTC CALCULATION (BAZETT): 441 MS
EKG R AXIS: -28 DEGREES
EKG T AXIS: 31 DEGREES
EKG VENTRICULAR RATE: 100 BPM
EOSINOPHIL # BLD: 0 K/UL (ref 0–0.6)
EOSINOPHIL NFR BLD: 0.1 %
GFR SERPLBLD CREATININE-BSD FMLA CKD-EPI: >90 ML/MIN/{1.73_M2}
GLUCOSE SERPL-MCNC: 81 MG/DL (ref 70–99)
HCT VFR BLD AUTO: 29.6 % (ref 36–48)
HGB BLD-MCNC: 10.3 G/DL (ref 12–16)
LYMPHOCYTES # BLD: 1.2 K/UL (ref 1–5.1)
LYMPHOCYTES NFR BLD: 9.5 %
MAGNESIUM SERPL-MCNC: 2.11 MG/DL (ref 1.8–2.4)
MCH RBC QN AUTO: 32.3 PG (ref 26–34)
MCHC RBC AUTO-ENTMCNC: 34.7 G/DL (ref 31–36)
MCV RBC AUTO: 93.1 FL (ref 80–100)
MONOCYTES # BLD: 0.7 K/UL (ref 0–1.3)
MONOCYTES NFR BLD: 6.1 %
NEUTROPHILS # BLD: 10.1 K/UL (ref 1.7–7.7)
NEUTROPHILS NFR BLD: 83.9 %
PHOSPHATE SERPL-MCNC: 1.9 MG/DL (ref 2.5–4.9)
PLATELET # BLD AUTO: 320 K/UL (ref 135–450)
PMV BLD AUTO: 7.8 FL (ref 5–10.5)
POTASSIUM SERPL-SCNC: 3.9 MMOL/L (ref 3.5–5.1)
RBC # BLD AUTO: 3.18 M/UL (ref 4–5.2)
SODIUM SERPL-SCNC: 137 MMOL/L (ref 136–145)
WBC # BLD AUTO: 12.1 K/UL (ref 4–11)

## 2025-06-01 PROCEDURE — 92611 MOTION FLUOROSCOPY/SWALLOW: CPT

## 2025-06-01 PROCEDURE — 92526 ORAL FUNCTION THERAPY: CPT

## 2025-06-01 PROCEDURE — 6360000002 HC RX W HCPCS: Performed by: INTERNAL MEDICINE

## 2025-06-01 PROCEDURE — 6360000002 HC RX W HCPCS

## 2025-06-01 PROCEDURE — 2500000003 HC RX 250 WO HCPCS

## 2025-06-01 PROCEDURE — 94640 AIRWAY INHALATION TREATMENT: CPT

## 2025-06-01 PROCEDURE — 74230 X-RAY XM SWLNG FUNCJ C+: CPT

## 2025-06-01 PROCEDURE — 6370000000 HC RX 637 (ALT 250 FOR IP)

## 2025-06-01 PROCEDURE — 85025 COMPLETE CBC W/AUTO DIFF WBC: CPT

## 2025-06-01 PROCEDURE — 2500000003 HC RX 250 WO HCPCS: Performed by: INTERNAL MEDICINE

## 2025-06-01 PROCEDURE — 2580000003 HC RX 258

## 2025-06-01 PROCEDURE — 36415 COLL VENOUS BLD VENIPUNCTURE: CPT

## 2025-06-01 PROCEDURE — 99233 SBSQ HOSP IP/OBS HIGH 50: CPT | Performed by: INTERNAL MEDICINE

## 2025-06-01 PROCEDURE — 93010 ELECTROCARDIOGRAM REPORT: CPT | Performed by: STUDENT IN AN ORGANIZED HEALTH CARE EDUCATION/TRAINING PROGRAM

## 2025-06-01 PROCEDURE — 83735 ASSAY OF MAGNESIUM: CPT

## 2025-06-01 PROCEDURE — 2000000000 HC ICU R&B

## 2025-06-01 PROCEDURE — 2700000000 HC OXYGEN THERAPY PER DAY

## 2025-06-01 PROCEDURE — 2580000003 HC RX 258: Performed by: INTERNAL MEDICINE

## 2025-06-01 PROCEDURE — 80069 RENAL FUNCTION PANEL: CPT

## 2025-06-01 PROCEDURE — 94761 N-INVAS EAR/PLS OXIMETRY MLT: CPT

## 2025-06-01 RX ORDER — ITRACONAZOLE 100 MG/1
200 CAPSULE ORAL 2 TIMES DAILY
Status: DISCONTINUED | OUTPATIENT
Start: 2025-06-01 | End: 2025-06-01

## 2025-06-01 RX ORDER — PREDNISONE 20 MG/1
40 TABLET ORAL DAILY
Status: DISCONTINUED | OUTPATIENT
Start: 2025-06-01 | End: 2025-06-02

## 2025-06-01 RX ADMIN — ARFORMOTEROL TARTRATE 15 MCG: 15 SOLUTION RESPIRATORY (INHALATION) at 08:44

## 2025-06-01 RX ADMIN — SODIUM CHLORIDE, PRESERVATIVE FREE 10 ML: 5 INJECTION INTRAVENOUS at 08:03

## 2025-06-01 RX ADMIN — DEXMEDETOMIDINE 0.8 MCG/KG/HR: 100 INJECTION, SOLUTION INTRAVENOUS at 02:14

## 2025-06-01 RX ADMIN — Medication: at 19:33

## 2025-06-01 RX ADMIN — Medication 1000 UNITS: at 19:28

## 2025-06-01 RX ADMIN — CYANOCOBALAMIN TAB 1000 MCG 500 MCG: 1000 TAB at 19:28

## 2025-06-01 RX ADMIN — BUSPIRONE HYDROCHLORIDE 5 MG: 10 TABLET ORAL at 19:28

## 2025-06-01 RX ADMIN — ACETAMINOPHEN 650 MG: 325 TABLET ORAL at 23:19

## 2025-06-01 RX ADMIN — SODIUM CHLORIDE, PRESERVATIVE FREE 10 ML: 5 INJECTION INTRAVENOUS at 19:33

## 2025-06-01 RX ADMIN — SODIUM CHLORIDE, SODIUM LACTATE, POTASSIUM CHLORIDE, AND CALCIUM CHLORIDE: .6; .31; .03; .02 INJECTION, SOLUTION INTRAVENOUS at 15:45

## 2025-06-01 RX ADMIN — ENOXAPARIN SODIUM 40 MG: 100 INJECTION SUBCUTANEOUS at 09:07

## 2025-06-01 RX ADMIN — SODIUM CHLORIDE, SODIUM LACTATE, POTASSIUM CHLORIDE, AND CALCIUM CHLORIDE: .6; .31; .03; .02 INJECTION, SOLUTION INTRAVENOUS at 01:29

## 2025-06-01 RX ADMIN — PANTOPRAZOLE SODIUM 40 MG: 40 INJECTION, POWDER, FOR SOLUTION INTRAVENOUS at 07:55

## 2025-06-01 RX ADMIN — DEXMEDETOMIDINE 0.2 MCG/KG/HR: 100 INJECTION, SOLUTION INTRAVENOUS at 20:09

## 2025-06-01 RX ADMIN — Medication: at 09:55

## 2025-06-01 RX ADMIN — ALBUTEROL SULFATE 2.5 MG: 2.5 SOLUTION RESPIRATORY (INHALATION) at 14:34

## 2025-06-01 RX ADMIN — BUDESONIDE 500 MCG: 0.5 SUSPENSION RESPIRATORY (INHALATION) at 08:44

## 2025-06-01 RX ADMIN — POTASSIUM PHOSPHATE, MONOBASIC AND POTASSIUM PHOSPHATE, DIBASIC 30 MMOL: 224; 236 INJECTION, SOLUTION, CONCENTRATE INTRAVENOUS at 08:46

## 2025-06-01 RX ADMIN — BUSPIRONE HYDROCHLORIDE 5 MG: 10 TABLET ORAL at 15:47

## 2025-06-01 RX ADMIN — IPRATROPIUM BROMIDE AND ALBUTEROL SULFATE 1 DOSE: 2.5; .5 SOLUTION RESPIRATORY (INHALATION) at 14:33

## 2025-06-01 RX ADMIN — ACETAMINOPHEN 650 MG: 325 TABLET ORAL at 16:35

## 2025-06-01 RX ADMIN — DEXMEDETOMIDINE 0.6 MCG/KG/HR: 100 INJECTION, SOLUTION INTRAVENOUS at 11:11

## 2025-06-01 RX ADMIN — QUETIAPINE FUMARATE 50 MG: 25 TABLET ORAL at 19:29

## 2025-06-01 RX ADMIN — IPRATROPIUM BROMIDE AND ALBUTEROL SULFATE 1 DOSE: 2.5; .5 SOLUTION RESPIRATORY (INHALATION) at 08:44

## 2025-06-01 RX ADMIN — WATER 5 MG: 1 INJECTION INTRAMUSCULAR; INTRAVENOUS; SUBCUTANEOUS at 19:25

## 2025-06-01 RX ADMIN — Medication 4 ML: at 08:44

## 2025-06-01 ASSESSMENT — PAIN DESCRIPTION - LOCATION
LOCATION: BACK
LOCATION: BACK

## 2025-06-01 ASSESSMENT — PAIN DESCRIPTION - DESCRIPTORS
DESCRIPTORS: ACHING
DESCRIPTORS: ACHING

## 2025-06-01 ASSESSMENT — PAIN SCALES - GENERAL
PAINLEVEL_OUTOF10: 0
PAINLEVEL_OUTOF10: 3
PAINLEVEL_OUTOF10: 0
PAINLEVEL_OUTOF10: 2
PAINLEVEL_OUTOF10: 0

## 2025-06-01 ASSESSMENT — PAIN DESCRIPTION - ONSET: ONSET: OTHER (COMMENT)

## 2025-06-01 ASSESSMENT — PAIN - FUNCTIONAL ASSESSMENT: PAIN_FUNCTIONAL_ASSESSMENT: ACTIVITIES ARE NOT PREVENTED

## 2025-06-01 ASSESSMENT — PAIN DESCRIPTION - ORIENTATION
ORIENTATION: MID;LOWER
ORIENTATION: LOWER

## 2025-06-01 ASSESSMENT — PAIN DESCRIPTION - PAIN TYPE: TYPE: ACUTE PAIN

## 2025-06-01 NOTE — PROCEDURES
INSTRUMENTAL SWALLOW REPORT  MODIFIED BARIUM SWALLOW    NAME: Charlene Nazario     : 1950  MRN: [id number]       Date of Eval: 2025     Ordering Physician: Jacinto Arevalo MD  Referring Diagnosis: Dysphagia    Past Medical History:  has a past medical history of Allergic rhinitis, Anxiety, Back pain, Cervical cancer screening, Closed compression fracture of L3 vertebra (HCC), Compression fracture of thoracic vertebrae, non-traumatic (HCC), COPD (chronic obstructive pulmonary disease) (HCC), COPD exacerbation (HCC), Elevated LDL cholesterol level, GERD (gastroesophageal reflux disease), History of mammogram, HLD (hyperlipidemia), Hoarseness of voice, Hypertension, Lung nodule:left, Mucus plugging of bronchi, Osteoarthritis, Osteomyelitis of left leg (HCC), Osteoporosis, S/P colonoscopy, Systolic CHF, chronic (HCC) EF 45%, Thoracic aorta atherosclerosis, Thyroid mass, Thyroid nodule, and Tibia fracture.  Past Surgical History:  has a past surgical history that includes  section; Appendectomy; Fixation Kyphoplasty (2017); other surgical history (2017); Thyroidectomy, partial (Right, 2017); Vocal cord injection (); laryngoplasty (Right, 2022); IR KYPHOPLASTY LUMBAR 1 VERTEBRAL BODY (2024); and Tibia fracture surgery (Left, ).    CXR:  IMPRESSIONS:  \"Left basilar airspace density, consistent with infiltrate or atelectasis. No other acute findings.\"    Prior MBS Results: None    Patient Complaints/Reason for Referral:  Charlene Nazario was referred for a MBS to assess the efficiency of his/her swallow function, assess for aspiration, and to make recommendations regarding safe dietary consistencies, effective compensatory strategies, and safe eating environment.    Onset of problem: 2025    Behavior/Cognition: WFL  Vision/Hearing: WFL    Impressions:  Oral Phase  Incomplete labial closure with mild anterior loss of liquid trials progressing to

## 2025-06-01 NOTE — RT PROTOCOL NOTE
RT Nebulizer Bronchodilator Protocol Note    There is a bronchodilator order in the chart from a provider indicating to follow the RT Bronchodilator Protocol and there is an “Initiate RT Bronchodilator Protocol” order as well (see protocol at bottom of note).    CXR Findings:  XR CHEST PORTABLE  Result Date: 5/31/2025  Left basilar airspace density, consistent with infiltrate or atelectasis. No other acute findings. Electronically signed by Claudia Peña      The findings from the last RT Protocol Assessment were as follows:  Smoking: Chronic pulmonary disease  Respiratory Pattern: Mild dyspnea at rest, irregular pattern, or RR 21-25 bpm  Breath Sounds: Inspiratory and expiratory or bilateral wheezing and/or rhonchi  Cough: Weak, productive  Indication for Bronchodilator Therapy: On home bronchodilators  Bronchodilator Assessment Score: 14    Aerosolized bronchodilator medication orders have been revised according to the RT Nebulizer Bronchodilator Protocol below.    Respiratory Therapist to perform RT Therapy Protocol Assessment initially then follow the protocol.  Repeat RT Therapy Protocol Assessment PRN for score 0-3 or on second treatment, BID, and PRN for scores above 3.    No Indications - adjust the frequency to every 6 hours PRN wheezing or bronchospasm, if no treatments needed after 48 hours then discontinue using Per Protocol order mode.     If indication present, adjust the RT bronchodilator orders based on the Bronchodilator Assessment Score as indicated below.  If a patient is on this medication at home then do not decrease Frequency below that used at home.    0-3 - enter or revise RT bronchodilator order(s) to equivalent RT Bronchodilator order with Frequency of every 4 hours PRN for wheezing or increased work of breathing using Per Protocol order mode.       4-6 - enter or revise RT Bronchodilator order(s) to two equivalent RT bronchodilator orders with one order with BID Frequency and one order

## 2025-06-02 LAB
ALBUMIN SERPL-MCNC: 2.8 G/DL (ref 3.4–5)
ANION GAP SERPL CALCULATED.3IONS-SCNC: 5 MMOL/L (ref 3–16)
BASOPHILS # BLD: 0 K/UL (ref 0–0.2)
BASOPHILS NFR BLD: 0.4 %
BUN SERPL-MCNC: 14 MG/DL (ref 7–20)
CALCIUM SERPL-MCNC: 7.5 MG/DL (ref 8.3–10.6)
CHLORIDE SERPL-SCNC: 107 MMOL/L (ref 99–110)
CO2 SERPL-SCNC: 28 MMOL/L (ref 21–32)
CREAT SERPL-MCNC: 0.5 MG/DL (ref 0.6–1.2)
DEPRECATED RDW RBC AUTO: 12.8 % (ref 12.4–15.4)
EOSINOPHIL # BLD: 0.2 K/UL (ref 0–0.6)
EOSINOPHIL NFR BLD: 2.5 %
FINAL REPORT: NORMAL
GFR SERPLBLD CREATININE-BSD FMLA CKD-EPI: >90 ML/MIN/{1.73_M2}
GLUCOSE SERPL-MCNC: 92 MG/DL (ref 70–99)
HCT VFR BLD AUTO: 31 % (ref 36–48)
HGB BLD-MCNC: 10.5 G/DL (ref 12–16)
LYMPHOCYTES # BLD: 1 K/UL (ref 1–5.1)
LYMPHOCYTES NFR BLD: 16.7 %
MAGNESIUM SERPL-MCNC: 1.8 MG/DL (ref 1.8–2.4)
MCH RBC QN AUTO: 31.5 PG (ref 26–34)
MCHC RBC AUTO-ENTMCNC: 33.9 G/DL (ref 31–36)
MCV RBC AUTO: 93 FL (ref 80–100)
MONOCYTES # BLD: 0.4 K/UL (ref 0–1.3)
MONOCYTES NFR BLD: 6.5 %
NEUTROPHILS # BLD: 4.6 K/UL (ref 1.7–7.7)
NEUTROPHILS NFR BLD: 73.9 %
PHOSPHATE SERPL-MCNC: 2.6 MG/DL (ref 2.5–4.9)
PLATELET # BLD AUTO: 321 K/UL (ref 135–450)
PMV BLD AUTO: 7.7 FL (ref 5–10.5)
POTASSIUM SERPL-SCNC: 3.6 MMOL/L (ref 3.5–5.1)
PRELIMINARY: NORMAL
RBC # BLD AUTO: 3.33 M/UL (ref 4–5.2)
SODIUM SERPL-SCNC: 140 MMOL/L (ref 136–145)
WBC # BLD AUTO: 6.2 K/UL (ref 4–11)

## 2025-06-02 PROCEDURE — 6360000002 HC RX W HCPCS

## 2025-06-02 PROCEDURE — 92526 ORAL FUNCTION THERAPY: CPT

## 2025-06-02 PROCEDURE — 85025 COMPLETE CBC W/AUTO DIFF WBC: CPT

## 2025-06-02 PROCEDURE — 2580000003 HC RX 258: Performed by: INTERNAL MEDICINE

## 2025-06-02 PROCEDURE — 2000000000 HC ICU R&B

## 2025-06-02 PROCEDURE — 6360000002 HC RX W HCPCS: Performed by: INTERNAL MEDICINE

## 2025-06-02 PROCEDURE — 83735 ASSAY OF MAGNESIUM: CPT

## 2025-06-02 PROCEDURE — 6370000000 HC RX 637 (ALT 250 FOR IP)

## 2025-06-02 PROCEDURE — 94640 AIRWAY INHALATION TREATMENT: CPT

## 2025-06-02 PROCEDURE — 99233 SBSQ HOSP IP/OBS HIGH 50: CPT | Performed by: INTERNAL MEDICINE

## 2025-06-02 PROCEDURE — 2500000003 HC RX 250 WO HCPCS

## 2025-06-02 PROCEDURE — 2580000003 HC RX 258

## 2025-06-02 PROCEDURE — 94761 N-INVAS EAR/PLS OXIMETRY MLT: CPT

## 2025-06-02 PROCEDURE — 2500000003 HC RX 250 WO HCPCS: Performed by: INTERNAL MEDICINE

## 2025-06-02 PROCEDURE — 2700000000 HC OXYGEN THERAPY PER DAY

## 2025-06-02 PROCEDURE — 36415 COLL VENOUS BLD VENIPUNCTURE: CPT

## 2025-06-02 PROCEDURE — 80069 RENAL FUNCTION PANEL: CPT

## 2025-06-02 RX ADMIN — Medication: at 08:48

## 2025-06-02 RX ADMIN — IPRATROPIUM BROMIDE AND ALBUTEROL SULFATE 1 DOSE: 2.5; .5 SOLUTION RESPIRATORY (INHALATION) at 12:55

## 2025-06-02 RX ADMIN — HYDROXYZINE HYDROCHLORIDE 25 MG: 25 TABLET ORAL at 19:37

## 2025-06-02 RX ADMIN — SODIUM CHLORIDE, PRESERVATIVE FREE 10 ML: 5 INJECTION INTRAVENOUS at 08:50

## 2025-06-02 RX ADMIN — QUETIAPINE FUMARATE 50 MG: 25 TABLET ORAL at 08:46

## 2025-06-02 RX ADMIN — SODIUM CHLORIDE, SODIUM LACTATE, POTASSIUM CHLORIDE, AND CALCIUM CHLORIDE: .6; .31; .03; .02 INJECTION, SOLUTION INTRAVENOUS at 20:09

## 2025-06-02 RX ADMIN — ENOXAPARIN SODIUM 40 MG: 100 INJECTION SUBCUTANEOUS at 08:47

## 2025-06-02 RX ADMIN — IPRATROPIUM BROMIDE AND ALBUTEROL SULFATE 1 DOSE: 2.5; .5 SOLUTION RESPIRATORY (INHALATION) at 16:34

## 2025-06-02 RX ADMIN — BUDESONIDE 500 MCG: 0.5 SUSPENSION RESPIRATORY (INHALATION) at 21:49

## 2025-06-02 RX ADMIN — SODIUM CHLORIDE, PRESERVATIVE FREE 10 ML: 5 INJECTION INTRAVENOUS at 20:11

## 2025-06-02 RX ADMIN — ASPIRIN 81 MG: 81 TABLET, CHEWABLE ORAL at 08:47

## 2025-06-02 RX ADMIN — DEXMEDETOMIDINE 0.8 MCG/KG/HR: 100 INJECTION, SOLUTION INTRAVENOUS at 00:06

## 2025-06-02 RX ADMIN — CYANOCOBALAMIN TAB 1000 MCG 500 MCG: 1000 TAB at 19:37

## 2025-06-02 RX ADMIN — DEXMEDETOMIDINE 0.5 MCG/KG/HR: 100 INJECTION, SOLUTION INTRAVENOUS at 08:27

## 2025-06-02 RX ADMIN — BUSPIRONE HYDROCHLORIDE 5 MG: 10 TABLET ORAL at 08:47

## 2025-06-02 RX ADMIN — IPRATROPIUM BROMIDE AND ALBUTEROL SULFATE 1 DOSE: 2.5; .5 SOLUTION RESPIRATORY (INHALATION) at 08:54

## 2025-06-02 RX ADMIN — BUDESONIDE 500 MCG: 0.5 SUSPENSION RESPIRATORY (INHALATION) at 08:54

## 2025-06-02 RX ADMIN — SODIUM CHLORIDE, SODIUM LACTATE, POTASSIUM CHLORIDE, AND CALCIUM CHLORIDE: .6; .31; .03; .02 INJECTION, SOLUTION INTRAVENOUS at 05:45

## 2025-06-02 RX ADMIN — Medication 3 MG: at 19:37

## 2025-06-02 RX ADMIN — METOPROLOL SUCCINATE 12.5 MG: 25 TABLET, FILM COATED, EXTENDED RELEASE ORAL at 08:47

## 2025-06-02 RX ADMIN — WATER 5 MG: 1 INJECTION INTRAMUSCULAR; INTRAVENOUS; SUBCUTANEOUS at 15:54

## 2025-06-02 RX ADMIN — Medication 1000 UNITS: at 19:37

## 2025-06-02 RX ADMIN — PANTOPRAZOLE SODIUM 40 MG: 40 INJECTION, POWDER, FOR SOLUTION INTRAVENOUS at 08:47

## 2025-06-02 RX ADMIN — ARFORMOTEROL TARTRATE 15 MCG: 15 SOLUTION RESPIRATORY (INHALATION) at 21:50

## 2025-06-02 RX ADMIN — BUSPIRONE HYDROCHLORIDE 5 MG: 10 TABLET ORAL at 19:37

## 2025-06-02 RX ADMIN — HYDROXYZINE HYDROCHLORIDE 25 MG: 25 TABLET ORAL at 11:48

## 2025-06-02 RX ADMIN — ARFORMOTEROL TARTRATE 15 MCG: 15 SOLUTION RESPIRATORY (INHALATION) at 08:54

## 2025-06-02 RX ADMIN — QUETIAPINE FUMARATE 50 MG: 25 TABLET ORAL at 19:37

## 2025-06-02 RX ADMIN — METOPROLOL SUCCINATE 12.5 MG: 25 TABLET, FILM COATED, EXTENDED RELEASE ORAL at 19:37

## 2025-06-02 RX ADMIN — Medication: at 20:10

## 2025-06-02 RX ADMIN — Medication 4 ML: at 08:55

## 2025-06-02 RX ADMIN — BUSPIRONE HYDROCHLORIDE 5 MG: 10 TABLET ORAL at 14:31

## 2025-06-02 RX ADMIN — IPRATROPIUM BROMIDE AND ALBUTEROL SULFATE 1 DOSE: 2.5; .5 SOLUTION RESPIRATORY (INHALATION) at 21:49

## 2025-06-02 ASSESSMENT — PAIN SCALES - GENERAL
PAINLEVEL_OUTOF10: 0

## 2025-06-02 ASSESSMENT — ENCOUNTER SYMPTOMS: ABDOMINAL PAIN: 0

## 2025-06-03 LAB
ALBUMIN SERPL-MCNC: 2.8 G/DL (ref 3.4–5)
ANION GAP SERPL CALCULATED.3IONS-SCNC: 7 MMOL/L (ref 3–16)
BASOPHILS # BLD: 0 K/UL (ref 0–0.2)
BASOPHILS NFR BLD: 0.7 %
BUN SERPL-MCNC: 12 MG/DL (ref 7–20)
CALCIUM SERPL-MCNC: 8 MG/DL (ref 8.3–10.6)
CHLORIDE SERPL-SCNC: 104 MMOL/L (ref 99–110)
CO2 SERPL-SCNC: 27 MMOL/L (ref 21–32)
CREAT SERPL-MCNC: 0.6 MG/DL (ref 0.6–1.2)
DEPRECATED RDW RBC AUTO: 13.4 % (ref 12.4–15.4)
EOSINOPHIL # BLD: 0.3 K/UL (ref 0–0.6)
EOSINOPHIL NFR BLD: 4.2 %
GFR SERPLBLD CREATININE-BSD FMLA CKD-EPI: >90 ML/MIN/{1.73_M2}
GLUCOSE SERPL-MCNC: 125 MG/DL (ref 70–99)
HCT VFR BLD AUTO: 31.1 % (ref 36–48)
HGB BLD-MCNC: 10.6 G/DL (ref 12–16)
LYMPHOCYTES # BLD: 1 K/UL (ref 1–5.1)
LYMPHOCYTES NFR BLD: 14.5 %
MAGNESIUM SERPL-MCNC: 1.52 MG/DL (ref 1.8–2.4)
MCH RBC QN AUTO: 31.8 PG (ref 26–34)
MCHC RBC AUTO-ENTMCNC: 34 G/DL (ref 31–36)
MCV RBC AUTO: 93.6 FL (ref 80–100)
MONOCYTES # BLD: 0.5 K/UL (ref 0–1.3)
MONOCYTES NFR BLD: 7.4 %
NEUTROPHILS # BLD: 4.8 K/UL (ref 1.7–7.7)
NEUTROPHILS NFR BLD: 73.2 %
PHOSPHATE SERPL-MCNC: 3.6 MG/DL (ref 2.5–4.9)
PLATELET # BLD AUTO: 304 K/UL (ref 135–450)
PMV BLD AUTO: 8.3 FL (ref 5–10.5)
POTASSIUM SERPL-SCNC: 3.3 MMOL/L (ref 3.5–5.1)
RBC # BLD AUTO: 3.33 M/UL (ref 4–5.2)
SODIUM SERPL-SCNC: 138 MMOL/L (ref 136–145)
WBC # BLD AUTO: 6.6 K/UL (ref 4–11)

## 2025-06-03 PROCEDURE — 1200000000 HC SEMI PRIVATE

## 2025-06-03 PROCEDURE — 2500000003 HC RX 250 WO HCPCS

## 2025-06-03 PROCEDURE — 2700000000 HC OXYGEN THERAPY PER DAY

## 2025-06-03 PROCEDURE — 85025 COMPLETE CBC W/AUTO DIFF WBC: CPT

## 2025-06-03 PROCEDURE — 94640 AIRWAY INHALATION TREATMENT: CPT

## 2025-06-03 PROCEDURE — 97530 THERAPEUTIC ACTIVITIES: CPT

## 2025-06-03 PROCEDURE — 6370000000 HC RX 637 (ALT 250 FOR IP)

## 2025-06-03 PROCEDURE — 36415 COLL VENOUS BLD VENIPUNCTURE: CPT

## 2025-06-03 PROCEDURE — 97110 THERAPEUTIC EXERCISES: CPT

## 2025-06-03 PROCEDURE — 6360000002 HC RX W HCPCS

## 2025-06-03 PROCEDURE — 97535 SELF CARE MNGMENT TRAINING: CPT

## 2025-06-03 PROCEDURE — 94761 N-INVAS EAR/PLS OXIMETRY MLT: CPT

## 2025-06-03 PROCEDURE — 92526 ORAL FUNCTION THERAPY: CPT

## 2025-06-03 PROCEDURE — 83735 ASSAY OF MAGNESIUM: CPT

## 2025-06-03 PROCEDURE — 80069 RENAL FUNCTION PANEL: CPT

## 2025-06-03 RX ORDER — IPRATROPIUM BROMIDE AND ALBUTEROL SULFATE 2.5; .5 MG/3ML; MG/3ML
1 SOLUTION RESPIRATORY (INHALATION) 3 TIMES DAILY
Status: DISCONTINUED | OUTPATIENT
Start: 2025-06-03 | End: 2025-06-04 | Stop reason: HOSPADM

## 2025-06-03 RX ORDER — MAGNESIUM SULFATE IN WATER 40 MG/ML
2000 INJECTION, SOLUTION INTRAVENOUS ONCE
Status: COMPLETED | OUTPATIENT
Start: 2025-06-03 | End: 2025-06-03

## 2025-06-03 RX ORDER — ENOXAPARIN SODIUM 100 MG/ML
30 INJECTION SUBCUTANEOUS DAILY
Status: DISCONTINUED | OUTPATIENT
Start: 2025-06-03 | End: 2025-06-04 | Stop reason: HOSPADM

## 2025-06-03 RX ADMIN — ENOXAPARIN SODIUM 30 MG: 100 INJECTION SUBCUTANEOUS at 08:00

## 2025-06-03 RX ADMIN — DEXMEDETOMIDINE 0.4 MCG/KG/HR: 100 INJECTION, SOLUTION INTRAVENOUS at 02:09

## 2025-06-03 RX ADMIN — METOPROLOL SUCCINATE 12.5 MG: 25 TABLET, FILM COATED, EXTENDED RELEASE ORAL at 20:45

## 2025-06-03 RX ADMIN — BUDESONIDE 500 MCG: 0.5 SUSPENSION RESPIRATORY (INHALATION) at 12:04

## 2025-06-03 RX ADMIN — IPRATROPIUM BROMIDE AND ALBUTEROL SULFATE 1 DOSE: 2.5; .5 SOLUTION RESPIRATORY (INHALATION) at 12:04

## 2025-06-03 RX ADMIN — ACETAMINOPHEN 650 MG: 325 TABLET ORAL at 22:19

## 2025-06-03 RX ADMIN — CYANOCOBALAMIN TAB 1000 MCG 500 MCG: 1000 TAB at 20:45

## 2025-06-03 RX ADMIN — Medication 4 ML: at 12:05

## 2025-06-03 RX ADMIN — Medication: at 07:52

## 2025-06-03 RX ADMIN — ARFORMOTEROL TARTRATE 15 MCG: 15 SOLUTION RESPIRATORY (INHALATION) at 12:05

## 2025-06-03 RX ADMIN — SODIUM CHLORIDE, PRESERVATIVE FREE 10 ML: 5 INJECTION INTRAVENOUS at 07:52

## 2025-06-03 RX ADMIN — HYDROXYZINE HYDROCHLORIDE 25 MG: 25 TABLET ORAL at 22:19

## 2025-06-03 RX ADMIN — BUSPIRONE HYDROCHLORIDE 5 MG: 10 TABLET ORAL at 20:45

## 2025-06-03 RX ADMIN — POTASSIUM BICARBONATE 40 MEQ: 782 TABLET, EFFERVESCENT ORAL at 07:50

## 2025-06-03 RX ADMIN — QUETIAPINE FUMARATE 50 MG: 25 TABLET ORAL at 07:51

## 2025-06-03 RX ADMIN — BUSPIRONE HYDROCHLORIDE 5 MG: 10 TABLET ORAL at 07:50

## 2025-06-03 RX ADMIN — PANTOPRAZOLE SODIUM 40 MG: 40 INJECTION, POWDER, FOR SOLUTION INTRAVENOUS at 07:51

## 2025-06-03 RX ADMIN — ASPIRIN 81 MG: 81 TABLET, CHEWABLE ORAL at 07:51

## 2025-06-03 RX ADMIN — Medication: at 20:00

## 2025-06-03 RX ADMIN — METOPROLOL SUCCINATE 12.5 MG: 25 TABLET, FILM COATED, EXTENDED RELEASE ORAL at 07:50

## 2025-06-03 RX ADMIN — QUETIAPINE FUMARATE 50 MG: 25 TABLET ORAL at 20:45

## 2025-06-03 RX ADMIN — MAGNESIUM SULFATE HEPTAHYDRATE 2000 MG: 40 INJECTION, SOLUTION INTRAVENOUS at 07:58

## 2025-06-03 RX ADMIN — Medication 1000 UNITS: at 20:45

## 2025-06-03 RX ADMIN — Medication 3 MG: at 20:44

## 2025-06-03 RX ADMIN — BUSPIRONE HYDROCHLORIDE 5 MG: 10 TABLET ORAL at 14:29

## 2025-06-03 ASSESSMENT — PAIN SCALES - GENERAL
PAINLEVEL_OUTOF10: 0
PAINLEVEL_OUTOF10: 4
PAINLEVEL_OUTOF10: 0

## 2025-06-03 ASSESSMENT — PAIN DESCRIPTION - PAIN TYPE: TYPE: ACUTE PAIN

## 2025-06-03 ASSESSMENT — PAIN DESCRIPTION - DESCRIPTORS: DESCRIPTORS: ACHING

## 2025-06-03 ASSESSMENT — ENCOUNTER SYMPTOMS: ABDOMINAL PAIN: 0

## 2025-06-03 ASSESSMENT — PAIN SCALES - WONG BAKER
WONGBAKER_NUMERICALRESPONSE: HURTS LITTLE MORE
WONGBAKER_NUMERICALRESPONSE: HURTS LITTLE MORE

## 2025-06-03 ASSESSMENT — PAIN DESCRIPTION - LOCATION: LOCATION: WRIST

## 2025-06-03 ASSESSMENT — PAIN DESCRIPTION - ORIENTATION: ORIENTATION: RIGHT;LEFT

## 2025-06-03 ASSESSMENT — PAIN - FUNCTIONAL ASSESSMENT: PAIN_FUNCTIONAL_ASSESSMENT: ACTIVITIES ARE NOT PREVENTED

## 2025-06-03 NOTE — CARE COORDINATION
Discharge Planning:    CM spoke at length with pt and family at bedside regarding DCP and SNF options/preferences. At this time, [facility] is the only facility that has been able to accept pt. Awaiting responses from [facility] and [facility] and referrals re-faxed to both facilities. Discussed any further preferences pt/family may have- agreeable to referrals being placed to [facility] and [facility]. CM placed referrals and awaiting responses.    Thank you  Cat Gerry BRASHER, BSN,   ICU   [phone]

## 2025-06-04 ENCOUNTER — CARE COORDINATION (OUTPATIENT)
Dept: CARE COORDINATION | Age: 75
End: 2025-06-04

## 2025-06-04 VITALS
HEIGHT: 61 IN | HEART RATE: 125 BPM | RESPIRATION RATE: 16 BRPM | TEMPERATURE: 97.7 F | OXYGEN SATURATION: 94 % | DIASTOLIC BLOOD PRESSURE: 97 MMHG | BODY MASS INDEX: 20.81 KG/M2 | WEIGHT: 110.23 LBS | SYSTOLIC BLOOD PRESSURE: 117 MMHG

## 2025-06-04 LAB
ALBUMIN SERPL-MCNC: 3.3 G/DL (ref 3.4–5)
ANION GAP SERPL CALCULATED.3IONS-SCNC: 10 MMOL/L (ref 3–16)
BASOPHILS # BLD: 0.1 K/UL (ref 0–0.2)
BASOPHILS NFR BLD: 0.8 %
BUN SERPL-MCNC: 9 MG/DL (ref 7–20)
CALCIUM SERPL-MCNC: 8.2 MG/DL (ref 8.3–10.6)
CHLORIDE SERPL-SCNC: 103 MMOL/L (ref 99–110)
CO2 SERPL-SCNC: 24 MMOL/L (ref 21–32)
CREAT SERPL-MCNC: 0.5 MG/DL (ref 0.6–1.2)
DEPRECATED RDW RBC AUTO: 13.5 % (ref 12.4–15.4)
EOSINOPHIL # BLD: 0.4 K/UL (ref 0–0.6)
EOSINOPHIL NFR BLD: 5.1 %
GFR SERPLBLD CREATININE-BSD FMLA CKD-EPI: >90 ML/MIN/{1.73_M2}
GLUCOSE SERPL-MCNC: 91 MG/DL (ref 70–99)
HCT VFR BLD AUTO: 34.4 % (ref 36–48)
HGB BLD-MCNC: 11.5 G/DL (ref 12–16)
LYMPHOCYTES # BLD: 2 K/UL (ref 1–5.1)
LYMPHOCYTES NFR BLD: 24.5 %
MAGNESIUM SERPL-MCNC: 1.81 MG/DL (ref 1.8–2.4)
MCH RBC QN AUTO: 31.5 PG (ref 26–34)
MCHC RBC AUTO-ENTMCNC: 33.5 G/DL (ref 31–36)
MCV RBC AUTO: 93.8 FL (ref 80–100)
MONOCYTES # BLD: 0.7 K/UL (ref 0–1.3)
MONOCYTES NFR BLD: 8.8 %
NEUTROPHILS # BLD: 4.9 K/UL (ref 1.7–7.7)
NEUTROPHILS NFR BLD: 60.8 %
PHOSPHATE SERPL-MCNC: 2.9 MG/DL (ref 2.5–4.9)
PLATELET # BLD AUTO: 354 K/UL (ref 135–450)
PMV BLD AUTO: 8 FL (ref 5–10.5)
POTASSIUM SERPL-SCNC: 3.4 MMOL/L (ref 3.5–5.1)
RBC # BLD AUTO: 3.66 M/UL (ref 4–5.2)
SODIUM SERPL-SCNC: 137 MMOL/L (ref 136–145)
WBC # BLD AUTO: 8 K/UL (ref 4–11)

## 2025-06-04 PROCEDURE — 2500000003 HC RX 250 WO HCPCS

## 2025-06-04 PROCEDURE — 85025 COMPLETE CBC W/AUTO DIFF WBC: CPT

## 2025-06-04 PROCEDURE — 97110 THERAPEUTIC EXERCISES: CPT

## 2025-06-04 PROCEDURE — 6370000000 HC RX 637 (ALT 250 FOR IP)

## 2025-06-04 PROCEDURE — 97530 THERAPEUTIC ACTIVITIES: CPT

## 2025-06-04 PROCEDURE — 97116 GAIT TRAINING THERAPY: CPT

## 2025-06-04 PROCEDURE — 80069 RENAL FUNCTION PANEL: CPT

## 2025-06-04 PROCEDURE — 6360000002 HC RX W HCPCS

## 2025-06-04 PROCEDURE — 99233 SBSQ HOSP IP/OBS HIGH 50: CPT | Performed by: INTERNAL MEDICINE

## 2025-06-04 PROCEDURE — 2580000003 HC RX 258: Performed by: INTERNAL MEDICINE

## 2025-06-04 PROCEDURE — 6370000000 HC RX 637 (ALT 250 FOR IP): Performed by: INTERNAL MEDICINE

## 2025-06-04 PROCEDURE — 83735 ASSAY OF MAGNESIUM: CPT

## 2025-06-04 PROCEDURE — 92526 ORAL FUNCTION THERAPY: CPT

## 2025-06-04 PROCEDURE — 97535 SELF CARE MNGMENT TRAINING: CPT

## 2025-06-04 PROCEDURE — 36415 COLL VENOUS BLD VENIPUNCTURE: CPT

## 2025-06-04 PROCEDURE — 94640 AIRWAY INHALATION TREATMENT: CPT

## 2025-06-04 PROCEDURE — 2700000000 HC OXYGEN THERAPY PER DAY

## 2025-06-04 RX ORDER — AZITHROMYCIN 250 MG/1
250 TABLET, FILM COATED ORAL DAILY
Status: DISCONTINUED | OUTPATIENT
Start: 2025-06-04 | End: 2025-06-04 | Stop reason: HOSPADM

## 2025-06-04 RX ORDER — QUETIAPINE FUMARATE 50 MG/1
50 TABLET, FILM COATED ORAL NIGHTLY
Qty: 30 TABLET | Refills: 3
Start: 2025-06-04

## 2025-06-04 RX ORDER — BUSPIRONE HYDROCHLORIDE 5 MG/1
5 TABLET ORAL 3 TIMES DAILY
Qty: 90 TABLET | Refills: 0
Start: 2025-06-04

## 2025-06-04 RX ORDER — AZITHROMYCIN 250 MG/1
250 TABLET, FILM COATED ORAL DAILY
Qty: 30 TABLET | Refills: 11 | OUTPATIENT
Start: 2025-06-04 | End: 2026-06-04

## 2025-06-04 RX ORDER — AZITHROMYCIN 250 MG/1
250 TABLET, FILM COATED ORAL DAILY
Qty: 30 TABLET | Refills: 11
Start: 2025-06-04 | End: 2026-06-04

## 2025-06-04 RX ORDER — HYDROXYZINE HYDROCHLORIDE 25 MG/1
25 TABLET, FILM COATED ORAL 3 TIMES DAILY PRN
Qty: 30 TABLET | Refills: 0
Start: 2025-06-04 | End: 2025-06-14

## 2025-06-04 RX ADMIN — Medication: at 08:43

## 2025-06-04 RX ADMIN — ASPIRIN 81 MG: 81 TABLET, CHEWABLE ORAL at 08:41

## 2025-06-04 RX ADMIN — Medication 4 ML: at 08:29

## 2025-06-04 RX ADMIN — POTASSIUM BICARBONATE 20 MEQ: 782 TABLET, EFFERVESCENT ORAL at 11:09

## 2025-06-04 RX ADMIN — AZITHROMYCIN DIHYDRATE 250 MG: 250 TABLET, FILM COATED ORAL at 14:08

## 2025-06-04 RX ADMIN — IPRATROPIUM BROMIDE AND ALBUTEROL SULFATE 1 DOSE: .5; 2.5 SOLUTION RESPIRATORY (INHALATION) at 14:09

## 2025-06-04 RX ADMIN — BUSPIRONE HYDROCHLORIDE 5 MG: 10 TABLET ORAL at 14:08

## 2025-06-04 RX ADMIN — BUSPIRONE HYDROCHLORIDE 5 MG: 10 TABLET ORAL at 08:41

## 2025-06-04 RX ADMIN — SODIUM CHLORIDE, PRESERVATIVE FREE 10 ML: 5 INJECTION INTRAVENOUS at 08:43

## 2025-06-04 RX ADMIN — METOPROLOL SUCCINATE 12.5 MG: 25 TABLET, FILM COATED, EXTENDED RELEASE ORAL at 08:41

## 2025-06-04 RX ADMIN — ENOXAPARIN SODIUM 30 MG: 100 INJECTION SUBCUTANEOUS at 08:41

## 2025-06-04 RX ADMIN — IPRATROPIUM BROMIDE AND ALBUTEROL SULFATE 1 DOSE: .5; 2.5 SOLUTION RESPIRATORY (INHALATION) at 08:29

## 2025-06-04 RX ADMIN — ARFORMOTEROL TARTRATE 15 MCG: 15 SOLUTION RESPIRATORY (INHALATION) at 08:29

## 2025-06-04 RX ADMIN — QUETIAPINE FUMARATE 50 MG: 25 TABLET ORAL at 08:41

## 2025-06-04 RX ADMIN — BUDESONIDE 500 MCG: 0.5 SUSPENSION RESPIRATORY (INHALATION) at 08:29

## 2025-06-04 RX ADMIN — POLYETHYLENE GLYCOL 3350 17 G: 17 POWDER, FOR SOLUTION ORAL at 08:42

## 2025-06-04 ASSESSMENT — PAIN SCALES - GENERAL: PAINLEVEL_OUTOF10: 0

## 2025-06-04 NOTE — PROGRESS NOTES
Pulmonary Followup Note    CC: COPD exacerbation, pneumothorax.  Subjective:  Transferred out of ICU yesterday.  Still slightly confused, but is redirectable.     ROS:  Denies headache, nausea or chest pain.    24HR INTAKE/OUTPUT:    Intake/Output Summary (Last 24 hours) at 2025 1312  Last data filed at 2025 0813  Gross per 24 hour   Intake 870 ml   Output 0 ml   Net 870 ml        tiotropium  1 puff Inhalation Daily RT    enoxaparin  30 mg SubCUTAneous Daily    ipratropium 0.5 mg-albuterol 2.5 mg  1 Dose Inhalation TID    melatonin  3 mg Oral Nightly    sodium phosphate IVPB (PERIPHERAL line)  30 mmol IntraVENous Once    polyethylene glycol  17 g Oral Daily    lisinopril  2.5 mg Oral QPM    metoprolol succinate  12.5 mg Oral BID    QUEtiapine  50 mg Oral BID    sodium chloride (Inhalant)  4 mL Nebulization BID RT    balsum peru-castor oil   Topical BID    busPIRone  5 mg Oral TID    sodium chloride flush  5-40 mL IntraVENous 2 times per day    aspirin  81 mg Oral Daily    budesonide  0.5 mg Nebulization BID RT    arformoterol tartrate  15 mcg Nebulization BID RT    vitamin B-12  500 mcg Oral Nightly    Vitamin D  1,000 Units Oral Nightly           PHYSICAL EXAMINATION:  BP (!) 117/97   Pulse (!) 125   Temp 97.7 °F (36.5 °C) (Oral)   Resp 16   Ht 1.549 m (5' 1\")   Wt 50 kg (110 lb 3.7 oz)   SpO2 94%   BMI 20.83 kg/m²   CURRENT PULSE OXIMETRY:  SpO2: 94 %  24HR PULSE OXIMETRY RANGE:  SpO2  Av.3 %  Min: 88 %  Max: 100 %   O2 Flow Rate (L/min): 2 L/min      Gen: no distress. Speaking in full sentences with trace accessory muscle use  HEENT: PERRL, EOMI, OP nl  Lung:  Decreased breath sounds bilaterally with faint expiratory wheezing.  CV: RRR without M/R/R  Abd: +BS, soft, NT/ND  Ext: No edema.    DATA  CBC:   Recent Labs     25  0544 25  0407 25  0636   WBC 6.2 6.6 8.0   HGB 10.5* 10.6* 11.5*   HCT 31.0* 31.1* 34.4*   MCV 93.0 93.6 93.8   PLT 
                Speech Language Pathology  Facility/Department:[facility] ICU  Bedside Swallow Evaluation/Tx                                                       Name: Charlene Nazario  : 1950  MRN: [id number]    Patient Diagnosis(es):   Patient Active Problem List    Diagnosis Date Noted    S/P coronary angiogram 2018    COPD with acute exacerbation (HCC) 2025    Moderate malnutrition 2025    COPD exacerbation (HCC) 2025    Thoracic aorta atherosclerosis 2022    Benign essential HTN 2022    Anxiety 2022    Impaired fasting glucose 2022    COPD, moderate (HCC) 2022    Chronic diastolic CHF (congestive heart failure) (HCC) 2022    HLD (hyperlipidemia) 2021    Chronic respiratory failure with hypoxia ([facility])     Vocal cord paralysis, unilateral partial 2018    Hoarseness of voice 10/04/2017    Gastroesophageal reflux disease without esophagitis 2017    Age related osteoporosis 2017    Lung nodule:left 2017    Dyspnea and respiratory abnormalities     Incidental pulmonary nodule, greater than or equal to 8mm 2017    Thyroid mass of unclear etiology 2017       Past Medical History:   Diagnosis Date    Allergic rhinitis     Anxiety 2022    Back pain     Chronic:under care of spine specialist:Dr. Adames    Cervical cancer screening     Nml per pt'    Closed compression fracture of L3 vertebra ([facility]) 2024    Compression fracture of thoracic vertebrae, non-traumatic ([facility])     under care of endo:Dr. Zhu    COPD (chronic obstructive pulmonary disease) ([facility])     under care of pulmo:Dr. Nazario    COPD exacerbation ([facility]) 2017    Elevated LDL cholesterol level     GERD (gastroesophageal reflux disease)     History of mammogram ;17    Nml per pt'. 17=negative.    HLD (hyperlipidemia) 2021    Hoarseness of voice 10/04/2017    Hypertension     Lung nodule:left 2017     1.2 cm 
                Speech Language Pathology  Facility/Department:[facility] ICU  Dysphagia Therapy Note                                                       Name: Charlene Nazario  : 1950  MRN: [id number]    Patient Diagnosis(es):   Patient Active Problem List    Diagnosis Date Noted    S/P coronary angiogram 2018    COPD with acute exacerbation (HCC) 2025    Moderate malnutrition 2025    COPD exacerbation (HCC) 2025    Thoracic aorta atherosclerosis 2022    Benign essential HTN 2022    Anxiety 2022    Impaired fasting glucose 2022    COPD, moderate (HCC) 2022    Chronic diastolic CHF (congestive heart failure) (HCC) 2022    HLD (hyperlipidemia) 2021    Chronic respiratory failure with hypoxia ([facility])     Vocal cord paralysis, unilateral partial 2018    Hoarseness of voice 10/04/2017    Gastroesophageal reflux disease without esophagitis 2017    Age related osteoporosis 2017    Lung nodule:left 2017    Dyspnea and respiratory abnormalities     Incidental pulmonary nodule, greater than or equal to 8mm 2017    Thyroid mass of unclear etiology 2017       Past Medical History:   Diagnosis Date    Allergic rhinitis     Anxiety 2022    Back pain     Chronic:under care of spine specialist:Dr. Adames    Cervical cancer screening     Nml per pt'    Closed compression fracture of L3 vertebra ([facility]) 2024    Compression fracture of thoracic vertebrae, non-traumatic ([facility])     under care of endo:Dr. Zhu    COPD (chronic obstructive pulmonary disease) ([facility])     under care of pulmo:Dr. Nazario    COPD exacerbation ([facility]) 2017    Elevated LDL cholesterol level     GERD (gastroesophageal reflux disease)     History of mammogram ;17    Nml per pt'. 17=negative.    HLD (hyperlipidemia) 2021    Hoarseness of voice 10/04/2017    Hypertension     Lung nodule:left 2017     1.2 cm 
            Speech Language Pathology  Facility/Department:[address]  Dysphagia Therapy Note                                                     Name: Charlene Nazario  : 1950  MRN: [id number]    Patient Diagnosis(es):   Patient Active Problem List    Diagnosis Date Noted    S/P coronary angiogram 2018    COPD with acute exacerbation (HCC) 2025    Moderate malnutrition 2025    COPD exacerbation (HCC) 2025    Thoracic aorta atherosclerosis 2022    Benign essential HTN 2022    Anxiety 2022    Impaired fasting glucose 2022    COPD, moderate (HCC) 2022    Chronic diastolic CHF (congestive heart failure) (HCC) 2022    HLD (hyperlipidemia) 2021    Chronic respiratory failure with hypoxia ([facility])     Vocal cord paralysis, unilateral partial 2018    Hoarseness of voice 10/04/2017    Gastroesophageal reflux disease without esophagitis 2017    Age related osteoporosis 2017    Lung nodule:left 2017    Dyspnea and respiratory abnormalities     Incidental pulmonary nodule, greater than or equal to 8mm 2017    Thyroid mass of unclear etiology 2017     Past Medical History:   Diagnosis Date    Allergic rhinitis     Anxiety 2022    Back pain     Chronic:under care of spine specialist:Dr. Adames    Cervical cancer screening     Nml per pt'    Closed compression fracture of L3 vertebra ([facility]) 2024    Compression fracture of thoracic vertebrae, non-traumatic ([facility])     under care of endo:Dr. Zhu    COPD (chronic obstructive pulmonary disease) ([facility])     under care of pulmo:Dr. Nazario    COPD exacerbation ([facility]) 2017    Elevated LDL cholesterol level     GERD (gastroesophageal reflux disease)     History of mammogram ;17    Nml per pt'. 17=negative.    HLD (hyperlipidemia) 2021    Hoarseness of voice 10/04/2017    Hypertension     Lung nodule:left 2017     1.2 cm indeterminate 
            Speech Language Pathology  Facility/Department:[facility] ICU  Dysphagia Therapy Note                                                     Name: Charlene Nazario  : 1950  MRN: [id number]    Patient Diagnosis(es):   Patient Active Problem List    Diagnosis Date Noted    S/P coronary angiogram 2018    COPD with acute exacerbation (HCC) 2025    Moderate malnutrition 2025    COPD exacerbation (HCC) 2025    Thoracic aorta atherosclerosis 2022    Benign essential HTN 2022    Anxiety 2022    Impaired fasting glucose 2022    COPD, moderate (HCC) 2022    Chronic diastolic CHF (congestive heart failure) (HCC) 2022    HLD (hyperlipidemia) 2021    Chronic respiratory failure with hypoxia ([facility])     Vocal cord paralysis, unilateral partial 2018    Hoarseness of voice 10/04/2017    Gastroesophageal reflux disease without esophagitis 2017    Age related osteoporosis 2017    Lung nodule:left 2017    Dyspnea and respiratory abnormalities     Incidental pulmonary nodule, greater than or equal to 8mm 2017    Thyroid mass of unclear etiology 2017     Past Medical History:   Diagnosis Date    Allergic rhinitis     Anxiety 2022    Back pain     Chronic:under care of spine specialist:Dr. Adames    Cervical cancer screening     Nml per pt'    Closed compression fracture of L3 vertebra ([facility]) 2024    Compression fracture of thoracic vertebrae, non-traumatic ([facility])     under care of endo:Dr. Zhu    COPD (chronic obstructive pulmonary disease) ([facility])     under care of pulmo:Dr. Nazario    COPD exacerbation ([facility]) 2017    Elevated LDL cholesterol level     GERD (gastroesophageal reflux disease)     History of mammogram ;17    Nml per pt'. 17=negative.    HLD (hyperlipidemia) 2021    Hoarseness of voice 10/04/2017    Hypertension     Lung nodule:left 2017     1.2 cm indeterminate left 
        Speech Language Pathology  Facility/Department:[facility] ICU  Dysphagia Therapy Note                                                     Name: Charlene Nazario  : 1950  MRN: [id number]    Patient Diagnosis(es):   Patient Active Problem List    Diagnosis Date Noted    S/P coronary angiogram 2018    COPD with acute exacerbation (HCC) 2025    Moderate malnutrition 2025    COPD exacerbation (HCC) 2025    Thoracic aorta atherosclerosis 2022    Benign essential HTN 2022    Anxiety 2022    Impaired fasting glucose 2022    COPD, moderate (HCC) 2022    Chronic diastolic CHF (congestive heart failure) (HCC) 2022    HLD (hyperlipidemia) 2021    Chronic respiratory failure with hypoxia ([facility])     Vocal cord paralysis, unilateral partial 2018    Hoarseness of voice 10/04/2017    Gastroesophageal reflux disease without esophagitis 2017    Age related osteoporosis 2017    Lung nodule:left 2017    Dyspnea and respiratory abnormalities     Incidental pulmonary nodule, greater than or equal to 8mm 2017    Thyroid mass of unclear etiology 2017     Past Medical History:   Diagnosis Date    Allergic rhinitis     Anxiety 2022    Back pain     Chronic:under care of spine specialist:Dr. Adames    Cervical cancer screening     Nml per pt'    Closed compression fracture of L3 vertebra ([facility]) 2024    Compression fracture of thoracic vertebrae, non-traumatic ([facility])     under care of endo:Dr. Zhu    COPD (chronic obstructive pulmonary disease) ([facility])     under care of pulmo:Dr. Nazario    COPD exacerbation ([facility]) 2017    Elevated LDL cholesterol level     GERD (gastroesophageal reflux disease)     History of mammogram ;17    Nml per pt'. 17=negative.    HLD (hyperlipidemia) 2021    Hoarseness of voice 10/04/2017    Hypertension     Lung nodule:left 2017     1.2 cm indeterminate left 
      [facility] Medicine Progress Note  V 5.17      Date of Admission: 5/16/2025    Hospital Day: 10      Chief Admission Complaint:  Dyspnea     Subjective:  SBT today    Presenting Admission History:       Ms. Charlene Nazario is a 75 y.o. female with a history significant for moderate COPD (on 3L at baseline), HFpEF (EF 50-55%), HTN, who presented to the ED on 5/16 with shortness of breath.     Patient states she was sleeping when she was awakened by acute shortness of breath at 4am. Patient states that she attempted to use her albuterol inhaler to help but still was having trouble breathing. Patient states that she has an infrequent cough but when it does comes on it does worsen her symptoms. Denies any mucus production with cough. En route to [facility], EMS administered duoneb x2. Patient denies any fever, chills, chest pain, nausea, vomiting, constipation, diarrhea, or any urinary symptoms.     Patient does endorse that she has been using her albuterol more frequently which she believes may be due to increased pollen levels that exacerbate her symptoms. Patient unclear on her other home regimen and states her  is more aware of her current regimen. Patient is notably on 3L at baseline and follows with Dr. Nazario outpatient. Of note, patient was recently admitted from 4/19-4/26 for COPD exacerbation and was treated with steroids and levaquin.     Patient is a former 20 pack year smoker but states she stopped smoking years ago. Endorses 1-2 glasses of wine nightly. Denies any recent travel or sick symptoms.    Assessment/Plan:      Acute on chronic hypercarbic respiratory failure  COPD exacerbation  - Patient on 3L NC at baseline for COPD. Presented with acute onset shortness of breath. Reports she woke up feeling acutely SOB at around 4am on date of presentation  - Admitted for presumed copd exacerbation, started on scheduled nebulizers, steroids, azithromycin. Was placed on BiPAP however refused to wear it 
      [facility] Medicine Progress Note  V 5.17      Date of Admission: 5/16/2025    Hospital Day: 15      Chief Admission Complaint:  Dyspnea     Subjective: extubated successfully, weaning sedation    Presenting Admission History:       Ms. Charlene Nazario is a 75 y.o. female with a history significant for moderate COPD (on 3L at baseline), HFpEF (EF 50-55%), HTN, who presented to the ED on 5/16 with shortness of breath.     Patient states she was sleeping when she was awakened by acute shortness of breath at 4am. Patient states that she attempted to use her albuterol inhaler to help but still was having trouble breathing. Patient states that she has an infrequent cough but when it does comes on it does worsen her symptoms. Denies any mucus production with cough. En route to [facility], EMS administered duoneb x2. Patient denies any fever, chills, chest pain, nausea, vomiting, constipation, diarrhea, or any urinary symptoms.     Patient does endorse that she has been using her albuterol more frequently which she believes may be due to increased pollen levels that exacerbate her symptoms. Patient unclear on her other home regimen and states her  is more aware of her current regimen. Patient is notably on 3L at baseline and follows with Dr. Nazario outpatient. Of note, patient was recently admitted from 4/19-4/26 for COPD exacerbation and was treated with steroids and levaquin.     Patient is a former 20 pack year smoker but states she stopped smoking years ago. Endorses 1-2 glasses of wine nightly. Denies any recent travel or sick symptoms.    Assessment/Plan:      Acute on chronic hypercarbic respiratory failure  COPD exacerbation  ABPA  - successfully extubated  - continue to wean sedation  - continue itraconazole    BIENVENIDO - resolved  - 5/17 Cr up to 1.1 from baseline of 0.5. She did receive IV contrast on 5/16. Patient also notably hypotensive throughout yesterday.  Will monitor on daily labs.    Hyperkalemia - 
      [facility] Medicine Progress Note  V 5.17      Date of Admission: 5/16/2025    Hospital Day: 11      Chief Admission Complaint:  Dyspnea     Subjective:  SBT failed    Presenting Admission History:       Ms. Charlene Nazario is a 75 y.o. female with a history significant for moderate COPD (on 3L at baseline), HFpEF (EF 50-55%), HTN, who presented to the ED on 5/16 with shortness of breath.     Patient states she was sleeping when she was awakened by acute shortness of breath at 4am. Patient states that she attempted to use her albuterol inhaler to help but still was having trouble breathing. Patient states that she has an infrequent cough but when it does comes on it does worsen her symptoms. Denies any mucus production with cough. En route to [facility], EMS administered duoneb x2. Patient denies any fever, chills, chest pain, nausea, vomiting, constipation, diarrhea, or any urinary symptoms.     Patient does endorse that she has been using her albuterol more frequently which she believes may be due to increased pollen levels that exacerbate her symptoms. Patient unclear on her other home regimen and states her  is more aware of her current regimen. Patient is notably on 3L at baseline and follows with Dr. Nazario outpatient. Of note, patient was recently admitted from 4/19-4/26 for COPD exacerbation and was treated with steroids and levaquin.     Patient is a former 20 pack year smoker but states she stopped smoking years ago. Endorses 1-2 glasses of wine nightly. Denies any recent travel or sick symptoms.    Assessment/Plan:      Acute on chronic hypercarbic respiratory failure  COPD exacerbation  - Patient on 3L NC at baseline for COPD. Presented with acute onset shortness of breath. Reports she woke up feeling acutely SOB at around 4am on date of presentation  - Admitted for presumed copd exacerbation, started on scheduled nebulizers, steroids, azithromycin. Was placed on BiPAP however refused to wear it 
      [facility] Medicine Progress Note  V 5.17      Date of Admission: 5/16/2025    Hospital Day: 9      Chief Admission Complaint:  Dyspnea     Subjective:  SBT today    Presenting Admission History:       Ms. Charlene Nazario is a 75 y.o. female with a history significant for moderate COPD (on 3L at baseline), HFpEF (EF 50-55%), HTN, who presented to the ED on 5/16 with shortness of breath.     Patient states she was sleeping when she was awakened by acute shortness of breath at 4am. Patient states that she attempted to use her albuterol inhaler to help but still was having trouble breathing. Patient states that she has an infrequent cough but when it does comes on it does worsen her symptoms. Denies any mucus production with cough. En route to [facility], EMS administered duoneb x2. Patient denies any fever, chills, chest pain, nausea, vomiting, constipation, diarrhea, or any urinary symptoms.     Patient does endorse that she has been using her albuterol more frequently which she believes may be due to increased pollen levels that exacerbate her symptoms. Patient unclear on her other home regimen and states her  is more aware of her current regimen. Patient is notably on 3L at baseline and follows with Dr. Nazario outpatient. Of note, patient was recently admitted from 4/19-4/26 for COPD exacerbation and was treated with steroids and levaquin.     Patient is a former 20 pack year smoker but states she stopped smoking years ago. Endorses 1-2 glasses of wine nightly. Denies any recent travel or sick symptoms.    Assessment/Plan:      Acute on chronic hypercarbic respiratory failure  COPD exacerbation  - Patient on 3L NC at baseline for COPD. Presented with acute onset shortness of breath. Reports she woke up feeling acutely SOB at around 4am on date of presentation  - Admitted for presumed copd exacerbation, started on scheduled nebulizers, steroids, azithromycin. Was placed on BiPAP however refused to wear it 
      [facility] Medicine Progress Note  V 5.17      Date of Admission: 5/16/2025    Hospital Day: 16      Chief Admission Complaint:  Dyspnea     Subjective: She is still on oxygen.  Alert and oriented.  Has been trying to pull off the lines.    Sister at bedside concerned about nutrition.  Discussed with sister she has been trying to pull out all the lites.  She will have formal speech eval tomorrow    Presenting Admission History:       Ms. Charlene Nazario is a 75 y.o. female with a history significant for moderate COPD (on 3L at baseline), HFpEF (EF 50-55%), HTN, who presented to the ED on 5/16 with shortness of breath.     Patient states she was sleeping when she was awakened by acute shortness of breath at 4am. Patient states that she attempted to use her albuterol inhaler to help but still was having trouble breathing. Patient states that she has an infrequent cough but when it does comes on it does worsen her symptoms. Denies any mucus production with cough. En route to [facility], EMS administered duoneb x2. Patient denies any fever, chills, chest pain, nausea, vomiting, constipation, diarrhea, or any urinary symptoms.     Patient does endorse that she has been using her albuterol more frequently which she believes may be due to increased pollen levels that exacerbate her symptoms. Patient unclear on her other home regimen and states her  is more aware of her current regimen. Patient is notably on 3L at baseline and follows with Dr. Nazario outpatient. Of note, patient was recently admitted from 4/19-4/26 for COPD exacerbation and was treated with steroids and levaquin.     Patient is a former 20 pack year smoker but states she stopped smoking years ago. Endorses 1-2 glasses of wine nightly. Denies any recent travel or sick symptoms.    Assessment/Plan:      Acute on chronic hypercarbic respiratory failure  COPD exacerbation  ABPA  - successfully extubated  - Nephrology recommended continue Solu-Medrol 40 mg 
      [facility] Medicine Progress Note  V 5.17      Date of Admission: 5/16/2025    Hospital Day: 8      Chief Admission Complaint:  Dyspnea     Subjective:  Patient seen and examined at bedside. Intubated, heavily sedated. Unable to obtain ROS. Bit agitated overnight, had sedation increased.     Presenting Admission History:       Ms. Charlene Nazario is a 75 y.o. female with a history significant for moderate COPD (on 3L at baseline), HFpEF (EF 50-55%), HTN, who presented to the ED on 5/16 with shortness of breath.     Patient states she was sleeping when she was awakened by acute shortness of breath at 4am. Patient states that she attempted to use her albuterol inhaler to help but still was having trouble breathing. Patient states that she has an infrequent cough but when it does comes on it does worsen her symptoms. Denies any mucus production with cough. En route to [facility], EMS administered duoneb x2. Patient denies any fever, chills, chest pain, nausea, vomiting, constipation, diarrhea, or any urinary symptoms.     Patient does endorse that she has been using her albuterol more frequently which she believes may be due to increased pollen levels that exacerbate her symptoms. Patient unclear on her other home regimen and states her  is more aware of her current regimen. Patient is notably on 3L at baseline and follows with Dr. Nazario outpatient. Of note, patient was recently admitted from 4/19-4/26 for COPD exacerbation and was treated with steroids and levaquin.     Patient is a former 20 pack year smoker but states she stopped smoking years ago. Endorses 1-2 glasses of wine nightly. Denies any recent travel or sick symptoms.    Assessment/Plan:      Acute on chronic hypercarbic respiratory failure  COPD exacerbation  - Patient on 3L NC at baseline for COPD. Presented with acute onset shortness of breath. Reports she woke up feeling acutely SOB at around 4am on date of presentation  - Admitted for presumed 
      [facility] Medicine Progress Note  V 5.17      Date of Admission: 5/16/2025    Hospital Day: 17      Chief Admission Complaint:  SOB    Subjective:  Patient seen at bedside this morning. NO acute concerns. No sob, hypoxic events overnight, NC 4L at bedside. No fevers, chills, cough.    Presenting Admission History:       Ms. Charlene Nazario is a 75 y.o. female with a history significant for moderate COPD (on 3L at baseline), HFpEF (EF 50-55%), HTN, who presented to the ED on 5/16 with shortness of breath.     Patient states she was sleeping when she was awakened by acute shortness of breath at 4am. Patient states that she attempted to use her albuterol inhaler to help but still was having trouble breathing. Patient states that she has an infrequent cough but when it does comes on it does worsen her symptoms. Denies any mucus production with cough. En route to [facility], EMS administered duoneb x2. Patient denies any fever, chills, chest pain, nausea, vomiting, constipation, diarrhea, or any urinary symptoms.     Patient does endorse that she has been using her albuterol more frequently which she believes may be due to increased pollen levels that exacerbate her symptoms. Patient unclear on her other home regimen and states her  is more aware of her current regimen. Patient is notably on 3L at baseline and follows with Dr. Nazario outpatient. Of note, patient was recently admitted from 4/19-4/26 for COPD exacerbation and was treated with steroids and levaquin.     Patient is a former 20 pack year smoker but states she stopped smoking years ago. Endorses 1-2 glasses of wine nightly. Denies any recent travel or sick symptoms.    Assessment/Plan:      Acute on chronic hypercarbic respiratory failure  COPD exacerbation  - successfully extubated  - Nephrology recommended continue Solu-Medrol 40 mg IV, pulmonary toilet with 3% nebs twice a day, stop itraconazole.  Continue bronchodilators  Maintain oxygen 
      [facility] Medicine Progress Note  V 5.17      Date of Admission: 5/16/2025    Hospital Day: 13      Chief Admission Complaint:  Dyspnea     Subjective:  remains on ventilator    Presenting Admission History:       Ms. Charlene Nazario is a 75 y.o. female with a history significant for moderate COPD (on 3L at baseline), HFpEF (EF 50-55%), HTN, who presented to the ED on 5/16 with shortness of breath.     Patient states she was sleeping when she was awakened by acute shortness of breath at 4am. Patient states that she attempted to use her albuterol inhaler to help but still was having trouble breathing. Patient states that she has an infrequent cough but when it does comes on it does worsen her symptoms. Denies any mucus production with cough. En route to [facility], EMS administered duoneb x2. Patient denies any fever, chills, chest pain, nausea, vomiting, constipation, diarrhea, or any urinary symptoms.     Patient does endorse that she has been using her albuterol more frequently which she believes may be due to increased pollen levels that exacerbate her symptoms. Patient unclear on her other home regimen and states her  is more aware of her current regimen. Patient is notably on 3L at baseline and follows with Dr. Nazario outpatient. Of note, patient was recently admitted from 4/19-4/26 for COPD exacerbation and was treated with steroids and levaquin.     Patient is a former 20 pack year smoker but states she stopped smoking years ago. Endorses 1-2 glasses of wine nightly. Denies any recent travel or sick symptoms.    Assessment/Plan:      Acute on chronic hypercarbic respiratory failure  COPD exacerbation  - Patient on 3L NC at baseline for COPD. Presented with acute onset shortness of breath. Reports she woke up feeling acutely SOB at around 4am on date of presentation  - Admitted for presumed copd exacerbation, started on scheduled nebulizers, steroids, azithromycin. Was placed on BiPAP however refused to 
      [facility] Medicine Progress Note  V 5.17      Date of Admission: 5/16/2025    Hospital Day: 7      Chief Admission Complaint:  Dyspnea     Subjective:  Patient seen and examined at bedside. Intubated, heavily sedated. Unable to obtain ROS.     Presenting Admission History:       Ms. Charlene Nazario is a 75 y.o. female with a history significant for moderate COPD (on 3L at baseline), HFpEF (EF 50-55%), HTN, who presented to the ED on 5/16 with shortness of breath.     Patient states she was sleeping when she was awakened by acute shortness of breath at 4am. Patient states that she attempted to use her albuterol inhaler to help but still was having trouble breathing. Patient states that she has an infrequent cough but when it does comes on it does worsen her symptoms. Denies any mucus production with cough. En route to [facility], EMS administered duoneb x2. Patient denies any fever, chills, chest pain, nausea, vomiting, constipation, diarrhea, or any urinary symptoms.     Patient does endorse that she has been using her albuterol more frequently which she believes may be due to increased pollen levels that exacerbate her symptoms. Patient unclear on her other home regimen and states her  is more aware of her current regimen. Patient is notably on 3L at baseline and follows with Dr. Nazario outpatient. Of note, patient was recently admitted from 4/19-4/26 for COPD exacerbation and was treated with steroids and levaquin.     Patient is a former 20 pack year smoker but states she stopped smoking years ago. Endorses 1-2 glasses of wine nightly. Denies any recent travel or sick symptoms.    Assessment/Plan:      Acute on chronic hypercarbic respiratory failure  COPD exacerbation  - Patient on 3L NC at baseline for COPD. Presented with acute onset shortness of breath. Reports she woke up feeling acutely SOB at around 4am on date of presentation  - Admitted for presumed copd exacerbation, started on scheduled 
      [facility] Medicine Progress Note  V 5.17      Date of Admission: 5/16/2025    Hospital Day: 4      Chief Admission Complaint:  Dyspnea     Subjective:  Patient seen and examined at bedside. Intubated, heavily sedated. Unable to obtain ROS.     Presenting Admission History:       Ms. Charlene Nazario is a 75 y.o. female with a history significant for moderate COPD (on 3L at baseline), HFpEF (EF 50-55%), HTN, who presented to the ED on 5/16 with shortness of breath.     Patient states she was sleeping when she was awakened by acute shortness of breath at 4am. Patient states that she attempted to use her albuterol inhaler to help but still was having trouble breathing. Patient states that she has an infrequent cough but when it does comes on it does worsen her symptoms. Denies any mucus production with cough. En route to [facility], EMS administered duoneb x2. Patient denies any fever, chills, chest pain, nausea, vomiting, constipation, diarrhea, or any urinary symptoms.     Patient does endorse that she has been using her albuterol more frequently which she believes may be due to increased pollen levels that exacerbate her symptoms. Patient unclear on her other home regimen and states her  is more aware of her current regimen. Patient is notably on 3L at baseline and follows with Dr. Nazario outpatient. Of note, patient was recently admitted from 4/19-4/26 for COPD exacerbation and was treated with steroids and levaquin.     Patient is a former 20 pack year smoker but states she stopped smoking years ago. Endorses 1-2 glasses of wine nightly. Denies any recent travel or sick symptoms.    Assessment/Plan:      Acute on chronic hypercarbic respiratory failure  COPD exacerbation  - Patient on 3L NC at baseline for COPD. Presented with acute onset shortness of breath. Reports she woke up feeling acutely SOB at around 4am on date of presentation  - Admitted for presumed copd exacerbation, started on scheduled 
      [facility] Medicine Progress Note  V 5.17      Date of Admission: 5/16/2025    Hospital Day: 5      Chief Admission Complaint:  Dyspnea     Subjective:  Patient seen and examined at bedside. Intubated, heavily sedated. Unable to obtain ROS.     Presenting Admission History:       Ms. Charlene Nazario is a 75 y.o. female with a history significant for moderate COPD (on 3L at baseline), HFpEF (EF 50-55%), HTN, who presented to the ED on 5/16 with shortness of breath.     Patient states she was sleeping when she was awakened by acute shortness of breath at 4am. Patient states that she attempted to use her albuterol inhaler to help but still was having trouble breathing. Patient states that she has an infrequent cough but when it does comes on it does worsen her symptoms. Denies any mucus production with cough. En route to [facility], EMS administered duoneb x2. Patient denies any fever, chills, chest pain, nausea, vomiting, constipation, diarrhea, or any urinary symptoms.     Patient does endorse that she has been using her albuterol more frequently which she believes may be due to increased pollen levels that exacerbate her symptoms. Patient unclear on her other home regimen and states her  is more aware of her current regimen. Patient is notably on 3L at baseline and follows with Dr. Nazario outpatient. Of note, patient was recently admitted from 4/19-4/26 for COPD exacerbation and was treated with steroids and levaquin.     Patient is a former 20 pack year smoker but states she stopped smoking years ago. Endorses 1-2 glasses of wine nightly. Denies any recent travel or sick symptoms.    Assessment/Plan:      Acute on chronic hypercarbic respiratory failure  COPD exacerbation  - Patient on 3L NC at baseline for COPD. Presented with acute onset shortness of breath. Reports she woke up feeling acutely SOB at around 4am on date of presentation  - Admitted for presumed copd exacerbation, started on scheduled 
      [facility] Medicine Progress Note  V 5.17      Date of Admission: 5/16/2025    Hospital Day: 3      Chief Admission Complaint:  Dyspnea     Subjective:  Patient seen and examined at bedside. Intubated, heavily sedated. Unable to obtain ROS.      Presenting Admission History:       Ms. Charlene Nazario is a 75 y.o. female with a history significant for moderate COPD (on 3L at baseline), HFpEF (EF 50-55%), HTN, who presented to the ED on 5/16 with shortness of breath.     Patient states she was sleeping when she was awakened by acute shortness of breath at 4am. Patient states that she attempted to use her albuterol inhaler to help but still was having trouble breathing. Patient states that she has an infrequent cough but when it does comes on it does worsen her symptoms. Denies any mucus production with cough. En route to [facility], EMS administered duoneb x2. Patient denies any fever, chills, chest pain, nausea, vomiting, constipation, diarrhea, or any urinary symptoms.     Patient does endorse that she has been using her albuterol more frequently which she believes may be due to increased pollen levels that exacerbate her symptoms. Patient unclear on her other home regimen and states her  is more aware of her current regimen. Patient is notably on 3L at baseline and follows with Dr. Nazario outpatient. Of note, patient was recently admitted from 4/19-4/26 for COPD exacerbation and was treated with steroids and levaquin.     Patient is a former 20 pack year smoker but states she stopped smoking years ago. Endorses 1-2 glasses of wine nightly. Denies any recent travel or sick symptoms.    Assessment/Plan:      Acute on chronic hypercarbic respiratory failure  COPD exacerbation  - Patient on 3L NC at baseline for COPD. Presented with acute onset shortness of breath. Reports she woke up feeling acutely SOB at around 4am on date of presentation  - Admitted for presume copd exacerbation, started on scheduled 
      [facility] Medicine Progress Note  V 5.17      Date of Admission: 5/16/2025    Hospital Day: 12      Chief Admission Complaint:  Dyspnea     Subjective:  SBT failed x2    Presenting Admission History:       Ms. Charlene Nazario is a 75 y.o. female with a history significant for moderate COPD (on 3L at baseline), HFpEF (EF 50-55%), HTN, who presented to the ED on 5/16 with shortness of breath.     Patient states she was sleeping when she was awakened by acute shortness of breath at 4am. Patient states that she attempted to use her albuterol inhaler to help but still was having trouble breathing. Patient states that she has an infrequent cough but when it does comes on it does worsen her symptoms. Denies any mucus production with cough. En route to [facility], EMS administered duoneb x2. Patient denies any fever, chills, chest pain, nausea, vomiting, constipation, diarrhea, or any urinary symptoms.     Patient does endorse that she has been using her albuterol more frequently which she believes may be due to increased pollen levels that exacerbate her symptoms. Patient unclear on her other home regimen and states her  is more aware of her current regimen. Patient is notably on 3L at baseline and follows with Dr. Nazario outpatient. Of note, patient was recently admitted from 4/19-4/26 for COPD exacerbation and was treated with steroids and levaquin.     Patient is a former 20 pack year smoker but states she stopped smoking years ago. Endorses 1-2 glasses of wine nightly. Denies any recent travel or sick symptoms.    Assessment/Plan:      Acute on chronic hypercarbic respiratory failure  COPD exacerbation  - Patient on 3L NC at baseline for COPD. Presented with acute onset shortness of breath. Reports she woke up feeling acutely SOB at around 4am on date of presentation  - Admitted for presumed copd exacerbation, started on scheduled nebulizers, steroids, azithromycin. Was placed on BiPAP however refused to wear it 
      [facility] Medicine Progress Note  V 5.17      Date of Admission: 5/16/2025    Hospital Day: 6      Chief Admission Complaint:  Dyspnea     Subjective:  Patient seen and examined at bedside. Intubated, heavily sedated. Unable to obtain ROS.     Presenting Admission History:       Ms. Charlene Nazario is a 75 y.o. female with a history significant for moderate COPD (on 3L at baseline), HFpEF (EF 50-55%), HTN, who presented to the ED on 5/16 with shortness of breath.     Patient states she was sleeping when she was awakened by acute shortness of breath at 4am. Patient states that she attempted to use her albuterol inhaler to help but still was having trouble breathing. Patient states that she has an infrequent cough but when it does comes on it does worsen her symptoms. Denies any mucus production with cough. En route to [facility], EMS administered duoneb x2. Patient denies any fever, chills, chest pain, nausea, vomiting, constipation, diarrhea, or any urinary symptoms.     Patient does endorse that she has been using her albuterol more frequently which she believes may be due to increased pollen levels that exacerbate her symptoms. Patient unclear on her other home regimen and states her  is more aware of her current regimen. Patient is notably on 3L at baseline and follows with Dr. Nazario outpatient. Of note, patient was recently admitted from 4/19-4/26 for COPD exacerbation and was treated with steroids and levaquin.     Patient is a former 20 pack year smoker but states she stopped smoking years ago. Endorses 1-2 glasses of wine nightly. Denies any recent travel or sick symptoms.    Assessment/Plan:      Acute on chronic hypercarbic respiratory failure  COPD exacerbation  - Patient on 3L NC at baseline for COPD. Presented with acute onset shortness of breath. Reports she woke up feeling acutely SOB at around 4am on date of presentation  - Admitted for presumed copd exacerbation, started on scheduled 
     ICU Progress Note    Admit Date: 5/16/2025  Day: 11  Vent Day: 10 (intubated 5/17/25)  IV Access:Peripheral  IV Fluids:None  Vasopressors:None                Antibiotics: Levaquin (5/21 - 5/26)  Diet: Diet NPO  ADULT TUBE FEEDING VOLUME BASED; Orogastric; Renal Formula; 30; Continuous; 720 (30 ml/hr); 24    CC: SOB    Interval history: Yesterday, patient failed SBT again. Initially she became hemodynamically unstable and we attempted again with a dose of ativan however it was too large of a dose and it suppressed her respiratory drive. Patient had low blood pressure overnight requiring pressor support with norepinephrine for a couple of hours. Seen at bedside, patient is easily woken up with current sedation, even attempted to wave her hand \"hello\". She is comfortable, however not initiating breaths over the ventilator. Patient is still able to interact with physical exam, responding to questions with head shakes and nods.     HPI:   Charlene VALDEZ Nazario 75 y.o. female with PMH of COPD and others as listed below presented to the hospital early this morning with progressive SOB over few hours.  It is reported that she was tripoding.  This SOB woke her up from sleep around 3:00am.  She denied increase in cough or sputum production.  There is no chest pain.  She was placed on BiPAP and continuous nebs.    At this time she is on 5 liters O2 via NC with sat of 100%.  Breathing is mildly labored.  There is no fever.  Patient improved on admission then on 5/17 refusing BIPAP and continuing to go down hill and required intubation.   Of note she was admitted to the hospital last month on 4/19/25 for similar complain.  She was discharged on 4/26/26.     Medications:     Scheduled Meds:   itraconazole  200 mg Orogastric BID    [Held by provider] metoprolol tartrate  12.5 mg Oral BID    [Held by provider] lisinopril  2.5 mg Oral QPM    [Held by provider] metoprolol succinate  12.5 mg Oral BID    QUEtiapine  50 mg Oral BID    
     ICU Progress Note    Admit Date: 5/16/2025  Day: 12  Vent Day: 11 (intubated 5/17/25)  IV Access:Peripheral  IV Fluids:None  Vasopressors:None                Antibiotics: Levaquin (5/21 - 5/26)  Diet: Diet NPO  ADULT TUBE FEEDING VOLUME BASED; Orogastric; Renal Formula; 30; Continuous; 720 (30 ml/hr); 24    CC: SOB    Interval history: Yesterday, patient's RR on mechanical ventilation was reduced to 20. She tolerated an SBT with 3mg Ativan, but declined after about an hour. No acute events overnight, patient did not require any pressor support, vitals are stable. Patient was given lasix 20mg iv for clinical evidence of volume overload.     Seen at bedside this AM, more sedated this morning. Patient's blood pressure with a MAP < 65, levo was restarted. RN was notified to wean off propofol     HPI:   Charlene Nazario 75 y.o. female with PMH of COPD and others as listed below presented to the hospital early this morning with progressive SOB over few hours.  It is reported that she was tripoding.  This SOB woke her up from sleep around 3:00am.  She denied increase in cough or sputum production.  There is no chest pain.  She was placed on BiPAP and continuous nebs.  At this time she is on 5 liters O2 via NC with sat of 100%.  Breathing is mildly labored.  There is no fever.  Patient improved on admission then on 5/17 refusing BIPAP and continuing to go down hill and required intubation. Of note she was admitted to the hospital last month on 4/19/25 for similar complain.  She was discharged on 4/26/26.     Medications:     Scheduled Meds:   potassium bicarb-citric acid  40 mEq Oral Once    predniSONE  60 mg Orogastric Daily    itraconazole  200 mg Orogastric BID    [Held by provider] metoprolol tartrate  12.5 mg Oral BID    [Held by provider] lisinopril  2.5 mg Oral QPM    [Held by provider] metoprolol succinate  12.5 mg Oral BID    QUEtiapine  50 mg Oral BID    sodium chloride (Inhalant)  4 mL Nebulization BID RT    
     ICU Progress Note    Admit Date: 5/16/2025  Day: 13  Vent Day: Extubated  IV Access:Peripheral  IV Fluids:None  Vasopressors:None                Antibiotics: Levaquin (5/21 - 5/26)  Diet: Diet NPO    CC: SOB    Interval history:   - Extubated yesterday. VBG following this: 7.41/67.9. Currently on HFNC  - This morning, no acute complaints. Remained on ketamine and precedex gtt, weaning sedation currently  - Is not having a cough or in respiratory distress  - Failed swallow screen today  - Interval plan: Place NG tube, continue current respiratory therapy with steroids, wean sedation, PT/OT    HPI:   Charlene Nazario 75 y.o. female with PMH of COPD and others as listed below presented to the hospital early this morning with progressive SOB over few hours.  It is reported that she was tripoding.  This SOB woke her up from sleep around 3:00am.  She denied increase in cough or sputum production.  There is no chest pain.  She was placed on BiPAP and continuous nebs.  At this time she is on 5 liters O2 via NC with sat of 100%.  Breathing is mildly labored.  There is no fever.  Patient improved on admission then on 5/17 refusing BIPAP and continuing to go down hill and required intubation. Of note she was admitted to the hospital last month on 4/19/25 for similar complain.  She was discharged on 4/26/26.     Medications:     Scheduled Meds:   potassium chloride  10 mEq IntraVENous Q1H    oxymetazoline  2 spray Each Nostril Once    lidocaine   Topical Once    polyethylene glycol  17 g Oral Daily    predniSONE  60 mg Orogastric Daily    itraconazole  200 mg Orogastric BID    [Held by provider] lisinopril  2.5 mg Oral QPM    [Held by provider] metoprolol succinate  12.5 mg Oral BID    QUEtiapine  50 mg Oral BID    sodium chloride (Inhalant)  4 mL Nebulization BID RT    enoxaparin  40 mg SubCUTAneous Daily    balsum peru-castor oil   Topical BID    pantoprazole (PROTONIX) 40 mg in sodium chloride (PF) 0.9 % 10 mL injection 
     ICU Progress Note    Admit Date: 5/16/2025  Day: 14  Vent Day: Extubated 5/29  IV Access:Peripheral  IV Fluids:None  Vasopressors:None                Antibiotics: Levaquin (5/21 - 5/26)  Diet: Diet NPO  ADULT TUBE FEEDING VOLUME BASED; Nasogastric; Standard with Fiber; 80; Continuous; 960; 24    CC: SOB    Interval history:   Patient pulled out her NG overnight, go delirious and required ativan 1mg total. She was placed back on the ketamine infusion for agitation and concern of impending respiratory decline. Patient seen this AM, she is lethargic up easily aroused. Nurses to replaced NG for medications. Patient failed swallow evaluation thus needing feeding tube for nutrition and po medications.       HPI:   Charlene Nazario 75 y.o. female with PMH of COPD and others as listed below presented to the hospital early this morning with progressive SOB over few hours.  It is reported that she was tripoding.  This SOB woke her up from sleep around 3:00am.  She denied increase in cough or sputum production.  There is no chest pain.  She was placed on BiPAP and continuous nebs.  At this time she is on 5 liters O2 via NC with sat of 100%.  Breathing is mildly labored.  There is no fever.  Patient improved on admission then on 5/17 refusing BIPAP and continuing to go down hill and required intubation. Of note she was admitted to the hospital last month on 4/19/25 for similar complain.  She was discharged on 4/26/26.     Medications:     Scheduled Meds:   ipratropium 0.5 mg-albuterol 2.5 mg  1 Dose Inhalation Q4H WA RT    polyethylene glycol  17 g Oral Daily    predniSONE  60 mg Orogastric Daily    itraconazole  200 mg Orogastric BID    [Held by provider] lisinopril  2.5 mg Oral QPM    [Held by provider] metoprolol succinate  12.5 mg Oral BID    QUEtiapine  50 mg Oral BID    sodium chloride (Inhalant)  4 mL Nebulization BID RT    enoxaparin  40 mg SubCUTAneous Daily    balsum peru-castor oil   Topical BID    pantoprazole 
     ICU Progress Note    Admit Date: 5/16/2025  Day: 15  Vent Day: Extubated 5/29  IV Access:Peripheral  IV Fluids:None  Vasopressors:None                Antibiotics: Levaquin (5/21 - 5/26)  Diet: Diet NPO  ADULT TUBE FEEDING VOLUME BASED; Nasogastric; Standard with Fiber; 80; Continuous; 960; 24    CC: SOB    Interval history:   - Overnight, episode of agitation. Received 1mg ativan  - Unable to be evaluated by SLP yesterday due to agitation requiring zyprexa  - This morning, waxing and waning inattention and agitation, attempting to get out of bed. Removed NG yesterday, was unable to be replaced yesterday.  - Vitals otherwise stable  - Interval plan: Continue current management. Will readdress NG/ feeding tomorrow. Discontinue methylprenisolone today. Precedex ordered for agitation to use instead of ativan as second line.    HPI:   Charlene Nazario 75 y.o. female with PMH of COPD and others as listed below presented to the hospital early this morning with progressive SOB over few hours.  It is reported that she was tripoding.  This SOB woke her up from sleep around 3:00am.  She denied increase in cough or sputum production.  There is no chest pain.  She was placed on BiPAP and continuous nebs.  At this time she is on 5 liters O2 via NC with sat of 100%.  Breathing is mildly labored.  There is no fever.  Patient improved on admission then on 5/17 refusing BIPAP and continuing to go down hill and required intubation. Of note she was admitted to the hospital last month on 4/19/25 for similar complain.  She was discharged on 4/26/26.     Medications:     Scheduled Meds:   potassium phosphate IVPB (PERIPHERAL LINE)  20 mmol IntraVENous Once    haloperidol lactate  10 mg IntraVENous Once    potassium chloride  10 mEq IntraVENous Q1H    pantoprazole  40 mg IntraVENous Daily    ipratropium 0.5 mg-albuterol 2.5 mg  1 Dose Inhalation Q4H WA RT    polyethylene glycol  17 g Oral Daily    [Held by provider] lisinopril  2.5 mg 
     ICU Progress Note    Admit Date: 5/16/2025  Day: 16  Vent Day: Extubated 5/29  IV Access:Peripheral  IV Fluids:None  Vasopressors:None                Antibiotics: Levaquin (5/21 - 5/26)  Diet: Diet NPO  ADULT TUBE FEEDING VOLUME BASED; Nasogastric; Standard with Fiber; 80; Continuous; 960; 24    CC: SOB    Interval history:   - Overnight, patient had no increased agitation on precedex gtt. However, she still has no po access due to intolerance of NG placement.   - patient pleasantly confused this AM,  at bedside. Patient seems fidgety, otherwise fairly stable. She is hungry, still without enteral access. Plan for MBS today  - patient can likely downgrade from ICU once off precedex gtt     HPI:   Charlene Nazario 75 y.o. female with PMH of COPD and others as listed below presented to the hospital early this morning with progressive SOB over few hours.  It is reported that she was tripoding.  This SOB woke her up from sleep around 3:00am.  She denied increase in cough or sputum production.  There is no chest pain.  She was placed on BiPAP and continuous nebs.  At this time she is on 5 liters O2 via NC with sat of 100%.  Breathing is mildly labored.  There is no fever.  Patient improved on admission then on 5/17 refusing BIPAP and continuing to go down hill and required intubation. Of note she was admitted to the hospital last month on 4/19/25 for similar complain.  She was discharged on 4/26/26.     Medications:     Scheduled Meds:   potassium phosphate IVPB (PERIPHERAL LINE)  30 mmol IntraVENous Once    haloperidol lactate  10 mg IntraVENous Once    pantoprazole  40 mg IntraVENous Daily    ipratropium 0.5 mg-albuterol 2.5 mg  1 Dose Inhalation Q4H WA RT    polyethylene glycol  17 g Oral Daily    [Held by provider] lisinopril  2.5 mg Oral QPM    [Held by provider] metoprolol succinate  12.5 mg Oral BID    QUEtiapine  50 mg Oral BID    sodium chloride (Inhalant)  4 mL Nebulization BID RT    enoxaparin  40 
     ICU Progress Note    Admit Date: 5/16/2025  Day: 17  Vent Day: Extubated 5/29  IV Access:Peripheral  IV Fluids:None  Vasopressors:None                Antibiotics: Levaquin (5/21 - 5/26)  Diet: ADULT DIET; Easy to Chew; Mildly Thick (Nectar)    CC: SOB    Interval history:   - No acute overnight event. Patient has sitter in the room with the  on the bedside. She is doing fine with no active complaint. Mentation wise she is AOX3 with no agitation.   She is still on precedex 0.5. she is still getting Seroquel 50 mg BID  She passed SLP eval yesterday she is now on thickened liquid. She had a loose bowel. Plans to decrease precedex to 0.4      HPI:   Charlene Nazario 75 y.o. female with PMH of COPD and others as listed below presented to the hospital early this morning with progressive SOB over few hours.  It is reported that she was tripoding.  This SOB woke her up from sleep around 3:00am.  She denied increase in cough or sputum production.  There is no chest pain.  She was placed on BiPAP and continuous nebs.  At this time she is on 5 liters O2 via NC with sat of 100%.  Breathing is mildly labored.  There is no fever.  Patient improved on admission then on 5/17 refusing BIPAP and continuing to go down hill and required intubation. Of note she was admitted to the hospital last month on 4/19/25 for similar complain.  She was discharged on 4/26/26.     Medications:     Scheduled Meds:   [Held by provider] predniSONE  40 mg Oral Daily    sodium phosphate IVPB (PERIPHERAL line)  30 mmol IntraVENous Once    pantoprazole  40 mg IntraVENous Daily    ipratropium 0.5 mg-albuterol 2.5 mg  1 Dose Inhalation Q4H WA RT    polyethylene glycol  17 g Oral Daily    [Held by provider] lisinopril  2.5 mg Oral QPM    metoprolol succinate  12.5 mg Oral BID    QUEtiapine  50 mg Oral BID    sodium chloride (Inhalant)  4 mL Nebulization BID RT    enoxaparin  40 mg SubCUTAneous Daily    balsum peru-castor oil   Topical BID    
     ICU Progress Note    Admit Date: 5/16/2025  Day: 2  Vent Day: NA  IV Access:Peripheral  IV Fluids:None  Vasopressors:None                Antibiotics: Azithromycin  Diet: ADULT DIET; Regular    CC: Dyspnea    Interval history:   - No acute events overnight. Vital signs stable on 3L NC  - Refused BiPAP overnight  - This morning, she is endorsing continued dyspnea with exertion, states that it may be somewhat improved from prior at approx 0730. 2 hours later, she suddenly became tachycardic and dyspneic shortly after breathing treatment. On re-evaluation, she was tripoding, on BiPAP in significant respiratory distress. Respiratory exam unchanged  - VBG this morning 7.32/69.6. CTPA yesterday negative for PE, however did show emphysematous changes.  - Discussed importance of BiPAP. Continue nebs, azithromycin and steroids  - For her acute onset dyspnea, ordered EKG, troponin, CXR, repeat VBG. Will consider changing albuterol to levalbuterol in the future    HPI:   Ms. Charlene Nazario is a 75 y.o. female with a history significant for moderate COPD (on 3L at baseline), HFpEF (EF 50-55%), HTN, who presented to the ED on 5/16 with shortness of breath.     Patient states she was sleeping when she was awakened by acute shortness of breath at 4am. Patient states that she attempted to use her albuterol inhaler to help but still was having trouble breathing. Patient states that she has an infrequent cough but when it does comes on it does worsen her symptoms. Denies any mucus production with cough. En route to [facility], EMS administered duoneb x2. Patient denies any fever, chills, chest pain, nausea, vomiting, constipation, diarrhea, or any urinary symptoms.     Patient does endorse that she has been using her albuterol more frequently which she believes may be due to increased pollen levels that exacerbate her symptoms. Patient unclear on her other home regimen and states her  is more aware of her current regimen. 
     ICU Progress Note    Admit Date: 5/16/2025  Day: 7  Vent Day: 6 (intubated 5/17/25)  IV Access:Peripheral  IV Fluids:None  Vasopressors:None                Antibiotics: Levaquin (5/21 - 5/26)  Diet: Diet NPO  ADULT TUBE FEEDING VOLUME BASED; Orogastric; Renal Formula; 30; Continuous; 720 (30 ml/hr); 24    CC: SOB    Interval history:     - Yesterday, patient failed a spontaneous breathing trial due to increased agitation and poor adequate breaths.  - patient continues to be agitated overnight, biting on her ETT and wide awake. She was already on the maximum dose of propofol and precedex. She was given Zyprexa 10mg once which she tolerated quite well and had a comfortably night. Her SpO2 remained at 100% with FiO2 of 40%  - patient's vitals stable, afebrile. Patient seems wide awake this AM, looks uncomfortable breathing. Continues to be constipated.     plan  - will plan to schedule anxiolytics and attempt another SBT   - schedule bowel regiment     HPI:   Charlene VALDEZ Nazario 75 y.o. female with PMH of COPD and others as listed below presented to the hospital early this morning with progressive SOB over few hours.  It is reported that she was tripoding.  This SOB woke her up from sleep around 3:00am.  She denied increase in cough or sputum production.  There is no chest pain.  She was placed on BiPAP and continuous nebs.    At this time she is on 5 liters O2 via NC with sat of 100%.  Breathing is mildly labored.  There is no fever.  Patient improved on admission then on 5/17 refusing BIPAP and continuing to go down hill and required intubation.   Of note she was admitted to the hospital last month on 4/19/25 for similar complain.  She was discharged on 4/26/26.     Medications:     Scheduled Meds:   lisinopril  2.5 mg Oral QPM    metoprolol succinate  12.5 mg Oral BID    enoxaparin  40 mg SubCUTAneous Daily    OLANZapine        sterile water        polyethylene glycol  17 g Oral Daily    methylPREDNISolone  40 mg 
     ICU Progress Note    Admit Date: 5/16/2025  Day: 8  Vent Day: 7 (intubated 5/17/25)  IV Access:Peripheral  IV Fluids:None  Vasopressors:None                Antibiotics: Levaquin (5/21 - 5/26)  Diet: Diet NPO  ADULT TUBE FEEDING VOLUME BASED; Orogastric; Renal Formula; 30; Continuous; 720 (30 ml/hr); 24    CC: SOB    Interval history: Patient failed SBT again yesterday due to agitation, seroquel scheduled. Overnight, patient had 5 beats of Vtach. Had BM yesterday.     This AM, despite sedation medications, patient seems alert yet slightly lethargic. Discussed with her about her recent SBT trials and our challenge with coming off sedation. She seems to comprehend his and also the plan to re-attempt SBT when able.     plan  - repeat EKG today to evaluate Qtc   - repeat CXR to evaluate pneumo   - another SBT today     HPI:   Charlene Nazario 75 y.o. female with PMH of COPD and others as listed below presented to the hospital early this morning with progressive SOB over few hours.  It is reported that she was tripoding.  This SOB woke her up from sleep around 3:00am.  She denied increase in cough or sputum production.  There is no chest pain.  She was placed on BiPAP and continuous nebs.    At this time she is on 5 liters O2 via NC with sat of 100%.  Breathing is mildly labored.  There is no fever.  Patient improved on admission then on 5/17 refusing BIPAP and continuing to go down hill and required intubation.   Of note she was admitted to the hospital last month on 4/19/25 for similar complain.  She was discharged on 4/26/26.     Medications:     Scheduled Meds:   [Held by provider] lisinopril  2.5 mg Oral QPM    [Held by provider] metoprolol succinate  12.5 mg Oral BID    QUEtiapine  50 mg Oral BID    sodium chloride (Inhalant)  4 mL Nebulization BID RT    enoxaparin  40 mg SubCUTAneous Daily    polyethylene glycol  17 g Oral Daily    methylPREDNISolone  40 mg IntraVENous Q12H    levofloxacin  750 mg IntraVENous 
     ICU Progress Note    Admit Date: 5/16/2025  Day: 9  Vent Day: 8 (intubated 5/17/25)  IV Access:Peripheral  IV Fluids:None  Vasopressors:None                Antibiotics: Levaquin (5/21 - 5/26)  Diet: Diet NPO  ADULT TUBE FEEDING VOLUME BASED; Orogastric; Renal Formula; 30; Continuous; 720 (30 ml/hr); 24    CC: SOB    Interval history:  - No acute events overnight. Remains intubated and sedated with propofol, ketamine, precedex  - This morning, she was more agitated, sedation increased, but BP dropped significantly. Levo was restarted at that time  - Exam unchanged  - A. Galacto Ag negative, unclear whether steroid course has caused this to be negative. BAL growing aspergillus. 1,3 beta-D-glucan negative as well. AM CXR with equivocal tiny right apical pneumothorax.  - Will place arterial line due to labile BP, start itraconazole 200 BID, will consult ID on Tuesday, continue other supportive care    HPI:   Charlene Nazario 75 y.o. female with PMH of COPD and others as listed below presented to the hospital early this morning with progressive SOB over few hours.  It is reported that she was tripoding.  This SOB woke her up from sleep around 3:00am.  She denied increase in cough or sputum production.  There is no chest pain.  She was placed on BiPAP and continuous nebs.    At this time she is on 5 liters O2 via NC with sat of 100%.  Breathing is mildly labored.  There is no fever.  Patient improved on admission then on 5/17 refusing BIPAP and continuing to go down hill and required intubation.   Of note she was admitted to the hospital last month on 4/19/25 for similar complain.  She was discharged on 4/26/26.     Medications:     Scheduled Meds:   itraconazole  200 mg Oral BID    [Held by provider] metoprolol tartrate  12.5 mg Oral BID    [Held by provider] lisinopril  2.5 mg Oral QPM    [Held by provider] metoprolol succinate  12.5 mg Oral BID    QUEtiapine  50 mg Oral BID    sodium chloride (Inhalant)  4 mL 
     ICU Progress Note    Admit Date: 5/16/2025  Hospital Day:  Hospital Day: 5  ICU Day: 4  Vent Settings: Vent Mode: AC/PRVC Resp Rate (Set): 26 bpm/Vt (Set, mL): 350 mL/ /FiO2 : 50 % / PEEP/CPAP (cmH2O): 8 / SpO2: 96 %   Vent Day: 4    Interval History     - Patient having high peak pressure ovn with increased secretions, placed on heated wire circuit.  - Patient intubated and sedated on 45 propofol, 9.56 ketamine and off precedex.  - AF. -122. SBP .    - I/Os: 1.28L/250mL (0BM) = +1L  - Labs: K 5.3 . Mag 2.73. Phos 5.1. Cr 1.3>1.2. WBC 11.6>11.4. UA negative. Urine + strep pneumo antigen. Respiratory cultures and BCx pending.  - Plan: Start IVF. Start levaquin. Add 3% nebs. Consider bronch today    HPI  Charlene Nazario 75 y.o. female with PMH of COPD and others as listed below presented to the hospital early this morning with progressive SOB over few hours.  It is reported that she was tripoding.  This SOB woke her up from sleep around 3:00am.  She denied increase in cough or sputum production.  There is no chest pain.  She was placed on BiPAP and continuous nebs.    At this time she is on 5 liters O2 via NC with sat of 100%.  Breathing is mildly labored.  There is no fever.  Patient improved on admission then on 5/17 refusing BIPAP and continuing to go down hill and required intubation.   Of note she was admitted to the hospital last month on 4/19/25 for similar complain.  She was discharged on 4/26/26.     ROS:   Review of Systems   Unable to perform ROS: Intubated     Objective     MEDICATIONS:  Scheduled:     methylPREDNISolone  60 mg IntraVENous Q12H    sodium zirconium cyclosilicate  10 g Oral TID    sennosides-docusate sodium  2 tablet Oral Daily    polyethylene glycol  17 g Oral Daily    pantoprazole (PROTONIX) 40 mg in sodium chloride (PF) 0.9 % 10 mL injection  40 mg IntraVENous Daily    ipratropium  0.5 mg Nebulization 4x Daily RT    levalbuterol  1.25 mg Nebulization 4x Daily RT    
     ICU Progress Note    Admit Date: 5/16/2025  Hospital Day:  Hospital Day: 6  ICU Day: 4  Vent Settings: Vent Mode: AC/PRVC Resp Rate (Set): 26 bpm/Vt (Set, mL): 400 mL/ /FiO2 : 45 % / PEEP/CPAP (cmH2O): 8 / SpO2: 100 %   Vent Day: 4    Interval History   - Patient continuing to retain urine and had multiple straight caths.  - Patient s/p bronch yesterday, found to have mucus plugging in RMB, RLL, and LMB. Samples sent for culture.  - Increased TV in afternoon with improvement, VBG 7.23/64.4/35.7  - Patient intubated and sedated on 35 propofol, 76.48 ketamine. Unable to wean propofol d/t patient becoming increasingly restless  - Tmax 100.6. HR . SBP .  - I/Os: 2L/1.15mL (3 BM) = +867mL  - Labs: ABG 7.35/61.4/151. Cr 1.2>0.8. WBC 11.4>9.3. Respiratory cultures and BCx NGTD.  - Plan: Decrease FiO2. CXR ordered. Continue abx. Sheppard ordered    HPI  Charlene Nazario 75 y.o. female with PMH of COPD and others as listed below presented to the hospital early this morning with progressive SOB over few hours.  It is reported that she was tripoding.  This SOB woke her up from sleep around 3:00am.  She denied increase in cough or sputum production.  There is no chest pain.  She was placed on BiPAP and continuous nebs.    At this time she is on 5 liters O2 via NC with sat of 100%.  Breathing is mildly labored.  There is no fever.  Patient improved on admission then on 5/17 refusing BIPAP and continuing to go down hill and required intubation.   Of note she was admitted to the hospital last month on 4/19/25 for similar complain.  She was discharged on 4/26/26.     ROS:   Review of Systems   Unable to perform ROS: Intubated     Objective     MEDICATIONS:  Scheduled:     polyethylene glycol  17 g Oral BID    sodium chloride (Inhalant)  4 mL Nebulization Q4H While awake    sodium zirconium cyclosilicate  10 g Oral Daily    levofloxacin  750 mg IntraVENous Q48H    methylPREDNISolone  60 mg IntraVENous Q12H    
    Pharmacy Note - Renal dose adjustment made per P/T protocol    Original order:  Levofloxacin 750 mg Q48H IV    Estimated Creatinine Clearance: 47 mL/min (based on SCr of 0.8 mg/dL).    Recent Labs     05/20/25  0609 05/20/25  1759 05/21/25  0506   BUN 46* 42* 47*   CREATININE 1.2 0.8 0.8       Renally adjusted order:  Levofloxacin 750 mg Q24H IV    Please call pharmacy with any questions.    Lito Campbell, PharmD  Main Pharmacy: [id number]  5/21/2025 12:39 PM      
    V2.[place]    [facility] [place]      Name:  Charlene Nazario /Age/Sex: 1950  (75 y.o. female)   MRN & CSN:  [id number] & [id number] Encounter Date/Time: 6/3/2025 2:21 PM EDT   Location:  [address] [place], [place] [address] PCP: Gerry Foley DO     Attending:Rena Augustine MD       Hospital Day:     HPI:    Assessment and Recommendations     Hospital course:    Charlene Nazario is a 75 y.o. female with pmh of COPD who presents with COPD with acute exacerbation (HCC).  In the ED patient was in respiratory distress and placed on BiPAP.  During the hospital stay patient refused BiPAP and decompensated and was intubated on .  On  patient underwent diagnostic bronchoscopy.  Strep pneumonia antigen was positive patient was treated with Levaquin.  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  Aspergillus antigen was negative Aspergillus antibody was negative and beta D glucan was negative.  Patient was empirically treated with itraconazole for 5 days.  During the hospital course patient was found to have right apical pneumothorax managed conservatively.  Repeat chest x-ray shows resolution of pneumothorax.  Hospital course was complicated by delirium needing Precedex drip      Plan:     Acute on chronic hypoxic with hypercarbic respiratory failure-now on home oxygen requirement  - Intubated on , extubated on   - Now on 2 L of oxygen  - Patient uses 2 L of oxygen at home    COPD exacerbation-now compensated  - Continue DuoNebs and inhaled steroids  - Finished p.o. steroid course    Pneumonia  - Treated as bacterial pneumonia.  Finished Levaquin course    Possible ABPA  - BAL culture positive for aspergillosis, Aspergillus antigen negative Aspergillus antibody negative D glucan negative  -Was treated with itraconazole for 5 days    Acute metabolic encephalopathy with delirium-mentation markedly improved  - Wean off Precedex drip  - On Seroquel    Hypertension  - Monitor blood pressure resume lisinopril if 
    V2.[place]    [facility] [place]      Name:  Charlene Nazario /Age/Sex: 1950  (75 y.o. female)   MRN & CSN:  [id number] & [id number] Encounter Date/Time: 2025 2:21 PM EDT   Location:  [facility]/[id number] PCP: Gerry Foley DO     Attending:Rena Augustine MD       Hospital Day:     HPI:    Assessment and Recommendations     Hospital course:    Charlene Nazario is a 75 y.o. female with pmh of COPD who presents with COPD with acute exacerbation (HCC).  In the ED patient was in respiratory distress and placed on BiPAP.  During the hospital stay patient refused BiPAP and decompensated and was intubated on .  On  patient underwent diagnostic bronchoscopy.  Strep pneumonia antigen was positive patient was treated with Levaquin.  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  Aspergillus antigen was negative Aspergillus antibody was negative and beta D glucan was negative.  Patient was empirically treated with itraconazole for 5 days.  During the hospital course patient was found to have right apical pneumothorax managed conservatively.  Repeat chest x-ray shows resolution of pneumothorax.  Hospital course was complicated by delirium needing Precedex drip      Plan:     Acute on chronic hypoxic with hypercarbic respiratory failure-now on home oxygen requirement  - Intubated on , extubated on   - Now on 2 L of oxygen  - Patient uses 2 L of oxygen at home    COPD exacerbation-now compensated  - Continue DuoNebs and inhaled steroids  - Finished p.o. steroid course    Pneumonia  - Treated as bacterial pneumonia.  Finished Levaquin course    Possible ABPA  - BAL culture positive for aspergillosis, Aspergillus antigen negative Aspergillus antibody negative D glucan negative  -Was treated with itraconazole for 5 days    Acute metabolic encephalopathy with delirium-mentation markedly improved  -  off Precedex drip  - On Seroquel    Hypertension- BP trending up   - resume lisinopril if blood 
   05/16/25 1214   RT Protocol   History Pulmonary Disease 2   Respiratory pattern 4   Breath sounds 2   Cough 1   Indications for Bronchodilator Therapy On home bronchodilators   Bronchodilator Assessment Score 9       
   05/17/25 1415   Treatment   Treatment Type HHN   $Treatment Type $Inhaled Therapy/Meds   Medications Levalbuterol HCL   Pre-Tx Pulse 131   Pre-Tx Resps 31   Breath Sounds Pre-Tx JUVENCIO Inspiratory wheezes;Expiratory wheezes   Breath Sounds Pre-Tx LLL Inspiratory wheezes;Expiratory wheezes   Breath Sounds Pre-Tx RUL Inspiratory wheezes;Expiratory wheezes   Breath Sounds Pre-Tx RML Inspiratory wheezes;Expiratory wheezes   Breath Sounds Pre-Tx RLL Expiratory wheezes;Inspiratory wheezes   Breath Sounds Post-Tx JUVENCIO Expiratory wheezes;Inspiratory wheezes   Breath Sounds Post-Tx LLL Expiratory wheezes;Inspiratory wheezes   Breath Sounds Post-Tx RUL Expiratory wheezes;Inspiratory wheezes   Breath Sounds Post-Tx RML Expiratory wheezes;Inspiratory wheezes   Breath Sounds Post-Tx RLL Expiratory wheezes;Inspiratory wheezes   Post-Tx Pulse 135   Post-Tx Resps 30   Delivery Source Oxygen;Mask   Position Sitting   Treatment Tolerance Poor   Duration 7   Is patient on O2? Y   Breath Sounds   Respiratory Pattern Irregular;Tachypneic;Other (Comment)  (tripoding)   Breath Sounds Bilateral Inspiratory wheezing;Expiratory wheezing   Oxygen Therapy/Pulse Ox   O2 Therapy Oxygen humidified   O2 Device High flow nasal cannula   O2 Flow Rate (L/min) 15 L/min   Pulse (!) 135   Respirations (!) 34   SpO2 91 %   Skin Assessment Clean, dry, & intact   Pulse Oximeter Device Mode Continuous   Pulse Oximeter Device Location Finger   Patient Observation   Patient Observations (S)  pt in respiratory distress. residents bedside       
  Comprehensive Nutrition Assessment    RECOMMENDATIONS:  PO Diet: Continue easy to chew: mildly thick liquid diet  Nutrition Supplement: Continue magic cup BID (thickened liquid restriction)  Nutrition Education: Education/Counseling not appropriate     NUTRITION ASSESSMENT:   Nutritional summary & status: Pt now on easy to chew diet w/ nectar thickened liquids. Mentation greatly improved, and intakes >50% with most meals over the past two days at 100%. Added magic cup BID for thickened liquid restriction, pt consuming 50%. Still experiencing some agitation overnight, requiring sitter however seems to do well with family in room. Will continue current nutrition interventions.   Admission // PMH: COPD Exacerbation // HTN, HLD, CHF, COPD, GERD    MALNUTRITION ASSESSMENT  Context of Malnutrition: Acute Illness   Malnutrition Status: At risk for malnutrition  Findings of the 6 clinical characteristics of malnutrition (Minimum of 2 out of 6 clinical characteristics is required to make the diagnosis of moderate or severe Protein Calorie Malnutrition based on AND/ASPEN Guidelines):  Energy Intake:  Mild decrease in energy intake  Weight Loss:  No weight loss     Body Fat Loss:  No body fat loss     Muscle Mass Loss:  Mild muscle mass loss Clavicles (pectoralis & deltoids), Temples (temporalis)  Fluid Accumulation:  No fluid accumulation      NUTRITION DIAGNOSIS   Inadequate oral intake related to cognitive or neurological impairment as evidenced by intake 51-75%, intake 26-50%    Nutrition Monitoring and Evaluation:   Food/Nutrient Intake Outcomes:  Supplement Intake, Food and Nutrient Intake  Physical Signs/Symptoms Outcomes:  Biochemical Data, GI Status, Hemodynamic Status, Nutrition Focused Physical Findings, Skin, Weight     OBJECTIVE DATA: Significant to nutrition assessment  Nutrition Related Findings: K 3.3. Mg 1.52. . +8L. +BM 6/1.  Wounds: None  Nutrition Goals: Meet at least 75% of estimated needs, within 
  Comprehensive Nutrition Assessment    RECOMMENDATIONS:  Pending SLP eval to determine ability to start diet versus replacing NGT and resuming TF  Nutrition Education: Education/Counseling not appropriate     NUTRITION ASSESSMENT:   Nutritional summary & status: Pt extubated 5/28, SLP eval recommended continued NPO. Corpak placed for meds/nutrition however pt became agitated overnight and pulled NGT. Waiting for SLP to reevaluate pt today prior to replacing corpak which she will need if she fails her swallow eval d/t oral meds. Will keep current TF order in for now. Hypophosphatemic this morning, MD to replace.   Admission // PMH: COPD Exacerbation // HTN, HLD, CHF, COPD, GERD    MALNUTRITION ASSESSMENT  Context of Malnutrition: Acute Illness   Malnutrition Status: At risk for malnutrition (NPO on vent)  Findings of the 6 clinical characteristics of malnutrition (Minimum of 2 out of 6 clinical characteristics is required to make the diagnosis of moderate or severe Protein Calorie Malnutrition based on AND/ASPEN Guidelines):  Energy Intake:  Mild decrease in energy intake  Weight Loss:  No weight loss     Body Fat Loss:  Unable to assess     Muscle Mass Loss:  Unable to assess    Fluid Accumulation:  No fluid accumulation      NUTRITION DIAGNOSIS   Inadequate oral intake related to swallowing difficulty as evidenced by swallow study results, NPO or clear liquid status due to medical condition    Nutrition Monitoring and Evaluation:   Food/Nutrient Intake Outcomes:  Progression of Nutrition  Physical Signs/Symptoms Outcomes:  Biochemical Data, GI Status, Hemodynamic Status, Nutrition Focused Physical Findings, Skin, Weight     OBJECTIVE DATA: Significant to nutrition assessment  Nutrition Related Findings: Phos 2.2. 200 mL stool output. +4L.  Wounds: None  Nutrition Goals: Initiation of nutrition, within 2 days     CURRENT NUTRITION THERAPIES  Diet NPO  ADULT TUBE FEEDING VOLUME BASED; Nasogastric; Standard with 
  Hospital Medicine Progress Note      Date of Admission: 5/16/2025  Hospital Day: 2    Chief Admission Complaint:  SOB     Subjective:  endorsing SOB    Presenting Admission History:       Ms. Charlene Nazario is a 75 y.o. female with a history significant for moderate COPD (on 3L at baseline), HFpEF (EF 50-55%), HTN, who presented to the ED on 5/16 with shortness of breath.     Patient states she was sleeping when she was awakened by acute shortness of breath at 4am. Patient states that she attempted to use her albuterol inhaler to help but still was having trouble breathing. Patient states that she has an infrequent cough but when it does comes on it does worsen her symptoms. Denies any mucus production with cough. En route to [facility], EMS administered duoneb x2. Patient denies any fever, chills, chest pain, nausea, vomiting, constipation, diarrhea, or any urinary symptoms.     Patient does endorse that she has been using her albuterol more frequently which she believes may be due to increased pollen levels that exacerbate her symptoms. Patient unclear on her other home regimen and states her  is more aware of her current regimen. Patient is notably on 3L at baseline and follows with Dr. Nazario outpatient. Of note, patient was recently admitted from 4/19-4/26 for COPD exacerbation and was treated with steroids and levaquin.     Patient is a former 20 pack year smoker but states she stopped smoking years ago. Endorses 1-2 glasses of wine nightly. Denies any recent travel or sick symptoms.    Assessment/Plan:      Current Principal Problem:  COPD with acute exacerbation (HCC)    Respiratory  #Acute on Chronic Hypercarbic Respiratory Failure (on 3L at baseline)  #COPD Exacerbation  Patient presented with acute onset SOB that was not able to be resolved with albuterol rescue inhaler at home. EMS gave patient multiple duoneb tx en route. Patient is on 3L O2 at baseline. Respiratory acidosis seen on initial 
  Speech-Language Pathology    Attempted to complete MBS, however cancelled per d/w RN as pt unable to participate (agitated and given zyprexa).    Cheri Chand, SLP, [id number]  Ph. # [phone]      
  Speech-Language Pathology    Attempted to see patient for dysphagia; was too sleepy earlier, is now more awake but using bedpan. Will plan to re-attempt later; d/w RN.    Cheri Chand, SLP, [id number]  Ph. # [phone]      
 Latest Reference Range & Units 05/17/25 14:57   pH, Arterial 7.350 - 7.450  7.171 (LL)   pCO2, Arterial 35.0 - 45.0 mm Hg 93.5 (HH)   pO2, Arterial 75.0 - 108.0 mm Hg 49.4 (L)   HCO3, Arterial 21.0 - 29.0 mmol/L 34.1 (H)   TCO2 (calc), Art Not Established mmol/L 37   Base Excess, Arterial -3 - 3  6 (H)   O2 Sat, Arterial 93 - 100 % 71 (L)   (LL): Data is critically low  (HH): Data is critically high  (L): Data is abnormally low  (H): Data is abnormally high  
 Latest Reference Range & Units 05/18/25 11:01   pH, Yumiko 7.350 - 7.450  7.247 (L)   pCO2, Yumiko 40.0 - 50.0 mm Hg 75.1 (H)   pO2, Yumiko Not Established mm Hg 60   Bicarbonate, Venous 23.0 - 29.0 mmol/L 32.7 (H)   TC02 (Calc), Yumiko Not Established mmol/L 35   Base Excess, Yumiko -3 - 3  5 (H)   O2 Saturation Venous Not Established % 85   Sample Type  YUMIKO   (L): Data is abnormally low  (H): Data is abnormally high  
 \"ana\" updated on patients status.    Electronically signed by NEHEMIAH GOMEZ RN on 5/25/2025 at 8:48 PM    
4 Eyes Skin Assessment     NAME:  Charlene Nazario  YOB: 1950  MEDICAL RECORD NUMBER:  [id number]    The patient is being assessed for  Admission    I agree that at least one RN has performed a thorough Head to Toe Skin Assessment on the patient. ALL assessment sites listed below have been assessed.      Areas assessed by both nurses:    Head, Face, Ears, Shoulders, Back, Chest, Arms, Elbows, Hands, Sacrum. Buttock, Coccyx, Ischium, Legs. Feet and Heels, and Under Medical Devices         Does the Patient have a Wound? No noted wound(s)       Oscar Prevention initiated by RN: No  Wound Care Orders initiated by RN: No    Pressure Injury (Stage 3,4, Unstageable, DTI, NWPT, and Complex wounds) if present, place Wound referral order by RN under : No    New Ostomies, if present place, Ostomy referral order under : No     Nurse 1 eSignature: Electronically signed by CARLOS PRATT RN on 5/16/25 at 11:44 AM EDT    **SHARE this note so that the co-signing nurse can place an eSignature**    Nurse 2 eSignature: {Esignature:[id number]}   
650 mL urine removed with straight cath.   
Assessment completed as charted, see flow sheet. Resp regular per vent. Remains sedated, Propofol infusing at 45 mcg/kg/min. TLC intact right fem vein. OG patent with Jevity infusing at 10 ml/hr.  
Attempted to call pt husbands back no answer, lvm.   
Attempted to titrate propofol down (see MAR) Patient began to sit up in bed and move her arms and became non compliant with the vent. Propofol rate increased to maintain vent compliance and decrease restlessness  
Attempted to wean Propofol for Ketamine this shift, per MD request. Despite increases in Ketamine, patient would cough persistently with Propofol decreases, even at 30 mcg/kg/min. In several instances, patient had her eyes open for an extended period of time. She did not follow commands at this time or track voice.   
Bladder scan showed 189 mL of urine with 75 mL voided and one unmeasured, small urine occurrence.     Patient is not getting IVF or TF at this time, but did overnight. Will monitor closely.   
Clinical Pharmacy Progress Note  Medication History     Admit Date: 5/16/2025    List of of current medications patient is taking is complete. Home Medication list in EPIC updated to reflect changes noted below.    Source of information: interview at bedside with  and pt, pharmacy fills    [facility]'s home [facility]     Changes made to medication list:   Medications removed:   Furosemide 20mg daily x 3 days - prescribed on 5/7 x 3 days only for foot/leg swelling.  No longer taking per .  Medications added:   Culturelle daily   Vit B12 OTC  Vit D3 OTC  Calcium OTC  Medication doses adjusted:   APAP - pt takes 500mg QHS, then also takes prn during day for back pain  Melatonin - per , takes 3mg QHS scheduled  Other notes:   PS sent to Dr Pimentel    Current Outpatient Medications   Medication Instructions    acetaminophen (TYLENOL) 500 mg, EVERY 8 HOURS PRN    acetaminophen (TYLENOL) 500 mg, Nightly    albuterol (PROVENTIL) 2.5 mg, EVERY 6 HOURS PRN    albuterol sulfate HFA (PROVENTIL;VENTOLIN;PROAIR) 108 (90 Base) MCG/ACT inhaler 2 puffs, Inhalation, EVERY 6 HOURS PRN, for wheezing    aspirin 81 mg, Oral, DAILY    calcium carbonate (OSCAL) 500 mg, Nightly    fluticasone-umeclidin-vilant (TRELEGY ELLIPTA) 200-62.5-25 MCG/ACT AEPB inhaler 1 puff, Inhalation, DAILY    lactobacillus (CULTURELLE) capsule 1 capsule, DAILY    lisinopril (PRINIVIL;ZESTRIL) 2.5 mg, Oral, EVERY EVENING    melatonin 3 mg, Nightly    metoprolol succinate (TOPROL XL) 12.5 mg, Oral, 2 TIMES DAILY    montelukast (SINGULAIR) 10 mg, Oral, DAILY    tiZANidine (ZANAFLEX) 2 mg, Oral, NIGHTLY    vitamin B-12 (CYANOCOBALAMIN) 500 mcg, Nightly    vitamin D (CHOLECALCIFEROL) 1,000 Units, Nightly       Please call with any questions.  Madison Elizabeth PharmD., [facility]   5/16/2025 2:10 PM  Wireless: [phone]        
ICU residents and Dr. Shaw informed of critical BAL result.     SHARRON DRISCOLL RN    
ICU residents notified with low urine output. No new orders  
Improved sleep overnight of at least 5-6 hours intermittently.   Continues to have bouts of restlessness and disorientation but easier to redirect this AM.  Weaning precedex as tolerated.  SAMEERA English  
Levophed drip weaned off, Tube feeds started at 10 mls/hr.   
MAP is 65, though patient is more hypotensive after starting Fentanyl. Ketamine stopped and Propofol weaned as charted.     Discussed with medical residents. Will maintain MAP > 60.  
MECHANICAL VENTILATION WEANING PROTOCOL    PRE-TRIAL PATIENT ASSESSMENT - COMPLETED     Ventilatory Assessment:    PARAMETER CRITERIA FOR WEANING   Spontaneous Cough:  Yes    Sputum Characteristics:  Sputum Amount: Small  Tenacity: Thick  Sputum Color: Tan SPONTANEOUS COUGH With small to moderate  Amount of secretions   FiO2 : 35 % FIO2 less than or equal to 50%     PEEP less than or equal to 8   Progressive Mobility Protocol  No     ABG:  Lab Results   Component Value Date/Time    PHART 7.449 05/26/2025 08:47 AM    LNB6RLO 59.5 05/26/2025 08:47 AM    PO2ART 87.6 05/26/2025 08:47 AM    A3HMHLAP 97 05/26/2025 08:47 AM    SGH2SMV 41.3 05/26/2025 08:47 AM    BEART 17 05/26/2025 08:47 AM     HGB/WBC:  Lab Results   Component Value Date/Time    HGB 10.6 05/26/2025 03:42 AM    WBC 8.4 05/26/2025 03:42 AM        Vital Signs:    PARAMETER CRITERIA FOR WEANING Meets Criteria   Pulse: (!) 112 Within patient's normal limits / stable No   Respirations: 30 Less than or equal to 30 Yes   BP: 135/81 Within patient's normal limits / minimal pressors (Hemodynamically Stable) Yes   SpO2: 93 % Greater than or equal to 90% Yes     Within patient's normal limits Yes   Temp: 99.3 °F (37.4 °C) Less than 38.5oC / 101.3oF Yes   Dr wanted pt placed on wean  [x]    Based on this assessment and the  Ventilator Weaning Protocol, this patient  IS being placed on a Spontaneous Breathing Trial (SBT) at this time.  []    Based on this assessment and the  Ventilator Weaning Protocol, this patient  IS NOT being placed on a Spontaneous Breathing Trial (SBT) at this time.  []    Patient  IS NOT being placed on a Spontaneous Breathing Trial (SBT) at this time because of factors not previously addressed.  Those factors include          SBT - Initiated at  931    Ventilator Settings:  CPAP - 5 cmH2O, PS - 8 cmH2O(if using settings other than CPAP 5/PS 8, please explain reasons for settings here):       1 Minute SBT Respiratory 
MECHANICAL VENTILATION WEANING PROTOCOL    PRE-TRIAL PATIENT ASSESSMENT - COMPLETED AT 1134    Ventilatory Assessment:    PARAMETER CRITERIA FOR WEANING   Spontaneous Cough:  Yes    Sputum Characteristics:  Sputum Amount: Small  Tenacity: Thick  Sputum Color: Tan SPONTANEOUS COUGH With small to moderate  Amount of secretions   FiO2 : 35 % FIO2 less than or equal to 50%     PEEP less than or equal to 8   Progressive Mobility Protocol  No     ABG:  Lab Results   Component Value Date/Time    PHART 7.412 05/26/2025 09:42 AM    BBU0WZT 63.8 05/26/2025 09:42 AM    PO2ART 91.0 05/26/2025 09:42 AM    X4NVOFLX 97 05/26/2025 09:42 AM    MYJ7MFL 40.6 05/26/2025 09:42 AM    BEART 16 05/26/2025 09:42 AM     HGB/WBC:  Lab Results   Component Value Date/Time    HGB 10.6 05/26/2025 03:42 AM    WBC 8.4 05/26/2025 03:42 AM        Vital Signs:    PARAMETER CRITERIA FOR WEANING Meets Criteria   Pulse: 73 Within patient's normal limits / stable Yes   Respirations: 26 Less than or equal to 30 Yes   BP: (!) 89/67 Within patient's normal limits / minimal pressors (Hemodynamically Stable) No   SpO2: 92 % Greater than or equal to 90% Yes     Within patient's normal limits Yes   Temp: 99.3 °F (37.4 °C) Less than 38.5oC / 101.3oF Yes    wants pt on placed on wean  [x]    Based on this assessment and the  Ventilator Weaning Protocol, this patient  IS being placed on a Spontaneous Breathing Trial (SBT) at this time.  []    Based on this assessment and the  Ventilator Weaning Protocol, this patient  IS NOT being placed on a Spontaneous Breathing Trial (SBT) at this time.  []    Patient  IS NOT being placed on a Spontaneous Breathing Trial (SBT) at this time because of factors not previously addressed.  Those factors include ***     Vital Capacity (VC) = ***    Maximum Inspiratory Force (MIF) = ***    SBT - Initiated at  ***    Ventilator Settings:  CPAP - *** cmH2O, PS - *** cmH2O(if using settings other than CPAP 5/PS 8, please explain 
MECHANICAL VENTILATION WEANING PROTOCOL    PRE-TRIAL PATIENT ASSESSMENT - COMPLETED AT 1139    Ventilatory Assessment:    PARAMETER CRITERIA FOR WEANING   Spontaneous Cough:  Yes    Sputum Characteristics:  Sputum Amount: Small  Tenacity: Thick  Sputum Color: White SPONTANEOUS COUGH With small to moderate  Amount of secretions   FiO2 : 35 % FIO2 less than or equal to 50%     PEEP less than or equal to 8   Progressive Mobility Protocol  No     ABG:  Lab Results   Component Value Date/Time    PHART 7.412 05/26/2025 09:42 AM    VCQ3WUQ 63.8 05/26/2025 09:42 AM    PO2ART 91.0 05/26/2025 09:42 AM    U9NIUCTB 97 05/26/2025 09:42 AM    PJG9OIV 40.6 05/26/2025 09:42 AM    BEART 16 05/26/2025 09:42 AM     HGB/WBC:  Lab Results   Component Value Date/Time    HGB 10.8 05/27/2025 03:02 AM    WBC 9.1 05/27/2025 03:02 AM        Vital Signs:    PARAMETER CRITERIA FOR WEANING Meets Criteria   Pulse: 76 Within patient's normal limits / stable Yes   Respirations: 26 Less than or equal to 30 Yes   BP: 127/83 Within patient's normal limits / minimal pressors (Hemodynamically Stable) no   SpO2: 92 % Greater than or equal to 90% Yes     Within patient's normal limits Yes   Temp: 98.8 °F (37.1 °C) Less than 38.5oC / 101.3oF Yes    wants pt placed on wean.  [x]    Based on this assessment and the  Ventilator Weaning Protocol, this patient  IS being placed on a Spontaneous Breathing Trial (SBT) at this time.  []    Based on this assessment and the  Ventilator Weaning Protocol, this patient  IS NOT being placed on a Spontaneous Breathing Trial (SBT) at this time.  []    Patient  IS NOT being placed on a Spontaneous Breathing Trial (SBT) at this time because of factors not previously addressed.  Those factors include      Vital Capacity (VC) = 282    Maximum Inspiratory Force (MIF) = -10    SBT - Initiated at  1139    Ventilator Settings:  CPAP - 5 cmH2O, PS - 8 cmH2O(if using settings other than CPAP 5/PS 8, please explain reasons for 
Medical residents notified of small, right apical pneumo, called per radiology. PEEP decreased from 8 to 5. RT Faby notified.  
NG placement attempted several times with 3 staff members. Each time, resistance felt at 40 cm where tube coils or returns up esophagus.     Medical team notified during rounds. SLP will be bedside today to assess swallowing.   
Occupational Therapy  Facility/Department: [facility] ICU  Occupational Therapy Initial Assessment and Treatment    Name: Charlene Nazario  : 1950  MRN: [id number]  Date of Service: 2025    Discharge Recommendations:  Subacute/Skilled Nursing Facility  OT Equipment Recommendations  Other: defer recommendations to discharge facility       Patient Diagnosis(es): The primary encounter diagnosis was Dyspnea and respiratory abnormalities. Diagnoses of COPD exacerbation (HCC), COPD with acute exacerbation (HCC), and Chronic respiratory failure with hypoxia (HCC) were also pertinent to this visit.      Treatment Diagnosis: impaired functional transfers/mobility and decreased functional activity tolerance      Assessment  Performance deficits / Impairments: Decreased functional mobility ;Decreased ADL status;Decreased balance;Decreased endurance;Decreased strength  Assessment: Presenting w/ increased SOB. Pt had extended period she was intubated this admission. At baseline, pt is independent w/ ADLs, IADLs, and ambulates w/o AD. Pt does wear 3L O2 at baseline. Today, pt was fatigued on min exertion and required MaxA of 2 for sit-stand transfers. Limited by tachycardia. Anticipate need for further IP OT upon discharge. Continue per POC.  Treatment Diagnosis: impaired functional transfers/mobility and decreased functional activity tolerance  Prognosis: Good  Decision Making: High Complexity  REQUIRES OT FOLLOW-UP: Yes  Activity Tolerance  Activity Tolerance: Patient limited by fatigue;Treatment limited secondary to medical complications (free text)  Activity Tolerance Comments: resting HR 120s - HR increased to 144 w/ sit-stand transfer     Plan  Occupational Therapy Plan  Times Per Week: 2-5  Current Treatment Recommendations: Strengthening, Balance training, Functional mobility training, Endurance training, Safety education & training, Equipment evaluation, education, & procurement, Self-Care / 
Occupational Therapy  Facility/Department: [facility] ICU  Daily Treatment Note  NAME: Charlene Nazario  : 1950  MRN: [id number]    Date of Service: 6/3/2025    Discharge Recommendations:  Subacute/Skilled Nursing Facility  OT Equipment Recommendations  Other: defer recommendations to discharge facility      Patient Diagnosis(es): The primary encounter diagnosis was Dyspnea and respiratory abnormalities. Diagnoses of COPD exacerbation (HCC), COPD with acute exacerbation (HCC), and Chronic respiratory failure with hypoxia (HCC) were also pertinent to this visit.     Assessment   Assessment: Pt agreeable to OT tx and tolerates session well. Pt presents below her baseline and demos decreased activity tolerance, global deconditioning, decreased balance and generalized weakness impacting ADL/IADL performance. Pt requires assist of 2 for functional transfers and SBA to maxA for ADLs. Pt educated on HEP and demos understanding. Pt will benefit from continued skilled OT to address above deficits and progress towards PLOF. Cont per POC.  Activity Tolerance: Patient limited by fatigue;Patient limited by endurance  Discharge Recommendations: Subacute/Skilled Nursing Facility  Other: defer recommendations to discharge facility     Plan  Occupational Therapy Plan  Times Per Week: 2-5  Current Treatment Recommendations: Strengthening;Balance training;Functional mobility training;Endurance training;Safety education & training;Equipment evaluation, education, & procurement;Self-Care / ADL    Restrictions  Position Activity Restriction  Other Position/Activity Restrictions: up with assist    Subjective  75 y.o. F presenting to ED  w/ c/o progressive SOB over few hours. Hospital Course: refusing BIPAP and continuing to go down hill and required intubation ;  Dx bronchoscopy performed, proximal RMB small ulcer, evidence of bronchospasm, excessive amount of secretions noted, BAL collected; extubated ; PMH: COPD (on 3L 
Occupational Therapy  Facility/Department: [facility]  Occupational Therapy Treatment    Name: Charlene Nazario  : 1950  MRN: [id number]  Date of Service: 2025    Discharge Recommendations:  Subacute/Skilled Nursing Facility  OT Equipment Recommendations  Equipment Needed: No  Other: defer recommendations to discharge facility       Patient Diagnosis(es): The primary encounter diagnosis was Dyspnea and respiratory abnormalities. Diagnoses of COPD exacerbation (HCC), COPD with acute exacerbation (HCC), and Chronic respiratory failure with hypoxia (HCC) were also pertinent to this visit.  Past Medical History:  has a past medical history of Allergic rhinitis, Anxiety, Back pain, Cervical cancer screening, Closed compression fracture of L3 vertebra (HCC), Compression fracture of thoracic vertebrae, non-traumatic (HCC), COPD (chronic obstructive pulmonary disease) (HCC), COPD exacerbation (HCC), Elevated LDL cholesterol level, GERD (gastroesophageal reflux disease), History of mammogram, HLD (hyperlipidemia), Hoarseness of voice, Hypertension, Lung nodule:left, Mucus plugging of bronchi, Osteoarthritis, Osteomyelitis of left leg (HCC), Osteoporosis, S/P colonoscopy, Systolic CHF, chronic (HCC) EF 45%, Thoracic aorta atherosclerosis, Thyroid mass, Thyroid nodule, and Tibia fracture.  Past Surgical History:  has a past surgical history that includes  section; Appendectomy; Fixation Kyphoplasty (2017); other surgical history (2017); Thyroidectomy, partial (Right, 2017); Vocal cord injection (); laryngoplasty (Right, 2022); IR KYPHOPLASTY LUMBAR 1 VERTEBRAL BODY (2024); and Tibia fracture surgery (Left, ).    Treatment Diagnosis: impaired functional transfers/mobility and decreased functional activity tolerance      Assessment  Performance deficits / Impairments: Decreased functional mobility ;Decreased ADL status;Decreased balance;Decreased 
Occupational Therapy/Physical Therapy  HOLD    Orders received, chart reviewed. Per discussion w/ RN, pt too sleepy to participate this morning. Pt received Ativan for NG placement. Will attempt f/u later today as able.    Luda Lazar OTR/L #[id number]  Isela Sweeney, PT    
PRN Zyprexa given for restlessness/agitation. Patient is redirectable, but repeatedly attempting to get out of bed.  
Palliative Care Chart Review  and Check in Note:     NAME:  Charlene Nazario  Admit Date: 5/16/2025  Hospital Day:  Hospital Day: 5   Current Code status: Full Code    Palliative care is continuing to following Ms. Nazario for symptom management,  and goals of care discussion as needed. Patient's chart reviewed today 5/20/25.      The following are the currently established goals/code status, and Symptom management.     Met with patient's  (BEBE) Norberto today as well as patient's sister Carey at bedside. They are very hopeful about patient's outcome. Yesterday, patient's daughter had started family discussions (patient's 5 daughters) and reportedly the consensus among the family is that they would want aggressive measures and \"everything to be done to keep patient alive.\"  Carey mentioned her niece has been worried about long term outcomes. I advised family to take things one at a time and offered emotional support. There is plan for potential bronchoscopy today.    Goals of care: Remaining full code and continue aggressive care, agreeable to bronchoscopy today.    Code status: Full Code     Discharge plan:  Will require rehab vs nursing facility placement.       Christine Birmingham, DO  Internal Med PGY2  05/20/25    
Palliative Care Chart Review  and Check in Note:     NAME:  Charlene Nazario  Admit Date: 5/16/2025  Hospital Day:  Hospital Day: 7   Current Code status: Full Code    Palliative care is continuing to following Ms. Nazario for symptom management,  and goals of care discussion as needed. Patient's chart reviewed today 5/22/25.      Remains intubated (Day 4), and sedated.    The following are the currently established goals/code status, and Symptom management.     Goals of care:  Remaining full code and continue aggressive care.     Code status: Full Code    Discharge plan: MARBIN Birmingham, DO  Internal Medicine, PGY2  05/22/25      
Patient RASS -3 on Precedex drip and Propofol drip. Weaned Precedex drip off and Propofol down to 20 mcg/kg/min. Patient awake, attempting to sit up, RR high 40's, moving all extremities, but not following commands.   
Patient changed to heated wire circuit due to several large plugs suctioned via et tube this evening.  
Patient discharged to nursing home via ambulance service with all belongings on 2 liters oxygen. IV removed. VSS. Attempted to call report x2, no answer, left callback number.   
Patient extubated yesterday, OGT removed. SLP eval today with recommendation for continued NPO. Replacing corpak for meds/nutrition.    TF orders:  Jevity 1.5 @ 40 mL/hr to provide: 960 mL TV, 1440 kcal, 61 g/pro and 730 mL FW   Flushes: 80 mL q4h    Eduarda Holloway, RD, LD    Suresh: 396- 1305      
Patient found to be sustaining low O2 sat in the mid 80s. During this time patient denied any difficulty breathing but did say she needed to have a bowel movement. RT and ICU resident at bedside. Placed on 15L HFNC and 15L NRB. Plan made to let patient have a bowel movement to see if that would help relieve her. Assisted back into bed with x2 AST & Stedy after BM was done. Reported feeling much more relieved after BM. Receiving breathing Tx, currently satting 100% on 15L HFNC.      1454: breathing treatment finished, RT aware. Will monitor O2 and wean supplemental O2 as tolerated.   
Patient had 3 second run of SVT, HR up to 170. BP also increasing over the last hour as well. ICU residents notified. STAT EKG ordered.   
Patient had a medium-sized, loose, brown bowel movement.  
Patient has been successfully weaned from Mechanical Ventilation.  RSBI before extubation was 65 with EtCO2 of 51 and SpO2 of 95 on 50% FiO2.  Patient extubated and placed on Vapotherm titrated to 35L 50%.  Post extubation SpO2 is 96% with HR  122 bpm and RR 24 breaths/min.  Patient had moderate cough that was productive of white sputum.  Extubation Well tolerated by patient..  
Patient has had x2 urine occurances that were blood tinged. Dr. Augustine informed.     SHARRON DRISCOLL RN    
Patient is able to answer all orientation questions correctly but has short-term memory and is forgetful. Easily redirectable. VSS. Denies pain. No shortness of breath, satting well at baseline 2L oxygen. Lung sounds with expiratory grunting but improved after IS use. Bowel sounds active and abdomen soft. Tolerating diet and ate most of breakfast. Tolerating ambulation with stedy x1 well. Fall precautions in place.   
Patient noted to be in respiratory distress. RT bedside, ICU residents updated, new orders placed.   
Patient placed on BIPAP   05/16/25 0856   NIV Type   $NIV $Daily Charge   Ventilator ID [id number]   Equipment Changed Mask   Suction Setup and Functional Yes   NIV Started/Stopped On   Equipment Type trilogy   Mode Bilevel   Mask Type Full face mask   Mask Size Small   Bonnet size Small   Assessment   Pulse (!) 124   Respirations 16   SpO2 100 %   Comfort Level Good   Using Accessory Muscles Yes   Mask Compliance Good   Skin Assessment Clean, dry, & intact   Skin Protection for O2 Device No   Breath Sounds   Respiratory Pattern Irregular   Settings/Measurements   PIP Observed 14 cm H20   IPAP 12 cmH20   CPAP/EPAP 5 cmH2O   Rate Ordered 16   FiO2  60 %   I Time/ I Time % 1 s   Minute Volume (L/min) 8.4 Liters   Mask Leak (lpm) 34 lpm   Patient's Home Machine No   Alarm Settings   Alarms On Y   Apnea (secs) 20 secs   RR Low (bpm) 10   RR High (bpm) 40 br/min       
Patient placed on SBT and became increasingly agitated/restless. RR increased to the mid 40s and oxygen saturation dropped to the low 80s. Zyprexa given and RR/agitation remained high. ICU team notified, ok to place back on full ventilator support per Dr. Shaw. Sedation increased per MAR, plan of care ongoing.   Electronically signed by Eduarda Schaefer RN on 5/23/2025 at 9:58 AM   
Patient sitting up in bed appears uncomfortable on vent Patient also moving arms about, Sedation increased see MAR   
Patient to CT with contrast.  
Patient transfer to room 5312 from ICU. Patient is A&O x 4. VSS stable. Patient oriented to the room all safety measures in place. Patient given IS and SCDs at this time. Admission orders released and patient 4 eyes completed. Admission documentation completed. No other needs are noted at this time.    [x] Bed alarm on and cord plugged into wall  [x] Bed in lowest position  [x] Call light and bedside table within reach  [x] Patient educated on all safety measures  [x]Oxygen connected to wall (if applicable)     Nurse 1 Esignature: Electronically signed by YANELIS AREVALO, RN on 6/4/25 at 2:11 AM EDT  Nurse 2 Esignature: {Esignature:[id number]}    4 Eyes Admission Assessment     I agree as the admission nurse that 2 RN's have performed a thorough Head to Toe Skin Assessment on the patient. ALL assessment sites listed below have been assessed on admission.       Areas assessed by both nurses:   [x]   Head, Face, and Ears   [x]   Shoulders, Back, and Chest  [x]   Arms, Elbows, and Hands   [x]   Coccyx, Sacrum, and Ischium  [x]   Legs, Feet, and Heels        Does the Patient have Skin Breakdown?  Yes a wound was noted on the Admission Assessment and an LDA was Initiated documentation include the Aziza-wound, Wound Assessment, Measurements, Dressing Treatment, Drainage, and Color\",         Oscar Prevention initiated:  Yes   Wound Care Orders initiated:  Yes      [facility] nurse consulted for Pressure Injury (Stage 3,4, Unstageable, DTI, NWPT, and Complex wounds) or Oscar score 18 or lower:  No      Nurse 1 eSignature: Electronically signed by YANELIS AREVALO, RN on 6/4/25 at 2:12 AM EDT    **SHARE this note so that the co-signing nurse is able to place an eSignature**    Nurse 2 eSignature: {Esignature:[id number]}  
Patient transferred from bed to chair at this time. Was assisted by 2 RN and sitter in the room with Bettie Jasmine. Pt tolerated fairly well, remains on 3L O2 and HR not tachycardic.   
Patient up from ED at this time. A&Ox4, VSS, on bipap, call light in reach, bed locked in lowest position.   
Patient's SpO2 dropped into the mid 80s and sustained there on 3L after tolerating the 3L for much of the morning. Of note she did get recent jesus/incontinence care, involving multiple shifts and repositions. Placed on 6L NC and called RT for breathing treatment. Breathing tx administered by RT and ICU resident came to bedside to evaluate. STAT CXR ordered and obtained. Placed on 6L HFNC. Will monitor closely and wean back to baseline of 3L as appropriate.     Straight cath order also placed as patient was seen having 797 mL on bladder scan and was constantly c/o feeling the urge to pee. Had been restless in bed often, complaining of this urge. 750 mL urine obtained from straight cath. Post void residual showing at most 37 mL remaining.    Addend 1324: patient resting in bed at this time.    
Pharmacist Review and Automatic Dose Adjustment of Prophylactic Enoxaparin    The reviewing pharmacist has made an adjustment to the ordered enoxaparin dose per the approved [facility] protocol and table as defined below.    Plan / Rationale: Based upon the patient's weight (which is more consistently ~49 kg) and renal function, the ordered dose of Enoxaparin 40 mg SQ daily has been converted to enoxaparin 30 mg SQ daily.    Thank you,  Madison Elizabeth PharmD., BCPS   6/3/2025 6:55 AM  Wireless: [phone]        Charlene Nazario is a 75 y.o. female.     Recent Labs     06/01/25  0512 06/02/25  0544 06/03/25  0407   CREATININE 0.5* 0.5* 0.6       Estimated Creatinine Clearance: 63 mL/min (based on SCr of 0.6 mg/dL).    Recent Labs     06/01/25  0512 06/02/25  0544   HGB 10.3* 10.5*   HCT 29.6* 31.0*    321     No results for input(s): \"INR\" in the last 72 hours.    Height:   Ht Readings from Last 1 Encounters:   05/16/25 1.549 m (5' 1\")     Weight:  Wt Readings from Last 1 Encounters:   06/03/25 50.2 kg (110 lb 10.7 oz)             
Physical Therapy  Facility/Department: [facility] ICU  Physical Therapy Daily Treatment    Name: Charlene Nazario  : 1950  MRN: [id number]  Date of Service: 6/3/2025    Discharge Recommendations:  Subacute/Skilled Nursing Facility   PT Equipment Recommendations  Equipment Needed: No  Other: Defer to next level of care    [x]co-treatment indicated; patient seen for co-treatment due to: patient safety, complexity of condition, and need for the assistance of 2 skilled therapists.      Patient Diagnosis(es): The primary encounter diagnosis was Dyspnea and respiratory abnormalities. Diagnoses of COPD exacerbation (HCC), COPD with acute exacerbation (HCC), and Chronic respiratory failure with hypoxia (HCC) were also pertinent to this visit.  Past Medical History:  has a past medical history of Allergic rhinitis, Anxiety, Back pain, Cervical cancer screening, Closed compression fracture of L3 vertebra (HCC), Compression fracture of thoracic vertebrae, non-traumatic (HCC), COPD (chronic obstructive pulmonary disease) (HCC), COPD exacerbation (HCC), Elevated LDL cholesterol level, GERD (gastroesophageal reflux disease), History of mammogram, HLD (hyperlipidemia), Hoarseness of voice, Hypertension, Lung nodule:left, Mucus plugging of bronchi, Osteoarthritis, Osteomyelitis of left leg (HCC), Osteoporosis, S/P colonoscopy, Systolic CHF, chronic (HCC) EF 45%, Thoracic aorta atherosclerosis, Thyroid mass, Thyroid nodule, and Tibia fracture.  Past Surgical History:  has a past surgical history that includes  section; Appendectomy; Fixation Kyphoplasty (2017); other surgical history (2017); Thyroidectomy, partial (Right, 2017); Vocal cord injection (); laryngoplasty (Right, 2022); IR KYPHOPLASTY LUMBAR 1 VERTEBRAL BODY (2024); and Tibia fracture surgery (Left, ).    Assessment  Body Structures, Functions, Activity Limitations Requiring Skilled Therapeutic Intervention: Decreased 
Physical Therapy  Facility/Department: [facility] ICU  Physical Therapy Initial Assessment    Name: Charlene Nazario  : 1950  MRN: [id number]  Date of Service: 2025    Discharge Recommendations:  Subacute/Skilled Nursing Facility   PT Equipment Recommendations  Other: defer      Patient Diagnosis(es): The primary encounter diagnosis was Dyspnea and respiratory abnormalities. Diagnoses of COPD exacerbation (HCC), COPD with acute exacerbation (HCC), and Chronic respiratory failure with hypoxia (HCC) were also pertinent to this visit.  Past Medical History:  has a past medical history of Allergic rhinitis, Anxiety, Back pain, Cervical cancer screening, Closed compression fracture of L3 vertebra (HCC), Compression fracture of thoracic vertebrae, non-traumatic (HCC), COPD (chronic obstructive pulmonary disease) (HCC), COPD exacerbation (HCC), Elevated LDL cholesterol level, GERD (gastroesophageal reflux disease), History of mammogram, HLD (hyperlipidemia), Hoarseness of voice, Hypertension, Lung nodule:left, Mucus plugging of bronchi, Osteoarthritis, Osteomyelitis of left leg (HCC), Osteoporosis, S/P colonoscopy, Systolic CHF, chronic (HCC) EF 45%, Thoracic aorta atherosclerosis, Thyroid mass, Thyroid nodule, and Tibia fracture.  Past Surgical History:  has a past surgical history that includes  section; Appendectomy; Fixation Kyphoplasty (2017); other surgical history (2017); Thyroidectomy, partial (Right, 2017); Vocal cord injection (); laryngoplasty (Right, 2022); IR KYPHOPLASTY LUMBAR 1 VERTEBRAL BODY (2024); and Tibia fracture surgery (Left, ).    Assessment  Body Structures, Functions, Activity Limitations Requiring Skilled Therapeutic Intervention: Decreased functional mobility ;Decreased endurance;Decreased balance;Decreased posture  Assessment: Pt is 75 y.o. with diagnosis of dyspnea and respiratory abnormality presenting with decreased functional mobility.  Pt 
Propofol weaned to 30 and patient spontaneously moved all extremities, sitting up in bed bucking the vent, not following commands. Patient placed on CPAP mode and was initiating breaths but started to desaturate into the high 70s. ICU residents notified and patient placed back into support mode on ventilator and sedation was increased per MAR. Plan of care ongoing.   
Pt a/o x4. VSS w/ exception to intermittent tachycardia and elevated resp rate. Denies N/V. No complaints of pain at this time. Pt up x2 assist and columba lewis. Will continue to monitor patient.     Abimbola Mullen RN   
Pt achieved 3-4 hours of intermittent sleep overnight.  All sleep promotion interventions in place all night.  Delirium protocol.   Mentation and agitation improved this AM.  SAMEERA English  
Pt agitated and impulsive to start 1900 shift.   Unable to redirect. Decreased environment stimuli and all patient needs met.  PRN Zyprexa and schedule seroquel given with little results.   Pt became increasingly more confused trying to use call light as cell phone, having incontinent episode, and trying to get out of bed 'to go down to the store for beer'  Precedex gtts restarted. Pt remains restless to agitated. Only oriented to person.  This nurse at bedside for patient safety.  SAMEERA English  
Pt agitated, bp elevated. Zyprexa given and increasing sedation.     SHARRON DRISCOLL RN     
Pt alert and oriented/ calm and cooperative. Family at bedside.  d/c'ed. Will continue to monitor.   
Pt autopeep was 96ibB2T. Increased PEEP t0 8cmH2o. AutoPeep reduced to 6 cmH2O.    SBT started.Increased PS to account for increased in PEEP. SBT set at 12/5 50%. Alarms tightened.        05/28/25 1008   Patient Observation   Pulse 69   Respirations 22   SpO2 99 %   ETCO2 (mmHg) 39 mmHg   Breath Sounds   Respiratory Pattern Regular   Right Upper Lobe Inspiratory wheezes;End expiratory wheezes   Vent Information   Ventilator Day(s) 12   Ventilator ID [id number]   Vent Mode AC/PRVC   Ventilator Settings   Vt (Set, mL) 400 mL   Resp Rate (Set) 22 bpm   PEEP/CPAP (cmH2O) (S)  8   Insp Time (sec) 0.7 sec   Vent Patient Data (Readings)   Vt (Measured) 430 mL   Peak Inspiratory Pressure (cmH2O) 30 cmH2O   Rate Measured 22 br/min   Minute Volume (L/min) 8.7 Liters   Mean Airway Pressure (cmH2O) 13 cmH20   I:E Ratio 1:2.90   PEEP Intrinsic (cm H2O) (S)  6 cm H2O   Dynamic Compliance (L/cm H2O) 20 L/cm H2O   I Time/ I Time % 0.7 s   Insp Rise Time (%) 300 %   Backup Apnea On   Backup Rate 22 Breaths Per Minute   Backup Vt 400   Backup I Time   (.7)   Vent Alarm Settings   Low Pressure (cmH2O) 13 cmH2O   High Pressure (cmH2O) 50 cmH2O   Low Minute Volume (lpm) 6 L/min   High Minute Volume (lpm) 20 L/min   Low Exhaled Vt (ml) 200 mL   High Exhaled Vt (ml) 1000 mL   RR Low (bpm) 8   RR High (bpm) 40 br/min   Apnea (secs) 20 secs   ETCO2/TCM Max 60 mmHg   ETCO2/TCM Min 10 mmHg   Additional Respiratoray Assessments   Humidification Source Heated wire   Humidification Temp 35.1   Circuit Condensation Not drained   Ambu Bag With Mask At Bedside Yes   Airway Clearance   Suction ET Tube;Oral   Subglottic Suction Done No   Suction Device Inline suction catheter  (mouth care)   Suction Catheter Size 14 Fr   Sputum Method Obtained Endotracheal   Sputum Amount Scant   Sputum Color/Odor Clear;White   Sputum Consistency Thin   ETT    Placement Date/Time: 05/17/25 1928   Present on Admission/Arrival: No  Airway Type: Cuffed  Airway Tube Size: 7.5 mm 
Pt blood pressure dropped 60/40. ICU residents informed and at bedside. Sedation decreased and briefly paused. Blood pressure improved. Will continue to monitor.     SHARRON DRISCOLL RN    
Pt has no urine output for this shift. Bladder scan completed with volume of 208mL. ICU residents notified of low production of urine. No new orders at this time. Straight cath ordered for UAR collection.   
Pt has only made 100mL of urine since 1800. ICU residents aware.  
Pt increasingly agitated after chest x-ray.   
Pt intubated due to respiratory failure by provider. RT assist bedside. Size 7.5 mm ET tube placed through the vocal chords to 21 cm at the teeth. ET tube cuff inflated to MOV. ETT secured w/ commercial tube nash. Bilateral breath sounds heard. X-ray called to confirm placement. Pt placed on mechanical ventilator with ordered settings per provider. No visible trauma noted. Pt resting comfortably.   
Pt's HR in the 130's, blood pressure 160's after attempting to wean sedation. ICU resident aware. Sedation increased and prn zyprexa given. Will continue to monitor.     SHARRON DRISCOLL RN    
Pt's blood pressure dropped 48/38. RN restarted levo and paused propofol. ICU residents at bedside. Blood pressure improved. Levo turned off d/t blood pressure now elevated. Propofol restarted. Will continue to monitor.     SHARRON DRISCOLL RN    
Pt's blood pressure dropped 65/50. RN decreasing sedation and ICU residents informed. Blood pressure is trending back up. Will continue to monitor.     SHARRON DRISCOLL RN    
Pt's blood pressure dropped again 61/47. ICU residents informed.     SHARRON DRISCOLL RN    
RN gave 5 mg ativan IV and SBT started at 1141. Pt failed after 2 min d/t low resp rate and spo2 in 80's. RT flipped pt back into vent mode. ICU residents informed.     SHARRON DRISCOLL RN    
Received call from patient's  Norberto, updated with patient's condition, questions answered and made aware of plan of care.   
Right femoral CVC removed per order. Pressure dressing in place without drainage.  
SBT failed. 's, bp 190/100. Sedation resumed. ABG obtained.    SHARRON DRISCOLL RN    
Sedation paused per verbal order from ICU residents and breathing trial started.     SHARRON DRISCOLL RN    
Spiritual Health History and Assessment/Progress Note  [facility]    (P) Spiritual/Emotional Needs,  ,  ,  Conversation with patient's  at bedside who spoke about the current care plan. He requested prayer for patient and comfort in waiting.     Name: Charlene Nazario MRN: [id number]    Age: 75 y.o.     Sex: female   Language: English   Cheondoism: [employer]aritan   COPD with acute exacerbation (HCC)     Date: 5/20/2025            Total Time Calculated: (P) 11 min              Spiritual Assessment began in [facility] ICU        Referral/Consult From: (P) Rounding   Encounter Overview/Reason: (P) Spiritual/Emotional Needs  Service Provided For: (P) Family    Kendal, Belief, Meaning:   Patient unable to assess at this time  Family/Friends have beliefs or practices that help with coping during difficult times      Importance and Influence:  Patient unable to assess at this time  Family/Friends have spiritual/personal beliefs that influence decisions regarding the patient's health    Community:  Patient Other:  at bedside  Family/Friends feel well-supported. Support system includes: Children    Assessment and Plan of Care:     Patient Interventions include: Provided sacramental/Temple ritual  Family/Friends Interventions include: Facilitated expression of thoughts and feelings, Affirmed coping skills/support systems, and Provided sacramental/Temple ritual    Patient Plan of Care: Spiritual Care available upon further referral  Family/Friends Plan of Care: Spiritual Care available upon further referral    Electronically signed by DEMIAN Humphreys on 5/20/2025 at 1:19 PM    
Sudden change in WOB bedside post tx with increase in HR. No clear indicator as to cause. Pt placed on BiPAP. 14/5 50% for sudden increase WOB. Pt HR and RR also increased with no significant change in breath sounds bilaterally pre/post breathing tx. PT tripoding and pursed lip breathing. RN and residents brought to bedside for consult.  
This RN noted elevated peak pressures in the 60s after ETT suctioning. After a few minutes the pressures did not decrease. RT at bedside. RT preformed a lavage. Large amount of thick secretions obtained. Heated circuit added. Pressures improved.   
UOP 25 mL, 20 mL, and 20 mL the past three hours. Medical residents notified and following closely.   
Upon arrival, pt's peak pressure was 60mmHg and pt's face was cyanotic. Axel RN, Dr Gen MD, Dr. Margarito MD and RT Darius were at the bedside. RT Darius utilized bag-mask ventilations to improve oxygenation. Pt's BP dropped to 51/35 and HR was 45bpm. Pt was connected to the LifePak and given 200 mcg of push dose phenylephrine. Pt's BP and oxygenation improved. After pt stabilized, ICU residents placed a R fem CVC a the bedside.   
Vt increased to 400 mL.   
5/28/2025 at 2:23 PM    
While awake    sennosides-docusate sodium  2 tablet Oral Daily    pantoprazole (PROTONIX) 40 mg in sodium chloride (PF) 0.9 % 10 mL injection  40 mg IntraVENous Daily    ipratropium  0.5 mg Nebulization 4x Daily RT    levalbuterol  1.25 mg Nebulization 4x Daily RT    busPIRone  5 mg Oral TID    sodium chloride flush  5-40 mL IntraVENous 2 times per day    enoxaparin  30 mg SubCUTAneous Daily    aspirin  81 mg Oral Daily    [Held by provider] lisinopril  2.5 mg Oral QPM    [Held by provider] metoprolol succinate  12.5 mg Oral BID    budesonide  0.5 mg Nebulization BID RT    arformoterol tartrate  15 mcg Nebulization BID RT    vitamin B-12  500 mcg Oral Nightly    Vitamin D  1,000 Units Oral Nightly     Continuous Infusions:     norepinephrine      propofol 40 mcg/kg/min (05/22/25 0603)    fentaNYL 125 mcg/hr (05/22/25 0603)    dexmedeTOMIDine Stopped (05/19/25 0055)    sodium chloride 5 mL/hr at 05/20/25 0842     PRN:  hydrOXYzine HCl, phenylephrine (YASMANI-SYNEPHRINE) (FOR PUSH DOSE), sodium chloride flush, sodium chloride, acetaminophen **OR** acetaminophen, prochlorperazine **OR** prochlorperazine, tiZANidine, melatonin    VITALS:   Patient Vitals for the past 5 hrs:   BP Temp Temp src Pulse Resp SpO2 Weight   05/22/25 0615 95/67 -- -- (!) 110 13 98 % --   05/22/25 0600 (!) 144/94 -- -- (!) 112 15 97 % 53.8 kg (118 lb 9.7 oz)   05/22/25 0545 (!) 168/106 -- -- (!) 117 23 99 % --   05/22/25 0530 (!) 161/110 -- -- (!) 117 19 99 % --   05/22/25 0515 (!) 171/117 -- -- 100 13 97 % --   05/22/25 0500 (!) 163/113 -- -- (!) 115 20 98 % --   05/22/25 0445 -- -- -- (!) 117 29 93 % --   05/22/25 0430 (!) 120/99 -- -- 74 23 98 % --   05/22/25 0415 100/62 -- -- 78 26 98 % --   05/22/25 0400 103/64 99.4 °F (37.4 °C) Temporal 79 26 98 % --   05/22/25 0345 100/63 -- -- 81 26 98 % --   05/22/25 0330 124/75 -- -- 79 26 98 % --   05/22/25 0315 115/89 -- -- 84 23 99 % --   05/22/25 0300 91/62 -- -- 78 26 100 % --   05/22/25 0255 -- -- -- 
endurance as well as impaired cognition. Pt is below her baseline and would benefit from further skilled PT to maximize safety and independence with functional mobility. Will continue to follow.  Treatment Diagnosis: Decreased functional mobility  Therapy Prognosis: Good  Decision Making: Medium Complexity  Activity Tolerance  Activity Tolerance: Patient limited by fatigue;Patient limited by endurance    Plan  Physical Therapy Plan  General Plan:  (2-5)  Current Treatment Recommendations: Strengthening, ROM, Balance training, Functional mobility training, Transfer training, Endurance training, Gait training, Stair training, Neuromuscular re-education, Home exercise program, Safety education & training, Patient/Caregiver education & training, Equipment evaluation, education, & procurement, Therapeutic activities  Safety Devices  Type of Devices: Call light within reach, Nurse notified, Left in chair, Chair alarm in place, Gait belt, Sitter present    Restrictions  Position Activity Restriction  Other Position/Activity Restrictions: up with assist     Subjective  General  Chart Reviewed: Yes  Additional Pertinent Hx: Pt is 75 y.o. admitted to [facility] on 5/16/25 with complaints of SOB.  CT chest neg for PE.  Pt intubated 5/17/25 due to respiratory distress, extubated 5/28/25.  PMH: COPD, HLD, HTN, OA, systolic CHF, anxiety.  Family/Caregiver Present: Yes (family)  Referring Practitioner: Travis Montenegro DO  Diagnosis: dyspnea and respiratory abnormalities  Follows Commands: Within Functional Limits  General  General Comments: Pt cleared for therapy per RN  Subjective  Subjective: Pt found sitting up in chair upon arrival, denying pain and agreeable to therapy.         Social/Functional History  Social/Functional History  Lives With: Spouse  Type of Home: House  Home Layout: One level (basement finished with rooms and bathroom, however, laundry 1st floor)  Home Access: Level entry  Bathroom Shower/Tub: Walk-in 
  GLUCOSE 90 105* 135*   CALCIUM 8.6 8.2* 8.2*   MG 2.39 2.18 2.42*   PHOS 3.0 4.3 4.7   ANIONGAP 10 8 8     Hepatic:   No results for input(s): \"AST\", \"ALT\", \"BILITOT\", \"BILIDIR\", \"LABALBU\", \"ALKPHOS\" in the last 72 hours.    Invalid input(s): \"PROT\"    Troponin:   Recent Labs     05/16/25  0904 05/17/25  0948   TROPHS 19* 23*       NT-proBNP:    Recent Labs     05/17/25  0948 05/17/25  2104   PROBNP 163* 211*     BNP: No results for input(s): \"BNP\" in the last 72 hours.  Lipids: No results for input(s): \"CHOL\", \"HDL\" in the last 72 hours.    Invalid input(s): \"LDLCALCU\", \"TRIGLYCERIDE\"  INR: No results for input(s): \"INR\" in the last 72 hours.  Lactate: No results for input(s): \"LACTATE\" in the last 72 hours.  Lactic acid:  Invalid input(s): \"LACTICACID\"  ESR: No results for input(s): \"SEDRATE\" in the last 72 hours.  CRP:  No results for input(s): \"CRP\" in the last 72 hours.    MICROBIOLOGY:  Previous Cultures:       Results       Procedure Component Value Units Date/Time    Culture, Respiratory [[id number]] Collected: 05/18/25 1023    Order Status: Sent Specimen: Sputum Induced Updated: 05/18/25 1427     Gram Stain Result 3+ WBC's (Polymorphonuclear)  3+ WBC's (Mononuclear)  3+ Gram positive cocci  No Epithelial Cells seen      Narrative:      ORDER#: [id number]                          ORDERED BY: SHALINI AVILES  SOURCE: Sputum Induced                     COLLECTED:  05/18/25 10:23  ANTIBIOTICS AT [facility].:                      RECEIVED :  05/18/25 14:26    Pneumonia Panel, Molecular [[id number]] Collected: 05/18/25 1023    Order Status: Completed Specimen: Sputum, Suctioned Updated: 05/18/25 2012     Pneumonia Panel Molecular --     Pneumonia Pathogens Panel PCR Result: Not Detected  See additional report for complete Pneumonia Pathogens Panel      Narrative:      ORDER#: [id number]                          ORDERED BY: SHALINI AVILES  SOURCE: Sputum Suctioned ett               COLLECTED:  05/18/25 10:23  ANTIBIOTICS 
incubation. 05/19/2025 10:17 AM     Lab Results   Component Value Date/Time    BLOODCULT2 No Growth after 4 days of incubation. 05/19/2025 10:20 AM     Organism:   Lab Results   Component Value Date/Time    ORG Aspergillus species 05/20/2025 05:00 PM         Marito Jason MD    
significantly less agitated/anxious this date. Pt trials ice chips and sips water via straw, demonstrates cough response in 1/5 ice chip trials, 2/5 water trials. SLP provided education re: MBSS to assess oropharyngeal swallow function, pt and spouse verbalize understanding. All questions answered re: MBSS to pt's and spouse's satisfaction. MBSS today. Continue.   6/1: MBSS completed. Goal met.     Goal 2: Patient/caregiver will demonstrate understanding of swallowing concerns/recommendations.  5/29: Provided training to patient/spouse re: swallow function, impact of prolonged intubation and aphonia, oral hygiene completion/rationale, ice chip protocol, and effortful swallow exercise. Pt and  indicated good comprehension.  5/30: Reviewed swallow function, aspiration concerns, instrumental assessment recommendation. Pt and spouse verbalized comprehension. Cont goal  6/2: Reviewed importance of upright positioning, small single sips, slow rate, limiting talking with PO in mouth. Pt verbalized comprehension, suspect would benefit from reinforcement.  Cont goal    Goal 3: Patient will tolerate least restrictive diet with adequate oral prep and without overt signs of aspiration or associated decline in respiratory status.  6/2: Per chart review/discussion with RN, pt has been tolerating diet level without any concerns. Received pt awake, seated in bed, on 2L O2 via NC, sitter and  present.  Pt seen eating lunch (easy to chew solids + mildly thick liquids), trialed advanced solid (regular); patient with positive oral acceptance, slowed mastication, positive swallow movement, mild stasis. Mild cough, however per RN has had ongoing unrelated cough. Recommend continue current diet level, defer advancement of solids.   Cont goal    Education  Provided education to patient re: role of SLP, purpose of visit, recommendations. Patient with good comprehension.    Pt goal: eat/drink  Pt discharge goal: did not 
at least 5 cm to have an intragastric location. Electronically signed by Katharina Herrera    XR CHEST PORTABLE  Result Date: 5/27/2025  Chest HISTORY : Follow-up pneumothorax VIEWS : 1     No visualized pneumothorax. Diffuse interstitial prominence again noted. Stable heart size with tortuous descending thoracic aorta. Multilevel thoracic vertebral augmentation cement. Lines and tubes in standard position. Electronically signed by Rudy Shankar      CBC:   Recent Labs     05/31/25  0510 06/01/25  0512 06/02/25  0544   WBC 14.1* 12.1* 6.2   HGB 11.6* 10.3* 10.5*    320 321     BMP:    Recent Labs     05/31/25  0510 06/01/25  0512 06/02/25  0544    137 140   K 3.3* 3.9 3.6    103 107   CO2 30 24 28   BUN 20 22* 14   CREATININE 0.5* 0.5* 0.5*   GLUCOSE 82 81 92     Hepatic: No results for input(s): \"AST\", \"ALT\", \"BILITOT\", \"ALKPHOS\" in the last 72 hours.    Invalid input(s): \"ALB\"  Lipids:   Lab Results   Component Value Date/Time    CHOL 192 04/03/2025 12:34 PM    HDL 99 04/03/2025 12:34 PM    TRIG 99 05/29/2025 05:58 AM     Hemoglobin A1C:   Lab Results   Component Value Date/Time    LABA1C 5.6 04/03/2025 12:34 PM     TSH:   Lab Results   Component Value Date/Time    TSH 0.62 04/03/2025 12:34 PM    TSH 1.66 07/20/2021 11:29 AM     Troponin: No results found for: \"TROPONINT\"  Lactic Acid: No results for input(s): \"LACTA\" in the last 72 hours.  BNP: No results for input(s): \"PROBNP\" in the last 72 hours.  UA:  Lab Results   Component Value Date/Time    NITRU Negative 05/21/2025 11:02 AM    COLORU Yellow 05/21/2025 11:02 AM    PHUR 6.0 05/21/2025 11:02 AM    PHUR 7.5 06/20/2022 08:46 PM    WBCUA None seen 05/20/2025 06:09 AM    RBCUA 0-2 05/20/2025 06:09 AM    MUCUS Rare 05/20/2025 06:09 AM    CLARITYU Clear 05/21/2025 11:02 AM    LEUKOCYTESUR Negative 05/21/2025 11:02 AM    UROBILINOGEN 0.2 05/21/2025 11:02 AM    BILIRUBINUR Negative 05/21/2025 11:02 AM    BLOODU Negative 05/21/2025 11:02 AM    
baseline.   Has mild leukocytosis today.  She is on steroids.    Ex smoker.       Etiology of this episode is not clear.    Will treat as COPD exacerbation/reactive airway disease.  Consider cardiac workup later.    Start TF  Start GI prophylaxis       Pt has a high probability of imminent or life-threatening deterioration requiring close monitoring, and highly complex decision-making and/or interventions of high intensity to assess, manipulate, and support his critical organ systems to prevent a likely inevitable decline which could occur if left untreated.      A total critical care time 35 minutes was used. This includes but not limited to examining patient, collaborating with other physicians, monitoring vital signs, telemetry, continuous pulse oximetry, and clinical response to IV medications, documentation time, review and interpretation of laboratory and radiological data, review of nursing notes and old record review. This time excludes any time that may have been spent performing procedures for life threatening organ failure.       
24  GI ppx: IV protonix 40mg qd     Constipation  Having bowel movements consistently   - Glycolax qd prn    - monitor for BM         #Urinary Retention  Patient continue to retain urine within bladder after multiple days of straight cath.  - lowe in place  - strict I/Os    Psych  #CORINNA  - buspar 5mg TID  - hydroxyzine 25mg PRN  - Spot dose with Zyprexa if needed       Code Status: Full Code   PPX: SCDs, LMWH  DISPO: ICU    Summer  MD Nury, PGY-1  Internal Medicine Resident  Contact via [employer]  05/26/25  7:18 AM    This patient has been staffed and discussed with DELILAH Matthews         PULMONARY AND CRITICAL CARE ATTENDING:  Patient was seen, examined and discussed with Dr. Arrington PGY-1. I agree with the history of present illness, past medical/surgical histories, family history, social history, medication list and allergies as listed. The review of systems is as noted above. My physical exam confirms the findings listed above.   Chart was reviewed including Labs, CXR, CT scan, EKG, and Medical records confirm the findings noted above.   I edited the note where appropriate.     Briefly, this is a 75 y.o. female admitted to ICU with acute COPD exacerbation on mechanical ventilation.   Initially she responded well to therapy but had an acute change in respiratory status and was intubated 05/18 due to increased respiratory distress and failure on NIV.      INTERVAL HISTORY:  Awake on multiple sedatives. Failed SBT today due to HTN/tachycardia.     /71   Pulse (!) 103   Temp 99.3 °F (37.4 °C) (Bladder)   Resp 24   Ht 1.549 m (5' 1\")   Wt 55.2 kg (121 lb 11.1 oz)   SpO2 92%   BMI 22.99 kg/m²      Intake/Output Summary (Last 24 hours) at 5/26/2025 1102  Last data filed at 5/26/2025 0800      Gross per 24 hour   Intake 1961.74 ml   Output 1150 ml   Net 811.74 ml    SpO2: 92 %  Ventilator settings  - Vent Mode: AC/PRVC  - Ventilator Day(s): 10  - Vt (Set, mL): 400 mL  - PEEP/CPAP (cmH2O): 
for MBS today   - no po access      Cardiovascular  # HFpEF (EF 50-55%)  Most recent TTE 3/2025 showed EF of 50 - 55%. Indeterminate diastolic fxn. Patient placed on lasix 3 day course after recently presented to PCP for leg swelling that has now resolved. CXR not showing any signs of overload thus far.  - holding metoprolol 12.5mg BID  - monitor for fluid overload  - daily weights  - s/p lasix 20mg IV x2      # Hypotension, improved  # HTN  Patient becoming hypotensive on 5/17 and 5/18 and placed on levo briefly. Bps have now improved.  - holding lisinopril 2.5mg nightly     # Prolonged QTc, resolved  Patient QTc 510 on EKG. Prior , will avoid QT prolonging meds for now. Repeat EKG showed normal QTc  - monitor w daily EKG     GI  Diet: ADULT DIET; Easy to Chew; Mildly Thick (Nectar)  ADULT ORAL NUTRITION SUPPLEMENT; Lunch, Dinner; Frozen Oral Supplement  GI ppx: protonix 40mg po qd  - multivitamin  - B12 and vitamin D supplementation      Constipation  Having bowel movements consistently   - Glycolax qd prn    - monitor for BM       #Urinary Retention  Patient continue to retain urine within bladder after multiple days of straight cath.  - lowe in place  - strict I/Os    Code Status: Full Code   PPX: SCDs, LMWH  DISPO: ICU    Cuauhtemoc Victor MD, PGY-1  Internal Medicine Resident  Contact via [employer]  06/03/25  7:24 AM    This patient has been staffed and discussed with Dr. Saldivar    Patient examined, findings as discussed.Sedeeq.  Agree with assessment and plan.  Respiratory failure resolving with improving COPD exacerbation.  Oxygen requirement down to 2 L..  Treatment with bronchodilators only, off of steroid and antibiotic.  Mental status remains problematic.  She is confused, requires dexmedetomidine infusion to avoid agitation.  Seroquel has been started, and may be able to supply at dexmedetomidine infusion.  Otherwise, she is a candidate for transfer to PCU.

## 2025-06-04 NOTE — PLAN OF CARE
Problem: Chronic Conditions and Co-morbidities  Goal: Patient's chronic conditions and co-morbidity symptoms are monitored and maintained or improved  5/19/2025 0016 by Flor Cade RN    Outcome: Progressing  Flowsheets  Care Plan - Patient's Chronic Conditions and Co-Morbidity Symptoms are Monitored and Maintained or Improved: Monitor and assess patient's chronic conditions and comorbid symptoms for stability, deterioration, or improvement     Problem: Discharge Planning  Goal: Discharge to home or other facility with appropriate resources  5/19/2025 0016 by Flor Cade RN  Flowsheets   Discharge to home or other facility with appropriate resources:   Identify barriers to discharge with patient and caregiver   Arrange for needed discharge resources and transportation as appropriate   Identify discharge learning needs (meds, wound care, etc)   Refer to discharge planning if patient needs post-hospital services based on physician order or complex needs related to functional status, cognitive ability or social support system       Problem: Safety - Adult  Goal: Free from fall injury  5/19/2025 0016 by Flor Cade RN  Outcome: Progressing  Flowsheets (Taken 5/18/2025 2351)  Free From Fall Injury: Instruct family/caregiver on patient safety  5/]     Problem: Safety - Medical Restraint  Goal: Remains free of injury from restraints (Restraint for Interference with Medical Device)  5/19/2025 0016 by Flor Cade RN  Outcome: Progressing    Flowsheets  Remains free of injury from restraints (restraint for interference with medical device):   Determine that other, less restrictive measures have been tried or would not be effective before applying the restraint   Evaluate the patient's condition at the time of restraint application   Inform patient/family regarding the reason for restraint   Every 2 hours: Monitor safety, psychosocial status, comfort, nutrition and hydration       Problem: 
  Problem: Chronic Conditions and Co-morbidities  Goal: Patient's chronic conditions and co-morbidity symptoms are monitored and maintained or improved  5/20/2025 0752 by Rudy Bobby RN  Outcome: Progressing  5/20/2025 0715 by Liz Zee, RN  Outcome: Progressing  Flowsheets (Taken 5/19/2025 2000 by Rudy Bobby, RN)  Care Plan - Patient's Chronic Conditions and Co-Morbidity Symptoms are Monitored and Maintained or Improved:   Monitor and assess patient's chronic conditions and comorbid symptoms for stability, deterioration, or improvement   Collaborate with multidisciplinary team to address chronic and comorbid conditions and prevent exacerbation or deterioration   Update acute care plan with appropriate goals if chronic or comorbid symptoms are exacerbated and prevent overall improvement and discharge     Problem: Discharge Planning  Goal: Discharge to home or other facility with appropriate resources  5/20/2025 0752 by Rudy Bobby RN  Outcome: Progressing  5/20/2025 0715 by Liz Zee, RN  Outcome: Progressing  Flowsheets (Taken 5/19/2025 2000 by Rudy Bobby, RN)  Discharge to home or other facility with appropriate resources:   Identify barriers to discharge with patient and caregiver   Arrange for needed discharge resources and transportation as appropriate   Identify discharge learning needs (meds, wound care, etc)   Arrange for interpreters to assist at discharge as needed   Refer to discharge planning if patient needs post-hospital services based on physician order or complex needs related to functional status, cognitive ability or social support system     Problem: Safety - Adult  Goal: Free from fall injury  5/20/2025 0752 by Rudy Bobby, RN  Outcome: Progressing  5/20/2025 0715 by Liz Zee, RN  Outcome: Progressing  Flowsheets (Taken 5/20/2025 0645 by Rudy Bobby, RN)  Free From Fall Injury:   Instruct family/caregiver on patient safety   Based on caregiver fall 
  Problem: Chronic Conditions and Co-morbidities  Goal: Patient's chronic conditions and co-morbidity symptoms are monitored and maintained or improved  5/21/2025 1007 by Liz Zee RN  Outcome: Progressing  Flowsheets (Taken 5/21/2025 0800)  Care Plan - Patient's Chronic Conditions and Co-Morbidity Symptoms are Monitored and Maintained or Improved: Monitor and assess patient's chronic conditions and comorbid symptoms for stability, deterioration, or improvement  5/21/2025 0231 by Rudy Bobby, RN  Outcome: Progressing  Flowsheets (Taken 5/20/2025 2000)  Care Plan - Patient's Chronic Conditions and Co-Morbidity Symptoms are Monitored and Maintained or Improved:   Monitor and assess patient's chronic conditions and comorbid symptoms for stability, deterioration, or improvement   Collaborate with multidisciplinary team to address chronic and comorbid conditions and prevent exacerbation or deterioration   Update acute care plan with appropriate goals if chronic or comorbid symptoms are exacerbated and prevent overall improvement and discharge     Problem: Discharge Planning  Goal: Discharge to home or other facility with appropriate resources  5/21/2025 1007 by Liz Zee RN  Outcome: Progressing  Flowsheets (Taken 5/21/2025 0800)  Discharge to home or other facility with appropriate resources:   Identify barriers to discharge with patient and caregiver   Arrange for needed discharge resources and transportation as appropriate   Identify discharge learning needs (meds, wound care, etc)  5/21/2025 0231 by Rudy Bobby, RN  Outcome: Progressing  Flowsheets (Taken 5/20/2025 2000)  Discharge to home or other facility with appropriate resources:   Identify barriers to discharge with patient and caregiver   Arrange for needed discharge resources and transportation as appropriate   Identify discharge learning needs (meds, wound care, etc)   Arrange for interpreters to assist at discharge as needed   Refer 
  Problem: Chronic Conditions and Co-morbidities  Goal: Patient's chronic conditions and co-morbidity symptoms are monitored and maintained or improved  5/24/2025 1506 by Abimbola Mullen RN  Outcome: Progressing  Flowsheets (Taken 5/24/2025 0800)  Care Plan - Patient's Chronic Conditions and Co-Morbidity Symptoms are Monitored and Maintained or Improved: Monitor and assess patient's chronic conditions and comorbid symptoms for stability, deterioration, or improvement     Problem: Discharge Planning  Goal: Discharge to home or other facility with appropriate resources  5/24/2025 1506 by Abimbola Mullen RN  Outcome: Progressing  Flowsheets (Taken 5/24/2025 0800)  Discharge to home or other facility with appropriate resources: Identify barriers to discharge with patient and caregiver     Problem: Safety - Adult  Goal: Free from fall injury  5/24/2025 1506 by Abimbola Mullen RN  Outcome: Progressing  Flowsheets (Taken 5/24/2025 0800)  Free From Fall Injury: Instruct family/caregiver on patient safety     Problem: Safety - Medical Restraint  Goal: Remains free of injury from restraints (Restraint for Interference with Medical Device)  Description: INTERVENTIONS:1. Determine that other, less restrictive measures have been tried or would not be effective before applying the restraint2. Evaluate the patient's condition at the time of restraint application3. Inform patient/family regarding the reason for restraint4. Q2H: Monitor safety, psychosocial status, comfort, nutrition and hydration  5/24/2025 1506 by Abimbola Mullen RN  Outcome: Progressing  Flowsheets  Taken 5/24/2025 1400 by Abimbola Mullen RN  Remains free of injury from restraints (restraint for interference with medical device): Every 2 hours: Monitor safety, psychosocial status, comfort, nutrition and hydration  Problem: Pain  Goal: Verbalizes/displays adequate comfort level or baseline comfort level  5/24/2025 1506 by Abimbola Mullen RN  Outcome: 
  Problem: Chronic Conditions and Co-morbidities  Goal: Patient's chronic conditions and co-morbidity symptoms are monitored and maintained or improved  5/24/2025 1905 by Anya Ho RN  Outcome: Progressing  5/24/2025 1506 by Abimbola Mullen RN  Outcome: Progressing  Flowsheets (Taken 5/24/2025 0800)  Care Plan - Patient's Chronic Conditions and Co-Morbidity Symptoms are Monitored and Maintained or Improved: Monitor and assess patient's chronic conditions and comorbid symptoms for stability, deterioration, or improvement  5/24/2025 0540 by Ryan Kahn RN  Outcome: Progressing     Problem: Discharge Planning  Goal: Discharge to home or other facility with appropriate resources  5/24/2025 1905 by Anya Ho RN  Outcome: Progressing  5/24/2025 1506 by Abimbola Mullen RN  Outcome: Progressing  Flowsheets (Taken 5/24/2025 0800)  Discharge to home or other facility with appropriate resources: Identify barriers to discharge with patient and caregiver  5/24/2025 0540 by Ryan Kahn RN  Outcome: Progressing     Problem: Safety - Adult  Goal: Free from fall injury  5/24/2025 1905 by Anya Ho RN  Outcome: Progressing  5/24/2025 1506 by Abimbola Mullen RN  Outcome: Progressing  Flowsheets (Taken 5/24/2025 0800)  Free From Fall Injury: Instruct family/caregiver on patient safety  5/24/2025 0540 by Ryan Kahn RN  Outcome: Progressing     Problem: Safety - Medical Restraint  Goal: Remains free of injury from restraints (Restraint for Interference with Medical Device)  Description: INTERVENTIONS:1. Determine that other, less restrictive measures have been tried or would not be effective before applying the restraint2. Evaluate the patient's condition at the time of restraint application3. Inform patient/family regarding the reason for restraint4. Q2H: Monitor safety, psychosocial status, comfort, nutrition and hydration  5/24/2025 1905 by Anya Ho RN  Outcome: 
  Problem: Chronic Conditions and Co-morbidities  Goal: Patient's chronic conditions and co-morbidity symptoms are monitored and maintained or improved  5/26/2025 0715 by Abimbola Mullen RN  Outcome: Progressing    Problem: Discharge Planning  Goal: Discharge to home or other facility with appropriate resources  5/26/2025 0715 by Abimbola Mullen RN  Outcome: Progressing     Problem: Safety - Adult  Goal: Free from fall injury  5/26/2025 0715 by Abimbola Mullen RN  Outcome: Progressing     Problem: Safety - Medical Restraint  Goal: Remains free of injury from restraints (Restraint for Interference with Medical Device)  Description: INTERVENTIONS:1. Determine that other, less restrictive measures have been tried or would not be effective before applying the restraint2. Evaluate the patient's condition at the time of restraint application3. Inform patient/family regarding the reason for restraint4. Q2H: Monitor safety, psychosocial status, comfort, nutrition and hydration  5/26/2025 0715 by Abimbola Mullen RN  Outcome: Progressing     Problem: Pain  Goal: Verbalizes/displays adequate comfort level or baseline comfort level  5/26/2025 0715 by Abimbola Mullen RN  Outcome: Progressing     Problem: Skin/Tissue Integrity  Goal: Skin integrity remains intact  Description: 1.  Monitor for areas of redness and/or skin breakdown2.  Assess vascular access sites hourly3.  Every 4-6 hours minimum:  Change oxygen saturation probe site4.  Every 4-6 hours:  If on nasal continuous positive airway pressure, respiratory therapy assess nares and determine need for appliance change or resting period  5/26/2025 0715 by Abimbola Mullen RN  Outcome: Progressing     Problem: ABCDS Injury Assessment  Goal: Absence of physical injury  5/26/2025 0715 by Abimbola Mullen RN  Outcome: Progressing     Problem: Nutrition Deficit:  Goal: Optimize nutritional status  5/26/2025 0715 by Abimbola Mullen RN  Outcome: Progressing     
  Problem: Chronic Conditions and Co-morbidities  Goal: Patient's chronic conditions and co-morbidity symptoms are monitored and maintained or improved  5/27/2025 0822 by Amelia Mckenna RN  Outcome: Progressing  Flowsheets  Taken 5/27/2025 0400 by Dejon Mauro RN  Care Plan - Patient's Chronic Conditions and Co-Morbidity Symptoms are Monitored and Maintained or Improved: Monitor and assess patient's chronic conditions and comorbid symptoms for stability, deterioration, or improvement  Taken 5/27/2025 0000 by Dejon Mauro RN  Care Plan - Patient's Chronic Conditions and Co-Morbidity Symptoms are Monitored and Maintained or Improved: Monitor and assess patient's chronic conditions and comorbid symptoms for stability, deterioration, or improvement     Problem: Discharge Planning  Goal: Discharge to home or other facility with appropriate resources  5/27/2025 0822 by Amelia Mckenna RN  Outcome: Progressing  Flowsheets  Taken 5/27/2025 0400 by Dejon Mauro RN  Discharge to home or other facility with appropriate resources: Identify barriers to discharge with patient and caregiver  Taken 5/27/2025 0000 by Dejon Mauro RN  Discharge to home or other facility with appropriate resources: Identify barriers to discharge with patient and caregiver     Problem: Safety - Adult  Goal: Free from fall injury  5/27/2025 0822 by Amelia Mckenna RN  Outcome: Progressing     Problem: Safety - Medical Restraint  Goal: Remains free of injury from restraints (Restraint for Interference with Medical Device)  Description: INTERVENTIONS:1. Determine that other, less restrictive measures have been tried or would not be effective before applying the restraint2. Evaluate the patient's condition at the time of restraint application3. Inform patient/family regarding the reason for restraint4. Q2H: Monitor safety, psychosocial status, comfort, nutrition and hydration  5/27/2025 0822 by Amelia Mckenna, RN  Outcome: 
  Problem: Chronic Conditions and Co-morbidities  Goal: Patient's chronic conditions and co-morbidity symptoms are monitored and maintained or improved  6/4/2025 0209 by Sheryl Cardenas RN  Outcome: Progressing  6/3/2025 2155 by Lito Starr RN  Outcome: Progressing  Flowsheets (Taken 6/3/2025 0800 by Abimbola Mullen, RN)  Care Plan - Patient's Chronic Conditions and Co-Morbidity Symptoms are Monitored and Maintained or Improved: Monitor and assess patient's chronic conditions and comorbid symptoms for stability, deterioration, or improvement     Problem: Discharge Planning  Goal: Discharge to home or other facility with appropriate resources  Outcome: Progressing     Problem: Safety - Adult  Goal: Free from fall injury  6/4/2025 0209 by Sheryl Cardenas RN  Outcome: Progressing  6/3/2025 2155 by Lito Starr RN  Outcome: Progressing  Flowsheets (Taken 5/30/2025 1323 by Liz Zee RN)  Free From Fall Injury: Instruct family/caregiver on patient safety     Problem: Pain  Goal: Verbalizes/displays adequate comfort level or baseline comfort level  6/4/2025 0209 by Sheryl Cardenas RN  Outcome: Progressing  6/3/2025 2155 by Lito Starr RN  Outcome: Progressing  Flowsheets (Taken 6/3/2025 1238 by Abimbola Mullen, RN)  Verbalizes/displays adequate comfort level or baseline comfort level: Encourage patient to monitor pain and request assistance     Problem: Skin/Tissue Integrity  Goal: Skin integrity remains intact  Description: 1.  Monitor for areas of redness and/or skin breakdown2.  Assess vascular access sites hourly3.  Every 4-6 hours minimum:  Change oxygen saturation probe site4.  Every 4-6 hours:  If on nasal continuous positive airway pressure, respiratory therapy assess nares and determine need for appliance change or resting period  6/3/2025 2155 by Lito Starr RN  Outcome: Progressing  Flowsheets (Taken 6/3/2025 0800 by Abimbola Mullen, RN)  Skin Integrity Remains Intact: Monitor for areas of 
  Problem: Chronic Conditions and Co-morbidities  Goal: Patient's chronic conditions and co-morbidity symptoms are monitored and maintained or improved  Outcome: Progressing     Problem: Discharge Planning  Goal: Discharge to home or other facility with appropriate resources  Outcome: Progressing     Problem: Safety - Adult  Goal: Free from fall injury  6/3/2025 0740 by Abimbola Mullen RN  Outcome: Progressing     Problem: Pain  Goal: Verbalizes/displays adequate comfort level or baseline comfort level  6/3/2025 0740 by Abimbola Mullen RN  Outcome: Progressing     Problem: Skin/Tissue Integrity  Goal: Skin integrity remains intact  Description: 1.  Monitor for areas of redness and/or skin breakdown2.  Assess vascular access sites hourly3.  Every 4-6 hours minimum:  Change oxygen saturation probe site4.  Every 4-6 hours:  If on nasal continuous positive airway pressure, respiratory therapy assess nares and determine need for appliance change or resting period  6/3/2025 0740 by Abimbola Mullen RN  Outcome: Progressing     Problem: ABCDS Injury Assessment  Goal: Absence of physical injury  Outcome: Progressing     Problem: Nutrition Deficit:  Goal: Optimize nutritional status  Outcome: Progressing     Problem: Confusion  Goal: Confusion, delirium, dementia, or psychosis is improved or at baseline  Description: INTERVENTIONS:1. Assess for possible contributors to thought disturbance, including medications, impaired vision or hearing, underlying metabolic abnormalities, dehydration, psychiatric diagnoses, and notify attending LIP2. [place] high risk fall precautions, as indicated3. Provide frequent short contacts to provide reality reorientation, refocusing and direction4. Decrease environmental stimuli, including noise as appropriate5. Monitor and intervene to maintain adequate nutrition, hydration, elimination, sleep and activity6. If unable to ensure safety without constant attention obtain sitter and review 
  Problem: Chronic Conditions and Co-morbidities  Goal: Patient's chronic conditions and co-morbidity symptoms are monitored and maintained or improved  Outcome: Progressing     Problem: Discharge Planning  Goal: Discharge to home or other facility with appropriate resources  Outcome: Progressing     Problem: Safety - Adult  Goal: Free from fall injury  Outcome: Progressing     Problem: Pain  Goal: Verbalizes/displays adequate comfort level or baseline comfort level  Outcome: Progressing     Problem: Skin/Tissue Integrity  Goal: Skin integrity remains intact  Description: 1.  Monitor for areas of redness and/or skin breakdown2.  Assess vascular access sites hourly3.  Every 4-6 hours minimum:  Change oxygen saturation probe site4.  Every 4-6 hours:  If on nasal continuous positive airway pressure, respiratory therapy assess nares and determine need for appliance change or resting period  Outcome: Progressing     Problem: ABCDS Injury Assessment  Goal: Absence of physical injury  Outcome: Progressing     Problem: Nutrition Deficit:  Goal: Optimize nutritional status  Outcome: Progressing - Patient anticipating receiving a formal swallow eval by speech therapy on Sunday     Problem: Confusion  Goal: Confusion, delirium, dementia, or psychosis is improved or at baseline  Description: INTERVENTIONS:1. Assess for possible contributors to thought disturbance, including medications, impaired vision or hearing, underlying metabolic abnormalities, dehydration, psychiatric diagnoses, and notify attending LIP2. [place] high risk fall precautions, as indicated3. Provide frequent short contacts to provide reality reorientation, refocusing and direction4. Decrease environmental stimuli, including noise as appropriate5. Monitor and intervene to maintain adequate nutrition, hydration, elimination, sleep and activity6. If unable to ensure safety without constant attention obtain sitter and review sitter guidelines with assigned 
  Problem: Chronic Conditions and Co-morbidities  Goal: Patient's chronic conditions and co-morbidity symptoms are monitored and maintained or improved  Outcome: Progressing     Problem: Discharge Planning  Goal: Discharge to home or other facility with appropriate resources  Outcome: Progressing     Problem: Safety - Adult  Goal: Free from fall injury  Outcome: Progressing     Problem: Pain  Goal: Verbalizes/displays adequate comfort level or baseline comfort level  Outcome: Progressing     Problem: Skin/Tissue Integrity  Goal: Skin integrity remains intact  Description: 1.  Monitor for areas of redness and/or skin breakdown2.  Assess vascular access sites hourly3.  Every 4-6 hours minimum:  Change oxygen saturation probe site4.  Every 4-6 hours:  If on nasal continuous positive airway pressure, respiratory therapy assess nares and determine need for appliance change or resting period  Outcome: Progressing  Note: Checked for incontinence frequently and repositioned as needed. Patient often seen moving around in bed by herself though.      Problem: ABCDS Injury Assessment  Goal: Absence of physical injury  Outcome: Progressing     Problem: Nutrition Deficit:  Goal: Optimize nutritional status  Outcome: Progressing  Note: Received approval from speech therapy to start on a \"easy to chew\" diet with thickened liquids.      Problem: Respiratory - Adult  Goal: Achieves optimal ventilation and oxygenation  Outcome: Progressing     Problem: Cardiovascular - Adult  Goal: Maintains optimal cardiac output and hemodynamic stability  Outcome: Progressing  Goal: Absence of cardiac dysrhythmias or at baseline  Outcome: Progressing     Problem: Skin/Tissue Integrity - Adult  Goal: Skin integrity remains intact  Description: 1.  Monitor for areas of redness and/or skin breakdown2.  Assess vascular access sites hourly3.  Every 4-6 hours minimum:  Change oxygen saturation probe site4.  Every 4-6 hours:  If on nasal continuous 
  Problem: Chronic Conditions and Co-morbidities  Goal: Patient's chronic conditions and co-morbidity symptoms are monitored and maintained or improved  Outcome: Progressing     Problem: Discharge Planning  Goal: Discharge to home or other facility with appropriate resources  Outcome: Progressing     Problem: Safety - Adult  Goal: Free from fall injury  Outcome: Progressing     Problem: Pain  Goal: Verbalizes/displays adequate comfort level or baseline comfort level  Outcome: Progressing  Flowsheets (Taken 5/28/2025 1200)  Verbalizes/displays adequate comfort level or baseline comfort level: Assess pain using appropriate pain scale     Problem: Skin/Tissue Integrity  Goal: Skin integrity remains intact  Description: 1.  Monitor for areas of redness and/or skin breakdown2.  Assess vascular access sites hourly3.  Every 4-6 hours minimum:  Change oxygen saturation probe site4.  Every 4-6 hours:  If on nasal continuous positive airway pressure, respiratory therapy assess nares and determine need for appliance change or resting period  Outcome: Progressing     Problem: ABCDS Injury Assessment  Goal: Absence of physical injury  Outcome: Progressing     Problem: Nutrition Deficit:  Goal: Optimize nutritional status  Outcome: Progressing     Problem: Safety - Medical Restraint  Goal: Remains free of injury from restraints (Restraint for Interference with Medical Device)  Description: INTERVENTIONS:1. Determine that other, less restrictive measures have been tried or would not be effective before applying the restraint2. Evaluate the patient's condition at the time of restraint application3. Inform patient/family regarding the reason for restraint4. Q2H: Monitor safety, psychosocial status, comfort, nutrition and hydration  Outcome: Completed  Flowsheets  Taken 5/28/2025 1200 by Lawrence Camacho RN  Remains free of injury from restraints (restraint for interference with medical device): Determine that other, less restrictive 
  Problem: Chronic Conditions and Co-morbidities  Goal: Patient's chronic conditions and co-morbidity symptoms are monitored and maintained or improved  Outcome: Progressing     Problem: Discharge Planning  Goal: Discharge to home or other facility with appropriate resources  Outcome: Progressing     Problem: Safety - Adult  Goal: Free from fall injury  Outcome: Progressing     Problem: Safety - Medical Restraint  Goal: Remains free of injury from restraints (Restraint for Interference with Medical Device)  Description: INTERVENTIONS:1. Determine that other, less restrictive measures have been tried or would not be effective before applying the restraint2. Evaluate the patient's condition at the time of restraint application3. Inform patient/family regarding the reason for restraint4. Q2H: Monitor safety, psychosocial status, comfort, nutrition and hydration  Outcome: Progressing  Flowsheets  Taken 5/23/2025 0800 by Eduarda Schaefer RN  Remains free of injury from restraints (restraint for interference with medical device):   Determine that other, less restrictive measures have been tried or would not be effective before applying the restraint   Evaluate the patient's condition at the time of restraint application   Inform patient/family regarding the reason for restraint   Every 2 hours: Monitor safety, psychosocial status, comfort, nutrition and hydration  Taken 5/23/2025 0600 by La Amin RN  Remains free of injury from restraints (restraint for interference with medical device):   Determine that other, less restrictive measures have been tried or would not be effective before applying the restraint   Evaluate the patient's condition at the time of restraint application   Inform patient/family regarding the reason for restraint   Every 2 hours: Monitor safety, psychosocial status, comfort, nutrition and hydration  Taken 5/23/2025 0400 by La Amin RN  Remains free of injury from restraints (restraint for 
  Problem: Chronic Conditions and Co-morbidities  Goal: Patient's chronic conditions and co-morbidity symptoms are monitored and maintained or improved  Outcome: Progressing     Problem: Discharge Planning  Goal: Discharge to home or other facility with appropriate resources  Outcome: Progressing     Problem: Safety - Adult  Goal: Free from fall injury  Outcome: Progressing     Problem: Safety - Medical Restraint  Goal: Remains free of injury from restraints (Restraint for Interference with Medical Device)  Description: INTERVENTIONS:1. Determine that other, less restrictive measures have been tried or would not be effective before applying the restraint2. Evaluate the patient's condition at the time of restraint application3. Inform patient/family regarding the reason for restraint4. Q2H: Monitor safety, psychosocial status, comfort, nutrition and hydration  Outcome: Progressing  Flowsheets  Taken 5/24/2025 0200 by Ryan Kahn RN  Remains free of injury from restraints (restraint for interference with medical device): Every 2 hours: Monitor safety, psychosocial status, comfort, nutrition and hydration  Taken 5/24/2025 0000 by Ryan Kahn RN  Remains free of injury from restraints (restraint for interference with medical device): Every 2 hours: Monitor safety, psychosocial status, comfort, nutrition and hydration  Taken 5/23/2025 2200 by Ryan Kahn RN  Remains free of injury from restraints (restraint for interference with medical device): Every 2 hours: Monitor safety, psychosocial status, comfort, nutrition and hydration  Taken 5/23/2025 2000 by Ryan Kahn RN  Remains free of injury from restraints (restraint for interference with medical device): Every 2 hours: Monitor safety, psychosocial status, comfort, nutrition and hydration    Problem: Pain  Goal: Verbalizes/displays adequate comfort level or baseline comfort level  Outcome: Progressing     Problem: Skin/Tissue Integrity  Goal: 
  Problem: Chronic Conditions and Co-morbidities  Goal: Patient's chronic conditions and co-morbidity symptoms are monitored and maintained or improved  Outcome: Progressing  Flowsheets  Taken 5/26/2025 2121 by Dejon Mauro RN  Care Plan - Patient's Chronic Conditions and Co-Morbidity Symptoms are Monitored and Maintained or Improved: Monitor and assess patient's chronic conditions and comorbid symptoms for stability, deterioration, or improvement    Problem: Discharge Planning  Goal: Discharge to home or other facility with appropriate resources  Outcome: Progressing  Flowsheets  Taken 5/26/2025 2121 by Dejon Mauro RN  Discharge to home or other facility with appropriate resources: Identify barriers to discharge with patient and caregiver    Problem: Safety - Adult  Goal: Free from fall injury  Outcome: Progressing  Flowsheets  Taken 5/26/2025 2121  Free From Fall Injury: Instruct family/caregiver on patient safety  Taken 5/26/2025 2000  Free From Fall Injury: Instruct family/caregiver on patient safety     Problem: Safety - Medical Restraint  Goal: Remains free of injury from restraints (Restraint for Interference with Medical Device)  Description: INTERVENTIONS:1. Determine that other, less restrictive measures have been tried or would not be effective before applying the restraint2. Evaluate the patient's condition at the time of restraint application3. Inform patient/family regarding the reason for restraint4. Q2H: Monitor safety, psychosocial status, comfort, nutrition and hydration  Outcome: Progressing  Flowsheets  Taken 5/26/2025 2121 by Dejon Mauro RN  Remains free of injury from restraints (restraint for interference with medical device): Determine that other, less restrictive measures have been tried or would not be effective before applying the restraint  Taken 5/26/2025 2000 by Dejon Mauro RN  Remains free of injury from restraints (restraint for interference with medical device):   
  Problem: Chronic Conditions and Co-morbidities  Goal: Patient's chronic conditions and co-morbidity symptoms are monitored and maintained or improved  Outcome: Progressing  Flowsheets (Taken 5/19/2025 2000 by Rudy Bobby, RN)  Care Plan - Patient's Chronic Conditions and Co-Morbidity Symptoms are Monitored and Maintained or Improved:   Monitor and assess patient's chronic conditions and comorbid symptoms for stability, deterioration, or improvement   Collaborate with multidisciplinary team to address chronic and comorbid conditions and prevent exacerbation or deterioration   Update acute care plan with appropriate goals if chronic or comorbid symptoms are exacerbated and prevent overall improvement and discharge     Problem: Discharge Planning  Goal: Discharge to home or other facility with appropriate resources  Outcome: Progressing  Flowsheets (Taken 5/19/2025 2000 by Rudy Bobby, RN)  Discharge to home or other facility with appropriate resources:   Identify barriers to discharge with patient and caregiver   Arrange for needed discharge resources and transportation as appropriate   Identify discharge learning needs (meds, wound care, etc)   Arrange for interpreters to assist at discharge as needed   Refer to discharge planning if patient needs post-hospital services based on physician order or complex needs related to functional status, cognitive ability or social support system     Problem: Safety - Adult  Goal: Free from fall injury  Outcome: Progressing     Problem: Safety - Medical Restraint  Goal: Remains free of injury from restraints (Restraint for Interference with Medical Device)  Description: INTERVENTIONS:1. Determine that other, less restrictive measures have been tried or would not be effective before applying the restraint2. Evaluate the patient's condition at the time of restraint application3. Inform patient/family regarding the reason for restraint4. Q2H: Monitor safety, psychosocial 
  Problem: Chronic Conditions and Co-morbidities  Goal: Patient's chronic conditions and co-morbidity symptoms are monitored and maintained or improved  Outcome: Progressing  Flowsheets (Taken 5/20/2025 2000)  Care Plan - Patient's Chronic Conditions and Co-Morbidity Symptoms are Monitored and Maintained or Improved:   Monitor and assess patient's chronic conditions and comorbid symptoms for stability, deterioration, or improvement   Collaborate with multidisciplinary team to address chronic and comorbid conditions and prevent exacerbation or deterioration   Update acute care plan with appropriate goals if chronic or comorbid symptoms are exacerbated and prevent overall improvement and discharge     Problem: Discharge Planning  Goal: Discharge to home or other facility with appropriate resources  Outcome: Progressing  Flowsheets (Taken 5/20/2025 2000)  Discharge to home or other facility with appropriate resources:   Identify barriers to discharge with patient and caregiver   Arrange for needed discharge resources and transportation as appropriate   Identify discharge learning needs (meds, wound care, etc)   Arrange for interpreters to assist at discharge as needed   Refer to discharge planning if patient needs post-hospital services based on physician order or complex needs related to functional status, cognitive ability or social support system     Problem: Safety - Adult  Goal: Free from fall injury  Outcome: Progressing  Flowsheets (Taken 5/21/2025 0230)  Free From Fall Injury:   Instruct family/caregiver on patient safety   Based on caregiver fall risk screen, instruct family/caregiver to ask for assistance with transferring infant if caregiver noted to have fall risk factors     Problem: Safety - Medical Restraint  Goal: Remains free of injury from restraints (Restraint for Interference with Medical Device)  Description: INTERVENTIONS:1. Determine that other, less restrictive measures have been tried or would 
  Problem: Chronic Conditions and Co-morbidities  Goal: Patient's chronic conditions and co-morbidity symptoms are monitored and maintained or improved  Outcome: Progressing  Flowsheets (Taken 5/21/2025 2000 by Rudy Bobby, RN)  Care Plan - Patient's Chronic Conditions and Co-Morbidity Symptoms are Monitored and Maintained or Improved:   Monitor and assess patient's chronic conditions and comorbid symptoms for stability, deterioration, or improvement   Collaborate with multidisciplinary team to address chronic and comorbid conditions and prevent exacerbation or deterioration   Update acute care plan with appropriate goals if chronic or comorbid symptoms are exacerbated and prevent overall improvement and discharge     Problem: Discharge Planning  Goal: Discharge to home or other facility with appropriate resources  Outcome: Progressing  Flowsheets (Taken 5/21/2025 2000 by Rudy Bobby, RN)  Discharge to home or other facility with appropriate resources:   Identify barriers to discharge with patient and caregiver   Arrange for needed discharge resources and transportation as appropriate   Identify discharge learning needs (meds, wound care, etc)   Arrange for interpreters to assist at discharge as needed   Refer to discharge planning if patient needs post-hospital services based on physician order or complex needs related to functional status, cognitive ability or social support system     Problem: Safety - Adult  Goal: Free from fall injury  Outcome: Progressing     Problem: Safety - Medical Restraint  Goal: Remains free of injury from restraints (Restraint for Interference with Medical Device)  Description: INTERVENTIONS:1. Determine that other, less restrictive measures have been tried or would not be effective before applying the restraint2. Evaluate the patient's condition at the time of restraint application3. Inform patient/family regarding the reason for restraint4. Q2H: Monitor safety, psychosocial 
  Problem: Chronic Conditions and Co-morbidities  Goal: Patient's chronic conditions and co-morbidity symptoms are monitored and maintained or improved  Outcome: Progressing  Flowsheets (Taken 5/30/2025 0800)  Care Plan - Patient's Chronic Conditions and Co-Morbidity Symptoms are Monitored and Maintained or Improved:   Monitor and assess patient's chronic conditions and comorbid symptoms for stability, deterioration, or improvement   Collaborate with multidisciplinary team to address chronic and comorbid conditions and prevent exacerbation or deterioration     Problem: Discharge Planning  Goal: Discharge to home or other facility with appropriate resources  Outcome: Progressing  Flowsheets (Taken 5/30/2025 0800)  Discharge to home or other facility with appropriate resources:   Identify barriers to discharge with patient and caregiver   Arrange for needed discharge resources and transportation as appropriate     Problem: Safety - Adult  Goal: Free from fall injury  Outcome: Progressing  Flowsheets (Taken 5/30/2025 1323)  Free From Fall Injury: Instruct family/caregiver on patient safety     Problem: Pain  Goal: Verbalizes/displays adequate comfort level or baseline comfort level  Outcome: Progressing  Flowsheets (Taken 5/30/2025 0745)  Verbalizes/displays adequate comfort level or baseline comfort level:   Encourage patient to monitor pain and request assistance   Assess pain using appropriate pain scale   Administer analgesics based on type and severity of pain and evaluate response     Problem: Skin/Tissue Integrity  Goal: Skin integrity remains intact  Description: 1.  Monitor for areas of redness and/or skin breakdown2.  Assess vascular access sites hourly3.  Every 4-6 hours minimum:  Change oxygen saturation probe site4.  Every 4-6 hours:  If on nasal continuous positive airway pressure, respiratory therapy assess nares and determine need for appliance change or resting period  Outcome: Progressing  Flowsheets 
  Problem: Chronic Conditions and Co-morbidities  Goal: Patient's chronic conditions and co-morbidity symptoms are monitored and maintained or improved  Outcome: Progressing  Flowsheets (Taken 6/3/2025 0800 by Abimbola Mullen, RN)  Care Plan - Patient's Chronic Conditions and Co-Morbidity Symptoms are Monitored and Maintained or Improved: Monitor and assess patient's chronic conditions and comorbid symptoms for stability, deterioration, or improvement     Problem: Safety - Adult  Goal: Free from fall injury  Outcome: Progressing  Flowsheets (Taken 5/30/2025 1323 by Liz Zee, RN)  Free From Fall Injury: Instruct family/caregiver on patient safety     Problem: Pain  Goal: Verbalizes/displays adequate comfort level or baseline comfort level  Outcome: Progressing  Flowsheets (Taken 6/3/2025 1238 by Abimbola Mullen, RN)  Verbalizes/displays adequate comfort level or baseline comfort level: Encourage patient to monitor pain and request assistance     Problem: Skin/Tissue Integrity  Goal: Skin integrity remains intact  Description: 1.  Monitor for areas of redness and/or skin breakdown2.  Assess vascular access sites hourly3.  Every 4-6 hours minimum:  Change oxygen saturation probe site4.  Every 4-6 hours:  If on nasal continuous positive airway pressure, respiratory therapy assess nares and determine need for appliance change or resting period  Outcome: Progressing  Flowsheets (Taken 6/3/2025 0800 by Abimbola Mullen, RN)  Skin Integrity Remains Intact: Monitor for areas of redness and/or skin breakdown     Problem: ABCDS Injury Assessment  Goal: Absence of physical injury  Outcome: Progressing  Flowsheets (Taken 5/31/2025 2134 by Tameka Booker, RN)  Absence of Physical Injury: Implement safety measures based on patient assessment     Problem: Nutrition Deficit:  Goal: Optimize nutritional status  Outcome: Progressing  Flowsheets (Taken 6/3/2025 0954 by Eduarda Holloway, RACHAEL)  Nutrient intake appropriate for 
  Problem: Confusion  Goal: Confusion, delirium, dementia, or psychosis is improved or at baseline  Description: INTERVENTIONS:1. Assess for possible contributors to thought disturbance, including medications, impaired vision or hearing, underlying metabolic abnormalities, dehydration, psychiatric diagnoses, and notify attending LIP2. [place] high risk fall precautions, as indicated3. Provide frequent short contacts to provide reality reorientation, refocusing and direction4. Decrease environmental stimuli, including noise as appropriate5. Monitor and intervene to maintain adequate nutrition, hydration, elimination, sleep and activity6. If unable to ensure safety without constant attention obtain sitter and review sitter guidelines with assigned personnel7. Initiate Psychosocial CNS and Spiritual Care consult, as indicated  INTERVENTIONS:1. Assess for possible contributors to thought disturbance, including medications, impaired vision or hearing, underlying metabolic abnormalities, dehydration, psychiatric diagnoses, and notify attending LIP2. [place] high risk fall precautions, as indicated3. Provide frequent short contacts to provide reality reorientation, refocusing and direction4. Decrease environmental stimuli, including noise as appropriate5. Monitor and intervene to maintain adequate nutrition, hydration, elimination, sleep and activity6. If unable to ensure safety without constant attention obtain sitter and review sitter guidelines with assigned personnel7. Initiate Psychosocial CNS and Spiritual Care consult, as indicated  Outcome: Not Progressing  Note: Continues to have confusion at times though is redirectable.      Problem: Neurosensory - Adult  Goal: Achieves stable or improved neurological status  Outcome: Not Progressing  Note: Able to answer orientation questions correctly and follow commands but is often impulsive and sometimes forgetful of recent information.      
  Problem: Confusion  Goal: Confusion, delirium, dementia, or psychosis is improved or at baseline  Description: INTERVENTIONS:1. Assess for possible contributors to thought disturbance, including medications, impaired vision or hearing, underlying metabolic abnormalities, dehydration, psychiatric diagnoses, and notify attending LIP2. [place] high risk fall precautions, as indicated3. Provide frequent short contacts to provide reality reorientation, refocusing and direction4. Decrease environmental stimuli, including noise as appropriate5. Monitor and intervene to maintain adequate nutrition, hydration, elimination, sleep and activity6. If unable to ensure safety without constant attention obtain sitter and review sitter guidelines with assigned personnel7. Initiate Psychosocial CNS and Spiritual Care consult, as indicated  INTERVENTIONS:1. Assess for possible contributors to thought disturbance, including medications, impaired vision or hearing, underlying metabolic abnormalities, dehydration, psychiatric diagnoses, and notify attending LIP2. [place] high risk fall precautions, as indicated3. Provide frequent short contacts to provide reality reorientation, refocusing and direction4. Decrease environmental stimuli, including noise as appropriate5. Monitor and intervene to maintain adequate nutrition, hydration, elimination, sleep and activity6. If unable to ensure safety without constant attention obtain sitter and review sitter guidelines with assigned personnel7. Initiate Psychosocial CNS and Spiritual Care consult, as indicated  6/1/2025 2052 by Tameka Booker, RN  Outcome: Not Progressing  Flowsheets (Taken 5/31/2025 2134)  Effect of thought disturbance (confusion, delirium, dementia, or psychosis) are managed with adequate functional status:   Assess for contributors to thought disturbance, including medications, impaired vision or hearing, underlying metabolic abnormalities, dehydration, psychiatric 
  Problem: Pain  Goal: Verbalizes/displays adequate comfort level or baseline comfort level  5/31/2025 2134 by Tameka Booker RN  Flowsheets (Taken 5/31/2025 2134)  Verbalizes/displays adequate comfort level or baseline comfort level:   Encourage patient to monitor pain and request assistance   Assess pain using appropriate pain scale   Implement non-pharmacological measures as appropriate and evaluate response  5/31/2025 1924 by Aram Hood RN  Outcome: Progressing     Problem: Skin/Tissue Integrity  Goal: Skin integrity remains intact  Description: 1.  Monitor for areas of redness and/or skin breakdown2.  Assess vascular access sites hourly3.  Every 4-6 hours minimum:  Change oxygen saturation probe site4.  Every 4-6 hours:  If on nasal continuous positive airway pressure, respiratory therapy assess nares and determine need for appliance change or resting period  5/31/2025 2134 by Tameka Booker RN  Outcome: Progressing  Flowsheets (Taken 5/31/2025 2000)  Skin Integrity Remains Intact:   Monitor for areas of redness and/or skin breakdown   Assess vascular access sites hourly   Every 4-6 hours minimum:  Change oxygen saturation probe site   Every 4-6 hours:  If on nasal continuous positive airway pressure, assess nares and determine need for appliance change or resting period   Turn and reposition as indicated   Assess need for specialty bed   Positioning devices   Pressure redistribution bed/mattress (bed type)   Check visual cues for pain   Monitor skin under medical devices  5/31/2025 1924 by Aram Hood, RN  Outcome: Progressing     Problem: ABCDS Injury Assessment  Goal: Absence of physical injury  5/31/2025 2134 by Tameka Booker RN  Outcome: Progressing  Flowsheets (Taken 5/31/2025 2134)  Absence of Physical Injury: Implement safety measures based on patient assessment  5/31/2025 1924 by Aram Hood RN  Outcome: Progressing     Problem: Confusion  Goal: Confusion, delirium, dementia, or 
  Problem: Safety - Adult  Goal: Free from fall injury  5/29/2025 1917 by Jared Peralta RN  Outcome: Progressing  Note: Fall risk protocol in place: Bed alarm on, fall risk bracelet applied, yellow indicator in hallway on, non-skid footwear in use.  Patient/family educated on fall risk protocol, instructed to call for assistance when needed.  5/29/2025 1916 by Jared Peralta RN  Outcome: Progressing     Problem: Skin/Tissue Integrity  Goal: Skin integrity remains intact  Description: 1.  Monitor for areas of redness and/or skin breakdown2.  Assess vascular access sites hourly3.  Every 4-6 hours minimum:  Change oxygen saturation probe site4.  Every 4-6 hours:  If on nasal continuous positive airway pressure, respiratory therapy assess nares and determine need for appliance change or resting period  5/29/2025 1917 by Jared Peralta, RN  Outcome: Progressing  Note: Patient unable to turn self adequately in bed, so pillow supports in use with repositioning every two hours. Sacral Heart Mepilex in place to protect, skin intact underneath.  5/29/2025 1916 by Jared Peralta RN  Outcome: Progressing     Problem: Safety - Medical Restraint  Goal: Remains free of injury from restraints (Restraint for Interference with Medical Device)  Description: INTERVENTIONS:1. Determine that other, less restrictive measures have been tried or would not be effective before applying the restraint2. Evaluate the patient's condition at the time of restraint application3. Inform patient/family regarding the reason for restraint4. Q2H: Monitor safety, psychosocial status, comfort, nutrition and hydration  Outcome: Progressing  Flowsheets  Taken 5/29/2025 1800  Remains free of injury from restraints (restraint for interference with medical device):   Determine that other, less restrictive measures have been tried or would not be effective before applying the restraint   Evaluate the patient's condition at the time of restraint application   
  Problem: Safety - Adult  Goal: Free from fall injury  6/4/2025 1018 by Makdea Damian, RN  Outcome: Progressing   Patient has remained free of falls. 2/4 bed rails up, bed locked and in lowest position, call light within reach. Patient instructed on use of call light and uses appropriately. Bed alarm on. Non-skid footwear and fall band on.       Problem: Pain  Goal: Verbalizes/displays adequate comfort level or baseline comfort level  6/4/2025 1018 by Makeda Damian, RN  Outcome: Progressing  Patient denies pain at this time.     
  Problem: Safety - Adult  Goal: Free from fall injury  Outcome: Progressing  Flowsheets (Taken 5/17/2025 0800 by Juliocesar Arias RN)  Free From Fall Injury: Instruct family/caregiver on patient safety     Problem: Safety - Medical Restraint  Goal: Remains free of injury from restraints (Restraint for Interference with Medical Device)  Description: INTERVENTIONS:1. Determine that other, less restrictive measures have been tried or would not be effective before applying the restraint2. Evaluate the patient's condition at the time of restraint application3. Inform patient/family regarding the reason for restraint4. Q2H: Monitor safety, psychosocial status, comfort, nutrition and hydration  Outcome: Progressing  Flowsheets (Taken 5/17/2025 2237)  Remains free of injury from restraints (restraint for interference with medical device):   Determine that other, less restrictive measures have been tried or would not be effective before applying the restraint   Every 2 hours: Monitor safety, psychosocial status, comfort, nutrition and hydration     Problem: Pain  Goal: Verbalizes/displays adequate comfort level or baseline comfort level  Outcome: Progressing  Flowsheets (Taken 5/17/2025 2237)  Verbalizes/displays adequate comfort level or baseline comfort level: Assess pain using appropriate pain scale     Problem: Skin/Tissue Integrity  Goal: Skin integrity remains intact  Description: 1.  Monitor for areas of redness and/or skin breakdown2.  Assess vascular access sites hourly3.  Every 4-6 hours minimum:  Change oxygen saturation probe site4.  Every 4-6 hours:  If on nasal continuous positive airway pressure, respiratory therapy assess nares and determine need for appliance change or resting period  Outcome: Progressing  Flowsheets (Taken 5/17/2025 2237)  Skin Integrity Remains Intact:   Monitor for areas of redness and/or skin breakdown   Turn and reposition as indicated   Check visual cues for pain   Positioning 
  Problem: Safety - Medical Restraint  Goal: Remains free of injury from restraints (Restraint for Interference with Medical Device)  Description: INTERVENTIONS:1. Determine that other, less restrictive measures have been tried or would not be effective before applying the restraint2. Evaluate the patient's condition at the time of restraint application3. Inform patient/family regarding the reason for restraint4. Q2H: Monitor safety, psychosocial status, comfort, nutrition and hydration  Outcome: Completed  Flowsheets  Taken 5/30/2025 0600 by Dona Dean RN  Remains free of injury from restraints (restraint for interference with medical device):   Determine that other, less restrictive measures have been tried or would not be effective before applying the restraint   Evaluate the patient's condition at the time of restraint application   Inform patient/family regarding the reason for restraint   Every 2 hours: Monitor safety, psychosocial status, comfort, nutrition and hydration  Taken 5/30/2025 0400 by Dona Dean RN  Remains free of injury from restraints (restraint for interference with medical device):   Determine that other, less restrictive measures have been tried or would not be effective before applying the restraint   Evaluate the patient's condition at the time of restraint application   Inform patient/family regarding the reason for restraint   Every 2 hours: Monitor safety, psychosocial status, comfort, nutrition and hydration  Taken 5/30/2025 0200 by Dona Dean RN  Remains free of injury from restraints (restraint for interference with medical device):   Determine that other, less restrictive measures have been tried or would not be effective before applying the restraint   Evaluate the patient's condition at the time of restraint application   Inform patient/family regarding the reason for restraint   Every 2 hours: Monitor safety, psychosocial status, comfort, nutrition 
Slowly wean precedex and utilized prn anti anxiety and anti anxiety medications as appropriate. Spoke with Resident this am at bedside would like to wean off precedex if possible to manage throughput of downgrading pt and transfer out of ICU.   Pt still requiring  r/t increased risk of falls secondary to confusion and or restlessness.  
Abimbola Mullen, RN  Outcome: Progressing     Problem: Skin/Tissue Integrity  Goal: Skin integrity remains intact  Description: 1.  Monitor for areas of redness and/or skin breakdown2.  Assess vascular access sites hourly3.  Every 4-6 hours minimum:  Change oxygen saturation probe site4.  Every 4-6 hours:  If on nasal continuous positive airway pressure, respiratory therapy assess nares and determine need for appliance change or resting period  5/25/2025 0828 by Abimbola Mullen, RN  Outcome: Progressing  Flowsheets  Taken 5/25/2025 0800  Skin Integrity Remains Intact: Monitor for areas of redness and/or skin breakdown  Taken 5/25/2025 0700  Skin Integrity Remains Intact: Monitor for areas of redness and/or skin breakdown     Problem: ABCDS Injury Assessment  Goal: Absence of physical injury  5/25/2025 0828 by Abimbola Mullen, RN  Outcome: Progressing  Flowsheets (Taken 5/25/2025 0700)  Absence of Physical Injury: Implement safety measures based on patient assessment     Problem: Nutrition Deficit:  Goal: Optimize nutritional status  5/25/2025 0828 by Abimbola Mullen, RN  Outcome: Progressing     
patient needs post-hospital services based on physician order or complex needs related to functional status, cognitive ability or social support system  Taken 5/18/2025 0800 by Juliocesar Arias, RN  Discharge to home or other facility with appropriate resources: Identify barriers to discharge with patient and caregiver     
on patient assessment     Problem: Nutrition Deficit:  Goal: Optimize nutritional status  5/25/2025 2033 by Dejon Mauro, RN  Outcome: Progressing  Flowsheets (Taken 5/25/2025 2033)  Nutrient intake appropriate for improving, restoring, or maintaining nutritional needs: Assess nutritional status and recommend course of action      Every 2 hours: Monitor safety, psychosocial status, comfort, nutrition and hydration     
indicated  6/2/2025 1341 by La Amin, RN  Outcome: Progressing  Flowsheets (Taken 6/2/2025 1336)  Skin Integrity Remains Intact:   Monitor for areas of redness and/or skin breakdown   Every 4-6 hours minimum:  Change oxygen saturation probe site   Every 4-6 hours:  If on nasal continuous positive airway pressure, assess nares and determine need for appliance change or resting period   Turn and reposition as indicated

## 2025-06-04 NOTE — DISCHARGE INSTR - COC
Continuity of Care Form    Patient Name: Charlene Nazario   :  1950  MRN:  [id number]    Admit date:  2025  Discharge date:  2025    Code Status Order: Full Code   Advance Directives:     Admitting Physician:  Marito Jason MD  PCP: Gerry Foley DO    Discharging Nurse: Makeda  Discharging Hospital Unit/Room#: 5312/5312-01  Discharging Unit Phone Number: [phone]    Emergency Contact:   Extended Emergency Contact Information  Primary Emergency Contact: Norberto Nazario   [facility]  Home Phone: [phone]  Mobile Phone: [phone]  Relation: Spouse  Secondary Emergency Contact: Nesha Lawrence  Mobile Phone: [phone]  Relation: Child    Past Surgical History:  Past Surgical History:   Procedure Laterality Date    APPENDECTOMY       SECTION      FIXATION KYPHOPLASTY  2017    Dr. Adames    IR KYPHOPLASTY LUMBAR 1 VERTEBRAL BODY  2024    IR KYPHOPLASTY LUMBAR FIRST LEVEL 2024 [facility]    LARYNGOPLASTY Right 2022    MEDIALIZATION LARYNGOPLASTY, Dr. Sarina Medrano    OTHER SURGICAL HISTORY  2017    thoracic kyphoplasty    THYROIDECTOMY, PARTIAL Right 2017    Right hemithyroidectomy for goiter,  complicated by vocal cord paralysis    TIBIA FRACTURE SURGERY Left     Dr. Swenson    VOCAL CORD INJECTION         Immunization History:   Immunization History   Administered Date(s) Administered    COVID-19, MODERNA BLUE border, Primary or Immunocompromised, (age 12y+), IM, 100 mcg/0.5mL 2021, 2021, 2021    COVID-19, MODERNA Booster BLUE border, (age 18y+), IM, 50mcg/0.25mL 2022    COVID-19, PFIZER Bivalent, DO NOT Dilute, (age 12y+), IM, 30 mcg/0.3 mL 10/14/2022    COVID-19, PFIZER, , (age 12y+), IM, 30mcg/0.3mL 2023, 11/15/2024    COVID-19, US Vaccine, Vaccine Unspecified 2022    Influenza, FLUAD, (age 65 y+), IM, Quadv, 0.5mL 2021    Influenza, FLUAD, (age

## 2025-06-04 NOTE — CARE COORDINATION
Case Management Assessment            Discharge Note                    Date / Time of Note: 6/4/2025 2:05 PM                  Discharge Note Completed by: Jazz Gutierrez RN    Patient Name: Charlene Nazario   YOB: 1950  Diagnosis: Dyspnea and respiratory abnormalities [R06.00, R06.89]  COPD exacerbation (HCC) [J44.1]  COPD with acute exacerbation (HCC) [J44.1]   Date / Time: 5/16/2025  6:39 AM    Current PCP: Gerry Foley DO  Clinic patient: No    Hospitalization in the last 30 days: Yes  Readmission Assessment  Number of Days since last admission?: 8-30 days  Previous Disposition: Home with Family  Who is being Interviewed: Patient  What was the patient's/caregiver's perception as to why they think they needed to return back to the hospital?: Other (Comment) (new sx)  Did you visit your Primary Care Physician after you left the hospital, before you returned this time?: Yes  Did you see a specialist, such as Cardiac, Pulmonary, Orthopedic Physician, etc. after you left the hospital?: Yes (pulm)  Who advised the patient to return to the hospital?: Self-referral  Does the patient report anything that got in the way of taking their medications?: No  In our efforts to provide the best possible care to you and others like you, can you think of anything that we could have done to help you after you left the hospital the first time, so that you might not have needed to return so soon?: Other (Comment) (na)    Advance Directives:  Code Status: Full Code  [place] DNR form completed and on chart: No    Financial:  Payor: MEDICARE / Plan: MEDICARE PART A AND B / Product Type: *No Product type* /      Ph[facility]    [id number] [place], [place] - [address] P [phone] - F [phone]  [address] [address]  Phone: [phone] Fax: [phone]    [id number] [place], [place] - [address] - P [phone] - F [phone] [address]

## 2025-06-04 NOTE — DISCHARGE SUMMARY
markedly improved  -  off Precedex drip  - On Seroquel     Hypertension- BP trending up   - resume lisinopril if blood pressure trends up     Pneumothorax  - Resolved  - Did not need chest tube     Urinary retention  - resolved      Patient was admitted from 4/19-4/26 for COPD exacerbation         The patient expressed appropriate understanding of, and agreement with the discharge recommendations, medications, and plan.     Consults this admission:  IP CONSULT TO CRITICAL CARE  IP CONSULT TO DIETITIAN  IP CONSULT TO PALLIATIVE CARE    Discharge Diagnosis:   COPD with acute exacerbation (HCC)  Acute hypoxic respiratory failure  Delirium  Pneumonia  Hypertension  Pneumothorax  Urinary retention    Discharge Instruction:   Follow up appointments:pulm  Primary care physician: Gerry Foley DO within 1 week  Diet: regular diet   Activity: activity as tolerated  Disposition: Discharged to:   []Home, []C, [x]SNF, []Acute Rehab, []Hospice   Condition on discharge: Stable  Labs and Tests to be Followed up as an outpatient by PCP or Specialist:     Discharge Medications:        Medication List        START taking these medications      azithromycin 250 MG tablet  Commonly known as: ZITHROMAX  Take 1 tablet by mouth daily for 365 doses     busPIRone 5 MG tablet  Commonly known as: BUSPAR  Take 1 tablet by mouth 3 times daily     hydrOXYzine HCl 25 MG tablet  Commonly known as: ATARAX  Take 1 tablet by mouth 3 times daily as needed for Itching or Anxiety     QUEtiapine 50 MG tablet  Commonly known as: SEROQUEL  Take 1 tablet by mouth at bedtime            CONTINUE taking these medications      * acetaminophen 500 MG tablet  Commonly known as: TYLENOL     * acetaminophen 500 MG tablet  Commonly known as: TYLENOL     * albuterol (2.5 MG/3ML) 0.083% nebulizer solution  Commonly known as: PROVENTIL     * albuterol sulfate  (90 Base) MCG/ACT inhaler  Commonly known as: PROVENTIL;VENTOLIN;PROAIR  INHALE 2 PUFFS INTO THE

## 2025-06-05 RX ORDER — DOXYCYCLINE HYCLATE 100 MG
100 TABLET ORAL 2 TIMES DAILY
COMMUNITY
Start: 2025-06-05 | End: 2025-06-19

## 2025-06-05 RX ORDER — ALPRAZOLAM 0.25 MG
0.25 TABLET ORAL 2 TIMES DAILY PRN
COMMUNITY

## 2025-06-05 RX ORDER — ACETAMINOPHEN 325 MG/1
650 TABLET ORAL EVERY 4 HOURS PRN
COMMUNITY

## 2025-06-05 NOTE — CARE COORDINATION
SECURE email notification sent to identified IDT members at   [facility]

## 2025-06-05 NOTE — CARE COORDINATION
Patient admitted to [facility] following hospital admission for COPD exacerbation, dyspnea, chronic respiratory failure w/ hypoxia, intubated 5/17- 5/28. [facility] admission chart review complete

## 2025-06-09 ENCOUNTER — TELEPHONE (OUTPATIENT)
Dept: INTERNAL MEDICINE CLINIC | Age: 75
End: 2025-06-09

## 2025-06-09 NOTE — TELEPHONE ENCOUNTER
Notify  that the  staff at the [facility] will arrange for home health care.  Once she is home, I will follow and sign documents.

## 2025-06-09 NOTE — TELEPHONE ENCOUNTER
Spouse, Hardy, called to let Dr. Foley know that patient is at [facility] for PT. States she is starting PT today. He is unsure how long she will be there. States she needs to learn how to walk. Unsure if she will be able to take care of herself at home due mental decline & physical issues. Requesting a referral to home health once she is released.    Patient admitted to [facility] following hospital admission for COPD exacerbation, dyspnea, chronic respiratory failure w/ hypoxia, intubated 5/17- 5/28.

## 2025-06-18 ENCOUNTER — TELEPHONE (OUTPATIENT)
Dept: INTERNAL MEDICINE CLINIC | Age: 75
End: 2025-06-18

## 2025-06-18 NOTE — TELEPHONE ENCOUNTER
The patient spouse Norberto is calling in for Dr. Foley in regards to the patient memory getting worse. He wants to see if Dr. Foley can prescribe something for her dementia. Norberto stated it was previously discussed with Dr. Foley about putting the patient on a medication. [facility] Did let him know the patient will probably need a appointment with Dr. Foley to f/u and prescribe. Norberto stated the patient was recently seen by Dr. Foley. Please advise.     Norberto's callback# [phone]

## 2025-06-18 NOTE — TELEPHONE ENCOUNTER
Unable to start medicine [facility] is [facility].  They can certainly start at there they feel the need to do so.  Once she is out, I had be happy to try her on donepezil.

## 2025-06-18 NOTE — TELEPHONE ENCOUNTER
I have seen her.  I will give them a call to confirm what was doing when he lets us know she is out

## 2025-06-18 NOTE — TELEPHONE ENCOUNTER
I called and spoke with Norberto and relayed provider's message.   He states patient will be out at noon on Saturday.   I ask him to call us first thing on Monday morning, so we can address and get patient started on medication.     Just to clarify, do you need to see patient?  Or will previous documentation support the need for treatment?    We can let Norberto know on Monday when he calls.     He voices  understanding and much appreciation for call.

## 2025-06-23 ENCOUNTER — CARE COORDINATION (OUTPATIENT)
Dept: CARE COORDINATION | Age: 75
End: 2025-06-23

## 2025-06-23 ENCOUNTER — TELEPHONE (OUTPATIENT)
Dept: INTERNAL MEDICINE CLINIC | Age: 75
End: 2025-06-23

## 2025-06-23 ENCOUNTER — OFFICE VISIT (OUTPATIENT)
Dept: INTERNAL MEDICINE CLINIC | Age: 75
End: 2025-06-23
Payer: MEDICARE

## 2025-06-23 VITALS
WEIGHT: 113 LBS | DIASTOLIC BLOOD PRESSURE: 80 MMHG | OXYGEN SATURATION: 94 % | SYSTOLIC BLOOD PRESSURE: 136 MMHG | BODY MASS INDEX: 21.35 KG/M2 | TEMPERATURE: 97 F | HEART RATE: 69 BPM

## 2025-06-23 DIAGNOSIS — R60.0 BILATERAL LEG EDEMA: ICD-10-CM

## 2025-06-23 DIAGNOSIS — G31.84 MILD COGNITIVE IMPAIRMENT: ICD-10-CM

## 2025-06-23 DIAGNOSIS — I87.2 CHRONIC VENOUS INSUFFICIENCY: ICD-10-CM

## 2025-06-23 DIAGNOSIS — I50.32 CHRONIC DIASTOLIC CHF (CONGESTIVE HEART FAILURE) (HCC): ICD-10-CM

## 2025-06-23 DIAGNOSIS — J44.9 COPD, MODERATE (HCC): ICD-10-CM

## 2025-06-23 DIAGNOSIS — J96.11 CHRONIC RESPIRATORY FAILURE WITH HYPOXIA (HCC): ICD-10-CM

## 2025-06-23 DIAGNOSIS — S81.811A LACERATION OF RIGHT LOWER LEG, INITIAL ENCOUNTER: ICD-10-CM

## 2025-06-23 DIAGNOSIS — E87.6 HYPOKALEMIA: ICD-10-CM

## 2025-06-23 DIAGNOSIS — Z09 HOSPITAL DISCHARGE FOLLOW-UP: Primary | ICD-10-CM

## 2025-06-23 PROCEDURE — 3075F SYST BP GE 130 - 139MM HG: CPT | Performed by: INTERNAL MEDICINE

## 2025-06-23 PROCEDURE — G8399 PT W/DXA RESULTS DOCUMENT: HCPCS | Performed by: INTERNAL MEDICINE

## 2025-06-23 PROCEDURE — 1111F DSCHRG MED/CURRENT MED MERGE: CPT | Performed by: INTERNAL MEDICINE

## 2025-06-23 PROCEDURE — G8420 CALC BMI NORM PARAMETERS: HCPCS | Performed by: INTERNAL MEDICINE

## 2025-06-23 PROCEDURE — G8427 DOCREV CUR MEDS BY ELIG CLIN: HCPCS | Performed by: INTERNAL MEDICINE

## 2025-06-23 PROCEDURE — 1036F TOBACCO NON-USER: CPT | Performed by: INTERNAL MEDICINE

## 2025-06-23 PROCEDURE — 1123F ACP DISCUSS/DSCN MKR DOCD: CPT | Performed by: INTERNAL MEDICINE

## 2025-06-23 PROCEDURE — 3079F DIAST BP 80-89 MM HG: CPT | Performed by: INTERNAL MEDICINE

## 2025-06-23 PROCEDURE — 3017F COLORECTAL CA SCREEN DOC REV: CPT | Performed by: INTERNAL MEDICINE

## 2025-06-23 PROCEDURE — 3023F SPIROM DOC REV: CPT | Performed by: INTERNAL MEDICINE

## 2025-06-23 PROCEDURE — 99215 OFFICE O/P EST HI 40 MIN: CPT | Performed by: INTERNAL MEDICINE

## 2025-06-23 PROCEDURE — 1159F MED LIST DOCD IN RCRD: CPT | Performed by: INTERNAL MEDICINE

## 2025-06-23 PROCEDURE — 1090F PRES/ABSN URINE INCON ASSESS: CPT | Performed by: INTERNAL MEDICINE

## 2025-06-23 NOTE — PATIENT INSTRUCTIONS
TO Do List:    #1 Obtain MRI brain at your convenience.      Keep wound clean with Dial Soap and water    Pat dry    Place triple antibiotic on wound daily    Observe for increasing redness, drainage, pain, fever

## 2025-06-23 NOTE — TELEPHONE ENCOUNTER
[facility] called into the office for Dr. Foley to see if he will follow the patient for homecare nursing, PT/OT. Also letting him know the [facility]. Please advise.     Barbie's callback# with secured line [phone]

## 2025-06-23 NOTE — PROGRESS NOTES
[facility]  Internal Medicine  Patient Encounter  Gerry Foley D.O., FACOI        Post-Discharge Transitional Care Management Progress Note      Charlene Nazario   YOB: 1950    Date of Office Visit:  6/23/2025    [facility]  Date of Hospital Admission: 5/16/2025  Date of Hospital Discharge: 6/4/2025    [facility]  Date of admission: 6/4/2025  Date of discharge: 6/20/2025    Care management risk score Rising risk (score 2-5) and Complex Care (Scores >=6): No Risk Score On File     Non face to face  following discharge, date last encounter closed (first attempt may have been earlier): *No documented post hospital discharge outreach found in the last 14 days *No documented post hospital discharge outreach found in the last 14 days    Call initiated 2 business days of discharge: *No response recorded in the last 14 days    ASSESSMENT/PLAN:         1. Hospital discharge follow-up  Status post hospital admission on 5/16/2025 and then skilled nursing facility stay on 6/4/2025.  Patient presented the hospital in respiratory distress.  Patient eventually decompensated and required intubation.  Patient diagnosed with exacerbation of COPD with hypercarbia.  Patient was treated in the usual fashion.  She was discharged to the skilled nursing facility for rehab.  While there she sustained a leg injury after falling off of her wheelchair.  It is not clear what she struck her leg on.  The  states that they placed Steri-Strips which were falling off over the last couple of days.  The patient pulled the remaining Steri-Strips off only to find an open wound.  According to the , he thinks they referred them to home health care but has no paperwork.  No one has contacted them.  Referral placed for home health care for RN, PT, OT  - MD DISCHARGE MEDS RECONCILED W/ CURRENT OUTPATIENT MED LIST    2. Chronic respiratory failure with hypoxia (HCC)  Stable on O2  Follow-up with

## 2025-06-24 ENCOUNTER — RESULTS FOLLOW-UP (OUTPATIENT)
Dept: INTERNAL MEDICINE CLINIC | Age: 75
End: 2025-06-24

## 2025-06-24 LAB
ANION GAP SERPL CALCULATED.3IONS-SCNC: 9 MMOL/L (ref 3–16)
BASOPHILS # BLD: 0.1 K/UL (ref 0–0.2)
BASOPHILS NFR BLD: 0.9 %
BUN SERPL-MCNC: 10 MG/DL (ref 7–20)
CALCIUM SERPL-MCNC: 9.1 MG/DL (ref 8.3–10.6)
CHLORIDE SERPL-SCNC: 100 MMOL/L (ref 99–110)
CO2 SERPL-SCNC: 28 MMOL/L (ref 21–32)
CREAT SERPL-MCNC: 0.4 MG/DL (ref 0.6–1.2)
DEPRECATED RDW RBC AUTO: 13.7 % (ref 12.4–15.4)
EOSINOPHIL # BLD: 0.3 K/UL (ref 0–0.6)
EOSINOPHIL NFR BLD: 4.3 %
GFR SERPLBLD CREATININE-BSD FMLA CKD-EPI: >90 ML/MIN/{1.73_M2}
GLUCOSE SERPL-MCNC: 84 MG/DL (ref 70–99)
HCT VFR BLD AUTO: 34 % (ref 36–48)
HGB BLD-MCNC: 11 G/DL (ref 12–16)
LYMPHOCYTES # BLD: 2.7 K/UL (ref 1–5.1)
LYMPHOCYTES NFR BLD: 43.3 %
MCH RBC QN AUTO: 31.1 PG (ref 26–34)
MCHC RBC AUTO-ENTMCNC: 32.4 G/DL (ref 31–36)
MCV RBC AUTO: 96.2 FL (ref 80–100)
MONOCYTES # BLD: 0.6 K/UL (ref 0–1.3)
MONOCYTES NFR BLD: 9.3 %
NEUTROPHILS # BLD: 2.6 K/UL (ref 1.7–7.7)
NEUTROPHILS NFR BLD: 42.2 %
NT-PROBNP SERPL-MCNC: 355 PG/ML (ref 0–124)
PLATELET # BLD AUTO: 345 K/UL (ref 135–450)
PMV BLD AUTO: 7.9 FL (ref 5–10.5)
POTASSIUM SERPL-SCNC: 4.3 MMOL/L (ref 3.5–5.1)
RBC # BLD AUTO: 3.54 M/UL (ref 4–5.2)
SODIUM SERPL-SCNC: 137 MMOL/L (ref 136–145)
WBC # BLD AUTO: 6.1 K/UL (ref 4–11)

## 2025-06-24 NOTE — TELEPHONE ENCOUNTER
I will be the one following her as her primary care physician.  Please make sure she has an appointment with wound care ASAP

## 2025-06-24 NOTE — TELEPHONE ENCOUNTER
Spoke with patient's  - they have an appt Thursday @ 2:00 with wound care. If you need anything sooner please advise.

## 2025-06-24 NOTE — TELEPHONE ENCOUNTER
Barbie with [facility] called back into the office in regards to the message that was sent yesterday. She is needing verbal orders for the [facility] homecare PT/OT and nursing. Please advise.

## 2025-06-25 NOTE — TELEPHONE ENCOUNTER
I called and spoke to Barbie and she gave verbal understanding and stated they will get the patient seen.

## 2025-06-26 ENCOUNTER — CARE COORDINATION (OUTPATIENT)
Dept: CARE COORDINATION | Age: 75
End: 2025-06-26

## 2025-06-26 ENCOUNTER — HOSPITAL ENCOUNTER (OUTPATIENT)
Dept: WOUND CARE | Age: 75
Discharge: HOME | End: 2025-06-26
Attending: STUDENT IN AN ORGANIZED HEALTH CARE EDUCATION/TRAINING PROGRAM
Payer: MEDICARE

## 2025-06-26 VITALS
TEMPERATURE: 97 F | HEART RATE: 84 BPM | WEIGHT: 113 LBS | SYSTOLIC BLOOD PRESSURE: 120 MMHG | BODY MASS INDEX: 21.35 KG/M2 | OXYGEN SATURATION: 94 % | DIASTOLIC BLOOD PRESSURE: 87 MMHG

## 2025-06-26 DIAGNOSIS — L97.212 NON-PRESSURE CHRONIC ULCER OF RIGHT CALF WITH FAT LAYER EXPOSED (HCC): Primary | ICD-10-CM

## 2025-06-26 PROCEDURE — 29581 APPL MULTLAYER CMPRN SYS LEG: CPT

## 2025-06-26 PROCEDURE — 99213 OFFICE O/P EST LOW 20 MIN: CPT

## 2025-06-26 PROCEDURE — 11042 DBRDMT SUBQ TIS 1ST 20SQCM/<: CPT

## 2025-06-26 RX ORDER — SODIUM CHLOR/HYPOCHLOROUS ACID 0.033 %
SOLUTION, IRRIGATION IRRIGATION PRN
OUTPATIENT
Start: 2025-06-26

## 2025-06-26 RX ORDER — BETAMETHASONE DIPROPIONATE 0.5 MG/G
CREAM TOPICAL PRN
OUTPATIENT
Start: 2025-06-26

## 2025-06-26 RX ORDER — CLOBETASOL PROPIONATE 0.5 MG/G
OINTMENT TOPICAL PRN
OUTPATIENT
Start: 2025-06-26

## 2025-06-26 RX ORDER — BACITRACIN ZINC AND POLYMYXIN B SULFATE 500; 1000 [USP'U]/G; [USP'U]/G
OINTMENT TOPICAL PRN
OUTPATIENT
Start: 2025-06-26

## 2025-06-26 RX ORDER — GENTAMICIN SULFATE 1 MG/G
OINTMENT TOPICAL PRN
OUTPATIENT
Start: 2025-06-26

## 2025-06-26 RX ORDER — MUPIROCIN 2 %
OINTMENT (GRAM) TOPICAL PRN
OUTPATIENT
Start: 2025-06-26

## 2025-06-26 RX ORDER — LIDOCAINE HYDROCHLORIDE 40 MG/ML
SOLUTION TOPICAL PRN
OUTPATIENT
Start: 2025-06-26

## 2025-06-26 RX ORDER — SILVER SULFADIAZINE 10 MG/G
CREAM TOPICAL PRN
OUTPATIENT
Start: 2025-06-26

## 2025-06-26 RX ORDER — GINSENG 100 MG
CAPSULE ORAL PRN
OUTPATIENT
Start: 2025-06-26

## 2025-06-26 RX ORDER — LIDOCAINE 50 MG/G
OINTMENT TOPICAL PRN
OUTPATIENT
Start: 2025-06-26

## 2025-06-26 RX ORDER — LIDOCAINE HYDROCHLORIDE 20 MG/ML
JELLY TOPICAL PRN
OUTPATIENT
Start: 2025-06-26

## 2025-06-26 RX ORDER — LIDOCAINE 40 MG/G
CREAM TOPICAL PRN
OUTPATIENT
Start: 2025-06-26

## 2025-06-26 RX ORDER — TRIAMCINOLONE ACETONIDE 1 MG/G
OINTMENT TOPICAL PRN
OUTPATIENT
Start: 2025-06-26

## 2025-06-26 RX ORDER — NEOMYCIN/BACITRACIN/POLYMYXINB 3.5-400-5K
OINTMENT (GRAM) TOPICAL PRN
OUTPATIENT
Start: 2025-06-26

## 2025-06-26 NOTE — PROGRESS NOTES
[facility]  Progress Note and Procedure Note      Charlene Nazario  MEDICAL RECORD NUMBER:  [id number]  AGE: 75 y.o.   GENDER: female  : 1950  EPISODE DATE:  2025    Subjective:     Chief Complaint   Patient presents with    Wound Check         HISTORY of PRESENT ILLNESS HPI     Charlene Nazario is a 75 y.o. female who presents today for wound/ulcer evaluation.   History of Wound Context: Patient relates she injured her right leg during a fall.  She relates the facility she was at used Steri-strips, however it did not hold the laceration together.  Patient relates she does have issues with swelling in her legs.  Wound/Ulcer Pain Timing/Severity: intermittent, mild  Quality of pain: sharp  Severity:  3 / 10   Modifying Factors: Pain worsens with walking  Associated Signs/Symptoms: edema    Ulcer Identification:  Ulcer Type: venous and traumatic    Contributing Factors: edema    Acute Wound: N/A not an acute wound        PAST MEDICAL HISTORY        Diagnosis Date    Allergic rhinitis     Anxiety 2022    Back pain     Chronic:under care of spine specialist:Dr. Adames    Cervical cancer screening     Nml per pt'    Closed compression fracture of L3 vertebra (HCC) 2024    Compression fracture of thoracic vertebrae, non-traumatic (HCC)     under care of endo:Dr. Zhu    COPD (chronic obstructive pulmonary disease) (HCC)     under care of pulmo:Dr. Nazario    COPD exacerbation (HCC) 2017    Elevated LDL cholesterol level     GERD (gastroesophageal reflux disease)     History of mammogram ;17    Nml per pt'. 17=negative.    HLD (hyperlipidemia) 2021    Hoarseness of voice 10/04/2017    Hypertension     Lung nodule:left 2017     1.2 cm indeterminate left upper lobe pulmonary nodule:under care of pulmo:Dr. Nazario    Mucus plugging of bronchi 2017    Osteoarthritis     Osteomyelitis of left leg (HCC)     Osteoporosis     under care

## 2025-06-26 NOTE — PROGRESS NOTES
Multilayer Compression Wrap   (Not Unna) Below the Knee    NAME:  Charlene Nazario  YOB: 1950  MEDICAL RECORD NUMBER:  [id number]  DATE:  6/26/2025       Removed old Multilayer wrap if present and washed leg with mild soap/water.   Applied moisturizing agent to dry skin as needed.    Applied primary and secondary dressing as ordered    Applied multilayered dressing below the knee to Right lower leg(s)  (2 Layer Lite compression wrap ) .    Instructed patient/caregiver not to remove dressing and to keep it clean and dry.    Instructed patient/caregiver on complications to report to provider, such as pain, numbness in toes, heavy drainage, and slippage of dressing.    Instructed patient on purpose of compression dressing and on activity and exercise recommendations.   Applied per   Guidelines    Electronically signed by Tad Moran RN on 6/26/25 at 3:58 PM EDT

## 2025-06-26 NOTE — PATIENT INSTRUCTIONS
[facility]  Patient Instructions and Physician Orders  [address]  Telephone: [phone] FAX [phone]    NAME:  Charlene Nazario  YOB: 1950  DATE: 6/26/2025       Return Appointment:  Return Appointment: With Ryan Walsh DPM  in  1 Week(s)  [] Return Appointment for a Wound Assessment with the nurse on:     Future Appointments   Date Time Provider Department Center   6/28/2025  7:30 AM [facility] MRI RM 1 [facility] Radio   7/15/2025  9:30 AM Gerry Foley DO [facility] DEP   8/7/2025 10:15 AM Tru Campo MD [facility]         No orders of the defined types were placed in this encounter.      Home Care Company: none    Medically necessary services for evaluation and treatment:   []Skilled Nursing (using clean technique) []PT (Eval & Treat) []OT (Eval & Treat) []Social Work []Dietician []Other:      Wound care instructions:  If you smoke we ask that you refrain from smoking. Smoking inhibits wounds from healing.  When taking antibiotics take the entire prescription as ordered. Do not stop taking until medication is all gone unless otherwise instructed.   Exercise as tolerated.   Keep weight off wounds and reposition every 2 hours if applicable.  Do not get wounds wet in the bath or shower unless otherwise instructed by your physician. If your wound is on your foot or leg, you may purchase a cast bag/cast cover. This may be purchased at any pharmacy or online.  Wash hands with soap and water prior to and after every dressing change.    [x]Wash wounds with: 0.9% normal saline  [x][place] wound Topical Treatments: Do not apply lotions, creams, or ointments to the skin around the wound bed unless directed as followed:   Apply around the wound: Nothing         [x]Wound Location: right lower leg   Apply Primary Dressing to wound: Hydrofiber with silver (ie. Opticell Ag, Aquacel Ag)  Secondary Dressing: 4X4 gauze pad   Avoid contact

## 2025-06-27 ENCOUNTER — CARE COORDINATION (OUTPATIENT)
Dept: CARE COORDINATION | Age: 75
End: 2025-06-27

## 2025-06-27 NOTE — CARE COORDINATION
Ambulatory Care Coordination Note     2025 3:33 PM     Patient Current Location:  Home: [address]  [facility] [address]     This patient was received as a referral from Care Transition Nurse.    ACM contacted the spouse/partner by telephone. Verified name and  with patient as identifiers. Provided introduction to self, and explanation of the ACM role.   Spouse/Partner accepted care management services at this time.          ACM: Araceli Bai RN     Challenges to be reviewed by the provider   Additional needs identified to be addressed with provider No                 Method of communication with provider: chart routing.    Utilization: Patient has not had any utilization since our last call.     Care Summary Note: spoke w spouse   [facility] was out today for SOC, PT/OT next week  Saw [facility] yesterday, new dressing, will work with them if [facility]HC WWC is needed in addition to their therapy.   did get ramp, grabbars etc and everything is set up.    Pt has concentrator, wears 2 lpm  Has nebulizer   BP machine and pulse ox at home  98% on 2 lpm  Doing well      Offered patient enrollment in the Remote Patient Monitoring (RPM) program for in-home monitoring: Yes, but did not enroll at this time: already monitoring with home equipment.     Assessments Completed:   COPD Assessment    Does the patient understand envrionmental exposure?: Yes  Is the patient able to verbalize Rescue vs. Long Acting medications?: Yes  Does the patient have a nebulizer?: Yes     No patient-reported symptoms         Symptoms:     Self Monitoring - SaO2: Yes  Baseline SaO2 Readin          Medications Reviewed:   Patient denies any changes with medications and reports taking all medications as prescribed. and Completed during a previous call     Advance Care Planning:   Not reviewed during this call     Care Planning:   Education Documentation  No documentation found.  Education Comments  No comments found.     ,    Goals Addressed

## 2025-07-02 ENCOUNTER — HOSPITAL ENCOUNTER (OUTPATIENT)
Dept: MAMMOGRAPHY | Age: 75
Discharge: HOME | End: 2025-07-02
Attending: INTERNAL MEDICINE
Payer: MEDICARE

## 2025-07-02 ENCOUNTER — RESULTS FOLLOW-UP (OUTPATIENT)
Dept: INTERNAL MEDICINE CLINIC | Age: 75
End: 2025-07-02

## 2025-07-02 ENCOUNTER — HOSPITAL ENCOUNTER (OUTPATIENT)
Dept: MRI IMAGING | Age: 75
Discharge: HOME | End: 2025-07-02
Attending: INTERNAL MEDICINE
Payer: MEDICARE

## 2025-07-02 VITALS — WEIGHT: 111 LBS | BODY MASS INDEX: 20.43 KG/M2 | HEIGHT: 62 IN

## 2025-07-02 DIAGNOSIS — R41.89 COGNITIVE IMPAIRMENT: ICD-10-CM

## 2025-07-02 DIAGNOSIS — R68.89 FORGETFULNESS: ICD-10-CM

## 2025-07-02 DIAGNOSIS — Z12.31 ENCOUNTER FOR SCREENING MAMMOGRAM FOR MALIGNANT NEOPLASM OF BREAST: ICD-10-CM

## 2025-07-02 PROCEDURE — 6360000004 HC RX CONTRAST MEDICATION: Performed by: INTERNAL MEDICINE

## 2025-07-02 PROCEDURE — A9576 INJ PROHANCE MULTIPACK: HCPCS | Performed by: INTERNAL MEDICINE

## 2025-07-02 PROCEDURE — 77063 BREAST TOMOSYNTHESIS BI: CPT

## 2025-07-02 PROCEDURE — 70553 MRI BRAIN STEM W/O & W/DYE: CPT

## 2025-07-02 RX ORDER — GADOTERIDOL 279.3 MG/ML
10 INJECTION INTRAVENOUS
Status: DISCONTINUED | OUTPATIENT
Start: 2025-07-02 | End: 2025-07-03 | Stop reason: HOSPADM

## 2025-07-02 RX ORDER — GADOTERIDOL 279.3 MG/ML
15 INJECTION INTRAVENOUS
Status: COMPLETED | OUTPATIENT
Start: 2025-07-02 | End: 2025-07-02

## 2025-07-02 RX ADMIN — GADOTERIDOL 15 ML: 279.3 INJECTION, SOLUTION INTRAVENOUS at 08:44

## 2025-07-03 ENCOUNTER — HOSPITAL ENCOUNTER (OUTPATIENT)
Dept: WOUND CARE | Age: 75
Discharge: HOME | End: 2025-07-03
Attending: STUDENT IN AN ORGANIZED HEALTH CARE EDUCATION/TRAINING PROGRAM
Payer: MEDICARE

## 2025-07-03 VITALS
RESPIRATION RATE: 16 BRPM | HEART RATE: 67 BPM | SYSTOLIC BLOOD PRESSURE: 159 MMHG | TEMPERATURE: 97.2 F | DIASTOLIC BLOOD PRESSURE: 80 MMHG

## 2025-07-03 DIAGNOSIS — L97.212 NON-PRESSURE CHRONIC ULCER OF RIGHT CALF WITH FAT LAYER EXPOSED (HCC): Primary | ICD-10-CM

## 2025-07-03 PROCEDURE — 29581 APPL MULTLAYER CMPRN SYS LEG: CPT

## 2025-07-03 PROCEDURE — 11042 DBRDMT SUBQ TIS 1ST 20SQCM/<: CPT

## 2025-07-03 PROCEDURE — 6370000000 HC RX 637 (ALT 250 FOR IP): Performed by: PODIATRIST

## 2025-07-03 RX ORDER — LIDOCAINE HYDROCHLORIDE 20 MG/ML
JELLY TOPICAL PRN
OUTPATIENT
Start: 2025-07-03

## 2025-07-03 RX ORDER — GINSENG 100 MG
CAPSULE ORAL PRN
OUTPATIENT
Start: 2025-07-03

## 2025-07-03 RX ORDER — LIDOCAINE 40 MG/G
CREAM TOPICAL PRN
OUTPATIENT
Start: 2025-07-03

## 2025-07-03 RX ORDER — SODIUM CHLOR/HYPOCHLOROUS ACID 0.033 %
SOLUTION, IRRIGATION IRRIGATION PRN
OUTPATIENT
Start: 2025-07-03

## 2025-07-03 RX ORDER — TRIAMCINOLONE ACETONIDE 1 MG/G
OINTMENT TOPICAL PRN
OUTPATIENT
Start: 2025-07-03

## 2025-07-03 RX ORDER — LIDOCAINE 40 MG/G
CREAM TOPICAL PRN
Status: DISCONTINUED | OUTPATIENT
Start: 2025-07-03 | End: 2025-07-04 | Stop reason: HOSPADM

## 2025-07-03 RX ORDER — BACITRACIN ZINC AND POLYMYXIN B SULFATE 500; 1000 [USP'U]/G; [USP'U]/G
OINTMENT TOPICAL PRN
OUTPATIENT
Start: 2025-07-03

## 2025-07-03 RX ORDER — CLOBETASOL PROPIONATE 0.5 MG/G
OINTMENT TOPICAL PRN
OUTPATIENT
Start: 2025-07-03

## 2025-07-03 RX ORDER — LIDOCAINE 50 MG/G
OINTMENT TOPICAL PRN
OUTPATIENT
Start: 2025-07-03

## 2025-07-03 RX ORDER — NEOMYCIN/BACITRACIN/POLYMYXINB 3.5-400-5K
OINTMENT (GRAM) TOPICAL PRN
OUTPATIENT
Start: 2025-07-03

## 2025-07-03 RX ORDER — BETAMETHASONE DIPROPIONATE 0.5 MG/G
CREAM TOPICAL PRN
OUTPATIENT
Start: 2025-07-03

## 2025-07-03 RX ORDER — SILVER SULFADIAZINE 10 MG/G
CREAM TOPICAL PRN
OUTPATIENT
Start: 2025-07-03

## 2025-07-03 RX ORDER — GENTAMICIN SULFATE 1 MG/G
OINTMENT TOPICAL PRN
OUTPATIENT
Start: 2025-07-03

## 2025-07-03 RX ORDER — MUPIROCIN 2 %
OINTMENT (GRAM) TOPICAL PRN
OUTPATIENT
Start: 2025-07-03

## 2025-07-03 RX ORDER — LIDOCAINE HYDROCHLORIDE 40 MG/ML
SOLUTION TOPICAL PRN
OUTPATIENT
Start: 2025-07-03

## 2025-07-03 RX ADMIN — LIDOCAINE: 40 CREAM TOPICAL at 08:23

## 2025-07-03 NOTE — PATIENT INSTRUCTIONS
[facility]  Patient Instructions and Physician Orders  [address] [place]  Telephone: [phone] FAX [phone]    NAME:  Charlene Nazario  YOB: 1950  DATE: 7/3/2025       Return Appointment:  Return Appointment: With Ryan Walsh DPM  in  1 Week(s)  [] Return Appointment for a Wound Assessment with the nurse on:     Future Appointments   Date Time Provider Department Center   7/15/2025  9:30 AM Gerry Foley DO [place] [facility] ECC DEP   8/7/2025 10:15 AM Tru Campo MD [place] [facility] MMA         Orders Placed This Encounter   Procedures    Initiate Outpatient Wound Care Protocol       Home Care Company: none    Medically necessary services for evaluation and treatment:   []Skilled Nursing (using clean technique) []PT (Eval & Treat) []OT (Eval & Treat) []Social Work []Dietician []Other:      Wound care instructions:  If you smoke we ask that you refrain from smoking. Smoking inhibits wounds from healing.  When taking antibiotics take the entire prescription as ordered. Do not stop taking until medication is all gone unless otherwise instructed.   Exercise as tolerated.   Keep weight off wounds and reposition every 2 hours if applicable.  Do not get wounds wet in the bath or shower unless otherwise instructed by your physician. If your wound is on your foot or leg, you may purchase a cast bag/cast cover. This may be purchased at any pharmacy or online.  Wash hands with soap and water prior to and after every dressing change.    [x]Wash wounds with: 0.9% normal saline  [x]Aziza wound Topical Treatments: Do not apply lotions, creams, or ointments to the skin around the wound bed unless directed as followed:   Apply around the wound: Nothing         [x]Wound Location: right lower leg   Apply Primary Dressing to wound: jennifer  Secondary Dressing: 4X4 gauze pad   Avoid contact of tape with skin if possible.  When to change Dressing: DO NOT

## 2025-07-03 NOTE — PROGRESS NOTES
Multilayer Compression Wrap   (Not Unna) Below the Knee    NAME:  Charlene Nazario  YOB: 1950  MEDICAL RECORD NUMBER:  [id number]  DATE:  7/3/2025       Removed old Multilayer wrap if present and washed leg with mild soap/water.   Applied moisturizing agent to dry skin as needed.    Applied primary and secondary dressing as ordered    Applied multilayered dressing below the knee to Right lower leg(s)  (2 Layer Lite compression wrap ) .    Instructed patient/caregiver not to remove dressing and to keep it clean and dry.    Instructed patient/caregiver on complications to report to provider, such as pain, numbness in toes, heavy drainage, and slippage of dressing.    Instructed patient on purpose of compression dressing and on activity and exercise recommendations.   Applied per   Guidelines    Electronically signed by Cherelle Preciado RN on 7/3/25 at 8:51 AM EDT        
lotions, creams, or ointments to the skin around the wound bed unless directed as followed:   Apply around the wound: Nothing         [x]Wound Location: right lower leg   Apply Primary Dressing to wound: jennifer  Secondary Dressing: 4X4 gauze pad   Avoid contact of tape with skin if possible.  When to change Dressing: DO NOT CHANGE        [x]Application of a biologic skin substitute:   This is a highly advanced wound care dressing that is intended to enhance your own bodies ability to increase growth factors and other healing abilities.  Please leave the dressing clean, dry and intact unless otherwise instructed by your physician. Leave steri-strips and non adherent in place if changing the dressing as instructed.   Call the wound care center for any complications including but limited to; increased drainage, dressing falling or slipping off, the dressing becomes wet, or severe pain or tingling in the toes/extremity.   []Instructions for Home Health Care if applicable:     []DME/Wound Dressing Supplies ordered from:    Please note, depending on your insurance coverage, you may have out-of-pocket expenses for these supplies. If your out-of-pocket cost could be substantial, many companies have financial hardship programs for patients who qualify. If you have any questions about your supplies or your potential out-of-pocket costs, or if you need to place an order for a refill of supplies (typically monthly), please call the company directly.           [x] Multilayer Compression Wrap:  Type: Applied on Right lower leg(s)  2 Layer Lite Compression Wrap    Do not get leg(s) with wrap wet.  If wraps become too tight call the center or completely remove the wrap.   Elevate leg(s) above the level of the heart when sitting.  Avoid prolonged standing in one place.   Applied in Clinic on 7/3/2025  The Goal of this therapy is to reduce edema and get into long term compression garments to control venous insufficiency, lymphedema

## 2025-07-07 ENCOUNTER — TELEPHONE (OUTPATIENT)
Dept: INTERNAL MEDICINE CLINIC | Age: 75
End: 2025-07-07

## 2025-07-07 RX ORDER — ALBUTEROL SULFATE 90 UG/1
2 INHALANT RESPIRATORY (INHALATION) EVERY 6 HOURS PRN
Qty: 18 G | Refills: 11 | Status: SHIPPED | OUTPATIENT
Start: 2025-07-07

## 2025-07-07 NOTE — TELEPHONE ENCOUNTER
Can you address this while Dr Nazario is away?    Requested Prescriptions     Pending Prescriptions Disp Refills    albuterol sulfate HFA (PROVENTIL;VENTOLIN;PROAIR) 108 (90 Base) MCG/ACT inhaler [Pharmacy Med Name: ALBUTEROL HFA INH (200 PUFFS) 18GM] 18 g 11     Sig: INHALE 2 PUFFS INTO THE LUNGS EVERY 6 HOURS AS NEEDED FOR WHEEZING       Last Appt  12/2/2024

## 2025-07-07 NOTE — TELEPHONE ENCOUNTER
The patient called into the office for Dr. Foley in regards to getting her results from her recent MRI. Did let the patient know it doesn't look like Dr. Foley has made any notes on it yet. Patient would like callback when results are ready. Please advise.       Pt callback# [phone]

## 2025-07-09 ENCOUNTER — TELEPHONE (OUTPATIENT)
Dept: PULMONOLOGY | Age: 75
End: 2025-07-09

## 2025-07-09 ENCOUNTER — TELEPHONE (OUTPATIENT)
Dept: INTERNAL MEDICINE CLINIC | Age: 75
End: 2025-07-09

## 2025-07-09 DIAGNOSIS — J44.9 COPD, MODERATE (HCC): ICD-10-CM

## 2025-07-09 DIAGNOSIS — J43.8 OTHER EMPHYSEMA (HCC): Primary | ICD-10-CM

## 2025-07-09 NOTE — TELEPHONE ENCOUNTER
ADAN    The patients PT Brandon called into the office to let Dr. Foley know that the patient stated she is doing well and doesn't think she needs to continue homecare PT anymore.

## 2025-07-09 NOTE — TELEPHONE ENCOUNTER
FYI --I received denial letter from [employer] for Dupixent. Insurance needs patient to have a PFT completed. See attached denial scanned in chart. Patient has follow up appt 8/4 with Dr. Nazario.

## 2025-07-10 ENCOUNTER — HOSPITAL ENCOUNTER (OUTPATIENT)
Dept: WOUND CARE | Age: 75
Discharge: HOME | End: 2025-07-10
Attending: STUDENT IN AN ORGANIZED HEALTH CARE EDUCATION/TRAINING PROGRAM
Payer: MEDICARE

## 2025-07-10 ENCOUNTER — CARE COORDINATION (OUTPATIENT)
Dept: CARE COORDINATION | Age: 75
End: 2025-07-10

## 2025-07-10 VITALS
TEMPERATURE: 97.4 F | SYSTOLIC BLOOD PRESSURE: 153 MMHG | DIASTOLIC BLOOD PRESSURE: 98 MMHG | HEART RATE: 70 BPM | RESPIRATION RATE: 18 BRPM

## 2025-07-10 DIAGNOSIS — R90.89 ABNORMAL BRAIN MRI: Primary | ICD-10-CM

## 2025-07-10 DIAGNOSIS — I67.9 CEREBROVASCULAR DISEASE: ICD-10-CM

## 2025-07-10 DIAGNOSIS — R41.89 COGNITIVE IMPAIRMENT: ICD-10-CM

## 2025-07-10 DIAGNOSIS — L97.212 NON-PRESSURE CHRONIC ULCER OF RIGHT CALF WITH FAT LAYER EXPOSED (HCC): Primary | ICD-10-CM

## 2025-07-10 PROCEDURE — 6370000000 HC RX 637 (ALT 250 FOR IP): Performed by: PODIATRIST

## 2025-07-10 PROCEDURE — 11042 DBRDMT SUBQ TIS 1ST 20SQCM/<: CPT

## 2025-07-10 PROCEDURE — 29581 APPL MULTLAYER CMPRN SYS LEG: CPT

## 2025-07-10 RX ORDER — LIDOCAINE HYDROCHLORIDE 20 MG/ML
JELLY TOPICAL PRN
OUTPATIENT
Start: 2025-07-10

## 2025-07-10 RX ORDER — SILVER SULFADIAZINE 10 MG/G
CREAM TOPICAL PRN
OUTPATIENT
Start: 2025-07-10

## 2025-07-10 RX ORDER — GENTAMICIN SULFATE 1 MG/G
OINTMENT TOPICAL PRN
OUTPATIENT
Start: 2025-07-10

## 2025-07-10 RX ORDER — BETAMETHASONE DIPROPIONATE 0.5 MG/G
CREAM TOPICAL PRN
OUTPATIENT
Start: 2025-07-10

## 2025-07-10 RX ORDER — GINSENG 100 MG
CAPSULE ORAL PRN
OUTPATIENT
Start: 2025-07-10

## 2025-07-10 RX ORDER — NEOMYCIN/BACITRACIN/POLYMYXINB 3.5-400-5K
OINTMENT (GRAM) TOPICAL PRN
OUTPATIENT
Start: 2025-07-10

## 2025-07-10 RX ORDER — TRIAMCINOLONE ACETONIDE 1 MG/G
OINTMENT TOPICAL PRN
OUTPATIENT
Start: 2025-07-10

## 2025-07-10 RX ORDER — CLOBETASOL PROPIONATE 0.5 MG/G
OINTMENT TOPICAL PRN
OUTPATIENT
Start: 2025-07-10

## 2025-07-10 RX ORDER — MUPIROCIN 2 %
OINTMENT (GRAM) TOPICAL PRN
OUTPATIENT
Start: 2025-07-10

## 2025-07-10 RX ORDER — LIDOCAINE 50 MG/G
OINTMENT TOPICAL PRN
OUTPATIENT
Start: 2025-07-10

## 2025-07-10 RX ORDER — BACITRACIN ZINC AND POLYMYXIN B SULFATE 500; 1000 [USP'U]/G; [USP'U]/G
OINTMENT TOPICAL PRN
OUTPATIENT
Start: 2025-07-10

## 2025-07-10 RX ORDER — LIDOCAINE HYDROCHLORIDE 40 MG/ML
SOLUTION TOPICAL PRN
OUTPATIENT
Start: 2025-07-10

## 2025-07-10 RX ORDER — LIDOCAINE 40 MG/G
CREAM TOPICAL PRN
Status: DISCONTINUED | OUTPATIENT
Start: 2025-07-10 | End: 2025-07-11 | Stop reason: HOSPADM

## 2025-07-10 RX ORDER — SODIUM CHLOR/HYPOCHLOROUS ACID 0.033 %
SOLUTION, IRRIGATION IRRIGATION PRN
OUTPATIENT
Start: 2025-07-10

## 2025-07-10 RX ORDER — LIDOCAINE 40 MG/G
CREAM TOPICAL PRN
OUTPATIENT
Start: 2025-07-10

## 2025-07-10 RX ADMIN — LIDOCAINE: 40 CREAM TOPICAL at 08:49

## 2025-07-10 NOTE — PROGRESS NOTES
[facility]  Progress Note and Procedure Note      Charlene Nzaario  MEDICAL RECORD NUMBER:  [id number]  AGE: 75 y.o.   GENDER: female  : 1950  EPISODE DATE:  7/10/2025    Subjective:     Chief Complaint   Patient presents with    Wound Check     Right leg         HISTORY of PRESENT ILLNESS HPI     Charlene Nazario is a 75 y.o. female who presents today for wound/ulcer evaluation.   History of Wound Context: Patient relates she injured her right leg during a fall.  She relates the facility she was at used Steri-strips, however it did not hold the laceration together.  Patient relates she does have issues with swelling in her legs.  Wound/Ulcer Pain Timing/Severity: intermittent, mild  Quality of pain: sharp  Severity:  3 / 10   Modifying Factors: Pain worsens with walking  Associated Signs/Symptoms: edema    Ulcer Identification:  Ulcer Type: venous and traumatic    Contributing Factors: edema    Acute Wound: N/A not an acute wound        PAST MEDICAL HISTORY        Diagnosis Date    Allergic rhinitis     Anxiety 2022    Back pain     Chronic:under care of spine specialist:Dr. Adames    Cervical cancer screening     Nml per pt'    Closed compression fracture of L3 vertebra (HCC) 2024    Compression fracture of thoracic vertebrae, non-traumatic (HCC)     under care of endo:Dr. Zhu    COPD (chronic obstructive pulmonary disease) (HCC)     under care of pulmo:Dr. Nazario    COPD exacerbation (HCC) 2017    Elevated LDL cholesterol level     GERD (gastroesophageal reflux disease)     History of mammogram ;17    Nml per pt'. 17=negative.    HLD (hyperlipidemia) 2021    Hoarseness of voice 10/04/2017    Hypertension     Lung nodule:left 2017     1.2 cm indeterminate left upper lobe pulmonary nodule:under care of pulmo:Dr. Nazario    Mucus plugging of bronchi 2017    Osteoarthritis     Osteomyelitis of left leg (HCC)     Osteoporosis

## 2025-07-10 NOTE — PROGRESS NOTES
Multilayer Compression Wrap   (Not Unna) Below the Knee    NAME:  Charlene Nazario  YOB: 1950  MEDICAL RECORD NUMBER:  [id number]  DATE:  7/10/2025       Removed old Multilayer wrap if present and washed leg with mild soap/water.   Applied moisturizing agent to dry skin as needed.    Applied primary and secondary dressing as ordered    Applied multilayered dressing below the knee to Right lower leg(s)  (2 Layer Lite compression wrap ) .    Instructed patient/caregiver not to remove dressing and to keep it clean and dry.    Instructed patient/caregiver on complications to report to provider, such as pain, numbness in toes, heavy drainage, and slippage of dressing.    Instructed patient on purpose of compression dressing and on activity and exercise recommendations.   Applied per   Guidelines    Electronically signed by Cherelle Preciado RN on 7/10/25 at 9:07 AM EDT

## 2025-07-10 NOTE — PATIENT INSTRUCTIONS
[facility]  Patient Instructions and Physician Orders  [address]  Telephone: [phone] FAX [phone]    NAME:  Charlene Nazario  YOB: 1950  DATE: 7/10/2025       Return Appointment:  Return Appointment: With Ryan Walsh DPM  in  1 Week(s)  [] Return Appointment for a Wound Assessment with the nurse on:     Future Appointments   Date Time Provider Department Center   7/15/2025  9:30 AM Gerry Foley DO [place] [facility] ECC [facility]   8/4/2025  1:20 PM [place] Nazario MD [place] [facility]   8/7/2025 10:15 AM Tru Campo MD [place] [facility]   10/22/2025  9:20 AM [place] Nazario MD [place] [facility]             Home Care Company: none    Medically necessary services for evaluation and treatment:   []Skilled Nursing (using clean technique) []PT (Eval & Treat) []OT (Eval & Treat) []Social Work []Dietician []Other:      Wound care instructions:  If you smoke we ask that you refrain from smoking. Smoking inhibits wounds from healing.  When taking antibiotics take the entire prescription as ordered. Do not stop taking until medication is all gone unless otherwise instructed.   Exercise as tolerated.   Keep weight off wounds and reposition every 2 hours if applicable.  Do not get wounds wet in the bath or shower unless otherwise instructed by your physician. If your wound is on your foot or leg, you may purchase a cast bag/cast cover. This may be purchased at any pharmacy or online.  Wash hands with soap and water prior to and after every dressing change.    [x]Wash wounds with: 0.9% normal saline  [x]Aziza wound Topical Treatments: Do not apply lotions, creams, or ointments to the skin around the wound bed unless directed as followed:   Apply around the wound: Nothing         [x]Wound Location: right lower leg   Apply Primary Dressing to wound: jennifer  Secondary Dressing: 4X4 gauze pad   Avoid contact of tape with skin if possible.  When to change

## 2025-07-10 NOTE — CARE COORDINATION
Ambulatory Care Coordination Note     7/10/2025 2:49 PM     Patient Current Location:  Home: [address] [place] [id number]     ACM contacted the spouse/partner by telephone. Verified name and  with patient as identifiers.         ACM: Araceli Bai RN     Challenges to be reviewed by the provider   Additional needs identified to be addressed with provider No                  Method of communication with provider: chart routing.    Utilization: Patient has not had any utilization since our last call.     Care Summary Note: Call to spouse   WCC this week, healing nicely, may dc next week  HHC was dcd per spouse as pt did not need   Had MRI brain results as follows  Notify pt that her MRI shows mild atrophy (shrinkage), evidence of small old stroked in the cerebellum which could contribute to balance and coordination problems.  There are also old small infarcts (strokes) called lacunar infarcts in the deeper part of the brain.  There are also numerous chronic microhemorrhages involved both cerebral hemispheres.  I would recommend a consultation with a neurologist.  Continue the aspirin.    I would be happy to call to discuss the results.   Pt has appt moris Nieto for      checks BP. Pulse ox  Doing well      Offered patient enrollment in the Remote Patient Monitoring (RPM) program for in-home monitoring: Yes, but did not enroll at this time: already monitoring with home equipment.     Assessments Completed:   General Assessment    Do you have any symptoms that are causing concern?: Yes  Progression since Onset: Gradually Improving  Reported Symptoms: Other (Comment: wound)          Medications Reviewed:   Patient denies any changes with medications and reports taking all medications as prescribed. and Completed during a previous call     Advance Care Planning:   Not reviewed during this call     Care Planning:   Education Documentation  Discuss COPD Overview, taught by Araceli Bai, RN at

## 2025-07-14 NOTE — TELEPHONE ENCOUNTER
She had one in 2018.  Did they say they require one that's more recent, or do they think her COPD might have improved despite her clinical decline?  It would be less expensive for her insurance (although not the medication portion) to just approve the Dupixent instead of paying for an unnecessary test, and then the Dupixent.     Assuming they really need it, I ordered it.       Diagnosis Orders   1. Other emphysema (HCC)  Full PFT Study With Bronchodilator      2. COPD, moderate (HCC)  Full PFT Study With Bronchodilator

## 2025-07-15 ENCOUNTER — OFFICE VISIT (OUTPATIENT)
Dept: INTERNAL MEDICINE CLINIC | Age: 75
End: 2025-07-15

## 2025-07-15 VITALS
HEART RATE: 83 BPM | OXYGEN SATURATION: 97 % | DIASTOLIC BLOOD PRESSURE: 84 MMHG | SYSTOLIC BLOOD PRESSURE: 128 MMHG | BODY MASS INDEX: 20.3 KG/M2 | WEIGHT: 111 LBS | TEMPERATURE: 97.2 F

## 2025-07-15 DIAGNOSIS — J96.11 CHRONIC RESPIRATORY FAILURE WITH HYPOXIA (HCC): ICD-10-CM

## 2025-07-15 DIAGNOSIS — G31.84 MILD COGNITIVE IMPAIRMENT: Primary | ICD-10-CM

## 2025-07-15 DIAGNOSIS — S81.811D LACERATION OF RIGHT LOWER LEG, SUBSEQUENT ENCOUNTER: ICD-10-CM

## 2025-07-15 RX ORDER — DONEPEZIL HYDROCHLORIDE 5 MG/1
5 TABLET, FILM COATED ORAL NIGHTLY
Qty: 30 TABLET | Refills: 0 | Status: SHIPPED | OUTPATIENT
Start: 2025-07-15

## 2025-07-15 NOTE — PROGRESS NOTES
[facility]  Internal Medicine  Patient Encounter  Gerry Foley D.O., [facility]        Chief Complaint   Patient presents with    Follow-up       HPI: 75 y.o. female seen today to review results.  Patient was seen for her transitional care visit on 6/23/2025 after she was admitted to [facility] on 5/16/2025 - 6/4/2025 and then to a skilled nursing facility 6/4/2025 - 6/20/2025.  See note for details.  Briefly she is a 75-year-old  female with a myriad of complex medical problems who presented with yet another exacerbation of COPD with hypoxia and hypercarbia.  She had required intubation this time.  She was sent to a skilled nursing facility for rehab.  While there she had \"slipped off of her wheelchair.\"  She struck her right leg sustaining a wound.  At her visit, she was found to have a significant laceration.  The Steri-Strips that they had placed were not helpful.  She was referred to wound care.  She was last seen by wound care on 7/10/2025.  The last picture from 7/3/2025 shows that the wound is healing by secondary intention.  There does not appear to be any further undermining.  She has another visit this Thursday.  She has a dressing in place.      A referral has been placed to neurology to evaluate cognitive impairment, cerebrovascular disease and findings on brain MRI.  Her  states that he stopped Xanax, buspirone, Seroquel that were all given to her while she was at the skilled nursing facility ([facility]).  Pt states she does have some balance problems.  She tries to be careful.  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    She has a neurology appointment in August.    She denies anxiety.  Her  reports she has short term memory.        MRI brain 7/2/2025:  IMPRESSION:  1. No intracranial mass or acute findings  2. Moderate chronic small vessel ischemia advanced for age  3. Numerous chronic microhemorrhages involving cerebral hemispheres with

## 2025-07-15 NOTE — PATIENT INSTRUCTIONS
To do list:    #1 call in 25 to 30 days with report as to how you are tolerating the donepezil.  We will want to increase the dose to 10 mg if you are tolerating the medication.    #2 monitor for decreased appetite, weight loss, diarrhea, vivid dreams or nightmares which are some of the more common potential side effects of donepezil (Aricept)

## 2025-07-15 NOTE — PROGRESS NOTES
"Your 9 month old is becoming a little more independent and will be crawling soon if not already, eating some finger foods, and playing pat-a-cake.   As your child starts to explore the world more, there will be some unwanted behaviors.  Think of discipline as  teaching your child and keeping them safe.  Use positive discipline with distraction and redirection.  This works better than saying \" no\" most of the time.    Make sure to continue to avoid TV and other screen time, but  talk and sing  throughout the day.  This will promote language development.  Your 9 month old will be starting to copy sounds that you make, and look around when they hear  something like, \"Where's your blanket?\"  Try to have a consistent bedtime routine.  This is a good time to cuddle and read a story, but try to put your baby in the crib while still awake.  Learning to self soothe is important, especially since you may notice some separation anxiety at this age which could contribute to more night waking.      Continue either  breast milk or iron fortified formula until 12 months of age.  If breast feeding, continue a liquid multivitamin with iron once daily (such as Poly-vi-sol with iron).    Continue to increase the variety and textures of finger foods. Provide 3 meals and 2 to 3 snacks a day.   Avoid honey until 12 months of age, and avoid any choking hazards such as whole grapes, nuts, etc.    Clean your baby's teeth and gums with soft toothbrush twice daily with a small smear of fluoride toothpaste (size of a grain of rice.)  Avoid juice or limit to no more than 2-4 oz per day.        Use a rear facing car seat until your child is at least 2 years old.  Never leave your child alone in the car.    Baby proof your home, and keep the POISON CONTROL number  handy  ([phone]).  Always make sure you are within touching distance when around water, pools, and bathtubs.    Secure cabinets or TV stands to the wall, and don't leave heavy " objects or hot liquids on tablecloths.  Any firearms in the house should be locked, unloaded, and the ammunition locked separately.    We will screen for lead poisoning and anemia today with a fingerstick blood sample.      A TRUSTED WEB SITE FOR PARENTING AND CHILD HEALTH INFORMATION IS  HealthyChildren.org.   (The American Academy of Pediatrics Parenting Web site)    health issues came up in conversation. I was working her up for valves, but I don't think her emphysema is bad enough. She's more of a chronic bronchitic. Screening CT in June 2022 was stable, so she is due for screening CT in June of 2023. We will need to readdress when she is feeling better. Previous good quote: \"If you can't be a good example, then be a horrible warning\"    Orders Placed This Encounter   Medications    predniSONE (DELTASONE) 10 MG tablet     Sig: Take 4 tablets by mouth for 5 days. Dispense:  20 tablet     Refill:  0         The patient is not currently smoking. RTC 3-4 months w/ MD. Call or RTC sooner if symptoms persist or worsen acutely.       Glo Leigh MD

## 2025-07-16 ENCOUNTER — TELEPHONE (OUTPATIENT)
Dept: PULMONOLOGY | Age: 75
End: 2025-07-16

## 2025-07-16 NOTE — TELEPHONE ENCOUNTER
I spoke to spouse and advised him that Dupixent was denied for pt. I explained the PFTs from 2018 are too old and she will need new testing. He will discuss with the patient and see what she would like to do. They will call the office for phone # to schedule PFTs if she decides to test.

## 2025-07-17 ENCOUNTER — HOSPITAL ENCOUNTER (OUTPATIENT)
Dept: WOUND CARE | Age: 75
Discharge: HOME | End: 2025-07-17
Attending: STUDENT IN AN ORGANIZED HEALTH CARE EDUCATION/TRAINING PROGRAM
Payer: MEDICARE

## 2025-07-17 VITALS
RESPIRATION RATE: 20 BRPM | SYSTOLIC BLOOD PRESSURE: 125 MMHG | TEMPERATURE: 97.7 F | DIASTOLIC BLOOD PRESSURE: 85 MMHG | HEART RATE: 83 BPM

## 2025-07-17 DIAGNOSIS — L97.212 NON-PRESSURE CHRONIC ULCER OF RIGHT CALF WITH FAT LAYER EXPOSED (HCC): Primary | ICD-10-CM

## 2025-07-17 PROCEDURE — 29581 APPL MULTLAYER CMPRN SYS LEG: CPT

## 2025-07-17 PROCEDURE — 11042 DBRDMT SUBQ TIS 1ST 20SQCM/<: CPT

## 2025-07-17 PROCEDURE — 6370000000 HC RX 637 (ALT 250 FOR IP): Performed by: PODIATRIST

## 2025-07-17 RX ORDER — LIDOCAINE 40 MG/G
CREAM TOPICAL PRN
Status: DISCONTINUED | OUTPATIENT
Start: 2025-07-17 | End: 2025-07-18 | Stop reason: HOSPADM

## 2025-07-17 RX ORDER — LIDOCAINE HYDROCHLORIDE 20 MG/ML
JELLY TOPICAL PRN
OUTPATIENT
Start: 2025-07-17

## 2025-07-17 RX ORDER — GINSENG 100 MG
CAPSULE ORAL PRN
OUTPATIENT
Start: 2025-07-17

## 2025-07-17 RX ORDER — BETAMETHASONE DIPROPIONATE 0.5 MG/G
CREAM TOPICAL PRN
OUTPATIENT
Start: 2025-07-17

## 2025-07-17 RX ORDER — SILVER SULFADIAZINE 10 MG/G
CREAM TOPICAL PRN
OUTPATIENT
Start: 2025-07-17

## 2025-07-17 RX ORDER — CLOBETASOL PROPIONATE 0.5 MG/G
OINTMENT TOPICAL PRN
OUTPATIENT
Start: 2025-07-17

## 2025-07-17 RX ORDER — TRIAMCINOLONE ACETONIDE 1 MG/G
OINTMENT TOPICAL PRN
OUTPATIENT
Start: 2025-07-17

## 2025-07-17 RX ORDER — LIDOCAINE HYDROCHLORIDE 40 MG/ML
SOLUTION TOPICAL PRN
OUTPATIENT
Start: 2025-07-17

## 2025-07-17 RX ORDER — LIDOCAINE 50 MG/G
OINTMENT TOPICAL PRN
OUTPATIENT
Start: 2025-07-17

## 2025-07-17 RX ORDER — MUPIROCIN 2 %
OINTMENT (GRAM) TOPICAL PRN
OUTPATIENT
Start: 2025-07-17

## 2025-07-17 RX ORDER — NEOMYCIN/BACITRACIN/POLYMYXINB 3.5-400-5K
OINTMENT (GRAM) TOPICAL PRN
OUTPATIENT
Start: 2025-07-17

## 2025-07-17 RX ORDER — BACITRACIN ZINC AND POLYMYXIN B SULFATE 500; 1000 [USP'U]/G; [USP'U]/G
OINTMENT TOPICAL PRN
OUTPATIENT
Start: 2025-07-17

## 2025-07-17 RX ORDER — GENTAMICIN SULFATE 1 MG/G
OINTMENT TOPICAL PRN
OUTPATIENT
Start: 2025-07-17

## 2025-07-17 RX ORDER — LIDOCAINE 40 MG/G
CREAM TOPICAL PRN
OUTPATIENT
Start: 2025-07-17

## 2025-07-17 RX ORDER — SODIUM CHLOR/HYPOCHLOROUS ACID 0.033 %
SOLUTION, IRRIGATION IRRIGATION PRN
OUTPATIENT
Start: 2025-07-17

## 2025-07-17 RX ADMIN — LIDOCAINE: 40 CREAM TOPICAL at 10:27

## 2025-07-17 NOTE — PATIENT INSTRUCTIONS
Applied on Right lower leg(s)  2 Layer Lite Compression Wrap    Do not get leg(s) with wrap wet.  If wraps become too tight call the center or completely remove the wrap.   Elevate leg(s) above the level of the heart when sitting.  Avoid prolonged standing in one place.   Applied in Clinic on 7/17/2025  The Goal of this therapy is to reduce edema and get into long term compression garments to control venous insufficiency, lymphedema and reduce occurrence of venous ulcers    [x] Edema Control:       Elevate leg(s) above the level of the heart for 30 minutes 4-5 times a day and/or when sitting.  Avoid prolonged standing in one place.        Dietary:  Important dietary reminders:  1. Increase Protein intake (i.e. Lean meats, fish, eggs, legumes, and yogurt). Protein intake should be 1 to 2 g of protein per kilogram of body weight while trying to heal an active wound.   2. No added salt  3. If diabetic, follow a diabetic diet and check glucose prior to meals or as instructed by your physician. Recommend trying to keep carbohydrates to 60 g or less per meal for 3 meals daily and 30 g or less per snack for 2 snacks daily. Recommend protein with each meal or snack to help stabilize insulin, increase satiety, and improve wound healing.     Dietary Supplements(Take twice a day unless instructed otherwise):  [] Steven  [] 30ml ProStat [] Ensure Complete [] Ensure Max/Premier [] Expedite [] Other:    Your nurse  is:  Tad     Electronically signed by Tad Moran RN on 7/17/2025 at 10:38 AM     Wound Care Center Information: Should you experience any significant changes in your wound(s) or have questions about your wound care, please contact the [facility] at [phone].   Hours of operation:  Mon:  8AM - 12:30PM  Tue: 12:30PM - 5PM  Wed: 12:30PM - 4:30PM  Thur: 12:30PM - 4:30PM  Fri:  CLOSED  The office is closed on all major holidays.    Please give us 24-48 business hours to return

## 2025-07-17 NOTE — PROGRESS NOTES
of endo:Dr. Zhu    S/P colonoscopy     Polyps:next in [id number]    Systolic CHF, chronic (HCC) EF 45% 2018    Thoracic aorta atherosclerosis 2022    Thyroid mass     under care of endo & surgeon(Dr. Ewing)    Thyroid nodule     under care of endo:Dr. Zhu    Tibia fracture 2008    Left       PAST SURGICAL HISTORY    Past Surgical History:   Procedure Laterality Date    APPENDECTOMY       SECTION      FIXATION KYPHOPLASTY  2017    Dr. Adames    IR KYPHOPLASTY LUMBAR 1 VERTEBRAL BODY  2024    IR KYPHOPLASTY LUMBAR FIRST LEVEL 2024 [facility]    LARYNGOPLASTY Right 2022    MEDIALIZATION LARYNGOPLASTY, Dr. Sarina Medrano    OTHER SURGICAL HISTORY  2017    thoracic kyphoplasty    THYROIDECTOMY, PARTIAL Right 2017    Right hemithyroidectomy for goiter,  complicated by vocal cord paralysis    TIBIA FRACTURE SURGERY Left     Dr. Swenson    VOCAL CORD INJECTION  2017       FAMILY HISTORY    Family History   Problem Relation Age of Onset    Diabetes Mother     No Known Problems Father     No Known Problems Sister     No Known Problems Brother     No Known Problems Maternal Grandmother     No Known Problems Maternal Grandfather     No Known Problems Paternal Grandmother     No Known Problems Paternal Grandfather        SOCIAL HISTORY    Social History     Tobacco Use    Smoking status: Former     Current packs/day: 0.00     Average packs/day: 1.5 packs/day for 22.0 years (33.0 ttl pk-yrs)     Types: Cigarettes     Start date: 1989     Quit date: 2011     Years since quittin.2    Smokeless tobacco: Never   Vaping Use    Vaping status: Never Used   Substance Use Topics    Alcohol use: Yes     Alcohol/week: 4.0 standard drinks of alcohol     Types: 4 Cans of beer per week     Comment: 1-2 drinks a day    Drug use: No       ALLERGIES    Allergies   Allergen Reactions    Bee Venom Swelling    Codeine Hives and Nausea

## 2025-07-24 ENCOUNTER — HOSPITAL ENCOUNTER (OUTPATIENT)
Dept: WOUND CARE | Age: 75
Discharge: HOME | End: 2025-07-24
Attending: STUDENT IN AN ORGANIZED HEALTH CARE EDUCATION/TRAINING PROGRAM
Payer: MEDICARE

## 2025-07-24 VITALS
DIASTOLIC BLOOD PRESSURE: 85 MMHG | TEMPERATURE: 97.5 F | SYSTOLIC BLOOD PRESSURE: 135 MMHG | RESPIRATION RATE: 20 BRPM | HEART RATE: 83 BPM

## 2025-07-24 DIAGNOSIS — L97.212 NON-PRESSURE CHRONIC ULCER OF RIGHT CALF WITH FAT LAYER EXPOSED (HCC): Primary | ICD-10-CM

## 2025-07-24 PROCEDURE — 11042 DBRDMT SUBQ TIS 1ST 20SQCM/<: CPT

## 2025-07-24 PROCEDURE — 6370000000 HC RX 637 (ALT 250 FOR IP): Performed by: PODIATRIST

## 2025-07-24 PROCEDURE — 29581 APPL MULTLAYER CMPRN SYS LEG: CPT

## 2025-07-24 RX ORDER — LIDOCAINE HYDROCHLORIDE 40 MG/ML
SOLUTION TOPICAL PRN
OUTPATIENT
Start: 2025-07-24

## 2025-07-24 RX ORDER — NEOMYCIN/BACITRACIN/POLYMYXINB 3.5-400-5K
OINTMENT (GRAM) TOPICAL PRN
OUTPATIENT
Start: 2025-07-24

## 2025-07-24 RX ORDER — LIDOCAINE HYDROCHLORIDE 20 MG/ML
JELLY TOPICAL PRN
OUTPATIENT
Start: 2025-07-24

## 2025-07-24 RX ORDER — GINSENG 100 MG
CAPSULE ORAL PRN
OUTPATIENT
Start: 2025-07-24

## 2025-07-24 RX ORDER — MUPIROCIN 2 %
OINTMENT (GRAM) TOPICAL PRN
OUTPATIENT
Start: 2025-07-24

## 2025-07-24 RX ORDER — LIDOCAINE 50 MG/G
OINTMENT TOPICAL PRN
OUTPATIENT
Start: 2025-07-24

## 2025-07-24 RX ORDER — GENTAMICIN SULFATE 1 MG/G
OINTMENT TOPICAL PRN
OUTPATIENT
Start: 2025-07-24

## 2025-07-24 RX ORDER — BACITRACIN ZINC AND POLYMYXIN B SULFATE 500; 1000 [USP'U]/G; [USP'U]/G
OINTMENT TOPICAL PRN
OUTPATIENT
Start: 2025-07-24

## 2025-07-24 RX ORDER — LIDOCAINE 40 MG/G
CREAM TOPICAL PRN
Status: DISCONTINUED | OUTPATIENT
Start: 2025-07-24 | End: 2025-07-25 | Stop reason: HOSPADM

## 2025-07-24 RX ORDER — SODIUM CHLOR/HYPOCHLOROUS ACID 0.033 %
SOLUTION, IRRIGATION IRRIGATION PRN
OUTPATIENT
Start: 2025-07-24

## 2025-07-24 RX ORDER — CLOBETASOL PROPIONATE 0.5 MG/G
OINTMENT TOPICAL PRN
OUTPATIENT
Start: 2025-07-24

## 2025-07-24 RX ORDER — LIDOCAINE 40 MG/G
CREAM TOPICAL PRN
OUTPATIENT
Start: 2025-07-24

## 2025-07-24 RX ORDER — SILVER SULFADIAZINE 10 MG/G
CREAM TOPICAL PRN
OUTPATIENT
Start: 2025-07-24

## 2025-07-24 RX ORDER — TRIAMCINOLONE ACETONIDE 1 MG/G
OINTMENT TOPICAL PRN
OUTPATIENT
Start: 2025-07-24

## 2025-07-24 RX ORDER — BETAMETHASONE DIPROPIONATE 0.5 MG/G
CREAM TOPICAL PRN
OUTPATIENT
Start: 2025-07-24

## 2025-07-24 RX ADMIN — LIDOCAINE: 40 CREAM TOPICAL at 09:59

## 2025-07-24 NOTE — PROGRESS NOTES
Multilayer Compression Wrap   (Not Unna) Below the Knee    NAME:  Charlene Nazario  YOB: 1950  MEDICAL RECORD NUMBER:  [id number]  DATE:  7/24/2025       Removed old Multilayer wrap if present and washed leg with mild soap/water.   Applied moisturizing agent to dry skin as needed.    Applied primary and secondary dressing as ordered    Applied multilayered dressing below the knee to Right lower leg(s)  (2 Layer Lite compression wrap ) .    Instructed patient/caregiver not to remove dressing and to keep it clean and dry.    Instructed patient/caregiver on complications to report to provider, such as pain, numbness in toes, heavy drainage, and slippage of dressing.    Instructed patient on purpose of compression dressing and on activity and exercise recommendations.   Applied per   Guidelines    Electronically signed by Tad Moran RN on 7/24/25 at 3:29 PM EDT

## 2025-07-24 NOTE — PATIENT INSTRUCTIONS
Applied on Right lower leg(s)  2 Layer Lite Compression Wrap    Do not get leg(s) with wrap wet.  If wraps become too tight call the center or completely remove the wrap.   Elevate leg(s) above the level of the heart when sitting.  Avoid prolonged standing in one place.   Applied in Clinic on 7/24/2025  The Goal of this therapy is to reduce edema and get into long term compression garments to control venous insufficiency, lymphedema and reduce occurrence of venous ulcers    [x] Edema Control:       Elevate leg(s) above the level of the heart for 30 minutes 4-5 times a day and/or when sitting.  Avoid prolonged standing in one place.        Dietary:  Important dietary reminders:  1. Increase Protein intake (i.e. Lean meats, fish, eggs, legumes, and yogurt). Protein intake should be 1 to 2 g of protein per kilogram of body weight while trying to heal an active wound.   2. No added salt  3. If diabetic, follow a diabetic diet and check glucose prior to meals or as instructed by your physician. Recommend trying to keep carbohydrates to 60 g or less per meal for 3 meals daily and 30 g or less per snack for 2 snacks daily. Recommend protein with each meal or snack to help stabilize insulin, increase satiety, and improve wound healing.     Dietary Supplements(Take twice a day unless instructed otherwise):  [] Steven  [] 30ml ProStat [] Ensure Complete [] Ensure Max/Premier [] Expedite [] Other:    Your nurse  is:  Tad     Electronically signed by Tad Moran RN on 7/24/2025 at 10:10 AM     Wound Care Center Information: Should you experience any significant changes in your wound(s) or have questions about your wound care, please contact the [facility] at [phone].   Hours of operation:  Mon:  8AM - 12:30PM  Tue: 12:30PM - 5PM  Wed: 12:30PM - 4:30PM  Thur: 12:30PM - 4:30PM  Fri:  CLOSED  The office is closed on all major holidays.    Please give us 24-48 business hours to return

## 2025-07-24 NOTE — PROGRESS NOTES
[facility]  Progress Note and Procedure Note      Charlene Nazario  MEDICAL RECORD NUMBER:  [id number]  AGE: 75 y.o.   GENDER: female  : 1950  EPISODE DATE:  2025    Subjective:     Chief Complaint   Patient presents with    Wound Check         HISTORY of PRESENT ILLNESS HPI     Charlene Nazario is a 75 y.o. female who presents today for wound/ulcer evaluation.   History of Wound Context: Patient relates she injured her right leg during a fall.  She relates the facility she was at used Steri-strips, however it did not hold the laceration together.  Patient relates she does have issues with swelling in her legs.  Wound/Ulcer Pain Timing/Severity: intermittent, mild  Quality of pain: sharp  Severity:  3 / 10   Modifying Factors: Pain worsens with walking  Associated Signs/Symptoms: edema    Ulcer Identification:  Ulcer Type: venous and traumatic    Contributing Factors: edema    Acute Wound: N/A not an acute wound        PAST MEDICAL HISTORY        Diagnosis Date    Allergic rhinitis     Anxiety 2022    Back pain     Chronic:under care of spine specialist:Dr. Adames    Cervical cancer screening     Nml per pt'    Closed compression fracture of L3 vertebra (HCC) 2024    Compression fracture of thoracic vertebrae, non-traumatic (HCC)     under care of endo:Dr. Zhu    COPD (chronic obstructive pulmonary disease) (HCC)     under care of pulmo:Dr. Nazario    COPD exacerbation (HCC) 2017    Elevated LDL cholesterol level     GERD (gastroesophageal reflux disease)     History of mammogram ;17    Nml per pt'. 17=negative.    HLD (hyperlipidemia) 2021    Hoarseness of voice 10/04/2017    Hypertension     Lung nodule:left 2017     1.2 cm indeterminate left upper lobe pulmonary nodule:under care of pulmo:Dr. Nazario    Mucus plugging of bronchi 2017    Osteoarthritis     Osteomyelitis of left leg (HCC)     Osteoporosis     under care

## 2025-07-31 ENCOUNTER — HOSPITAL ENCOUNTER (OUTPATIENT)
Dept: WOUND CARE | Age: 75
Discharge: HOME | End: 2025-07-31
Attending: STUDENT IN AN ORGANIZED HEALTH CARE EDUCATION/TRAINING PROGRAM
Payer: MEDICARE

## 2025-07-31 VITALS
SYSTOLIC BLOOD PRESSURE: 143 MMHG | HEART RATE: 90 BPM | TEMPERATURE: 96.7 F | DIASTOLIC BLOOD PRESSURE: 98 MMHG | RESPIRATION RATE: 18 BRPM

## 2025-07-31 DIAGNOSIS — L97.212 NON-PRESSURE CHRONIC ULCER OF RIGHT CALF WITH FAT LAYER EXPOSED (HCC): Primary | ICD-10-CM

## 2025-07-31 DIAGNOSIS — B35.1 DERMATOPHYTOSIS OF NAIL: ICD-10-CM

## 2025-07-31 PROCEDURE — 6370000000 HC RX 637 (ALT 250 FOR IP): Performed by: PODIATRIST

## 2025-07-31 PROCEDURE — 99211 OFF/OP EST MAY X REQ PHY/QHP: CPT

## 2025-07-31 RX ORDER — BETAMETHASONE DIPROPIONATE 0.5 MG/G
CREAM TOPICAL PRN
OUTPATIENT
Start: 2025-07-31

## 2025-07-31 RX ORDER — SILVER SULFADIAZINE 10 MG/G
CREAM TOPICAL PRN
OUTPATIENT
Start: 2025-07-31

## 2025-07-31 RX ORDER — MUPIROCIN 2 %
OINTMENT (GRAM) TOPICAL PRN
OUTPATIENT
Start: 2025-07-31

## 2025-07-31 RX ORDER — LIDOCAINE 50 MG/G
OINTMENT TOPICAL PRN
OUTPATIENT
Start: 2025-07-31

## 2025-07-31 RX ORDER — BACITRACIN ZINC AND POLYMYXIN B SULFATE 500; 1000 [USP'U]/G; [USP'U]/G
OINTMENT TOPICAL PRN
OUTPATIENT
Start: 2025-07-31

## 2025-07-31 RX ORDER — CLOBETASOL PROPIONATE 0.5 MG/G
OINTMENT TOPICAL PRN
OUTPATIENT
Start: 2025-07-31

## 2025-07-31 RX ORDER — GINSENG 100 MG
CAPSULE ORAL PRN
OUTPATIENT
Start: 2025-07-31

## 2025-07-31 RX ORDER — LIDOCAINE 40 MG/G
CREAM TOPICAL PRN
OUTPATIENT
Start: 2025-07-31

## 2025-07-31 RX ORDER — SODIUM CHLOR/HYPOCHLOROUS ACID 0.033 %
SOLUTION, IRRIGATION IRRIGATION PRN
OUTPATIENT
Start: 2025-07-31

## 2025-07-31 RX ORDER — LIDOCAINE HYDROCHLORIDE 20 MG/ML
JELLY TOPICAL PRN
OUTPATIENT
Start: 2025-07-31

## 2025-07-31 RX ORDER — LIDOCAINE 40 MG/G
CREAM TOPICAL PRN
Status: DISCONTINUED | OUTPATIENT
Start: 2025-07-31 | End: 2025-08-01 | Stop reason: HOSPADM

## 2025-07-31 RX ORDER — LIDOCAINE HYDROCHLORIDE 40 MG/ML
SOLUTION TOPICAL PRN
OUTPATIENT
Start: 2025-07-31

## 2025-07-31 RX ORDER — NEOMYCIN/BACITRACIN/POLYMYXINB 3.5-400-5K
OINTMENT (GRAM) TOPICAL PRN
OUTPATIENT
Start: 2025-07-31

## 2025-07-31 RX ORDER — GENTAMICIN SULFATE 1 MG/G
OINTMENT TOPICAL PRN
OUTPATIENT
Start: 2025-07-31

## 2025-07-31 RX ORDER — TRIAMCINOLONE ACETONIDE 1 MG/G
OINTMENT TOPICAL PRN
OUTPATIENT
Start: 2025-07-31

## 2025-07-31 NOTE — PROGRESS NOTES
[facility]  Progress Note and Procedure Note      Charlene Nazario  MEDICAL RECORD NUMBER:  [id number]  AGE: 75 y.o.   GENDER: female  : 1950  EPISODE DATE:  2025    Subjective:     Chief Complaint   Patient presents with    Wound Check     RLE           HISTORY of PRESENT ILLNESS HPI     Charlene Nazario is a 75 y.o. female who presents today for wound/ulcer evaluation.   History of Wound Context: Patient relates her ulcer right anterior leg may be healed.  Patient requests toenail debridement and relates she is unable to debride her own toenails.  Wound/Ulcer Pain Timing/Severity: intermittent, mild  Quality of pain: sharp  Severity:  3 / 10   Modifying Factors: Pain worsens with walking  Associated Signs/Symptoms: edema    Ulcer Identification:  Ulcer Type: venous and traumatic    Contributing Factors: edema    Acute Wound: N/A not an acute wound        PAST MEDICAL HISTORY        Diagnosis Date    Allergic rhinitis     Anxiety 2022    Back pain     Chronic:under care of spine specialist:Dr. Adames    Cervical cancer screening     Nml per pt'    Closed compression fracture of L3 vertebra (HCC) 2024    Compression fracture of thoracic vertebrae, non-traumatic (HCC)     under care of endo:Dr. Zhu    COPD (chronic obstructive pulmonary disease) (HCC)     under care of pulmo:Dr. Nazario    COPD exacerbation (HCC) 2017    Elevated LDL cholesterol level     GERD (gastroesophageal reflux disease)     History of mammogram ;17    Nml per pt'. 17=negative.    HLD (hyperlipidemia) 2021    Hoarseness of voice 10/04/2017    Hypertension     Lung nodule:left 2017     1.2 cm indeterminate left upper lobe pulmonary nodule:under care of pulmo:Dr. Nazario    Mucus plugging of bronchi 2017    Osteoarthritis     Osteomyelitis of left leg (HCC)     Osteoporosis     under care of endo:Dr. Zhu    S/P colonoscopy     Polyps:next in

## 2025-07-31 NOTE — PATIENT INSTRUCTIONS
DISCHARGE INSTRUCTIONS  Wound Clinic Physician Orders   [facility]   [address]  Telephone: [phone] FAX [phone]    NAME:  Charlene Nazario  YOB: 1950  MEDICAL RECORD NUMBER:  [id number]  DATE:  7/31/2025      Congratulations!! You have completed your treatment.   Return to your Primary Care Physician for all your health issues.   Resume your ordinary activities as tolerated.   Take your medications as prescribed by your primary care physician.   Check your skin daily for cracks, bruises, sores, or dryness. Use a moisturizer as needed.   Clean and dry your skin, using mild soap and warm water (not hot).   Avoid alcohol and caffeine and do not smoke.   Maintain a nutritious diet. Ask your primary care doctor about adding a multivitamin to your medication regimen.   Avoid pressure on your healed wound site.     Additional instructions for healed wound/skin care: Marathon was applied to your healed skin. Marathon Liquid Skin Protectant is a non-cytotoxic cyanoacrylate-based monomer that forms a remarkably strong protective layer over skin.1 It is designed to act as a barrier against moisture (e.g. body fluids, humidity) and friction, yet it allows the skin to breathe.   It will wear off in a couple days.    THANK YOU FOR ALLOWING US TO SERVE YOU. PLEASE CALL IF YOU DEVELOP ANOTHER WOUND [phone]      Electronically signed by Tad Moran RN on 7/31/2025 at 10:20 AM

## 2025-08-04 ENCOUNTER — OFFICE VISIT (OUTPATIENT)
Dept: PULMONOLOGY | Age: 75
End: 2025-08-04
Payer: MEDICARE

## 2025-08-04 VITALS
BODY MASS INDEX: 22.09 KG/M2 | WEIGHT: 117 LBS | DIASTOLIC BLOOD PRESSURE: 60 MMHG | OXYGEN SATURATION: 98 % | SYSTOLIC BLOOD PRESSURE: 114 MMHG | HEIGHT: 61 IN | HEART RATE: 78 BPM

## 2025-08-04 DIAGNOSIS — J96.11 CHRONIC RESPIRATORY FAILURE WITH HYPOXIA (HCC): ICD-10-CM

## 2025-08-04 DIAGNOSIS — F03.A0 MILD DEMENTIA, UNSPECIFIED DEMENTIA TYPE, UNSPECIFIED WHETHER BEHAVIORAL, PSYCHOTIC, OR MOOD DISTURBANCE OR ANXIETY (HCC): ICD-10-CM

## 2025-08-04 DIAGNOSIS — J44.9 MODERATE COPD (CHRONIC OBSTRUCTIVE PULMONARY DISEASE) (HCC): Primary | ICD-10-CM

## 2025-08-04 LAB — SPO2: NORMAL

## 2025-08-04 PROCEDURE — 3074F SYST BP LT 130 MM HG: CPT | Performed by: INTERNAL MEDICINE

## 2025-08-04 PROCEDURE — 1036F TOBACCO NON-USER: CPT | Performed by: INTERNAL MEDICINE

## 2025-08-04 PROCEDURE — G8420 CALC BMI NORM PARAMETERS: HCPCS | Performed by: INTERNAL MEDICINE

## 2025-08-04 PROCEDURE — 1090F PRES/ABSN URINE INCON ASSESS: CPT | Performed by: INTERNAL MEDICINE

## 2025-08-04 PROCEDURE — 3078F DIAST BP <80 MM HG: CPT | Performed by: INTERNAL MEDICINE

## 2025-08-04 PROCEDURE — G8399 PT W/DXA RESULTS DOCUMENT: HCPCS | Performed by: INTERNAL MEDICINE

## 2025-08-04 PROCEDURE — G8427 DOCREV CUR MEDS BY ELIG CLIN: HCPCS | Performed by: INTERNAL MEDICINE

## 2025-08-04 PROCEDURE — 3017F COLORECTAL CA SCREEN DOC REV: CPT | Performed by: INTERNAL MEDICINE

## 2025-08-04 PROCEDURE — 3023F SPIROM DOC REV: CPT | Performed by: INTERNAL MEDICINE

## 2025-08-04 PROCEDURE — G2211 COMPLEX E/M VISIT ADD ON: HCPCS | Performed by: INTERNAL MEDICINE

## 2025-08-04 PROCEDURE — 1159F MED LIST DOCD IN RCRD: CPT | Performed by: INTERNAL MEDICINE

## 2025-08-04 PROCEDURE — 1124F ACP DISCUSS-NO DSCNMKR DOCD: CPT | Performed by: INTERNAL MEDICINE

## 2025-08-04 PROCEDURE — 99215 OFFICE O/P EST HI 40 MIN: CPT | Performed by: INTERNAL MEDICINE

## 2025-08-04 RX ORDER — FLUTICASONE FUROATE, UMECLIDINIUM BROMIDE AND VILANTEROL TRIFENATATE 200; 62.5; 25 UG/1; UG/1; UG/1
1 POWDER RESPIRATORY (INHALATION) DAILY
Qty: 3 EACH | Refills: 3 | Status: SHIPPED | OUTPATIENT
Start: 2025-08-04

## 2025-08-07 ENCOUNTER — OFFICE VISIT (OUTPATIENT)
Dept: CARDIOLOGY CLINIC | Age: 75
End: 2025-08-07
Payer: MEDICARE

## 2025-08-07 VITALS
WEIGHT: 114 LBS | OXYGEN SATURATION: 97 % | SYSTOLIC BLOOD PRESSURE: 128 MMHG | BODY MASS INDEX: 21.54 KG/M2 | HEART RATE: 73 BPM | DIASTOLIC BLOOD PRESSURE: 84 MMHG

## 2025-08-07 DIAGNOSIS — E78.5 HYPERLIPIDEMIA, UNSPECIFIED HYPERLIPIDEMIA TYPE: ICD-10-CM

## 2025-08-07 DIAGNOSIS — I27.81 CHRONIC COR PULMONALE (HCC): Primary | ICD-10-CM

## 2025-08-07 PROCEDURE — 3079F DIAST BP 80-89 MM HG: CPT | Performed by: INTERNAL MEDICINE

## 2025-08-07 PROCEDURE — G8420 CALC BMI NORM PARAMETERS: HCPCS | Performed by: INTERNAL MEDICINE

## 2025-08-07 PROCEDURE — 99213 OFFICE O/P EST LOW 20 MIN: CPT | Performed by: INTERNAL MEDICINE

## 2025-08-07 PROCEDURE — 1160F RVW MEDS BY RX/DR IN RCRD: CPT | Performed by: INTERNAL MEDICINE

## 2025-08-07 PROCEDURE — G8427 DOCREV CUR MEDS BY ELIG CLIN: HCPCS | Performed by: INTERNAL MEDICINE

## 2025-08-07 PROCEDURE — 1036F TOBACCO NON-USER: CPT | Performed by: INTERNAL MEDICINE

## 2025-08-07 PROCEDURE — 1090F PRES/ABSN URINE INCON ASSESS: CPT | Performed by: INTERNAL MEDICINE

## 2025-08-07 PROCEDURE — 1124F ACP DISCUSS-NO DSCNMKR DOCD: CPT | Performed by: INTERNAL MEDICINE

## 2025-08-07 PROCEDURE — 3074F SYST BP LT 130 MM HG: CPT | Performed by: INTERNAL MEDICINE

## 2025-08-07 PROCEDURE — G8399 PT W/DXA RESULTS DOCUMENT: HCPCS | Performed by: INTERNAL MEDICINE

## 2025-08-07 PROCEDURE — 1159F MED LIST DOCD IN RCRD: CPT | Performed by: INTERNAL MEDICINE

## 2025-08-07 PROCEDURE — 3017F COLORECTAL CA SCREEN DOC REV: CPT | Performed by: INTERNAL MEDICINE

## 2025-08-16 DIAGNOSIS — G31.84 MILD COGNITIVE IMPAIRMENT: ICD-10-CM

## 2025-08-19 ENCOUNTER — HOSPITAL ENCOUNTER (OUTPATIENT)
Dept: PULMONOLOGY | Age: 75
Discharge: HOME | End: 2025-08-19
Attending: INTERNAL MEDICINE
Payer: MEDICARE

## 2025-08-19 DIAGNOSIS — J43.8 OTHER EMPHYSEMA (HCC): ICD-10-CM

## 2025-08-19 DIAGNOSIS — J44.9 COPD, MODERATE (HCC): ICD-10-CM

## 2025-08-19 PROCEDURE — 94760 N-INVAS EAR/PLS OXIMETRY 1: CPT

## 2025-08-19 PROCEDURE — 94060 EVALUATION OF WHEEZING: CPT

## 2025-08-19 PROCEDURE — 94664 DEMO&/EVAL PT USE INHALER: CPT

## 2025-08-19 PROCEDURE — 94729 DIFFUSING CAPACITY: CPT

## 2025-08-19 PROCEDURE — 94726 PLETHYSMOGRAPHY LUNG VOLUMES: CPT

## 2025-08-19 PROCEDURE — 6360000002 HC RX W HCPCS: Performed by: INTERNAL MEDICINE

## 2025-08-19 RX ORDER — ALBUTEROL SULFATE 0.83 MG/ML
2.5 SOLUTION RESPIRATORY (INHALATION) ONCE
Status: COMPLETED | OUTPATIENT
Start: 2025-08-19 | End: 2025-08-19

## 2025-08-19 RX ORDER — DONEPEZIL HYDROCHLORIDE 5 MG/1
5 TABLET, FILM COATED ORAL NIGHTLY
Qty: 90 TABLET | Refills: 1 | OUTPATIENT
Start: 2025-08-19

## 2025-08-19 RX ORDER — DONEPEZIL HYDROCHLORIDE 10 MG/1
10 TABLET, FILM COATED ORAL NIGHTLY
Qty: 90 TABLET | Refills: 1 | Status: SHIPPED | OUTPATIENT
Start: 2025-08-19

## 2025-08-19 RX ADMIN — ALBUTEROL SULFATE 2.5 MG: 2.5 SOLUTION RESPIRATORY (INHALATION) at 12:43

## 2025-08-21 PROBLEM — J43.8 OTHER EMPHYSEMA (HCC): Status: ACTIVE | Noted: 2025-08-21

## 2025-08-28 DIAGNOSIS — R06.02 SOB (SHORTNESS OF BREATH): ICD-10-CM

## 2025-08-28 RX ORDER — MONTELUKAST SODIUM 10 MG/1
10 TABLET ORAL DAILY
Qty: 90 TABLET | Refills: 3 | Status: SHIPPED | OUTPATIENT
Start: 2025-08-28